# Patient Record
Sex: FEMALE | Race: WHITE | Employment: OTHER | URBAN - METROPOLITAN AREA
[De-identification: names, ages, dates, MRNs, and addresses within clinical notes are randomized per-mention and may not be internally consistent; named-entity substitution may affect disease eponyms.]

---

## 2017-02-01 ENCOUNTER — TRANSFERRED RECORDS (OUTPATIENT)
Dept: HEALTH INFORMATION MANAGEMENT | Facility: CLINIC | Age: 82
End: 2017-02-01

## 2017-02-06 ENCOUNTER — OFFICE VISIT (OUTPATIENT)
Dept: FAMILY MEDICINE | Facility: CLINIC | Age: 82
End: 2017-02-06
Payer: MEDICARE

## 2017-02-06 VITALS
TEMPERATURE: 97.8 F | WEIGHT: 167 LBS | OXYGEN SATURATION: 95 % | HEIGHT: 61 IN | DIASTOLIC BLOOD PRESSURE: 70 MMHG | BODY MASS INDEX: 31.53 KG/M2 | HEART RATE: 82 BPM | SYSTOLIC BLOOD PRESSURE: 180 MMHG

## 2017-02-06 DIAGNOSIS — I87.8 VENOUS STASIS OF LOWER EXTREMITY: ICD-10-CM

## 2017-02-06 DIAGNOSIS — J44.9 COPD, MODERATE (H): ICD-10-CM

## 2017-02-06 DIAGNOSIS — Z51.81 MEDICATION MONITORING ENCOUNTER: ICD-10-CM

## 2017-02-06 DIAGNOSIS — M15.0 PRIMARY OSTEOARTHRITIS INVOLVING MULTIPLE JOINTS: ICD-10-CM

## 2017-02-06 DIAGNOSIS — N18.9 ANEMIA IN CHRONIC RENAL DISEASE: ICD-10-CM

## 2017-02-06 DIAGNOSIS — D63.1 ANEMIA IN CHRONIC RENAL DISEASE: ICD-10-CM

## 2017-02-06 DIAGNOSIS — N18.4 CKD (CHRONIC KIDNEY DISEASE) STAGE 4, GFR 15-29 ML/MIN (H): Primary | ICD-10-CM

## 2017-02-06 DIAGNOSIS — G62.9 PERIPHERAL POLYNEUROPATHY: ICD-10-CM

## 2017-02-06 DIAGNOSIS — E78.5 HYPERLIPIDEMIA LDL GOAL <100: ICD-10-CM

## 2017-02-06 DIAGNOSIS — C34.12 MALIGNANT NEOPLASM OF UPPER LOBE OF LEFT LUNG (H): ICD-10-CM

## 2017-02-06 DIAGNOSIS — I10 HYPERTENSION GOAL BP (BLOOD PRESSURE) < 140/90: ICD-10-CM

## 2017-02-06 PROCEDURE — 99214 OFFICE O/P EST MOD 30 MIN: CPT | Performed by: FAMILY MEDICINE

## 2017-02-06 RX ORDER — FUROSEMIDE 20 MG
20 TABLET ORAL 2 TIMES DAILY
Qty: 30 TABLET | COMMUNITY
Start: 2017-02-06 | End: 2017-02-20

## 2017-02-06 RX ORDER — LOSARTAN POTASSIUM 50 MG/1
50 TABLET ORAL DAILY
Qty: 30 TABLET | Refills: 1 | Status: SHIPPED | OUTPATIENT
Start: 2017-02-06 | End: 2017-03-25

## 2017-02-06 RX ORDER — AMLODIPINE BESYLATE 5 MG/1
5 TABLET ORAL 2 TIMES DAILY
Qty: 90 TABLET | Refills: 3 | COMMUNITY
Start: 2017-02-06 | End: 2017-02-06

## 2017-02-06 RX ORDER — AMLODIPINE BESYLATE 5 MG/1
5 TABLET ORAL DAILY
Qty: 90 TABLET | Refills: 3 | Status: SHIPPED | OUTPATIENT
Start: 2017-02-06 | End: 2018-02-19

## 2017-02-06 NOTE — PATIENT INSTRUCTIONS
2/6/17    Amlodipine- Down to once a day 5 mg daily to reduce edema.  Losartan-Use as directed - 50 mg daily    Ibuprofen - 200 mg, 2 twice a day with food/fluids  Metoprolol - 50 mg twice daily.  Lasix - 20 mg twice daily  Aspirin 81 mg daily    [facility] ([facility] - [place]                        To reach your care team during and after hours:   [phone]  To reach our pharmacy:        [phone]    Clinic Hours                        Our clinic hours are:    Monday   7:30 am to 7:00 pm                  Tuesday through Friday 7:30 am to 5:00 pm                             Saturday   8:00 am to 12:00 pm      Sunday   Closed      Pharmacy Hours                        Our pharmacy hours are:    Monday   8:30 am to 7:00 pm       Tuesday to Friday  8:30 am to 6:00 pm                       Saturday    9:00 am to 1:00 pm              Sunday    Closed              There is also information available at our web site:  [email or web address]    If your provider ordered any lab tests and you do not receive the results within 10 business days, please call the clinic.    If you need a medication refill please contact your pharmacy.  Please allow 2-3 business days for your refill to be completed.    Our clinic offers telephone visits and e visits.  Please ask one of your team members to explain more.      Use [employer] (secure email communication and access to your chart) to send your primary care provider a message or make an appointment. Ask someone on your Team how to sign up for [employer].  Immunizations                      Immunization History   Administered Date(s) Administered     Influenza (High Dose) 3 valent vaccine 10/07/2010, 09/19/2012, 11/04/2013, 10/08/2014, 11/03/2015, 09/16/2016     Influenza (IIV3) 10/19/2004, 11/15/2005     Pneumococcal (PCV 13) 11/03/2015     Pneumococcal 23 valent 04/25/2006     TD (ADULT, 7+) 03/03/1998, 01/23/2006     TDAP (BOOSTRIX AGES 10-64) 08/27/2012     Zoster vaccine, live 10/24/2012         Health Maintenance                         Health Maintenance Due   Topic Date Due     URINE DRUG SCREEN Q1 YR  01/25/1950     Medicare Annual Wellness Visit  11/21/2015     Wellness Visit with your Primary Provider - yearly  11/21/2015     Osteoporosis Screening (Dexa) - every 3 years   01/11/2016     COPD ACTION PLAN Q1 YR  06/01/2016     Discuss Advance Directive Planning  07/15/2016

## 2017-02-06 NOTE — MR AVS SNAPSHOT
After Visit Summary   2/6/2017    Anat Correa    MRN: [id number]           Patient Information     Date Of Birth          1935        Visit Information        Provider Department      2/6/2017 3:40 PM Rashi Calle MD [facility]        Today's Diagnoses     Hypertension goal BP (blood pressure) < 140/90    -  1     CKD (chronic kidney disease) stage 4, GFR 15-29 ml/min (H)         Hyperlipidemia LDL goal <100         COPD, moderate         Malignant neoplasm of upper lobe of left lung (H)         Venous stasis of lower extremity         Peripheral polyneuropathy (H)         Anemia in chronic renal disease         Medication monitoring encounter           Care Instructions    2/6/17    Amlodipine- Down to once a day 5 mg daily to reduce edema.  Losartan-Use as directed - 50 mg daily    Ibuprofen - 200 mg, 2 twice a day with food/fluids  Metoprolol - 50 mg twice daily.  Lasix - 20 mg twice daily  Aspirin 81 mg daily    [facility] - [place]                        To reach your care team during and after hours:   [phone]  To reach our pharmacy:        [phone]    Clinic Hours                        Our clinic hours are:    Monday   7:30 am to 7:00 pm                  Tuesday through Friday 7:30 am to 5:00 pm                             Saturday   8:00 am to 12:00 pm      Sunday   Closed      Pharmacy Hours                        Our pharmacy hours are:    Monday   8:30 am to 7:00 pm       Tuesday to Friday  8:30 am to 6:00 pm                       Saturday    9:00 am to 1:00 pm              Sunday    Closed              There is also information available at our web site:  [email or web address]    If your provider ordered any lab tests and you do not receive the results within 10 business days, please call the clinic.    If you need a medication refill please contact your pharmacy.  Please allow 2-3 business days for your refill to be completed.    Our clinic offers  telephone visits and e visits.  Please ask one of your team members to explain more.      Use [employer] (secure email communication and access to your chart) to send your primary care provider a message or make an appointment. Ask someone on your Team how to sign up for [employer].  Immunizations                      Immunization History   Administered Date(s) Administered     Influenza (High Dose) 3 valent vaccine 10/07/2010, 09/19/2012, 11/04/2013, 10/08/2014, 11/03/2015, 09/16/2016     Influenza (IIV3) 10/19/2004, 11/15/2005     Pneumococcal (PCV 13) 11/03/2015     Pneumococcal 23 valent 04/25/2006     TD (ADULT, 7+) 03/03/1998, 01/23/2006     TDAP (BOOSTRIX AGES 10-64) 08/27/2012     Zoster vaccine, live 10/24/2012        Health Maintenance                         Health Maintenance Due   Topic Date Due     URINE DRUG SCREEN Q1 YR  01/25/1950     Medicare Annual Wellness Visit  11/21/2015     Wellness Visit with your Primary Provider - yearly  11/21/2015     Osteoporosis Screening (Dexa) - every 3 years   01/11/2016     COPD ACTION PLAN Q1 YR  06/01/2016     Discuss Advance Directive Planning  07/15/2016               Follow-ups after your visit        Who to contact     If you have questions or need follow up information about today's clinic visit or your schedule please contact [facility] directly at [phone].  Normal or non-critical lab and imaging results will be communicated to you by [employer], letter or phone within 4 business days after the clinic has received the results. If you do not hear from us within 7 days, please contact the clinic through [employer] or phone. If you have a critical or abnormal lab result, we will notify you by phone as soon as possible.  Submit refill requests through [employer] or call your pharmacy and they will forward the refill request to us. Please allow 3 business days for your refill to be completed.          Additional Information About Your Visit        [employer]  "Information     [employer] lets you send messages to your doctor, view your test results, renew your prescriptions, schedule appointments and more. To sign up, go to [email or web address] . Click on \"Log in\" on the left side of the screen, which will take you to the Welcome page. Then click on \"Sign up Now\" on the right side of the page.     You will be asked to enter the access code listed below, as well as some personal information. Please follow the directions to create your username and password.     Your access code is: [email or web address]  Expires: 2017  4:28 PM     Your access code will  in 90 days. If you need help or a new code, please call your [place] clinic or [phone].        Care EveryWhere ID     This is your Care EveryWhere ID. This could be used by other organizations to access your [place] medical records  [id number]        Your Vitals Were     Pulse Temperature Height BMI (Body Mass Index) Pulse Oximetry       82 97.8  F (36.6  C) (Oral) 5' 1\" (1.549 m) 31.57 kg/m2 95%        Blood Pressure from Last 3 Encounters:   17 180/70   16 158/72   11/10/16 152/60    Weight from Last 3 Encounters:   17 167 lb (75.751 kg)   16 162 lb (73.483 kg)   10/12/16 162 lb (73.483 kg)              Today, you had the following     No orders found for display         Today's Medication Changes          These changes are accurate as of: 17  4:28 PM.  If you have any questions, ask your nurse or doctor.               Start taking these medicines.        Dose/Directions    amLODIPine 5 MG tablet   Commonly known as:  NORVASC   Used for:  Hypertension goal BP (blood pressure) < 140/90, CKD (chronic kidney disease) stage 4, GFR 15-29 ml/min (H)   Started by:  Rashi Calle MD        Dose:  5 mg   Take 1 tablet (5 mg) by mouth daily   Quantity:  90 tablet   Refills:  3       losartan 50 MG tablet   Commonly known as:  COZAAR   Used for:  Hypertension goal BP (blood pressure) < 140/90, CKD " (chronic kidney disease) stage 4, GFR 15-29 ml/min (H)   Started by:  Rashi Calle MD        Dose:  50 mg   Take 1 tablet (50 mg) by mouth daily   Quantity:  30 tablet   Refills:  1         These medicines have changed or have updated prescriptions.        Dose/Directions    LASIX 20 MG tablet   This may have changed:  when to take this   Generic drug:  furosemide   Changed by:  Rashi Calle MD        Dose:  20 mg   Take 1 tablet (20 mg) by mouth 2 times daily   Quantity:  30 tablet   Refills:  0            Where to get your medicines      These medications were sent to [facility] [place] - [place], [place] - [address] [place] [address]  [address] [place] [id number]     Phone:  [phone]    - amLODIPine 5 MG tablet  - losartan 50 MG tablet             Primary Care Provider Office Phone # Fax #    Rashi Calle [phone]       [address] [address]        Thank you!     Thank you for choosing [facility]  for your care. Our goal is always to provide you with excellent care. Hearing back from our patients is one way we can continue to improve our services. Please take a few minutes to complete the written survey that you may receive in the mail after your visit with us. Thank you!             Your Updated Medication List - Protect others around you: Learn how to safely use, store and throw away your medicines at www.disposemymeds.org.          This list is accurate as of: 2/6/17  4:28 PM.  Always use your most recent med list.                   Brand Name Dispense Instructions for use    acetaminophen-codeine 300-30 MG per tablet    TYLENOL #3    90 tablet    Take 1-2 tablets by mouth every 6 hours as needed for moderate pain       albuterol 108 (90 BASE) MCG/ACT Inhaler    PROAIR HFA/PROVENTIL HFA/VENTOLIN HFA    3 Inhaler    Inhale 2 puffs into the lungs every 6 hours as needed for shortness of breath / dyspnea        amLODIPine 5 MG tablet    NORVASC    90 tablet    Take 1 tablet (5 mg) by mouth daily       calcium carbonate 500 MG chewable tablet    TUMS     Take 2 chew tab by mouth daily as needed for heartburn       IBUPROFEN PO      Take 400 mg by mouth every 6 hours as needed for moderate pain       ipratropium - albuterol 0.5 mg/2.5 mg/3 mL 0.5-2.5 (3) MG/3ML neb solution    DUONEB    90 vial    Take 1 vial (3 mLs) by nebulization every 6 hours as needed for shortness of breath / dyspnea or wheezing       LASIX 20 MG tablet   Generic drug:  furosemide     30 tablet    Take 1 tablet (20 mg) by mouth 2 times daily       losartan 50 MG tablet    COZAAR    30 tablet    Take 1 tablet (50 mg) by mouth daily       MAGIC MOUTHWASH (ENTER INGREDIENTS IN COMMENTS)     180 mL    Swish and spit 5-10 mLs in mouth every 6 hours as needed       METOPROLOL SUCCINATE ER PO      Take 100 mg by mouth daily (2 x 50mg = 100mg)       potassium chloride SA 10 MEQ CR tablet    K-DUR/KLOR-CON M    90 tablet    Take 1 tablet (10 mEq) by mouth daily

## 2017-02-06 NOTE — NURSING NOTE
"Chief Complaint   Patient presents with     Hypertension       Initial /78 mmHg  Pulse 82  Temp(Src) 97.8  F (36.6  C) (Oral)  Ht 5' 1\" (1.549 m)  Wt 167 lb (75.751 kg)  BMI 31.57 kg/m2  SpO2 95% Estimated body mass index is 31.57 kg/(m^2) as calculated from the following:    Height as of this encounter: 5' 1\" (1.549 m).    Weight as of this encounter: 167 lb (75.751 kg)..  BP completed using cuff size: james Rosario MA  "

## 2017-02-06 NOTE — PROGRESS NOTES
SUBJECTIVE:                                                    Anat Correa is a 82 year old female who presents to clinic today for the following health issues:    Anat presented to the clinic for bp issues.   Her left ring finger and right shoulder are causing her discomfort today.  Upon palpitation of some edema on her lower leg it was painful.       CKD/Hypertension Follow-up      Outpatient blood pressures are not being checked.    Low Salt Diet: no added salt    BP Readings from Last 3 Encounters:   02/06/17 180/70   12/07/16 158/72   11/10/16 152/60     Lipids    Recent Labs   Lab Test  03/09/16   0810  06/01/15   0818  09/25/12   0723   CHOL  211*  209*  156   HDL  54  55  49*   LDL  130*  128  83   TRIG  136  132  118   CHOLHDLRATIO   --   3.8  3.2     COPD stable    Lung cancer - stable followed by Dr Jay ERVIN - stable       Amount of exercise or physical activity:     Problems taking medications regularly: No    Medication side effects: With amlodipine some edema of the lower leg.      Diet: low salt        Problem list and histories reviewed & adjusted, as indicated.  Additional history: as documented.    Wt Readings from Last 4 Encounters:   02/06/17 167 lb (75.751 kg)   12/07/16 162 lb (73.483 kg)   10/12/16 162 lb (73.483 kg)   09/14/16 159 lb (72.122 kg)       Health Maintenance    Health Maintenance Due   Topic Date Due     URINE DRUG SCREEN Q1 YR  01/25/1950     MEDICARE ANNUAL WELLNESS VISIT  11/21/2015     WELLNESS VISIT Q1 YR (NO INBASKET)  11/21/2015     DEXA Q3 YR  01/11/2016     COPD ACTION PLAN Q1 YR  06/01/2016     ADVANCE DIRECTIVE PLANNING Q5 YRS (NO INBASKET)  07/15/2016       Current Problem List    Patient Active Problem List   Diagnosis     History of colonic polyps     Calculus of kidney     Lesion of plantar nerve     Osteoporosis     Primary iridocyclitis     Anemia     Hyperlipidemia LDL goal <100     OA (osteoarthritis)     Advanced directives,  counseling/discussion     Hypertension goal BP (blood pressure) < 140/90     COPD, moderate     Medication management     Vitamin D deficiency     CKD (chronic kidney disease) stage 4, GFR 15-29 ml/min (H)     Anemia in chronic renal disease     Secondary renal hyperparathyroidism (H)     Venous stasis of lower extremity     Peripheral neuropathy (H)     Hip pain, left     Chronic pain     Lung nodule     Nodule of left lung     Malignant neoplasm of upper lobe of left lung (H)       Past Medical History    Past Medical History   Diagnosis Date     Primary iridocyclitis 1998     iritis dr dominguez     Osteoporosis, unspecified 2/02     FOSAMAX  = upset stomach , pt declines further rx.      Lesion of plantar nerve 8/98     hay's neuroma dr connor     OA (osteoarthritis) 1998     lower ext worse katya knees     Calculus of kidney 1998     dr hope     Personal history of colonic polyps 7/98     dr araujo     Irritable bowel syndrome 7/03     Diverticulosis of colon (without mention of hemorrhage) 7/03     Internal hemorrhoids without mention of complication 7/03     Other ulcerative colitis 7/03     collangenous collitis- last colonoscopy 7/03      Hypertension goal BP (blood pressure) < 140/90 2005     COPD, moderate (H) 2004     moderate obstruction, with pulm htn - dr francisco did AAP     CKD (chronic kidney disease) stage 4, GFR 15-29 ml/min (H) 2008     dr mcdermott     Hemorrhage of gastrointestinal tract, unspecified 4/08     dr su     Anemia      Hyperlipidemia LDL goal <100 2006     Medication management      OA - 1 T#3 at HS with HX CKD     PONV (postoperative nausea and vomiting)      Venous stasis of lower extremity      Peripheral neuropathy (H)      early - burning at HS     Obesity (BMI 30.0-34.9)      Chronic pain      OA     Lung nodule 4/14, 3/16     Dr Thompson, 1.4 x 1.1 cm, lingula, PET neg 3/16     Malignant neoplasm of upper lobe of left lung (H) 7/16     Dr Thompson - x 2 nodules - moderately  differentiated adenocarcinoma (acinar predominant).  Final pathologic stage [id number], Final clinical stage IA, grade 2       Past Surgical History    Past Surgical History   Procedure Laterality Date     Hc hemorrhoidectomy,int/ext,simple       C appendectomy       C  delivery only  1965     , Low Cervical     Hc repair sliding inguinal hernia       mitra     Surgical history of -        mitra neck & back tumors     Hc colonoscopy thru stoma, diagnostic       IBS/diverticula/hemmorhoids dr araujo     Surgical history of -        neuroma excision - 2nd toe amputation     Surgical history of -   3/07     Rt IOL - dr jimenez     Surgical history of -        Lt IOL - dr jimenez     Hc esophagoscopy, diagnostic  , , 6/10     Gastric ulcer, healed, gastritis     Surgical history of -        Lt Knee replacement - dr gayle     Surgical history of -        Rt Knee replacement - dr gayle     Excise mass upper extremity  10/13/2011     Lt Forearm mass excision - dr ely     Excise mass upper extremity  2012     Lt Forearm mass excision - dr ely     Xr joint injection hip (hib)  2015     Rt - Dr Terry     Surgical history of -   9/15     Rt Second toe amputation - Dr White     Amputate toe(s) Right 2015     Procedure: AMPUTATE TOE(S);  Surgeon: Ashia White DPM, Pod;  Location: [facility] OR     Thoracoscopic wedge resection lung Left 2016     Procedure: THORACOSCOPIC WEDGE RESECTION LUNG;  Surgeon: Abdelrahman Thompson MD;  Location:  OR       Current Medications    Current Outpatient Prescriptions   Medication Sig Dispense Refill     furosemide (LASIX) 20 MG tablet Take 1 tablet (20 mg) by mouth 2 times daily 30 tablet      losartan (COZAAR) 50 MG tablet Take 1 tablet (50 mg) by mouth daily 30 tablet 1     amLODIPine (NORVASC) 5 MG tablet Take 1 tablet (5 mg) by mouth daily 90 tablet 3     acetaminophen-codeine (TYLENOL #3) 300-30 MG  per tablet Take 1-2 tablets by mouth every 6 hours as needed for moderate pain 90 tablet 0     MAGIC MOUTHWASH, ENTER INGREDIENTS IN COMMENTS, Swish and spit 5-10 mLs in mouth every 6 hours as needed 180 mL 1     albuterol (PROAIR HFA, PROVENTIL HFA, VENTOLIN HFA) 108 (90 BASE) MCG/ACT inhaler Inhale 2 puffs into the lungs every 6 hours as needed for shortness of breath / dyspnea 3 Inhaler 3     potassium chloride SA (K-DUR,KLOR-CON M) 10 MEQ tablet Take 1 tablet (10 mEq) by mouth daily 90 tablet 1     IBUPROFEN PO Take 400 mg by mouth every 6 hours as needed for moderate pain       calcium carbonate (TUMS) 500 MG chewable tablet Take 2 chew tab by mouth daily as needed for heartburn       METOPROLOL SUCCINATE ER PO Take 100 mg by mouth daily (2 x 50mg = 100mg)       ipratropium - albuterol 0.5 mg/2.5 mg/3 mL (DUONEB) 0.5-2.5 (3) MG/3ML nebulization Take 1 vial (3 mLs) by nebulization every 6 hours as needed for shortness of breath / dyspnea or wheezing 90 vial 3       Allergies    Allergies   Allergen Reactions     Prednisone      Yeast infection - swollen lips       Immunizations    Immunization History   Administered Date(s) Administered     Influenza (High Dose) 3 valent vaccine 10/07/2010, 09/19/2012, 11/04/2013, 10/08/2014, 11/03/2015, 09/16/2016     Influenza (IIV3) 10/19/2004, 11/15/2005     Pneumococcal (PCV 13) 11/03/2015     Pneumococcal 23 valent 04/25/2006     TD (ADULT, 7+) 03/03/1998, 01/23/2006     TDAP (BOOSTRIX AGES 10-64) 08/27/2012     Zoster vaccine, live 10/24/2012       Family History    Family History   Problem Relation Age of Onset     Cancer - colorectal Brother      Cancer - colorectal Brother      Breast Cancer Sister      CANCER Sister      lung     CANCER Sister      lung     CANCER Sister      lung     CEREBROVASCULAR DISEASE Mother      CEREBROVASCULAR DISEASE Father      Scleroderma Sister        Social History    Social History     Social History     Marital Status:       "Spouse Name: thanh     Number of Children: 1     Years of Education: 12     Occupational History     part-time [employer] section at [employer]       Social History Main Topics     Smoking status: Former Smoker -- 3.00 packs/day for 50 years     Types: Cigarettes     Quit date: 12/21/1993     Smokeless tobacco: Never Used      Comment: quit 1993     Alcohol Use: No     Drug Use: No      Comment: no herbal meds either      Sexual Activity: Not Currently      Comment:  for 24 years      Other Topics Concern     Caffeine Concern Yes     1 pot  qd - switched to decaff now     Special Diet Yes     Low salt     Exercise No     Seat Belt Yes     Self-Exams Yes     SBE encouraged motnhly      Parent/Sibling W/ Cabg, Mi Or Angioplasty Before 65f 55m? No     Social History Narrative    calcium - 3 large dairy servings/day - pt declines fosamax and other meds for her osteoporosis    flex sig/colonoscopy -last colonoscopy 7/03     sun precautions - discussed     mammogram - hasn't had one since 2001 at least - ordered     Td booster - 1/23/06    pneumovax -today 4/25/06    DEXA - pt declines repeat scan today 4/25/06 despite her osteoporosis     stool hemoccults - every year after age 40    ASA- easy bruising - can't take     mulvitamin - encouraged       All above reviewed and updated, all stable unless otherwise noted    Recent labs reviewed    ROS:  Constitutional, HEENT, cardiovascular, pulmonary, GI, , musculoskeletal, neuro, skin, endocrine and psych systems are negative, except as in HPI or otherwise noted       OBJECTIVE:                                                    /70 mmHg  Pulse 82  Temp(Src) 97.8  F (36.6  C) (Oral)  Ht 5' 1\" (1.549 m)  Wt 167 lb (75.751 kg)  BMI 31.57 kg/m2  SpO2 95%  Body mass index is 31.57 kg/(m^2).  GENERAL: healthy, alert and no distress, Overweight   EYES: Eyes grossly normal to inspection, extraocular movements - intact, and PERRL  HENT: ear canals- normal; TMs- " normal; Nose- normal; Mouth- no ulcers, no lesions  NECK: no tenderness, no adenopathy, no asymmetry, no masses, no stiffness; thyroid- normal to palpation  RESP: lungs clear to auscultation - no rales, no rhonchi, no wheezes  CV: regular rates and rhythm, normal S1 S2, no S3 or S4 and no murmur, no click or rub -  ABDOMEN: soft, no tenderness, no  hepatosplenomegaly, no masses, normal bowel sounds  MS: extremities- no gross deformities noted, some edema with pain on palpitation    SKIN: no suspicious lesions, no rashes  NEURO: strength and tone- normal, sensory exam- grossly normal, mentation- intact, speech- normal, reflexes- symmetric  BACK: no CVA tenderness, no paralumbar tenderness  PSYCH: Alert and oriented times 3; speech- coherent , normal rate and volume; able to articulate logical thoughts, able to abstract reason, no tangential thoughts, no hallucinations or delusions, affect- normal    DIAGNOSTICS/PROCEDURES:                                                      Results for orders placed or performed in visit on 12/07/16   Comprehensive metabolic panel   Result Value Ref Range    Sodium 133 133 - 144 mmol/L    Potassium 4.7 3.4 - 5.3 mmol/L    Chloride 99 94 - 109 mmol/L    Carbon Dioxide 25 20 - 32 mmol/L    Anion Gap 9 3 - 14 mmol/L    Glucose 83 70 - 99 mg/dL    Urea Nitrogen 28 7 - 30 mg/dL    Creatinine 1.35 (H) 0.52 - 1.04 mg/dL    GFR Estimate 38 (L) >60 mL/min/1.7m2    GFR Estimate If Black 46 (L) >60 mL/min/1.7m2    Calcium 9.7 8.5 - 10.1 mg/dL    Bilirubin Total 0.3 0.2 - 1.3 mg/dL    Albumin 3.8 3.4 - 5.0 g/dL    Protein Total 7.2 6.8 - 8.8 g/dL    Alkaline Phosphatase 75 40 - 150 U/L    ALT 20 0 - 50 U/L    AST 17 0 - 45 U/L   CBC with platelets   Result Value Ref Range    WBC 8.7 4.0 - 11.0 10e9/L    RBC Count 3.61 (L) 3.8 - 5.2 10e12/L    Hemoglobin 11.2 (L) 11.7 - 15.7 g/dL    Hematocrit 33.1 (L) 35.0 - 47.0 %    MCV 92 78 - 100 fl    MCH 31.0 26.5 - 33.0 pg    MCHC 33.8 31.5 - 36.5 g/dL     RDW 12.5 10.0 - 15.0 %    Platelet Count 313 150 - 450 10e9/L   Parathyroid Hormone Intact   Result Value Ref Range    Parathyroid Hormone Intact 12 12 - 72 pg/mL   Vitamin D Deficiency   Result Value Ref Range    Vitamin D Deficiency screening 31 20 - 75 ug/L        ASSESSMENT/PLAN:                                                        ICD-10-CM    1. CKD (chronic kidney disease) stage 4, GFR 15-29 ml/min (H) N18.4 losartan (COZAAR) 50 MG tablet     amLODIPine (NORVASC) 5 MG tablet   2. Hypertension goal BP (blood pressure) < 140/90 I10 losartan (COZAAR) 50 MG tablet     amLODIPine (NORVASC) 5 MG tablet     DISCONTINUED: amLODIPine (NORVASC) 5 MG tablet   3. Hyperlipidemia LDL goal <100 E78.5    4. COPD, moderate J44.9    5. Malignant neoplasm of upper lobe of left lung (H) C34.12    6. Venous stasis of lower extremity I87.8    7. Peripheral polyneuropathy (H) G62.9    8. Anemia in chronic renal disease N18.9     D63.1    9. Primary osteoarthritis involving multiple joints M15.0    10. Medication monitoring encounter Z51.81        Discussed treatment/modality options, including risk and benefits, she desires advised aspirin 81 mg po daily, heat, ice, further health care maintenance, medication change(s), new medication(s), OTC meds and observation. All diagnosis above reviewed and noted above, otherwise stable.  See [facility] orders for further details.  Follow up in 2 week(s) and as needed.    Changed amlodipine to once a day at 5 mg to reduce edema, prescribed losartan, and advised heat and ice for OA.  Continue current cares.    Health Maintenance Due   Topic Date Due     URINE DRUG SCREEN Q1 YR  01/25/1950     MEDICARE ANNUAL WELLNESS VISIT  11/21/2015     WELLNESS VISIT Q1 YR (NO INBASKET)  11/21/2015     DEXA Q3 YR  01/11/2016     COPD ACTION PLAN Q1 YR  06/01/2016     ADVANCE DIRECTIVE PLANNING Q5 YRS (NO INBASKET)  07/15/2016       See Patient Instructions  This document serves as a record of the  services and decisions personally performed and made by Rashi Calle MD FAAFP. It was created on their behalf by Dougie Vega, a trained medical scribe. The creation of this document is based the provider's statements to the medical scribe.  Dougie Vega February 6, 2017 3:53 PM             Rashi Calle MD FAAFP  [address]  [id number] [phone] Fax

## 2017-02-09 ENCOUNTER — TELEPHONE (OUTPATIENT)
Dept: FAMILY MEDICINE | Facility: CLINIC | Age: 82
End: 2017-02-09

## 2017-02-09 ENCOUNTER — ALLIED HEALTH/NURSE VISIT (OUTPATIENT)
Dept: FAMILY MEDICINE | Facility: CLINIC | Age: 82
End: 2017-02-09
Payer: MEDICARE

## 2017-02-09 VITALS — SYSTOLIC BLOOD PRESSURE: 160 MMHG | DIASTOLIC BLOOD PRESSURE: 68 MMHG

## 2017-02-09 DIAGNOSIS — I10 HYPERTENSION GOAL BP (BLOOD PRESSURE) < 140/90: Primary | ICD-10-CM

## 2017-02-09 PROCEDURE — 99207 ZZC NO CHARGE NURSE ONLY: CPT | Performed by: FAMILY MEDICINE

## 2017-02-09 NOTE — PROGRESS NOTES
Anat Correa is enrolled/participating in the retail pharmacy Blood Pressure Goals Achievement Program (BPGAP).  Anat Correa was evaluated at [facility] on February 9, 2017 at which time her blood pressure was:    BP Readings from Last 3 Encounters:   02/09/17 160/68   02/06/17 180/70   12/07/16 158/72     Reviewed lifestyle modifications for blood pressure control and reduction: including making healthy food choices, managing weight, getting regular exercise, smoking cessation, reducing alcohol consumption, monitoring blood pressure regularly.     Anat Correa is not experiencing symptoms.    Follow-Up: BP is not at goal of < 150/90 mmHg (patient 60+ years of age without diabetes), Recommended follow-up in 1 month at the pharmacy. Routing to PCP as an FYI.    Completed by: Thank you,  Alysa Hays [facility], Mgr [facility] [place] [phone]    Pt reports that she is stuck between amlodipine dosing of 2 daily and leg edema presumably from amlodipine.

## 2017-02-09 NOTE — TELEPHONE ENCOUNTER
Anat Correa is enrolled/participating in the retail pharmacy Blood Pressure Goals Achievement Program (BPGAP).  Anat Correa was evaluated at [facility] on February 9, 2017 at which time her blood pressure was:    BP Readings from Last 3 Encounters:   02/09/17 160/68   02/06/17 180/70   12/07/16 158/72     Reviewed lifestyle modifications for blood pressure control and reduction: including making healthy food choices, managing weight, getting regular exercise, smoking cessation, reducing alcohol consumption, monitoring blood pressure regularly.     Anat Correa is not experiencing symptoms.    Follow-Up: BP is not at goal of < 150/90 mmHg (patient 60+ years of age without diabetes), Recommended follow-up in 1 month at the pharmacy. Routing to PCP as an FYI.    Completed by: Thank you,  Alysa Hays [facility], Mgr [facility] [place] [phone]    Pt reports that she is stuck between amlodipine dosing of 2 daily and leg edema presumably from amlodipine.

## 2017-02-15 ENCOUNTER — TELEPHONE (OUTPATIENT)
Dept: FAMILY MEDICINE | Facility: CLINIC | Age: 82
End: 2017-02-15

## 2017-02-15 NOTE — TELEPHONE ENCOUNTER
Called pt     Joint Pain     Onset: ongoing    Description:   Location: right shoulder  Character: Sharp    Intensity: severe    Progression of Symptoms: worse    Accompanying Signs & Symptoms:  Other symptoms: radiation of pain to elbow and hand   History:   Previous similar pain: YES      Precipitating factors:   Trauma or overuse: YES    Alleviating factors:  Improved by: nothing       Therapies Tried and outcome: Pt advised we can order MRI and PT.  Pt has appt Monday and will wait until then.  Offered appt on Friday pt refused.    Cassandra Patel RN    [facility]

## 2017-02-20 ENCOUNTER — OFFICE VISIT (OUTPATIENT)
Dept: FAMILY MEDICINE | Facility: CLINIC | Age: 82
End: 2017-02-20
Payer: MEDICARE

## 2017-02-20 VITALS
BODY MASS INDEX: 31.34 KG/M2 | SYSTOLIC BLOOD PRESSURE: 146 MMHG | HEIGHT: 61 IN | OXYGEN SATURATION: 96 % | DIASTOLIC BLOOD PRESSURE: 60 MMHG | HEART RATE: 76 BPM | TEMPERATURE: 98.2 F | WEIGHT: 166 LBS

## 2017-02-20 DIAGNOSIS — E55.9 VITAMIN D DEFICIENCY: ICD-10-CM

## 2017-02-20 DIAGNOSIS — I10 HYPERTENSION GOAL BP (BLOOD PRESSURE) < 140/90: Primary | ICD-10-CM

## 2017-02-20 DIAGNOSIS — J44.9 COPD, MODERATE (H): ICD-10-CM

## 2017-02-20 DIAGNOSIS — I87.8 VENOUS STASIS OF LOWER EXTREMITY: ICD-10-CM

## 2017-02-20 DIAGNOSIS — Z51.81 MEDICATION MONITORING ENCOUNTER: ICD-10-CM

## 2017-02-20 DIAGNOSIS — G89.29 OTHER CHRONIC PAIN: ICD-10-CM

## 2017-02-20 DIAGNOSIS — M79.601 PAIN OF RIGHT UPPER EXTREMITY: ICD-10-CM

## 2017-02-20 DIAGNOSIS — G62.9 PERIPHERAL POLYNEUROPATHY: ICD-10-CM

## 2017-02-20 DIAGNOSIS — C34.12 MALIGNANT NEOPLASM OF UPPER LOBE OF LEFT LUNG (H): ICD-10-CM

## 2017-02-20 DIAGNOSIS — N18.4 CKD (CHRONIC KIDNEY DISEASE) STAGE 4, GFR 15-29 ML/MIN (H): ICD-10-CM

## 2017-02-20 DIAGNOSIS — M15.0 PRIMARY OSTEOARTHRITIS INVOLVING MULTIPLE JOINTS: ICD-10-CM

## 2017-02-20 PROCEDURE — 99214 OFFICE O/P EST MOD 30 MIN: CPT | Performed by: FAMILY MEDICINE

## 2017-02-20 RX ORDER — FUROSEMIDE 20 MG
TABLET ORAL
Qty: 270 TABLET | Refills: 3 | Status: SHIPPED | OUTPATIENT
Start: 2017-02-20 | End: 2017-03-02

## 2017-02-20 RX ORDER — POTASSIUM CHLORIDE 750 MG/1
20 TABLET, EXTENDED RELEASE ORAL DAILY
Qty: 180 TABLET | Refills: 3 | Status: SHIPPED | OUTPATIENT
Start: 2017-02-20 | End: 2017-06-02

## 2017-02-20 NOTE — PROGRESS NOTES
SUBJECTIVE:                                                    Anat Correa is a 82 year old female who presents to clinic today for the following health issues:      Patient has no complaints of issues related to hypertension.       Hypertension Follow-up - follows with Dr Gauthier - quite variable      Outpatient blood pressures are not being checked.    Low Salt Diet: no added salt    Currently on potassium, losartan, metoprolol, and lasix.        BP Readings from Last 3 Encounters:   02/20/17 146/60   02/09/17 160/68   02/06/17 180/70     Creatinine   Date Value Ref Range Status   12/07/2016 1.35 (H) 0.52 - 1.04 mg/dL Final     Recent Labs   Lab Test  03/09/16   0810  06/01/15   0818  09/25/12   0723   CHOL  211*  209*  156   HDL  54  55  49*   LDL  130*  128  83   TRIG  136  132  118   CHOLHDLRATIO   --   3.8  3.2     The patient has ongoing sharp pain in her right shoulder that is rated severe in intensity, lena so posterior in shoulder.   It radiates down to her elbow and hand, even makes fingernails hurt.   She has had a similar pain before, with no history of trauma and overuse.  She can't elevate arm above 30 degrees.   The patient reports lack of strength.   Denies redness, warmth, swelling.    Pt was advised we can order a MRI and PT, with phone call last week      Joint Pain - Rt upper extremity pain     Onset: ongoing    Description:   Location: right shoulder  Character: Sharp    Intensity: severe    Progression of Symptoms: worse    Accompanying Signs & Symptoms:  Other symptoms: radiation of pain to elbow and hand   History:   Previous similar pain: YES     Precipitating factors:   Trauma or overuse: YES    Alleviating factors:  Improved by: nothing     Therapies Tried and outcome: Pt advised we can order MRI and PT. Pt has appt Monday and will wait until then. Offered appt on Friday pt refused.     Cassandra Patel RN    Amount of exercise or physical activity: active at home - chores    Problems  taking medications regularly: No    Medication side effects: none    Diet: regular (no restrictions)      Problem list and histories reviewed & adjusted, as indicated.  Additional history: as documented    Wt Readings from Last 4 Encounters:   02/20/17 166 lb (75.3 kg)   02/06/17 167 lb (75.8 kg)   12/07/16 162 lb (73.5 kg)   10/12/16 162 lb (73.5 kg)       Health Maintenance    Health Maintenance Due   Topic Date Due     URINE DRUG SCREEN Q1 YR  01/25/1950     MEDICARE ANNUAL WELLNESS VISIT  11/21/2015     WELLNESS VISIT Q1 YR (NO INBASKET)  11/21/2015     DEXA Q3 YR  01/11/2016     COPD ACTION PLAN Q1 YR  06/01/2016     ADVANCE DIRECTIVE PLANNING Q5 YRS (NO INBASKET)  07/15/2016     LIPID MONITORING Q1 YEAR( NO INBASKET)  03/09/2017     MICROALBUMIN Q1 YEAR( NO INBASKET)  03/09/2017       Current Problem List    Patient Active Problem List   Diagnosis     History of colonic polyps     Calculus of kidney     Lesion of plantar nerve     Osteoporosis     Primary iridocyclitis     Anemia     Hyperlipidemia LDL goal <100     OA (osteoarthritis)     Advanced directives, counseling/discussion     Hypertension goal BP (blood pressure) < 140/90     COPD, moderate     Medication management     Vitamin D deficiency     CKD (chronic kidney disease) stage 4, GFR 15-29 ml/min (H)     Anemia in chronic renal disease     Secondary renal hyperparathyroidism (H)     Venous stasis of lower extremity     Peripheral neuropathy (H)     Hip pain, left     Chronic pain     Lung nodule     Nodule of left lung     Malignant neoplasm of upper lobe of left lung (H)       Past Medical History    Past Medical History   Diagnosis Date     Anemia      Calculus of kidney 1998     dr hope     Chronic pain      OA     CKD (chronic kidney disease) stage 4, GFR 15-29 ml/min (H) 2008     dr mcdermott     COPD, moderate (H) 2004     moderate obstruction, with pulm htn - dr francisco did AAP     Diverticulosis of colon (without mention of hemorrhage) 7/03      Hemorrhage of gastrointestinal tract, unspecified      dr su     Hyperlipidemia LDL goal <100      Hypertension goal BP (blood pressure) < 140/90      Internal hemorrhoids without mention of complication      Irritable bowel syndrome      Lesion of plantar nerve      hay's neuroma dr connor     Lung nodule , 3/16     Dr Thompson, 1.4 x 1.1 cm, lingula, PET neg 3/16     Malignant neoplasm of upper lobe of left lung (H)      Dr Thompson - x 2 nodules - moderately differentiated adenocarcinoma (acinar predominant).  Final pathologic stage [id number], Final clinical stage IA, grade 2     Medication management      OA - 1 T#3 at HS with HX CKD     OA (osteoarthritis)      lower ext worse katya knees     Obesity (BMI 30.0-34.9)      Osteoporosis, unspecified      FOSAMAX  = upset stomach , pt declines further rx.      Other ulcerative colitis      collangenous collitis- last colonoscopy       Peripheral neuropathy (H)      early - burning at HS     Personal history of colonic polyps      dr araujo     PONV (postoperative nausea and vomiting)      Primary iridocyclitis 1998     iritis dr dominguez     Venous stasis of lower extremity        Past Surgical History    Past Surgical History   Procedure Laterality Date     Hc hemorrhoidectomy,int/ext,simple       C appendectomy       C  delivery only  1965     , Low Cervical     Hc repair sliding inguinal hernia  1960     mitra     Surgical history of -        mitra neck & back tumors     Hc colonoscopy thru stoma, diagnostic       IBS/diverticula/hemmorhoids dr araujo     Surgical history of -        neuroma excision - 2nd toe amputation     Surgical history of -   3/07     Rt IOL - dr jimenez     Surgical history of -        Lt IOL - dr jimenez     Hc esophagoscopy, diagnostic  , , 6/10     Gastric ulcer, healed, gastritis     Surgical history of -        Lt Knee replacement  - dr gayle     Surgical history of -   9/09     Rt Knee replacement - dr gayle     Excise mass upper extremity  10/13/2011     Lt Forearm mass excision - dr ely     Excise mass upper extremity  12/31/2012     Lt Forearm mass excision - dr ely     Xr joint injection hip (hib)  2/2015     Rt - Dr Terry     Surgical history of -   9/15     Rt Second toe amputation - Dr White     Amputate toe(s) Right 9/17/2015     Procedure: AMPUTATE TOE(S);  Surgeon: Ashia White DPM, Pod;  Location: [facility] OR     Thoracoscopic wedge resection lung Left 7/12/2016     Procedure: THORACOSCOPIC WEDGE RESECTION LUNG;  Surgeon: Abdelrahman Thompson MD;  Location: [facility] OR       Current Medications    Current Outpatient Prescriptions   Medication Sig Dispense Refill     furosemide (LASIX) 20 MG tablet 40 mg in am and 20 mg at noon 270 tablet 3     potassium chloride SA (K-DUR/KLOR-CON M) 10 MEQ CR tablet Take 2 tablets (20 mEq) by mouth daily 180 tablet 3     losartan (COZAAR) 50 MG tablet Take 1 tablet (50 mg) by mouth daily 30 tablet 1     amLODIPine (NORVASC) 5 MG tablet Take 1 tablet (5 mg) by mouth daily 90 tablet 3     acetaminophen-codeine (TYLENOL #3) 300-30 MG per tablet Take 1-2 tablets by mouth every 6 hours as needed for moderate pain 90 tablet 0     MAGIC MOUTHWASH, ENTER INGREDIENTS IN COMMENTS, Swish and spit 5-10 mLs in mouth every 6 hours as needed 180 mL 1     albuterol (PROAIR HFA, PROVENTIL HFA, VENTOLIN HFA) 108 (90 BASE) MCG/ACT inhaler Inhale 2 puffs into the lungs every 6 hours as needed for shortness of breath / dyspnea 3 Inhaler 3     IBUPROFEN PO Take 400 mg by mouth every 6 hours as needed for moderate pain       calcium carbonate (TUMS) 500 MG chewable tablet Take 2 chew tab by mouth daily as needed for heartburn       METOPROLOL SUCCINATE ER PO Take 100 mg by mouth daily (2 x 50mg = 100mg)       ipratropium - albuterol 0.5 mg/2.5 mg/3 mL (DUONEB) 0.5-2.5 (3) MG/3ML nebulization Take 1 vial (3 mLs)  by nebulization every 6 hours as needed for shortness of breath / dyspnea or wheezing 90 vial 3     [DISCONTINUED] furosemide (LASIX) 20 MG tablet Take 1 tablet (20 mg) by mouth 2 times daily 30 tablet        Allergies    Allergies   Allergen Reactions     Prednisone      Yeast infection - swollen lips       Immunizations    Immunization History   Administered Date(s) Administered     Influenza (High Dose) 3 valent vaccine 10/07/2010, 09/19/2012, 11/04/2013, 10/08/2014, 11/03/2015, 09/16/2016     Influenza (IIV3) 10/19/2004, 11/15/2005     Pneumococcal (PCV 13) 11/03/2015     Pneumococcal 23 valent 04/25/2006     TD (ADULT, 7+) 03/03/1998, 01/23/2006     TDAP (BOOSTRIX AGES 10-64) 08/27/2012     Zoster vaccine, live 10/24/2012       Family History    Family History   Problem Relation Age of Onset     CEREBROVASCULAR DISEASE Mother      CEREBROVASCULAR DISEASE Father      Cancer - colorectal Brother      Cancer - colorectal Brother      Breast Cancer Sister      CANCER Sister      lung     CANCER Sister      lung     CANCER Sister      lung     Scleroderma Sister        Social History    Social History     Social History     Marital status:      Spouse name: thanh     Number of children: 1     Years of education: 12     Occupational History     part-time [employer] section at [employer]       Social History Main Topics     Smoking status: Former Smoker     Packs/day: 3.00     Years: 50.00     Types: Cigarettes     Quit date: 12/21/1993     Smokeless tobacco: Never Used      Comment: quit 1993     Alcohol use No     Drug use: No      Comment: no herbal meds either      Sexual activity: Not Currently      Comment:  for 24 years      Other Topics Concern     Caffeine Concern Yes     1 pot  qd - switched to decaff now     Special Diet Yes     Low salt     Exercise No     Seat Belt Yes     Self-Exams Yes     SBE encouraged motnhly      Parent/Sibling W/ Cabg, Mi Or Angioplasty Before 65f 55m? No     Social  "History Narrative    calcium - 3 large dairy servings/day - pt declines fosamax and other meds for her osteoporosis    flex sig/colonoscopy -last colonoscopy 7/03     sun precautions - discussed     mammogram - hasn't had one since 2001 at least - ordered     Td booster - 1/23/06    pneumovax -today 4/25/06    DEXA - pt declines repeat scan today 4/25/06 despite her osteoporosis     stool hemoccults - every year after age 40    ASA- easy bruising - can't take     mulvitamin - encouraged       All above reviewed and updated, all stable unless otherwise noted    Recent labs reviewed    ROS:  Constitutional, HEENT, cardiovascular, pulmonary, GI, , musculoskeletal, neuro, skin, endocrine and psych systems are negative, except as in HPI or otherwise noted       OBJECTIVE:                                                    /60  Pulse 76  Temp 98.2  F (36.8  C) (Oral)  Ht 5' 1\" (1.549 m)  Wt 166 lb (75.3 kg)  SpO2 96%  BMI 31.37 kg/m2  Body mass index is 31.37 kg/(m^2).  GENERAL: healthy, alert and no distress Overweight   EYES: Eyes grossly normal to inspection, extraocular movements - intact, and PERRL  HENT: ear canals- normal; TMs- normal; Nose- normal; Mouth- no ulcers, no lesions  NECK: no tenderness, no adenopathy, no asymmetry, no masses, no stiffness; thyroid- normal to palpation- Limited ROM     RESP: lungs clear to auscultation - no rales, no rhonchi, no wheezes- Crackles   CV: regular rates and rhythm, normal S1 S2, no S3 or S4 and no murmur, no click or rub -  ABDOMEN: soft, no tenderness, no  hepatosplenomegaly, no masses, normal bowel sounds  MS: extremities- no gross deformities noted, no edema - Pain with external rotation- Right arm Pain with abduction at approx 30 degrees and weak- Right arm - Neck ROM 50 % - limited rt shoulder rom - pain over rt anterior posterior shoulder to palpation  SKIN: no suspicious lesions, no rashes-   NEURO: strength and tone- normal, sensory exam- grossly normal, " mentation- intact, speech- normal, reflexes- symmetric  BACK: no CVA tenderness, no paralumbar tenderness  PSYCH: Alert and oriented times 3; speech- coherent , normal rate and volume; able to articulate logical thoughts, able to abstract reason, no tangential thoughts, no hallucinations or delusions, affect- normal    DIAGNOSTICS/PROCEDURES:                                                      Results for orders placed or performed in visit on 12/07/16   Comprehensive metabolic panel   Result Value Ref Range    Sodium 133 133 - 144 mmol/L    Potassium 4.7 3.4 - 5.3 mmol/L    Chloride 99 94 - 109 mmol/L    Carbon Dioxide 25 20 - 32 mmol/L    Anion Gap 9 3 - 14 mmol/L    Glucose 83 70 - 99 mg/dL    Urea Nitrogen 28 7 - 30 mg/dL    Creatinine 1.35 (H) 0.52 - 1.04 mg/dL    GFR Estimate 38 (L) >60 mL/min/1.7m2    GFR Estimate If Black 46 (L) >60 mL/min/1.7m2    Calcium 9.7 8.5 - 10.1 mg/dL    Bilirubin Total 0.3 0.2 - 1.3 mg/dL    Albumin 3.8 3.4 - 5.0 g/dL    Protein Total 7.2 6.8 - 8.8 g/dL    Alkaline Phosphatase 75 40 - 150 U/L    ALT 20 0 - 50 U/L    AST 17 0 - 45 U/L   CBC with platelets   Result Value Ref Range    WBC 8.7 4.0 - 11.0 10e9/L    RBC Count 3.61 (L) 3.8 - 5.2 10e12/L    Hemoglobin 11.2 (L) 11.7 - 15.7 g/dL    Hematocrit 33.1 (L) 35.0 - 47.0 %    MCV 92 78 - 100 fl    MCH 31.0 26.5 - 33.0 pg    MCHC 33.8 31.5 - 36.5 g/dL    RDW 12.5 10.0 - 15.0 %    Platelet Count 313 150 - 450 10e9/L   Parathyroid Hormone Intact   Result Value Ref Range    Parathyroid Hormone Intact 12 12 - 72 pg/mL   Vitamin D Deficiency   Result Value Ref Range    Vitamin D Deficiency screening 31 20 - 75 ug/L        ASSESSMENT/PLAN:                                                        ICD-10-CM    1. Hypertension goal BP (blood pressure) < 140/90 I10 furosemide (LASIX) 20 MG tablet     potassium chloride SA (K-DUR/KLOR-CON M) 10 MEQ CR tablet   2. CKD (chronic kidney disease) stage 4, GFR 15-29 ml/min (H) N18.4 furosemide (LASIX)  20 MG tablet   3. Pain of right upper extremity M79.601 PHYSICAL THERAPY REFERRAL   4. Primary osteoarthritis involving multiple joints M15.0    5. Malignant neoplasm of upper lobe of left lung (H) C34.12    6. COPD, moderate J44.9    7. Vitamin D deficiency E55.9    8. Venous stasis of lower extremity I87.8    9. Peripheral polyneuropathy (H) G62.9    10. Other chronic pain G89.29    11. Medication monitoring encounter Z51.81        Discussed treatment/modality options, including risk and benefits, she desires advised alcohol consumption 1oz per day or less, advised aspirin 81 mg po daily, advised 1 multivitamin per day, advised calcium 7611-7401 mg/d and Vitamin D 800-1200 IU/d, advised diet and exercise, advised opthalmologist every 1-2 years, further diagnostic(s), further health care maintenance, medication change(s), medication refill(s), heat, ice, and stretching, OTC meds and observation. All diagnosis above reviewed and noted above, otherwise stable.  See [facility] [place] orders for further details.  Follow up in 1 week(s) and as needed.    Advised tylenol/T#3 for rt shoulder related pain, advised to use limited NSAID's as directed, lasix increased to 2 in morning, potassium 2 in morning, PT- ordered, heat, ice and stretching, sx cares, BP follow-up, further HCM, consider MRI neck/shoulder, consider nephrology/cardiology consults.        Health Maintenance Due   Topic Date Due     URINE DRUG SCREEN Q1 YR  01/25/1950     MEDICARE ANNUAL WELLNESS VISIT  11/21/2015     WELLNESS VISIT Q1 YR (NO INBASKET)  11/21/2015     DEXA Q3 YR  01/11/2016     COPD ACTION PLAN Q1 YR  06/01/2016     ADVANCE DIRECTIVE PLANNING Q5 YRS (NO INBASKET)  07/15/2016     LIPID MONITORING Q1 YEAR( NO INBASKET)  03/09/2017     MICROALBUMIN Q1 YEAR( NO INBASKET)  03/09/2017       See Patient Instructions  This document serves as a record of the services and decisions personally performed and made by Rashi Calle MD [facility]. It was created on their  behalf by Dougie Vega, a trained medical scribe. The creation of this document is based the provider's statements to the medical scribe.  Dougie Vega February 20, 2017 7:52 AM             Rashi Calle MD [address]  [address] [phone] Fax

## 2017-02-20 NOTE — NURSING NOTE
"Chief Complaint   Patient presents with     Hypertension       Initial /68  Pulse 76  Temp 98.2  F (36.8  C) (Oral)  Ht 5' 1\" (1.549 m)  Wt 166 lb (75.3 kg)  SpO2 96%  BMI 31.37 kg/m2 Estimated body mass index is 31.37 kg/(m^2) as calculated from the following:    Height as of this encounter: 5' 1\" (1.549 m).    Weight as of this encounter: 166 lb (75.3 kg)..  BP completed using cuff size: regular  Sydney Rosaroi MA  "

## 2017-02-20 NOTE — PATIENT INSTRUCTIONS
2/20/17  Tylenol/Tylenol 3 for shoulder-  Use as directed  Ibuprofen- Use as directed  Lasix- Increased to 2 in morning and 1 at noon  Potassium- 2 in morning.      PT- Ordered  Heat, ice, and stretching   [facility]                        To reach your care team during and after hours:   [phone]  To reach our pharmacy:        [phone]    Clinic Hours                        Our clinic hours are:    Monday   7:30 am to 7:00 pm                  Tuesday through Friday 7:30 am to 5:00 pm                             Saturday   8:00 am to 12:00 pm      Sunday   Closed      Pharmacy Hours                        Our pharmacy hours are:    Monday   8:30 am to 7:00 pm       Tuesday to Friday  8:30 am to 6:00 pm                       Saturday    9:00 am to 1:00 pm              Sunday    Closed              There is also information available at our web site:  [email or web address]    If your provider ordered any lab tests and you do not receive the results within 10 business days, please call the clinic.    If you need a medication refill please contact your pharmacy.  Please allow 2-3 business days for your refill to be completed.    Our clinic offers telephone visits and e visits.  Please ask one of your team members to explain more.      Use [employer] (secure email communication and access to your chart) to send your primary care provider a message or make an appointment. Ask someone on your Team how to sign up for [employer].  Immunizations                      Immunization History   Administered Date(s) Administered     Influenza (High Dose) 3 valent vaccine 10/07/2010, 09/19/2012, 11/04/2013, 10/08/2014, 11/03/2015, 09/16/2016     Influenza (IIV3) 10/19/2004, 11/15/2005     Pneumococcal (PCV 13) 11/03/2015     Pneumococcal 23 valent 04/25/2006     TD (ADULT, 7+) 03/03/1998, 01/23/2006     TDAP (BOOSTRIX AGES 10-64) 08/27/2012     Zoster vaccine, live 10/24/2012        Health Maintenance                          Health Maintenance Due   Topic Date Due     URINE DRUG SCREEN Q1 YR  01/25/1950     Medicare Annual Wellness Visit  11/21/2015     Wellness Visit with your Primary Provider - yearly  11/21/2015     Osteoporosis Screening (Dexa) - every 3 years   01/11/2016     COPD ACTION PLAN Q1 YR  06/01/2016     Discuss Advance Directive Planning  07/15/2016     Cholesterol Lab - yearly  03/09/2017     Microalbumin Lab - yearly  03/09/2017                       Neck Strain             What is neck strain?   A strain is a tear of a muscle or tendon. Your neck is surrounded by small muscles that are close to the vertebrae, and larger muscles that make up the visible muscles of the neck.   How does it occur?   Neck strains most often happen when the head and neck are forcibly moved, such as in a whiplash injury or from contact in sports. Sometimes strains happen from an awkward position during sleep or poor posture while working at a computer.   What are the symptoms?   Symptoms include pain in your neck. When the neck muscles go into spasm you feel hard, tight muscles in your neck that are very tender to the touch. You have pain when you move your head to the side or when you try to move your head up or down. The spasming muscles can cause headaches.   The pain may start right after an injury or may take a few hours or days to develop. Other symptoms may include neck stiffness, dizziness, or unusual sensations, such as burning or a pins-and-needles feeling.   How is it diagnosed?   Your healthcare provider will examine your neck. You may have X-rays to make sure the vertebrae are not injured.   How is it treated?   Right after the injury, put an ice pack, gel pack, or package of frozen vegetables, wrapped in a cloth on the area every 3 to 4 hours, for up to 20 minutes at a time.   If you still have neck pain several days after the injury and after using ice, your healthcare provider may recommend using moist heat on your  neck. You can buy a moist-heat pad or make your own by soaking towels in hot water. Put moist heat on your neck for up to 20 minutes at a time every 3 or 4 hours until the pain goes away. You may find that it helps to alternate putting heat and ice on your neck.   Your healthcare provider may prescribe an anti-inflammatory medicine and a neck collar to support your neck and prevent further injury. Nonsteroidal anti-inflammatory medicines (NSAIDs) may cause stomach bleeding and other problems. These risks increase with age. Read the label and take as directed. Unless recommended by your healthcare provider, do not take for more than 10 days.   Follow your provider's instructions for doing exercises to help you recover.   How long will the effects last?   How long it takes to recover depends on your age, health, and if you have had a previous neck injury. Recovery time also depends on the severity of the injury. A mild injury may recover within a few weeks, whereas a severe injury may take 6 weeks or longer to recover. Ask your healthcare provider when you can return to your normal activities.   How can I prevent neck strain?   Neck strain is best prevented by having strong and supple neck muscles. If you have a job that requires you to be in one position all day (for example, work at a computer all day), it is very important to take breaks and stretch your neck muscles. Your provider will give you exercises to do while taking breaks from work.     Published by [employer].  This content is reviewed periodically and is subject to change as new health information becomes available. The information is intended to inform and educate and is not a replacement for medical evaluation, advice, diagnosis or treatment by a healthcare professional.   Written by Lucio Hastings MD, for [employer].   ? 2010 [employer] and/or its affiliates. All Rights Reserved.   Copyright   [employer] 2011  Adult Health  Advisor                    Neck Strain Rehabilitation Exercises                Do these exercises only if you do not have pain or numbness running down your arm or into your hand. The first 6 exercises are meant to help your neck remain flexible. Do not do any exercises that make your neck pain worse.   Active neck rotation: Sit in a chair, keeping your neck, shoulders, and trunk straight. First, turn your head slowly to the right. Move it gently to the point of pain. Move it back to the forward position. Relax. Then move it to the left. Repeat 10 times.   Active neck side bend: Sit in a chair, keeping your neck, shoulders, and trunk straight. Tilt your head so that your right ear moves toward your right shoulder. Move it to the point of pain. Then tilt your head so your left ear moves toward your left shoulder. Make sure you do not rotate your head while tilting or raise your shoulder toward your head. Repeat this exercise 10 times in each direction.   Neck flexion: Sit in a chair, keeping your neck, shoulders, and trunk straight. Bend your head forward, reaching your chin toward your chest. Hold for 5 seconds. Repeat 10 times.   Neck extension: Sit in a chair, keeping your neck, shoulders, and trunk straight. Bring your head back so that your chin is pointing toward the ceiling. Repeat 10 times.   Chin tuck: Place your fingertips on your chin and gently push your head straight back as if you are trying to make a double chin. Keep looking forward as your head moves back. Hold 5 seconds and repeat 5 times.   Scalene stretch: Sit or stand and clasp both hands behind your back. Lower your left shoulder and tilt your head toward the right until you feel a stretch. Hold this position for 15 to 30 seconds and then come back to the starting position. Then lower your right shoulder and tilt your head toward the left. Hold for 15 to 30 seconds. Repeat 3 times on each side.   Isometric neck flexion: Sit tall, eyes straight  ahead, and chin level. Place your palm against your forehead and gently push your forehead into your palm. Hold for 5 seconds and release. Do 3 sets of 5.   Isometric neck extension: Sit tall, eyes straight ahead, and chin level. Clasp your hands together and place them behind your head. Press the back of your head into your palms. Hold 5 seconds and release. Do 3 sets of 5.   Isometric neck side bend: Sit tall, eyes straight ahead, and chin level. Place the palm of your hand at the side of your temple and press your temple into the palm of your hand. Hold 5 seconds and release. Do 3 sets of 5 on each side.   Head lift: Neck curl: Lie on your back with your knees bent and your feet flat on the floor. Tuck your chin and lift your head toward your chest, keeping your shoulders on the floor. Hold for 5 seconds. Repeat 10 times.   Head lift: Neck side bend: Lie on your right side with your right arm lying straight out. Rest your head on your arm, then lift your head slowly toward your left shoulder. Hold for 5 seconds. Repeat 10 times. Switch to your left side and repeat the exercise, lifting your head toward your right shoulder.   Neck extension on hands and knees: Get on your hands and knees and look down at the floor. Keep your back straight and let your head slowly drop toward your chest. Then tuck your chin slightly and lift your head up until your neck is level with your back. Hold this position for 5 seconds. Repeat 10 times.   Scapular squeeze: While sitting or standing with your arms by your sides, squeeze your shoulder blades together and hold for 5 seconds. Do 3 sets of 10.   Published by [employer].  This content is reviewed periodically and is subject to change as new health information becomes available. The information is intended to inform and educate and is not a replacement for medical evaluation, advice, diagnosis or treatment by a healthcare professional.   Written by Geno Santana, MS, PT, and  Kriss Dietz, PT, [facility], [facility], for [facility].   ? 2010 [facility] and/or its affiliates. All Rights Reserved.   Copyright   [employer] 2011  Adult Health Advisor                                                        Rotator Cuff Injury   What is a rotator cuff injury?   A rotator cuff injury is a strain or tear in the group of tendons and muscles that hold your shoulder joint together and help move your shoulder.   How does it occur?   A rotator cuff injury may result from:     using your arm to break a fall     falling onto your arm     lifting a heavy object     use of your shoulder in sports with a repetitive overhead movement, such as swimming, baseball (mainly pitchers), football, and tennis, which gradually strains the tendon     manual labor such as painting, plastering, raking leaves, or housework   What are the symptoms?   The symptoms of a torn rotator cuff are:     arm and shoulder pain     shoulder weakness     shoulder tenderness     loss of shoulder movement, especially overhead   How is it diagnosed?   Your healthcare provider will examine you and check your shoulder for pain, tenderness, and loss of motion as you move your arm in all directions. Your provider will ask if your shoulder pain began suddenly or gradually. You may have an X-ray to make sure there are not any fractures or bone spurs.   Based on these results, you may have other tests or procedures right away or later, such as:     magnetic resonance imaging (MRI), which creates images of your shoulder and surrounding structures with sound waves     an arthrogram, which is an X-ray or MRI that is taken after a special dye has been injected into your shoulder joint to outline its soft structures     arthroscopy, a surgical procedure in which a small instrument is inserted into your shoulder joint so your provider can look directly at your rotator cuff   What is the treatment?   A tendon in your shoulder can be inflamed,  partially torn, or completely torn. What is done about it depends on how torn it is and how much it hurts.   If your tear is a minor one, it can be left to heal by itself if it does not interfere with your everyday activities. Your treatment plan should include:     proper sitting posture, in which your head and shoulders are balanced     rest for your shoulder, which means avoiding strenuous activity or any overhead motion that causes pain     ice packs at least once a day, and preferably 2 or 3 times a day     doing the exercises your healthcare provider gives you     anti-inflammatory drugs. Adults aged 65 years and older should not take non-steroidal anti-inflammatory medicine for more than 7 days without their healthcare provider's approval.     physical therapy to strengthen your shoulder as it heals   If you have a bad tear, you may need to have it repaired by arthroscopy. Arthroscopy can be used to perform surgery on a joint as well as to see inside the joint. The rough edges of a torn tendon can be trimmed and left to heal. Larger tears can be stitched back together. After surgery, your treatment plan will include physical therapy to strengthen your shoulder as it heals.   How long will the effects last?   Full recovery depends on what is torn and how it is treated.   When can I return to my normal activities?   Everyone recovers from an injury at a different rate. Return to your activities will be determined by how soon your shoulder recovers, not by how many days or weeks it has been since your injury has occurred. In general, the longer you have symptoms before you start treatment, the longer it will take to get better. The goal of rehabilitation is to return you to your normal activities as soon as is safely possible. If you return too soon you may worsen your injury.   You may safely return to your normal activities when:     Your injured shoulder has full range of motion without pain.     Your injured  shoulder has regained normal strength compared to the uninjured shoulder.   What can be done to help prevent this from recurring?   The best way to prevent a recurrence is to strengthen your shoulder muscles and keep them in peak condition with shoulder exercises.           Rotator Cuff Strain Rehabilitation Exercises                  You may do all of these exercises right away.   Isometric shoulder external rotation:  a doorway with your elbow bent 90 degrees and the back of the wrist on your injured side pressed against the door frame. Try to press your hand outward into the door frame. Hold for 5 seconds. Do 3 sets of 10.   Isometric shoulder internal rotation:  a doorway with your elbow bent 90 degrees and the front of the wrist on your injured side pressed against the door frame. Try to press your palm into the door frame. Hold for 5 seconds. Do 3 sets of 10.   Wand exercise: Flexion: Stand upright and hold a stick in both hands, palms down. Stretch your arms by lifting them over your head, keeping your arms straight. Hold for 5 seconds and return to the starting position. Repeat 10 times.   Wand exercise: Extension: Stand upright and hold a stick in both hands behind your back. Move the stick away from your back. Hold the end position for 5 seconds. Relax and return to the starting position. Repeat 10 times.   Wand exercise: External rotation: Lie on your back and hold a stick in both hands, palms up. Your upper arms should be resting on the floor with your elbows at your sides and bent 90 degrees. Use your uninjured arm to push your injured arm out away from your body. Keep the elbow of your injured arm at your side while it is being pushed. Hold the stretch for 5 seconds. Repeat 10 times.   Wand exercise: Shoulder abduction and adduction: Stand and hold a stick with both hands, palms facing away from your body. Rest the stick against the front of your thighs. Use your uninjured arm to push  your injured arm out to the side and up as high as possible. Keep your arms straight. Hold for 5 seconds. Repeat 10 times.   Resisted shoulder external rotation: Stand sideways next to a door with your injured arm farther from the door. Tie a knot in the end of the tubing and shut the knot in the door at waist level. Hold the other end of the tubing with the hand of your injured arm. Rest the hand of your injured arm across your stomach. Keeping your elbow in at your side, rotate your arm outward and away from your waist. Make sure you keep your elbow bent 90 degrees and your forearm parallel to the floor. Repeat 10 times. Build up to 3 sets of 10.   Resisted shoulder internal rotation: Stand sideways next to a door with your injured arm closest to the door. Tie a knot in the end of the tubing and shut the knot in the door at waist level. Hold the other end of the tubing with the hand of your injured arm. Bend the elbow of your injured arm 90 degrees. Keeping your elbow in at your side, rotate your forearm across your body and then back to the starting position. Make sure you keep your forearm parallel to the floor. Do 3 sets of 10.   Scaption: Stand with your arms at your sides and with your elbows straight. Slowly raise your arms to eye level. As you raise your arms, spread them apart so that they are only slightly in front of your body (at about a 30-degree angle to the front of your body). Point your thumbs toward the ceiling. Hold for 2 seconds and lower your arms slowly. Do 3 sets of 10. Progress to holding a soup can or light weight when you are doing the exercise and increase the weight as the exercise gets easier.   Side-lying external rotation: Lie on your uninjured side with your injured arm at your side and your elbow bent 90 degrees. Keeping your elbow against your side, raise your forearm toward the ceiling and hold for 2 seconds. Slowly lower your arm. Do 3 sets of 10. You can start doing this  "exercise holding a soup can or light weight and gradually increase the weight as long as there is no pain.   Horizontal abduction: Lie on your stomach on a table or the edge of a bed with the arm on your injured side hanging down over the edge. Raise your arm out to the side, with your thumb pointed toward the ceiling, until your arm is parallel to the floor. Hold for 2 seconds and then lower it slowly. Start this exercise with no weight. As you get stronger, add a light weight or hold a soup can. Do 3 sets of 10.   Push-up with a plus: Begin on the floor on your hands and knees. Keep your arms a shoulder width apart and lift your feet off the floor. Arch your back as high as possible and round your shoulders (this is the \"plus\" part or the exercise). Bend your elbows and lower your body to the floor. Return to the starting position and arch your back again. Do 3 sets of 10.   Published by [employer].  This content is reviewed periodically and is subject to change as new health information becomes available. The information is intended to inform and educate and is not a replacement for medical evaluation, advice, diagnosis or treatment by a healthcare professional.   Written by Geno Santana, MS, PT, and Kriss Dietz, PT, [facility], [facility], for [employer].   ? 2010 [employer] and/or its affiliates. All Rights Reserved.   Copyright   [employer] Reference Systems 2011  Adult Health Advisor                "

## 2017-02-20 NOTE — MR AVS SNAPSHOT
After Visit Summary   2/20/2017    Anat Correa    MRN: [id number]           Patient Information     Date Of Birth          1935        Visit Information        Provider Department      2/20/2017 7:40 AM Rashi Calle MD [facility]        Today's Diagnoses     Hypertension goal BP (blood pressure) < 140/90    -  1    CKD (chronic kidney disease) stage 4, GFR 15-29 ml/min (H)        Pain of right upper extremity        Primary osteoarthritis involving multiple joints        Malignant neoplasm of upper lobe of left lung (H)        COPD, moderate        Vitamin D deficiency        Venous stasis of lower extremity        Peripheral polyneuropathy (H)        Other chronic pain        Medication monitoring encounter          Care Instructions    2/20/17  Tylenol/Tylenol 3 for shoulder-  Use as directed  Ibuprofen- Use as directed  Lasix- Increased to 2 in morning and 1 at noon  Potassium- 2 in morning.      PT- Ordered  Heat, ice, and stretching   [facility]                        To reach your care team during and after hours:   [phone]  To reach our pharmacy:        [phone]    Clinic Hours                        Our clinic hours are:    Monday   7:30 am to 7:00 pm                  Tuesday through Friday 7:30 am to 5:00 pm                             Saturday   8:00 am to 12:00 pm      Sunday   Closed      Pharmacy Hours                        Our pharmacy hours are:    Monday   8:30 am to 7:00 pm       Tuesday to Friday  8:30 am to 6:00 pm                       Saturday    9:00 am to 1:00 pm              Sunday    Closed              There is also information available at our web site:  [email or web address]    If your provider ordered any lab tests and you do not receive the results within 10 business days, please call the clinic.    If you need a medication refill please contact your pharmacy.  Please allow 2-3 business days for your refill to be  completed.    Our clinic offers telephone visits and e visits.  Please ask one of your team members to explain more.      Use [employer] (secure email communication and access to your chart) to send your primary care provider a message or make an appointment. Ask someone on your Team how to sign up for [employer].  Immunizations                      Immunization History   Administered Date(s) Administered     Influenza (High Dose) 3 valent vaccine 10/07/2010, 09/19/2012, 11/04/2013, 10/08/2014, 11/03/2015, 09/16/2016     Influenza (IIV3) 10/19/2004, 11/15/2005     Pneumococcal (PCV 13) 11/03/2015     Pneumococcal 23 valent 04/25/2006     TD (ADULT, 7+) 03/03/1998, 01/23/2006     TDAP (BOOSTRIX AGES 10-64) 08/27/2012     Zoster vaccine, live 10/24/2012        Health Maintenance                         Health Maintenance Due   Topic Date Due     URINE DRUG SCREEN Q1 YR  01/25/1950     Medicare Annual Wellness Visit  11/21/2015     Wellness Visit with your Primary Provider - yearly  11/21/2015     Osteoporosis Screening (Dexa) - every 3 years   01/11/2016     COPD ACTION PLAN Q1 YR  06/01/2016     Discuss Advance Directive Planning  07/15/2016     Cholesterol Lab - yearly  03/09/2017     Microalbumin Lab - yearly  03/09/2017                       Neck Strain             What is neck strain?   A strain is a tear of a muscle or tendon. Your neck is surrounded by small muscles that are close to the vertebrae, and larger muscles that make up the visible muscles of the neck.   How does it occur?   Neck strains most often happen when the head and neck are forcibly moved, such as in a whiplash injury or from contact in sports. Sometimes strains happen from an awkward position during sleep or poor posture while working at a computer.   What are the symptoms?   Symptoms include pain in your neck. When the neck muscles go into spasm you feel hard, tight muscles in your neck that are very tender to the touch. You have pain when you  move your head to the side or when you try to move your head up or down. The spasming muscles can cause headaches.   The pain may start right after an injury or may take a few hours or days to develop. Other symptoms may include neck stiffness, dizziness, or unusual sensations, such as burning or a pins-and-needles feeling.   How is it diagnosed?   Your healthcare provider will examine your neck. You may have X-rays to make sure the vertebrae are not injured.   How is it treated?   Right after the injury, put an ice pack, gel pack, or package of frozen vegetables, wrapped in a cloth on the area every 3 to 4 hours, for up to 20 minutes at a time.   If you still have neck pain several days after the injury and after using ice, your healthcare provider may recommend using moist heat on your neck. You can buy a moist-heat pad or make your own by soaking towels in hot water. Put moist heat on your neck for up to 20 minutes at a time every 3 or 4 hours until the pain goes away. You may find that it helps to alternate putting heat and ice on your neck.   Your healthcare provider may prescribe an anti-inflammatory medicine and a neck collar to support your neck and prevent further injury. Nonsteroidal anti-inflammatory medicines (NSAIDs) may cause stomach bleeding and other problems. These risks increase with age. Read the label and take as directed. Unless recommended by your healthcare provider, do not take for more than 10 days.   Follow your provider's instructions for doing exercises to help you recover.   How long will the effects last?   How long it takes to recover depends on your age, health, and if you have had a previous neck injury. Recovery time also depends on the severity of the injury. A mild injury may recover within a few weeks, whereas a severe injury may take 6 weeks or longer to recover. Ask your healthcare provider when you can return to your normal activities.   How can I prevent neck strain?   Neck  strain is best prevented by having strong and supple neck muscles. If you have a job that requires you to be in one position all day (for example, work at a computer all day), it is very important to take breaks and stretch your neck muscles. Your provider will give you exercises to do while taking breaks from work.     Published by [employer].  This content is reviewed periodically and is subject to change as new health information becomes available. The information is intended to inform and educate and is not a replacement for medical evaluation, advice, diagnosis or treatment by a healthcare professional.   Written by Lucio Hastings MD, for [employer].   ? 2010 [facility] and/or its affiliates. All Rights Reserved.   Copyright   [employer] Systems 2011  Adult Health Advisor                    Neck Strain Rehabilitation Exercises                Do these exercises only if you do not have pain or numbness running down your arm or into your hand. The first 6 exercises are meant to help your neck remain flexible. Do not do any exercises that make your neck pain worse.   Active neck rotation: Sit in a chair, keeping your neck, shoulders, and trunk straight. First, turn your head slowly to the right. Move it gently to the point of pain. Move it back to the forward position. Relax. Then move it to the left. Repeat 10 times.   Active neck side bend: Sit in a chair, keeping your neck, shoulders, and trunk straight. Tilt your head so that your right ear moves toward your right shoulder. Move it to the point of pain. Then tilt your head so your left ear moves toward your left shoulder. Make sure you do not rotate your head while tilting or raise your shoulder toward your head. Repeat this exercise 10 times in each direction.   Neck flexion: Sit in a chair, keeping your neck, shoulders, and trunk straight. Bend your head forward, reaching your chin toward your chest. Hold for 5 seconds. Repeat 10 times.   Neck  extension: Sit in a chair, keeping your neck, shoulders, and trunk straight. Bring your head back so that your chin is pointing toward the ceiling. Repeat 10 times.   Chin tuck: Place your fingertips on your chin and gently push your head straight back as if you are trying to make a double chin. Keep looking forward as your head moves back. Hold 5 seconds and repeat 5 times.   Scalene stretch: Sit or stand and clasp both hands behind your back. Lower your left shoulder and tilt your head toward the right until you feel a stretch. Hold this position for 15 to 30 seconds and then come back to the starting position. Then lower your right shoulder and tilt your head toward the left. Hold for 15 to 30 seconds. Repeat 3 times on each side.   Isometric neck flexion: Sit tall, eyes straight ahead, and chin level. Place your palm against your forehead and gently push your forehead into your palm. Hold for 5 seconds and release. Do 3 sets of 5.   Isometric neck extension: Sit tall, eyes straight ahead, and chin level. Clasp your hands together and place them behind your head. Press the back of your head into your palms. Hold 5 seconds and release. Do 3 sets of 5.   Isometric neck side bend: Sit tall, eyes straight ahead, and chin level. Place the palm of your hand at the side of your temple and press your temple into the palm of your hand. Hold 5 seconds and release. Do 3 sets of 5 on each side.   Head lift: Neck curl: Lie on your back with your knees bent and your feet flat on the floor. Tuck your chin and lift your head toward your chest, keeping your shoulders on the floor. Hold for 5 seconds. Repeat 10 times.   Head lift: Neck side bend: Lie on your right side with your right arm lying straight out. Rest your head on your arm, then lift your head slowly toward your left shoulder. Hold for 5 seconds. Repeat 10 times. Switch to your left side and repeat the exercise, lifting your head toward your right shoulder.   Neck  extension on hands and knees: Get on your hands and knees and look down at the floor. Keep your back straight and let your head slowly drop toward your chest. Then tuck your chin slightly and lift your head up until your neck is level with your back. Hold this position for 5 seconds. Repeat 10 times.   Scapular squeeze: While sitting or standing with your arms by your sides, squeeze your shoulder blades together and hold for 5 seconds. Do 3 sets of 10.   Published by [employer].  This content is reviewed periodically and is subject to change as new health information becomes available. The information is intended to inform and educate and is not a replacement for medical evaluation, advice, diagnosis or treatment by a healthcare professional.   Written by Geno Santana MS, PT, and Kriss Dietz PT, [facility], [facility], for [employer].   ? 2010 [facility] and/or its affiliates. All Rights Reserved.   Copyright   Clinical Reference Systems 2011  Adult Health Advisor                                                        Rotator Cuff Injury   What is a rotator cuff injury?   A rotator cuff injury is a strain or tear in the group of tendons and muscles that hold your shoulder joint together and help move your shoulder.   How does it occur?   A rotator cuff injury may result from:     using your arm to break a fall     falling onto your arm     lifting a heavy object     use of your shoulder in sports with a repetitive overhead movement, such as swimming, baseball (mainly pitchers), football, and tennis, which gradually strains the tendon     manual labor such as painting, plastering, raking leaves, or housework   What are the symptoms?   The symptoms of a torn rotator cuff are:     arm and shoulder pain     shoulder weakness     shoulder tenderness     loss of shoulder movement, especially overhead   How is it diagnosed?   Your healthcare provider will examine you and check your shoulder for pain, tenderness, and loss of  motion as you move your arm in all directions. Your provider will ask if your shoulder pain began suddenly or gradually. You may have an X-ray to make sure there are not any fractures or bone spurs.   Based on these results, you may have other tests or procedures right away or later, such as:     magnetic resonance imaging (MRI), which creates images of your shoulder and surrounding structures with sound waves     an arthrogram, which is an X-ray or MRI that is taken after a special dye has been injected into your shoulder joint to outline its soft structures     arthroscopy, a surgical procedure in which a small instrument is inserted into your shoulder joint so your provider can look directly at your rotator cuff   What is the treatment?   A tendon in your shoulder can be inflamed, partially torn, or completely torn. What is done about it depends on how torn it is and how much it hurts.   If your tear is a minor one, it can be left to heal by itself if it does not interfere with your everyday activities. Your treatment plan should include:     proper sitting posture, in which your head and shoulders are balanced     rest for your shoulder, which means avoiding strenuous activity or any overhead motion that causes pain     ice packs at least once a day, and preferably 2 or 3 times a day     doing the exercises your healthcare provider gives you     anti-inflammatory drugs. Adults aged 65 years and older should not take non-steroidal anti-inflammatory medicine for more than 7 days without their healthcare provider's approval.     physical therapy to strengthen your shoulder as it heals   If you have a bad tear, you may need to have it repaired by arthroscopy. Arthroscopy can be used to perform surgery on a joint as well as to see inside the joint. The rough edges of a torn tendon can be trimmed and left to heal. Larger tears can be stitched back together. After surgery, your treatment plan will include physical  therapy to strengthen your shoulder as it heals.   How long will the effects last?   Full recovery depends on what is torn and how it is treated.   When can I return to my normal activities?   Everyone recovers from an injury at a different rate. Return to your activities will be determined by how soon your shoulder recovers, not by how many days or weeks it has been since your injury has occurred. In general, the longer you have symptoms before you start treatment, the longer it will take to get better. The goal of rehabilitation is to return you to your normal activities as soon as is safely possible. If you return too soon you may worsen your injury.   You may safely return to your normal activities when:     Your injured shoulder has full range of motion without pain.     Your injured shoulder has regained normal strength compared to the uninjured shoulder.   What can be done to help prevent this from recurring?   The best way to prevent a recurrence is to strengthen your shoulder muscles and keep them in peak condition with shoulder exercises.           Rotator Cuff Strain Rehabilitation Exercises                  You may do all of these exercises right away.   Isometric shoulder external rotation:  a doorway with your elbow bent 90 degrees and the back of the wrist on your injured side pressed against the door frame. Try to press your hand outward into the door frame. Hold for 5 seconds. Do 3 sets of 10.   Isometric shoulder internal rotation:  a doorway with your elbow bent 90 degrees and the front of the wrist on your injured side pressed against the door frame. Try to press your palm into the door frame. Hold for 5 seconds. Do 3 sets of 10.   Wand exercise: Flexion: Stand upright and hold a stick in both hands, palms down. Stretch your arms by lifting them over your head, keeping your arms straight. Hold for 5 seconds and return to the starting position. Repeat 10 times.   Wand exercise:  Extension: Stand upright and hold a stick in both hands behind your back. Move the stick away from your back. Hold the end position for 5 seconds. Relax and return to the starting position. Repeat 10 times.   Wand exercise: External rotation: Lie on your back and hold a stick in both hands, palms up. Your upper arms should be resting on the floor with your elbows at your sides and bent 90 degrees. Use your uninjured arm to push your injured arm out away from your body. Keep the elbow of your injured arm at your side while it is being pushed. Hold the stretch for 5 seconds. Repeat 10 times.   Wand exercise: Shoulder abduction and adduction: Stand and hold a stick with both hands, palms facing away from your body. Rest the stick against the front of your thighs. Use your uninjured arm to push your injured arm out to the side and up as high as possible. Keep your arms straight. Hold for 5 seconds. Repeat 10 times.   Resisted shoulder external rotation: Stand sideways next to a door with your injured arm farther from the door. Tie a knot in the end of the tubing and shut the knot in the door at waist level. Hold the other end of the tubing with the hand of your injured arm. Rest the hand of your injured arm across your stomach. Keeping your elbow in at your side, rotate your arm outward and away from your waist. Make sure you keep your elbow bent 90 degrees and your forearm parallel to the floor. Repeat 10 times. Build up to 3 sets of 10.   Resisted shoulder internal rotation: Stand sideways next to a door with your injured arm closest to the door. Tie a knot in the end of the tubing and shut the knot in the door at waist level. Hold the other end of the tubing with the hand of your injured arm. Bend the elbow of your injured arm 90 degrees. Keeping your elbow in at your side, rotate your forearm across your body and then back to the starting position. Make sure you keep your forearm parallel to the floor. Do 3 sets of  "10.   Scaption: Stand with your arms at your sides and with your elbows straight. Slowly raise your arms to eye level. As you raise your arms, spread them apart so that they are only slightly in front of your body (at about a 30-degree angle to the front of your body). Point your thumbs toward the ceiling. Hold for 2 seconds and lower your arms slowly. Do 3 sets of 10. Progress to holding a soup can or light weight when you are doing the exercise and increase the weight as the exercise gets easier.   Side-lying external rotation: Lie on your uninjured side with your injured arm at your side and your elbow bent 90 degrees. Keeping your elbow against your side, raise your forearm toward the ceiling and hold for 2 seconds. Slowly lower your arm. Do 3 sets of 10. You can start doing this exercise holding a soup can or light weight and gradually increase the weight as long as there is no pain.   Horizontal abduction: Lie on your stomach on a table or the edge of a bed with the arm on your injured side hanging down over the edge. Raise your arm out to the side, with your thumb pointed toward the ceiling, until your arm is parallel to the floor. Hold for 2 seconds and then lower it slowly. Start this exercise with no weight. As you get stronger, add a light weight or hold a soup can. Do 3 sets of 10.   Push-up with a plus: Begin on the floor on your hands and knees. Keep your arms a shoulder width apart and lift your feet off the floor. Arch your back as high as possible and round your shoulders (this is the \"plus\" part or the exercise). Bend your elbows and lower your body to the floor. Return to the starting position and arch your back again. Do 3 sets of 10.   Published by [employer].  This content is reviewed periodically and is subject to change as new health information becomes available. The information is intended to inform and educate and is not a replacement for medical evaluation, advice, diagnosis or treatment " by a healthcare professional.   Written by Geno Santana, MS, PT, and Kriss Dietz, PT, [facility], [facility], for [facility].   ? 2010 [facility] and/or its affiliates. All Rights Reserved.   Copyright   Clinical Reference Systems 2011  Adult Health Advisor                      Follow-ups after your visit        Additional Services     PHYSICAL THERAPY REFERRAL       *This therapy referral will be filtered to a centralized scheduling office at [facility] and the patient will receive a call to schedule an appointment at a [place] location most convenient for them. *     [facility] provides Physical Therapy evaluation and treatment and many specialty services across the [facility].  If requesting a specialty program, please choose from the list below.    If you have not heard from the scheduling office within 2 business days, please call [phone] for all locations, with the exception of [place], please call [phone].  Treatment: Evaluation & Treatment  Special Instructions/Modalities: none  Special Programs: right neck, shoulder, and arm pain    Please be aware that coverage of these services is subject to the terms and limitations of your health insurance plan.  Call member services at your health plan with any benefit or coverage questions.      **Note to Provider:  If you are referring outside of [place] for the therapy appointment, please list the name of the location in the  special instructions  above, print the referral and give to the patient to schedule the appointment.                  Who to contact     If you have questions or need follow up information about today's clinic visit or your schedule please contact [facility] directly at [phone].  Normal or non-critical lab and imaging results will be communicated to you by MyChart, letter or phone within 4 business days after the clinic has received the results. If you do not hear from us  "within 7 days, please contact the clinic through [employer] or phone. If you have a critical or abnormal lab result, we will notify you by phone as soon as possible.  Submit refill requests through [employer] or call your pharmacy and they will forward the refill request to us. Please allow 3 business days for your refill to be completed.          Additional Information About Your Visit        [employer] Information     [employer] lets you send messages to your doctor, view your test results, renew your prescriptions, schedule appointments and more. To sign up, go to [email or web address] . Click on \"Log in\" on the left side of the screen, which will take you to the Welcome page. Then click on \"Sign up Now\" on the right side of the page.     You will be asked to enter the access code listed below, as well as some personal information. Please follow the directions to create your username and password.     Your access code is: [email or web address]  Expires: 2017  4:28 PM     Your access code will  in 90 days. If you need help or a new code, please call your [place] clinic or [phone].        Care EveryWhere ID     This is your Care EveryWhere ID. This could be used by other organizations to access your [place] medical records  [id number]        Your Vitals Were     Pulse Temperature Height Pulse Oximetry BMI (Body Mass Index)       76 98.2  F (36.8  C) (Oral) 5' 1\" (1.549 m) 96% 31.37 kg/m2        Blood Pressure from Last 3 Encounters:   17 176/68   17 160/68   17 180/70    Weight from Last 3 Encounters:   17 166 lb (75.3 kg)   17 167 lb (75.8 kg)   16 162 lb (73.5 kg)              We Performed the Following     PHYSICAL THERAPY REFERRAL          Today's Medication Changes          These changes are accurate as of: 17  8:08 AM.  If you have any questions, ask your nurse or doctor.               These medicines have changed or have updated prescriptions.        Dose/Directions "    furosemide 20 MG tablet   Commonly known as:  LASIX   This may have changed:    - how much to take  - how to take this  - when to take this  - additional instructions   Used for:  Hypertension goal BP (blood pressure) < 140/90, CKD (chronic kidney disease) stage 4, GFR 15-29 ml/min (H)   Changed by:  Rashi Calle MD        40 mg in am and 20 mg at noon   Quantity:  270 tablet   Refills:  3       potassium chloride SA 10 MEQ CR tablet   Commonly known as:  K-DUR/KLOR-CON M   This may have changed:  how much to take   Used for:  Hypertension goal BP (blood pressure) < 140/90   Changed by:  Rashi Calle MD        Dose:  20 mEq   Take 2 tablets (20 mEq) by mouth daily   Quantity:  180 tablet   Refills:  3            Where to get your medicines      These medications were sent to [facility] [place] [address]     Phone:  [phone]     furosemide 20 MG tablet    potassium chloride SA 10 MEQ CR tablet                Primary Care Provider Office Phone # Fax #    Rashi Calle [phone]       [address] [address]        Thank you!     Thank you for choosing [facility]  for your care. Our goal is always to provide you with excellent care. Hearing back from our patients is one way we can continue to improve our services. Please take a few minutes to complete the written survey that you may receive in the mail after your visit with us. Thank you!             Your Updated Medication List - Protect others around you: Learn how to safely use, store and throw away your medicines at www.disposemymeds.org.          This list is accurate as of: 2/20/17  8:08 AM.  Always use your most recent med list.                   Brand Name Dispense Instructions for use    acetaminophen-codeine 300-30 MG per tablet    TYLENOL #3    90 tablet    Take 1-2 tablets by mouth  every 6 hours as needed for moderate pain       albuterol 108 (90 BASE) MCG/ACT Inhaler    PROAIR HFA/PROVENTIL HFA/VENTOLIN HFA    3 Inhaler    Inhale 2 puffs into the lungs every 6 hours as needed for shortness of breath / dyspnea       amLODIPine 5 MG tablet    NORVASC    90 tablet    Take 1 tablet (5 mg) by mouth daily       calcium carbonate 500 MG chewable tablet    TUMS     Take 2 chew tab by mouth daily as needed for heartburn       furosemide 20 MG tablet    LASIX    270 tablet    40 mg in am and 20 mg at noon       IBUPROFEN PO      Take 400 mg by mouth every 6 hours as needed for moderate pain       ipratropium - albuterol 0.5 mg/2.5 mg/3 mL 0.5-2.5 (3) MG/3ML neb solution    DUONEB    90 vial    Take 1 vial (3 mLs) by nebulization every 6 hours as needed for shortness of breath / dyspnea or wheezing       losartan 50 MG tablet    COZAAR    30 tablet    Take 1 tablet (50 mg) by mouth daily       MAGIC MOUTHWASH (ENTER INGREDIENTS IN COMMENTS)     180 mL    Swish and spit 5-10 mLs in mouth every 6 hours as needed       METOPROLOL SUCCINATE ER PO      Take 100 mg by mouth daily (2 x 50mg = 100mg)       potassium chloride SA 10 MEQ CR tablet    K-DUR/KLOR-CON M    180 tablet    Take 2 tablets (20 mEq) by mouth daily

## 2017-02-22 ENCOUNTER — THERAPY VISIT (OUTPATIENT)
Dept: PHYSICAL THERAPY | Facility: CLINIC | Age: 82
End: 2017-02-22
Payer: MEDICARE

## 2017-02-22 ENCOUNTER — ALLIED HEALTH/NURSE VISIT (OUTPATIENT)
Dept: NURSING | Facility: CLINIC | Age: 82
End: 2017-02-22
Payer: MEDICARE

## 2017-02-22 VITALS — DIASTOLIC BLOOD PRESSURE: 80 MMHG | OXYGEN SATURATION: 97 % | HEART RATE: 75 BPM | SYSTOLIC BLOOD PRESSURE: 190 MMHG

## 2017-02-22 DIAGNOSIS — I10 HTN (HYPERTENSION): Primary | ICD-10-CM

## 2017-02-22 DIAGNOSIS — M25.511 ACUTE PAIN OF RIGHT SHOULDER: Primary | ICD-10-CM

## 2017-02-22 PROCEDURE — 99207 ZZC NO CHARGE NURSE ONLY: CPT

## 2017-02-22 PROCEDURE — G8984 CARRY CURRENT STATUS: HCPCS | Mod: GP | Performed by: PHYSICAL THERAPIST

## 2017-02-22 PROCEDURE — 97161 PT EVAL LOW COMPLEX 20 MIN: CPT | Mod: GP | Performed by: PHYSICAL THERAPIST

## 2017-02-22 PROCEDURE — G8985 CARRY GOAL STATUS: HCPCS | Mod: GP | Performed by: PHYSICAL THERAPIST

## 2017-02-22 PROCEDURE — 97110 THERAPEUTIC EXERCISES: CPT | Mod: GP | Performed by: PHYSICAL THERAPIST

## 2017-02-22 NOTE — LETTER
DEPARTMENT OF HEALTH AND HUMAN SERVICES  CENTERS FOR MEDICARE & MEDICAID SERVICES    PLAN/UPDATED PLAN OF PROGRESS FOR OUTPATIENT REHABILITATION    PATIENTS NAME:  Anat Correa   : 1935  [facility]:    [id number]  [id number]  PROVIDER NAME: [facility]  MEDICAL RECORD NUMBER: [id number]   START OF CARE DATE:  SOC Date: 17   TYPE:  PT  PRIMARY/TREATMENT DIAGNOSIS: Acute pain of right shoulder  VISITS FROM START OF CARE:  Rxs Used: 1     Subjective:  Anat Correa is a 82 year old female with a right shoulder condition.  Occurance: Pt reports R shoulder pain and limited movement with R arm started about 1 month ago.    This is a new condition  2017  Patient reports pain:  Anterior and lateral.    Pain is described as aching and is intermittent and reported as 8/10.  Associated symptoms:  Loss of strength and loss of motion/stiffness. Pain is worse during the day.  Symptoms are exacerbated by using arm overhead, certain positions, using arm behind back, lying on extremity, using arm at shoulder level and lifting and relieved by ice.  Since onset symptoms are unchanged.        General health as reported by patient is good.  Pertinent medical history includes:  High blood pressure, osteoarthritis, kidney disease, cancer and overweight.  Medical allergies: no.  Other surgeries include:  Cancer surgery and orthopedic surgery (B TKA).  Medication history: see EPIC.  Current occupation is Retired  Barriers include:  Lives alone.  Red flags:  None as reported by the patient.      Objective:  Standing Alignment:    Cervical/Thoracic:  Thoracic kyphosis increased and forward head  Shoulder/UE:  Rounded shoulders  Gait:    Gait Type:  Antalgic   Weight Bearing Status:  WBAT   Assistive Devices:  None  Shoulder Evaluation:  ROM:  AROM:    Flexion:  Left:  130    Right:  85  Abduction:  Left: 125   Right:  100  External Rotation:  Left:  60    Right:   60  Extension/Internal Rotation:  Left:  T12    Right:  L2    Pain: pain with Flex and Abd  Strength:    Internal Rotation:  Left:5-/5     Pain:    Right: 4-/5     Pain:  External Rotation:   Left:5-/5     Pain:   Right:3/5      Pain:++    Special Tests:    Right shoulder positive for the following special tests:Rotator cuff tear     PATIENTS NAME:  Anat Correa   : 1935     Assessment/Plan:    Patient is a 82 year old female with right side shoulder complaints.    Patient has the following significant findings with corresponding treatment plan.                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    Comorbidity factors that impact the plan of care are:      Osteoarthritis and Weakness.     Medications impacting care: None.  2) Examination of Body Systems comprised of:   Body structures and functions that impact the plan of care:      Shoulder.   Activity limitations that impact the plan of care are:      Bathing, Cooking, Dressing and Lifting.  3) Clinical presentation characteristics are:   Stable/Uncomplicated.  4) Decision-Making    Low complexity using standardized patient assessment instrument and/or measureable assessment of functional outcome.  Cumulative Therapy Evaluation is: Low complexity.  Previous and current functional limitations:  (See Goal Flow Sheet for this information)    Short term and Long term goals: (See Goal Flow Sheet for this information)   Communication ability:  Patient appears to be able to clearly communicate and understand verbal and written communication and follow  "directions correctly.  Treatment Explanation - The following has been discussed with the patient:   RX ordered/plan of care  Anticipated outcomes  Possible risks and side effects  This patient would benefit from PT intervention to resume normal activities.   Rehab potential is good.    Frequency:  1 X week, once daily  Duration:  for 5 weeks  Discharge Plan:  Achieve all LTG.  Independent in home treatment program.  Reach maximal therapeutic benefit.            PATIENTS NAME:  Anat Correa   : 1935        Caregiver Signature/Credentials _____________________________ Date ________       Treating Provider: Marcell Otto PT     I have reviewed and certified the need for these services and plan of treatment while under my care.        PHYSICIAN'S SIGNATURE:   _________________________________________  Date___________   Rashi Calle    Certification period:  Beginning of Cert date period: 17 to  End of Cert period date: 17     Functional Level Progress Report: Please see attached \"Goal Flow sheet for Functional level.\"    ____X____ Continue Services or       ________ DC Services                Service dates: From  SOC Date: 17 date to present                         "

## 2017-02-22 NOTE — PROGRESS NOTES
Subjective:    Anat Correa is a 82 year old female with a right shoulder condition.  Occurance: Pt reports R shoulder pain and limited movement with R arm started about 1 month ago.    This is a new condition  Jan 2017  .    Patient reports pain:  Anterior and lateral.    Pain is described as aching and is intermittent and reported as 8/10.  Associated symptoms:  Loss of strength and loss of motion/stiffness. Pain is worse during the day.  Symptoms are exacerbated by using arm overhead, certain positions, using arm behind back, lying on extremity, using arm at shoulder level and lifting and relieved by ice.  Since onset symptoms are unchanged.        General health as reported by patient is good.  Pertinent medical history includes:  High blood pressure, osteoarthritis, kidney disease, cancer and overweight.  Medical allergies: no.  Other surgeries include:  Cancer surgery and orthopedic surgery (B TKA).  Medication history: see EPIC.  Current occupation is Retired  .        Barriers include:  Lives alone.    Red flags:  None as reported by the patient.                      Objective:    Standing Alignment:    Cervical/Thoracic:  Thoracic kyphosis increased and forward head  Shoulder/UE:  Rounded shoulders              Gait:    Gait Type:  Antalgic   Weight Bearing Status:  WBAT   Assistive Devices:  None                           Shoulder Evaluation:  ROM:  AROM:    Flexion:  Left:  130    Right:  85    Abduction:  Left: 125   Right:  100      External Rotation:  Left:  60    Right:  60            Extension/Internal Rotation:  Left:  T12    Right:  L2      Pain: pain with Flex and Abd    Strength:            Internal Rotation:  Left:5-/5     Pain:    Right: 4-/5     Pain:  External Rotation:   Left:5-/5     Pain:   Right:3/5      Pain:++              Special Tests:      Right shoulder positive for the following special tests:Rotator cuff tear                                       General      ROS    Assessment/Plan:      Patient is a 82 year old female with right side shoulder complaints.    Patient has the following significant findings with corresponding treatment plan.                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    Comorbidity factors that impact the plan of care are:      Osteoarthritis and Weakness.     Medications impacting care: None.  2) Examination of Body Systems comprised of:   Body structures and functions that impact the plan of care:      Shoulder.   Activity limitations that impact the plan of care are:      Bathing, Cooking, Dressing and Lifting.  3) Clinical presentation characteristics are:   Stable/Uncomplicated.  4) Decision-Making    Low complexity using standardized patient assessment instrument and/or measureable assessment of functional outcome.  Cumulative Therapy Evaluation is: Low complexity.    Previous and current functional limitations:  (See Goal Flow Sheet for this information)    Short term and Long term goals: (See Goal Flow Sheet for this information)     Communication ability:  Patient appears to be able to clearly communicate and understand verbal and written communication and follow directions correctly.  Treatment Explanation - The following has been discussed with the patient:   RX ordered/plan of care  Anticipated outcomes  Possible risks and side effects  This patient would benefit from PT intervention to resume normal activities.   Rehab potential is good.    Frequency:  1 X week, once daily  Duration:  for 5 weeks  Discharge Plan:  Achieve  all LTG.  Independent in home treatment program.  Reach maximal therapeutic benefit.    Please refer to the daily flowsheet for treatment today, total treatment time and time spent performing 1:1 timed codes.

## 2017-02-22 NOTE — NURSING NOTE
Anat M Annmariepaulette is being followed for Blood Pressure management.    NURSING ASSESSMENT/PLAN:  Blood pressure reading today is not at the provider specified goal of <140/90.    Current blood pressure medication(s):  Lasix, K+, Cozaar 50mg daily, Norvasc 5mg daily, Metoprolol 100mg daily.   1.  The patient will continue current medication regimen unchanged.     2.  We will not be checking a metabolic lab panel today.      3.  Follow up instructions include:     Next Provider visit: Follow up in 1 week.. Scheduled    SUBJECTIVE:                                                    The patient is taking medication as prescribed and is tolerating well.   Patient is not monitoring Blood Pressure at home.   Last 3 home readings   In addition notes:     Out of the following complicating factors: Cough, Headache, Lightheadedness, Shortness of breath, Fatigue, Nausea, Sexual Dysfunction, New onset of swelling or edema, Weakness and New onset of Chest Pain, the patient reports:  None    OBJECTIVE:                                                    Is pulse 55 or greater? - Yes  Pulse Readings from Last 1 Encounters:   02/20/17 76     Today's BP completed using cuff size: regular on left side  arm.  BP Readings from Last 3 Encounters:   02/20/17 146/60   02/09/17 160/68   02/06/17 180/70       Current Outpatient Prescriptions   Medication Sig Dispense Refill     furosemide (LASIX) 20 MG tablet 40 mg in am and 20 mg at noon 270 tablet 3     potassium chloride SA (K-DUR/KLOR-CON M) 10 MEQ CR tablet Take 2 tablets (20 mEq) by mouth daily 180 tablet 3     losartan (COZAAR) 50 MG tablet Take 1 tablet (50 mg) by mouth daily 30 tablet 1     amLODIPine (NORVASC) 5 MG tablet Take 1 tablet (5 mg) by mouth daily 90 tablet 3     acetaminophen-codeine (TYLENOL #3) 300-30 MG per tablet Take 1-2 tablets by mouth every 6 hours as needed for moderate pain 90 tablet 0     MAGIC MOUTHWASH, ENTER INGREDIENTS IN COMMENTS, Swish and spit 5-10 mLs  in mouth every 6 hours as needed 180 mL 1     albuterol (PROAIR HFA, PROVENTIL HFA, VENTOLIN HFA) 108 (90 BASE) MCG/ACT inhaler Inhale 2 puffs into the lungs every 6 hours as needed for shortness of breath / dyspnea 3 Inhaler 3     IBUPROFEN PO Take 400 mg by mouth every 6 hours as needed for moderate pain       calcium carbonate (TUMS) 500 MG chewable tablet Take 2 chew tab by mouth daily as needed for heartburn       METOPROLOL SUCCINATE ER PO Take 100 mg by mouth daily (2 x 50mg = 100mg)       ipratropium - albuterol 0.5 mg/2.5 mg/3 mL (DUONEB) 0.5-2.5 (3) MG/3ML nebulization Take 1 vial (3 mLs) by nebulization every 6 hours as needed for shortness of breath / dyspnea or wheezing 90 vial 3     Potassium   Date Value Ref Range Status   12/07/2016 4.7 3.4 - 5.3 mmol/L Final     Creatinine   Date Value Ref Range Status   12/07/2016 1.35 (H) 0.52 - 1.04 mg/dL Final     Urea Nitrogen   Date Value Ref Range Status   12/07/2016 28 7 - 30 mg/dL Final     GFR Estimate   Date Value Ref Range Status   12/07/2016 38 (L) >60 mL/min/1.7m2 Final     Comment:     Non  GFR Calc      A baseline potassium, creatinine, BUN, GFR has been done within past 12 months    Education:  general discussion/verbal explanation  Limit dietary sodium intake between 1500-2000mg every day  Regular aerobic exercise.  Discussed habit change regarding Continue medication and follow up  These interventions were used: Empathy/Understanding, Permission to raise concern or advise, Open ended questions and Roll with resistance  Education material provided and patient was given an opportunity to ask questions.    Patient verbalized understanding of this plan and is agreeable.    Professional Reference click here   Copy of current RN HTN MGMT order or refer to Other Orders:    Dosage adjustment made based on patient specific, physician directed, care plan.    Patient was here today for Physical Therapy and just wanted to check BP. Advised I  will route to Dr. Calle.  Macy Lema RN

## 2017-02-22 NOTE — MR AVS SNAPSHOT
After Visit Summary   2/22/2017    Anat Correa    MRN: [id number]           Patient Information     Date Of Birth          1935        Visit Information        Provider Department      2/22/2017 3:00 PM [facility]        Today's Diagnoses     HTN (hypertension)    -  1       Follow-ups after your visit        Follow-up notes from your care team     Return in about 1 week (around 3/1/2017), or has follow up appt with Dr. Calle.      Your next 10 appointments already scheduled     Feb 22, 2017  3:00 PM CST   Nurse Only with [facility]   [facility] ([facility])    [address] [address]   [phone]            Mar 01, 2017 10:10 AM CST   [facility] with Rylan Otto PT   [facility] ([facility])    [address] [address]   [phone]            Mar 02, 2017  9:00 AM CST   Office Visit with Rashi Calle MD   [facility]    [address] [place] [id number] [phone]           Bring a current list of meds and any records pertaining to this visit.  For Physicals, please bring immunization records and any forms needing to be filled out.  Please arrive 10 minutes early to complete paperwork.            Mar 08, 2017  8:50 AM CST   [facility] Sp[facility] with Rylan Otto PT   [facility] ([facility])    [address] [address] [phone]              Who to contact     If you have questions or need follow up information about today's clinic visit or your schedule please contact [facility] directly at [phone].  Normal or non-critical lab and imaging results will be communicated to you by MyChart, letter or phone within 4 business days after the clinic has received the results. If you do not hear from us within 7 days, please  "contact the clinic through [employer] or phone. If you have a critical or abnormal lab result, we will notify you by phone as soon as possible.  Submit refill requests through [employer] or call your pharmacy and they will forward the refill request to us. Please allow 3 business days for your refill to be completed.          Additional Information About Your Visit        [employer] Information     [employer] lets you send messages to your doctor, view your test results, renew your prescriptions, schedule appointments and more. To sign up, go to [email or web address] . Click on \"Log in\" on the left side of the screen, which will take you to the Welcome page. Then click on \"Sign up Now\" on the right side of the page.     You will be asked to enter the access code listed below, as well as some personal information. Please follow the directions to create your username and password.     Your access code is: [email or web address]  Expires: 2017  4:28 PM     Your access code will  in 90 days. If you need help or a new code, please call your [place] clinic or [phone].        Care EveryWhere ID     This is your Care EveryWhere ID. This could be used by other organizations to access your [facility] medical records  [id number]        Your Vitals Were     Pulse Pulse Oximetry                75 97%           Blood Pressure from Last 3 Encounters:   17 190/80   17 146/60   17 160/68    Weight from Last 3 Encounters:   17 166 lb (75.3 kg)   17 167 lb (75.8 kg)   16 162 lb (73.5 kg)              Today, you had the following     No orders found for display       Primary Care Provider Office Phone # Fax #    Rashi Calle [phone]       [address] [address]        Thank you!     Thank you for choosing [facility]  for your care. Our goal is always to provide you with excellent care. Hearing back from our patients is one way " we can continue to improve our services. Please take a few minutes to complete the written survey that you may receive in the mail after your visit with us. Thank you!             Your Updated Medication List - Protect others around you: Learn how to safely use, store and throw away your medicines at www.disposemymeds.org.          This list is accurate as of: 2/22/17  2:58 PM.  Always use your most recent med list.                   Brand Name Dispense Instructions for use    acetaminophen-codeine 300-30 MG per tablet    TYLENOL #3    90 tablet    Take 1-2 tablets by mouth every 6 hours as needed for moderate pain       albuterol 108 (90 BASE) MCG/ACT Inhaler    PROAIR HFA/PROVENTIL HFA/VENTOLIN HFA    3 Inhaler    Inhale 2 puffs into the lungs every 6 hours as needed for shortness of breath / dyspnea       amLODIPine 5 MG tablet    NORVASC    90 tablet    Take 1 tablet (5 mg) by mouth daily       calcium carbonate 500 MG chewable tablet    TUMS     Take 2 chew tab by mouth daily as needed for heartburn       furosemide 20 MG tablet    LASIX    270 tablet    40 mg in am and 20 mg at noon       IBUPROFEN PO      Take 400 mg by mouth every 6 hours as needed for moderate pain       ipratropium - albuterol 0.5 mg/2.5 mg/3 mL 0.5-2.5 (3) MG/3ML neb solution    DUONEB    90 vial    Take 1 vial (3 mLs) by nebulization every 6 hours as needed for shortness of breath / dyspnea or wheezing       losartan 50 MG tablet    COZAAR    30 tablet    Take 1 tablet (50 mg) by mouth daily       MAGIC MOUTHWASH (ENTER INGREDIENTS IN COMMENTS)     180 mL    Swish and spit 5-10 mLs in mouth every 6 hours as needed       METOPROLOL SUCCINATE ER PO      Take 100 mg by mouth daily (2 x 50mg = 100mg)       potassium chloride SA 10 MEQ CR tablet    K-DUR/KLOR-CON M    180 tablet    Take 2 tablets (20 mEq) by mouth daily

## 2017-02-23 ENCOUNTER — ALLIED HEALTH/NURSE VISIT (OUTPATIENT)
Dept: FAMILY MEDICINE | Facility: CLINIC | Age: 82
End: 2017-02-23
Payer: MEDICARE

## 2017-02-23 ENCOUNTER — TELEPHONE (OUTPATIENT)
Dept: FAMILY MEDICINE | Facility: CLINIC | Age: 82
End: 2017-02-23

## 2017-02-23 VITALS — SYSTOLIC BLOOD PRESSURE: 164 MMHG | DIASTOLIC BLOOD PRESSURE: 66 MMHG

## 2017-02-23 DIAGNOSIS — I10 HYPERTENSION GOAL BP (BLOOD PRESSURE) < 140/90: Primary | ICD-10-CM

## 2017-02-23 PROCEDURE — 99207 ZZC NO CHARGE NURSE ONLY: CPT | Performed by: FAMILY MEDICINE

## 2017-02-23 NOTE — MR AVS SNAPSHOT
After Visit Summary   2/23/2017    Anat Correa    MRN: [id number]           Patient Information     Date Of Birth          1935        Visit Information        Provider Department      2/23/2017 5:09 PM Rashi Calle MD [facility]        Today's Diagnoses     Hypertension goal BP (blood pressure) < 140/90    -  1       Follow-ups after your visit        Your next 10 appointments already scheduled     Mar 01, 2017 10:10 AM CST   RADHA Spine with Rylan Otto PT   [facility] ([facility])    [address] [id number] [phone]            Mar 02, 2017  9:00 AM CST   Office Visit with Rashi Calle MD   [facility] ([facility]    [address] [place] [id number] [phone]           Bring a current list of meds and any records pertaining to this visit.  For Physicals, please bring immunization records and any forms needing to be filled out.  Please arrive 10 minutes early to complete paperwork.            Mar 08, 2017  8:50 AM CST   RADHA Spine with Rylan Otto PT   [facility] ([facility])    [address] [id number] [phone]              Who to contact     If you have questions or need follow up information about today's clinic visit or your schedule please contact [facility] directly at [phone].  Normal or non-critical lab and imaging results will be communicated to you by MyChart, letter or phone within 4 business days after the clinic has received the results. If you do not hear from us within 7 days, please contact the clinic through MyChart or phone. If you have a critical or abnormal lab result, we will notify you by phone as soon as possible.  Submit refill requests through [employer] or call your pharmacy and they will forward the refill request to us. Please allow 3 business days for your  "refill to be completed.          Additional Information About Your Visit        MedminderharYohobuy Information     [email or web address] lets you send messages to your doctor, view your test results, renew your prescriptions, schedule appointments and more. To sign up, go to [email or web address] . Click on \"Log in\" on the left side of the screen, which will take you to the Welcome page. Then click on \"Sign up Now\" on the right side of the page.     You will be asked to enter the access code listed below, as well as some personal information. Please follow the directions to create your username and password.     Your access code is: [email or web address]  Expires: 2017  4:28 PM     Your access code will  in 90 days. If you need help or a new code, please call your [place] clinic or [phone].        Care EveryWhere ID     This is your Care EveryWhere ID. This could be used by other organizations to access your [place] medical records  [id number]         Blood Pressure from Last 3 Encounters:   17 164/66   17 190/80   17 146/60    Weight from Last 3 Encounters:   17 166 lb (75.3 kg)   17 167 lb (75.8 kg)   16 162 lb (73.5 kg)              Today, you had the following     No orders found for display       Primary Care Provider Office Phone # Fax #    Rashi Calle [phone]       [address] [address]        Thank you!     Thank you for choosing [facility]  for your care. Our goal is always to provide you with excellent care. Hearing back from our patients is one way we can continue to improve our services. Please take a few minutes to complete the written survey that you may receive in the mail after your visit with us. Thank you!             Your Updated Medication List - Protect others around you: Learn how to safely use, store and throw away your medicines at www.disposemymeds.org.          This list is accurate " as of: 2/23/17  5:12 PM.  Always use your most recent med list.                   Brand Name Dispense Instructions for use    acetaminophen-codeine 300-30 MG per tablet    TYLENOL #3    90 tablet    Take 1-2 tablets by mouth every 6 hours as needed for moderate pain       albuterol 108 (90 BASE) MCG/ACT Inhaler    PROAIR HFA/PROVENTIL HFA/VENTOLIN HFA    3 Inhaler    Inhale 2 puffs into the lungs every 6 hours as needed for shortness of breath / dyspnea       amLODIPine 5 MG tablet    NORVASC    90 tablet    Take 1 tablet (5 mg) by mouth daily       calcium carbonate 500 MG chewable tablet    TUMS     Take 2 chew tab by mouth daily as needed for heartburn       furosemide 20 MG tablet    LASIX    270 tablet    40 mg in am and 20 mg at noon       IBUPROFEN PO      Take 400 mg by mouth every 6 hours as needed for moderate pain       ipratropium - albuterol 0.5 mg/2.5 mg/3 mL 0.5-2.5 (3) MG/3ML neb solution    DUONEB    90 vial    Take 1 vial (3 mLs) by nebulization every 6 hours as needed for shortness of breath / dyspnea or wheezing       losartan 50 MG tablet    COZAAR    30 tablet    Take 1 tablet (50 mg) by mouth daily       MAGIC MOUTHWASH (ENTER INGREDIENTS IN COMMENTS)     180 mL    Swish and spit 5-10 mLs in mouth every 6 hours as needed       METOPROLOL SUCCINATE ER PO      Take 100 mg by mouth daily (2 x 50mg = 100mg)       potassium chloride SA 10 MEQ CR tablet    K-DUR/KLOR-CON M    180 tablet    Take 2 tablets (20 mEq) by mouth daily

## 2017-02-23 NOTE — TELEPHONE ENCOUNTER
Anat Correa is enrolled/participating in the retail pharmacy Blood Pressure Goals Achievement Program (BPGAP).  Anat Correa was evaluated at [facility] on February 23, 2017 at which time her blood pressure was:    BP Readings from Last 3 Encounters:   02/23/17 164/66   02/22/17 190/80   02/20/17 146/60     Reviewed lifestyle modifications for blood pressure control and reduction: including making healthy food choices, managing weight, getting regular exercise, smoking cessation, reducing alcohol consumption, monitoring blood pressure regularly.     Anat Correa is not experiencing symptoms.    Follow-Up: BP is not at goal of < 140/90mmHg (patient 18+ years of age with or without diabetes), Recommended follow-up with PCP.  Routing to PCP for further review.    Completed by: Thank you,  Alysa Hays RP, [facility] [place] [phone]  NOTE: Pt has appt on 3/2/17.  She will start doing the furosemide 2qam and 1 qnoon.  She has not been doing the noon dose.  She agreed to try it again and recheck bp early next week before appt.  Thank you,  Alysa Hays RP, [facility] [place] [phone]

## 2017-02-23 NOTE — PROGRESS NOTES
Anat Correa is enrolled/participating in the retail pharmacy Blood Pressure Goals Achievement Program (BPGAP).  Anat Correa was evaluated at [facility] on February 23, 2017 at which time her blood pressure was:    BP Readings from Last 3 Encounters:   02/23/17 164/66   02/22/17 190/80   02/20/17 146/60     Reviewed lifestyle modifications for blood pressure control and reduction: including making healthy food choices, managing weight, getting regular exercise, smoking cessation, reducing alcohol consumption, monitoring blood pressure regularly.     Anat Correa is not experiencing symptoms.    Follow-Up: BP is not at goal of < 140/90mmHg (patient 18+ years of age with or without diabetes), Recommended follow-up with PCP.  Routing to PCP for further review.    Completed by: Thank you,  Alysa Hays RP, [facility] [place] [phone]  NOTE: Pt has appt on 3/2/17.  She will start doing the furosemide 2qam and 1 qnoon.  She has not been doing the noon dose.  She agreed to try it again and recheck bp early next week before appt.  Thank you,  Alysa Hays RP, [facility] [place] [phone]

## 2017-02-28 ENCOUNTER — ALLIED HEALTH/NURSE VISIT (OUTPATIENT)
Dept: FAMILY MEDICINE | Facility: CLINIC | Age: 82
End: 2017-02-28
Payer: MEDICARE

## 2017-02-28 ENCOUNTER — TELEPHONE (OUTPATIENT)
Dept: FAMILY MEDICINE | Facility: CLINIC | Age: 82
End: 2017-02-28

## 2017-02-28 VITALS — DIASTOLIC BLOOD PRESSURE: 64 MMHG | SYSTOLIC BLOOD PRESSURE: 155 MMHG

## 2017-02-28 DIAGNOSIS — I10 HYPERTENSION GOAL BP (BLOOD PRESSURE) < 140/90: Primary | ICD-10-CM

## 2017-02-28 PROCEDURE — 99207 ZZC NO CHARGE NURSE ONLY: CPT | Performed by: FAMILY MEDICINE

## 2017-02-28 NOTE — NURSING NOTE
"Anat Correa is enrolled/participating in the retail pharmacy Blood Pressure Goals Achievement Program (BPGAP).  Anat Correa was evaluated at [facility] on February 28, 2017 at which time her blood pressure was:    BP Readings from Last 3 Encounters:   02/28/17 155/64   02/23/17 164/66   02/22/17 190/80     Reviewed lifestyle modifications for blood pressure control and reduction: including making healthy food choices, managing weight, getting regular exercise, smoking cessation, reducing alcohol consumption, monitoring blood pressure regularly.     Anat Correa is not experiencing symptoms.    Follow-Up: BP is not at goal of < 140/90mmHg (patient 18+ years of age with or without diabetes), Recommended follow-up with PCP.  Routing to PCP for further review.    Completed by: Thank you,  Alysa Hays [facility], [facility] [place] [phone]    NOTE:  Pt tried taking furosemide 2 qam and 1 q noon for a few days after the last bp check.  She reports that she had to go to the bathroom \"all the time\" so stopped.  She has appt with RIYA Calle 3/2/17.  Thank you,  Alysa Hays RP, [facility] [place] [phone]      "

## 2017-02-28 NOTE — MR AVS SNAPSHOT
After Visit Summary   2/28/2017    Anat Correa    MRN: [id number]           Patient Information     Date Of Birth          1935        Visit Information        Provider Department      2/28/2017 3:21 PM Rashi Calle MD [facility]        Today's Diagnoses     Hypertension goal BP (blood pressure) < 140/90    -  1       Follow-ups after your visit        Your next 10 appointments already scheduled     Mar 01, 2017 10:10 AM CST   RADHA Spine with Rylan Otto PT   [facility] ([facility])    [address] [address] [phone]            Mar 02, 2017  9:00 AM CST   Office Visit with Rashi Calle MD   [facility] ([facility])    [address] [place] [id number] [phone]           Bring a current list of meds and any records pertaining to this visit.  For Physicals, please bring immunization records and any forms needing to be filled out.  Please arrive 10 minutes early to complete paperwork.            Mar 08, 2017  8:50 AM CST   RADHA Spine with Rylan Otto PT   [facility] ([facility])    [address] [id number] [phone]              Who to contact     If you have questions or need follow up information about today's clinic visit or your schedule please contact [facility] directly at [phone].  Normal or non-critical lab and imaging results will be communicated to you by MyChart, letter or phone within 4 business days after the clinic has received the results. If you do not hear from us within 7 days, please contact the clinic through MyChart or phone. If you have a critical or abnormal lab result, we will notify you by phone as soon as possible.  Submit refill requests through [employer] or call your pharmacy and they will forward the refill request to us. Please allow 3 business days for your  "refill to be completed.          Additional Information About Your Visit        FacishareharFwd: Power Information     [employer] lets you send messages to your doctor, view your test results, renew your prescriptions, schedule appointments and more. To sign up, go to [email or web address] . Click on \"Log in\" on the left side of the screen, which will take you to the Welcome page. Then click on \"Sign up Now\" on the right side of the page.     You will be asked to enter the access code listed below, as well as some personal information. Please follow the directions to create your username and password.     Your access code is: [email or web address]  Expires: 2017  4:28 PM     Your access code will  in 90 days. If you need help or a new code, please call your [place] clinic or [phone].        Care EveryWhere ID     This is your Care EveryWhere ID. This could be used by other organizations to access your [place] medical records  [id number]         Blood Pressure from Last 3 Encounters:   17 155/64   17 164/66   17 190/80    Weight from Last 3 Encounters:   17 166 lb (75.3 kg)   17 167 lb (75.8 kg)   16 162 lb (73.5 kg)              Today, you had the following     No orders found for display       Primary Care Provider Office Phone # Fax #    Rashi Calle [phone]       [address] [address]        Thank you!     Thank you for choosing [facility]  for your care. Our goal is always to provide you with excellent care. Hearing back from our patients is one way we can continue to improve our services. Please take a few minutes to complete the written survey that you may receive in the mail after your visit with us. Thank you!             Your Updated Medication List - Protect others around you: Learn how to safely use, store and throw away your medicines at www.disposemymeds.org.          This list is accurate " as of: 2/28/17  3:30 PM.  Always use your most recent med list.                   Brand Name Dispense Instructions for use    acetaminophen-codeine 300-30 MG per tablet    TYLENOL #3    90 tablet    Take 1-2 tablets by mouth every 6 hours as needed for moderate pain       albuterol 108 (90 BASE) MCG/ACT Inhaler    PROAIR HFA/PROVENTIL HFA/VENTOLIN HFA    3 Inhaler    Inhale 2 puffs into the lungs every 6 hours as needed for shortness of breath / dyspnea       amLODIPine 5 MG tablet    NORVASC    90 tablet    Take 1 tablet (5 mg) by mouth daily       calcium carbonate 500 MG chewable tablet    TUMS     Take 2 chew tab by mouth daily as needed for heartburn       furosemide 20 MG tablet    LASIX    270 tablet    40 mg in am and 20 mg at noon       IBUPROFEN PO      Take 400 mg by mouth every 6 hours as needed for moderate pain       ipratropium - albuterol 0.5 mg/2.5 mg/3 mL 0.5-2.5 (3) MG/3ML neb solution    DUONEB    90 vial    Take 1 vial (3 mLs) by nebulization every 6 hours as needed for shortness of breath / dyspnea or wheezing       losartan 50 MG tablet    COZAAR    30 tablet    Take 1 tablet (50 mg) by mouth daily       MAGIC MOUTHWASH (ENTER INGREDIENTS IN COMMENTS)     180 mL    Swish and spit 5-10 mLs in mouth every 6 hours as needed       METOPROLOL SUCCINATE ER PO      Take 100 mg by mouth daily (2 x 50mg = 100mg)       potassium chloride SA 10 MEQ CR tablet    K-DUR/KLOR-CON M    180 tablet    Take 2 tablets (20 mEq) by mouth daily

## 2017-02-28 NOTE — TELEPHONE ENCOUNTER
Called pt reviewed results and recommendations.    Cassandra Patel RN    [facility]

## 2017-02-28 NOTE — TELEPHONE ENCOUNTER
"  BP Readings from Last 3 Encounters:   02/28/17 155/64   02/23/17 164/66   02/22/17 190/80     Reviewed lifestyle modifications for blood pressure control and reduction: including making healthy food choices, managing weight, getting regular exercise, smoking cessation, reducing alcohol consumption, monitoring blood pressure regularly.     Anat Correa is not experiencing symptoms.    Follow-Up: BP is not at goal of < 140/90mmHg (patient 18+ years of age with or without diabetes), Recommended follow-up with PCP.  Routing to PCP for further review.    Completed by: Thank you,  Alysa Hays [facility], [facility] [place] [phone]    NOTE:  Pt tried taking furosemide 2 qam and 1 q noon for a few days after the last bp check.  She reports that she had to go to the bathroom \"all the time\" so stopped.  She has appt with RIYA Calle 3/2/17.  Thank you,  Alysa Hays [facility], [facility] [place] [phone]        "

## 2017-03-01 ENCOUNTER — THERAPY VISIT (OUTPATIENT)
Dept: PHYSICAL THERAPY | Facility: CLINIC | Age: 82
End: 2017-03-01
Payer: MEDICARE

## 2017-03-01 DIAGNOSIS — M25.511 ACUTE PAIN OF RIGHT SHOULDER: ICD-10-CM

## 2017-03-01 PROCEDURE — 97110 THERAPEUTIC EXERCISES: CPT | Mod: GP | Performed by: PHYSICAL THERAPIST

## 2017-03-01 NOTE — PROGRESS NOTES
Subjective:    HPI                    Objective:    System    Physical Exam    General     ROS    Assessment/Plan:      SUBJECTIVE  Subjective changes as noted by pt:  Anat returns to PT for her 2nd PT visit with cont pain in her shoulder.  She has been doing her ROM HEP as recommended.    Changes in function:  Yes, improved AROM     Adverse reaction to treatment or activity:  None    OBJECTIVE  Changes in objective findings:  Yes, Anat now has full AAROM/PROM, AROM also improved to near WFL compared to other shoulder.  Pt now needs to work on light strengthening exercises.  Pt was started on supine strength HEP and will progress from there.  Pt was instructed the recovering is not quick and to anticipate 4-8 more weeks of working on light strength exercise to help with her shoulder function and pain.  With her injury and possible shoulder OA she might not get to be pain free.    ASSESSMENT  Anat continues to require intervention to meet STG and LTG's: PT  Patient is progressing as expected.  Response to therapy has shown an improvement in  ROM   Response to therapy has shown lack of progress in  pain level  Progress made towards STG/LTG?  Yes, improved ROM    PLAN  Current treatment program is being advanced to more complex exercises.    PTA/ATC plan:  N/A    Please refer to the daily flowsheet for treatment today, total treatment time and time spent performing 1:1 timed codes.

## 2017-03-01 NOTE — MR AVS SNAPSHOT
After Visit Summary   3/1/2017    Anat Correa    MRN: [id number]           Patient Information     Date Of Birth          1935        Visit Information        Provider Department      3/1/2017 10:10 AM Rylan Otto PT [facility]        Today's Diagnoses     Acute pain of right shoulder           Follow-ups after your visit        Your next 10 appointments already scheduled     Mar 02, 2017  9:00 AM CST   Office Visit with Rashi Calle MD   [facility] ([facility]    [address] [place] [id number] [phone]           Bring a current list of meds and any records pertaining to this visit.  For Physicals, please bring immunization records and any forms needing to be filled out.  Please arrive 10 minutes early to complete paperwork.            Mar 08, 2017  8:50 AM CST   RADHA Spine with Rylan Otto PT   [facility] ([facility]    [address] [address] [phone]              Who to contact     If you have questions or need follow up information about today's clinic visit or your schedule please contact [place] [facility] directly at [phone].  Normal or non-critical lab and imaging results will be communicated to you by [employer] Express[employer], letter or phone within 4 business days after the clinic has received the results. If you do not hear from us within 7 days, please contact the clinic through [employer] or phone. If you have a critical or abnormal lab result, we will notify you by phone as soon as possible.  Submit refill requests through [employer] or call your pharmacy and they will forward the refill request to us. Please allow 3 business days for your refill to be completed.          Additional Information About Your Visit        [employer] Chip ExpressharInnovational [employer] Information     [employer] lets you send messages to your doctor, view your test results, renew  "your prescriptions, schedule appointments and more. To sign up, go to [email or web address] . Click on \"Log in\" on the left side of the screen, which will take you to the Welcome page. Then click on \"Sign up Now\" on the right side of the page.     You will be asked to enter the access code listed below, as well as some personal information. Please follow the directions to create your username and password.     Your access code is: [email or web address]  Expires: 2017  4:28 PM     Your access code will  in 90 days. If you need help or a new code, please call your [place] clinic or [phone].        Care EveryWhere ID     This is your Care EveryWhere ID. This could be used by other organizations to access your [place] medical records  [id number]         Blood Pressure from Last 3 Encounters:   17 155/64   17 164/66   17 190/80    Weight from Last 3 Encounters:   17 75.3 kg (166 lb)   17 75.8 kg (167 lb)   16 73.5 kg (162 lb)              We Performed the Following     THERAPEUTIC EXERCISES        Primary Care Provider Office Phone # Fax #    Rashi Calle [phone]       [place] [id number]        Thank you!     Thank you for choosing [place] [facility] [place]  for your care. Our goal is always to provide you with excellent care. Hearing back from our patients is one way we can continue to improve our services. Please take a few minutes to complete the written survey that you may receive in the mail after your visit with us. Thank you!             Your Updated Medication List - Protect others around you: Learn how to safely use, store and throw away your medicines at www.disposemymeds.org.          This list is accurate as of: 3/1/17 11:19 AM.  Always use your most recent med list.                   Brand Name Dispense Instructions for use    acetaminophen-codeine 300-30 MG per tablet    TYLENOL " #3    90 tablet    Take 1-2 tablets by mouth every 6 hours as needed for moderate pain       albuterol 108 (90 BASE) MCG/ACT Inhaler    PROAIR HFA/PROVENTIL HFA/VENTOLIN HFA    3 Inhaler    Inhale 2 puffs into the lungs every 6 hours as needed for shortness of breath / dyspnea       amLODIPine 5 MG tablet    NORVASC    90 tablet    Take 1 tablet (5 mg) by mouth daily       calcium carbonate 500 MG chewable tablet    TUMS     Take 2 chew tab by mouth daily as needed for heartburn       furosemide 20 MG tablet    LASIX    270 tablet    40 mg in am and 20 mg at noon       IBUPROFEN PO      Take 400 mg by mouth every 6 hours as needed for moderate pain       ipratropium - albuterol 0.5 mg/2.5 mg/3 mL 0.5-2.5 (3) MG/3ML neb solution    DUONEB    90 vial    Take 1 vial (3 mLs) by nebulization every 6 hours as needed for shortness of breath / dyspnea or wheezing       losartan 50 MG tablet    COZAAR    30 tablet    Take 1 tablet (50 mg) by mouth daily       MAGIC MOUTHWASH (ENTER INGREDIENTS IN COMMENTS)     180 mL    Swish and spit 5-10 mLs in mouth every 6 hours as needed       METOPROLOL SUCCINATE ER PO      Take 100 mg by mouth daily (2 x 50mg = 100mg)       potassium chloride SA 10 MEQ CR tablet    K-DUR/KLOR-CON M    180 tablet    Take 2 tablets (20 mEq) by mouth daily

## 2017-03-02 ENCOUNTER — OFFICE VISIT (OUTPATIENT)
Dept: FAMILY MEDICINE | Facility: CLINIC | Age: 82
End: 2017-03-02
Payer: MEDICARE

## 2017-03-02 VITALS
OXYGEN SATURATION: 98 % | HEART RATE: 69 BPM | TEMPERATURE: 97.9 F | WEIGHT: 165 LBS | BODY MASS INDEX: 31.18 KG/M2 | SYSTOLIC BLOOD PRESSURE: 156 MMHG | DIASTOLIC BLOOD PRESSURE: 68 MMHG

## 2017-03-02 DIAGNOSIS — C34.12 MALIGNANT NEOPLASM OF UPPER LOBE OF LEFT LUNG (H): ICD-10-CM

## 2017-03-02 DIAGNOSIS — N18.4 CKD (CHRONIC KIDNEY DISEASE) STAGE 4, GFR 15-29 ML/MIN (H): Primary | ICD-10-CM

## 2017-03-02 DIAGNOSIS — I87.8 VENOUS STASIS OF LOWER EXTREMITY: ICD-10-CM

## 2017-03-02 DIAGNOSIS — M25.511 CHRONIC RIGHT SHOULDER PAIN: ICD-10-CM

## 2017-03-02 DIAGNOSIS — I10 HYPERTENSION GOAL BP (BLOOD PRESSURE) < 140/90: ICD-10-CM

## 2017-03-02 DIAGNOSIS — G89.29 CHRONIC RIGHT SHOULDER PAIN: ICD-10-CM

## 2017-03-02 DIAGNOSIS — G63 POLYNEUROPATHY ASSOCIATED WITH UNDERLYING DISEASE (H): ICD-10-CM

## 2017-03-02 DIAGNOSIS — J44.9 COPD, MODERATE (H): ICD-10-CM

## 2017-03-02 DIAGNOSIS — Z51.81 MEDICATION MONITORING ENCOUNTER: ICD-10-CM

## 2017-03-02 PROCEDURE — 99214 OFFICE O/P EST MOD 30 MIN: CPT | Performed by: FAMILY MEDICINE

## 2017-03-02 RX ORDER — FUROSEMIDE 20 MG
TABLET ORAL
Qty: 180 TABLET | Refills: 3 | Status: SHIPPED | OUTPATIENT
Start: 2017-03-02 | End: 2018-02-19

## 2017-03-02 NOTE — PROGRESS NOTES
SUBJECTIVE:                                                    Anat Correa is a 82 year old female who presents to clinic today for the following health issues:    Anat started PT for her right shoulder, they feel she has gained strength, however it is quite painful this morning.    May see LP if not better next week for right shoulder injection.    Hypertension/CKD Follow-up      Outpatient blood pressures are not being checked.    Low Salt Diet: low salt    Patient reports being in the bathroom all the time do to the lasix.    Switching to 40 mg of lasix in the morning and nothing in the afternoon.        Pt states she has really been trying to watch salt and is religous about taking meds    No significant edema    Lab Results   Component Value Date    CR 1.35 12/07/2016         BP Readings from Last 6 Encounters:   03/02/17 156/68   02/28/17 155/64   02/23/17 164/66   02/22/17 190/80   02/20/17 146/60   02/09/17 160/68       Wt Readings from Last 4 Encounters:   02/20/17 75.3 kg (166 lb)   02/06/17 75.8 kg (167 lb)   12/07/16 73.5 kg (162 lb)   10/12/16 73.5 kg (162 lb)     Lipids  Recent Labs   Lab Test  03/09/16   0810  06/01/15   0818  09/25/12   0723   CHOL  211*  209*  156   HDL  54  55  49*   LDL  130*  128  83   TRIG  136  132  118   CHOLHDLRATIO   --   3.8  3.2     Problem list and histories reviewed & adjusted, as indicated.  Additional history: as documented    Reviewed and updated as needed this visit by clinical staff  Tobacco  Allergies  Meds  Problems  Med Hx  Surg Hx  Fam Hx  Soc Hx        Reviewed and updated as needed this visit by Provider    Health Maintenance    Health Maintenance Due   Topic Date Due     URINE DRUG SCREEN Q1 YR  01/25/1950     MEDICARE ANNUAL WELLNESS VISIT  11/21/2015     WELLNESS VISIT Q1 YR (NO INBASKET)  11/21/2015     DEXA Q3 YR  01/11/2016     COPD ACTION PLAN Q1 YR  06/01/2016     ADVANCE DIRECTIVE PLANNING Q5 YRS (NO INBASKET)  07/15/2016     LIPID  MONITORING Q1 YEAR( NO INBASKET)  03/09/2017     MICROALBUMIN Q1 YEAR( NO INBASKET)  03/09/2017       Current Problem List    Patient Active Problem List   Diagnosis     History of colonic polyps     Calculus of kidney     Lesion of plantar nerve     Osteoporosis     Primary iridocyclitis     Anemia     Hyperlipidemia LDL goal <100     OA (osteoarthritis)     Advanced directives, counseling/discussion     Hypertension goal BP (blood pressure) < 140/90     COPD, moderate     Medication management     Vitamin D deficiency     CKD (chronic kidney disease) stage 4, GFR 15-29 ml/min (H)     Anemia in chronic renal disease     Secondary renal hyperparathyroidism (H)     Venous stasis of lower extremity     Peripheral neuropathy (H)     Chronic pain     Lung nodule     Nodule of left lung     Malignant neoplasm of upper lobe of left lung (H)     Acute pain of right shoulder       Past Medical History    Past Medical History   Diagnosis Date     Anemia      Calculus of kidney 1998     dr hope     Chronic pain      OA     CKD (chronic kidney disease) stage 4, GFR 15-29 ml/min (H) 2008     dr mcdermott     COPD, moderate (H) 2004     moderate obstruction, with pulm htn - dr francisco did AAP     Diverticulosis of colon (without mention of hemorrhage) 7/03     Hemorrhage of gastrointestinal tract, unspecified 4/08     dr su     Hyperlipidemia LDL goal <100 2006     Hypertension goal BP (blood pressure) < 140/90 2005     Internal hemorrhoids without mention of complication 7/03     Irritable bowel syndrome 7/03     Lesion of plantar nerve 8/98     hay's neuroma dr connor     Lung nodule 4/14, 3/16     Dr Thompson, 1.4 x 1.1 cm, lingula, PET neg 3/16     Malignant neoplasm of upper lobe of left lung (H) 7/16     Dr Thompson - x 2 nodules - moderately differentiated adenocarcinoma (acinar predominant).  Final pathologic stage [id number], Final clinical stage IA, grade 2     Medication management      OA - 1 T#3 at HS with HX CKD      OA (osteoarthritis)      lower ext worse katya knees     Obesity (BMI 30.0-34.9)      Osteoporosis, unspecified      FOSAMAX  = upset stomach , pt declines further rx.      Other ulcerative colitis      collangenous collitis- last colonoscopy       Peripheral neuropathy (H)      early - burning at HS     Personal history of colonic polyps      dr araujo     PONV (postoperative nausea and vomiting)      Primary iridocyclitis      iritis dr dominguez     Venous stasis of lower extremity        Past Surgical History    Past Surgical History   Procedure Laterality Date     Hc hemorrhoidectomy,int/ext,simple       C appendectomy       C  delivery only       , Low Cervical     Hc repair sliding inguinal hernia       mitra     Surgical history of -        mitra neck & back tumors     Hc colonoscopy thru stoma, diagnostic       IBS/diverticula/hemmorhoids dr araujo     Surgical history of -        neuroma excision - 2nd toe amputation     Surgical history of -   3/07     Rt IOL - dr jimenez     Surgical history of -        Lt IOL - dr jimenez     Hc esophagoscopy, diagnostic  , , 6/10     Gastric ulcer, healed, gastritis     Surgical history of -        Lt Knee replacement - dr gayle     Surgical history of -        Rt Knee replacement - dr gayle     Excise mass upper extremity  10/13/2011     Lt Forearm mass excision - dr ely     Excise mass upper extremity  2012     Lt Forearm mass excision - dr ely     Xr joint injection hip (hib)  2015     Rt - Dr Terry     Surgical history of -   9/15     Rt Second toe amputation - Dr White     Amputate toe(s) Right 2015     Procedure: AMPUTATE TOE(S);  Surgeon: Ashia White, DPM, Pod;  Location:  OR     Thoracoscopic wedge resection lung Left 2016     Procedure: THORACOSCOPIC WEDGE RESECTION LUNG;  Surgeon: Abdelrahman Thompson MD;  Location:  OR       Current  Medications    Current Outpatient Prescriptions   Medication Sig Dispense Refill     furosemide (LASIX) 20 MG tablet 40 mg in am 180 tablet 3     potassium chloride SA (K-DUR/KLOR-CON M) 10 MEQ CR tablet Take 2 tablets (20 mEq) by mouth daily 180 tablet 3     [DISCONTINUED] furosemide (LASIX) 20 MG tablet 40 mg in am and 20 mg at noon 270 tablet 3     losartan (COZAAR) 50 MG tablet Take 1 tablet (50 mg) by mouth daily 30 tablet 1     amLODIPine (NORVASC) 5 MG tablet Take 1 tablet (5 mg) by mouth daily 90 tablet 3     acetaminophen-codeine (TYLENOL #3) 300-30 MG per tablet Take 1-2 tablets by mouth every 6 hours as needed for moderate pain 90 tablet 0     MAGIC MOUTHWASH, ENTER INGREDIENTS IN COMMENTS, Swish and spit 5-10 mLs in mouth every 6 hours as needed 180 mL 1     albuterol (PROAIR HFA, PROVENTIL HFA, VENTOLIN HFA) 108 (90 BASE) MCG/ACT inhaler Inhale 2 puffs into the lungs every 6 hours as needed for shortness of breath / dyspnea 3 Inhaler 3     IBUPROFEN PO Take 400 mg by mouth every 6 hours as needed for moderate pain       calcium carbonate (TUMS) 500 MG chewable tablet Take 2 chew tab by mouth daily as needed for heartburn       METOPROLOL SUCCINATE ER PO Take 100 mg by mouth daily (2 x 50mg = 100mg)       ipratropium - albuterol 0.5 mg/2.5 mg/3 mL (DUONEB) 0.5-2.5 (3) MG/3ML nebulization Take 1 vial (3 mLs) by nebulization every 6 hours as needed for shortness of breath / dyspnea or wheezing 90 vial 3       Allergies    Allergies   Allergen Reactions     Prednisone      Yeast infection - swollen lips       Immunizations    Immunization History   Administered Date(s) Administered     Influenza (High Dose) 3 valent vaccine 10/07/2010, 09/19/2012, 11/04/2013, 10/08/2014, 11/03/2015, 09/16/2016     Influenza (IIV3) 10/19/2004, 11/15/2005     Pneumococcal (PCV 13) 11/03/2015     Pneumococcal 23 valent 04/25/2006     TD (ADULT, 7+) 03/03/1998, 01/23/2006     TDAP (BOOSTRIX AGES 10-64) 08/27/2012     Zoster  vaccine, live 10/24/2012       Family History    Family History   Problem Relation Age of Onset     CEREBROVASCULAR DISEASE Mother      CEREBROVASCULAR DISEASE Father      Cancer - colorectal Brother      Cancer - colorectal Brother      Breast Cancer Sister      CANCER Sister      lung     CANCER Sister      lung     CANCER Sister      lung     Scleroderma Sister        Social History    Social History     Social History     Marital status:      Spouse name: thanh     Number of children: Alex     Years of education: 12     Occupational History     part-time [employer] section at [employer]       Social History Main Topics     Smoking status: Former Smoker     Packs/day: 3.00     Years: 50.00     Types: Cigarettes     Quit date: 12/21/1993     Smokeless tobacco: Never Used      Comment: quit 1993     Alcohol use No     Drug use: No      Comment: no herbal meds either      Sexual activity: Not Currently      Comment:  for 24 years      Other Topics Concern     Caffeine Concern Yes     1 pot  qd - switched to decaff now     Special Diet Yes     Low salt     Exercise No     Seat Belt Yes     Self-Exams Yes     SBE encouraged motnhly      Parent/Sibling W/ Cabg, Mi Or Angioplasty Before 65f 55m? No     Social History Narrative    calcium - 3 large dairy servings/day - pt declines fosamax and other meds for her osteoporosis    flex sig/colonoscopy -last colonoscopy 7/03     sun precautions - discussed     mammogram - hasn't had one since 2001 at least - ordered     Td booster - 1/23/06    pneumovax -today 4/25/06    DEXA - pt declines repeat scan today 4/25/06 despite her osteoporosis     stool hemoccults - every year after age 40    ASA- easy bruising - can't take     mulvitamin - encouraged       All above reviewed and updated, all stable unless otherwise noted    Recent labs reviewed    ROS:  Constitutional, HEENT, cardiovascular, pulmonary, GI, , musculoskeletal, neuro, skin, endocrine and psych systems  are negative, except as in HPI or otherwise noted       OBJECTIVE:                                                    /68  Pulse 69  Temp 97.9  F (36.6  C) (Oral)  Wt 165 lb (74.8 kg)  SpO2 98%  BMI 31.18 kg/m2  Body mass index is 31.18 kg/(m^2).  GENERAL: healthy, alert and no distress Overweight  EYES: Eyes grossly normal to inspection, extraocular movements - intact, and PERRL  HENT: ear canals- normal; TMs- normal; Nose- normal; Mouth- no ulcers, no lesions  NECK: no tenderness, no adenopathy, no asymmetry, no masses, no stiffness; thyroid- normal to palpation  RESP: lungs clear to auscultation - no rales, no rhonchi, no wheezes  CV: regular rates and rhythm, normal S1 S2, no S3 or S4 and no murmur, no click or rub -  ABDOMEN: soft, no tenderness, no  hepatosplenomegaly, no masses, normal bowel sounds  MS: extremities- no gross deformities noted, no edema, slight tenderness to the right leg  SKIN: no suspicious lesions, no rashes  NEURO: strength and tone- normal, sensory exam- grossly normal, mentation- intact, speech- normal, reflexes- symmetric  BACK: no CVA tenderness, no paralumbar tenderness  PSYCH: Alert and oriented times 3; speech- coherent , normal rate and volume; able to articulate logical thoughts, able to abstract reason, no tangential thoughts, no hallucinations or delusions, affect- normal    DIAGNOSTICS/PROCEDURES:                                                      Results for orders placed or performed in visit on 12/07/16   Comprehensive metabolic panel   Result Value Ref Range    Sodium 133 133 - 144 mmol/L    Potassium 4.7 3.4 - 5.3 mmol/L    Chloride 99 94 - 109 mmol/L    Carbon Dioxide 25 20 - 32 mmol/L    Anion Gap 9 3 - 14 mmol/L    Glucose 83 70 - 99 mg/dL    Urea Nitrogen 28 7 - 30 mg/dL    Creatinine 1.35 (H) 0.52 - 1.04 mg/dL    GFR Estimate 38 (L) >60 mL/min/1.7m2    GFR Estimate If Black 46 (L) >60 mL/min/1.7m2    Calcium 9.7 8.5 - 10.1 mg/dL    Bilirubin Total 0.3 0.2 -  1.3 mg/dL    Albumin 3.8 3.4 - 5.0 g/dL    Protein Total 7.2 6.8 - 8.8 g/dL    Alkaline Phosphatase 75 40 - 150 U/L    ALT 20 0 - 50 U/L    AST 17 0 - 45 U/L   CBC with platelets   Result Value Ref Range    WBC 8.7 4.0 - 11.0 10e9/L    RBC Count 3.61 (L) 3.8 - 5.2 10e12/L    Hemoglobin 11.2 (L) 11.7 - 15.7 g/dL    Hematocrit 33.1 (L) 35.0 - 47.0 %    MCV 92 78 - 100 fl    MCH 31.0 26.5 - 33.0 pg    MCHC 33.8 31.5 - 36.5 g/dL    RDW 12.5 10.0 - 15.0 %    Platelet Count 313 150 - 450 10e9/L   Parathyroid Hormone Intact   Result Value Ref Range    Parathyroid Hormone Intact 12 12 - 72 pg/mL   Vitamin D Deficiency   Result Value Ref Range    Vitamin D Deficiency screening 31 20 - 75 ug/L        ASSESSMENT/PLAN:                                                        ICD-10-CM    1. CKD (chronic kidney disease) stage 4, GFR 15-29 ml/min (H) N18.4 furosemide (LASIX) 20 MG tablet   2. Hypertension goal BP (blood pressure) < 140/90 I10 furosemide (LASIX) 20 MG tablet   3. COPD, moderate J44.9    4. Chronic right shoulder pain M25.511     G89.29    5. Malignant neoplasm of upper lobe of left lung (H) C34.12    6. Polyneuropathy associated with underlying disease (H) G63    7. Venous stasis of lower extremity I87.8    8. Medication monitoring encounter Z51.81        Discussed treatment/modality options, including risk and benefits, she desires advised alcohol consumption 1oz per day or less, advised aspirin 81 mg po daily, advised 1 multivitamin per day, advised calcium 3475-3666 mg/d and Vitamin D 800-1200 IU/d, advised dentist every 6 months, advised diet, exercise, and weight loss, advised opthalmologist every 1-2 years, further health care maintenance, medication change(s) and observation. All diagnosis above reviewed and noted above, otherwise stable.  See [facility] orders for further details.  Follow up in 2 month(s) and as needed.    Planning on switching to taking 40 mg of lasix in the morning and nothing in the  afternoon, planning to see Dinorahtracy Terrazas for shoulder injection if not better by next week, continue PT, hold on labs.    Health Maintenance Due   Topic Date Due     URINE DRUG SCREEN Q1 YR  01/25/1950     MEDICARE ANNUAL WELLNESS VISIT  11/21/2015     WELLNESS VISIT Q1 YR (NO INBASKET)  11/21/2015     DEXA Q3 YR  01/11/2016     COPD ACTION PLAN Q1 YR  06/01/2016     ADVANCE DIRECTIVE PLANNING Q5 YRS (NO INBASKET)  07/15/2016     LIPID MONITORING Q1 YEAR( NO INBASKET)  03/09/2017     MICROALBUMIN Q1 YEAR( NO INBASKET)  03/09/2017       See Patient Instructions  This document serves as a record of the services and decisions personally performed and made by Rashi Calle MD [facility]. It was created on their behalf by Dougie Vega, a trained medical scribe. The creation of this document is based the provider's statements to the medical scribe.  Dougie Vega March 2, 2017 8:55 AM             Rashi Calle MD [address]  [phone] Fax

## 2017-03-02 NOTE — MR AVS SNAPSHOT
After Visit Summary   3/2/2017    Anat Correa    MRN: [id number]           Patient Information     Date Of Birth          1935        Visit Information        Provider Department      3/2/2017 9:00 AM Rashi Calle MD [facility]        Today's Diagnoses     CKD (chronic kidney disease) stage 4, GFR 15-29 ml/min (H)    -  1    Hypertension goal BP (blood pressure) < 140/90        COPD, moderate        Chronic right shoulder pain        Malignant neoplasm of upper lobe of left lung (H)        Polyneuropathy associated with underlying disease (H)        Venous stasis of lower extremity        Medication monitoring encounter          Care Instructions    3/2/17    Lasix 40 mg every morning    Dinorah ANTONIO who can do shoulder injections    [facility]                        To reach your care team during and after hours:   [phone]  To reach our pharmacy:        [phone]    Clinic Hours                        Our clinic hours are:    Monday   7:30 am to 7:00 pm                  Tuesday through Friday 7:30 am to 5:00 pm                             Saturday   8:00 am to 12:00 pm      Sunday   Closed      Pharmacy Hours                        Our pharmacy hours are:    Monday   8:30 am to 7:00 pm       Tuesday to Friday  8:30 am to 6:00 pm                       Saturday    9:00 am to 1:00 pm              Sunday    Closed              There is also information available at our web site:  [email or web address]    If your provider ordered any lab tests and you do not receive the results within 10 business days, please call the clinic.    If you need a medication refill please contact your pharmacy.  Please allow 2-3 business days for your refill to be completed.    Our clinic offers telephone visits and e visits.  Please ask one of your team members to explain more.      Use [email or web address] (secure email communication and access to your chart) to send your primary  care provider a message or make an appointment. Ask someone on your Team how to sign up for [employer].  Immunizations                      Immunization History   Administered Date(s) Administered     Influenza (High Dose) 3 valent vaccine 10/07/2010, 09/19/2012, 11/04/2013, 10/08/2014, 11/03/2015, 09/16/2016     Influenza (IIV3) 10/19/2004, 11/15/2005     Pneumococcal (PCV 13) 11/03/2015     Pneumococcal 23 valent 04/25/2006     TD (ADULT, 7+) 03/03/1998, 01/23/2006     TDAP (BOOSTRIX AGES 10-64) 08/27/2012     Zoster vaccine, live 10/24/2012        Health Maintenance                         Health Maintenance Due   Topic Date Due     URINE DRUG SCREEN Q1 YR  01/25/1950     Medicare Annual Wellness Visit  11/21/2015     Wellness Visit with your Primary Provider - yearly  11/21/2015     Osteoporosis Screening (Dexa) - every 3 years   01/11/2016     COPD ACTION PLAN Q1 YR  06/01/2016     Discuss Advance Directive Planning  07/15/2016     Cholesterol Lab - yearly  03/09/2017     Microalbumin Lab - yearly  03/09/2017             Follow-ups after your visit        Your next 10 appointments already scheduled     Mar 08, 2017  8:50 AM CST   [facility] with Rylan Otto PT   [facility] [place] ([facility])    [address]  [place] [address] [phone]              Who to contact     If you have questions or need follow up information about today's clinic visit or your schedule please contact [facility] directly at [phone].  Normal or non-critical lab and imaging results will be communicated to you by MyChart, letter or phone within 4 business days after the clinic has received the results. If you do not hear from us within 7 days, please contact the clinic through MyChart or phone. If you have a critical or abnormal lab result, we will notify you by phone as soon as possible.  Submit refill requests through [employer] or call your pharmacy and they will forward the  "refill request to us. Please allow 3 business days for your refill to be completed.          Additional Information About Your Visit        [employer] Information     [employer] lets you send messages to your doctor, view your test results, renew your prescriptions, schedule appointments and more. To sign up, go to [email or web address] . Click on \"Log in\" on the left side of the screen, which will take you to the Welcome page. Then click on \"Sign up Now\" on the right side of the page.     You will be asked to enter the access code listed below, as well as some personal information. Please follow the directions to create your username and password.     Your access code is: [email or web address]  Expires: 2017  4:28 PM     Your access code will  in 90 days. If you need help or a new code, please call your [place] clinic or [phone].        Care EveryWhere ID     This is your Care EveryWhere ID. This could be used by other organizations to access your [place] medical records  [id number]        Your Vitals Were     Pulse Temperature Pulse Oximetry BMI (Body Mass Index)          69 97.9  F (36.6  C) (Oral) 98% 31.18 kg/m2         Blood Pressure from Last 3 Encounters:   17 156/68   17 155/64   17 164/66    Weight from Last 3 Encounters:   17 165 lb (74.8 kg)   17 166 lb (75.3 kg)   17 167 lb (75.8 kg)              Today, you had the following     No orders found for display         Today's Medication Changes          These changes are accurate as of: 3/2/17  9:19 AM.  If you have any questions, ask your nurse or doctor.               These medicines have changed or have updated prescriptions.        Dose/Directions    furosemide 20 MG tablet   Commonly known as:  LASIX   This may have changed:  additional instructions   Used for:  Hypertension goal BP (blood pressure) < 140/90, CKD (chronic kidney disease) stage 4, GFR 15-29 ml/min (H)   Changed by:  Rashi Calle MD        40 mg " in am   Quantity:  180 tablet   Refills:  3            Where to get your medicines      These medications were sent to [facility] - [place], [place] - [address]  [address] [address]     Phone:  [phone]     furosemide 20 MG tablet                Primary Care Provider Office Phone # Fax #    Rashi Calle [phone]       [facility] [address] [place] [id number]        Thank you!     Thank you for choosing [facility]  for your care. Our goal is always to provide you with excellent care. Hearing back from our patients is one way we can continue to improve our services. Please take a few minutes to complete the written survey that you may receive in the mail after your visit with us. Thank you!             Your Updated Medication List - Protect others around you: Learn how to safely use, store and throw away your medicines at www.disposemymeds.org.          This list is accurate as of: 3/2/17  9:19 AM.  Always use your most recent med list.                   Brand Name Dispense Instructions for use    acetaminophen-codeine 300-30 MG per tablet    TYLENOL #3    90 tablet    Take 1-2 tablets by mouth every 6 hours as needed for moderate pain       albuterol 108 (90 BASE) MCG/ACT Inhaler    PROAIR HFA/PROVENTIL HFA/VENTOLIN HFA    3 Inhaler    Inhale 2 puffs into the lungs every 6 hours as needed for shortness of breath / dyspnea       amLODIPine 5 MG tablet    NORVASC    90 tablet    Take 1 tablet (5 mg) by mouth daily       calcium carbonate 500 MG chewable tablet    TUMS     Take 2 chew tab by mouth daily as needed for heartburn       furosemide 20 MG tablet    LASIX    180 tablet    40 mg in am       IBUPROFEN PO      Take 400 mg by mouth every 6 hours as needed for moderate pain       ipratropium - albuterol 0.5 mg/2.5 mg/3 mL 0.5-2.5 (3) MG/3ML neb solution    DUONEB    90 vial    Take 1 vial  (3 mLs) by nebulization every 6 hours as needed for shortness of breath / dyspnea or wheezing       losartan 50 MG tablet    COZAAR    30 tablet    Take 1 tablet (50 mg) by mouth daily       MAGIC MOUTHWASH (ENTER INGREDIENTS IN COMMENTS)     180 mL    Swish and spit 5-10 mLs in mouth every 6 hours as needed       METOPROLOL SUCCINATE ER PO      Take 100 mg by mouth daily (2 x 50mg = 100mg)       potassium chloride SA 10 MEQ CR tablet    K-DUR/KLOR-CON M    180 tablet    Take 2 tablets (20 mEq) by mouth daily

## 2017-03-02 NOTE — PATIENT INSTRUCTIONS
3/2/17    Lasix 40 mg every morning    Dinorah ANTONIO who can do shoulder injections    [facility]                        To reach your care team during and after hours:   [phone]  To reach our pharmacy:        [phone]    Clinic Hours                        Our clinic hours are:    Monday   7:30 am to 7:00 pm                  Tuesday through Friday 7:30 am to 5:00 pm                             Saturday   8:00 am to 12:00 pm      Sunday   Closed      Pharmacy Hours                        Our pharmacy hours are:    Monday   8:30 am to 7:00 pm       Tuesday to Friday  8:30 am to 6:00 pm                       Saturday    9:00 am to 1:00 pm              Sunday    Closed              There is also information available at our web site:  [email or web address]    If your provider ordered any lab tests and you do not receive the results within 10 business days, please call the clinic.    If you need a medication refill please contact your pharmacy.  Please allow 2-3 business days for your refill to be completed.    Our clinic offers telephone visits and e visits.  Please ask one of your team members to explain more.      Use [employer] (secure email communication and access to your chart) to send your primary care provider a message or make an appointment. Ask someone on your Team how to sign up for [employer].  Immunizations                      Immunization History   Administered Date(s) Administered     Influenza (High Dose) 3 valent vaccine 10/07/2010, 09/19/2012, 11/04/2013, 10/08/2014, 11/03/2015, 09/16/2016     Influenza (IIV3) 10/19/2004, 11/15/2005     Pneumococcal (PCV 13) 11/03/2015     Pneumococcal 23 valent 04/25/2006     TD (ADULT, 7+) 03/03/1998, 01/23/2006     TDAP (BOOSTRIX AGES 10-64) 08/27/2012     Zoster vaccine, live 10/24/2012        Health Maintenance                         Health Maintenance Due   Topic Date Due     URINE DRUG SCREEN Q1 YR  01/25/1950     Medicare Annual  Wellness Visit  11/21/2015     Wellness Visit with your Primary Provider - yearly  11/21/2015     Osteoporosis Screening (Dexa) - every 3 years   01/11/2016     COPD ACTION PLAN Q1 YR  06/01/2016     Discuss Advance Directive Planning  07/15/2016     Cholesterol Lab - yearly  03/09/2017     Microalbumin Lab - yearly  03/09/2017

## 2017-03-02 NOTE — NURSING NOTE
"Chief Complaint   Patient presents with     RECHECK       Initial /70  Pulse 69  Temp 97.9  F (36.6  C) (Oral)  Wt 165 lb (74.8 kg)  SpO2 98%  BMI 31.18 kg/m2 Estimated body mass index is 31.18 kg/(m^2) as calculated from the following:    Height as of 2/20/17: 5' 1\" (1.549 m).    Weight as of this encounter: 165 lb (74.8 kg).  Medication Reconciliation: complete  "

## 2017-03-04 ENCOUNTER — TELEPHONE (OUTPATIENT)
Dept: FAMILY MEDICINE | Facility: CLINIC | Age: 82
End: 2017-03-04

## 2017-03-08 ENCOUNTER — THERAPY VISIT (OUTPATIENT)
Dept: PHYSICAL THERAPY | Facility: CLINIC | Age: 82
End: 2017-03-08
Payer: MEDICARE

## 2017-03-08 DIAGNOSIS — M25.511 ACUTE PAIN OF RIGHT SHOULDER: ICD-10-CM

## 2017-03-08 PROCEDURE — G8985 CARRY GOAL STATUS: HCPCS | Mod: GP | Performed by: PHYSICAL THERAPIST

## 2017-03-08 PROCEDURE — G8984 CARRY CURRENT STATUS: HCPCS | Mod: GP | Performed by: PHYSICAL THERAPIST

## 2017-03-08 PROCEDURE — 97110 THERAPEUTIC EXERCISES: CPT | Mod: GP | Performed by: PHYSICAL THERAPIST

## 2017-03-08 NOTE — MR AVS SNAPSHOT
"              After Visit Summary   3/8/2017    Anat Correa    MRN: [id number]           Patient Information     Date Of Birth          1935        Visit Information        Provider Department      3/8/2017 8:50 AM Rylan Otto PT [place] [facility]        Today's Diagnoses     Acute pain of right shoulder           Follow-ups after your visit        Who to contact     If you have questions or need follow up information about today's clinic visit or your schedule please contact [facility] directly at [phone].  Normal or non-critical lab and imaging results will be communicated to you by [employer], letter or phone within 4 business days after the clinic has received the results. If you do not hear from us within 7 days, please contact the clinic through [employer] or phone. If you have a critical or abnormal lab result, we will notify you by phone as soon as possible.  Submit refill requests through [employer] or call your pharmacy and they will forward the refill request to us. Please allow 3 business days for your refill to be completed.          Additional Information About Your Visit        MyChart Information     [employer] lets you send messages to your doctor, view your test results, renew your prescriptions, schedule appointments and more. To sign up, go to [email or web address] . Click on \"Log in\" on the left side of the screen, which will take you to the Welcome page. Then click on \"Sign up Now\" on the right side of the page.     You will be asked to enter the access code listed below, as well as some personal information. Please follow the directions to create your username and password.     Your access code is: [email or web address]  Expires: 2017  4:28 PM     Your access code will  in 90 days. If you need help or a new code, please call your [facility] clinic or [phone].        Care EveryWhere ID     This is your Care EveryWhere ID. This " could be used by other organizations to access your [facility] medical records  [id number]         Blood Pressure from Last 3 Encounters:   03/02/17 156/68   02/28/17 155/64   02/23/17 164/66    Weight from Last 3 Encounters:   03/02/17 74.8 kg (165 lb)   02/20/17 75.3 kg (166 lb)   02/06/17 75.8 kg (167 lb)              We Performed the Following     [facility] PROGRESS NOTES REPORT     THERAPEUTIC EXERCISES        Primary Care Provider Office Phone # Fax #    Rashi Calle [phone]       [facility] [address] [place] [phone]        Thank you!     Thank you for choosing [facility]  for your care. Our goal is always to provide you with excellent care. Hearing back from our patients is one way we can continue to improve our services. Please take a few minutes to complete the written survey that you may receive in the mail after your visit with us. Thank you!             Your Updated Medication List - Protect others around you: Learn how to safely use, store and throw away your medicines at www.disposemymeds.org.          This list is accurate as of: 3/8/17  9:20 AM.  Always use your most recent med list.                   Brand Name Dispense Instructions for use    acetaminophen-codeine 300-30 MG per tablet    TYLENOL #3    90 tablet    Take 1-2 tablets by mouth every 6 hours as needed for moderate pain       albuterol 108 (90 BASE) MCG/ACT Inhaler    PROAIR HFA/PROVENTIL HFA/VENTOLIN HFA    3 Inhaler    Inhale 2 puffs into the lungs every 6 hours as needed for shortness of breath / dyspnea       amLODIPine 5 MG tablet    NORVASC    90 tablet    Take 1 tablet (5 mg) by mouth daily       calcium carbonate 500 MG chewable tablet    TUMS     Take 2 chew tab by mouth daily as needed for heartburn       furosemide 20 MG tablet    LASIX    180 tablet    40 mg in am       IBUPROFEN PO      Take 400 mg by mouth every 6 hours as needed for moderate pain        ipratropium - albuterol 0.5 mg/2.5 mg/3 mL 0.5-2.5 (3) MG/3ML neb solution    DUONEB    90 vial    Take 1 vial (3 mLs) by nebulization every 6 hours as needed for shortness of breath / dyspnea or wheezing       losartan 50 MG tablet    COZAAR    30 tablet    Take 1 tablet (50 mg) by mouth daily       MAGIC MOUTHWASH (ENTER INGREDIENTS IN COMMENTS)     180 mL    Swish and spit 5-10 mLs in mouth every 6 hours as needed       METOPROLOL SUCCINATE ER PO      Take 100 mg by mouth daily (2 x 50mg = 100mg)       potassium chloride SA 10 MEQ CR tablet    K-DUR/KLOR-CON M    180 tablet    Take 2 tablets (20 mEq) by mouth daily

## 2017-03-08 NOTE — PROGRESS NOTES
Subjective:    HPI                    Objective:    System    Physical Exam    General     ROS    Assessment/Plan:      PROGRESS  REPORT    Progress reporting period is from initial to 3/8/17.       SUBJECTIVE  Subjective changes noted by patient:  Anat returns to PT with some improvement from her first visit, pt feels pain in her shoulder with new supine AROM exercises so pt decided to stop them at home, pt has been mostly doing AAROM with cane.  Pt saw her MD last week with possible injection for shoulder to help with pain.    Current pain level is 6/10  .     Previous pain level was  8/10  .   Changes in function:  Yes (See Goal flowsheet attached for changes in current functional level)  Adverse reaction to treatment or activity: None    OBJECTIVE  Changes noted in objective findings:  Yes, AROM WFL (ROM is normal compared to other side).  MMT ER 3+/5, IR 4/5 (trial of progressing strength not tolerated).  Pt now has improved functional AROM.    ASSESSMENT/PLAN  Updated problem list and treatment plan: Diagnosis 1:  R shoulder pain, RCT  Pain -  hot/cold therapy, self management, education and home program  Decreased strength - therapeutic exercise and therapeutic activities  Inflammation - cold therapy  Decreased function - therapeutic activities  Impaired posture - neuro re-education  STG/LTGs have been met or progress has been made towards goals:  Yes (See Goal flow sheet completed today.)  Assessment of Progress: The patient's condition is improving.  Self Management Plans:  Patient has been instructed in a home treatment program.  Patient  has been instructed in self management of symptoms.  I have re-evaluated this patient and find that the nature, scope, duration and intensity of the therapy is appropriate for the medical condition of the patient.  Anat continues to require the following intervention to meet STG and LTG's:  PT    Recommendations:  Anat will cont with AAROM 1x/day, use her arm  more normally during the day.  Discussed need for injection at this time pt has improved AROM, slight reduction of pain so recommend waiting on doing this shoulder injection in risk of making her RC tear worse and lose her functional AROM.  Pt will call MD if she would like to proceed with injection.    Please refer to the daily flowsheet for treatment today, total treatment time and time spent performing 1:1 timed codes.

## 2017-03-11 DIAGNOSIS — I10 ESSENTIAL HYPERTENSION WITH GOAL BLOOD PRESSURE LESS THAN 140/90: ICD-10-CM

## 2017-03-13 RX ORDER — POTASSIUM CHLORIDE 750 MG/1
TABLET, EXTENDED RELEASE ORAL
Qty: 90 TABLET | Refills: 1 | OUTPATIENT
Start: 2017-03-13

## 2017-03-13 NOTE — TELEPHONE ENCOUNTER
POTASSIUM CHLORIDE VISHAL ER 10 TBCR      Last Written Prescription Date: 02/20/2017  Last Fill Quantity: 180, # refills: 3  Last Office Visit with G, P or [facility] prescribing provider: 03/02/2017       Potassium   Date Value Ref Range Status   12/07/2016 4.7 3.4 - 5.3 mmol/L Final     Creatinine   Date Value Ref Range Status   12/07/2016 1.35 (H) 0.52 - 1.04 mg/dL Final     BP Readings from Last 3 Encounters:   03/02/17 156/68   02/28/17 155/64   02/23/17 164/66

## 2017-03-13 NOTE — TELEPHONE ENCOUNTER
Duplicate. Sent to pharmacy 2/17 -Mary Lou Baron, Certified Pharmacy Technician, [facility], [facility], Ph. [phone]

## 2017-03-16 ENCOUNTER — RADIANT APPOINTMENT (OUTPATIENT)
Dept: GENERAL RADIOLOGY | Facility: CLINIC | Age: 82
End: 2017-03-16
Attending: PHYSICIAN ASSISTANT
Payer: MEDICARE

## 2017-03-16 ENCOUNTER — OFFICE VISIT (OUTPATIENT)
Dept: FAMILY MEDICINE | Facility: CLINIC | Age: 82
End: 2017-03-16
Payer: MEDICARE

## 2017-03-16 VITALS
WEIGHT: 165 LBS | OXYGEN SATURATION: 97 % | DIASTOLIC BLOOD PRESSURE: 70 MMHG | SYSTOLIC BLOOD PRESSURE: 136 MMHG | TEMPERATURE: 97.2 F | HEART RATE: 75 BPM | BODY MASS INDEX: 31.15 KG/M2 | HEIGHT: 61 IN

## 2017-03-16 DIAGNOSIS — M25.551 HIP PAIN, RIGHT: ICD-10-CM

## 2017-03-16 DIAGNOSIS — M25.511 ACUTE PAIN OF RIGHT SHOULDER: ICD-10-CM

## 2017-03-16 DIAGNOSIS — M70.61 GREATER TROCHANTERIC BURSITIS OF RIGHT HIP: Primary | ICD-10-CM

## 2017-03-16 DIAGNOSIS — M16.11 PRIMARY OSTEOARTHRITIS OF RIGHT HIP: ICD-10-CM

## 2017-03-16 DIAGNOSIS — M75.41 IMPINGEMENT SYNDROME OF RIGHT SHOULDER: ICD-10-CM

## 2017-03-16 PROCEDURE — 20605 DRAIN/INJ JOINT/BURSA W/O US: CPT | Mod: RT | Performed by: PHYSICIAN ASSISTANT

## 2017-03-16 PROCEDURE — 73030 X-RAY EXAM OF SHOULDER: CPT | Mod: RT

## 2017-03-16 PROCEDURE — 73502 X-RAY EXAM HIP UNI 2-3 VIEWS: CPT

## 2017-03-16 PROCEDURE — 20610 DRAIN/INJ JOINT/BURSA W/O US: CPT | Mod: RT | Performed by: PHYSICIAN ASSISTANT

## 2017-03-16 RX ORDER — TRIAMCINOLONE ACETONIDE 40 MG/ML
80 INJECTION, SUSPENSION INTRA-ARTICULAR; INTRAMUSCULAR ONCE
Qty: 2 ML | Refills: 0 | OUTPATIENT
Start: 2017-03-16 | End: 2017-03-16

## 2017-03-16 NOTE — NURSING NOTE
"Chief Complaint   Patient presents with     Shoulder Pain     right shoulder pain ~2-3 months and right hip pain ongoing. Has been going to PT - not helpful        Initial /70  Pulse 75  Temp 97.2  F (36.2  C) (Tympanic)  Ht 5' 1\" (1.549 m)  Wt 165 lb (74.8 kg)  SpO2 97%  BMI 31.18 kg/m2 Estimated body mass index is 31.18 kg/(m^2) as calculated from the following:    Height as of this encounter: 5' 1\" (1.549 m).    Weight as of this encounter: 165 lb (74.8 kg).  Medication Reconciliation: complete   Sara Lopez CMA      "

## 2017-03-16 NOTE — MR AVS SNAPSHOT
After Visit Summary   3/16/2017    Anat Correa    MRN: [id number]           Patient Information     Date Of Birth          1935        Visit Information        Provider Department      3/16/2017 1:40 PM Dinorah Terrazas PA-C [facility] [place]        Today's Diagnoses     Greater trochanteric bursitis of right hip    -  1    Hip pain, right        Acute pain of right shoulder        Primary osteoarthritis of right hip        Impingement syndrome of right shoulder          Care Instructions                   Trochanteric Bursitis           What is trochanteric bursitis?   Trochanteric bursitis is irritation or inflammation of the trochanteric bursa. A bursa is a fluid-filled sac that acts as a cushion between tendons, bones, and skin. The trochanteric bursa is located on the upper, outer area of the thigh. There is a bump on the outer side of the upper part of the thigh bone (femur) called the greater trochanter. The trochanteric bursa is located over the greater trochanter.   How does it occur?   The trochanteric bursa may be inflamed by a group of muscles or tendons rubbing over the bursa and causing friction against the thigh bone. This injury can occur with running, walking, or bicycling, especially when the bicycle seat is too high.   What are the symptoms?   You have pain on the upper outer area of your thigh or in your hip. The pain is worse when you walk, bicycle, or go up or down stairs. You have pain when you move your thigh bone and feel tenderness in the area over the greater trochanter.   How is it diagnosed?   Your healthcare provider will ask about your symptoms and examine your hip and thigh.   How is it treated?   To treat this condition:   Put an ice pack, gel pack, or package of frozen vegetables, wrapped in a cloth on the area every 3 to 4 hours, for up to 20 minutes at a time.   Take an anti-inflammatory medicine such as ibuprofen, or other medicine as  directed by your provider. Nonsteroidal anti-inflammatory medicines (NSAIDs) may cause stomach bleeding and other problems. These risks increase with age. Read the label and take as directed. Unless recommended by your healthcare provider, do not take for more than 10 days.   Your provider may give you an injection of a corticosteroid medicine.   While you are recovering from your injury you will need to change your sport or activity to one that does not make your condition worse. For example, you may need to swim instead of running or bicycling. If you are bicycling, you may need to lower your bicycle seat.   How long do the effects last?   The length of recovery depends on many factors such as your age, health, and if you have had a previous injury. Recovery time also depends on the severity of the injury. A bursa that is only mildly inflamed and has just started to hurt may improve within a few weeks. A bursa that is significantly inflamed and has been painful for a long time may take up to a few months to improve. You need to stop doing the activities that cause pain until your bursa has healed. If you continue doing activities that cause pain, your symptoms will return and it will take longer to recover.   When can I return to my normal activities?   Everyone recovers from an injury at a different rate. Return to your activities depends on how soon your leg recovers, not by how many days or weeks it has been since your injury has occurred. In general, the longer you have symptoms before you start treatment, the longer it will take to get better. The goal of rehabilitation is to return to your normal activities as soon as is safely possible. If you return too soon you may worsen your injury.   You may safely return to your normal activities when, starting from the top of the list and progressing to the end, each of the following is true:   You have full range of motion in the injured leg compared to the  uninjured leg.   You have full strength of the injured leg compared to the uninjured leg.   You can walk straight ahead without pain or limping.   How can I prevent trochanteric bursitis?   Trochanteric bursitis is best prevented by warming up properly and stretching the muscles on the outer side of your upper thigh.     Published by [employer].  This content is reviewed periodically and is subject to change as new health information becomes available. The information is intended to inform and educate and is not a replacement for medical evaluation, advice, diagnosis or treatment by a healthcare professional.   Written by Lucio Hastings MD, for [employer].   ? 2010 [facility] and/or its affiliates. All Rights Reserved.   Copyright   Clinical Reference Systems 2011         Trochanteric Bursitis Rehabilitation Exercises   You can begin stretching the muscles that run along the outside of your hip using the first two exercise. You can do the strengthening exercises when the sharp pain lessens.   Stretching exercises     Piriformis stretch: Lying on your back with both knees bent, rest the ankle of your injured leg over the knee of your uninjured leg. Grasp the thigh of your uninjured leg and pull that knee toward your chest. You will feel a stretch along the buttocks and possibly along the outside of your hip on the injured side. Hold this for 15 to 30 seconds. Repeat 3 times.     Iliotibial band stretch (standing): Cross your uninjured leg in front of your injured leg and bend down and touch your toes. You can move your hands across the floor toward the uninjured side and you will feel more stretch on the outside of your thigh on the injured side. Hold this position for 15 to 30 seconds. Return to the starting position. Repeat 3 times.   Strengthening exercises     Straight leg raise: Lie on your back with your legs straight out in front of you. Tighten up the top of your thigh muscle on the injured leg and lift  that leg about 8 inches off the floor, keeping the thigh muscle tight throughout. Slowly lower your leg back down to the floor. Do 3 sets of 10.     Wall squat with a ball: Stand with your back, shoulders, and head against a wall and look straight ahead. Keep your shoulders relaxed and your feet 1 foot away from the wall and a shoulder's width apart. Place a rolled up pillow or a soccer-sized ball between your thighs. Keeping your head against the wall, slowly squat while squeezing the pillow or ball at the same time. Squat down until you are almost in a sitting position. Your thighs will not yet be parallel to the floor. Hold this position for 10 seconds and then slowly slide back up the wall. Make sure you keep squeezing the pillow or ball throughout this exercise. Repeat 10 times. Build up to 3 sets of 10.     Prone hip extension: Lie on your stomach with your legs straight out behind you. Tighten up your buttocks muscles and lift one leg off the floor about 8 inches. Keep your knee straight. Hold for 5 seconds. Then lower your leg and relax. Do 3 sets of 10.   Written by Geno Santana M.S., P.T., for [employer].   Published by [employer].   This content is reviewed periodically and is subject to change as new health information becomes available. The information is intended to inform and educate and is not a replacement for medical evaluation, advice, diagnosis or treatment by a healthcare professional.   Sports Medicine Advisor 2003.1 Index  Sports Medicine Advisor 2003.1 Credits   Copyright   2003 [employer]. All rights reserved.   Page footer image                      Follow-ups after your visit        Who to contact     If you have questions or need follow up information about today's clinic visit or your schedule please contact [facility] directly at [phone].  Normal or non-critical lab and imaging results will be communicated to  "you by MyChart, letter or phone within 4 business days after the clinic has received the results. If you do not hear from us within 7 days, please contact the clinic through [employer] or phone. If you have a critical or abnormal lab result, we will notify you by phone as soon as possible.  Submit refill requests through [employer] or call your pharmacy and they will forward the refill request to us. Please allow 3 business days for your refill to be completed.          Additional Information About Your Visit        [employer] Information     [employer] lets you send messages to your doctor, view your test results, renew your prescriptions, schedule appointments and more. To sign up, go to [email or web address] . Click on \"Log in\" on the left side of the screen, which will take you to the Welcome page. Then click on \"Sign up Now\" on the right side of the page.     You will be asked to enter the access code listed below, as well as some personal information. Please follow the directions to create your username and password.     Your access code is: [email or web address]  Expires: 2017  5:28 PM     Your access code will  in 90 days. If you need help or a new code, please call your [facility] clinic or [phone].        Care EveryWhere ID     This is your Care EveryWhere ID. This could be used by other organizations to access your [facility] medical records  [id number]        Your Vitals Were     Pulse Temperature Height Pulse Oximetry BMI (Body Mass Index)       75 97.2  F (36.2  C) (Tympanic) 5' 1\" (1.549 m) 97% 31.18 kg/m2        Blood Pressure from Last 3 Encounters:   17 136/70   17 156/68   17 155/64    Weight from Last 3 Encounters:   17 165 lb (74.8 kg)   17 165 lb (74.8 kg)   17 166 lb (75.3 kg)              We Performed the Following     Kenalog  80 MG         []     Kenalog 20 MG  []     Large Joint/Bursa injection and/or drainage (Shoulder, Knee)     Medium Joint/Bursa " injection and/or drainage (Elbow, TM, Wrist, Ankle)          Today's Medication Changes          These changes are accurate as of: 3/16/17  2:16 PM.  If you have any questions, ask your nurse or doctor.               Start taking these medicines.        Dose/Directions    * triamcinolone acetonide 40 MG/ML injection   Commonly known as:  KENALOG   Used for:  Impingement syndrome of right shoulder   Started by:  Dinorah Terrazas PA-C        Dose:  80 mg   2 mLs (80 mg) by INTRA-ARTICULAR route once for 1 dose   Quantity:  2 mL   Refills:  0       * triamcinolone acetonide 10 MG/ML injection   Commonly known as:  KENALOG   Used for:  Greater trochanteric bursitis of right hip   Started by:  Dinorah Terrazas PA-C        Dose:  20 mg   2 mLs (20 mg) by INTRA-ARTICULAR route once for 1 dose   Quantity:  2 mL   Refills:  0       * Notice:  This list has 2 medication(s) that are the same as other medications prescribed for you. Read the directions carefully, and ask your doctor or other care provider to review them with you.         Where to get your medicines      Some of these will need a paper prescription and others can be bought over the counter.  Ask your nurse if you have questions.     You don't need a prescription for these medications     triamcinolone acetonide 10 MG/ML injection    triamcinolone acetonide 40 MG/ML injection                Primary Care Provider Office Phone # Fax #    Rashi Calle [phone]       [facility] [address] [place] [id number]        Thank you!     Thank you for choosing [facility]  for your care. Our goal is always to provide you with excellent care. Hearing back from our patients is one way we can continue to improve our services. Please take a few minutes to complete the written survey that you may receive in the mail after your visit with us. Thank you!             Your Updated Medication List - Protect others  around you: Learn how to safely use, store and throw away your medicines at www.disposemymeds.org.          This list is accurate as of: 3/16/17  2:16 PM.  Always use your most recent med list.                   Brand Name Dispense Instructions for use    acetaminophen-codeine 300-30 MG per tablet    TYLENOL #3    90 tablet    Take 1-2 tablets by mouth every 6 hours as needed for moderate pain       albuterol 108 (90 BASE) MCG/ACT Inhaler    PROAIR HFA/PROVENTIL HFA/VENTOLIN HFA    3 Inhaler    Inhale 2 puffs into the lungs every 6 hours as needed for shortness of breath / dyspnea       amLODIPine 5 MG tablet    NORVASC    90 tablet    Take 1 tablet (5 mg) by mouth daily       calcium carbonate 500 MG chewable tablet    TUMS     Take 2 chew tab by mouth daily as needed for heartburn       furosemide 20 MG tablet    LASIX    180 tablet    40 mg in am       IBUPROFEN PO      Take 400 mg by mouth every 6 hours as needed for moderate pain       ipratropium - albuterol 0.5 mg/2.5 mg/3 mL 0.5-2.5 (3) MG/3ML neb solution    DUONEB    90 vial    Take 1 vial (3 mLs) by nebulization every 6 hours as needed for shortness of breath / dyspnea or wheezing       losartan 50 MG tablet    COZAAR    30 tablet    Take 1 tablet (50 mg) by mouth daily       MAGIC MOUTHWASH (ENTER INGREDIENTS IN COMMENTS)     180 mL    Swish and spit 5-10 mLs in mouth every 6 hours as needed       METOPROLOL SUCCINATE ER PO      Take 100 mg by mouth daily (2 x 50mg = 100mg)       potassium chloride SA 10 MEQ CR tablet    K-DUR/KLOR-CON M    180 tablet    Take 2 tablets (20 mEq) by mouth daily       * triamcinolone acetonide 40 MG/ML injection    KENALOG    2 mL    2 mLs (80 mg) by INTRA-ARTICULAR route once for 1 dose       * triamcinolone acetonide 10 MG/ML injection    KENALOG    2 mL    2 mLs (20 mg) by INTRA-ARTICULAR route once for 1 dose       * Notice:  This list has 2 medication(s) that are the same as other medications prescribed for you. Read  the directions carefully, and ask your doctor or other care provider to review them with you.

## 2017-03-16 NOTE — PATIENT INSTRUCTIONS
Trochanteric Bursitis           What is trochanteric bursitis?   Trochanteric bursitis is irritation or inflammation of the trochanteric bursa. A bursa is a fluid-filled sac that acts as a cushion between tendons, bones, and skin. The trochanteric bursa is located on the upper, outer area of the thigh. There is a bump on the outer side of the upper part of the thigh bone (femur) called the greater trochanter. The trochanteric bursa is located over the greater trochanter.   How does it occur?   The trochanteric bursa may be inflamed by a group of muscles or tendons rubbing over the bursa and causing friction against the thigh bone. This injury can occur with running, walking, or bicycling, especially when the bicycle seat is too high.   What are the symptoms?   You have pain on the upper outer area of your thigh or in your hip. The pain is worse when you walk, bicycle, or go up or down stairs. You have pain when you move your thigh bone and feel tenderness in the area over the greater trochanter.   How is it diagnosed?   Your healthcare provider will ask about your symptoms and examine your hip and thigh.   How is it treated?   To treat this condition:   Put an ice pack, gel pack, or package of frozen vegetables, wrapped in a cloth on the area every 3 to 4 hours, for up to 20 minutes at a time.   Take an anti-inflammatory medicine such as ibuprofen, or other medicine as directed by your provider. Nonsteroidal anti-inflammatory medicines (NSAIDs) may cause stomach bleeding and other problems. These risks increase with age. Read the label and take as directed. Unless recommended by your healthcare provider, do not take for more than 10 days.   Your provider may give you an injection of a corticosteroid medicine.   While you are recovering from your injury you will need to change your sport or activity to one that does not make your condition worse. For example, you may need to swim instead of running or  bicycling. If you are bicycling, you may need to lower your bicycle seat.   How long do the effects last?   The length of recovery depends on many factors such as your age, health, and if you have had a previous injury. Recovery time also depends on the severity of the injury. A bursa that is only mildly inflamed and has just started to hurt may improve within a few weeks. A bursa that is significantly inflamed and has been painful for a long time may take up to a few months to improve. You need to stop doing the activities that cause pain until your bursa has healed. If you continue doing activities that cause pain, your symptoms will return and it will take longer to recover.   When can I return to my normal activities?   Everyone recovers from an injury at a different rate. Return to your activities depends on how soon your leg recovers, not by how many days or weeks it has been since your injury has occurred. In general, the longer you have symptoms before you start treatment, the longer it will take to get better. The goal of rehabilitation is to return to your normal activities as soon as is safely possible. If you return too soon you may worsen your injury.   You may safely return to your normal activities when, starting from the top of the list and progressing to the end, each of the following is true:   You have full range of motion in the injured leg compared to the uninjured leg.   You have full strength of the injured leg compared to the uninjured leg.   You can walk straight ahead without pain or limping.   How can I prevent trochanteric bursitis?   Trochanteric bursitis is best prevented by warming up properly and stretching the muscles on the outer side of your upper thigh.     Published by [employer].  This content is reviewed periodically and is subject to change as new health information becomes available. The information is intended to inform and educate and is not a replacement for medical  evaluation, advice, diagnosis or treatment by a healthcare professional.   Written by Lucio Hastings MD, for [employer].   ? 2010 [facility] and/or its affiliates. All Rights Reserved.   Copyright   Clinical Reference Systems 2011         Trochanteric Bursitis Rehabilitation Exercises   You can begin stretching the muscles that run along the outside of your hip using the first two exercise. You can do the strengthening exercises when the sharp pain lessens.   Stretching exercises     Piriformis stretch: Lying on your back with both knees bent, rest the ankle of your injured leg over the knee of your uninjured leg. Grasp the thigh of your uninjured leg and pull that knee toward your chest. You will feel a stretch along the buttocks and possibly along the outside of your hip on the injured side. Hold this for 15 to 30 seconds. Repeat 3 times.     Iliotibial band stretch (standing): Cross your uninjured leg in front of your injured leg and bend down and touch your toes. You can move your hands across the floor toward the uninjured side and you will feel more stretch on the outside of your thigh on the injured side. Hold this position for 15 to 30 seconds. Return to the starting position. Repeat 3 times.   Strengthening exercises     Straight leg raise: Lie on your back with your legs straight out in front of you. Tighten up the top of your thigh muscle on the injured leg and lift that leg about 8 inches off the floor, keeping the thigh muscle tight throughout. Slowly lower your leg back down to the floor. Do 3 sets of 10.     Wall squat with a ball: Stand with your back, shoulders, and head against a wall and look straight ahead. Keep your shoulders relaxed and your feet 1 foot away from the wall and a shoulder's width apart. Place a rolled up pillow or a soccer-sized ball between your thighs. Keeping your head against the wall, slowly squat while squeezing the pillow or ball at the same time. Squat down until you  are almost in a sitting position. Your thighs will not yet be parallel to the floor. Hold this position for 10 seconds and then slowly slide back up the wall. Make sure you keep squeezing the pillow or ball throughout this exercise. Repeat 10 times. Build up to 3 sets of 10.     Prone hip extension: Lie on your stomach with your legs straight out behind you. Tighten up your buttocks muscles and lift one leg off the floor about 8 inches. Keep your knee straight. Hold for 5 seconds. Then lower your leg and relax. Do 3 sets of 10.   Written by Geno Santana M.S., P.T., for [employer].   Published by [employer].   This content is reviewed periodically and is subject to change as new health information becomes available. The information is intended to inform and educate and is not a replacement for medical evaluation, advice, diagnosis or treatment by a healthcare professional.   Sports Medicine Advisor 2003.1 Index  Sports Medicine Advisor 2003.1 Credits   Copyright   2003 [employer]. All rights reserved.   Page footer image

## 2017-03-25 DIAGNOSIS — I10 HYPERTENSION GOAL BP (BLOOD PRESSURE) < 140/90: ICD-10-CM

## 2017-03-25 DIAGNOSIS — N18.4 CKD (CHRONIC KIDNEY DISEASE) STAGE 4, GFR 15-29 ML/MIN (H): ICD-10-CM

## 2017-03-25 RX ORDER — LOSARTAN POTASSIUM 50 MG/1
50 TABLET ORAL DAILY
Qty: 90 TABLET | Refills: 3 | Status: SHIPPED | OUTPATIENT
Start: 2017-03-25 | End: 2017-10-18

## 2017-03-25 NOTE — TELEPHONE ENCOUNTER
Last filled 3/4/17 for #30 for 30 day supply.    Patient would like a 90 day supply.    Thanks!  Ben Fierro, Certified Pharmacy Technician  [facility]  [phone]

## 2017-03-26 NOTE — TELEPHONE ENCOUNTER
rx done, recent visit.    Lab Results   Component Value Date    CR 1.35 12/07/2016     BP Readings from Last 3 Encounters:   03/16/17 136/70   03/02/17 156/68   02/28/17 155/64

## 2017-03-27 DIAGNOSIS — M15.0 PRIMARY OSTEOARTHRITIS INVOLVING MULTIPLE JOINTS: ICD-10-CM

## 2017-03-27 NOTE — TELEPHONE ENCOUNTER
acetaminophen-codeine (TYLENOL #3) 300-30 MG per tablet      Last Written Prescription Date:  12-  Last Fill Quantity: 90,   # refills: 0  Last Office Visit with [facility], [facility] or [facility] prescribing provider: 03-  Future Office visit:       Routing refill request to provider for review/approval because:  acetaminophen-codeine (TYLENOL #3) 300-30 MG per tablet

## 2017-04-10 ENCOUNTER — ALLIED HEALTH/NURSE VISIT (OUTPATIENT)
Dept: FAMILY MEDICINE | Facility: CLINIC | Age: 82
End: 2017-04-10
Payer: MEDICARE

## 2017-04-10 VITALS — SYSTOLIC BLOOD PRESSURE: 158 MMHG | DIASTOLIC BLOOD PRESSURE: 66 MMHG

## 2017-04-10 DIAGNOSIS — I10 HYPERTENSION GOAL BP (BLOOD PRESSURE) < 140/90: Primary | ICD-10-CM

## 2017-04-10 PROCEDURE — 99207 ZZC NO CHARGE NURSE ONLY: CPT | Performed by: FAMILY MEDICINE

## 2017-04-10 NOTE — PROGRESS NOTES
Anat Correa is enrolled/participating in the retail pharmacy Blood Pressure Goals Achievement Program (BPGAP).  Anat Correa was evaluated at [facility] on April 10, 2017 at which time her blood pressure was:    BP Readings from Last 3 Encounters:   04/10/17 158/66   03/16/17 136/70   03/02/17 156/68     Reviewed lifestyle modifications for blood pressure control and reduction: including making healthy food choices, managing weight, getting regular exercise, smoking cessation, reducing alcohol consumption, monitoring blood pressure regularly.     Anat Correa is not experiencing symptoms.    Follow-Up: BP is not at goal of < 140/90 mmHg, note CKD, Recommended follow-up with PCP.  Routing to PCP for further review.    Completed by: Ally Lyon, Angel  [facility] Pharmacist

## 2017-04-10 NOTE — MR AVS SNAPSHOT
"              After Visit Summary   4/10/2017    Anat Correa    MRN: [id number]           Patient Information     Date Of Birth          1935        Visit Information        Provider Department      4/10/2017 3:34 PM Rashi Calle MD [facility]        Today's Diagnoses     Hypertension goal BP (blood pressure) < 140/90    -  1       Follow-ups after your visit        Who to contact     If you have questions or need follow up information about today's clinic visit or your schedule please contact [facility] directly at [phone].  Normal or non-critical lab and imaging results will be communicated to you by HunterOnhart, letter or phone within 4 business days after the clinic has received the results. If you do not hear from us within 7 days, please contact the clinic through HunterOnhart or phone. If you have a critical or abnormal lab result, we will notify you by phone as soon as possible.  Submit refill requests through Affibody or call your pharmacy and they will forward the refill request to us. Please allow 3 business days for your refill to be completed.          Additional Information About Your Visit        MyChart Information     Affibody lets you send messages to your doctor, view your test results, renew your prescriptions, schedule appointments and more. To sign up, go to [email or web address] . Click on \"Log in\" on the left side of the screen, which will take you to the Welcome page. Then click on \"Sign up Now\" on the right side of the page.     You will be asked to enter the access code listed below, as well as some personal information. Please follow the directions to create your username and password.     Your access code is: [email or web address]  Expires: 2017  5:28 PM     Your access code will  in 90 days. If you need help or a new code, please call your [facility] or [phone].        Care EveryWhere ID     This is your Care EveryWhere ID. This could " be used by other organizations to access your [place] medical records  [id number]         Blood Pressure from Last 3 Encounters:   04/10/17 158/66   03/16/17 136/70   03/02/17 156/68    Weight from Last 3 Encounters:   03/16/17 165 lb (74.8 kg)   03/02/17 165 lb (74.8 kg)   02/20/17 166 lb (75.3 kg)              Today, you had the following     No orders found for display       Primary Care Provider Office Phone # Fax #    Rashi Calle [phone]       [facility] [address] [address]        Thank you!     Thank you for choosing [facility]  for your care. Our goal is always to provide you with excellent care. Hearing back from our patients is one way we can continue to improve our services. Please take a few minutes to complete the written survey that you may receive in the mail after your visit with us. Thank you!             Your Updated Medication List - Protect others around you: Learn how to safely use, store and throw away your medicines at www.disposemymeds.org.          This list is accurate as of: 4/10/17 11:59 PM.  Always use your most recent med list.                   Brand Name Dispense Instructions for use    acetaminophen-codeine 300-30 MG per tablet    TYLENOL #3    90 tablet    Take 1-2 tablets by mouth every 6 hours as needed for moderate pain       albuterol 108 (90 BASE) MCG/ACT Inhaler    PROAIR HFA/PROVENTIL HFA/VENTOLIN HFA    3 Inhaler    Inhale 2 puffs into the lungs every 6 hours as needed for shortness of breath / dyspnea       amLODIPine 5 MG tablet    NORVASC    90 tablet    Take 1 tablet (5 mg) by mouth daily       calcium carbonate 500 MG chewable tablet    TUMS     Take 2 chew tab by mouth daily as needed for heartburn       furosemide 20 MG tablet    LASIX    180 tablet    40 mg in am       IBUPROFEN PO      Take 400 mg by mouth every 6 hours as needed for moderate pain       ipratropium - albuterol 0.5 mg/2.5  mg/3 mL 0.5-2.5 (3) MG/3ML neb solution    DUONEB    90 vial    Take 1 vial (3 mLs) by nebulization every 6 hours as needed for shortness of breath / dyspnea or wheezing       losartan 50 MG tablet    COZAAR    90 tablet    Take 1 tablet (50 mg) by mouth daily       MAGIC MOUTHWASH (ENTER INGREDIENTS IN COMMENTS)     180 mL    Swish and spit 5-10 mLs in mouth every 6 hours as needed       METOPROLOL SUCCINATE ER PO      Take 100 mg by mouth daily (2 x 50mg = 100mg)       potassium chloride SA 10 MEQ CR tablet    K-DUR/KLOR-CON M    180 tablet    Take 2 tablets (20 mEq) by mouth daily

## 2017-05-26 ENCOUNTER — ALLIED HEALTH/NURSE VISIT (OUTPATIENT)
Dept: FAMILY MEDICINE | Facility: CLINIC | Age: 82
End: 2017-05-26
Payer: MEDICARE

## 2017-05-26 DIAGNOSIS — I10 HYPERTENSION GOAL BP (BLOOD PRESSURE) < 140/90: Primary | ICD-10-CM

## 2017-05-26 DIAGNOSIS — J44.9 COPD, MODERATE (H): ICD-10-CM

## 2017-05-26 PROCEDURE — 99207 ZZC NO CHARGE NURSE ONLY: CPT | Performed by: FAMILY MEDICINE

## 2017-05-26 RX ORDER — IPRATROPIUM BROMIDE AND ALBUTEROL SULFATE 2.5; .5 MG/3ML; MG/3ML
SOLUTION RESPIRATORY (INHALATION)
Qty: 270 ML | Refills: 3 | Status: SHIPPED | OUTPATIENT
Start: 2017-05-26 | End: 2018-02-19

## 2017-05-26 NOTE — TELEPHONE ENCOUNTER
ipratropium - albuterol 0.5 mg/2.5 mg/3 mL (DUONEB) 0.5-2.5 (3) MG/3ML nebulization       Last Written Prescription Date: 3/7/2016  Last Fill Quantity: 90 vial, # refills: 3    Last Office Visit with G, P or [facility] prescribing provider:  3/02/2017  Future Office Visit:       Date of Last Asthma Action Plan Letter:   There are no preventive care reminders to display for this patient.   Asthma Control Test:   ACT Total Scores 7/30/2012   ACT TOTAL SCORE 8   ASTHMA ER VISITS 0 = None   ASTHMA HOSPITALIZATIONS 0 = None       Date of Last Spirometry Test: 10/24/2014  No results found for this or any previous visit.

## 2017-05-26 NOTE — NURSING NOTE
Anat Correa is enrolled/participating in the retail pharmacy Blood Pressure Goals Achievement Program (BPGAP).  Anat Correa was evaluated at [facility] on May 26, 2017 at which time her blood pressure was:    BP Readings from Last 3 Encounters:   04/10/17 158/66   03/16/17 136/70   03/02/17 156/68     Reviewed lifestyle modifications for blood pressure control and reduction: including making healthy food choices, managing weight, getting regular exercise, smoking cessation, reducing alcohol consumption, monitoring blood pressure regularly.     Anat Correa is not experiencing symptoms.    Follow-Up: BP is at goal of < 140/90mmHg (patient 18+ years of age with or without diabetes).  Recommended follow-up at pharmacy in 6 months.     Recommendation to Provider:  Bp is near normal for her.    Completed by: Thank you,  Alysa Hays RPh,  [facility] [phone]

## 2017-05-26 NOTE — MR AVS SNAPSHOT
"              After Visit Summary   2017    Anat Correa    MRN: [id number]           Patient Information     Date Of Birth          1935        Visit Information        Provider Department      2017 5:59 PM Rashi Calle MD [facility]        Today's Diagnoses     Hypertension goal BP (blood pressure) < 140/90    -  1       Follow-ups after your visit        Who to contact     If you have questions or need follow up information about today's clinic visit or your schedule please contact [facility] directly at [phone].  Normal or non-critical lab and imaging results will be communicated to you by ROVOPhart, letter or phone within 4 business days after the clinic has received the results. If you do not hear from us within 7 days, please contact the clinic through PlayWitht or phone. If you have a critical or abnormal lab result, we will notify you by phone as soon as possible.  Submit refill requests through [employer] Communications or call your pharmacy and they will forward the refill request to us. Please allow 3 business days for your refill to be completed.          Additional Information About Your Visit        MyChart Information     [employer] lets you send messages to your doctor, view your test results, renew your prescriptions, schedule appointments and more. To sign up, go to [email or web address] . Click on \"Log in\" on the left side of the screen, which will take you to the Welcome page. Then click on \"Sign up Now\" on the right side of the page.     You will be asked to enter the access code listed below, as well as some personal information. Please follow the directions to create your username and password.     Your access code is: [email or web address]  Expires: 2017  6:01 PM     Your access code will  in 90 days. If you need help or a new code, please call your [facility] or [phone].        Care EveryWhere ID     This is your Care EveryWhere ID. This " could be used by other organizations to access your [place] medical records  [id number]         Blood Pressure from Last 3 Encounters:   04/10/17 158/66   03/16/17 136/70   03/02/17 156/68    Weight from Last 3 Encounters:   03/16/17 165 lb (74.8 kg)   03/02/17 165 lb (74.8 kg)   02/20/17 166 lb (75.3 kg)              Today, you had the following     No orders found for display         Today's Medication Changes          These changes are accurate as of: 5/26/17  6:01 PM.  If you have any questions, ask your nurse or doctor.               These medicines have changed or have updated prescriptions.        Dose/Directions    ipratropium - albuterol 0.5 mg/2.5 mg/3 mL 0.5-2.5 (3) MG/3ML neb solution   Commonly known as:  DUONEB   This may have changed:  See the new instructions.   Used for:  COPD, moderate (H)   Changed by:  Rashi Calle MD        NEBULIZE CONTENTS OF ONE VIAL EVERY 6 HOURS AS NEEDED FOR SHORTNESS OF BREATH/ DYSPNEA OR WHEEZING   Quantity:  270 mL   Refills:  3            Where to get your medicines      These medications were sent to [facility] [place] [facility] [place] [address]     Phone:  [phone]     ipratropium - albuterol 0.5 mg/2.5 mg/3 mL 0.5-2.5 (3) MG/3ML neb solution                Primary Care Provider Office Phone # Fax #    Rashi Calle [phone]       [address] [address]        Thank you!     Thank you for choosing [facility]  for your care. Our goal is always to provide you with excellent care. Hearing back from our patients is one way we can continue to improve our services. Please take a few minutes to complete the written survey that you may receive in the mail after your visit with us. Thank you!             Your Updated Medication List - Protect others around you: Learn how to safely use, store and throw away your  medicines at www.disposemymeds.org.          This list is accurate as of: 5/26/17  6:01 PM.  Always use your most recent med list.                   Brand Name Dispense Instructions for use    acetaminophen-codeine 300-30 MG per tablet    TYLENOL #3    90 tablet    Take 1-2 tablets by mouth every 6 hours as needed for moderate pain       albuterol 108 (90 BASE) MCG/ACT Inhaler    PROAIR HFA/PROVENTIL HFA/VENTOLIN HFA    3 Inhaler    Inhale 2 puffs into the lungs every 6 hours as needed for shortness of breath / dyspnea       amLODIPine 5 MG tablet    NORVASC    90 tablet    Take 1 tablet (5 mg) by mouth daily       calcium carbonate 500 MG chewable tablet    TUMS     Take 2 chew tab by mouth daily as needed for heartburn       furosemide 20 MG tablet    LASIX    180 tablet    40 mg in am       IBUPROFEN PO      Take 400 mg by mouth every 6 hours as needed for moderate pain       ipratropium - albuterol 0.5 mg/2.5 mg/3 mL 0.5-2.5 (3) MG/3ML neb solution    DUONEB    270 mL    NEBULIZE CONTENTS OF ONE VIAL EVERY 6 HOURS AS NEEDED FOR SHORTNESS OF BREATH/ DYSPNEA OR WHEEZING       losartan 50 MG tablet    COZAAR    90 tablet    Take 1 tablet (50 mg) by mouth daily       MAGIC MOUTHWASH (ENTER INGREDIENTS IN COMMENTS)     180 mL    Swish and spit 5-10 mLs in mouth every 6 hours as needed       METOPROLOL SUCCINATE ER PO      Take 100 mg by mouth daily (2 x 50mg = 100mg)       potassium chloride SA 10 MEQ CR tablet    K-DUR/KLOR-CON M    180 tablet    Take 2 tablets (20 mEq) by mouth daily

## 2017-05-27 ENCOUNTER — OFFICE VISIT (OUTPATIENT)
Dept: FAMILY MEDICINE | Facility: CLINIC | Age: 82
End: 2017-05-27
Payer: MEDICARE

## 2017-05-27 VITALS
DIASTOLIC BLOOD PRESSURE: 78 MMHG | HEART RATE: 73 BPM | SYSTOLIC BLOOD PRESSURE: 118 MMHG | OXYGEN SATURATION: 97 % | HEIGHT: 61 IN | TEMPERATURE: 98.2 F

## 2017-05-27 DIAGNOSIS — S81.811A SKIN TEAR OF RIGHT LOWER LEG WITHOUT COMPLICATION, INITIAL ENCOUNTER: Primary | ICD-10-CM

## 2017-05-27 DIAGNOSIS — N25.81 SECONDARY RENAL HYPERPARATHYROIDISM (H): ICD-10-CM

## 2017-05-27 DIAGNOSIS — Z89.429 S/P AMPUTATION OF LESSER TOE, UNSPECIFIED LATERALITY (H): ICD-10-CM

## 2017-05-27 DIAGNOSIS — G63 POLYNEUROPATHY ASSOCIATED WITH UNDERLYING DISEASE (H): ICD-10-CM

## 2017-05-27 DIAGNOSIS — I87.8 VENOUS STASIS: ICD-10-CM

## 2017-05-27 PROCEDURE — 99214 OFFICE O/P EST MOD 30 MIN: CPT | Performed by: PHYSICIAN ASSISTANT

## 2017-05-27 NOTE — MR AVS SNAPSHOT
After Visit Summary   5/27/2017    Anat Correa    MRN: [id number]           Patient Information     Date Of Birth          1935        Visit Information        Provider Department      5/27/2017 11:00 AM Dinorah Terrazas PA-C [facility]        Today's Diagnoses     Skin tear of right lower leg without complication, initial encounter    -  1    Venous stasis          Care Instructions      Skin Tear (Skin Avulsion)  A skin avulsion is a tearing of the top layer of skin. This commonly happens after a fall or other injury. It also tends to be more common in older people, or those taking blood thinners or steroids for long periods of time.  Home care  The following guidelines will help you care for your wound at home:    Keep the wound clean and dry for the first 48 hours after the stitches have been placed.    If there is a dressing or bandage, change it when it gets wet or dirty. Otherwise, leave it on for the first 24 hours, then change it once a day or as often as the doctor says.    If stitches or staples were used, check the wound every day.    After taking off the dressing, wash the area gently with soap and water. Clean as close to the stitches as you can. Avoid washing or rubbing the stitches directly.    After 3 days you can keep the bandages off the wound, unless told otherwise. Allow the wound to be open to the air.    Keep a thin layer of antibiotic ointment on the cut. This will keep the wound clean, make it easier to remove the stitches, and reduce scarring.    If your wound is oozing, you can put a nonstick dressing over it. Then, reapply the bandage or dressing as you were told.    You can shower as usual after the first 24 hours, but don't soak the area in water (no baths or swimming) until the stitches or staples are taken out.    If surgical tape was used, keep the area clean and dry. If it becomes wet, blot it dry with a towel.    If skin glue was used,  don't put any creams, lotions, or antibiotic ointments on it. These can dissolve the glue. Usually the glue will flake off in about 5 to 10 days by itself. Try to resist picking it off before that so the wound doesn't open up. When it gets wet, dry it gently.  Here is some information about medications:    You may use acetaminophen or ibuprofen to control pain, unless another pain medicine was given. If you have chronic liver or kidney disease or ever had a stomach ulcer or GI bleeding, talk with your doctor before using these medicines.    If you were given antibiotics, take them until they are all used up. It is important to finish the antibiotics even if the wound looks better. This will ensure that the infection has cleared.  Follow-up care  Follow up with your doctor, clinic, or this facility as you were advised, and:    Watch for any signs of infection, such as increasing redness, swelling, or pus coming out. If this happens, don't wait for your scheduled visit. Instead, see a doctor sooner.    Stitches or staples are usually taken out within 5 to 14 days. This varies depending on what part of your body they are on, and the type of wound. The doctor will tell you how long stitches should be left in.     If surgical tape was used, it is usually left on for 7 to 10 days. You can remove surgical tape after that unless you were told otherwise. If you try to remove it, and it is too hard, soaking can help. If the edges of the cut pull apart, stop removing the tape and follow up with your doctor    As mentioned above, skin glue will flake off by itself in 5 to 10 days, so you don't need to pull it off.  Note: If any X-rays were done, a radiologist will review them. You will be notified of any changes that may affect your care.  When to seek medical care  Get prompt medical attention if any of the following occur:    Increasing pain in the wound    Redness, swelling, or pus coming from the wound    Fever of 100.4 F  "(38 C) or higher, or as directed by your health care provider    Sutures or staples come apart or fall out before your next appointment    Surgical tape closures fall off within 7 days, or the wound edges re-open    Bleeding not controlled by direct pressure    [id number] [employer]. [address] [place] [address]. All rights reserved. This information is not intended as a substitute for professional medical care. Always follow your healthcare professional's instructions.                Follow-ups after your visit        Additional Services     VASCULAR MEDICINE REFERRAL ([facility])       PAD without symptoms - order lipid panel and \"US AVERY Doppler no exercise\" (RRR3124)  PAD with claudication - order \"US AVERY Doppler with exercise\" ([id number]), \"US aorta/ivc/iliac duplex complete\" ([id number]) and \"US lower extremity arterial duplex bilateral\" ([id number])  PAD with critical limb ischemia - resting AVERY and toe-brachial index (refer to vascular medicine for triage of testing)  Varicose veins/edema - \"US lower extremity venous duplex bilateral\" ([id number])  [Number to call to schedule: [phone]. Vascular medicine providers are: Nora Dawson Godishala]                  Who to contact     If you have questions or need follow up information about today's clinic visit or your schedule please contact [facility] directly at [phone].  Normal or non-critical lab and imaging results will be communicated to you by MyChart, letter or phone within 4 business days after the clinic has received the results. If you do not hear from us within 7 days, please contact the clinic through MyChart or phone. If you have a critical or abnormal lab result, we will notify you by phone as soon as possible.  Submit refill requests through ModeWalk or call your pharmacy and they will forward the refill request to us. Please allow 3 business days for your refill to be completed.          Additional Information " "About Your Visit        MyChart Information     Soundstache lets you send messages to your doctor, view your test results, renew your prescriptions, schedule appointments and more. To sign up, go to [email or web address] . Click on \"Log in\" on the left side of the screen, which will take you to the Welcome page. Then click on \"Sign up Now\" on the right side of the page.     You will be asked to enter the access code listed below, as well as some personal information. Please follow the directions to create your username and password.     Your access code is: [email or web address]  Expires: 2017  6:01 PM     Your access code will  in 90 days. If you need help or a new code, please call your [place] clinic or [phone].        Care EveryWhere ID     This is your Care EveryWhere ID. This could be used by other organizations to access your [place] medical records  [id number]        Your Vitals Were     Pulse Temperature Height Pulse Oximetry Breastfeeding?       73 98.2  F (36.8  C) (Oral) 5' 1\" (1.549 m) 97% No        Blood Pressure from Last 3 Encounters:   04/10/17 158/66   17 136/70   17 156/68    Weight from Last 3 Encounters:   17 165 lb (74.8 kg)   17 165 lb (74.8 kg)   17 166 lb (75.3 kg)              We Performed the Following     VASCULAR MEDICINE REFERRAL ([facility])        Primary Care Provider Office Phone # Fax #    Rashi Calle [phone]       [facility] [address] [address]        Thank you!     Thank you for choosing [facility]  for your care. Our goal is always to provide you with excellent care. Hearing back from our patients is one way we can continue to improve our services. Please take a few minutes to complete the written survey that you may receive in the mail after your visit with us. Thank you!             Your Updated Medication List - Protect others around you: Learn how to safely use, store " and throw away your medicines at www.disposemymeds.org.          This list is accurate as of: 5/27/17 11:26 AM.  Always use your most recent med list.                   Brand Name Dispense Instructions for use    acetaminophen-codeine 300-30 MG per tablet    TYLENOL #3    90 tablet    Take 1-2 tablets by mouth every 6 hours as needed for moderate pain       albuterol 108 (90 BASE) MCG/ACT Inhaler    PROAIR HFA/PROVENTIL HFA/VENTOLIN HFA    3 Inhaler    Inhale 2 puffs into the lungs every 6 hours as needed for shortness of breath / dyspnea       amLODIPine 5 MG tablet    NORVASC    90 tablet    Take 1 tablet (5 mg) by mouth daily       calcium carbonate 500 MG chewable tablet    TUMS     Take 2 chew tab by mouth daily as needed for heartburn       furosemide 20 MG tablet    LASIX    180 tablet    40 mg in am       IBUPROFEN PO      Take 400 mg by mouth every 6 hours as needed for moderate pain       ipratropium - albuterol 0.5 mg/2.5 mg/3 mL 0.5-2.5 (3) MG/3ML neb solution    DUONEB    270 mL    NEBULIZE CONTENTS OF ONE VIAL EVERY 6 HOURS AS NEEDED FOR SHORTNESS OF BREATH/ DYSPNEA OR WHEEZING       losartan 50 MG tablet    COZAAR    90 tablet    Take 1 tablet (50 mg) by mouth daily       MAGIC MOUTHWASH (ENTER INGREDIENTS IN COMMENTS)     180 mL    Swish and spit 5-10 mLs in mouth every 6 hours as needed       METOPROLOL SUCCINATE ER PO      Take 100 mg by mouth daily (2 x 50mg = 100mg)       potassium chloride SA 10 MEQ CR tablet    K-DUR/KLOR-CON M    180 tablet    Take 2 tablets (20 mEq) by mouth daily

## 2017-05-27 NOTE — NURSING NOTE
"Chief Complaint   Patient presents with     Abrasion     scrape on her right leg -        Initial Pulse 73  Temp 98.2  F (36.8  C) (Oral)  Ht 5' 1\" (1.549 m)  SpO2 97%  Breastfeeding? No Estimated body mass index is 31.18 kg/(m^2) as calculated from the following:    Height as of 3/16/17: 5' 1\" (1.549 m).    Weight as of 3/16/17: 165 lb (74.8 kg).  Medication Reconciliation: complete    "

## 2017-05-27 NOTE — PROGRESS NOTES
SUBJECTIVE:                                                    Anat Correa is a 82 year old female who presents to clinic today for the following health issues:    Leg Abrasion  Anat presents to clinic today with chief complaint of right calf abrasion. Injury occurred this morning when patient scraped her leg as she was getting into car. Describes large skin tear with significant bleeding. Has tried dressing but with bleeding, bruising, and pain she thought it should be looked at.   Of mention, patient would like her BP rechecked. States that she noticed BP was low upon arrival to clinic. This is of some concern for patient as she states that she normally runs higher that 118.     BP Readings from Last 6 Encounters:   05/27/17 118/78   04/10/17 158/66   03/16/17 136/70   03/02/17 156/68   02/28/17 155/64   02/23/17 164/66       Problem list and histories reviewed & adjusted, as indicated.  Additional history: as documented    Patient Active Problem List   Diagnosis     History of colonic polyps     Calculus of kidney     Lesion of plantar nerve     Osteoporosis     Primary iridocyclitis     Anemia     Hyperlipidemia LDL goal <100     OA (osteoarthritis)     Advanced directives, counseling/discussion     Hypertension goal BP (blood pressure) < 140/90     COPD, moderate     Medication management     Vitamin D deficiency     CKD (chronic kidney disease) stage 4, GFR 15-29 ml/min (H)     Anemia in chronic renal disease     Secondary renal hyperparathyroidism (H)     Venous stasis of lower extremity     Peripheral neuropathy (H)     Chronic pain     Lung nodule     Nodule of left lung     Malignant neoplasm of upper lobe of left lung (H)     Acute pain of right shoulder     S/P amputation of lesser toe, unspecified laterality (H)     Past Surgical History:   Procedure Laterality Date     AMPUTATE TOE(S) Right 9/17/2015    Procedure: AMPUTATE TOE(S);  Surgeon: Ashia White DPM, Pod;  Location:  OR       APPENDECTOMY  1980     C  DELIVERY ONLY  1965    , Low Cervical     EXCISE MASS UPPER EXTREMITY  10/13/2011    Lt Forearm mass excision - dr ely     EXCISE MASS UPPER EXTREMITY  2012    Lt Forearm mass excision - dr ely     HC COLONOSCOPY THRU STOMA, DIAGNOSTIC      IBS/diverticula/hemmorhoids dr araujo     HC ESOPHAGOSCOPY, DIAGNOSTIC  , , 6/10    Gastric ulcer, healed, gastritis     HC HEMORRHOIDECTOMY,INT/EXT,SIMPLE       HC REPAIR SLIDING INGUINAL HERNIA      mitra     SURGICAL HISTORY OF -       [facility] neck & back tumors     SURGICAL HISTORY OF -       neuroma excision - 2nd toe amputation     SURGICAL HISTORY OF -   3/07    Rt IOL - dr jimenez     SURGICAL HISTORY OF -       Lt IOL - dr jimenez     SURGICAL HISTORY OF -       Lt Knee replacement - dr gayle     SURGICAL HISTORY OF -       Rt Knee replacement - dr gayle     SURGICAL HISTORY OF -   9/15    Rt Second toe amputation - Dr White     THORACOSCOPIC WEDGE RESECTION LUNG Left 2016    Procedure: THORACOSCOPIC WEDGE RESECTION LUNG;  Surgeon: Abdelrahman Thompson MD;  Location: [facility] OR     XR JOINT INJECTION HIP (HIB)  2015    Rt - Dr Terry       Social History   Substance Use Topics     Smoking status: Former Smoker     Packs/day: 3.00     Years: 50.00     Types: Cigarettes     Quit date: 1993     Smokeless tobacco: Never Used      Comment: quit      Alcohol use No     Family History   Problem Relation Age of Onset     CEREBROVASCULAR DISEASE Mother      CEREBROVASCULAR DISEASE Father      Cancer - colorectal Brother      Cancer - colorectal Brother      Breast Cancer Sister      CANCER Sister      lung     CANCER Sister      lung     CANCER Sister      lung     Scleroderma Sister          Current Outpatient Prescriptions   Medication Sig Dispense Refill     ipratropium - albuterol 0.5 mg/2.5 mg/3 mL (DUONEB) 0.5-2.5 (3) MG/3ML neb solution NEBULIZE CONTENTS OF  ONE VIAL EVERY 6 HOURS AS NEEDED FOR SHORTNESS OF BREATH/ DYSPNEA OR WHEEZING 270 mL 3     acetaminophen-codeine (TYLENOL #3) 300-30 MG per tablet Take 1-2 tablets by mouth every 6 hours as needed for moderate pain 90 tablet 0     losartan (COZAAR) 50 MG tablet Take 1 tablet (50 mg) by mouth daily 90 tablet 3     furosemide (LASIX) 20 MG tablet 40 mg in am 180 tablet 3     potassium chloride SA (K-DUR/KLOR-CON M) 10 MEQ CR tablet Take 2 tablets (20 mEq) by mouth daily 180 tablet 3     amLODIPine (NORVASC) 5 MG tablet Take 1 tablet (5 mg) by mouth daily 90 tablet 3     MAGIC MOUTHWASH, ENTER INGREDIENTS IN COMMENTS, Swish and spit 5-10 mLs in mouth every 6 hours as needed 180 mL 1     albuterol (PROAIR HFA, PROVENTIL HFA, VENTOLIN HFA) 108 (90 BASE) MCG/ACT inhaler Inhale 2 puffs into the lungs every 6 hours as needed for shortness of breath / dyspnea 3 Inhaler 3     IBUPROFEN PO Take 400 mg by mouth every 6 hours as needed for moderate pain       calcium carbonate (TUMS) 500 MG chewable tablet Take 2 chew tab by mouth daily as needed for heartburn       METOPROLOL SUCCINATE ER PO Take 100 mg by mouth daily (2 x 50mg = 100mg)       Allergies   Allergen Reactions     Prednisone      Yeast infection - swollen lips       Reviewed and updated as needed this visit by clinical staff  Tobacco  Allergies  Meds  Problems       Reviewed and updated as needed this visit by Provider  Allergies  Meds  Problems         ROS:  Constitutional, HEENT, cardiovascular, pulmonary, GI, , musculoskeletal, neuro, skin, endocrine and psych systems are negative, except as otherwise noted.    This document serves as a record of the services and decisions personally performed and made by Dinorah Terrazas PA-C. It was created on her behalf by Abigail Berger, a trained medical scribe. The creation of this document is based the provider's statements to the medical scribe.  Abigail Berger, May 27, 2017 11:19 AM    OBJECTIVE:                        "                             /78 (BP Location: Left arm)  Pulse 73  Temp 98.2  F (36.8  C) (Oral)  Ht 5' 1\" (1.549 m)  SpO2 97%  Breastfeeding? No  There is no height or weight on file to calculate BMI.     GENERAL: healthy, alert and no distress  RESP: lungs clear to auscultation - no rales, rhonchi or wheezes  CV: regular rate and rhythm, normal S1 S2, no S3 or S4, no murmur, click or rub, no peripheral edema and peripheral pulses strong  SKIN: full thickness disruption of epidurmis on right anterior shin with fat exposure measuring 4 cm in length and 3 cm in width at widest point, small area of active bleeding at inferior aspect of skin tear, venous stasis of skin changes on bilateral lower extremities with nonpitting (semi-boggy) swelling of left lower extremity - tenderness to deep palpation of this area - no palpable abnormality (pt reports this is a chronic area of pain/swelling)  NEURO: Normal strength and tone, mentation intact and speech normal  PSYCH: mentation appears normal, affect normal/bright    Recheck BP in clinic during visit today:     Diagnostic Test Results:  none      ASSESSMENT/PLAN:                                                    Anat was seen today for abrasion.    Diagnoses and all orders for this visit:    Skin tear of right lower leg without complication, initial encounter, Venous stasis   Dressed wound in clinic. Sent patient home with wound dressings to change BID. Advised patient to elevate legs at home. Referred patient to vascular medicine for further evaluation of venostasis changes. If wound in not healing, worsens, or becomes uncomfortable RTC next week.     Greater than 25 minutes were spent with the patient. The majority of this time was coordinating care and counseling regarding the above diagnoses.    The information in this document, created by the medical scribe for me, accurately reflects the services I personally performed and the decisions made by me. I " have reviewed and approved this document for accuracy prior to leaving the patient care area .  Dinorah Terrazas PA-C May 27, 2017 11:19 AM    Dinorah Terrazas PA-C  [facility] [place]

## 2017-05-27 NOTE — Clinical Note
Anat is following up Friday if needed - if improving I told her she didn't have to come in.  Perhaps you want to have triage call Thursday to see how she is doing?  Thanks!  Dinorah

## 2017-05-27 NOTE — PATIENT INSTRUCTIONS
Skin Tear (Skin Avulsion)  A skin avulsion is a tearing of the top layer of skin. This commonly happens after a fall or other injury. It also tends to be more common in older people, or those taking blood thinners or steroids for long periods of time.  Home care  The following guidelines will help you care for your wound at home:    Keep the wound clean and dry for the first 48 hours after the stitches have been placed.    If there is a dressing or bandage, change it when it gets wet or dirty. Otherwise, leave it on for the first 24 hours, then change it once a day or as often as the doctor says.    If stitches or staples were used, check the wound every day.    After taking off the dressing, wash the area gently with soap and water. Clean as close to the stitches as you can. Avoid washing or rubbing the stitches directly.    After 3 days you can keep the bandages off the wound, unless told otherwise. Allow the wound to be open to the air.    Keep a thin layer of antibiotic ointment on the cut. This will keep the wound clean, make it easier to remove the stitches, and reduce scarring.    If your wound is oozing, you can put a nonstick dressing over it. Then, reapply the bandage or dressing as you were told.    You can shower as usual after the first 24 hours, but don't soak the area in water (no baths or swimming) until the stitches or staples are taken out.    If surgical tape was used, keep the area clean and dry. If it becomes wet, blot it dry with a towel.    If skin glue was used, don't put any creams, lotions, or antibiotic ointments on it. These can dissolve the glue. Usually the glue will flake off in about 5 to 10 days by itself. Try to resist picking it off before that so the wound doesn't open up. When it gets wet, dry it gently.  Here is some information about medications:    You may use acetaminophen or ibuprofen to control pain, unless another pain medicine was given. If you have chronic liver or  kidney disease or ever had a stomach ulcer or GI bleeding, talk with your doctor before using these medicines.    If you were given antibiotics, take them until they are all used up. It is important to finish the antibiotics even if the wound looks better. This will ensure that the infection has cleared.  Follow-up care  Follow up with your doctor, clinic, or this facility as you were advised, and:    Watch for any signs of infection, such as increasing redness, swelling, or pus coming out. If this happens, don't wait for your scheduled visit. Instead, see a doctor sooner.    Stitches or staples are usually taken out within 5 to 14 days. This varies depending on what part of your body they are on, and the type of wound. The doctor will tell you how long stitches should be left in.     If surgical tape was used, it is usually left on for 7 to 10 days. You can remove surgical tape after that unless you were told otherwise. If you try to remove it, and it is too hard, soaking can help. If the edges of the cut pull apart, stop removing the tape and follow up with your doctor    As mentioned above, skin glue will flake off by itself in 5 to 10 days, so you don't need to pull it off.  Note: If any X-rays were done, a radiologist will review them. You will be notified of any changes that may affect your care.  When to seek medical care  Get prompt medical attention if any of the following occur:    Increasing pain in the wound    Redness, swelling, or pus coming from the wound    Fever of 100.4 F (38 C) or higher, or as directed by your health care provider    Sutures or staples come apart or fall out before your next appointment    Surgical tape closures fall off within 7 days, or the wound edges re-open    Bleeding not controlled by direct pressure    6725-3790 [employer]. [address] [place] [address], [place] [address]. All rights reserved. This information is not intended as a substitute for professional  medical care. Always follow your healthcare professional's instructions.

## 2017-06-02 ENCOUNTER — TELEPHONE (OUTPATIENT)
Dept: FAMILY MEDICINE | Facility: CLINIC | Age: 82
End: 2017-06-02

## 2017-06-02 ENCOUNTER — OFFICE VISIT (OUTPATIENT)
Dept: FAMILY MEDICINE | Facility: CLINIC | Age: 82
End: 2017-06-02
Payer: MEDICARE

## 2017-06-02 VITALS
DIASTOLIC BLOOD PRESSURE: 74 MMHG | BODY MASS INDEX: 31.34 KG/M2 | SYSTOLIC BLOOD PRESSURE: 176 MMHG | HEART RATE: 78 BPM | TEMPERATURE: 98.5 F | OXYGEN SATURATION: 92 % | HEIGHT: 61 IN | WEIGHT: 166 LBS

## 2017-06-02 DIAGNOSIS — I10 HYPERTENSION GOAL BP (BLOOD PRESSURE) < 140/90: ICD-10-CM

## 2017-06-02 DIAGNOSIS — Z51.81 MEDICATION MONITORING ENCOUNTER: ICD-10-CM

## 2017-06-02 DIAGNOSIS — G63 POLYNEUROPATHY ASSOCIATED WITH UNDERLYING DISEASE (H): ICD-10-CM

## 2017-06-02 DIAGNOSIS — N18.4 CKD (CHRONIC KIDNEY DISEASE) STAGE 4, GFR 15-29 ML/MIN (H): ICD-10-CM

## 2017-06-02 DIAGNOSIS — I10 HYPERTENSION GOAL BP (BLOOD PRESSURE) < 140/90: Primary | ICD-10-CM

## 2017-06-02 DIAGNOSIS — I87.8 VENOUS STASIS OF LOWER EXTREMITY: ICD-10-CM

## 2017-06-02 DIAGNOSIS — J44.9 COPD, MODERATE (H): ICD-10-CM

## 2017-06-02 DIAGNOSIS — H35.61 RETINAL HEMORRHAGE OF RIGHT EYE: ICD-10-CM

## 2017-06-02 DIAGNOSIS — E78.5 HYPERLIPIDEMIA LDL GOAL <100: ICD-10-CM

## 2017-06-02 DIAGNOSIS — S81.811D SKIN TEAR OF RIGHT LOWER LEG WITHOUT COMPLICATION, SUBSEQUENT ENCOUNTER: ICD-10-CM

## 2017-06-02 DIAGNOSIS — R55 SYNCOPE, UNSPECIFIED SYNCOPE TYPE: ICD-10-CM

## 2017-06-02 DIAGNOSIS — M15.0 PRIMARY OSTEOARTHRITIS INVOLVING MULTIPLE JOINTS: ICD-10-CM

## 2017-06-02 DIAGNOSIS — G89.29 OTHER CHRONIC PAIN: ICD-10-CM

## 2017-06-02 DIAGNOSIS — D63.1 ANEMIA IN CHRONIC RENAL DISEASE: ICD-10-CM

## 2017-06-02 DIAGNOSIS — C34.12 MALIGNANT NEOPLASM OF UPPER LOBE OF LEFT LUNG (H): ICD-10-CM

## 2017-06-02 DIAGNOSIS — R53.83 OTHER FATIGUE: ICD-10-CM

## 2017-06-02 DIAGNOSIS — N18.9 ANEMIA IN CHRONIC RENAL DISEASE: ICD-10-CM

## 2017-06-02 LAB
ERYTHROCYTE [DISTWIDTH] IN BLOOD BY AUTOMATED COUNT: 12.8 % (ref 10–15)
HCT VFR BLD AUTO: 31.2 % (ref 35–47)
HGB BLD-MCNC: 10.5 G/DL (ref 11.7–15.7)
MCH RBC QN AUTO: 31.1 PG (ref 26.5–33)
MCHC RBC AUTO-ENTMCNC: 33.7 G/DL (ref 31.5–36.5)
MCV RBC AUTO: 92 FL (ref 78–100)
PLATELET # BLD AUTO: 349 10E9/L (ref 150–450)
RBC # BLD AUTO: 3.38 10E12/L (ref 3.8–5.2)
WBC # BLD AUTO: 10.6 10E9/L (ref 4–11)

## 2017-06-02 PROCEDURE — 99214 OFFICE O/P EST MOD 30 MIN: CPT | Performed by: FAMILY MEDICINE

## 2017-06-02 PROCEDURE — 84443 ASSAY THYROID STIM HORMONE: CPT | Performed by: FAMILY MEDICINE

## 2017-06-02 PROCEDURE — 36415 COLL VENOUS BLD VENIPUNCTURE: CPT | Performed by: FAMILY MEDICINE

## 2017-06-02 PROCEDURE — 85027 COMPLETE CBC AUTOMATED: CPT | Performed by: FAMILY MEDICINE

## 2017-06-02 PROCEDURE — 80053 COMPREHEN METABOLIC PANEL: CPT | Performed by: FAMILY MEDICINE

## 2017-06-02 RX ORDER — METOPROLOL SUCCINATE 50 MG/1
50 TABLET, EXTENDED RELEASE ORAL DAILY
Qty: 180 TABLET | Refills: 3 | Status: SHIPPED | OUTPATIENT
Start: 2017-06-02 | End: 2017-06-02

## 2017-06-02 RX ORDER — METOPROLOL SUCCINATE 50 MG/1
50 TABLET, EXTENDED RELEASE ORAL
Qty: 180 TABLET | Refills: 3 | Status: SHIPPED | OUTPATIENT
Start: 2017-06-02 | End: 2018-02-19
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
## 2017-06-02 NOTE — PATIENT INSTRUCTIONS
[facility]                        To reach your care team during and after hours:   [phone]  To reach our pharmacy:        [phone]    Clinic Hours                        Our clinic hours are:    Monday   7:30 am to 7:00 pm                  Tuesday through Friday 7:30 am to 5:00 pm                             Saturday   8:00 am to 12:00 pm      Sunday   Closed      Pharmacy Hours                        Our pharmacy hours are:    Monday   8:30 am to 7:00 pm       Tuesday to Friday  8:30 am to 6:00 pm                       Saturday    9:00 am to 1:00 pm              Sunday    Closed              There is also information available at our web site:  [email or web address]    If your provider ordered any lab tests and you do not receive the results within 10 business days, please call the clinic.    If you need a medication refill please contact your pharmacy.  Please allow 2-3 business days for your refill to be completed.    Our clinic offers telephone visits and e visits.  Please ask one of your team members to explain more.      Use [employer] (secure email communication and access to your chart) to send your primary care provider a message or make an appointment. Ask someone on your Team how to sign up for [employer].  Immunizations                      Immunization History   Administered Date(s) Administered     Influenza (High Dose) 3 valent vaccine 10/07/2010, 09/19/2012, 11/04/2013, 10/08/2014, 11/03/2015, 09/16/2016     Influenza (IIV3) 10/19/2004, 11/15/2005     Pneumococcal (PCV 13) 11/03/2015     Pneumococcal 23 valent 04/25/2006     TD (ADULT, 7+) 03/03/1998, 01/23/2006     TDAP Vaccine (Boostrix) 08/27/2012     Zoster vaccine, live 10/24/2012        Health Maintenance                         Health Maintenance Due   Topic Date Due     URINE DRUG SCREEN Q1 YR  01/25/1950     Medicare Annual Wellness Visit  11/21/2015     Wellness Visit with your Primary Provider - yearly  11/21/2015      Osteoporosis Screening (Dexa) - every 3 years   01/11/2016     COPD ACTION PLAN Q1 YR  06/01/2016     Discuss Advance Directive Planning  07/15/2016     Cholesterol Lab - yearly  03/09/2017     Microalbumin Lab - yearly  03/09/2017     FALL RISK ASSESSMENT  05/20/2017

## 2017-06-02 NOTE — TELEPHONE ENCOUNTER
Huddled with TS,MD - metoprolol needs to BID     Reordered     Ariadna Starks RN, BSN  [facility]

## 2017-06-02 NOTE — NURSING NOTE
"Chief Complaint   Patient presents with     WOUND CARE       Initial /72  Pulse 78  Temp 98.5  F (36.9  C) (Oral)  Ht 5' 1\" (1.549 m)  Wt 166 lb (75.3 kg)  SpO2 92%  BMI 31.37 kg/m2 Estimated body mass index is 31.37 kg/(m^2) as calculated from the following:    Height as of this encounter: 5' 1\" (1.549 m).    Weight as of this encounter: 166 lb (75.3 kg)..  BP completed using cuff size: james Rosario MA  "

## 2017-06-02 NOTE — MR AVS SNAPSHOT
After Visit Summary   6/2/2017    Anat Correa    MRN: [id number]           Patient Information     Date Of Birth          1935        Visit Information        Provider Department      6/2/2017 2:20 PM Rashi Calle MD [place] [facility] [place]        Today's Diagnoses     Hypertension goal BP (blood pressure) < 140/90    -  1    Retinal hemorrhage of right eye        Syncope, unspecified syncope type        Other fatigue        Skin tear of right lower leg without complication, subsequent encounter        Malignant neoplasm of upper lobe of left lung (H)        COPD, moderate        CKD (chronic kidney disease) stage 4, GFR 15-29 ml/min (H)        Polyneuropathy associated with underlying disease (H)        Venous stasis of lower extremity        Anemia in chronic renal disease        Hyperlipidemia LDL goal <100        Primary osteoarthritis involving multiple joints        Other chronic pain        Medication monitoring encounter          Care Instructions        [facility] [place]                        To reach your care team during and after hours:   [phone]  To reach our pharmacy:        [phone]    Clinic Hours                        Our clinic hours are:    Monday   7:30 am to 7:00 pm                  Tuesday through Friday 7:30 am to 5:00 pm                             Saturday   8:00 am to 12:00 pm      Sunday   Closed      Pharmacy Hours                        Our pharmacy hours are:    Monday   8:30 am to 7:00 pm       Tuesday to Friday  8:30 am to 6:00 pm                       Saturday    9:00 am to 1:00 pm              Sunday    Closed              There is also information available at our web site:  [email or web address]    If your provider ordered any lab tests and you do not receive the results within 10 business days, please call the clinic.    If you need a medication refill please contact your pharmacy.  Please allow 2-3 business days for your refill to  be completed.    Our clinic offers telephone visits and e visits.  Please ask one of your team members to explain more.      Use Usersnaphart (secure email communication and access to your chart) to send your primary care provider a message or make an appointment. Ask someone on your Team how to sign up for Users[employer].  Immunizations                      Immunization History   Administered Date(s) Administered     Influenza (High Dose) 3 valent vaccine 10/07/2010, 09/19/2012, 11/04/2013, 10/08/2014, 11/03/2015, 09/16/2016     Influenza (IIV3) 10/19/2004, 11/15/2005     Pneumococcal (PCV 13) 11/03/2015     Pneumococcal 23 valent 04/25/2006     TD (ADULT, 7+) 03/03/1998, 01/23/2006     TDAP Vaccine (Boostrix) 08/27/2012     Zoster vaccine, live 10/24/2012        Health Maintenance                         Health Maintenance Due   Topic Date Due     URINE DRUG SCREEN Q1 YR  01/25/1950     Medicare Annual Wellness Visit  11/21/2015     Wellness Visit with your Primary Provider - yearly  11/21/2015     Osteoporosis Screening (Dexa) - every 3 years   01/11/2016     COPD ACTION PLAN Q1 YR  06/01/2016     Discuss Advance Directive Planning  07/15/2016     Cholesterol Lab - yearly  03/09/2017     Microalbumin Lab - yearly  03/09/2017     FALL RISK ASSESSMENT  05/20/2017               Follow-ups after your visit        Future tests that were ordered for you today     Open Future Orders        Priority Expected Expires Ordered    MR Brain w/o & w Contrast Routine  6/2/2018 6/2/2017    MRA Head w/o Contrast Angiogram Routine  6/2/2018 6/2/2017            Who to contact     If you have questions or need follow up information about today's clinic visit or your schedule please contact [facility] directly at [phone].  Normal or non-critical lab and imaging results will be communicated to you by MyChart, letter or phone within 4 business days after the clinic has received the results. If you do not hear from us within 7  "days, please contact the clinic through [employer] or phone. If you have a critical or abnormal lab result, we will notify you by phone as soon as possible.  Submit refill requests through [employer] or call your pharmacy and they will forward the refill request to us. Please allow 3 business days for your refill to be completed.          Additional Information About Your Visit        AnaBios Information     AnaBios lets you send messages to your doctor, view your test results, renew your prescriptions, schedule appointments and more. To sign up, go to [email or web address] . Click on \"Log in\" on the left side of the screen, which will take you to the Welcome page. Then click on \"Sign up Now\" on the right side of the page.     You will be asked to enter the access code listed below, as well as some personal information. Please follow the directions to create your username and password.     Your access code is: [email or web address]  Expires: 2017  6:01 PM     Your access code will  in 90 days. If you need help or a new code, please call your [place] clinic or [phone].        Care EveryWhere ID     This is your Care EveryWhere ID. This could be used by other organizations to access your [place] medical records  [id number]        Your Vitals Were     Pulse Temperature Height Pulse Oximetry BMI (Body Mass Index)       78 98.5  F (36.9  C) (Oral) 5' 1\" (1.549 m) 92% 31.37 kg/m2        Blood Pressure from Last 3 Encounters:   17 176/74   17 118/78   04/10/17 158/66    Weight from Last 3 Encounters:   17 166 lb (75.3 kg)   17 165 lb (74.8 kg)   17 165 lb (74.8 kg)              We Performed the Following     CBC with platelets     Comprehensive metabolic panel     TSH with free T4 reflex          Today's Medication Changes          These changes are accurate as of: 17  3:26 PM.  If you have any questions, ask your nurse or doctor.               These medicines have changed or have " updated prescriptions.        Dose/Directions    metoprolol 50 MG 24 hr tablet   Commonly known as:  TOPROL-XL   This may have changed:    - medication strength  - how much to take   Used for:  Hypertension goal BP (blood pressure) < 140/90   Changed by:  Rashi Calle MD        Dose:  50 mg   Take 1 tablet (50 mg) by mouth daily (2 x 50mg = 100mg)   Quantity:  180 tablet   Refills:  3            Where to get your medicines      These medications were sent to [facility] - [address] [address]     Phone:  [phone]     metoprolol 50 MG 24 hr tablet                Primary Care Provider Office Phone # Fax #    Rashi Calle [phone]       [address] [address]        Thank you!     Thank you for choosing [facility]  for your care. Our goal is always to provide you with excellent care. Hearing back from our patients is one way we can continue to improve our services. Please take a few minutes to complete the written survey that you may receive in the mail after your visit with us. Thank you!             Your Updated Medication List - Protect others around you: Learn how to safely use, store and throw away your medicines at www.disposemymeds.org.          This list is accurate as of: 6/2/17  3:26 PM.  Always use your most recent med list.                   Brand Name Dispense Instructions for use    acetaminophen-codeine 300-30 MG per tablet    TYLENOL #3    90 tablet    Take 1-2 tablets by mouth every 6 hours as needed for moderate pain       albuterol 108 (90 BASE) MCG/ACT Inhaler    PROAIR HFA/PROVENTIL HFA/VENTOLIN HFA    3 Inhaler    Inhale 2 puffs into the lungs every 6 hours as needed for shortness of breath / dyspnea       amLODIPine 5 MG tablet    NORVASC    90 tablet    Take 1 tablet (5 mg) by mouth daily       calcium carbonate 500 MG  chewable tablet    TUMS     Take 2 chew tab by mouth daily as needed for heartburn       furosemide 20 MG tablet    LASIX    180 tablet    40 mg in am       IBUPROFEN PO      Take 400 mg by mouth every 6 hours as needed for moderate pain       ipratropium - albuterol 0.5 mg/2.5 mg/3 mL 0.5-2.5 (3) MG/3ML neb solution    DUONEB    270 mL    NEBULIZE CONTENTS OF ONE VIAL EVERY 6 HOURS AS NEEDED FOR SHORTNESS OF BREATH/ DYSPNEA OR WHEEZING       losartan 50 MG tablet    COZAAR    90 tablet    Take 1 tablet (50 mg) by mouth daily       MAGIC MOUTHWASH (ENTER INGREDIENTS IN COMMENTS)     180 mL    Swish and spit 5-10 mLs in mouth every 6 hours as needed       metoprolol 50 MG 24 hr tablet    TOPROL-XL    180 tablet    Take 1 tablet (50 mg) by mouth daily (2 x 50mg = 100mg)

## 2017-06-03 ENCOUNTER — ALLIED HEALTH/NURSE VISIT (OUTPATIENT)
Dept: FAMILY MEDICINE | Facility: CLINIC | Age: 82
End: 2017-06-03
Payer: MEDICARE

## 2017-06-03 VITALS — SYSTOLIC BLOOD PRESSURE: 144 MMHG | DIASTOLIC BLOOD PRESSURE: 64 MMHG

## 2017-06-03 DIAGNOSIS — I10 HYPERTENSION GOAL BP (BLOOD PRESSURE) < 140/90: Primary | ICD-10-CM

## 2017-06-03 LAB
ALBUMIN SERPL-MCNC: 3.7 G/DL (ref 3.4–5)
ALP SERPL-CCNC: 73 U/L (ref 40–150)
ALT SERPL W P-5'-P-CCNC: 22 U/L (ref 0–50)
ANION GAP SERPL CALCULATED.3IONS-SCNC: 10 MMOL/L (ref 3–14)
AST SERPL W P-5'-P-CCNC: 20 U/L (ref 0–45)
BILIRUB SERPL-MCNC: 0.2 MG/DL (ref 0.2–1.3)
BUN SERPL-MCNC: 43 MG/DL (ref 7–30)
CALCIUM SERPL-MCNC: 9.7 MG/DL (ref 8.5–10.1)
CHLORIDE SERPL-SCNC: 97 MMOL/L (ref 94–109)
CO2 SERPL-SCNC: 24 MMOL/L (ref 20–32)
CREAT SERPL-MCNC: 2.15 MG/DL (ref 0.52–1.04)
GFR SERPL CREATININE-BSD FRML MDRD: 22 ML/MIN/1.7M2
GLUCOSE SERPL-MCNC: 84 MG/DL (ref 70–99)
POTASSIUM SERPL-SCNC: 5.2 MMOL/L (ref 3.4–5.3)
PROT SERPL-MCNC: 7.3 G/DL (ref 6.8–8.8)
SODIUM SERPL-SCNC: 131 MMOL/L (ref 133–144)
TSH SERPL DL<=0.005 MIU/L-ACNC: 2.99 MU/L (ref 0.4–4)

## 2017-06-03 PROCEDURE — 99207 ZZC NO CHARGE NURSE ONLY: CPT | Performed by: FAMILY MEDICINE

## 2017-06-03 NOTE — MR AVS SNAPSHOT
"              After Visit Summary   6/3/2017    Anat Correa    MRN: [id number]           Patient Information     Date Of Birth          1935        Visit Information        Provider Department      6/3/2017 10:01 AM Rashi Calle MD [facility]        Today's Diagnoses     Hypertension goal BP (blood pressure) < 140/90    -  1       Follow-ups after your visit        Your next 10 appointments already scheduled     Jun 30, 2017  2:40 PM CDT   Office Visit with Rashi Calle MD   [facility]    [address] [place] [id number]   [phone]           Bring a current list of meds and any records pertaining to this visit.  For Physicals, please bring immunization records and any forms needing to be filled out.  Please arrive 10 minutes early to complete paperwork.              Who to contact     If you have questions or need follow up information about today's clinic visit or your schedule please contact [facility] directly at [phone].  Normal or non-critical lab and imaging results will be communicated to you by Flavourshart, letter or phone within 4 business days after the clinic has received the results. If you do not hear from us within 7 days, please contact the clinic through VSHOREt or phone. If you have a critical or abnormal lab result, we will notify you by phone as soon as possible.  Submit refill requests through Bunkspeed or call your pharmacy and they will forward the refill request to us. Please allow 3 business days for your refill to be completed.          Additional Information About Your Visit        FlavoursharHazelcast Information     Bunkspeed lets you send messages to your doctor, view your test results, renew your prescriptions, schedule appointments and more. To sign up, go to [email or web address] . Click on \"Log in\" on the left side of the screen, which will take you to the Welcome page. Then click " "on \"Sign up Now\" on the right side of the page.     You will be asked to enter the access code listed below, as well as some personal information. Please follow the directions to create your username and password.     Your access code is: [email or web address]  Expires: 2017  6:01 PM     Your access code will  in 90 days. If you need help or a new code, please call your [place] clinic or [phone].        Care EveryWhere ID     This is your Care EveryWhere ID. This could be used by other organizations to access your [place] medical records  [id number]         Blood Pressure from Last 3 Encounters:   17 144/64   17 176/74   17 118/78    Weight from Last 3 Encounters:   17 166 lb (75.3 kg)   17 165 lb (74.8 kg)   17 165 lb (74.8 kg)              Today, you had the following     No orders found for display       Primary Care Provider Office Phone # Fax #    Rashi Calle [phone]       [facility] [address] [place] [address]        Thank you!     Thank you for choosing [facility]  for your care. Our goal is always to provide you with excellent care. Hearing back from our patients is one way we can continue to improve our services. Please take a few minutes to complete the written survey that you may receive in the mail after your visit with us. Thank you!             Your Updated Medication List - Protect others around you: Learn how to safely use, store and throw away your medicines at www.disposemymeds.org.          This list is accurate as of: 6/3/17 11:59 PM.  Always use your most recent med list.                   Brand Name Dispense Instructions for use    acetaminophen-codeine 300-30 MG per tablet    TYLENOL #3    90 tablet    Take 1-2 tablets by mouth every 6 hours as needed for moderate pain       albuterol 108 (90 BASE) MCG/ACT Inhaler    PROAIR HFA/PROVENTIL HFA/VENTOLIN HFA    3 Inhaler    Inhale 2 " puffs into the lungs every 6 hours as needed for shortness of breath / dyspnea       amLODIPine 5 MG tablet    NORVASC    90 tablet    Take 1 tablet (5 mg) by mouth daily       calcium carbonate 500 MG chewable tablet    TUMS     Take 2 chew tab by mouth daily as needed for heartburn       furosemide 20 MG tablet    LASIX    180 tablet    40 mg in am       IBUPROFEN PO      Take 400 mg by mouth every 6 hours as needed for moderate pain       ipratropium - albuterol 0.5 mg/2.5 mg/3 mL 0.5-2.5 (3) MG/3ML neb solution    DUONEB    270 mL    NEBULIZE CONTENTS OF ONE VIAL EVERY 6 HOURS AS NEEDED FOR SHORTNESS OF BREATH/ DYSPNEA OR WHEEZING       losartan 50 MG tablet    COZAAR    90 tablet    Take 1 tablet (50 mg) by mouth daily       MAGIC MOUTHWASH (ENTER INGREDIENTS IN COMMENTS)     180 mL    Swish and spit 5-10 mLs in mouth every 6 hours as needed       metoprolol 50 MG 24 hr tablet    TOPROL-XL    180 tablet    Take 1 tablet (50 mg) by mouth 2 times daily (2 x 50mg = 100mg)

## 2017-06-03 NOTE — Clinical Note
Dr. Calle wanted her to follow up in the pharmacy today (saturday) due to high reading Friday (systolic katya). She was changed to bid vs qd dosing of her Toprol XL on Friday. Anat was thrilled by the readings today & feeling very well

## 2017-06-12 ENCOUNTER — OFFICE VISIT (OUTPATIENT)
Dept: FAMILY MEDICINE | Facility: CLINIC | Age: 82
End: 2017-06-12
Payer: MEDICARE

## 2017-06-12 VITALS
WEIGHT: 165 LBS | TEMPERATURE: 98.1 F | SYSTOLIC BLOOD PRESSURE: 160 MMHG | HEIGHT: 61 IN | DIASTOLIC BLOOD PRESSURE: 64 MMHG | OXYGEN SATURATION: 90 % | BODY MASS INDEX: 31.15 KG/M2 | HEART RATE: 77 BPM

## 2017-06-12 DIAGNOSIS — N25.81 SECONDARY RENAL HYPERPARATHYROIDISM (H): ICD-10-CM

## 2017-06-12 DIAGNOSIS — Z51.81 MEDICATION MONITORING ENCOUNTER: ICD-10-CM

## 2017-06-12 DIAGNOSIS — N18.4 CKD (CHRONIC KIDNEY DISEASE) STAGE 4, GFR 15-29 ML/MIN (H): Primary | ICD-10-CM

## 2017-06-12 DIAGNOSIS — I10 HYPERTENSION GOAL BP (BLOOD PRESSURE) < 140/90: ICD-10-CM

## 2017-06-12 DIAGNOSIS — N18.9 ANEMIA IN CHRONIC RENAL DISEASE: ICD-10-CM

## 2017-06-12 DIAGNOSIS — R30.0 DYSURIA: ICD-10-CM

## 2017-06-12 DIAGNOSIS — D63.1 ANEMIA IN CHRONIC RENAL DISEASE: ICD-10-CM

## 2017-06-12 DIAGNOSIS — I87.8 VENOUS STASIS OF LOWER EXTREMITY: ICD-10-CM

## 2017-06-12 DIAGNOSIS — D64.9 ANEMIA, UNSPECIFIED TYPE: ICD-10-CM

## 2017-06-12 DIAGNOSIS — J44.9 COPD, MODERATE (H): ICD-10-CM

## 2017-06-12 DIAGNOSIS — S81.811A SKIN TEAR OF RIGHT LOWER LEG WITHOUT COMPLICATION, INITIAL ENCOUNTER: ICD-10-CM

## 2017-06-12 LAB
ALBUMIN UR-MCNC: NEGATIVE MG/DL
APPEARANCE UR: CLEAR
BACTERIA #/AREA URNS HPF: ABNORMAL /HPF
BASOPHILS # BLD AUTO: 0 10E9/L (ref 0–0.2)
BASOPHILS NFR BLD AUTO: 0.4 %
BILIRUB UR QL STRIP: NEGATIVE
COLOR UR AUTO: YELLOW
COPATH REPORT: NORMAL
DIFFERENTIAL METHOD BLD: ABNORMAL
EOSINOPHIL # BLD AUTO: 0.4 10E9/L (ref 0–0.7)
EOSINOPHIL NFR BLD AUTO: 3.6 %
ERYTHROCYTE [DISTWIDTH] IN BLOOD BY AUTOMATED COUNT: 12.5 % (ref 10–15)
FOLATE SERPL-MCNC: 55.3 NG/ML
GLUCOSE UR STRIP-MCNC: NEGATIVE MG/DL
HCT VFR BLD AUTO: 31.3 % (ref 35–47)
HGB BLD-MCNC: 10.7 G/DL (ref 11.7–15.7)
HGB UR QL STRIP: NEGATIVE
KETONES UR STRIP-MCNC: NEGATIVE MG/DL
LEUKOCYTE ESTERASE UR QL STRIP: ABNORMAL
LYMPHOCYTES # BLD AUTO: 1.7 10E9/L (ref 0.8–5.3)
LYMPHOCYTES NFR BLD AUTO: 17.1 %
MCH RBC QN AUTO: 31.5 PG (ref 26.5–33)
MCHC RBC AUTO-ENTMCNC: 34.2 G/DL (ref 31.5–36.5)
MCV RBC AUTO: 92 FL (ref 78–100)
MONOCYTES # BLD AUTO: 1.1 10E9/L (ref 0–1.3)
MONOCYTES NFR BLD AUTO: 11 %
NEUTROPHILS # BLD AUTO: 6.6 10E9/L (ref 1.6–8.3)
NEUTROPHILS NFR BLD AUTO: 67.9 %
NITRATE UR QL: NEGATIVE
NON-SQ EPI CELLS #/AREA URNS LPF: ABNORMAL /LPF
PH UR STRIP: 7 PH (ref 5–7)
PLATELET # BLD AUTO: 332 10E9/L (ref 150–450)
RBC # BLD AUTO: 3.4 10E12/L (ref 3.8–5.2)
RBC #/AREA URNS AUTO: ABNORMAL /HPF (ref 0–2)
SP GR UR STRIP: 1.01 (ref 1–1.03)
URN SPEC COLLECT METH UR: ABNORMAL
UROBILINOGEN UR STRIP-ACNC: 0.2 EU/DL (ref 0.2–1)
VIT B12 SERPL-MCNC: 834 PG/ML (ref 193–986)
WBC # BLD AUTO: 9.7 10E9/L (ref 4–11)
WBC #/AREA URNS AUTO: ABNORMAL /HPF (ref 0–2)

## 2017-06-12 PROCEDURE — 82728 ASSAY OF FERRITIN: CPT | Performed by: FAMILY MEDICINE

## 2017-06-12 PROCEDURE — 83550 IRON BINDING TEST: CPT | Performed by: FAMILY MEDICINE

## 2017-06-12 PROCEDURE — 85025 COMPLETE CBC W/AUTO DIFF WBC: CPT | Performed by: FAMILY MEDICINE

## 2017-06-12 PROCEDURE — 82746 ASSAY OF FOLIC ACID SERUM: CPT | Performed by: FAMILY MEDICINE

## 2017-06-12 PROCEDURE — 99214 OFFICE O/P EST MOD 30 MIN: CPT | Performed by: FAMILY MEDICINE

## 2017-06-12 PROCEDURE — 83540 ASSAY OF IRON: CPT | Performed by: FAMILY MEDICINE

## 2017-06-12 PROCEDURE — 85060 BLOOD SMEAR INTERPRETATION: CPT | Performed by: FAMILY MEDICINE

## 2017-06-12 PROCEDURE — 80048 BASIC METABOLIC PNL TOTAL CA: CPT | Performed by: FAMILY MEDICINE

## 2017-06-12 PROCEDURE — 81001 URINALYSIS AUTO W/SCOPE: CPT | Performed by: FAMILY MEDICINE

## 2017-06-12 PROCEDURE — 36415 COLL VENOUS BLD VENIPUNCTURE: CPT | Performed by: FAMILY MEDICINE

## 2017-06-12 PROCEDURE — 82607 VITAMIN B-12: CPT | Performed by: FAMILY MEDICINE

## 2017-06-12 PROCEDURE — 40000847 ZZHCL STATISTIC MORPHOLOGY W/INTERP HISTOLOGY TC 85060: Performed by: FAMILY MEDICINE

## 2017-06-12 RX ORDER — BUDESONIDE AND FORMOTEROL FUMARATE DIHYDRATE 160; 4.5 UG/1; UG/1
2 AEROSOL RESPIRATORY (INHALATION) 2 TIMES DAILY
Qty: 10.2 G | Refills: 11 | Status: SHIPPED | OUTPATIENT
Start: 2017-06-12 | End: 2017-12-05

## 2017-06-12 NOTE — PATIENT INSTRUCTIONS
Use Duoneb, albuterol inhaler, and Symbicort as directed for breathing issues - you can use Symbicort twice daily, if needed    Wound care discussed, follow up if right leg wound does not improve or worsens    Follow up regarding labs drawn today    Continue to check blood pressure    Follow up for Brain/Head MRI/MRA, as directed    [facility] [place]                        To reach your care team during and after hours:   [phone]  To reach our pharmacy:        [phone]    Clinic Hours                        Our clinic hours are:    Monday   7:30 am to 7:00 pm                  Tuesday through Friday 7:30 am to 5:00 pm                             Saturday   8:00 am to 12:00 pm      Sunday   Closed      Pharmacy Hours                        Our pharmacy hours are:    Monday   8:30 am to 7:00 pm       Tuesday to Friday  8:30 am to 6:00 pm                       Saturday    9:00 am to 1:00 pm              Sunday    Closed              There is also information available at our web site:  [email or web address]    If your provider ordered any lab tests and you do not receive the results within 10 business days, please call the clinic.    If you need a medication refill please contact your pharmacy.  Please allow 2-3 business days for your refill to be completed.    Our clinic offers telephone visits and e visits.  Please ask one of your team members to explain more.      Use [employer] (secure email communication and access to your chart) to send your primary care provider a message or make an appointment. Ask someone on your Team how to sign up for [employer].  Immunizations                      Immunization History   Administered Date(s) Administered     Influenza (High Dose) 3 valent vaccine 10/07/2010, 09/19/2012, 11/04/2013, 10/08/2014, 11/03/2015, 09/16/2016     Influenza (IIV3) 10/19/2004, 11/15/2005     Pneumococcal (PCV 13) 11/03/2015     Pneumococcal 23 valent 04/25/2006     TD (ADULT, 7+) 03/03/1998,  01/23/2006     TDAP Vaccine (Boostrix) 08/27/2012     Zoster vaccine, live 10/24/2012        Health Maintenance                         Health Maintenance Due   Topic Date Due     URINE DRUG SCREEN Q1 YR  01/25/1950     Medicare Annual Wellness Visit  11/21/2015     Wellness Visit with your Primary Provider - yearly  11/21/2015     Osteoporosis Screening (Dexa) - every 3 years   01/11/2016     COPD ACTION PLAN Q1 YR  06/01/2016     Discuss Advance Directive Planning  07/15/2016     Cholesterol Lab - yearly  03/09/2017     Microalbumin Lab - yearly  03/09/2017     FALL RISK ASSESSMENT  05/20/2017
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
## 2017-06-12 NOTE — NURSING NOTE
"Chief Complaint   Patient presents with     RECHECK     Hypertension       Initial /64 (BP Location: Right arm, Patient Position: Chair, Cuff Size: Adult Large)  Pulse 77  Temp 98.1  F (36.7  C) (Oral)  Ht 5' 1\" (1.549 m)  Wt 165 lb (74.8 kg)  SpO2 90%  BMI 31.18 kg/m2 Estimated body mass index is 31.18 kg/(m^2) as calculated from the following:    Height as of this encounter: 5' 1\" (1.549 m).    Weight as of this encounter: 165 lb (74.8 kg)..  BP completed using cuff size: james Rosario MA  "

## 2017-06-12 NOTE — MR AVS SNAPSHOT
After Visit Summary   6/12/2017    Anat Correa    MRN: [id number]           Patient Information     Date Of Birth          1935        Visit Information        Provider Department      6/12/2017 9:00 AM Rashi Calle MD [facility]        Today's Diagnoses     CKD (chronic kidney disease) stage 4, GFR 15-29 ml/min (H)    -  1    Hypertension goal BP (blood pressure) < 140/90        Secondary renal hyperparathyroidism (H)        Anemia, unspecified type        Anemia in chronic renal disease        COPD, moderate        Skin tear of right lower leg without complication, initial encounter        Venous stasis of lower extremity        Dysuria        Medication monitoring encounter          Care Instructions    Use Duoneb, albuterol inhaler, and Symbicort as directed for breathing issues - you can use Symbicort twice daily, if needed    Wound care discussed, follow up if right leg wound does not improve or worsens    Follow up regarding labs drawn today    Continue to check blood pressure    Follow up for Brain/Head MRI/MRA, as directed    [facility]                        To reach your care team during and after hours:   [phone]  To reach our pharmacy:        [phone]    Clinic Hours                        Our clinic hours are:    Monday   7:30 am to 7:00 pm                  Tuesday through Friday 7:30 am to 5:00 pm                             Saturday   8:00 am to 12:00 pm      Sunday   Closed      Pharmacy Hours                        Our pharmacy hours are:    Monday   8:30 am to 7:00 pm       Tuesday to Friday  8:30 am to 6:00 pm                       Saturday    9:00 am to 1:00 pm              Sunday    Closed              There is also information available at our web site:  [email or web address]    If your provider ordered any lab tests and you do not receive the results within 10 business days, please call the clinic.    If you need a medication  refill please contact your pharmacy.  Please allow 2-3 business days for your refill to be completed.    Our clinic offers telephone visits and e visits.  Please ask one of your team members to explain more.      Use [employer] (secure email communication and access to your chart) to send your primary care provider a message or make an appointment. Ask someone on your Team how to sign up for [employer].  Immunizations                      Immunization History   Administered Date(s) Administered     Influenza (High Dose) 3 valent vaccine 10/07/2010, 09/19/2012, 11/04/2013, 10/08/2014, 11/03/2015, 09/16/2016     Influenza (IIV3) 10/19/2004, 11/15/2005     Pneumococcal (PCV 13) 11/03/2015     Pneumococcal 23 valent 04/25/2006     TD (ADULT, 7+) 03/03/1998, 01/23/2006     TDAP Vaccine (Boostrix) 08/27/2012     Zoster vaccine, live 10/24/2012        Health Maintenance                         Health Maintenance Due   Topic Date Due     URINE DRUG SCREEN Q1 YR  01/25/1950     Medicare Annual Wellness Visit  11/21/2015     Wellness Visit with your Primary Provider - yearly  11/21/2015     Osteoporosis Screening (Dexa) - every 3 years   01/11/2016     COPD ACTION PLAN Q1 YR  06/01/2016     Discuss Advance Directive Planning  07/15/2016     Cholesterol Lab - yearly  03/09/2017     Microalbumin Lab - yearly  03/09/2017     FALL RISK ASSESSMENT  05/20/2017               Follow-ups after your visit        Your next 10 appointments already scheduled     Jun 30, 2017  2:40 PM CDT   Office Visit with Rashi Calle MD   [facility]    [address] [place] [id number] [phone]           Bring a current list of meds and any records pertaining to this visit.  For Physicals, please bring immunization records and any forms needing to be filled out.  Please arrive 10 minutes early to complete paperwork.              Who to contact     If you have questions or need follow up  "information about today's clinic visit or your schedule please contact [facility] directly at [phone].  Normal or non-critical lab and imaging results will be communicated to you by MyChart, letter or phone within 4 business days after the clinic has received the results. If you do not hear from us within 7 days, please contact the clinic through [employer] or phone. If you have a critical or abnormal lab result, we will notify you by phone as soon as possible.  Submit refill requests through [employer] or call your pharmacy and they will forward the refill request to us. Please allow 3 business days for your refill to be completed.          Additional Information About Your Visit        MyChart Information     [employer] lets you send messages to your doctor, view your test results, renew your prescriptions, schedule appointments and more. To sign up, go to [email or web address] . Click on \"Log in\" on the left side of the screen, which will take you to the Welcome page. Then click on \"Sign up Now\" on the right side of the page.     You will be asked to enter the access code listed below, as well as some personal information. Please follow the directions to create your username and password.     Your access code is: [email or web address]  Expires: 2017  6:01 PM     Your access code will  in 90 days. If you need help or a new code, please call your [facility] clinic or [phone].        Care EveryWhere ID     This is your Care EveryWhere ID. This could be used by other organizations to access your [facility] medical records  [id number]        Your Vitals Were     Pulse Temperature Height Pulse Oximetry BMI (Body Mass Index)       77 98.1  F (36.7  C) (Oral) 5' 1\" (1.549 m) 90% 31.18 kg/m2        Blood Pressure from Last 3 Encounters:   17 160/64   17 144/64   17 176/74    Weight from Last 3 Encounters:   17 165 lb (74.8 kg)   17 166 lb (75.3 kg)   17 165 lb (74.8 " kg)              We Performed the Following     *UA reflex to Microscopic and Culture ([place] [facility] (except [place] and [place])     Basic metabolic panel  (Ca, Cl, CO2, Creat, Gluc, K, Na, BUN)     Bld morphology pathology review     CBC with platelets     Ferritin     Folate     Iron and iron binding capacity     Vitamin B12          Today's Medication Changes          These changes are accurate as of: 6/12/17  9:51 AM.  If you have any questions, ask your nurse or doctor.               Start taking these medicines.        Dose/Directions    budesonide-formoterol 160-4.5 MCG/ACT Inhaler   Commonly known as:  SYMBICORT   Used for:  COPD, moderate (H)   Started by:  Rashi Calle MD        Dose:  2 puff   Inhale 2 puffs into the lungs 2 times daily   Quantity:  10.2 g   Refills:  11            Where to get your medicines      These medications were sent to [facility] [place] [id number]     Phone:  [phone]     budesonide-formoterol 160-4.5 MCG/ACT Inhaler                Primary Care Provider Office Phone # Fax #    Rashi Calle [phone]       [address] [address]        Thank you!     Thank you for choosing [facility]  for your care. Our goal is always to provide you with excellent care. Hearing back from our patients is one way we can continue to improve our services. Please take a few minutes to complete the written survey that you may receive in the mail after your visit with us. Thank you!             Your Updated Medication List - Protect others around you: Learn how to safely use, store and throw away your medicines at www.disposemymeds.org.          This list is accurate as of: 6/12/17  9:51 AM.  Always use your most recent med list.                   Brand Name Dispense Instructions for use    acetaminophen-codeine  300-30 MG per tablet    TYLENOL #3    90 tablet    Take 1-2 tablets by mouth every 6 hours as needed for moderate pain       albuterol 108 (90 BASE) MCG/ACT Inhaler    PROAIR HFA/PROVENTIL HFA/VENTOLIN HFA    3 Inhaler    Inhale 2 puffs into the lungs every 6 hours as needed for shortness of breath / dyspnea       amLODIPine 5 MG tablet    NORVASC    90 tablet    Take 1 tablet (5 mg) by mouth daily       budesonide-formoterol 160-4.5 MCG/ACT Inhaler    SYMBICORT    10.2 g    Inhale 2 puffs into the lungs 2 times daily       calcium carbonate 500 MG chewable tablet    TUMS     Take 2 chew tab by mouth daily as needed for heartburn       furosemide 20 MG tablet    LASIX    180 tablet    40 mg in am       IBUPROFEN PO      Take 400 mg by mouth every 6 hours as needed for moderate pain       ipratropium - albuterol 0.5 mg/2.5 mg/3 mL 0.5-2.5 (3) MG/3ML neb solution    DUONEB    270 mL    NEBULIZE CONTENTS OF ONE VIAL EVERY 6 HOURS AS NEEDED FOR SHORTNESS OF BREATH/ DYSPNEA OR WHEEZING       losartan 50 MG tablet    COZAAR    90 tablet    Take 1 tablet (50 mg) by mouth daily       MAGIC MOUTHWASH (ENTER INGREDIENTS IN COMMENTS)     180 mL    Swish and spit 5-10 mLs in mouth every 6 hours as needed       metoprolol 50 MG 24 hr tablet    TOPROL-XL    180 tablet    Take 1 tablet (50 mg) by mouth 2 times daily (2 x 50mg = 100mg)

## 2017-06-12 NOTE — LETTER
[facility]  [address] [place] [address]                  [phone]   June 14, 2017    Anat Correa  [address] [facility] [address]      Dear Anat,    Here is a summary of your recent test results:    Sodium is a little low.  Kidney function has improved.   Mildly low hemoglobin.  Few white cells in your urine.    We advise:    Continue current cares with close follow up.  Multivitamin with iron 1 daily.  Recheck sodium in the next 3-5 days.  Recheck urine in any signs of infection (UA/UC).    Your test results are enclosed.      Please contact me if you have any questions.    In addition, here is a list of due or overdue Health Maintenance reminders.    Health Maintenance Due   Topic Date Due     URINE DRUG SCREEN Q1 YR  01/25/1950     Medicare Annual Wellness Visit  11/21/2015     Wellness Visit with your Primary Provider - yearly  11/21/2015     Osteoporosis Screening (Dexa) - every 3 years   01/11/2016     COPD ACTION PLAN Q1 YR  06/01/2016     Discuss Advance Directive Planning  07/15/2016     Cholesterol Lab - yearly  03/09/2017     Microalbumin Lab - yearly  03/09/2017     FALL RISK ASSESSMENT  05/20/2017       Please call us at [phone] (or use [employer]) to address the above recommendations.            Thank you very much for trusting [facility]..     Healthy regards,        Rashi Calle M.D.        Results for orders placed or performed in visit on 06/12/17   Basic metabolic panel  (Ca, Cl, CO2, Creat, Gluc, K, Na, BUN)   Result Value Ref Range    Sodium 128 (L) 133 - 144 mmol/L    Potassium 5.1 3.4 - 5.3 mmol/L    Chloride 96 94 - 109 mmol/L    Carbon Dioxide 24 20 - 32 mmol/L    Anion Gap 8 3 - 14 mmol/L    Glucose 93 70 - 99 mg/dL    Urea Nitrogen 28 7 - 30 mg/dL    Creatinine 1.44 (H) 0.52 - 1.04 mg/dL    GFR Estimate 35 (L) >60 mL/min/1.7m2    GFR Estimate If Black 42 (L) >60 mL/min/1.7m2     Calcium 9.7 8.5 - 10.1 mg/dL   Bld morphology pathology review   Result Value Ref Range    Copath Report       Patient Name: AMITA CRESPO  MR#: [id number]  Specimen #: [id number]  Collected: 6/12/2017  Received: 6/12/2017  Reported: 6/12/2017 15:42  Ordering Phy(s): ELICIA HOSKINS    For improved result formatting, select 'View Enhanced Report Format'  under Linked Documents section.    TEST(S):  Peripheral Smear Morphology    FINAL DIAGNOSIS:  Peripheral blood morphology:  - Normocytic normochromic anemia, mild.    COMMENT:  The differential diagnosis of normocytic normochromic anemia includes,  alone or in combination, blood loss, low grade hemolysis, anemia of  chronic disease, anemia of renal failure, endocrinopathies and plasma  cell dyscrasia.  Correlation with clinical findings and further  evaluation, if clinically indicated, is recommended.    Electronically signed out by:    Ronen Sebastian M.D.    CLINICAL HISTORY:  82 years old female.  Indication for peripheral blood morphology:  Anemia.    PERIPHERAL BLOOD DATA:    Patient Value (Reference Range >18 year old female)  9.7       WBC    (4.0-11.0 x 10*9/L)  3.40     RBC    (3.8-5.2 x 10*12/L)  10.7     HGB    (11.7-15.7 g/dL)  31.3     HCT    (35.0-47.0 %)  92       MCV    (78-100fL)  31.5     MCH    (26.5-33.0 pg)  34.2     MCHC    (31.5-36.5 g/dL)  12.5     RDW    (10.0-15.0 %)  332      PLT    (150-450 x 10*9/L)    PERIPHERAL BLOOD DIFFERENTIAL  (Reference ranges >18 year old)    Percent  67.9      Neutrophils, segmented and bands  17.1      Lymphocytes  11.0      Monocytes  3.6       Eosinophils  0.4       Basophils    Absolute  6.6      Neutrophils, segmented and bands    (1.6 - 8.3 x 10*9/L)  1.7      Lymphocytes    (0.8 - 5.3 x 10*9/L)  1.1      Monocytes    (0 -1.3 x 10*9/L)  0.4      Eosinophils    (0 - 0.7 x 10*9/L)  0.0      Basophils    (0 - 0.2 x 10*9/L)    PERIPHERAL MORPHOLOGY:    ERYTHROCYTES: The hemoglobin is recorded as 10.7 g/dL.   The erythrocytes  are normocytic normochromic.  There is no morphologic evidence of  hemolysis.    LEUKOCYTES: The white blood cell count is recorded as 9700.  T he  differential counts are within normal limits.  The neutrophils,  eosinophils, basophils, monocytes and lymphocytes show mature  morphology.  There is no evidence of dysplastic changes or blasts.    PLATELETS: The platelet count is recorded as 332,000.  The platelets  show normal size and granulation.  (Dictated by Ronen Sebastian MD 6- @ 3:42 PM).    CPT Codes:  A: 53977-EFLU    TESTING LAB LOCATION:  [facility] [address] [place]  [id number] [phone]    COLLECTION SITE:  Client:  [facility]  Location:  [facility] (R)     Iron and iron binding capacity   Result Value Ref Range    Iron 76 35 - 180 ug/dL    Iron Binding Cap 348 240 - 430 ug/dL    Iron Saturation Index 22 15 - 46 %   Ferritin   Result Value Ref Range    Ferritin 44 8 - 252 ng/mL   Vitamin B12   Result Value Ref Range    Vitamin B12 834 193 - 986 pg/mL   Folate   Result Value Ref Range    Folate 55.3 >5.4 ng/mL   *UA reflex to Microscopic and Culture ([place] and [facility] (except [place] and [place])   Result Value Ref Range    Color Urine Yellow     Appearance Urine Clear     Glucose Urine Negative NEG mg/dL    Bilirubin Urine Negative NEG    Ketones Urine Negative NEG mg/dL    Specific Gravity Urine 1.010 1.003 - 1.035    Blood Urine Negative NEG    pH Urine 7.0 5.0 - 7.0 pH    Protein Albumin Urine Negative NEG mg/dL    Urobilinogen Urine 0.2 0.2 - 1.0 EU/dL    Nitrite Urine Negative NEG    Leukocyte Esterase Urine Trace (A) NEG    Source Midstream Urine    CBC with platelets differential   Result Value Ref Range    WBC 9.7 4.0 - 11.0 10e9/L    RBC Count 3.40 (L) 3.8 - 5.2 10e12/L    Hemoglobin 10.7 (L) 11.7 - 15.7 g/dL    Hematocrit 31.3 (L) 35.0 - 47.0 %    MCV 92 78 - 100 fl    MCH 31.5 26.5 - 33.0 pg    MCHC 34.2  31.5 - 36.5 g/dL    RDW 12.5 10.0 - 15.0 %    Platelet Count 332 150 - 450 10e9/L    Diff Method Automated Method     % Neutrophils 67.9 %    % Lymphocytes 17.1 %    % Monocytes 11.0 %    % Eosinophils 3.6 %    % Basophils 0.4 %    Absolute Neutrophil 6.6 1.6 - 8.3 10e9/L    Absolute Lymphocytes 1.7 0.8 - 5.3 10e9/L    Absolute Monocytes 1.1 0.0 - 1.3 10e9/L    Absolute Eosinophils 0.4 0.0 - 0.7 10e9/L    Absolute Basophils 0.0 0.0 - 0.2 10e9/L   Urine Microscopic   Result Value Ref Range    WBC Urine 2-5 (A) 0 - 2 /HPF    RBC Urine O - 2 0 - 2 /HPF    Squamous Epithelial /LPF Urine Few FEW /LPF    Bacteria Urine Few (A) NEG /HPF

## 2017-06-13 LAB
ANION GAP SERPL CALCULATED.3IONS-SCNC: 8 MMOL/L (ref 3–14)
BUN SERPL-MCNC: 28 MG/DL (ref 7–30)
CALCIUM SERPL-MCNC: 9.7 MG/DL (ref 8.5–10.1)
CHLORIDE SERPL-SCNC: 96 MMOL/L (ref 94–109)
CO2 SERPL-SCNC: 24 MMOL/L (ref 20–32)
CREAT SERPL-MCNC: 1.44 MG/DL (ref 0.52–1.04)
FERRITIN SERPL-MCNC: 44 NG/ML (ref 8–252)
GFR SERPL CREATININE-BSD FRML MDRD: 35 ML/MIN/1.7M2
GLUCOSE SERPL-MCNC: 93 MG/DL (ref 70–99)
IRON SATN MFR SERPL: 22 % (ref 15–46)
IRON SERPL-MCNC: 76 UG/DL (ref 35–180)
POTASSIUM SERPL-SCNC: 5.1 MMOL/L (ref 3.4–5.3)
SODIUM SERPL-SCNC: 128 MMOL/L (ref 133–144)
TIBC SERPL-MCNC: 348 UG/DL (ref 240–430)

## 2017-06-14 DIAGNOSIS — E87.1 LOW SODIUM LEVELS: Primary | ICD-10-CM

## 2017-06-26 DIAGNOSIS — M15.0 PRIMARY OSTEOARTHRITIS INVOLVING MULTIPLE JOINTS: ICD-10-CM

## 2017-06-26 DIAGNOSIS — G89.29 OTHER CHRONIC PAIN: ICD-10-CM

## 2017-06-26 NOTE — TELEPHONE ENCOUNTER
Controlled Substance Refill Request for Acetaminophen-Codeine   Problem List Complete:  Yes    Last Written Prescription Date:  03/27/2017  Last Fill Quantity: 90,   # refills: 0    Last Office Visit with [facility] primary care provider: 06/12/2017    Clinic visit frequency required: none noted     Future Office visit:   Next 5 appointments (look out 90 days)     Jun 30, 2017  2:40 PM CDT   [facility] Visit with Rashi [facility]le MD   [facility]    [address] [place] [id number] [phone]                  Controlled substance agreement on file: No.     Processing:  Staff will hand deliver Rx to on-site pharmacy   checked in past 6 months?  No, route to RN

## 2017-06-26 NOTE — TELEPHONE ENCOUNTER
Controlled Substance Refill Request for Acetaminophen-Codeine 300-30  Problem List Complete:  No     PROVIDER TO CONSIDER COMPLETION OF PROBLEM LIST AND OVERVIEW/CONTROLLED SUBSTANCE AGREEMENT    Last Written Prescription Date:  3-27-17  Last Fill Quantity: 90,   # refills: 0    Last Office Visit with [facility] primary care provider: 6-12-17    Future Office visit:   Next 5 appointments (look out 90 days)     Jun 30, 2017  2:40 PM CDT   [facility] Visit with [facility] MD   [facility]    [address] [place] [id number] [phone]                  Controlled substance agreement on file: No.     Processing:  Staff will hand deliver Rx to on-site pharmacy   checked in past 6 months?  No, route to RN     Thank you,  Katie Beverly CPhT  [facility]

## 2017-07-22 ENCOUNTER — ALLIED HEALTH/NURSE VISIT (OUTPATIENT)
Dept: FAMILY MEDICINE | Facility: CLINIC | Age: 82
End: 2017-07-22
Payer: MEDICARE

## 2017-07-22 VITALS — DIASTOLIC BLOOD PRESSURE: 66 MMHG | SYSTOLIC BLOOD PRESSURE: 158 MMHG

## 2017-07-22 DIAGNOSIS — I10 HYPERTENSION GOAL BP (BLOOD PRESSURE) < 140/90: Primary | ICD-10-CM

## 2017-07-22 PROCEDURE — 99207 ZZC NO CHARGE NURSE ONLY: CPT | Performed by: FAMILY MEDICINE

## 2017-07-22 NOTE — PROGRESS NOTES
"Anat Correa is enrolled/participating in the retail pharmacy Blood Pressure Goals Achievement Program (BPGAP).  Anat Correa was evaluated at [facility] on July 22, 2017 at which time her blood pressure was:    BP Readings from Last 3 Encounters:   07/22/17 158/66   06/12/17 160/64   06/03/17 144/64     Reviewed lifestyle modifications for blood pressure control and reduction: including making healthy food choices, managing weight, getting regular exercise, smoking cessation, reducing alcohol consumption, monitoring blood pressure regularly.     Anat Correa is not experiencing symptoms.    Follow-Up: BP is not at goal of < 140/90 mmHg (patient 60+ years of age without diabetes), Recommended follow-up with PCP.  Routing to PCP for further review.    Recommendation to Provider: Pt reports that she is using metoprolol bid and furosemide two daily \"unless I play cards\" (due to urination).  Pt has been chronically over goal.     Anat Correa was evaluated for enrollment into the PGEN study today.    Patient eligible for enrollment:  No- on 3 classes for htn  Patient interested in enrollment:  No    Completed by: Thank you,  Alysa Hays [facility], Mgr [facility] [phone]      "

## 2017-07-22 NOTE — MR AVS SNAPSHOT
"              After Visit Summary   2017    Anat Correa    MRN: [id number]           Patient Information     Date Of Birth          1935        Visit Information        Provider Department      2017 10:17 AM Rashi Calle MD [facility]        Today's Diagnoses     Hypertension goal BP (blood pressure) < 140/90    -  1       Follow-ups after your visit        Who to contact     If you have questions or need follow up information about today's clinic visit or your schedule please contact [facility] directly at [phone].  Normal or non-critical lab and imaging results will be communicated to you by [employer], letter or phone within 4 business days after the clinic has received the results. If you do not hear from us within 7 days, please contact the clinic through [employer] or phone. If you have a critical or abnormal lab result, we will notify you by phone as soon as possible.  Submit refill requests through [email or web address] or call your pharmacy and they will forward the refill request to us. Please allow 3 business days for your refill to be completed.          Additional Information About Your Visit        MyChart Information     [email or web address] lets you send messages to your doctor, view your test results, renew your prescriptions, schedule appointments and more. To sign up, go to [email or web address] . Click on \"Log in\" on the left side of the screen, which will take you to the Welcome page. Then click on \"Sign up Now\" on the right side of the page.     You will be asked to enter the access code listed below, as well as some personal information. Please follow the directions to create your username and password.     Your access code is: [email or web address]  Expires: 2017  6:01 PM     Your access code will  in 90 days. If you need help or a new code, please call your [facility] or [phone].        Care EveryWhere ID     This is your Care EveryWhere ID. This " could be used by other organizations to access your [place] medical records  [id number]         Blood Pressure from Last 3 Encounters:   07/22/17 158/66   06/12/17 160/64   06/03/17 144/64    Weight from Last 3 Encounters:   06/12/17 165 lb (74.8 kg)   06/02/17 166 lb (75.3 kg)   03/16/17 165 lb (74.8 kg)              Today, you had the following     No orders found for display       Primary Care Provider Office Phone # Fax #    Rashi Calle [phone]       [facility] [address] [facility] [address]        Equal Access to Services     DIANELYS LOW : Hadii bob velazquezo Soruy, waaxda luqadaha, qaybta kaalmada adeegyada, adriana calderon. So [facility] [phone].    ATENCIÓN: Si habla español, tiene a winchester disposición servicios gratuitos de asistencia lingüística. Llame al [phone].    We comply with applicable federal civil rights laws and [place] laws. We do not discriminate on the basis of race, color, national origin, age, disability sex, sexual orientation or gender identity.            Thank you!     Thank you for choosing [facility]  for your care. Our goal is always to provide you with excellent care. Hearing back from our patients is one way we can continue to improve our services. Please take a few minutes to complete the written survey that you may receive in the mail after your visit with us. Thank you!             Your Updated Medication List - Protect others around you: Learn how to safely use, store and throw away your medicines at www.disposemymeds.org.          This list is accurate as of: 7/22/17 11:59 PM.  Always use your most recent med list.                   Brand Name Dispense Instructions for use Diagnosis    acetaminophen-codeine 300-30 MG per tablet    TYLENOL #3    90 tablet    Take 1-2 tablets by mouth every 6 hours as needed for moderate pain    Primary osteoarthritis involving multiple joints        albuterol 108 (90 BASE) MCG/ACT Inhaler    PROAIR HFA/PROVENTIL HFA/VENTOLIN HFA    3 Inhaler    Inhale 2 puffs into the lungs every 6 hours as needed for shortness of breath / dyspnea    COPD, moderate (H)       amLODIPine 5 MG tablet    NORVASC    90 tablet    Take 1 tablet (5 mg) by mouth daily    Hypertension goal BP (blood pressure) < 140/90, CKD (chronic kidney disease) stage 4, GFR 15-29 ml/min (H)       budesonide-formoterol 160-4.5 MCG/ACT Inhaler    SYMBICORT    10.2 g    Inhale 2 puffs into the lungs 2 times daily    COPD, moderate (H)       calcium carbonate 500 MG chewable tablet    TUMS     Take 2 chew tab by mouth daily as needed for heartburn        fluticasone-vilanterol 200-25 MCG/INH oral inhaler    BREO ELLIPTA    1 Inhaler    Inhale 1 puff into the lungs daily    COPD, moderate (H)       furosemide 20 MG tablet    LASIX    180 tablet    40 mg in am    Hypertension goal BP (blood pressure) < 140/90, CKD (chronic kidney disease) stage 4, GFR 15-29 ml/min (H)       IBUPROFEN PO      Take 400 mg by mouth every 6 hours as needed for moderate pain        ipratropium - albuterol 0.5 mg/2.5 mg/3 mL 0.5-2.5 (3) MG/3ML neb solution    DUONEB    270 mL    NEBULIZE CONTENTS OF ONE VIAL EVERY 6 HOURS AS NEEDED FOR SHORTNESS OF BREATH/ DYSPNEA OR WHEEZING    COPD, moderate (H)       losartan 50 MG tablet    COZAAR    90 tablet    Take 1 tablet (50 mg) by mouth daily    Hypertension goal BP (blood pressure) < 140/90, CKD (chronic kidney disease) stage 4, GFR 15-29 ml/min (H)       MAGIC MOUTHWASH (ENTER INGREDIENTS IN COMMENTS)     180 mL    Swish and spit 5-10 mLs in mouth every 6 hours as needed    Pain, dental       metoprolol 50 MG 24 hr tablet    TOPROL-XL    180 tablet    Take 1 tablet (50 mg) by mouth 2 times daily (2 x 50mg = 100mg)    Hypertension goal BP (blood pressure) < 140/90

## 2017-08-29 ENCOUNTER — ALLIED HEALTH/NURSE VISIT (OUTPATIENT)
Dept: FAMILY MEDICINE | Facility: CLINIC | Age: 82
End: 2017-08-29
Payer: MEDICARE

## 2017-08-29 VITALS — DIASTOLIC BLOOD PRESSURE: 68 MMHG | SYSTOLIC BLOOD PRESSURE: 160 MMHG

## 2017-08-29 DIAGNOSIS — Z01.30 BP CHECK: Primary | ICD-10-CM

## 2017-08-29 PROCEDURE — 99207 ZZC NO CHARGE NURSE ONLY: CPT | Performed by: FAMILY MEDICINE

## 2017-08-29 NOTE — PROGRESS NOTES
Anat Correa is enrolled/participating in the retail pharmacy Blood Pressure Goals Achievement Program (BPGAP).  Anat Correa was evaluated at [facility] on August 29, 2017 at which time her blood pressure was:    BP Readings from Last 3 Encounters:   08/29/17 160/68   07/22/17 158/66   06/12/17 160/64     Reviewed lifestyle modifications for blood pressure control and reduction: including making healthy food choices, managing weight, getting regular exercise, smoking cessation, reducing alcohol consumption, monitoring blood pressure regularly.     Anat Correa is not experiencing symptoms.    Follow-Up: BP is not at goal of < 150/90 mmHg (patient 60+ years of age without diabetes), Recommended follow-up in 1 month at the pharmacy. Routing to PCP as an FYI.      Completed by: Tegan Busby [facility]   [phone]

## 2017-08-29 NOTE — MR AVS SNAPSHOT
"              After Visit Summary   2017    Anat Correa    MRN: [id number]           Patient Information     Date Of Birth          1935        Visit Information        Provider Department      2017 1:34 PM Rashi Calle MD [facility]        Today's Diagnoses     BP check    -  1       Follow-ups after your visit        Who to contact     If you have questions or need follow up information about today's clinic visit or your schedule please contact [facility] directly at [phone].  Normal or non-critical lab and imaging results will be communicated to you by MyChart, letter or phone within 4 business days after the clinic has received the results. If you do not hear from us within 7 days, please contact the clinic through [employer] or phone. If you have a critical or abnormal lab result, we will notify you by phone as soon as possible.  Submit refill requests through [employer] or call your pharmacy and they will forward the refill request to us. Please allow 3 business days for your refill to be completed.          Additional Information About Your Visit        MyChart Information     [employer] lets you send messages to your doctor, view your test results, renew your prescriptions, schedule appointments and more. To sign up, go to [email or web address] . Click on \"Log in\" on the left side of the screen, which will take you to the Welcome page. Then click on \"Sign up Now\" on the right side of the page.     You will be asked to enter the access code listed below, as well as some personal information. Please follow the directions to create your username and password.     Your access code is: [email or web address]  Expires: 2017  1:37 PM     Your access code will  in 90 days. If you need help or a new code, please call your [facility] or [phone].        Care EveryWhere ID     This is your Care EveryWhere ID. This could be used by other organizations to " access your [place] medical records  [id number]         Blood Pressure from Last 3 Encounters:   08/29/17 160/68   07/22/17 158/66   06/12/17 160/64    Weight from Last 3 Encounters:   06/12/17 165 lb (74.8 kg)   06/02/17 166 lb (75.3 kg)   03/16/17 165 lb (74.8 kg)              Today, you had the following     No orders found for display       Primary Care Provider Office Phone # Fax #    Rashi Calle [phone]       [address] [place] [address]        Equal Access to Services     [facility]: Hadii aad ku hadasho Soomaali, waaxda luqadaha, qaybta kaalmada adeegyada, adriana farias . So [facility] [phone].    ATENCIÓN: Si habla español, tiene a winchester disposición servicios gratuitos de asistencia lingüística. [facility] [phone].    We comply with applicable federal civil rights laws and [place] laws. We do not discriminate on the basis of race, color, national origin, age, disability sex, sexual orientation or gender identity.            Thank you!     Thank you for choosing [facility]  for your care. Our goal is always to provide you with excellent care. Hearing back from our patients is one way we can continue to improve our services. Please take a few minutes to complete the written survey that you may receive in the mail after your visit with us. Thank you!             Your Updated Medication List - Protect others around you: Learn how to safely use, store and throw away your medicines at www.disposemymeds.org.          This list is accurate as of: 8/29/17  1:37 PM.  Always use your most recent med list.                   Brand Name Dispense Instructions for use Diagnosis    acetaminophen-codeine 300-30 MG per tablet    TYLENOL #3    90 tablet    Take 1-2 tablets by mouth every 6 hours as needed for moderate pain    Primary osteoarthritis involving multiple joints       albuterol 108 (90 BASE) MCG/ACT Inhaler    PROAIR HFA/PROVENTIL  HFA/VENTOLIN HFA    3 Inhaler    Inhale 2 puffs into the lungs every 6 hours as needed for shortness of breath / dyspnea    COPD, moderate (H)       amLODIPine 5 MG tablet    NORVASC    90 tablet    Take 1 tablet (5 mg) by mouth daily    Hypertension goal BP (blood pressure) < 140/90, CKD (chronic kidney disease) stage 4, GFR 15-29 ml/min (H)       budesonide-formoterol 160-4.5 MCG/ACT Inhaler    SYMBICORT    10.2 g    Inhale 2 puffs into the lungs 2 times daily    COPD, moderate (H)       calcium carbonate 500 MG chewable tablet    TUMS     Take 2 chew tab by mouth daily as needed for heartburn        fluticasone-vilanterol 200-25 MCG/INH oral inhaler    BREO ELLIPTA    1 Inhaler    Inhale 1 puff into the lungs daily    COPD, moderate (H)       furosemide 20 MG tablet    LASIX    180 tablet    40 mg in am    Hypertension goal BP (blood pressure) < 140/90, CKD (chronic kidney disease) stage 4, GFR 15-29 ml/min (H)       IBUPROFEN PO      Take 400 mg by mouth every 6 hours as needed for moderate pain        ipratropium - albuterol 0.5 mg/2.5 mg/3 mL 0.5-2.5 (3) MG/3ML neb solution    DUONEB    270 mL    NEBULIZE CONTENTS OF ONE VIAL EVERY 6 HOURS AS NEEDED FOR SHORTNESS OF BREATH/ DYSPNEA OR WHEEZING    COPD, moderate (H)       losartan 50 MG tablet    COZAAR    90 tablet    Take 1 tablet (50 mg) by mouth daily    Hypertension goal BP (blood pressure) < 140/90, CKD (chronic kidney disease) stage 4, GFR 15-29 ml/min (H)       MAGIC MOUTHWASH (ENTER INGREDIENTS IN COMMENTS)     180 mL    Swish and spit 5-10 mLs in mouth every 6 hours as needed    Pain, dental       metoprolol 50 MG 24 hr tablet    TOPROL-XL    180 tablet    Take 1 tablet (50 mg) by mouth 2 times daily (2 x 50mg = 100mg)    Hypertension goal BP (blood pressure) < 140/90

## 2017-09-11 ENCOUNTER — OFFICE VISIT (OUTPATIENT)
Dept: PODIATRY | Facility: CLINIC | Age: 82
End: 2017-09-11
Payer: MEDICARE

## 2017-09-11 VITALS
DIASTOLIC BLOOD PRESSURE: 80 MMHG | BODY MASS INDEX: 31.15 KG/M2 | SYSTOLIC BLOOD PRESSURE: 170 MMHG | WEIGHT: 165 LBS | HEIGHT: 61 IN

## 2017-09-11 DIAGNOSIS — M19.071 PRIMARY LOCALIZED OSTEOARTHROSIS, ANKLE AND FOOT, RIGHT: ICD-10-CM

## 2017-09-11 DIAGNOSIS — M19.072 PRIMARY LOCALIZED OSTEOARTHROSIS, ANKLE AND FOOT, LEFT: ICD-10-CM

## 2017-09-11 DIAGNOSIS — M79.672 BILATERAL FOOT PAIN: Primary | ICD-10-CM

## 2017-09-11 DIAGNOSIS — M79.671 BILATERAL FOOT PAIN: Primary | ICD-10-CM

## 2017-09-11 DIAGNOSIS — I87.2 VENOUS INSUFFICIENCY OF BOTH LOWER EXTREMITIES: ICD-10-CM

## 2017-09-11 DIAGNOSIS — L60.0 INGROWN NAIL OF SECOND TOE OF RIGHT FOOT: ICD-10-CM

## 2017-09-11 DIAGNOSIS — M20.41 HAMMER TOE OF RIGHT FOOT: ICD-10-CM

## 2017-09-11 PROCEDURE — 99212 OFFICE O/P EST SF 10 MIN: CPT | Mod: 25 | Performed by: PODIATRIST

## 2017-09-11 PROCEDURE — 20600 DRAIN/INJ JOINT/BURSA W/O US: CPT | Mod: T2 | Performed by: PODIATRIST

## 2017-09-11 NOTE — LETTER
"    9/11/2017         RE: Anat Correa  [id number] [address] [facility] [address]        Dear Colleague,    Thank you for referring your patient, Anat Correa, to the [facility]. Please see a copy of my visit note below.    Podiatry / Foot and Ankle Surgery Progress Note    September 11, 2017    Subject: Patient was seen for follow up on bilateral foot pain and ingrown nails and hammertoe. She would like injections today to both feet. Is also wondering about ingrown nail procedures and doing a tenotomy on her right 2nd toe like her left 2nd toe. Pain today is 8/10.     Objective:  Vitals: /80  Ht 1.549 m (5' 1\")  Wt 74.8 kg (165 lb)  BMI 31.18 kg/m2  BMI= Body mass index is 31.18 kg/(m^2).    General:  Patient is alert and orientated.  NAD  Vascular: DP & PT pulses are faintly palpable bilaterally. Varicosities noted diffusely over feet. Edema Noted to both feet and legs, more prominent on left lateral ankle. CFT and skin temperature is normal to both lower extremities.   Neurologic: Lower extremity sensation is intact to light touch. No evidence of weakness or contracture in the lower extremities. No evidence of neuropathy. Patient did have pain with lateral compression of both feet and a nitish's click was noted at both feet.   Musculoskeletal: Patient is ambulatory without assistive device or brace. She has pain with compression of the 3rd metatarsal phalangeal joints bilateral.  Previously amputated 2nd toes bilateral.  Decrease arch height bilaterally.      Assessment:     Bilateral foot pain  Primary localized osteoarthrosis, ankle and foot, right  Primary localized osteoarthrosis, ankle and foot, left  Ingrown nail of second toe of right foot  Hammer toe of right foot  Toe amputation status, left (H)  Toe amputation status, right (H)  Venous insufficiency of both lower extremities      Plan:  Talked about arthritis and treatments including orthotics, injections, " icing, NSAIDS, bracing, physical therapy, compounding pain cream, or surgical intervention.    Reviewed and discussed causes of hammertoes with patient.  Explained that this can be caused by an overpowering of muscles or by the way we walk.  Discussed conservative treatments such as orthotics, pads, shoe gear.  Explained that sometimes the flexor tendons can be cut to try and straighten the toe and reduce rubbing. This is normally done in office and patient is weight bearing in postop she for 1-2 weeks.  We also discussed surgical intervention to remove the joint and possibly fuse the toe.  Normally patient has a pin sticking out of the toe for about 6 weeks and can not get the foot wet. Patient would have to be minimal weight bearing in cam boot.      The potential causes and nature of an ingrown toenail were discussed with the patient.  We reviewed the natural history/prognosis of the condition and potential risks if no treatment is provided.      Treatment options discussed included conservative management (oral antibiotics, soaking of foot, adequate width shoes)  as well as surgical management (partial or total nail removal).  The pros and cons of both forms of treatment were reviewed.      At this time, we can do the injections. She will make a 30 minute office procedure for the ingrown nails and tenotomy.     Procedure:  After verbal consent, the left 3rd toe joint was marked out.  The foot was prepped and draped using sterile technique.  A mixture of 1cc of 2% lidocaine plain and 1 1/2 cc of kenalog -40 was injected into the medial aspect of left 2nd interphalangeal joint.  Patient tolerated procedure and anesthesia well.    Procedure 2: same procedure done to the right foot.      Procedure:   After verbal consent, the left sinus tarsi.  The foot was prepped and draped using sterile technique.  A mixture of 1cc of 2% lidocaine plain and 1 1/2 cc of kenalog -40 was injected into the left sinus tarsi.  Patient  tolerated procedure and anesthesia well.    Ashia White DPM, Podiatry/Foot and Ankle Surgery    Weight management plan: Patient was referred to their PCP to discuss a diet and exercise plan.        Again, thank you for allowing me to participate in the care of your patient.        Sincerely,        Ashia White DPM, Podiatry/Foot and Ankle Surgery

## 2017-09-11 NOTE — MR AVS SNAPSHOT
After Visit Summary   9/11/2017    Anat Correa    MRN: [id number]           Patient Information     Date Of Birth          1935        Visit Information        Provider Department      9/11/2017 8:30 AM Ashia White DPM, Podiatry/Foot and Ankle Surgery [facility]        Care Instructions      DR. WHITE'S CLINIC LOCATIONS:   MONDAY AM - [place] TUESDAY - [place]   [address] [address] [address]    [facility] [place] [address] [place] [address]   [phone] / [phone] / [phone]       WEDNESDAY - [place] FRIDAY AM - [facility]   [address]   [place] [address] [place] [address]   [phone] / [phone]       FRIDAY PM - [place] SCHEDULE SURGERY: [phone]   [address] BILLING QUESTIONS: [phone]   [place] [address] AFTER HOURS: [phone] / [phone] APPOINTMENTS: [phone]       Make next appointment for 30 mins please       INGROWN TOENAILS  When a toenail is ingrown, it is curved and grows into the skin, usually at the nail borders (the sides of the nail). This  digging in  of the nail irritates the skin, often creating pain, redness, swelling, and warmth in the toe.  If an ingrown nail causes a break in the skin, bacteria may enter and cause an infection in the area, which is often marked by drainage and a foul odor. However, even if the toe isn t painful, red, swollen, or warm, a nail that curves downward into the skin can progress to an infection.  CAUSES:  Heredity: In many people, the tendency for ingrown toenails is inherited.   Trauma: Sometimes an ingrown toenail is the result of trauma, such as stubbing your toe, having an object fall on your toe, or engaging in activities that involve repeated pressure on the toes, such as kicking or running.   Improper Trimming:  The most common cause of ingrown toenails is cutting your nails  too short. This encourages the skin next to the nail to fold over the nail.   Improperly Sized Footwear: Ingrown toenails can result from wearing socks and shoes that are tight or short.   Nail Conditions: Ingrown toenails can be caused by nail problems, such as fungal infections or losing a nail due to trauma.   TREATMENT: Sometimes initial treatment for ingrown toenails can be safely performed at home. However, home treatment is strongly discouraged if an infection is suspected, or for those who have medical conditions that put feet at high risk, such as diabetes, nerve damage in the foot, or poor circulation.  Home care: If you don t have an infection or any of the above medical conditions, you can soak your foot in room-temperature water (adding Epsom s salt may be recommended by your doctor), and gently massage the side of the nail fold to help reduce the inflammation.  Avoid attempting  bathroom surgery.  Repeated cutting of the nail can cause the condition to worsen over time. If your symptoms fail to improve, it s time to see a foot and ankle surgeon.  Physician care: After examining the toe, the foot and ankle surgeon will select the treatment best suited for you. If an infection is present, an oral antibiotic may be prescribed.  Sometimes a minor surgical procedure, often performed in the office, will ease the pain and remove the offending nail. After applying a local anesthetic, the doctor removes part of the nail s side border. Some nails may become ingrown again, requiring removal of the nail root.  Following the nail procedure, a light bandage will be applied. Most people experience very little pain after surgery and may resume normal activity the next day. If your surgeon has prescribed an oral antibiotic, be sure to take all the medication, even if your symptoms have improved.  PREVENTION:  Proper Trimming: Cut toenails in a fairly straight line, and don t cut them too short. You should be able to  get your fingernail under the sides and end of the nail.   Well-fitting Footwear: Don t wear shoes that are short or tight in the toe area. Avoid shoes that are loose, because they too cause pressure on the toes, especially when running or walking briskly.     INGROWN TOENAIL REMOVAL AFTERCARE     Go directly home and elevate the affected foot on one or two pillows for the remainder of the day/evening if possible. Your toe may stay numb anywhere from 2-8 hours.     Take Tylenol, ibuprofen or another anti-inflammatory as needed for pain.     Take antibiotic if that has been prescribed. Finish the entire prescribed antibiotic even if your symptoms have improved.     The evening of the procedure, soak/wash the affected area in warm water (you may add Epsom salt) for 5 to 10 minutes. Do this twice a day for 2-4 weeks (6-8 weeks if you had phenol) (you may count showering/bathing as one soak).  After soaks, pat the area dry and then allow to airdry for a few minutes. Apply antibiotic ointment to the area and cover with 2 X 2 gauze and paper tape or band-aid.    You may pursue everyday activities as tolerated with either an open toe shoe or cut-out shoe as needed or you may wear regular shoes if no pain is noted.    Watch for any signs and symptoms of infection such as: redness, red streaks going up the foot/leg, swelling, pus or foul odor. Those that have had the phenol procedure, the toe will drain longer and will look like it is infected because it is a chemical burn.     Please call with questions.  HAMMERTOES     Hammertoe is a contracture (bending) of one or both joints of the second, third, fourth, or fifth (little) toes. This abnormal bending can put pressure on the toe when wearing shoes, causing problems to develop.  Hammertoes usually start out as mild deformities and get progressively worse over time. In the earlier stages, hammertoes are flexible and the symptoms can often be managed with noninvasive  measures. But if left untreated, hammertoes can become more rigid and will not respond to non-surgical treatment.  Because of the progressive nature of hammertoes, they should receive early attention. Hammertoes never get better without some kind of intervention.  Causes  The most common cause of hammertoe is a muscle/tendon imbalance. This imbalance, which leads to a bending of the toe, results from mechanical (structural) changes in the foot that occur over time in some people.  Hammertoes may be aggravated by shoes that don t fit properly. A hammertoe may result if a toe is too long and is forced into a cramped position when a tight shoe is worn.  Occasionally, hammertoe is the result of an earlier trauma to the toe. In some people, hammertoes are inherited.  Symptoms  Pain or irritation of the affected toe when wearing shoes.   Corns and calluses (a buildup of skin) on the toe, between two toes, or on the ball of the foot. Corns are caused by constant friction against the shoe. They may be soft or hard, depending upon their location.   Inflammation, redness, or a burning sensation   Contracture of the toe   In more severe cases of hammertoe, open sores may form.   Diagnosis  Although hammertoes are readily apparent, to arrive at a diagnosis the foot and ankle surgeon will obtain a thorough history of your symptoms and examine your foot. During the physical examination, the doctor may attempt to reproduce your symptoms by manipulating your foot and will study the contractures of the toes. In addition, the foot and ankle surgeon may take x-rays to determine the degree of the deformities and assess any changes that may have occurred.   Hammertoes are progressive - they don t go away by themselves and usually they will get worse over time. However, not all cases are alike - some hammertoes progress more rapidly than others. Once your foot and ankle surgeon has evaluated your hammertoes, a treatment plan can be  developed that is suited to your needs.  Non-surgical Treatment  There is a variety of treatment options for hammertoe. The treatment your foot and ankle surgeon selects will depend upon the severity of your hammertoe and other factors.  A number of non-surgical measures can be undertaken:  Padding corns and calluses. Your foot and ankle surgeon can provide or prescribe pads designed to shield corns from irritation. If you want to try over-the-counter pads, avoid the medicated types. Medicated pads are generally not recommended because they may contain a small amount of acid that can be harmful. Consult your surgeon about this option.   Changes in shoewear. Avoid shoes with pointed toes, shoes that are too short, or shoes with high heels - conditions that can force your toe against the front of the shoe. Instead, choose comfortable shoes with a deep, roomy toe box and heels no higher than two inches.   Orthotic devices. A custom orthotic device placed in your shoe may help control the muscle/tendon imbalance.   Injection therapy. Corticosteroid injections are sometimes used to ease pain and inflammation caused by hammertoe.   Medications. Oral nonsteroidal anti-inflammatory drugs (NSAIDs), such as ibuprofen, may be recommended to reduce pain and inflammation.   Splinting/strapping. Splints or small straps may be applied by the surgeon to realign the bent toe.   Exercises:   1. The Toe Tap  Stand flat on the ground in your bare feet. Raise all of your toes up off the ground as high as you can. Then starting with the little toes, slowly press them down to the ground. After the big toes are on the ground, start over by raising all of them up off the ground again. This motion is similar to tapping your fingers on a desk. Repeat this ten times.     2. Interlocking your Fingers and Toes  Cross your right foot over your knee and place the fingers of your left hand between your toes. Squeeze your toes together, pinching  your fingers between them. Spread the toes apart and squeeze them together again. Repeat this ten times then do the other foot. Like most exercises, this will get easier the more you do it. If you are having a lot of difficulty with this exercise, start with just your index finger between your first and second toe, then later add your middle finger between your second and third toes, and so on until you can fit all your fingers between your toes. Do this ten times on each foot. Eventually you will be able to spread your toes apart without using your fingers.    3. Gripping the Floor   the floor by pressing the pads of your toes (not the tips) into the floor without curling your toes. Relax and repeat this ten times. If your shoes have the proper amount of depth, you should be able to do this with shoes on.      HAMMERTOE TOE SURGERY   Hammertoe surgery is complex. The surgical procedure is an attempt to help the toe lay in a better position. Nearly every structure in the toe will be cut including the tendons, ligaments, skin and bone. Hammertoes are a complex deformity and final toe position is difficult to predict. The only sure way to position a toe is to fuse all three digital joints. That will not happen as some degree of toe motion is needed for walking. The toe may not be completely reduced as the surrounding skin and other structures may not allow the toe to return to a normal position. The tendons on adjacent toes may need to be cut at the time of the original or subsequent surgeries, as interconnections exist between the toes. The toe may drift after surgery. Stiffness may develop leading to new areas of pressure.   Future shoe choices will be critical in allowing the surgery to provide comfort. The toes will still hurt if shoes rub. The original pain may also persist as other foot problems may be contributing to the current pain. The toe may or may not be pinned in place. External pins would require  complete avoidance of water on the foot for six weeks. The pin would be removed about six weeks after the surgery. Strict attention to protection is critical. The pin could get bumped or loosen resulting in early removal. Removal might be necessary before the bone heals which would negatively affect the final surgical outcome and toe allignment.         Body Mass Index (BMI)  Many things can cause foot and ankle problems. Foot structure, activity level, foot mechanics and injuries are common causes of pain.  One very important issue that often goes unmentioned, is body weight. Extra weight can cause increased stress on muscles, ligaments, bones and tendons.  Sometimes just a few extra pounds is all it takes to put one over her/his threshold. Without reducing that stress, it can be difficult to alleviate pain. Some people are uncomfortable addressing this issue, but we feel it is important for you to think about it. As Foot &  Ankle specialists, our job is addressing the lower extremity problem and possible causes. Regarding extra body weight, we encourage patients to discuss diet and weight management plans with their primary care doctors. It is this team approach that gives you the best opportunity for pain relief and getting you back on your feet.                  Follow-ups after your visit        Who to contact     If you have questions or need follow up information about today's clinic visit or your schedule please contact [facility] directly at [phone].  Normal or non-critical lab and imaging results will be communicated to you by MyChart, letter or phone within 4 business days after the clinic has received the results. If you do not hear from us within 7 days, please contact the clinic through MyChart or phone. If you have a critical or abnormal lab result, we will notify you by phone as soon as possible.  Submit refill requests through [employer] or call your pharmacy and they will forward the  "refill request to us. Please allow 3 business days for your refill to be completed.          Additional Information About Your Visit        [employer] Information     [employer] lets you send messages to your doctor, view your test results, renew your prescriptions, schedule appointments and more. To sign up, go to [email or web address] . Click on \"Log in\" on the left side of the screen, which will take you to the Welcome page. Then click on \"Sign up Now\" on the right side of the page.     You will be asked to enter the access code listed below, as well as some personal information. Please follow the directions to create your username and password.     Your access code is: [email or web address]  Expires: 2017  1:37 PM     Your access code will  in 90 days. If you need help or a new code, please call your [facility] clinic or [phone].        Care EveryWhere ID     This is your Care EveryWhere ID. This could be used by other organizations to access your [facility] medical records  [id number]        Your Vitals Were     Height BMI (Body Mass Index)                5' 1\" (1.549 m) 31.18 kg/m2           Blood Pressure from Last 3 Encounters:   17 170/80   17 160/68   17 158/66    Weight from Last 3 Encounters:   17 165 lb (74.8 kg)   17 165 lb (74.8 kg)   17 166 lb (75.3 kg)              Today, you had the following     No orders found for display       Primary Care Provider Office Phone # Fax #    Rashi Calle [phone] [address] [facility] [address]        Equal Access to Services     [facility] : Hadii bob Murphy, adriana joe, chelo simmonsaladriana agustin. So [facility] [phone].    ATENCIÓN: Si habla español, tiene a w[place]ster disposición servicios gratuitos de asistencia lingüística. Llame al [phone].    We comply with applicable federal civil rights laws and [place] laws. We do not " discriminate on the basis of race, color, national origin, age, disability sex, sexual orientation or gender identity.            Thank you!     [facility]  [facility]. Our goal is always to provide you with excellent care. Hearing back from our patients is one way we can continue to improve our services. Please take a few minutes to complete the written survey that you may receive in the mail after your visit with us. Thank you!             Your Updated Medication List - Protect others around you: Learn how to safely use, store and throw away your medicines at www.disposemymeds.org.          This list is accurate as of: 9/11/17  8:52 AM.  Always use your most recent med list.                   Brand Name Dispense Instructions for use Diagnosis    acetaminophen-codeine 300-30 MG per tablet    TYLENOL #3    90 tablet    Take 1-2 tablets by mouth every 6 hours as needed for moderate pain    Primary osteoarthritis involving multiple joints       albuterol 108 (90 BASE) MCG/ACT Inhaler    PROAIR HFA/PROVENTIL HFA/VENTOLIN HFA    3 Inhaler    Inhale 2 puffs into the lungs every 6 hours as needed for shortness of breath / dyspnea    COPD, moderate (H)       amLODIPine 5 MG tablet    NORVASC    90 tablet    Take 1 tablet (5 mg) by mouth daily    Hypertension goal BP (blood pressure) < 140/90, CKD (chronic kidney disease) stage 4, GFR 15-29 ml/min (H)       budesonide-formoterol 160-4.5 MCG/ACT Inhaler    SYMBICORT    10.2 g    Inhale 2 puffs into the lungs 2 times daily    COPD, moderate (H)       calcium carbonate 500 MG chewable tablet    TUMS     Take 2 chew tab by mouth daily as needed for heartburn        fluticasone-vilanterol 200-25 MCG/INH oral inhaler    BREO ELLIPTA    1 Inhaler    Inhale 1 puff into the lungs daily    COPD, moderate (H)       furosemide 20 MG tablet    LASIX    180 tablet    40 mg in am    Hypertension goal BP (blood pressure) < 140/90, CKD (chronic kidney  disease) stage 4, GFR 15-29 ml/min (H)       IBUPROFEN PO      Take 400 mg by mouth every 6 hours as needed for moderate pain        ipratropium - albuterol 0.5 mg/2.5 mg/3 mL 0.5-2.5 (3) MG/3ML neb solution    DUONEB    270 mL    NEBULIZE CONTENTS OF ONE VIAL EVERY 6 HOURS AS NEEDED FOR SHORTNESS OF BREATH/ DYSPNEA OR WHEEZING    COPD, moderate (H)       losartan 50 MG tablet    COZAAR    90 tablet    Take 1 tablet (50 mg) by mouth daily    Hypertension goal BP (blood pressure) < 140/90, CKD (chronic kidney disease) stage 4, GFR 15-29 ml/min (H)       MAGIC MOUTHWASH (ENTER INGREDIENTS IN COMMENTS)     180 mL    Swish and spit 5-10 mLs in mouth every 6 hours as needed    Pain, dental       metoprolol 50 MG 24 hr tablet    TOPROL-XL    180 tablet    Take 1 tablet (50 mg) by mouth 2 times daily (2 x 50mg = 100mg)    Hypertension goal BP (blood pressure) < 140/90

## 2017-09-11 NOTE — PROGRESS NOTES
"Podiatry / Foot and Ankle Surgery Progress Note    September 11, 2017    Subject: Patient was seen for follow up on bilateral foot pain and ingrown nails and hammertoe. She would like injections today to both feet. Is also wondering about ingrown nail procedures and doing a tenotomy on her right 2nd toe like her left 2nd toe. Pain today is 8/10.     Objective:  Vitals: /80  Ht 1.549 m (5' 1\")  Wt 74.8 kg (165 lb)  BMI 31.18 kg/m2  BMI= Body mass index is 31.18 kg/(m^2).    General:  Patient is alert and orientated.  NAD  Vascular: DP & PT pulses are faintly palpable bilaterally. Varicosities noted diffusely over feet. Edema Noted to both feet and legs, more prominent on left lateral ankle. CFT and skin temperature is normal to both lower extremities.   Neurologic: Lower extremity sensation is intact to light touch. No evidence of weakness or contracture in the lower extremities. No evidence of neuropathy. Patient did have pain with lateral compression of both feet and a nitish's click was noted at both feet.   Musculoskeletal: Patient is ambulatory without assistive device or brace. She has pain with compression of the 3rd metatarsal phalangeal joints bilateral.  Previously amputated 2nd toes bilateral.  Decrease arch height bilaterally.      Assessment:     Bilateral foot pain  Primary localized osteoarthrosis, ankle and foot, right  Primary localized osteoarthrosis, ankle and foot, left  Ingrown nail of second toe of right foot  Hammer toe of right foot  Toe amputation status, left (H)  Toe amputation status, right (H)  Venous insufficiency of both lower extremities      Plan:  Talked about arthritis and treatments including orthotics, injections, icing, NSAIDS, bracing, physical therapy, compounding pain cream, or surgical intervention.    Reviewed and discussed causes of hammertoes with patient.  Explained that this can be caused by an overpowering of muscles or by the way we walk.  Discussed " conservative treatments such as orthotics, pads, shoe gear.  Explained that sometimes the flexor tendons can be cut to try and straighten the toe and reduce rubbing. This is normally done in office and patient is weight bearing in postop she for 1-2 weeks.  We also discussed surgical intervention to remove the joint and possibly fuse the toe.  Normally patient has a pin sticking out of the toe for about 6 weeks and can not get the foot wet. Patient would have to be minimal weight bearing in cam boot.      The potential causes and nature of an ingrown toenail were discussed with the patient.  We reviewed the natural history/prognosis of the condition and potential risks if no treatment is provided.      Treatment options discussed included conservative management (oral antibiotics, soaking of foot, adequate width shoes)  as well as surgical management (partial or total nail removal).  The pros and cons of both forms of treatment were reviewed.      At this time, we can do the injections. She will make a 30 minute office procedure for the ingrown nails and tenotomy.     Procedure:  After verbal consent, the left 3rd toe joint was marked out.  The foot was prepped and draped using sterile technique.  A mixture of 1cc of 2% lidocaine plain and 1 1/2 cc of kenalog -40 was injected into the medial aspect of [id number] interphalangeal joint.  Patient tolerated procedure and anesthesia well.    Procedure 2: same procedure done to the right foot.      Ashia White DPM, Podiatry/Foot and Ankle Surgery    Weight management plan: Patient was referred to their PCP to discuss a diet and exercise plan.

## 2017-09-11 NOTE — NURSING NOTE
"Chief Complaint   Patient presents with     Foot Problems     pain in the great toe and the 3rd toes on both feet, was hoping for a cortison injection, on left foot feels like the pain goes into the ankle, painful 3rd toes feels like it may be ingrown        Initial /80  Ht 5' 1\" (1.549 m)  Wt 165 lb (74.8 kg)  BMI 31.18 kg/m2 Estimated body mass index is 31.18 kg/(m^2) as calculated from the following:    Height as of this encounter: 5' 1\" (1.549 m).    Weight as of this encounter: 165 lb (74.8 kg).  Medication Reconciliation: complete   Renzo Rivera MA      "

## 2017-09-11 NOTE — PATIENT INSTRUCTIONS
DR. MCFARLAND'S CLINIC LOCATIONS:   MONDAY AM - [place] TUESDAY - [place]   [address] [address] [place] [address] [address] [place] [address]   [phone] / [phone] / [phone]       WEDNESDAY - [place] FRIDAY AM - [facility]   [address] [address]   [place] [address] [place] [address]   [phone] / [phone]       FRIDAY PM - [place] SCHEDULE SURGERY: [phone]   [address] BILLING QUESTIONS: [phone]   [place] [address] AFTER HOURS: [phone] APPOINTMENTS: [phone]       Make next appointment for 30 mins please       INGROWN TOENAILS  When a toenail is ingrown, it is curved and grows into the skin, usually at the nail borders (the sides of the nail). This  digging in  of the nail irritates the skin, often creating pain, redness, swelling, and warmth in the toe.  If an ingrown nail causes a break in the skin, bacteria may enter and cause an infection in the area, which is often marked by drainage and a foul odor. However, even if the toe isn t painful, red, swollen, or warm, a nail that curves downward into the skin can progress to an infection.  CAUSES:  Heredity: In many people, the tendency for ingrown toenails is inherited.   Trauma: Sometimes an ingrown toenail is the result of trauma, such as stubbing your toe, having an object fall on your toe, or engaging in activities that involve repeated pressure on the toes, such as kicking or running.   Improper Trimming:  The most common cause of ingrown toenails is cutting your nails too short. This encourages the skin next to the nail to fold over the nail.   Improperly Sized Footwear: Ingrown toenails can result from wearing socks and shoes that are tight or short.   Nail Conditions: Ingrown toenails can be caused by nail problems, such as fungal infections or losing a nail due to trauma.   TREATMENT: Sometimes  initial treatment for ingrown toenails can be safely performed at home. However, home treatment is strongly discouraged if an infection is suspected, or for those who have medical conditions that put feet at high risk, such as diabetes, nerve damage in the foot, or poor circulation.  Home care: If you don t have an infection or any of the above medical conditions, you can soak your foot in room-temperature water (adding Epsom s salt may be recommended by your doctor), and gently massage the side of the nail fold to help reduce the inflammation.  Avoid attempting  bathroom surgery.  Repeated cutting of the nail can cause the condition to worsen over time. If your symptoms fail to improve, it s time to see a foot and ankle surgeon.  Physician care: After examining the toe, the foot and ankle surgeon will select the treatment best suited for you. If an infection is present, an oral antibiotic may be prescribed.  Sometimes a minor surgical procedure, often performed in the office, will ease the pain and remove the offending nail. After applying a local anesthetic, the doctor removes part of the nail s side border. Some nails may become ingrown again, requiring removal of the nail root.  Following the nail procedure, a light bandage will be applied. Most people experience very little pain after surgery and may resume normal activity the next day. If your surgeon has prescribed an oral antibiotic, be sure to take all the medication, even if your symptoms have improved.  PREVENTION:  Proper Trimming: Cut toenails in a fairly straight line, and don t cut them too short. You should be able to get your fingernail under the sides and end of the nail.   Well-fitting Footwear: Don t wear shoes that are short or tight in the toe area. Avoid shoes that are loose, because they too cause pressure on the toes, especially when running or walking briskly.     INGROWN TOENAIL REMOVAL AFTERCARE     Go directly home and elevate the  affected foot on one or two pillows for the remainder of the day/evening if possible. Your toe may stay numb anywhere from 2-8 hours.     Take Tylenol, ibuprofen or another anti-inflammatory as needed for pain.     Take antibiotic if that has been prescribed. Finish the entire prescribed antibiotic even if your symptoms have improved.     The evening of the procedure, soak/wash the affected area in warm water (you may add Epsom salt) for 5 to 10 minutes. Do this twice a day for 2-4 weeks (6-8 weeks if you had phenol) (you may count showering/bathing as one soak).  After soaks, pat the area dry and then allow to airdry for a few minutes. Apply antibiotic ointment to the area and cover with 2 X 2 gauze and paper tape or band-aid.    You may pursue everyday activities as tolerated with either an open toe shoe or cut-out shoe as needed or you may wear regular shoes if no pain is noted.    Watch for any signs and symptoms of infection such as: redness, red streaks going up the foot/leg, swelling, pus or foul odor. Those that have had the phenol procedure, the toe will drain longer and will look like it is infected because it is a chemical burn.     Please call with questions.  HAMMERTOES     Hammertoe is a contracture (bending) of one or both joints of the second, third, fourth, or fifth (little) toes. This abnormal bending can put pressure on the toe when wearing shoes, causing problems to develop.  Hammertoes usually start out as mild deformities and get progressively worse over time. In the earlier stages, hammertoes are flexible and the symptoms can often be managed with noninvasive measures. But if left untreated, hammertoes can become more rigid and will not respond to non-surgical treatment.  Because of the progressive nature of hammertoes, they should receive early attention. Hammertoes never get better without some kind of intervention.  Causes  The most common cause of hammertoe is a muscle/tendon imbalance.  This imbalance, which leads to a bending of the toe, results from mechanical (structural) changes in the foot that occur over time in some people.  Hammertoes may be aggravated by shoes that don t fit properly. A hammertoe may result if a toe is too long and is forced into a cramped position when a tight shoe is worn.  Occasionally, hammertoe is the result of an earlier trauma to the toe. In some people, hammertoes are inherited.  Symptoms  Pain or irritation of the affected toe when wearing shoes.   Corns and calluses (a buildup of skin) on the toe, between two toes, or on the ball of the foot. Corns are caused by constant friction against the shoe. They may be soft or hard, depending upon their location.   Inflammation, redness, or a burning sensation   Contracture of the toe   In more severe cases of hammertoe, open sores may form.   Diagnosis  Although hammertoes are readily apparent, to arrive at a diagnosis the foot and ankle surgeon will obtain a thorough history of your symptoms and examine your foot. During the physical examination, the doctor may attempt to reproduce your symptoms by manipulating your foot and will study the contractures of the toes. In addition, the foot and ankle surgeon may take x-rays to determine the degree of the deformities and assess any changes that may have occurred.   Hammertoes are progressive   they don t go away by themselves and usually they will get worse over time. However, not all cases are alike   some hammertoes progress more rapidly than others. Once your foot and ankle surgeon has evaluated your hammertoes, a treatment plan can be developed that is suited to your needs.  Non-surgical Treatment  There is a variety of treatment options for hammertoe. The treatment your foot and ankle surgeon selects will depend upon the severity of your hammertoe and other factors.  A number of non-surgical measures can be undertaken:  Padding corns and calluses. Your foot and ankle  surgeon can provide or prescribe pads designed to shield corns from irritation. If you want to try over-the-counter pads, avoid the medicated types. Medicated pads are generally not recommended because they may contain a small amount of acid that can be harmful. Consult your surgeon about this option.   Changes in shoewear. Avoid shoes with pointed toes, shoes that are too short, or shoes with high heels   conditions that can force your toe against the front of the shoe. Instead, choose comfortable shoes with a deep, roomy toe box and heels no higher than two inches.   Orthotic devices. A custom orthotic device placed in your shoe may help control the muscle/tendon imbalance.   Injection therapy. Corticosteroid injections are sometimes used to ease pain and inflammation caused by hammertoe.   Medications. Oral nonsteroidal anti-inflammatory drugs (NSAIDs), such as ibuprofen, may be recommended to reduce pain and inflammation.   Splinting/strapping. Splints or small straps may be applied by the surgeon to realign the bent toe.   Exercises:   1. The Toe Tap  Stand flat on the ground in your bare feet. Raise all of your toes up off the ground as high as you can. Then starting with the little toes, slowly press them down to the ground. After the big toes are on the ground, start over by raising all of them up off the ground again. This motion is similar to tapping your fingers on a desk. Repeat this ten times.     2. Interlocking your Fingers and Toes  Cross your right foot over your knee and place the fingers of your left hand between your toes. Squeeze your toes together, pinching your fingers between them. Spread the toes apart and squeeze them together again. Repeat this ten times then do the other foot. Like most exercises, this will get easier the more you do it. If you are having a lot of difficulty with this exercise, start with just your index finger between your first and second toe, then later add your middle  finger between your second and third toes, and so on until you can fit all your fingers between your toes. Do this ten times on each foot. Eventually you will be able to spread your toes apart without using your fingers.    3. Gripping the Floor   the floor by pressing the pads of your toes (not the tips) into the floor without curling your toes. Relax and repeat this ten times. If your shoes have the proper amount of depth, you should be able to do this with shoes on.      HAMMERTOE TOE SURGERY   Hammertoe surgery is complex. The surgical procedure is an attempt to help the toe lay in a better position. Nearly every structure in the toe will be cut including the tendons, ligaments, skin and bone. Hammertoes are a complex deformity and final toe position is difficult to predict. The only sure way to position a toe is to fuse all three digital joints. That will not happen as some degree of toe motion is needed for walking. The toe may not be completely reduced as the surrounding skin and other structures may not allow the toe to return to a normal position. The tendons on adjacent toes may need to be cut at the time of the original or subsequent surgeries, as interconnections exist between the toes. The toe may drift after surgery. Stiffness may develop leading to new areas of pressure.   Future shoe choices will be critical in allowing the surgery to provide comfort. The toes will still hurt if shoes rub. The original pain may also persist as other foot problems may be contributing to the current pain. The toe may or may not be pinned in place. External pins would require complete avoidance of water on the foot for six weeks. The pin would be removed about six weeks after the surgery. Strict attention to protection is critical. The pin could get bumped or loosen resulting in early removal. Removal might be necessary before the bone heals which would negatively affect the final surgical outcome and toe  allignment.         Body Mass Index (BMI)  Many things can cause foot and ankle problems. Foot structure, activity level, foot mechanics and injuries are common causes of pain.  One very important issue that often goes unmentioned, is body weight. Extra weight can cause increased stress on muscles, ligaments, bones and tendons.  Sometimes just a few extra pounds is all it takes to put one over her/his threshold. Without reducing that stress, it can be difficult to alleviate pain. Some people are uncomfortable addressing this issue, but we feel it is important for you to think about it. As Foot &  Ankle specialists, our job is addressing the lower extremity problem and possible causes. Regarding extra body weight, we encourage patients to discuss diet and weight management plans with their primary care doctors. It is this team approach that gives you the best opportunity for pain relief and getting you back on your feet.

## 2017-09-19 ENCOUNTER — TELEPHONE (OUTPATIENT)
Dept: FAMILY MEDICINE | Facility: CLINIC | Age: 82
End: 2017-09-19

## 2017-09-19 DIAGNOSIS — Z12.31 ENCOUNTER FOR SCREENING MAMMOGRAM FOR BREAST CANCER: Primary | ICD-10-CM

## 2017-09-19 NOTE — TELEPHONE ENCOUNTER
Reason for Call: Request for an order or referral:    Order or referral being requested: Mammo orders      Date needed: as soon as possible    Has the patient been seen by the PCP for this problem? YES    Additional comments: Please put in orders for a mammogram    Phone number Patient can be reached at:  Home number on file [phone] (home)    Best Time:  any    Can we leave a detailed message on this number?  YES    Call taken on 9/19/2017 at 10:02 AM by Deann Delong

## 2017-09-19 NOTE — TELEPHONE ENCOUNTER
Orders placed     Called # below     Advised on the information above     Patient stated an understanding and agreed with plan.    Ariadna Starks RN, BSN  [facility]

## 2017-09-25 ENCOUNTER — TELEPHONE (OUTPATIENT)
Dept: LAB | Facility: CLINIC | Age: 82
End: 2017-09-25

## 2017-09-25 DIAGNOSIS — M15.0 PRIMARY OSTEOARTHRITIS INVOLVING MULTIPLE JOINTS: ICD-10-CM

## 2017-09-25 NOTE — TELEPHONE ENCOUNTER
Controlled Substance Refill Request for acetaminophen-codeine (TYLENOL #3) 300-30 MG per tablet  Problem List Complete:  Yes    Last Written Prescription Date:  6.26.17  Last Fill Quantity: 90,   # refills: 0    Last Office Visit with [facility] primary care provider: 6.12.17    Clinic visit frequency required:      Future Office visit:   Next 5 appointments (look out 90 days)     Oct 18, 2017 10:40 AM CDT   [facility]    [address] [place] [id number] [phone]                  Controlled substance agreement on file: No.     Processing:  Fax Rx to listed pharmacy     checked in past 6 months?  Yes 5.27.17

## 2017-09-25 NOTE — TELEPHONE ENCOUNTER
Patient was supposed to have a repeat CBC done after 06/12/2017 labs noted low sodium levels.     Called [phone] (home)   Advised of lab - lab only appointment scheduled for 09/28/2017.   Orders extended.     Annika Novak RN  [facility]

## 2017-09-25 NOTE — TELEPHONE ENCOUNTER
Labs on 2017 have  and a letter was sent on 2017. Patient has not followed up. Routing to team to address.    [facility]

## 2017-09-28 ENCOUNTER — TELEPHONE (OUTPATIENT)
Dept: FAMILY MEDICINE | Facility: CLINIC | Age: 82
End: 2017-09-28

## 2017-09-28 DIAGNOSIS — E87.1 LOW SODIUM LEVELS: ICD-10-CM

## 2017-09-28 DIAGNOSIS — N64.4 BREAST PAIN, LEFT: ICD-10-CM

## 2017-09-28 DIAGNOSIS — C34.12 MALIGNANT NEOPLASM OF UPPER LOBE OF LEFT LUNG (H): Primary | ICD-10-CM

## 2017-09-28 LAB
ALBUMIN SERPL-MCNC: 4 G/DL (ref 3.4–5)
ALP SERPL-CCNC: 74 U/L (ref 40–150)
ALT SERPL W P-5'-P-CCNC: 24 U/L (ref 0–50)
ANION GAP SERPL CALCULATED.3IONS-SCNC: 9 MMOL/L (ref 3–14)
AST SERPL W P-5'-P-CCNC: 17 U/L (ref 0–45)
BILIRUB SERPL-MCNC: 0.4 MG/DL (ref 0.2–1.3)
BUN SERPL-MCNC: 33 MG/DL (ref 7–30)
CALCIUM SERPL-MCNC: 9.6 MG/DL (ref 8.5–10.1)
CHLORIDE SERPL-SCNC: 91 MMOL/L (ref 94–109)
CO2 SERPL-SCNC: 24 MMOL/L (ref 20–32)
CREAT SERPL-MCNC: 1.53 MG/DL (ref 0.52–1.04)
ERYTHROCYTE [DISTWIDTH] IN BLOOD BY AUTOMATED COUNT: 12.2 % (ref 10–15)
GFR SERPL CREATININE-BSD FRML MDRD: 33 ML/MIN/1.7M2
GLUCOSE SERPL-MCNC: 107 MG/DL (ref 70–99)
HCT VFR BLD AUTO: 32.3 % (ref 35–47)
HGB BLD-MCNC: 11 G/DL (ref 11.7–15.7)
MCH RBC QN AUTO: 30.8 PG (ref 26.5–33)
MCHC RBC AUTO-ENTMCNC: 34.1 G/DL (ref 31.5–36.5)
MCV RBC AUTO: 91 FL (ref 78–100)
PLATELET # BLD AUTO: 368 10E9/L (ref 150–450)
POTASSIUM SERPL-SCNC: 4.8 MMOL/L (ref 3.4–5.3)
PROT SERPL-MCNC: 7.4 G/DL (ref 6.8–8.8)
RBC # BLD AUTO: 3.57 10E12/L (ref 3.8–5.2)
SODIUM SERPL-SCNC: 124 MMOL/L (ref 133–144)
WBC # BLD AUTO: 11.5 10E9/L (ref 4–11)

## 2017-09-28 PROCEDURE — 36415 COLL VENOUS BLD VENIPUNCTURE: CPT | Performed by: FAMILY MEDICINE

## 2017-09-28 PROCEDURE — 80053 COMPREHEN METABOLIC PANEL: CPT | Performed by: FAMILY MEDICINE

## 2017-09-28 PROCEDURE — 85027 COMPLETE CBC AUTOMATED: CPT | Performed by: FAMILY MEDICINE

## 2017-09-28 NOTE — TELEPHONE ENCOUNTER
Diagnostic mammogram pending.  Will route to TS/pool for review and to sign.    MACIE Sullivan, RN, PHN  [facility]) [phone]

## 2017-09-28 NOTE — TELEPHONE ENCOUNTER
Reason for Call: Request for an order or referral:    Order or referral being requested: Diagnostic Mammogram    Date needed: as soon as possible    Has the patient been seen by the PCP for this problem? Not Applicable    Additional comments: Patient was scheduled on 9/28/17 for a mammo with the mobile unit here in [place]. Mai from the mobile mammo states that this patient [facility] due to her stating she has left breast pain and has had previous lung cancer.  Please let patient know when order is done and she will schedule appointment.    Phone number Patient can be reached at:  Home number on file [phone] (home)    Best Time:  any    Can we leave a detailed message on this number?  YES    Call taken on 9/28/2017 at 10:21 AM by Macy Valle

## 2017-10-02 ENCOUNTER — OFFICE VISIT (OUTPATIENT)
Dept: FAMILY MEDICINE | Facility: CLINIC | Age: 82
End: 2017-10-02
Payer: MEDICARE

## 2017-10-02 VITALS
HEIGHT: 61 IN | WEIGHT: 161.4 LBS | TEMPERATURE: 97.6 F | HEART RATE: 70 BPM | OXYGEN SATURATION: 96 % | SYSTOLIC BLOOD PRESSURE: 163 MMHG | BODY MASS INDEX: 30.47 KG/M2 | DIASTOLIC BLOOD PRESSURE: 71 MMHG

## 2017-10-02 DIAGNOSIS — C34.12 MALIGNANT NEOPLASM OF UPPER LOBE OF LEFT LUNG (H): ICD-10-CM

## 2017-10-02 DIAGNOSIS — D63.1 ANEMIA IN STAGE 4 CHRONIC KIDNEY DISEASE (H): ICD-10-CM

## 2017-10-02 DIAGNOSIS — I10 HYPERTENSION GOAL BP (BLOOD PRESSURE) < 140/90: ICD-10-CM

## 2017-10-02 DIAGNOSIS — Z23 NEED FOR PROPHYLACTIC VACCINATION AND INOCULATION AGAINST INFLUENZA: ICD-10-CM

## 2017-10-02 DIAGNOSIS — G63 POLYNEUROPATHY ASSOCIATED WITH UNDERLYING DISEASE (H): ICD-10-CM

## 2017-10-02 DIAGNOSIS — N18.4 ANEMIA IN STAGE 4 CHRONIC KIDNEY DISEASE (H): ICD-10-CM

## 2017-10-02 DIAGNOSIS — N18.4 CKD (CHRONIC KIDNEY DISEASE) STAGE 4, GFR 15-29 ML/MIN (H): ICD-10-CM

## 2017-10-02 DIAGNOSIS — Z51.81 MEDICATION MONITORING ENCOUNTER: ICD-10-CM

## 2017-10-02 DIAGNOSIS — J44.9 COPD, MODERATE (H): ICD-10-CM

## 2017-10-02 DIAGNOSIS — E87.1 HYPONATREMIA: Primary | ICD-10-CM

## 2017-10-02 DIAGNOSIS — R73.09 ABNORMAL GLUCOSE: ICD-10-CM

## 2017-10-02 DIAGNOSIS — I87.8 VENOUS STASIS OF LOWER EXTREMITY: ICD-10-CM

## 2017-10-02 PROBLEM — Z71.89 ADVANCED DIRECTIVES, COUNSELING/DISCUSSION: Status: ACTIVE | Noted: 2017-10-02

## 2017-10-02 LAB
ANION GAP SERPL CALCULATED.3IONS-SCNC: 9 MMOL/L (ref 3–14)
BUN SERPL-MCNC: 40 MG/DL (ref 7–30)
CALCIUM SERPL-MCNC: 9.9 MG/DL (ref 8.5–10.1)
CHLORIDE SERPL-SCNC: 94 MMOL/L (ref 94–109)
CO2 SERPL-SCNC: 25 MMOL/L (ref 20–32)
CREAT SERPL-MCNC: 1.56 MG/DL (ref 0.52–1.04)
ERYTHROCYTE [DISTWIDTH] IN BLOOD BY AUTOMATED COUNT: 12.5 % (ref 10–15)
GFR SERPL CREATININE-BSD FRML MDRD: 32 ML/MIN/1.7M2
GLUCOSE SERPL-MCNC: 94 MG/DL (ref 70–99)
HBA1C MFR BLD: 5.6 % (ref 4.3–6)
HCT VFR BLD AUTO: 33.1 % (ref 35–47)
HGB BLD-MCNC: 11.3 G/DL (ref 11.7–15.7)
MCH RBC QN AUTO: 31 PG (ref 26.5–33)
MCHC RBC AUTO-ENTMCNC: 34.1 G/DL (ref 31.5–36.5)
MCV RBC AUTO: 91 FL (ref 78–100)
PLATELET # BLD AUTO: 345 10E9/L (ref 150–450)
POTASSIUM SERPL-SCNC: 5.2 MMOL/L (ref 3.4–5.3)
RBC # BLD AUTO: 3.65 10E12/L (ref 3.8–5.2)
SODIUM SERPL-SCNC: 128 MMOL/L (ref 133–144)
WBC # BLD AUTO: 10.8 10E9/L (ref 4–11)

## 2017-10-02 PROCEDURE — 85027 COMPLETE CBC AUTOMATED: CPT | Performed by: FAMILY MEDICINE

## 2017-10-02 PROCEDURE — G0008 ADMIN INFLUENZA VIRUS VAC: HCPCS | Performed by: FAMILY MEDICINE

## 2017-10-02 PROCEDURE — 36415 COLL VENOUS BLD VENIPUNCTURE: CPT | Performed by: FAMILY MEDICINE

## 2017-10-02 PROCEDURE — 80048 BASIC METABOLIC PNL TOTAL CA: CPT | Performed by: FAMILY MEDICINE

## 2017-10-02 PROCEDURE — 99214 OFFICE O/P EST MOD 30 MIN: CPT | Mod: 25 | Performed by: FAMILY MEDICINE

## 2017-10-02 PROCEDURE — 83036 HEMOGLOBIN GLYCOSYLATED A1C: CPT | Performed by: FAMILY MEDICINE

## 2017-10-02 PROCEDURE — 90662 IIV NO PRSV INCREASED AG IM: CPT | Performed by: FAMILY MEDICINE

## 2017-10-02 ASSESSMENT — ANXIETY QUESTIONNAIRES
7. FEELING AFRAID AS IF SOMETHING AWFUL MIGHT HAPPEN: NOT AT ALL
5. BEING SO RESTLESS THAT IT IS HARD TO SIT STILL: NOT AT ALL
2. NOT BEING ABLE TO STOP OR CONTROL WORRYING: NOT AT ALL
3. WORRYING TOO MUCH ABOUT DIFFERENT THINGS: NOT AT ALL
IF YOU CHECKED OFF ANY PROBLEMS ON THIS QUESTIONNAIRE, HOW DIFFICULT HAVE THESE PROBLEMS MADE IT FOR YOU TO DO YOUR WORK, TAKE CARE OF THINGS AT HOME, OR GET ALONG WITH OTHER PEOPLE: NOT DIFFICULT AT ALL
GAD7 TOTAL SCORE: 3
6. BECOMING EASILY ANNOYED OR IRRITABLE: NEARLY EVERY DAY
1. FEELING NERVOUS, ANXIOUS, OR ON EDGE: NOT AT ALL

## 2017-10-02 ASSESSMENT — PATIENT HEALTH QUESTIONNAIRE - PHQ9
5. POOR APPETITE OR OVEREATING: NOT AT ALL
SUM OF ALL RESPONSES TO PHQ QUESTIONS 1-9: 4

## 2017-10-02 NOTE — MR AVS SNAPSHOT
After Visit Summary   10/2/2017    Anat Correa    MRN: [id number]           Patient Information     Date Of Birth          1935        Visit Information        Provider Department      10/2/2017 9:20 AM Rashi Calle MD [facility]  [place]        Today's Diagnoses     Hyponatremia    -  1    CKD (chronic kidney disease) stage 4, GFR 15-29 ml/min (H)        Hypertension goal BP (blood pressure) < 140/90        Malignant neoplasm of upper lobe of left lung (H)        Polyneuropathy associated with underlying disease (H)        Venous stasis of lower extremity        Anemia in stage 4 chronic kidney disease (H)        COPD, moderate        Abnormal glucose        Medication monitoring encounter        Need for prophylactic vaccination and inoculation against influenza          Care Instructions    Zyrtec 10 mg for nose dripping    Follow up for blood pressure checks with pharmacy      [facility] [place]                        To reach your care team during and after hours:   [phone]  To reach our pharmacy:        [phone]    Clinic Hours                        Our clinic hours are:    Monday   7:30 am to 7:00 pm                  Tuesday through Friday 7:30 am to 5:00 pm                             Saturday   8:00 am to 12:00 pm      Sunday   Closed      Pharmacy Hours                        Our pharmacy hours are:    Monday   8:30 am to 7:00 pm       Tuesday to Friday  8:30 am to 6:00 pm                       Saturday    9:00 am to 1:00 pm              Sunday    Closed              There is also information available at our web site:  [email or web address]    If your provider ordered any lab tests and you do not receive the results within 10 business days, please call the clinic.    If you need a medication refill please contact your pharmacy.  Please allow 2-3 business days for your refill to be completed.    Our clinic offers telephone visits and e visits.  Please ask  one of your team members to explain more.      Use [employer] (secure email communication and access to your chart) to send your primary care provider a message or make an appointment. Ask someone on your Team how to sign up for [email or web address].  Immunizations                      Immunization History   Administered Date(s) Administered     Influenza (High Dose) 3 valent vaccine 10/07/2010, 09/19/2012, 11/04/2013, 10/08/2014, 11/03/2015, 09/16/2016     Influenza (IIV3) 10/19/2004, 11/15/2005     Pneumococcal (PCV 13) 11/03/2015     Pneumococcal 23 valent 04/25/2006     TD (ADULT, 7+) 03/03/1998, 01/23/2006     TDAP Vaccine (Boostrix) 08/27/2012     Zoster vaccine, live 10/24/2012        Health Maintenance                         Health Maintenance Due   Topic Date Due     URINE DRUG SCREEN Q1 YR  01/25/1950     Medicare Annual Wellness Visit  11/21/2015     Wellness Visit with your Primary Provider - yearly  11/21/2015     Osteoporosis Screening (Dexa) - every 3 years   01/11/2016     COPD ACTION PLAN Q1 YR  06/01/2016     Discuss Advance Directive Planning  07/15/2016     Cholesterol Lab - yearly  03/09/2017     Microalbumin Lab - yearly  03/09/2017     FALL RISK ASSESSMENT  05/20/2017     WILLIAM QUESTIONNAIRE 1 YEAR  07/16/2017     Depression Assessment - yearly  07/16/2017     Flu Vaccine - yearly  09/01/2017     Mammogram - yearly  09/12/2017               Follow-ups after your visit        Your next 10 appointments already scheduled     Oct 04, 2017 10:30 AM CDT   MA DIAGNOSTIC DIGITAL BILATERAL with [id number]   [facility] ([facility])    [address] [facility], [address]  [facility] [id number] [phone]           Do not use any powder, lotion or deodorant under your arms or on your breast. If you do, we will ask you to remove it before your exam.  Wear comfortable, two-piece clothing.  If you have any allergies, tell your care team.  Bring any previous mammograms from other facilities or have  "them mailed to the breast center.  Three-dimensional (3D) mammograms are available at [facility] locations in [place], [place], [place], [place], [facility], [place], [place], and [place]. -Health locations include [place] and [facility] [place] [facility] in [place]. Benefits of 3D mammograms include: - Improved rate of cancer detection - Decreases your chance of having to go back for more tests, which means fewer: - \"False-positive\" results (This means that there is an abnormal area but it isn't cancer.) - Invasive testing procedures, such as a biopsy or surgery - Can provide clearer images of the breast if you have dense breast tissue. 3D mammography is an optional exam that anyone can have with a 2D mammogram. It doesn't replace or take the place of a 2D mammogram. 2D mammograms remain an effective screening test for all women.  Not all insurance companies cover the cost of a 3D mammogram. Check with your insurance.            Oct 04, 2017 11:00 AM CDT   US BREAST LEFT LIMITED 1-3 QUAD with [email or web address]   [facility] ([facility] [facility])    [address] [facility], [address]  [facility] [address] [phone]           Please bring a list of your medicines (including vitamins, minerals and over-the-counter drugs). Also, tell your doctor about any allergies you may have. Wear comfortable clothes and leave your valuables at home.  You do not need to do anything special to prepare for your exam.  Please call the Imaging Department at your exam site with any questions.            Oct 18, 2017 10:40 AM CDT   Office Visit with Rashi Calle MD   [facility] ([facility]    [address] [place] [address] [phone]           Bring a current list of meds and any records pertaining to this visit. For Physicals, please bring immunization records and any forms needing to be filled out. Please arrive 10 minutes early to complete " "paperwork.              Who to contact     If you have questions or need follow up information about today's clinic visit or your schedule please contact [facility] directly at [phone].  Normal or non-critical lab and imaging results will be communicated to you by MyChart, letter or phone within 4 business days after the clinic has received the results. If you do not hear from us within 7 days, please contact the clinic through MyChart or phone. If you have a critical or abnormal lab result, we will notify you by phone as soon as possible.  Submit refill requests through [employer] or call your pharmacy and they will forward the refill request to us. Please allow 3 business days for your refill to be completed.          Additional Information About Your Visit        [facility] Information     [employer] lets you send messages to your doctor, view your test results, renew your prescriptions, schedule appointments and more. To sign up, go to [email or web address] . Click on \"Log in\" on the left side of the screen, which will take you to the Welcome page. Then click on \"Sign up Now\" on the right side of the page.     You will be asked to enter the access code listed below, as well as some personal information. Please follow the directions to create your username and password.     Your access code is: [email or web address]  Expires: 2017  1:37 PM     Your access code will  in 90 days. If you need help or a new code, please call your [place] clinic or [phone].        Care EveryWhere ID     This is your Care EveryWhere ID. This could be used by other organizations to access your [place] medical records  [id number]        Your Vitals Were     Pulse Temperature Height Pulse Oximetry Breastfeeding? BMI (Body Mass Index)    70 97.6  F (36.4  C) (Oral) 5' 1\" (1.549 m) 96% No 30.5 kg/m2       Blood Pressure from Last 3 Encounters:   10/02/17 163/71   17 170/80   17 160/68    Weight from " Last 3 Encounters:   10/02/17 161 lb 6.4 oz (73.2 kg)   09/11/17 165 lb (74.8 kg)   06/12/17 165 lb (74.8 kg)              We Performed the Following     Basic metabolic panel  (Ca, Cl, CO2, Creat, Gluc, K, Na, BUN)     CBC with platelets     FLU VACCINE, INCREASED ANTIGEN, PRESV FREE     Hemoglobin A1c          Today's Medication Changes          These changes are accurate as of: 10/2/17  9:49 AM.  If you have any questions, ask your nurse or doctor.               Stop taking these medicines if you haven't already. Please contact your care team if you have questions.     fluticasone-vilanterol 200-25 MCG/INH oral inhaler   Commonly known as:  BREO ELLIPTA   Stopped by:  Rashi Calle MD                    Primary Care Provider Office Phone # Fax #    Rashi Calle [phone] [address] [facility] [address]        Equal Access to Services     [facility] AH: Hadii aad ku hadasho Soomaali, waaxda luqadaha, qaybta kaalmada adeegyada, waxay idiin hayaan christiana khararajendra farias . So [facility] [phone].    ATENCIÓN: Si jorden espdevonte, tiene a [place] disposición servicios gratuitos de asistencia lingüística. Llame al [phone].    We comply with applicable federal civil rights laws and [place] laws. We do not discriminate on the basis of race, color, national origin, age, disability, sex, sexual orientation, or gender identity.            Thank you!     Thank you for choosing [facility]  for your care. Our goal is always to provide you with excellent care. Hearing back from our patients is one way we can continue to improve our services. Please take a few minutes to complete the written survey that you may receive in the mail after your visit with us. Thank you!             Your Updated Medication List - Protect others around you: Learn how to safely use, store and throw away your medicines at www.disposemymeds.org.          This list is accurate as of: 10/2/17  9:49 AM.   Always use your most recent med list.                   Brand Name Dispense Instructions for use Diagnosis    acetaminophen-codeine 300-30 MG per tablet    TYLENOL #3    90 tablet    Take 1-2 tablets by mouth every 6 hours as needed for moderate pain    Primary osteoarthritis involving multiple joints       albuterol 108 (90 BASE) MCG/ACT Inhaler    PROAIR HFA/PROVENTIL HFA/VENTOLIN HFA    3 Inhaler    Inhale 2 puffs into the lungs every 6 hours as needed for shortness of breath / dyspnea    COPD, moderate (H)       amLODIPine 5 MG tablet    NORVASC    90 tablet    Take 1 tablet (5 mg) by mouth daily    Hypertension goal BP (blood pressure) < 140/90, CKD (chronic kidney disease) stage 4, GFR 15-29 ml/min (H)       budesonide-formoterol 160-4.5 MCG/ACT Inhaler    SYMBICORT    10.2 g    Inhale 2 puffs into the lungs 2 times daily    COPD, moderate (H)       calcium carbonate 500 MG chewable tablet    TUMS     Take 2 chew tab by mouth daily as needed for heartburn        furosemide 20 MG tablet    LASIX    180 tablet    40 mg in am    Hypertension goal BP (blood pressure) < 140/90, CKD (chronic kidney disease) stage 4, GFR 15-29 ml/min (H)       IBUPROFEN PO      Take 400 mg by mouth every 6 hours as needed for moderate pain        ipratropium - albuterol 0.5 mg/2.5 mg/3 mL 0.5-2.5 (3) MG/3ML neb solution    DUONEB    270 mL    NEBULIZE CONTENTS OF ONE VIAL EVERY 6 HOURS AS NEEDED FOR SHORTNESS OF BREATH/ DYSPNEA OR WHEEZING    COPD, moderate (H)       losartan 50 MG tablet    COZAAR    90 tablet    Take 1 tablet (50 mg) by mouth daily    Hypertension goal BP (blood pressure) < 140/90, CKD (chronic kidney disease) stage 4, GFR 15-29 ml/min (H)       metoprolol 50 MG 24 hr tablet    TOPROL-XL    180 tablet    Take 1 tablet (50 mg) by mouth 2 times daily (2 x 50mg = 100mg)    Hypertension goal BP (blood pressure) < 140/90

## 2017-10-02 NOTE — PATIENT INSTRUCTIONS
Zyrtec 10 mg for nose dripping    Follow up for blood pressure checks with pharmacy      [facility]                        To reach your care team during and after hours:   [phone]  To reach our pharmacy:        [phone]    Clinic Hours                        Our clinic hours are:    Monday   7:30 am to 7:00 pm                  Tuesday through Friday 7:30 am to 5:00 pm                             Saturday   8:00 am to 12:00 pm      Sunday   Closed      Pharmacy Hours                        Our pharmacy hours are:    Monday   8:30 am to 7:00 pm       Tuesday to Friday  8:30 am to 6:00 pm                       Saturday    9:00 am to 1:00 pm              Sunday    Closed              There is also information available at our web site:  [email or web address]    If your provider ordered any lab tests and you do not receive the results within 10 business days, please call the clinic.    If you need a medication refill please contact your pharmacy.  Please allow 2-3 business days for your refill to be completed.    Our clinic offers telephone visits and e visits.  Please ask one of your team members to explain more.      Use "[employer] (secure email communication and access to your chart) to send your primary care provider a message or make an appointment. Ask someone on your Team how to sign up for "[employer]  Immunizations                      Immunization History   Administered Date(s) Administered     Influenza (High Dose) 3 valent vaccine 10/07/2010, 09/19/2012, 11/04/2013, 10/08/2014, 11/03/2015, 09/16/2016     Influenza (IIV3) 10/19/2004, 11/15/2005     Pneumococcal (PCV 13) 11/03/2015     Pneumococcal 23 valent 04/25/2006     TD (ADULT, 7+) 03/03/1998, 01/23/2006     TDAP Vaccine (Boostrix) 08/27/2012     Zoster vaccine, live 10/24/2012        Health Maintenance                         Health Maintenance Due   Topic Date Due     URINE DRUG SCREEN Q1 YR  01/25/1950     Medicare Annual Wellness  Visit  11/21/2015     Wellness Visit with your Primary Provider - yearly  11/21/2015     Osteoporosis Screening (Dexa) - every 3 years   01/11/2016     COPD ACTION PLAN Q1 YR  06/01/2016     Discuss Advance Directive Planning  07/15/2016     Cholesterol Lab - yearly  03/09/2017     Microalbumin Lab - yearly  03/09/2017     FALL RISK ASSESSMENT  05/20/2017     WILLIAM QUESTIONNAIRE 1 YEAR  07/16/2017     Depression Assessment - yearly  07/16/2017     Flu Vaccine - yearly  09/01/2017     Mammogram - yearly  09/12/2017

## 2017-10-02 NOTE — PROGRESS NOTES
SUBJECTIVE:   Anat Correa is a 82 year old female who presents to clinic today for the following health issues:    Recheck -- labs on 09/28/2017, coming in today to check on low sodium at 124 - see below.     Anxiety/Depression -- Well controlled.     PHQ-9: 4  WILLIAM-7: 3    HTN/CKD stage 4 -- Metoprolol 50 mg twice daily, Losartan 50 mg daily, Lasix 40 mg daily, Amlodipine 5 mg daily.     BP Readings from Last 3 Encounters:   10/02/17 163/71   09/11/17 170/80   08/29/17 160/68     Creatinine   Date Value Ref Range Status   09/28/2017 1.53 (H) 0.52 - 1.04 mg/dL Final     GFR Estimate   Date Value Ref Range Status   09/28/2017 33 (L) >60 mL/min/1.7m2 Final     Comment:     Non  GFR Calc   06/12/2017 35 (L) >60 mL/min/1.7m2 Final     Comment:     Non  GFR Calc   06/02/2017 22 (L) >60 mL/min/1.7m2 Final     Comment:     Non  GFR Calc     Lung Cancer -- Followed by Oncology (Jay) - she has scans scheduled.    Breast Pain -- Anat stated she needs to follow up for Mammogram due to left breast pain, was not able to be seen by mobile mammogram screening.      Ankle Pain -- Bilateral pain, right is much more significant that left. She complained of venous stasis changes to skin as well.     COPD -- Anat reported worse sx with hot/humid weather - for which she uses Symbicort.     OA -- Anat uses ibuprofen as directed by outside provider.     Eyes/Rhinitis -- Anat reported that she receives injections, which cause her nose to run.       Problem list and histories reviewed & adjusted, as indicated.  Additional history: as documented    Reviewed and updated as needed this visit by clinical staffTobacco  Allergies  Meds  Med Hx  Surg Hx  Fam Hx  Soc Hx      Reviewed and updated as needed this visit by Provider         BP Readings from Last 3 Encounters:   10/02/17 163/71   09/11/17 170/80   08/29/17 160/68     Wt Readings from Last 4 Encounters:    10/02/17 161 lb 6.4 oz (73.2 kg)   09/11/17 165 lb (74.8 kg)   06/12/17 165 lb (74.8 kg)   06/02/17 166 lb (75.3 kg)       Health Maintenance    Health Maintenance Due   Topic Date Due     URINE DRUG SCREEN Q1 YR  01/25/1950     MEDICARE ANNUAL WELLNESS VISIT  11/21/2015     WELLNESS VISIT Q1 YR  11/21/2015     DEXA Q3 YR  01/11/2016     COPD ACTION PLAN Q1 YR  06/01/2016     ADVANCE DIRECTIVE PLANNING Q5 YRS  07/15/2016     LIPID MONITORING Q1 YEAR  03/09/2017     MICROALBUMIN Q1 YEAR  03/09/2017     FALL RISK ASSESSMENT  05/20/2017     WILLIAM QUESTIONNAIRE 1 YEAR  07/16/2017     PHQ-9 Q1YR  07/16/2017     INFLUENZA VACCINE (SYSTEM ASSIGNED)  09/01/2017     MAMMO Q1 YR  09/12/2017       Current Problem List    Patient Active Problem List   Diagnosis     History of colonic polyps     Calculus of kidney     Lesion of plantar nerve     Osteoporosis     Primary iridocyclitis     Anemia     Hyperlipidemia LDL goal <100     OA (osteoarthritis)     Advanced directives, counseling/discussion     Hypertension goal BP (blood pressure) < 140/90     COPD, moderate     Medication management     Vitamin D deficiency     CKD (chronic kidney disease) stage 4, GFR 15-29 ml/min (H)     Anemia in chronic renal disease     Secondary renal hyperparathyroidism (H)     Venous stasis of lower extremity     Peripheral neuropathy (H)     Chronic pain     Lung nodule     Nodule of left lung     Malignant neoplasm of upper lobe of left lung (H)     Acute pain of right shoulder     S/P amputation of lesser toe, unspecified laterality (H)     Retinal hemorrhage of right eye       Past Medical History    Past Medical History:   Diagnosis Date     Anemia      Calculus of kidney 1998    dr hope     Chronic pain     OA     Chronic pain      CKD (chronic kidney disease) stage 4, GFR 15-29 ml/min (H) 2008    dr mcdermott     COPD, moderate (H) 2004    moderate obstruction, with pulm htn - dr francisco did AAP     Diverticulosis of colon (without mention of  hemorrhage)      Hemorrhage of gastrointestinal tract, unspecified     dr su     Hyperlipidemia LDL goal <100      Hypertension goal BP (blood pressure) < 140/90      Internal hemorrhoids without mention of complication      Irritable bowel syndrome      Lesion of plantar nerve     hay's neuroma dr connor     Lung nodule , 3/16    Dr Thompson, 1.4 x 1.1 cm, lingula, PET neg 3/16     Malignant neoplasm of upper lobe of left lung (H)     Dr Thompson - x 2 nodules - moderately differentiated adenocarcinoma (acinar predominant).  Final pathologic stage [id number], Final clinical stage IA, grade 2     Medication management     OA - 1 T#3 at HS with HX CKD     OA (osteoarthritis)     lower ext worse katya knees     Obesity (BMI 30.0-34.9)      Osteoporosis, unspecified     FOSAMAX  = upset stomach , pt declines further rx.      Other ulcerative colitis     collangenous collitis- last colonoscopy       Peripheral neuropathy (H)     early - burning at HS     Personal history of colonic polyps     dr araujo     PONV (postoperative nausea and vomiting)      Primary iridocyclitis     iritis dr dominguez     Venous stasis of lower extremity        Past Surgical History    Past Surgical History:   Procedure Laterality Date     AMPUTATE TOE(S) Right 2015    Procedure: AMPUTATE TOE(S);  Surgeon: Ashia White, DPM, Pod;  Location: [facility] OR     C APPENDECTOMY       C  DELIVERY ONLY  1965    , Low Cervical     EXCISE MASS UPPER EXTREMITY  10/13/2011    Lt Forearm mass excision - dr ely     EXCISE MASS UPPER EXTREMITY  2012    Lt Forearm mass excision - dr ely     HC COLONOSCOPY THRU STOMA, DIAGNOSTIC      IBS/diverticula/hemmorhoids dr araujo     HC ESOPHAGOSCOPY, DIAGNOSTIC  , , 6/10    Gastric ulcer, healed, gastritis     HC HEMORRHOIDECTOMY,INT/EXT,SIMPLE       HC REPAIR SLIDING INGUINAL HERNIA      [facility]     SURGICAL  HISTORY OF -   1970    [facility] neck & back tumors     SURGICAL HISTORY OF -   9/06    neuroma excision - 2nd toe amputation     SURGICAL HISTORY OF -   3/07    Rt IOL - dr jimenez     SURGICAL HISTORY OF -   4/07    Lt IOL - dr jimenez     SURGICAL HISTORY OF -   9/04    Lt Knee replacement - dr gayle     SURGICAL HISTORY OF -   9/09    Rt Knee replacement - dr gayle     SURGICAL HISTORY OF -   9/15    Rt Second toe amputation - Dr White     THORACOSCOPIC WEDGE RESECTION LUNG Left 7/12/2016    Procedure: THORACOSCOPIC WEDGE RESECTION LUNG;  Surgeon: Abdelrahman Thompson MD;  Location: [facility] OR     XR JOINT INJECTION HIP (HIB)  2/2015    Rt - Dr Terry       Current Medications    Current Outpatient Prescriptions   Medication Sig Dispense Refill     acetaminophen-codeine (TYLENOL #3) 300-30 MG per tablet Take 1-2 tablets by mouth every 6 hours as needed for moderate pain 90 tablet 0     budesonide-formoterol (SYMBICORT) 160-4.5 MCG/ACT Inhaler Inhale 2 puffs into the lungs 2 times daily 10.2 g 11     metoprolol (TOPROL-XL) 50 MG 24 hr tablet Take 1 tablet (50 mg) by mouth 2 times daily (2 x 50mg = 100mg) 180 tablet 3     ipratropium - albuterol 0.5 mg/2.5 mg/3 mL (DUONEB) 0.5-2.5 (3) MG/3ML neb solution NEBULIZE CONTENTS OF ONE VIAL EVERY 6 HOURS AS NEEDED FOR SHORTNESS OF BREATH/ DYSPNEA OR WHEEZING 270 mL 3     losartan (COZAAR) 50 MG tablet Take 1 tablet (50 mg) by mouth daily 90 tablet 3     furosemide (LASIX) 20 MG tablet 40 mg in am 180 tablet 3     amLODIPine (NORVASC) 5 MG tablet Take 1 tablet (5 mg) by mouth daily 90 tablet 3     albuterol (PROAIR HFA, PROVENTIL HFA, VENTOLIN HFA) 108 (90 BASE) MCG/ACT inhaler Inhale 2 puffs into the lungs every 6 hours as needed for shortness of breath / dyspnea 3 Inhaler 3     IBUPROFEN PO Take 400 mg by mouth every 6 hours as needed for moderate pain       calcium carbonate (TUMS) 500 MG chewable tablet Take 2 chew tab by mouth daily as needed for heartburn          Allergies    Allergies   Allergen Reactions     Prednisone      Yeast infection - swollen lips       Immunizations    Immunization History   Administered Date(s) Administered     Influenza (High Dose) 3 valent vaccine 10/07/2010, 09/19/2012, 11/04/2013, 10/08/2014, 11/03/2015, 09/16/2016, 10/02/2017     Influenza (IIV3) 10/19/2004, 11/15/2005     Pneumococcal (PCV 13) 11/03/2015     Pneumococcal 23 valent 04/25/2006     TD (ADULT, 7+) 03/03/1998, 01/23/2006     TDAP Vaccine (Boostrix) 08/27/2012     Zoster vaccine, live 10/24/2012       Family History    Family History   Problem Relation Age of Onset     CEREBROVASCULAR DISEASE Mother      CEREBROVASCULAR DISEASE Father      Cancer - colorectal Brother      Cancer - colorectal Brother      Breast Cancer Sister      CANCER Sister      lung     CANCER Sister      lung     CANCER Sister      lung     Scleroderma Sister        Social History    Social History     Social History     Marital status:      Spouse name: thanh     Number of children: 1     Years of education: 12     Occupational History     part-time [employer] at [employer] Main Topics     Smoking status: Former Smoker     Packs/day: 3.00     Years: 50.00     Types: Cigarettes     Quit date: 12/21/1993     Smokeless tobacco: Never Used      Comment: quit 1993     Alcohol use No     Drug use: No      Comment: no herbal meds either      Sexual activity: Not Currently      Comment:  for 24 years      Other Topics Concern     Caffeine Concern Yes     1 pot  qd - switched to decaff now     Special Diet Yes     Low salt     Exercise No     Seat Belt Yes     Self-Exams Yes     SBE encouraged motnhly      Parent/Sibling W/ Cabg, Mi Or Angioplasty Before 65f 55m? No     Social History Narrative    calcium - 3 large dairy servings/day - pt declines fosamax and other meds for her osteoporosis    flex sig/colonoscopy -last colonoscopy 7/03     sun precautions - discussed   "   mammogram - hasn't had one since 2001 at least - ordered     Td booster - 1/23/06    pneumovax -today 4/25/06    DEXA - pt declines repeat scan today 4/25/06 despite her osteoporosis     stool hemoccults - every year after age 40    ASA- easy bruising - can't take     mulvitamin - encouraged       All above reviewed and updated, all stable unless otherwise noted    Recent labs reviewed    ROS:  Constitutional, HEENT, cardiovascular, pulmonary, GI, , musculoskeletal, neuro, skin, endocrine and psych systems are negative, except as in HPI or otherwise noted     This document serves as a record of the services and decisions personally performed and made by Rashi Calle MD [facility]. It was created on their behalf by Dougie Osborne, a trained medical scribe. The creation of this document is based the provider's statements to the medical scribe.  Dougie Osborne October 2, 2017 9:33 AM    OBJECTIVE:                                                    /71 (BP Location: Left arm, Patient Position: Chair, Cuff Size: Adult Regular)  Pulse 70  Temp 97.6  F (36.4  C) (Oral)  Ht 5' 1\" (1.549 m)  Wt 161 lb 6.4 oz (73.2 kg)  SpO2 96%  Breastfeeding? No  BMI 30.5 kg/m2  Body mass index is 30.5 kg/(m^2).  GENERAL: healthy, alert and no distress, obese  HENT: ear canals and TM's normal upon viewing with otoscope, nose and mouth without ulcers or lesions upon viewing with otoscope  RESP: lungs clear to auscultation - no rales, no rhonchi, no wheezes  CV: regular rates and rhythm, normal S1 S2, no S3 or S4 and no murmur, no click or rub -  ABDOMEN: soft, no tenderness, no  hepatosplenomegaly, no masses, normal bowel sounds  MS: bilateral LE's tender to palpation, trace edema noted  SKIN: venous stasis changes noted to bilateral LE's, otherwise no suspicious lesions, no rashes to visible skin  NEURO: mentation intact and speech normal  BACK: no CVA tenderness, no paralumbar tenderness  PSYCH: Alert and oriented times 3; speech- " coherent , normal rate and volume; able to articulate logical thoughts, able to abstract reason, no tangential thoughts, no hallucinations or delusions, affect- normal    DIAGNOSTICS/PROCEDURES:                                                      Results for orders placed or performed in visit on 10/02/17   CBC with platelets   Result Value Ref Range    WBC 10.8 4.0 - 11.0 10e9/L    RBC Count 3.65 (L) 3.8 - 5.2 10e12/L    Hemoglobin 11.3 (L) 11.7 - 15.7 g/dL    Hematocrit 33.1 (L) 35.0 - 47.0 %    MCV 91 78 - 100 fl    MCH 31.0 26.5 - 33.0 pg    MCHC 34.1 31.5 - 36.5 g/dL    RDW 12.5 10.0 - 15.0 %    Platelet Count 345 150 - 450 10e9/L   Hemoglobin A1c   Result Value Ref Range    Hemoglobin A1C 5.6 4.3 - 6.0 %        ASSESSMENT/PLAN:                                                        ICD-10-CM    1. Hyponatremia E87.1 Basic metabolic panel  (Ca, Cl, CO2, Creat, Gluc, K, Na, BUN)   2. CKD (chronic kidney disease) stage 4, GFR 15-29 ml/min (H) N18.4 Basic metabolic panel  (Ca, Cl, CO2, Creat, Gluc, K, Na, BUN)     CBC with platelets   3. Hypertension goal BP (blood pressure) < 140/90 I10 Basic metabolic panel  (Ca, Cl, CO2, Creat, Gluc, K, Na, BUN)   4. Malignant neoplasm of upper lobe of left lung (H) C34.12    5. Polyneuropathy associated with underlying disease (H) G63    6. Venous stasis of lower extremity I87.8    7. Anemia in stage 4 chronic kidney disease (H) N18.4 CBC with platelets    D63.1    8. COPD, moderate J44.9    9. Abnormal glucose R73.09 Hemoglobin A1c   10. Medication monitoring encounter Z51.81 Basic metabolic panel  (Ca, Cl, CO2, Creat, Gluc, K, Na, BUN)     CBC with platelets   11. Need for prophylactic vaccination and inoculation against influenza Z23 FLU VACCINE, INCREASED ANTIGEN, PRESV FREE, AGE 65+ [30528]     Vaccine Administration, Initial [[id number]]     CANCELED: FLU VACCINE, INCREASED ANTIGEN, PRESV FREE     Discussed treatment/modality options, including risk and benefits, she desires  further health care maintenance, further lab(s), immunizations (influenza), OTC meds (Zyrtec), and observation. All diagnosis above reviewed and noted above, otherwise stable.  See [facility] orders for further details.  Follow up in 4-6 month(s) and as needed.    Recheck labs, flu vac, meds reviewed, close follow up, watch BP.    Health Maintenance Due   Topic Date Due     URINE DRUG SCREEN Q1 YR  01/25/1950     MEDICARE ANNUAL WELLNESS VISIT  11/21/2015     WELLNESS VISIT Q1 YR  11/21/2015     DEXA Q3 YR  01/11/2016     COPD ACTION PLAN Q1 YR  06/01/2016     ADVANCE DIRECTIVE PLANNING Q5 YRS  07/15/2016     LIPID MONITORING Q1 YEAR  03/09/2017     MICROALBUMIN Q1 YEAR  03/09/2017     FALL RISK ASSESSMENT  05/20/2017     WILLIAM QUESTIONNAIRE 1 YEAR  07/16/2017     PHQ-9 Q1YR  07/16/2017     INFLUENZA VACCINE (SYSTEM ASSIGNED)  09/01/2017     MAMMO Q1 YR  09/12/2017       Patient Instructions     Zyrtec 10 mg for nose dripping    Follow up for blood pressure checks with pharmacy      The information in this document, created by the medical scribe for me, accurately reflects the services I personally performed and the decisions made by me. I have reviewed and approved this document for accuracy.   Rashi Calle MD FAAFP            Rashi aClle MD [address]  [address] [phone] Fax    Injectable Influenza Immunization Documentation    1.  Is the person to be vaccinated sick today?   No    2. Does the person to be vaccinated have an allergy to a component   of the vaccine?   No    3. Has the person to be vaccinated ever had a serious reaction   to influenza vaccine in the past?   No    4. Has the person to be vaccinated ever had Guillain-Barré syndrome?   No    Form completed by Kena Lomas MA

## 2017-10-02 NOTE — NURSING NOTE
"Chief Complaint   Patient presents with     RECHECK     Labs - 09/28/2017       Initial /71 (BP Location: Left arm, Patient Position: Chair, Cuff Size: Adult Regular)  Pulse 70  Temp 97.6  F (36.4  C) (Oral)  Ht 5' 1\" (1.549 m)  Wt 161 lb 6.4 oz (73.2 kg)  SpO2 96%  Breastfeeding? No  BMI 30.5 kg/m2 Estimated body mass index is 30.5 kg/(m^2) as calculated from the following:    Height as of this encounter: 5' 1\" (1.549 m).    Weight as of this encounter: 161 lb 6.4 oz (73.2 kg).  Medication Reconciliation: complete     Kena Lomas MA       "

## 2017-10-03 ASSESSMENT — ANXIETY QUESTIONNAIRES: GAD7 TOTAL SCORE: 3

## 2017-10-04 ENCOUNTER — HOSPITAL ENCOUNTER (OUTPATIENT)
Dept: MAMMOGRAPHY | Facility: CLINIC | Age: 82
Discharge: HOME | End: 2017-10-04
Attending: PHYSICIAN ASSISTANT | Admitting: PHYSICIAN ASSISTANT
Payer: MEDICARE

## 2017-10-04 ENCOUNTER — HOSPITAL ENCOUNTER (OUTPATIENT)
Dept: ULTRASOUND IMAGING | Facility: CLINIC | Age: 82
End: 2017-10-04
Attending: PHYSICIAN ASSISTANT
Payer: MEDICARE

## 2017-10-04 ENCOUNTER — TELEPHONE (OUTPATIENT)
Dept: FAMILY MEDICINE | Facility: CLINIC | Age: 82
End: 2017-10-04

## 2017-10-04 DIAGNOSIS — C34.12 MALIGNANT NEOPLASM OF UPPER LOBE OF LEFT LUNG (H): ICD-10-CM

## 2017-10-04 DIAGNOSIS — N64.4 BREAST PAIN, LEFT: ICD-10-CM

## 2017-10-04 PROCEDURE — G0204 DX MAMMO INCL CAD BI: HCPCS

## 2017-10-04 PROCEDURE — 76642 ULTRASOUND BREAST LIMITED: CPT | Mod: LT

## 2017-10-04 NOTE — LETTER
[facility] Breast Ultrasound  [address]  [facility] [address]                                                                                                              Anat Correa  [address] [place] [facility] [address]    October 4, 2017  Date of Exam:     Dear Anat:    Thank you for your recent visit.  Breast Imaging Result: We are pleased to inform you that the results of your recent breast imaging show no evidence of malignancy (cancer).    Breast Density: Your mammogram shows that you have dense breast tissue. This means you have a slightly higher risk of getting breast cancer. It also means your mammograms will be harder to read but it doesn't mean that mammograms aren t useful. In fact, yearly mammograms are even more important for women at higher risk.    If you are experiencing any breast problems such as a lump or localized pain we request that you discuss this with your health care provider if you haven t already done so, as additional testing may be necessary.    As you know, early detection of cancer is very important. Although mammography is the most accurate method for early detection, not all cancers are found through mammography. A thorough examination includes a combination of mammography, physical examination and breast self-examination. Currently the American College of Radiology and the Society of Breast Imaging recommend an annual mammogram for all women beginning at the age of 40.    A report of your breast imaging results was sent to: [facility]    Your breast imaging will become part of your medical file here at [place] for at least 10 years. You are responsible for informing any new health care provider or breast imaging facility of the date and location of this examination.    We appreciate the opportunity to participate in your health care.    Sincerely,    Marcelo Masterson MD  Interpreting Radiologist  [facility]  Breast Ultrasound

## 2017-10-04 NOTE — TELEPHONE ENCOUNTER
Noted LP reviewed normal mammo results, pt wanted TS aware of these too.    Nathalie Joseph RN  [facility]

## 2017-10-04 NOTE — TELEPHONE ENCOUNTER
Patient notified by phone.  She wants to keep her 10/18/2017 appt with TS just in case she needs to follow up on tests she is having checked next week.    MACIE Sullivan, RN, N  [facility]) [phone]

## 2017-10-06 ENCOUNTER — TELEPHONE (OUTPATIENT)
Dept: FAMILY MEDICINE | Facility: CLINIC | Age: 82
End: 2017-10-06

## 2017-10-06 DIAGNOSIS — N18.4 CKD (CHRONIC KIDNEY DISEASE) STAGE 4, GFR 15-29 ML/MIN (H): ICD-10-CM

## 2017-10-06 DIAGNOSIS — D64.9 ANEMIA, UNSPECIFIED TYPE: Primary | ICD-10-CM

## 2017-10-06 DIAGNOSIS — D63.1 ANEMIA IN STAGE 4 CHRONIC KIDNEY DISEASE (H): ICD-10-CM

## 2017-10-06 DIAGNOSIS — I10 HYPERTENSION GOAL BP (BLOOD PRESSURE) < 140/90: ICD-10-CM

## 2017-10-06 DIAGNOSIS — N18.4 ANEMIA IN STAGE 4 CHRONIC KIDNEY DISEASE (H): ICD-10-CM

## 2017-10-10 ENCOUNTER — ALLIED HEALTH/NURSE VISIT (OUTPATIENT)
Dept: NURSING | Facility: CLINIC | Age: 82
End: 2017-10-10
Payer: MEDICARE

## 2017-10-10 VITALS — DIASTOLIC BLOOD PRESSURE: 74 MMHG | SYSTOLIC BLOOD PRESSURE: 156 MMHG | HEART RATE: 62 BPM

## 2017-10-10 DIAGNOSIS — N18.4 CKD (CHRONIC KIDNEY DISEASE) STAGE 4, GFR 15-29 ML/MIN (H): ICD-10-CM

## 2017-10-10 DIAGNOSIS — N18.4 ANEMIA IN STAGE 4 CHRONIC KIDNEY DISEASE (H): ICD-10-CM

## 2017-10-10 DIAGNOSIS — D63.1 ANEMIA IN STAGE 4 CHRONIC KIDNEY DISEASE (H): ICD-10-CM

## 2017-10-10 DIAGNOSIS — I10 HYPERTENSION GOAL BP (BLOOD PRESSURE) < 140/90: Primary | ICD-10-CM

## 2017-10-10 DIAGNOSIS — D64.9 ANEMIA, UNSPECIFIED TYPE: ICD-10-CM

## 2017-10-10 DIAGNOSIS — I10 HYPERTENSION GOAL BP (BLOOD PRESSURE) < 140/90: ICD-10-CM

## 2017-10-10 LAB
ANION GAP SERPL CALCULATED.3IONS-SCNC: 11 MMOL/L (ref 3–14)
BUN SERPL-MCNC: 40 MG/DL (ref 7–30)
CALCIUM SERPL-MCNC: 9.5 MG/DL (ref 8.5–10.1)
CHLORIDE SERPL-SCNC: 96 MMOL/L (ref 94–109)
CO2 SERPL-SCNC: 22 MMOL/L (ref 20–32)
CREAT SERPL-MCNC: 1.72 MG/DL (ref 0.52–1.04)
ERYTHROCYTE [DISTWIDTH] IN BLOOD BY AUTOMATED COUNT: 12.6 % (ref 10–15)
GFR SERPL CREATININE-BSD FRML MDRD: 28 ML/MIN/1.7M2
GLUCOSE SERPL-MCNC: 77 MG/DL (ref 70–99)
HCT VFR BLD AUTO: 33 % (ref 35–47)
HGB BLD-MCNC: 11.3 G/DL (ref 11.7–15.7)
MCH RBC QN AUTO: 31.1 PG (ref 26.5–33)
MCHC RBC AUTO-ENTMCNC: 34.2 G/DL (ref 31.5–36.5)
MCV RBC AUTO: 91 FL (ref 78–100)
PLATELET # BLD AUTO: 308 10E9/L (ref 150–450)
POTASSIUM SERPL-SCNC: 4.9 MMOL/L (ref 3.4–5.3)
RBC # BLD AUTO: 3.63 10E12/L (ref 3.8–5.2)
SODIUM SERPL-SCNC: 129 MMOL/L (ref 133–144)
WBC # BLD AUTO: 10.2 10E9/L (ref 4–11)

## 2017-10-10 PROCEDURE — 36415 COLL VENOUS BLD VENIPUNCTURE: CPT | Performed by: FAMILY MEDICINE

## 2017-10-10 PROCEDURE — 99207 ZZC NO CHARGE NURSE ONLY: CPT

## 2017-10-10 PROCEDURE — 85027 COMPLETE CBC AUTOMATED: CPT | Performed by: FAMILY MEDICINE

## 2017-10-10 PROCEDURE — 80048 BASIC METABOLIC PNL TOTAL CA: CPT | Performed by: FAMILY MEDICINE

## 2017-10-10 NOTE — MR AVS SNAPSHOT
"              After Visit Summary   10/10/2017    Anat Correa    MRN: [id number]           Patient Information     Date Of Birth          1935        Visit Information        Provider Department      10/10/2017 9:15 AM [facility]TICO[facility]ULATION CLINIC [facility]        Today's Diagnoses     Hypertension goal BP (blood pressure) < 140/90    -  1       Follow-ups after your visit        Your next 10 appointments already scheduled     Oct 18, 2017 10:40 AM CDT   Office Visit with Rashi Calle MD   [facility] ([facility])    [address] [place] [id number] [phone]           Bring a current list of meds and any records pertaining to this visit. For Physicals, please bring immunization records and any forms needing to be filled out. Please arrive 10 minutes early to complete paperwork.              Who to contact     If you have questions or need follow up information about today's clinic visit or your schedule please contact [facility] directly at [phone].  Normal or non-critical lab and imaging results will be communicated to you by Vadiohart, letter or phone within 4 business days after the clinic has received the results. If you do not hear from us within 7 days, please contact the clinic through [address] [employer] or phone. If you have a critical or abnormal lab result, we will notify you by phone as soon as possible.  Submit refill requests through Linkpass or call your pharmacy and they will forward the refill request to us. Please allow 3 business days for your refill to be completed.          Additional Information About Your Visit        VadioharNordic Consumer Portals Information     Linkpass lets you send messages to your doctor, view your test results, renew your prescriptions, schedule appointments and more. To sign up, go to [email or web address] . Click on \"Log in\" on the left side of the screen, which will take you to the Welcome page. " "Then click on \"Sign up Now\" on the right side of the page.     You will be asked to enter the access code listed below, as well as some personal information. Please follow the directions to create your username and password.     Your access code is: [email or web address]  Expires: 2017  1:37 PM     Your access code will  in 90 days. If you need help or a new code, please call your [facility] clinic or [phone].        Care EveryWhere ID     This is your Care EveryWhere ID. This could be used by other organizations to access your [facility] medical records  [id number]        Your Vitals Were     Pulse                   62            Blood Pressure from Last 3 Encounters:   10/10/17 156/74   10/02/17 163/71   17 170/80    Weight from Last 3 Encounters:   10/02/17 161 lb 6.4 oz (73.2 kg)   17 165 lb (74.8 kg)   17 165 lb (74.8 kg)              Today, you had the following     No orders found for display       Primary Care Provider Office Phone # Fax #    Rashi Calle [phone]       [address] [place] [id number]        Equal Access to Services     DIANELYS LOW AH: Hadii bob ku hadasho Soomaali, waaxda luqadaha, qaybta kaalmada adeegyada, adriana luin christiana calderon. So [facility] [phone].    ATENCIÓN: Si habla español, tiene a [place] disposición servicios gratuitos de asistencia lingüística. Llame al [phone].    We comply with applicable federal civil rights laws and [place] laws. We do not discriminate on the basis of race, color, national origin, age, disability, sex, sexual orientation, or gender identity.            Thank you!     Thank you for choosing [facility]  for your care. Our goal is always to provide you with excellent care. Hearing back from our patients is one way we can continue to improve our services. Please take a few minutes to complete the written survey that you may receive in the mail after your visit with us. " Thank you!             Your Updated Medication List - Protect others around you: Learn how to safely use, store and throw away your medicines at www.disposemymeds.org.          This list is accurate as of: 10/10/17  9:32 AM.  Always use your most recent med list.                   Brand Name Dispense Instructions for use Diagnosis    acetaminophen-codeine 300-30 MG per tablet    TYLENOL #3    90 tablet    Take 1-2 tablets by mouth every 6 hours as needed for moderate pain    Primary osteoarthritis involving multiple joints       albuterol 108 (90 BASE) MCG/ACT Inhaler    PROAIR HFA/PROVENTIL HFA/VENTOLIN HFA    3 Inhaler    Inhale 2 puffs into the lungs every 6 hours as needed for shortness of breath / dyspnea    COPD, moderate (H)       amLODIPine 5 MG tablet    NORVASC    90 tablet    Take 1 tablet (5 mg) by mouth daily    Hypertension goal BP (blood pressure) < 140/90, CKD (chronic kidney disease) stage 4, GFR 15-29 ml/min (H)       budesonide-formoterol 160-4.5 MCG/ACT Inhaler    SYMBICORT    10.2 g    Inhale 2 puffs into the lungs 2 times daily    COPD, moderate (H)       calcium carbonate 500 MG chewable tablet    TUMS     Take 2 chew tab by mouth daily as needed for heartburn        furosemide 20 MG tablet    LASIX    180 tablet    40 mg in am    Hypertension goal BP (blood pressure) < 140/90, CKD (chronic kidney disease) stage 4, GFR 15-29 ml/min (H)       IBUPROFEN PO      Take 400 mg by mouth every 6 hours as needed for moderate pain        ipratropium - albuterol 0.5 mg/2.5 mg/3 mL 0.5-2.5 (3) MG/3ML neb solution    DUONEB    270 mL    NEBULIZE CONTENTS OF ONE VIAL EVERY 6 HOURS AS NEEDED FOR SHORTNESS OF BREATH/ DYSPNEA OR WHEEZING    COPD, moderate (H)       losartan 50 MG tablet    COZAAR    90 tablet    Take 1 tablet (50 mg) by mouth daily    Hypertension goal BP (blood pressure) < 140/90, CKD (chronic kidney disease) stage 4, GFR 15-29 ml/min (H)       metoprolol 50 MG 24 hr tablet    TOPROL-XL     180 tablet    Take 1 tablet (50 mg) by mouth 2 times daily (2 x 50mg = 100mg)    Hypertension goal BP (blood pressure) < 140/90

## 2017-10-18 ENCOUNTER — OFFICE VISIT (OUTPATIENT)
Dept: FAMILY MEDICINE | Facility: CLINIC | Age: 82
End: 2017-10-18
Payer: MEDICARE

## 2017-10-18 VITALS
HEIGHT: 61 IN | BODY MASS INDEX: 30.58 KG/M2 | TEMPERATURE: 98.1 F | WEIGHT: 162 LBS | OXYGEN SATURATION: 93 % | HEART RATE: 74 BPM | SYSTOLIC BLOOD PRESSURE: 188 MMHG | DIASTOLIC BLOOD PRESSURE: 74 MMHG

## 2017-10-18 DIAGNOSIS — M81.0 OSTEOPOROSIS WITHOUT CURRENT PATHOLOGICAL FRACTURE, UNSPECIFIED OSTEOPOROSIS TYPE: ICD-10-CM

## 2017-10-18 DIAGNOSIS — M70.62 GREATER TROCHANTERIC BURSITIS OF LEFT HIP: ICD-10-CM

## 2017-10-18 DIAGNOSIS — M15.0 PRIMARY OSTEOARTHRITIS INVOLVING MULTIPLE JOINTS: ICD-10-CM

## 2017-10-18 DIAGNOSIS — G63 POLYNEUROPATHY ASSOCIATED WITH UNDERLYING DISEASE (H): ICD-10-CM

## 2017-10-18 DIAGNOSIS — N18.4 CKD (CHRONIC KIDNEY DISEASE) STAGE 4, GFR 15-29 ML/MIN (H): ICD-10-CM

## 2017-10-18 DIAGNOSIS — J44.9 COPD, MODERATE (H): ICD-10-CM

## 2017-10-18 DIAGNOSIS — Z79.899 CONTROLLED SUBSTANCE AGREEMENT SIGNED: ICD-10-CM

## 2017-10-18 DIAGNOSIS — N25.81 SECONDARY RENAL HYPERPARATHYROIDISM (H): ICD-10-CM

## 2017-10-18 DIAGNOSIS — I10 HYPERTENSION GOAL BP (BLOOD PRESSURE) < 140/90: Primary | ICD-10-CM

## 2017-10-18 DIAGNOSIS — N18.4 ANEMIA IN STAGE 4 CHRONIC KIDNEY DISEASE (H): ICD-10-CM

## 2017-10-18 DIAGNOSIS — C34.12 MALIGNANT NEOPLASM OF UPPER LOBE OF LEFT LUNG (H): ICD-10-CM

## 2017-10-18 DIAGNOSIS — D63.1 ANEMIA IN STAGE 4 CHRONIC KIDNEY DISEASE (H): ICD-10-CM

## 2017-10-18 DIAGNOSIS — I87.8 VENOUS STASIS OF LOWER EXTREMITY: ICD-10-CM

## 2017-10-18 DIAGNOSIS — Z51.81 MEDICATION MONITORING ENCOUNTER: ICD-10-CM

## 2017-10-18 PROCEDURE — 20610 DRAIN/INJ JOINT/BURSA W/O US: CPT | Mod: LT | Performed by: FAMILY MEDICINE

## 2017-10-18 PROCEDURE — 99214 OFFICE O/P EST MOD 30 MIN: CPT | Mod: 25 | Performed by: FAMILY MEDICINE

## 2017-10-18 RX ORDER — LOSARTAN POTASSIUM 50 MG/1
100 TABLET ORAL DAILY
Qty: 180 TABLET | Refills: 3 | Status: SHIPPED | OUTPATIENT
Start: 2017-10-18 | End: 2018-02-19

## 2017-10-18 NOTE — PATIENT INSTRUCTIONS
Increase losartan dose to 2 pills a day.     Follow up in 2-3 weeks for BP recheck and lab work.     [facility]                        To reach your care team during and after hours:   [phone]  To reach our pharmacy:        [phone]    Clinic Hours                        Our clinic hours are:    Monday   7:30 am to 7:00 pm                  Tuesday through Friday 7:30 am to 5:00 pm                             Saturday   8:00 am to 12:00 pm      Sunday   Closed      Pharmacy Hours                        Our pharmacy hours are:    Monday   8:30 am to 7:00 pm       Tuesday to Friday  8:30 am to 6:00 pm                       Saturday    9:00 am to 1:00 pm              Sunday    Closed              There is also information available at our web site:  [email or web address]    If your provider ordered any lab tests and you do not receive the results within 10 business days, please call the clinic.    If you need a medication refill please contact your pharmacy.  Please allow 2-3 business days for your refill to be completed.    Our clinic offers telephone visits and e visits.  Please ask one of your team members to explain more.      Use CrowdTwist (secure email communication and access to your chart) to send your primary care provider a message or make an appointment. Ask someone on your Team how to sign up for CrowdTwist.  Immunizations                      Immunization History   Administered Date(s) Administered     Influenza (High Dose) 3 valent vaccine 10/07/2010, 09/19/2012, 11/04/2013, 10/08/2014, 11/03/2015, 09/16/2016, 10/02/2017     Influenza (IIV3) 10/19/2004, 11/15/2005     Pneumococcal (PCV 13) 11/03/2015     Pneumococcal 23 valent 04/25/2006     TD (ADULT, 7+) 03/03/1998, 01/23/2006     TDAP Vaccine (Boostrix) 08/27/2012     Zoster vaccine, live 10/24/2012        Health Maintenance                         Health Maintenance Due   Topic Date Due     URINE DRUG SCREEN Q1 YR  01/25/1950      Medicare Annual Wellness Visit  11/21/2015     Wellness Visit with your Primary Provider - yearly  11/21/2015     Osteoporosis Screening (Dexa) - every 3 years   01/11/2016     COPD ACTION PLAN Q1 YR  06/01/2016     Cholesterol Lab - yearly  03/09/2017     Microalbumin Lab - yearly  03/09/2017

## 2017-10-18 NOTE — PROGRESS NOTES
SUBJECTIVE:   Anat Correa is a 82 year old female who presents to clinic today for the following health issues:    Review lab results    MS: Wants injection in left hip area - last injection was 3/16/17 by Dinorah Terrazas - left greater trochantitis. She states that she is having trouble sleeping because of the pain, when laying on it.     Arthritis: the pt says her legs and feet are hurting with her osteoarthritis. She takes 4 ibuprofen a day, but is limited due to her CKD. She gets some relief but not complete. She explains that some days are worse than others. Recent bilateral foot cortisone injections by Dr White.    Neuropathy: pt notes she still has some trouble with pedal neuropathy. She sees a podiatrist for her foot issues and will follow up as needed.     Hypertension: Pt inquires about lisinopril rather than losartan.     BP Readings from Last 3 Encounters:   10/18/17 188/74   10/10/17 156/74   10/02/17 163/71       Problem list and histories reviewed & adjusted, as indicated.  Additional history: as documented    Reviewed and updated as needed this visit by clinical staff    Reviewed and updated as needed this visit by Provider    Wt Readings from Last 4 Encounters:   10/18/17 162 lb (73.5 kg)   10/02/17 161 lb 6.4 oz (73.2 kg)   09/11/17 165 lb (74.8 kg)   06/12/17 165 lb (74.8 kg)       Health Maintenance    Health Maintenance Due   Topic Date Due     URINE DRUG SCREEN Q1 YR  01/25/1950     MEDICARE ANNUAL WELLNESS VISIT  11/21/2015     WELLNESS VISIT Q1 YR  11/21/2015     DEXA Q3 YR  01/11/2016     COPD ACTION PLAN Q1 YR  06/01/2016     LIPID MONITORING Q1 YEAR  03/09/2017     MICROALBUMIN Q1 YEAR  03/09/2017       Current Problem List    Patient Active Problem List   Diagnosis     History of colonic polyps     Calculus of kidney     Lesion of plantar nerve     Osteoporosis     Primary iridocyclitis     Anemia     Hyperlipidemia LDL goal <100     OA (osteoarthritis)     Advanced directives,  counseling/discussion     Hypertension goal BP (blood pressure) < 140/90     COPD, moderate     Controlled substance agreement signed     Vitamin D deficiency     CKD (chronic kidney disease) stage 4, GFR 15-29 ml/min (H)     Anemia in chronic renal disease     Secondary renal hyperparathyroidism (H)     Venous stasis of lower extremity     Peripheral neuropathy     Chronic pain     Lung nodule     Nodule of left lung     Malignant neoplasm of upper lobe of left lung (H)     Acute pain of right shoulder     S/P amputation of lesser toe, unspecified laterality (H)     Retinal hemorrhage of right eye       Past Medical History    Past Medical History:   Diagnosis Date     Anemia      Calculus of kidney 1998    dr hope     Chronic pain     OA     Chronic pain      CKD (chronic kidney disease) stage 4, GFR 15-29 ml/min (H) 2008    dr mcdermott     COPD, moderate (H) 2004    moderate obstruction, with pulm htn - dr francisco did AAP     Diverticulosis of colon (without mention of hemorrhage) 7/03     Hemorrhage of gastrointestinal tract, unspecified 4/08    dr us     Hyperlipidemia LDL goal <100 2006     Hypertension goal BP (blood pressure) < 140/90 2005     Internal hemorrhoids without mention of complication 7/03     Irritable bowel syndrome 7/03     Lesion of plantar nerve 8/98    hay's neuroma dr connor     Lung nodule 4/14, 3/16    Dr Thompson, 1.4 x 1.1 cm, lingula, PET neg 3/16     Malignant neoplasm of upper lobe of left lung (H) 7/16    Dr Thompson - x 2 nodules - moderately differentiated adenocarcinoma (acinar predominant).  Final pathologic stage [id number], Final clinical stage IA, grade 2     Medication management     OA - 1 T#3 at HS with HX CKD     OA (osteoarthritis) 1998    lower ext worse katya knees     Obesity (BMI 30.0-34.9)      Osteoporosis, unspecified 2/02    FOSAMAX  = upset stomach , pt declines further rx.      Other ulcerative colitis 7/03    collangenous collitis- last colonoscopy 7/03       Peripheral neuropathy     early - burning at HS     Personal history of colonic polyps     dr araujo     PONV (postoperative nausea and vomiting)      Primary iridocyclitis 1998    iritis dr dominguez     Venous stasis of lower extremity        Past Surgical History    Past Surgical History:   Procedure Laterality Date     AMPUTATE TOE(S) Right 2015    Procedure: AMPUTATE TOE(S);  Surgeon: Ashia White, DPM, Pod;  Location: [facility] OR     C APPENDECTOMY       C  DELIVERY ONLY  1965    , Low Cervical     EXCISE MASS UPPER EXTREMITY  10/13/2011    Lt Forearm mass excision - dr ely     EXCISE MASS UPPER EXTREMITY  2012    Lt Forearm mass excision - dr ely     HC COLONOSCOPY THRU STOMA, DIAGNOSTIC      IBS/diverticula/hemmorhoids dr araujo     HC ESOPHAGOSCOPY, DIAGNOSTIC  , , 6/10    Gastric ulcer, healed, gastritis     HC HEMORRHOIDECTOMY,INT/EXT,SIMPLE       HC REPAIR SLIDING INGUINAL HERNIA      mitra     SURGICAL HISTORY OF -       mitra neck & back tumors     SURGICAL HISTORY OF -       neuroma excision - 2nd toe amputation     SURGICAL HISTORY OF -   3/07    Rt IOL - dr jimenez     SURGICAL HISTORY OF -       Lt IOL - dr jimenez     SURGICAL HISTORY OF -       Lt Knee replacement - dr gayle     SURGICAL HISTORY OF -       Rt Knee replacement - dr gayle     SURGICAL HISTORY OF -   9/15    Rt Second toe amputation - Dr White     THORACOSCOPIC WEDGE RESECTION LUNG Left 2016    Procedure: THORACOSCOPIC WEDGE RESECTION LUNG;  Surgeon: Abdelrahman Thompson MD;  Location: [facility] OR     XR JOINT INJECTION HIP (HIB)  2015    Rt - Dr Terry       Current Medications    Current Outpatient Prescriptions   Medication Sig Dispense Refill     losartan (COZAAR) 50 MG tablet Take 2 tablets (100 mg) by mouth daily 180 tablet 3     acetaminophen-codeine (TYLENOL #3) 300-30 MG per tablet Take 1-2 tablets by mouth every 6 hours as needed for  moderate pain 90 tablet 0     budesonide-formoterol (SYMBICORT) 160-4.5 MCG/ACT Inhaler Inhale 2 puffs into the lungs 2 times daily 10.2 g 11     metoprolol (TOPROL-XL) 50 MG 24 hr tablet Take 1 tablet (50 mg) by mouth 2 times daily (2 x 50mg = 100mg) 180 tablet 3     ipratropium - albuterol 0.5 mg/2.5 mg/3 mL (DUONEB) 0.5-2.5 (3) MG/3ML neb solution NEBULIZE CONTENTS OF ONE VIAL EVERY 6 HOURS AS NEEDED FOR SHORTNESS OF BREATH/ DYSPNEA OR WHEEZING 270 mL 3     furosemide (LASIX) 20 MG tablet 40 mg in am 180 tablet 3     amLODIPine (NORVASC) 5 MG tablet Take 1 tablet (5 mg) by mouth daily 90 tablet 3     albuterol (PROAIR HFA, PROVENTIL HFA, VENTOLIN HFA) 108 (90 BASE) MCG/ACT inhaler Inhale 2 puffs into the lungs every 6 hours as needed for shortness of breath / dyspnea 3 Inhaler 3     IBUPROFEN PO Take 400 mg by mouth every 6 hours as needed for moderate pain       calcium carbonate (TUMS) 500 MG chewable tablet Take 2 chew tab by mouth daily as needed for heartburn       [DISCONTINUED] losartan (COZAAR) 50 MG tablet Take 1 tablet (50 mg) by mouth daily 90 tablet 3       Allergies    Allergies   Allergen Reactions     Prednisone      Yeast infection - swollen lips       Immunizations    Immunization History   Administered Date(s) Administered     Influenza (High Dose) 3 valent vaccine 10/07/2010, 09/19/2012, 11/04/2013, 10/08/2014, 11/03/2015, 09/16/2016, 10/02/2017     Influenza (IIV3) 10/19/2004, 11/15/2005     Pneumococcal (PCV 13) 11/03/2015     Pneumococcal 23 valent 04/25/2006     TD (ADULT, 7+) 03/03/1998, 01/23/2006     TDAP Vaccine (Boostrix) 08/27/2012     Zoster vaccine, live 10/24/2012       Family History    Family History   Problem Relation Age of Onset     CEREBROVASCULAR DISEASE Mother      CEREBROVASCULAR DISEASE Father      Cancer - colorectal Brother      Cancer - colorectal Brother      Breast Cancer Sister      CANCER Sister      lung     CANCER Sister      lung     CANCER Sister      lung      Scleroderma Sister        Social History    Social History     Social History     Marital status:      Spouse name: thanh     Number of children: Alex     Years of education: 12     Occupational History     part-time [employer] section at [employer]       Social History Main Topics     Smoking status: Former Smoker     Packs/day: 3.00     Years: 50.00     Types: Cigarettes     Quit date: 12/21/1993     Smokeless tobacco: Never Used      Comment: quit 1993     Alcohol use No     Drug use: No      Comment: no herbal meds either      Sexual activity: Not Currently      Comment:  for 24 years      Other Topics Concern     Caffeine Concern Yes     1 pot  qd - switched to decaff now     Special Diet Yes     Low salt     Exercise No     Seat Belt Yes     Self-Exams Yes     SBE encouraged motnhly      Parent/Sibling W/ Cabg, Mi Or Angioplasty Before 65f 55m? No     Social History Narrative    calcium - 3 large dairy servings/day - pt declines fosamax and other meds for her osteoporosis    flex sig/colonoscopy -last colonoscopy 7/03     sun precautions - discussed     mammogram - hasn't had one since 2001 at least - ordered     Td booster - 1/23/06    pneumovax -today 4/25/06    DEXA - pt declines repeat scan today 4/25/06 despite her osteoporosis     stool hemoccults - every year after age 40    ASA- easy bruising - can't take     mulvitamin - encouraged     All above reviewed and updated, all stable unless otherwise noted    Recent labs reviewed    ROS:  C: NEGATIVE for fever, chills, change in weight  I: NEGATIVE for worrisome rashes, moles or lesions  E: NEGATIVE for vision changes or irritation  E/M: NEGATIVE for ear, mouth and throat problems  R: NEGATIVE for significant cough or SOB  B: NEGATIVE for masses, tenderness or discharge  CV: NEGATIVE for chest pain, palpitations or peripheral edema  GI: NEGATIVE for nausea, abdominal pain, heartburn, or change in bowel habits  : NEGATIVE for frequency, dysuria,  "or hematuria  M: NEGATIVE for significant myalgia (See HPI above)  N: NEGATIVE for weakness, dizziness or paresthesias  E: NEGATIVE for temperature intolerance, skin/hair changes  H: NEGATIVE for bleeding problems  P: NEGATIVE for changes in mood or affect    OBJECTIVE:                                                    /74  Pulse 74  Temp 98.1  F (36.7  C) (Oral)  Ht 5' 1\" (1.549 m)  Wt 162 lb (73.5 kg)  SpO2 93%  BMI 30.61 kg/m2  Body mass index is 30.61 kg/(m^2).     GENERAL: healthy, alert and no distress  EYES: Eyes grossly normal to inspection, extraocular movements - intact, and PERRL  HENT: ear canals- normal; TMs- normal; Nose- normal; Mouth- no ulcers, no lesions  NECK: no tenderness, no adenopathy, no asymmetry, no masses, no stiffness; thyroid- normal to palpation  RESP: lungs clear to auscultation - no rales, no rhonchi, no wheezes  CV: regular rates and rhythm, normal S1 S2, no S3 or S4 and no murmur, no click or rub -  ABDOMEN: soft, no tenderness, no  hepatosplenomegaly, no masses, normal bowel sounds  MS: extremities- no gross deformities noted, no edema  SKIN: no suspicious lesions, no rashes  NEURO: strength and tone- normal, sensory exam- grossly normal, mentation- intact, speech- normal, reflexes- symmetric  BACK: no CVA tenderness, no paralumbar tenderness  PSYCH: Alert and oriented times 3; speech- coherent , normal rate and volume; able to articulate logical thoughts, able to abstract reason, no tangential thoughts, no hallucinations or delusions, affect- normal    DIAGNOSTICS/PROCEDURES:                                                      Pain over the left greater trochanteris - no redness - no warmth - no swelling - risks/benefits reviewed - Kenalog 40 mg/ml, 0.5 ml in 1.5 ml lidocaine with out epi - hibiclens prep - site identified - injection completed with immediate improvement.    Results for orders placed or performed in visit on 10/10/17   Basic metabolic panel  (Ca, Cl, " CO2, Creat, Gluc, K, Na, BUN)   Result Value Ref Range    Sodium 129 (L) 133 - 144 mmol/L    Potassium 4.9 3.4 - 5.3 mmol/L    Chloride 96 94 - 109 mmol/L    Carbon Dioxide 22 20 - 32 mmol/L    Anion Gap 11 3 - 14 mmol/L    Glucose 77 70 - 99 mg/dL    Urea Nitrogen 40 (H) 7 - 30 mg/dL    Creatinine 1.72 (H) 0.52 - 1.04 mg/dL    GFR Estimate 28 (L) >60 mL/min/1.7m2    GFR Estimate If Black 34 (L) >60 mL/min/1.7m2    Calcium 9.5 8.5 - 10.1 mg/dL   CBC with platelets   Result Value Ref Range    WBC 10.2 4.0 - 11.0 10e9/L    RBC Count 3.63 (L) 3.8 - 5.2 10e12/L    Hemoglobin 11.3 (L) 11.7 - 15.7 g/dL    Hematocrit 33.0 (L) 35.0 - 47.0 %    MCV 91 78 - 100 fl    MCH 31.1 26.5 - 33.0 pg    MCHC 34.2 31.5 - 36.5 g/dL    RDW 12.6 10.0 - 15.0 %    Platelet Count 308 150 - 450 10e9/L        ASSESSMENT/PLAN:                                                        ICD-10-CM    1. Hypertension goal BP (blood pressure) < 140/90 I10 Comprehensive metabolic panel     CBC with platelets     losartan (COZAAR) 50 MG tablet   2. CKD (chronic kidney disease) stage 4, GFR 15-29 ml/min (H) N18.4 Comprehensive metabolic panel     CBC with platelets     losartan (COZAAR) 50 MG tablet   3. Anemia in stage 4 chronic kidney disease (H) N18.4 Comprehensive metabolic panel    D63.1 CBC with platelets   4. Secondary renal hyperparathyroidism (H) N25.81    5. Greater trochanteric bursitis of left hip M70.62 DRAIN/INJECT LARGE JOINT/BURSA   6. Malignant neoplasm of upper lobe of left lung (H) C34.12    7. Primary osteoarthritis involving multiple joints M15.0    8. COPD, moderate J44.9    9. Polyneuropathy associated with underlying disease (H) G63    10. Venous stasis of lower extremity I87.8    11. Osteoporosis without current pathological fracture, unspecified osteoporosis type M81.0    12. Controlled substance agreement signed Z79.899    13. Medication monitoring encounter Z51.81 Comprehensive metabolic panel     CBC with platelets     Labs:  Discussed and reviewed recent lab results. Pt is offered repeat labs but defers at this time. She would rather do these in a couple weeks. Order placed for labs that the patient will complete in a couple of weeks.    CKD: Discussed the balance of sodium levels and kidney function with fluid intake.     Polyneuropathy: pt will follow up with podiatry for this issue.     Hypertension: advised increasing losartan to 2 pills per day.     MS (Left Hip): 1/2 mL of Kenalog 40 mg and 1.5 cc's lidocaine into left trochanteric region for cortisone injection administered with patient's verbal consent.     Discussed treatment/modality options, including risk and benefits, she is advised diet and exercise, advised blood pressure checks and future labs, and diet discussed. All diagnosis above reviewed and noted above, otherwise stable.  See Disruptor Beam orders for further details.  Follow up in 2-3 week(s) and as needed.    Health Maintenance Due   Topic Date Due     URINE DRUG SCREEN Q1 YR  01/25/1950     MEDICARE ANNUAL WELLNESS VISIT  11/21/2015     WELLNESS VISIT Q1 YR  11/21/2015     DEXA Q3 YR  01/11/2016     COPD ACTION PLAN Q1 YR  06/01/2016     LIPID MONITORING Q1 YEAR  03/09/2017     MICROALBUMIN Q1 YEAR  03/09/2017            Rashi Calle MD [facility]  [address]  [id number] [phone] Fax

## 2017-10-18 NOTE — MR AVS SNAPSHOT
After Visit Summary   10/18/2017    Anat Correa    MRN: [id number]           Patient Information     Date Of Birth          1935        Visit Information        Provider Department      10/18/2017 10:40 AM Rashi Calle MD [facility]        Today's Diagnoses     Hypertension goal BP (blood pressure) < 140/90    -  1    CKD (chronic kidney disease) stage 4, GFR 15-29 ml/min (H)        Anemia in stage 4 chronic kidney disease (H)        Secondary renal hyperparathyroidism (H)        Greater trochanteric bursitis of left hip        Malignant neoplasm of upper lobe of left lung (H)        Primary osteoarthritis involving multiple joints        COPD, moderate        Polyneuropathy associated with underlying disease (H)        Venous stasis of lower extremity        Osteoporosis without current pathological fracture, unspecified osteoporosis type        Controlled substance agreement signed        Medication monitoring encounter          Care Instructions    Increase losartan dose to 2 pills a day.     Follow up in 2-3 weeks for BP recheck and lab work.     [facility]                        To reach your care team during and after hours:   [phone]  To reach our pharmacy:        [phone]    Clinic Hours                        Our clinic hours are:    Monday   7:30 am to 7:00 pm                  Tuesday through Friday 7:30 am to 5:00 pm                             Saturday   8:00 am to 12:00 pm      Sunday   Closed      Pharmacy Hours                        Our pharmacy hours are:    Monday   8:30 am to 7:00 pm       Tuesday to Friday  8:30 am to 6:00 pm                       Saturday    9:00 am to 1:00 pm              Sunday    Closed              There is also information available at our web site:  [email or web address]    If your provider ordered any lab tests and you do not receive the results within 10 business days, please call the clinic.    If you  need a medication refill please contact your pharmacy.  Please allow 2-3 business days for your refill to be completed.    Our clinic offers telephone visits and e visits.  Please ask one of your team members to explain more.      Use Cramstert (secure email communication and access to your chart) to send your primary care provider a message or make an appointment. Ask someone on your Team how to sign up for Cramstert.  Immunizations                      Immunization History   Administered Date(s) Administered     Influenza (High Dose) 3 valent vaccine 10/07/2010, 09/19/2012, 11/04/2013, 10/08/2014, 11/03/2015, 09/16/2016, 10/02/2017     Influenza (IIV3) 10/19/2004, 11/15/2005     Pneumococcal (PCV 13) 11/03/2015     Pneumococcal 23 valent 04/25/2006     TD (ADULT, 7+) 03/03/1998, 01/23/2006     TDAP Vaccine (Boostrix) 08/27/2012     Zoster vaccine, live 10/24/2012        Health Maintenance                         Health Maintenance Due   Topic Date Due     URINE DRUG SCREEN Q1 YR  01/25/1950     Medicare Annual Wellness Visit  11/21/2015     Wellness Visit with your Primary Provider - yearly  11/21/2015     Osteoporosis Screening (Dexa) - every 3 years   01/11/2016     COPD ACTION PLAN Q1 YR  06/01/2016     Cholesterol Lab - yearly  03/09/2017     Microalbumin Lab - yearly  03/09/2017               Follow-ups after your visit        Follow-up notes from your care team     Return in about 3 weeks (around 11/8/2017), or if symptoms worsen or fail to improve, for Lab Work, BP Recheck.      Future tests that were ordered for you today     Open Future Orders        Priority Expected Expires Ordered    Comprehensive metabolic panel Routine  10/18/2018 10/18/2017    CBC with platelets Routine  10/18/2018 10/18/2017            Who to contact     If you have questions or need follow up information about today's clinic visit or your schedule please contact [facility] directly at [phone].  Normal or  "non-critical lab and imaging results will be communicated to you by MyChart, letter or phone within 4 business days after the clinic has received the results. If you do not hear from us within 7 days, please contact the clinic through HackerOnet or phone. If you have a critical or abnormal lab result, we will notify you by phone as soon as possible.  Submit refill requests through Tinybop or call your pharmacy and they will forward the refill request to us. Please allow 3 business days for your refill to be completed.          Additional Information About Your Visit        Tinybop Information     Tinybop lets you send messages to your doctor, view your test results, renew your prescriptions, schedule appointments and more. To sign up, go to [email or web address] . Click on \"Log in\" on the left side of the screen, which will take you to the Welcome page. Then click on \"Sign up Now\" on the right side of the page.     You will be asked to enter the access code listed below, as well as some personal information. Please follow the directions to create your username and password.     Your access code is: [email or web address]  Expires: 2017  1:37 PM     Your access code will  in 90 days. If you need help or a new code, please call your [facility] clinic or [phone].        Care EveryWhere ID     This is your Care EveryWhere ID. This could be used by other organizations to access your [facility] medical records  [id number]        Your Vitals Were     Pulse Temperature Height Pulse Oximetry BMI (Body Mass Index)       74 98.1  F (36.7  C) (Oral) 5' 1\" (1.549 m) 93% 30.61 kg/m2        Blood Pressure from Last 3 Encounters:   10/18/17 188/74   10/10/17 156/74   10/02/17 163/71    Weight from Last 3 Encounters:   10/18/17 162 lb (73.5 kg)   10/02/17 161 lb 6.4 oz (73.2 kg)   17 165 lb (74.8 kg)              We Performed the Following     DRAIN/INJECT LARGE JOINT/BURSA          Today's Medication Changes        "   These changes are accurate as of: 10/18/17 11:54 AM.  If you have any questions, ask your nurse or doctor.               These medicines have changed or have updated prescriptions.        Dose/Directions    losartan 50 MG tablet   Commonly known as:  COZAAR   This may have changed:  how much to take   Used for:  Hypertension goal BP (blood pressure) < 140/90, CKD (chronic kidney disease) stage 4, GFR 15-29 ml/min (H)   Changed by:  Rashi Calle MD        Dose:  100 mg   Take 2 tablets (100 mg) by mouth daily   Quantity:  180 tablet   Refills:  3            Where to get your medicines      These medications were sent to [facility] - [place], [place] - [address]  [address] [place] [address], [facility] [address]     Phone:  [phone]     losartan 50 MG tablet                Primary Care Provider Office Phone # Fax #    Rashi Calle [phone]       [address] [place] [address]        Equal Access to Services     [facility] AH: Hadii bob ku hadasho Soomaali, waaxda luqadaha, qaybta kaalmada adeegyada, waxay idiin hayaan christiana farias . So [facility] [phone].    ATENCIÓN: Si habla español, tiene a [place] disposición servicios gratuitos de asistencia lingüística. [facility] [phone].    We comply with applicable federal civil rights laws and [place] laws. We do not discriminate on the basis of race, color, national origin, age, disability, sex, sexual orientation, or gender identity.            Thank you!     Thank you for choosing [facility]  for your care. Our goal is always to provide you with excellent care. Hearing back from our patients is one way we can continue to improve our services. Please take a few minutes to complete the written survey that you may receive in the mail after your visit with us. Thank you!             Your Updated Medication List - Protect others around you: Learn how to safely use, store and throw away  your medicines at www.disposemymeds.org.          This list is accurate as of: 10/18/17 11:54 AM.  Always use your most recent med list.                   Brand Name Dispense Instructions for use Diagnosis    acetaminophen-codeine 300-30 MG per tablet    TYLENOL #3    90 tablet    Take 1-2 tablets by mouth every 6 hours as needed for moderate pain    Primary osteoarthritis involving multiple joints       albuterol 108 (90 BASE) MCG/ACT Inhaler    PROAIR HFA/PROVENTIL HFA/VENTOLIN HFA    3 Inhaler    Inhale 2 puffs into the lungs every 6 hours as needed for shortness of breath / dyspnea    COPD, moderate (H)       amLODIPine 5 MG tablet    NORVASC    90 tablet    Take 1 tablet (5 mg) by mouth daily    Hypertension goal BP (blood pressure) < 140/90, CKD (chronic kidney disease) stage 4, GFR 15-29 ml/min (H)       budesonide-formoterol 160-4.5 MCG/ACT Inhaler    SYMBICORT    10.2 g    Inhale 2 puffs into the lungs 2 times daily    COPD, moderate (H)       calcium carbonate 500 MG chewable tablet    TUMS     Take 2 chew tab by mouth daily as needed for heartburn        furosemide 20 MG tablet    LASIX    180 tablet    40 mg in am    Hypertension goal BP (blood pressure) < 140/90, CKD (chronic kidney disease) stage 4, GFR 15-29 ml/min (H)       IBUPROFEN PO      Take 400 mg by mouth every 6 hours as needed for moderate pain        ipratropium - albuterol 0.5 mg/2.5 mg/3 mL 0.5-2.5 (3) MG/3ML neb solution    DUONEB    270 mL    NEBULIZE CONTENTS OF ONE VIAL EVERY 6 HOURS AS NEEDED FOR SHORTNESS OF BREATH/ DYSPNEA OR WHEEZING    COPD, moderate (H)       losartan 50 MG tablet    COZAAR    180 tablet    Take 2 tablets (100 mg) by mouth daily    Hypertension goal BP (blood pressure) < 140/90, CKD (chronic kidney disease) stage 4, GFR 15-29 ml/min (H)       metoprolol 50 MG 24 hr tablet    TOPROL-XL    180 tablet    Take 1 tablet (50 mg) by mouth 2 times daily (2 x 50mg = 100mg)    Hypertension goal BP (blood pressure) <  140/90

## 2017-10-18 NOTE — NURSING NOTE
"Chief Complaint   Patient presents with     RECHECK       Initial /74  Pulse 74  Temp 98.1  F (36.7  C) (Oral)  Ht 5' 1\" (1.549 m)  Wt 162 lb (73.5 kg)  SpO2 93%  BMI 30.61 kg/m2 Estimated body mass index is 30.61 kg/(m^2) as calculated from the following:    Height as of this encounter: 5' 1\" (1.549 m).    Weight as of this encounter: 162 lb (73.5 kg)..  BP completed using cuff size: james Rosario MA  "

## 2017-11-08 ENCOUNTER — OFFICE VISIT (OUTPATIENT)
Dept: FAMILY MEDICINE | Facility: CLINIC | Age: 82
End: 2017-11-08
Payer: MEDICARE

## 2017-11-08 VITALS
HEART RATE: 77 BPM | SYSTOLIC BLOOD PRESSURE: 154 MMHG | TEMPERATURE: 98 F | WEIGHT: 160 LBS | DIASTOLIC BLOOD PRESSURE: 68 MMHG | OXYGEN SATURATION: 96 % | BODY MASS INDEX: 30.21 KG/M2 | HEIGHT: 61 IN

## 2017-11-08 DIAGNOSIS — Z79.899 CONTROLLED SUBSTANCE AGREEMENT SIGNED: ICD-10-CM

## 2017-11-08 DIAGNOSIS — G89.29 OTHER CHRONIC PAIN: ICD-10-CM

## 2017-11-08 DIAGNOSIS — M15.0 PRIMARY OSTEOARTHRITIS INVOLVING MULTIPLE JOINTS: ICD-10-CM

## 2017-11-08 DIAGNOSIS — H35.61 RETINAL HEMORRHAGE OF RIGHT EYE: ICD-10-CM

## 2017-11-08 DIAGNOSIS — N18.4 CKD (CHRONIC KIDNEY DISEASE) STAGE 4, GFR 15-29 ML/MIN (H): ICD-10-CM

## 2017-11-08 DIAGNOSIS — D63.1 ANEMIA IN STAGE 4 CHRONIC KIDNEY DISEASE (H): ICD-10-CM

## 2017-11-08 DIAGNOSIS — Z51.81 MEDICATION MONITORING ENCOUNTER: ICD-10-CM

## 2017-11-08 DIAGNOSIS — I10 HYPERTENSION GOAL BP (BLOOD PRESSURE) < 140/90: ICD-10-CM

## 2017-11-08 DIAGNOSIS — N25.81 SECONDARY RENAL HYPERPARATHYROIDISM (H): ICD-10-CM

## 2017-11-08 DIAGNOSIS — E78.5 HYPERLIPIDEMIA LDL GOAL <100: ICD-10-CM

## 2017-11-08 DIAGNOSIS — N18.4 ANEMIA IN STAGE 4 CHRONIC KIDNEY DISEASE (H): ICD-10-CM

## 2017-11-08 DIAGNOSIS — C34.12 MALIGNANT NEOPLASM OF UPPER LOBE OF LEFT LUNG (H): ICD-10-CM

## 2017-11-08 DIAGNOSIS — G63 POLYNEUROPATHY ASSOCIATED WITH UNDERLYING DISEASE (H): ICD-10-CM

## 2017-11-08 DIAGNOSIS — J44.9 COPD, MODERATE (H): ICD-10-CM

## 2017-11-08 DIAGNOSIS — I87.8 VENOUS STASIS OF LOWER EXTREMITY: ICD-10-CM

## 2017-11-08 LAB
ALBUMIN SERPL-MCNC: 3.6 G/DL (ref 3.4–5)
ALP SERPL-CCNC: 71 U/L (ref 40–150)
ALT SERPL W P-5'-P-CCNC: 26 U/L (ref 0–50)
ANION GAP SERPL CALCULATED.3IONS-SCNC: 10 MMOL/L (ref 3–14)
AST SERPL W P-5'-P-CCNC: 20 U/L (ref 0–45)
BILIRUB SERPL-MCNC: 0.4 MG/DL (ref 0.2–1.3)
BUN SERPL-MCNC: 32 MG/DL (ref 7–30)
CALCIUM SERPL-MCNC: 9.7 MG/DL (ref 8.5–10.1)
CHLORIDE SERPL-SCNC: 94 MMOL/L (ref 94–109)
CO2 SERPL-SCNC: 24 MMOL/L (ref 20–32)
CREAT SERPL-MCNC: 1.43 MG/DL (ref 0.52–1.04)
ERYTHROCYTE [DISTWIDTH] IN BLOOD BY AUTOMATED COUNT: 12.8 % (ref 10–15)
GFR SERPL CREATININE-BSD FRML MDRD: 35 ML/MIN/1.7M2
GLUCOSE SERPL-MCNC: 99 MG/DL (ref 70–99)
HCT VFR BLD AUTO: 32.7 % (ref 35–47)
HGB BLD-MCNC: 11.2 G/DL (ref 11.7–15.7)
MCH RBC QN AUTO: 31 PG (ref 26.5–33)
MCHC RBC AUTO-ENTMCNC: 34.3 G/DL (ref 31.5–36.5)
MCV RBC AUTO: 91 FL (ref 78–100)
PLATELET # BLD AUTO: 340 10E9/L (ref 150–450)
POTASSIUM SERPL-SCNC: 4.9 MMOL/L (ref 3.4–5.3)
PROT SERPL-MCNC: 7.2 G/DL (ref 6.8–8.8)
RBC # BLD AUTO: 3.61 10E12/L (ref 3.8–5.2)
SODIUM SERPL-SCNC: 128 MMOL/L (ref 133–144)
WBC # BLD AUTO: 11.2 10E9/L (ref 4–11)

## 2017-11-08 PROCEDURE — 80053 COMPREHEN METABOLIC PANEL: CPT | Performed by: FAMILY MEDICINE

## 2017-11-08 PROCEDURE — 85027 COMPLETE CBC AUTOMATED: CPT | Performed by: FAMILY MEDICINE

## 2017-11-08 PROCEDURE — 36415 COLL VENOUS BLD VENIPUNCTURE: CPT | Performed by: FAMILY MEDICINE

## 2017-11-08 PROCEDURE — 99214 OFFICE O/P EST MOD 30 MIN: CPT | Performed by: FAMILY MEDICINE

## 2017-11-08 NOTE — PROGRESS NOTES
SUBJECTIVE:   Anat Correa is a 82 year old female who presents to clinic today for the following health issues:    Skin -- Sore spot on bilateral legs - spots started as black spots and now are brown. She reported that she takes Tylenol #3 in the evening and wakes up with pain, curious if she should take two tablets instead.     Eyes -- Avastin injections have worked well, but she has experienced rhinitis as a result.     Chronic Pain -- Anat complained of cramps at night. She has tried tonic water, which helps some. She reported that a recent corticosteroid injection to her hip helped pain. Uses T#3 1-2 per night, mainly 1    COPD -- Stable, unchanged. She uses a nebulizer in the morning, which helps. She feels that her portable inhalers don't help too much, but she isn't concerned.     HTN -- Losartan 100 mg daily, Metoprolol 50 mg twice daily, Lasix 40 mg daily, Amlodipine 5 mg daily.     BP Readings from Last 3 Encounters:   11/08/17 154/68   10/18/17 188/74   10/10/17 156/74       Problem list and histories reviewed & adjusted, as indicated.  Additional history: as documented    Reviewed and updated as needed this visit by clinical staff  Tobacco  Allergies  Meds  Med Hx  Surg Hx  Fam Hx  Soc Hx      Reviewed and updated as needed this visit by Provider       Wt Readings from Last 4 Encounters:   11/08/17 160 lb (72.6 kg)   10/18/17 162 lb (73.5 kg)   10/02/17 161 lb 6.4 oz (73.2 kg)   09/11/17 165 lb (74.8 kg)       Health Maintenance    Health Maintenance Due   Topic Date Due     URINE DRUG SCREEN Q1 YR  01/25/1950     MEDICARE ANNUAL WELLNESS VISIT  11/21/2015     WELLNESS VISIT Q1 YR  11/21/2015     DEXA Q3 YR  01/11/2016     COPD ACTION PLAN Q1 YR  06/01/2016     LIPID MONITORING Q1 YEAR  03/09/2017     MICROALBUMIN Q1 YEAR  03/09/2017       Current Problem List    Patient Active Problem List   Diagnosis     History of colonic polyps     Calculus of kidney     Lesion of plantar nerve      Osteoporosis     Primary iridocyclitis     Anemia     Hyperlipidemia LDL goal <100     OA (osteoarthritis)     Advanced directives, counseling/discussion     Hypertension goal BP (blood pressure) < 140/90     COPD, moderate     Controlled substance agreement signed     Vitamin D deficiency     CKD (chronic kidney disease) stage 4, GFR 15-29 ml/min (H)     Anemia in chronic renal disease     Secondary renal hyperparathyroidism (H)     Venous stasis of lower extremity     Peripheral neuropathy     Chronic pain     Lung nodule     Nodule of left lung     Malignant neoplasm of upper lobe of left lung (H)     Acute pain of right shoulder     S/P amputation of lesser toe, unspecified laterality (H)     Retinal hemorrhage of right eye       Past Medical History    Past Medical History:   Diagnosis Date     Anemia      Calculus of kidney 1998    dr hope     Chronic pain     OA     Chronic pain      CKD (chronic kidney disease) stage 4, GFR 15-29 ml/min (H) 2008    dr mcdermott     COPD, moderate (H) 2004    moderate obstruction, with pulm htn - dr francisco did AAP     Diverticulosis of colon (without mention of hemorrhage) 7/03     Hemorrhage of gastrointestinal tract, unspecified 4/08    dr su     Hyperlipidemia LDL goal <100 2006     Hypertension goal BP (blood pressure) < 140/90 2005     Internal hemorrhoids without mention of complication 7/03     Irritable bowel syndrome 7/03     Lesion of plantar nerve 8/98    hay's neuroma dr connor     Lung nodule 4/14, 3/16    Dr Thompson, 1.4 x 1.1 cm, lingula, PET neg 3/16     Malignant neoplasm of upper lobe of left lung (H) 7/16    Dr Thompson - x 2 nodules - moderately differentiated adenocarcinoma (acinar predominant).  Final pathologic stage [id number], Final clinical stage IA, grade 2     Medication management     OA - 1 T#3 at HS with HX CKD     OA (osteoarthritis) 1998    lower ext worse katya knees     Obesity (BMI 30.0-34.9)      Osteoporosis, unspecified 2/02    FOSAMAX  =  upset stomach , pt declines further rx.      Other ulcerative colitis     collangenous collitis- last colonoscopy       Peripheral neuropathy     early - burning at HS     Personal history of colonic polyps     dr van SANTOS (postoperative nausea and vomiting)      Primary iridocyclitis     iritis dr dominguez     Venous stasis of lower extremity        Past Surgical History    Past Surgical History:   Procedure Laterality Date     AMPUTATE TOE(S) Right 2015    Procedure: AMPUTATE TOE(S);  Surgeon: Ashia White, DPM, Pod;  Location: [facility] OR     C APPENDECTOMY       C  DELIVERY ONLY      , Low Cervical     EXCISE MASS UPPER EXTREMITY  10/13/2011    Lt Forearm mass excision - dr ely     EXCISE MASS UPPER EXTREMITY  2012    Lt Forearm mass excision - dr ely     HC COLONOSCOPY THRU STOMA, DIAGNOSTIC      IBS/diverticula/hemmorhoids dr araujo     HC ESOPHAGOSCOPY, DIAGNOSTIC  , , 6/10    Gastric ulcer, healed, gastritis     HC HEMORRHOIDECTOMY,INT/EXT,SIMPLE       HC REPAIR SLIDING INGUINAL HERNIA      mitra     SURGICAL HISTORY OF -       mitra neck & back tumors     SURGICAL HISTORY OF -       neuroma excision - 2nd toe amputation     SURGICAL HISTORY OF -   3/07    Rt IOL - dr jimenez     SURGICAL HISTORY OF -       Lt IOL - dr jimenez     SURGICAL HISTORY OF -       Lt Knee replacement - dr gayle     SURGICAL HISTORY OF -       Rt Knee replacement - dr gayle     SURGICAL HISTORY OF -   9/15    Rt Second toe amputation - Dr White     THORACOSCOPIC WEDGE RESECTION LUNG Left 2016    Procedure: THORACOSCOPIC WEDGE RESECTION LUNG;  Surgeon: Abdelrahman Thompson MD;  Location: [facility] OR     XR JOINT INJECTION HIP (HIB)  2015    Rt - Dr Terry       Current Medications    Current Outpatient Prescriptions   Medication Sig Dispense Refill     ASPIRIN NOT PRESCRIBED (INTENTIONAL) Please choose reason not prescribed,  below 0 each 0     losartan (COZAAR) 50 MG tablet Take 2 tablets (100 mg) by mouth daily 180 tablet 3     acetaminophen-codeine (TYLENOL #3) 300-30 MG per tablet Take 1-2 tablets by mouth every 6 hours as needed for moderate pain 90 tablet 0     budesonide-formoterol (SYMBICORT) 160-4.5 MCG/ACT Inhaler Inhale 2 puffs into the lungs 2 times daily 10.2 g 11     metoprolol (TOPROL-XL) 50 MG 24 hr tablet Take 1 tablet (50 mg) by mouth 2 times daily (2 x 50mg = 100mg) 180 tablet 3     ipratropium - albuterol 0.5 mg/2.5 mg/3 mL (DUONEB) 0.5-2.5 (3) MG/3ML neb solution NEBULIZE CONTENTS OF ONE VIAL EVERY 6 HOURS AS NEEDED FOR SHORTNESS OF BREATH/ DYSPNEA OR WHEEZING 270 mL 3     furosemide (LASIX) 20 MG tablet 40 mg in am 180 tablet 3     amLODIPine (NORVASC) 5 MG tablet Take 1 tablet (5 mg) by mouth daily 90 tablet 3     albuterol (PROAIR HFA, PROVENTIL HFA, VENTOLIN HFA) 108 (90 BASE) MCG/ACT inhaler Inhale 2 puffs into the lungs every 6 hours as needed for shortness of breath / dyspnea 3 Inhaler 3     IBUPROFEN PO Take 400 mg by mouth every 6 hours as needed for moderate pain       calcium carbonate (TUMS) 500 MG chewable tablet Take 2 chew tab by mouth daily as needed for heartburn         Allergies    Allergies   Allergen Reactions     Prednisone      Yeast infection - swollen lips       Immunizations    Immunization History   Administered Date(s) Administered     Influenza (High Dose) 3 valent vaccine 10/07/2010, 09/19/2012, 11/04/2013, 10/08/2014, 11/03/2015, 09/16/2016, 10/02/2017     Influenza (IIV3) 10/19/2004, 11/15/2005     Pneumococcal (PCV 13) 11/03/2015     Pneumococcal 23 valent 04/25/2006     TD (ADULT, 7+) 03/03/1998, 01/23/2006     TDAP Vaccine (Boostrix) 08/27/2012     Zoster vaccine, live 10/24/2012       Family History    Family History   Problem Relation Age of Onset     CEREBROVASCULAR DISEASE Mother      CEREBROVASCULAR DISEASE Father      Cancer - colorectal Brother      Cancer - colorectal  Brother      Breast Cancer Sister      CANCER Sister      lung     CANCER Sister      lung     CANCER Sister      lung     Scleroderma Sister        Social History    Social History     Social History     Marital status:      Spouse name: thanh     Number of children: 1     Years of education: 12     Occupational History     part-time [employer] section at [employer]       Social History Main Topics     Smoking status: Former Smoker     Packs/day: 3.00     Years: 50.00     Types: Cigarettes     Quit date: 12/21/1993     Smokeless tobacco: Never Used      Comment: quit 1993     Alcohol use No     Drug use: No      Comment: no herbal meds either      Sexual activity: Not Currently      Comment:  for 24 years      Other Topics Concern     Caffeine Concern Yes     1 pot  qd - switched to decaff now     Special Diet Yes     Low salt     Exercise No     Seat Belt Yes     Self-Exams Yes     SBE encouraged motnhly      Parent/Sibling W/ Cabg, Mi Or Angioplasty Before 65f 55m? No     Social History Narrative    calcium - 3 large dairy servings/day - pt declines fosamax and other meds for her osteoporosis    flex sig/colonoscopy -last colonoscopy 7/03     sun precautions - discussed     mammogram - hasn't had one since 2001 at least - ordered     Td booster - 1/23/06    pneumovax -today 4/25/06    DEXA - pt declines repeat scan today 4/25/06 despite her osteoporosis     stool hemoccults - every year after age 40    ASA- easy bruising - can't take     mulvitamin - encouraged     All above reviewed and updated, all stable unless otherwise noted    Recent labs reviewed    ROS:  Constitutional, HEENT, cardiovascular, pulmonary, GI, , musculoskeletal, neuro, skin, endocrine and psych systems are negative, except as in HPI or otherwise noted     This document serves as a record of the services and decisions personally performed and made by Rashi Calle MD FAAFP. It was created on their behalf by Dougie Osborne, a trained  "medical scribe. The creation of this document is based the provider's statements to the medical scribe.  Dougie Osborne November 8, 2017 9:41 AM      OBJECTIVE:                                                    /68  Pulse 77  Temp 98  F (36.7  C) (Oral)  Ht 5' 1\" (1.549 m)  Wt 160 lb (72.6 kg)  SpO2 96%  BMI 30.23 kg/m2  Body mass index is 30.23 kg/(m^2).  GENERAL: healthy, alert and no distress, obese   RESP: lungs clear to auscultation - no rales, no rhonchi, no wheezes  CV: regular rates and rhythm, normal S1 S2, no S3 or S4 and no murmur, no click or rub -  MS: extremities- no gross deformities noted, no edema  SKIN: chronic venous stasis changes to bilateral LE's, otherwise no suspicious lesions, no rashes to visible skin  NEURO: mentation intact and speech normal  PSYCH: affect normal/bright    DIAGNOSTICS/PROCEDURES:                                                      Results for orders placed or performed in visit on 10/10/17   Basic metabolic panel  (Ca, Cl, CO2, Creat, Gluc, K, Na, BUN)   Result Value Ref Range    Sodium 129 (L) 133 - 144 mmol/L    Potassium 4.9 3.4 - 5.3 mmol/L    Chloride 96 94 - 109 mmol/L    Carbon Dioxide 22 20 - 32 mmol/L    Anion Gap 11 3 - 14 mmol/L    Glucose 77 70 - 99 mg/dL    Urea Nitrogen 40 (H) 7 - 30 mg/dL    Creatinine 1.72 (H) 0.52 - 1.04 mg/dL    GFR Estimate 28 (L) >60 mL/min/1.7m2    GFR Estimate If Black 34 (L) >60 mL/min/1.7m2    Calcium 9.5 8.5 - 10.1 mg/dL   CBC with platelets   Result Value Ref Range    WBC 10.2 4.0 - 11.0 10e9/L    RBC Count 3.63 (L) 3.8 - 5.2 10e12/L    Hemoglobin 11.3 (L) 11.7 - 15.7 g/dL    Hematocrit 33.0 (L) 35.0 - 47.0 %    MCV 91 78 - 100 fl    MCH 31.1 26.5 - 33.0 pg    MCHC 34.2 31.5 - 36.5 g/dL    RDW 12.6 10.0 - 15.0 %    Platelet Count 308 150 - 450 10e9/L        ASSESSMENT/PLAN:                                                        ICD-10-CM    1. Hypertension goal BP (blood pressure) < 140/90 I10 ASPIRIN NOT PRESCRIBED " (INTENTIONAL)     Comprehensive metabolic panel     CBC with platelets   2. CKD (chronic kidney disease) stage 4, GFR 15-29 ml/min (H) N18.4 Comprehensive metabolic panel     CBC with platelets   3. Retinal hemorrhage of right eye H35.61    4. COPD, moderate J44.9    5. Primary osteoarthritis involving multiple joints M15.0    6. Other chronic pain G89.29    7. Anemia in stage 4 chronic kidney disease (H) N18.4 Comprehensive metabolic panel    D63.1 CBC with platelets   8. Secondary renal hyperparathyroidism (H) N25.81    9. Venous stasis of lower extremity I87.8    10. Polyneuropathy associated with underlying disease (H) G63    11. Malignant neoplasm of upper lobe of left lung (H) C34.12    12. Hyperlipidemia LDL goal <100 E78.5    13. Controlled substance agreement signed Z79.899    14. Medication monitoring encounter Z51.81 Comprehensive metabolic panel     CBC with platelets     Discussed treatment/modality options, including risk and benefits, she desires further health care maintenance, further lab(s), heat/ice/stretching & exercise, medication refill(s), OTC meds (Gatorade), and observation. All diagnosis above reviewed and noted above, otherwise stable.  See [facility] orders for further details.  Follow up as needed.    - advised pt to increase Tylenol #3 to 1-2 tablets, if needed for nighttime pains.     Health Maintenance Due   Topic Date Due     URINE DRUG SCREEN Q1 YR  01/25/1950     MEDICARE ANNUAL WELLNESS VISIT  11/21/2015     WELLNESS VISIT Q1 YR  11/21/2015     DEXA Q3 YR  01/11/2016     COPD ACTION PLAN Q1 YR  06/01/2016     LIPID MONITORING Q1 YEAR  03/09/2017     MICROALBUMIN Q1 YEAR  03/09/2017     Patient Instructions     Heat recommended for leg tenderness -- okay to try taking 2 tablets of Tylenol #3 at night to treat this    For cramping, try drinking one glass of Gatorade daily/Tonic water    The information in this document, created by the medical scribe for me, accurately reflects the  services I personally performed and the decisions made by me. I have reviewed and approved this document for accuracy.   Rashi Calle MD FAAFP            Rashi Calle MD [address]  [address] [phone] Fax

## 2017-11-08 NOTE — PATIENT INSTRUCTIONS
Heat recommended for leg tenderness -- okay to try taking 2 tablets of Tylenol #3 at night to treat this    For cramping, try drinking one glass of Gatorade daily      [facility]                        To reach your care team during and after hours:   [phone]  To reach our pharmacy:        [phone]    Clinic Hours                        Our clinic hours are:    Monday   7:30 am to 7:00 pm                  Tuesday through Friday 7:30 am to 5:00 pm                             Saturday   8:00 am to 12:00 pm      Sunday   Closed      Pharmacy Hours                        Our pharmacy hours are:    Monday   8:30 am to 7:00 pm       Tuesday to Friday  8:30 am to 6:00 pm                       Saturday    9:00 am to 1:00 pm              Sunday    Closed              There is also information available at our web site:  [email or web address]    If your provider ordered any lab tests and you do not receive the results within 10 business days, please call the clinic.    If you need a medication refill please contact your pharmacy.  Please allow 2-3 business days for your refill to be completed.    Our clinic offers telephone visits and e visits.  Please ask one of your team members to explain more.      Use SecureLinkt (secure email communication and access to your chart) to send your primary care provider a message or make an appointment. Ask someone on your Team how to sign up for pyco.  Immunizations                      Immunization History   Administered Date(s) Administered     Influenza (High Dose) 3 valent vaccine 10/07/2010, 09/19/2012, 11/04/2013, 10/08/2014, 11/03/2015, 09/16/2016, 10/02/2017     Influenza (IIV3) 10/19/2004, 11/15/2005     Pneumococcal (PCV 13) 11/03/2015     Pneumococcal 23 valent 04/25/2006     TD (ADULT, 7+) 03/03/1998, 01/23/2006     TDAP Vaccine (Boostrix) 08/27/2012     Zoster vaccine, live 10/24/2012        Health Maintenance                         Health Maintenance  Due   Topic Date Due     URINE DRUG SCREEN Q1 YR  01/25/1950     Medicare Annual Wellness Visit  11/21/2015     Wellness Visit with your Primary Provider - yearly  11/21/2015     Osteoporosis Screening (Dexa) - every 3 years   01/11/2016     COPD ACTION PLAN Q1 YR  06/01/2016     Cholesterol Lab - yearly  03/09/2017     Microalbumin Lab - yearly  03/09/2017

## 2017-11-08 NOTE — NURSING NOTE
"Chief Complaint   Patient presents with     RECHECK       Initial /64  Pulse 77  Temp 98  F (36.7  C) (Oral)  Ht 5' 1\" (1.549 m)  Wt 160 lb (72.6 kg)  SpO2 96%  BMI 30.23 kg/m2 Estimated body mass index is 30.23 kg/(m^2) as calculated from the following:    Height as of this encounter: 5' 1\" (1.549 m).    Weight as of this encounter: 160 lb (72.6 kg)..  BP completed using cuff size: james Rosario MA  "

## 2017-11-08 NOTE — MR AVS SNAPSHOT
After Visit Summary   11/8/2017    Anat Correa    MRN: [id number]           Patient Information     Date Of Birth          1935        Visit Information        Provider Department      11/8/2017 9:00 AM Rashi Calle MD [facility]  [place]        Today's Diagnoses     Hypertension goal BP (blood pressure) < 140/90        CKD (chronic kidney disease) stage 4, GFR 15-29 ml/min (H)        Retinal hemorrhage of right eye        COPD, moderate        Primary osteoarthritis involving multiple joints        Other chronic pain        Anemia in stage 4 chronic kidney disease (H)        Secondary renal hyperparathyroidism (H)        Venous stasis of lower extremity        Polyneuropathy associated with underlying disease (H)        Malignant neoplasm of upper lobe of left lung (H)        Hyperlipidemia LDL goal <100        Controlled substance agreement signed        Medication monitoring encounter          Care Instructions    Heat recommended for leg tenderness -- okay to try taking 2 tablets of Tylenol #3 at night to treat this    For cramping, try drinking one glass of Gatorade daily      [facility]                        To reach your care team during and after hours:   [phone]  To reach our pharmacy:        [phone]    Clinic Hours                        Our clinic hours are:    Monday   7:30 am to 7:00 pm                  Tuesday through Friday 7:30 am to 5:00 pm                             Saturday   8:00 am to 12:00 pm      Sunday   Closed      Pharmacy Hours                        Our pharmacy hours are:    Monday   8:30 am to 7:00 pm       Tuesday to Friday  8:30 am to 6:00 pm                       Saturday    9:00 am to 1:00 pm              Sunday    Closed              There is also information available at our web site:  [email or web address]    If your provider ordered any lab tests and you do not receive the results within 10 business days, please call  the clinic.    If you need a medication refill please contact your pharmacy.  Please allow 2-3 business days for your refill to be completed.    Our clinic offers telephone visits and e visits.  Please ask one of your team members to explain more.      Use Fashion[employer]tt (secure email communication and access to your chart) to send your primary care provider a message or make an appointment. Ask someone on your Team how to sign up for Fashion[employer]tt.  Immunizations                      Immunization History   Administered Date(s) Administered     Influenza (High Dose) 3 valent vaccine 10/07/2010, 09/19/2012, 11/04/2013, 10/08/2014, 11/03/2015, 09/16/2016, 10/02/2017     Influenza (IIV3) 10/19/2004, 11/15/2005     Pneumococcal (PCV 13) 11/03/2015     Pneumococcal 23 valent 04/25/2006     TD (ADULT, 7+) 03/03/1998, 01/23/2006     TDAP Vaccine (Boostrix) 08/27/2012     Zoster vaccine, live 10/24/2012        Health Maintenance                         Health Maintenance Due   Topic Date Due     URINE DRUG SCREEN Q1 YR  01/25/1950     Medicare Annual Wellness Visit  11/21/2015     Wellness Visit with your Primary Provider - yearly  11/21/2015     Osteoporosis Screening (Dexa) - every 3 years   01/11/2016     COPD ACTION PLAN Q1 YR  06/01/2016     Cholesterol Lab - yearly  03/09/2017     Microalbumin Lab - yearly  03/09/2017               Follow-ups after your visit        Who to contact     If you have questions or need follow up information about today's clinic visit or your schedule please contact [facility] directly at [phone].  Normal or non-critical lab and imaging results will be communicated to you by MyChart, letter or phone within 4 business days after the clinic has received the results. If you do not hear from us within 7 days, please contact the clinic through MyChart or phone. If you have a critical or abnormal lab result, we will notify you by phone as soon as possible.  Submit refill requests through  "[employer]ot or call your pharmacy and they will forward the refill request to us. Please allow 3 business days for your refill to be completed.          Additional Information About Your Visit        MyChart Information     [employer] lets you send messages to your doctor, view your test results, renew your prescriptions, schedule appointments and more. To sign up, go to [email or web address] . Click on \"Log in\" on the left side of the screen, which will take you to the Welcome page. Then click on \"Sign up Now\" on the right side of the page.     You will be asked to enter the access code listed below, as well as some personal information. Please follow the directions to create your username and password.     Your access code is: [email or web address]  Expires: 2017 12:37 PM     Your access code will  in 90 days. If you need help or a new code, please call your [facility] clinic or [phone].        Care EveryWhere ID     This is your Care EveryWhere ID. This could be used by other organizations to access your [facility] medical records  [id number]        Your Vitals Were     Pulse Temperature Height Pulse Oximetry BMI (Body Mass Index)       77 98  F (36.7  C) (Oral) 5' 1\" (1.549 m) 96% 30.23 kg/m2        Blood Pressure from Last 3 Encounters:   17 154/68   10/18/17 188/74   10/10/17 156/74    Weight from Last 3 Encounters:   17 160 lb (72.6 kg)   10/18/17 162 lb (73.5 kg)   10/02/17 161 lb 6.4 oz (73.2 kg)              We Performed the Following     CBC with platelets     Comprehensive metabolic panel          Today's Medication Changes          These changes are accurate as of: 17  9:57 AM.  If you have any questions, ask your nurse or doctor.               Start taking these medicines.        Dose/Directions    ASPIRIN NOT PRESCRIBED   Commonly known as:  INTENTIONAL   Used for:  Hypertension goal BP (blood pressure) < 140/90   Started by:  Rashi Calle MD        Please choose reason not " prescribed, below   Quantity:  0 each   Refills:  0            Where to get your medicines      Some of these will need a paper prescription and others can be bought over the counter.  Ask your nurse if you have questions.     You don't need a prescription for these medications     ASPIRIN NOT PRESCRIBED                Primary Care Provider Office Phone # Fax #    Rashi Calle [phone]       [address] [place] [address]        Equal Access to Services     DIANELYS LOW : Hadii aad ku hadasho Soomaali, waaxda luqadaha, qaybta kaalmada adeegyada, waxay idiin hayaan adeeg meghanshenarajendra ladimas . So [facility] [phone].    ATENCIÓN: Si habla espdevonte, tiene a winchester disposición servicios gratuitos de asistencia lingüística. Kourtneyame al [phone].    We comply with applicable federal civil rights laws and [place] laws. We do not discriminate on the basis of race, color, national origin, age, disability, sex, sexual orientation, or gender identity.            Thank you!     Thank you for choosing [facility]  for your care. Our goal is always to provide you with excellent care. Hearing back from our patients is one way we can continue to improve our services. Please take a few minutes to complete the written survey that you may receive in the mail after your visit with us. Thank you!             Your Updated Medication List - Protect others around you: Learn how to safely use, store and throw away your medicines at www.disposemymeds.org.          This list is accurate as of: 11/8/17  9:57 AM.  Always use your most recent med list.                   Brand Name Dispense Instructions for use Diagnosis    acetaminophen-codeine 300-30 MG per tablet    TYLENOL #3    90 tablet    Take 1-2 tablets by mouth every 6 hours as needed for moderate pain    Primary osteoarthritis involving multiple joints       albuterol 108 (90 BASE) MCG/ACT Inhaler    PROAIR HFA/PROVENTIL HFA/VENTOLIN HFA    3  Inhaler    Inhale 2 puffs into the lungs every 6 hours as needed for shortness of breath / dyspnea    COPD, moderate (H)       amLODIPine 5 MG tablet    NORVASC    90 tablet    Take 1 tablet (5 mg) by mouth daily    Hypertension goal BP (blood pressure) < 140/90, CKD (chronic kidney disease) stage 4, GFR 15-29 ml/min (H)       ASPIRIN NOT PRESCRIBED    INTENTIONAL    0 each    Please choose reason not prescribed, below    Hypertension goal BP (blood pressure) < 140/90       budesonide-formoterol 160-4.5 MCG/ACT Inhaler    SYMBICORT    10.2 g    Inhale 2 puffs into the lungs 2 times daily    COPD, moderate (H)       calcium carbonate 500 MG chewable tablet    TUMS     Take 2 chew tab by mouth daily as needed for heartburn        furosemide 20 MG tablet    LASIX    180 tablet    40 mg in am    Hypertension goal BP (blood pressure) < 140/90, CKD (chronic kidney disease) stage 4, GFR 15-29 ml/min (H)       IBUPROFEN PO      Take 400 mg by mouth every 6 hours as needed for moderate pain        ipratropium - albuterol 0.5 mg/2.5 mg/3 mL 0.5-2.5 (3) MG/3ML neb solution    DUONEB    270 mL    NEBULIZE CONTENTS OF ONE VIAL EVERY 6 HOURS AS NEEDED FOR SHORTNESS OF BREATH/ DYSPNEA OR WHEEZING    COPD, moderate (H)       losartan 50 MG tablet    COZAAR    180 tablet    Take 2 tablets (100 mg) by mouth daily    Hypertension goal BP (blood pressure) < 140/90, CKD (chronic kidney disease) stage 4, GFR 15-29 ml/min (H)       metoprolol 50 MG 24 hr tablet    TOPROL-XL    180 tablet    Take 1 tablet (50 mg) by mouth 2 times daily (2 x 50mg = 100mg)    Hypertension goal BP (blood pressure) < 140/90

## 2017-11-10 DIAGNOSIS — E87.1 SODIUM BLOOD DECREASED: Primary | ICD-10-CM

## 2017-11-20 ENCOUNTER — ALLIED HEALTH/NURSE VISIT (OUTPATIENT)
Dept: NURSING | Facility: CLINIC | Age: 82
End: 2017-11-20
Payer: MEDICARE

## 2017-11-20 VITALS — HEART RATE: 72 BPM | DIASTOLIC BLOOD PRESSURE: 70 MMHG | OXYGEN SATURATION: 98 % | SYSTOLIC BLOOD PRESSURE: 140 MMHG

## 2017-11-20 DIAGNOSIS — E87.1 SODIUM BLOOD DECREASED: ICD-10-CM

## 2017-11-20 DIAGNOSIS — I10 HYPERTENSION GOAL BP (BLOOD PRESSURE) < 140/90: Primary | ICD-10-CM

## 2017-11-20 LAB — SODIUM SERPL-SCNC: 128 MMOL/L (ref 133–144)

## 2017-11-20 PROCEDURE — 36415 COLL VENOUS BLD VENIPUNCTURE: CPT | Performed by: FAMILY MEDICINE

## 2017-11-20 PROCEDURE — 99207 ZZC NO CHARGE NURSE ONLY: CPT

## 2017-11-20 PROCEDURE — 84295 ASSAY OF SERUM SODIUM: CPT | Performed by: FAMILY MEDICINE

## 2017-11-20 NOTE — MR AVS SNAPSHOT
"              After Visit Summary   2017    Anat Correa    MRN: [id number]           Patient Information     Date Of Birth          1935        Visit Information        Provider Department      2017 8:00 AM [facility]        Today's Diagnoses     Hypertension goal BP (blood pressure) < 140/90    -  1       Follow-ups after your visit        Who to contact     If you have questions or need follow up information about today's clinic visit or your schedule please contact [facility] directly at [phone].  Normal or non-critical lab and imaging results will be communicated to you by [employer]hart, letter or phone within 4 business days after the clinic has received the results. If you do not hear from us within 7 days, please contact the clinic through [employer] or phone. If you have a critical or abnormal lab result, we will notify you by phone as soon as possible.  Submit refill requests through [email or web address] or call your pharmacy and they will forward the refill request to us. Please allow 3 business days for your refill to be completed.          Additional Information About Your Visit        MyChart Information     [email or web address] lets you send messages to your doctor, view your test results, renew your prescriptions, schedule appointments and more. To sign up, go to [email or web address] . Click on \"Log in\" on the left side of the screen, which will take you to the Welcome page. Then click on \"Sign up Now\" on the right side of the page.     You will be asked to enter the access code listed below, as well as some personal information. Please follow the directions to create your username and password.     Your access code is: [email or web address]  Expires: 2017 12:37 PM     Your access code will  in 90 days. If you need help or a new code, please call your [facility] or [phone].        Care EveryWhere ID     This is your Care EveryWhere " ID. This could be used by other organizations to access your [facility] medical records  [id number]        Your Vitals Were     Pulse Pulse Oximetry                72 98%           Blood Pressure from Last 3 Encounters:   11/20/17 140/70   11/08/17 154/68   10/18/17 188/74    Weight from Last 3 Encounters:   11/08/17 160 lb (72.6 kg)   10/18/17 162 lb (73.5 kg)   10/02/17 161 lb 6.4 oz (73.2 kg)              Today, you had the following     No orders found for display       Primary Care Provider Office Phone # Fax #    Rashi Calle [phone]       [address] [facility] [address]        Equal Access to Services     DIANELYS LOW : Hadii bob barrett hadasho Sorubaali, waaxda luqadaha, qaybta kaalmada adeegyada, adriana farias . So [facility] [phone].    ATENCIÓN: Si habla español, tiene a winchester disposición servicios gratuitos de asistencia lingüística. [facility] [phone].    We comply with applicable federal civil rights laws and [place] laws. We do not discriminate on the basis of race, color, national origin, age, disability, sex, sexual orientation, or gender identity.            Thank you!     Thank you for choosing [facility]  for your care. Our goal is always to provide you with excellent care. Hearing back from our patients is one way we can continue to improve our services. Please take a few minutes to complete the written survey that you may receive in the mail after your visit with us. Thank you!             Your Updated Medication List - Protect others around you: Learn how to safely use, store and throw away your medicines at www.disposemymeds.org.          This list is accurate as of: 11/20/17  8:43 AM.  Always use your most recent med list.                   Brand Name Dispense Instructions for use Diagnosis    acetaminophen-codeine 300-30 MG per tablet    TYLENOL #3    90 tablet    Take 1-2 tablets by mouth every 6 hours as needed for  moderate pain    Primary osteoarthritis involving multiple joints       albuterol 108 (90 BASE) MCG/ACT Inhaler    PROAIR HFA/PROVENTIL HFA/VENTOLIN HFA    3 Inhaler    Inhale 2 puffs into the lungs every 6 hours as needed for shortness of breath / dyspnea    COPD, moderate (H)       amLODIPine 5 MG tablet    NORVASC    90 tablet    Take 1 tablet (5 mg) by mouth daily    Hypertension goal BP (blood pressure) < 140/90, CKD (chronic kidney disease) stage 4, GFR 15-29 ml/min (H)       ASPIRIN NOT PRESCRIBED    INTENTIONAL    0 each    Please choose reason not prescribed, below    Hypertension goal BP (blood pressure) < 140/90       budesonide-formoterol 160-4.5 MCG/ACT Inhaler    SYMBICORT    10.2 g    Inhale 2 puffs into the lungs 2 times daily    COPD, moderate (H)       calcium carbonate 500 MG chewable tablet    TUMS     Take 2 chew tab by mouth daily as needed for heartburn        furosemide 20 MG tablet    LASIX    180 tablet    40 mg in am    Hypertension goal BP (blood pressure) < 140/90, CKD (chronic kidney disease) stage 4, GFR 15-29 ml/min (H)       IBUPROFEN PO      Take 400 mg by mouth every 6 hours as needed for moderate pain        ipratropium - albuterol 0.5 mg/2.5 mg/3 mL 0.5-2.5 (3) MG/3ML neb solution    DUONEB    270 mL    NEBULIZE CONTENTS OF ONE VIAL EVERY 6 HOURS AS NEEDED FOR SHORTNESS OF BREATH/ DYSPNEA OR WHEEZING    COPD, moderate (H)       losartan 50 MG tablet    COZAAR    180 tablet    Take 2 tablets (100 mg) by mouth daily    Hypertension goal BP (blood pressure) < 140/90, CKD (chronic kidney disease) stage 4, GFR 15-29 ml/min (H)       metoprolol 50 MG 24 hr tablet    TOPROL-XL    180 tablet    Take 1 tablet (50 mg) by mouth 2 times daily (2 x 50mg = 100mg)    Hypertension goal BP (blood pressure) < 140/90

## 2017-11-20 NOTE — PROGRESS NOTES
Hypertension Follow-up      Outpatient blood pressures are not being checked.    Low Salt Diet: no added salt        Amount of exercise or physical activity: None    Problems taking medications regularly: No    Medication side effects: none    Diet: regular (no restrictions) and low salt    Denies: CP, SOB , headaches, blurred vision, nausea, vomiting     Huddled with MD GENESIS - BP is good no changes     Ariadna Starks RN, BSN  [facility]

## 2017-11-21 ENCOUNTER — TELEPHONE (OUTPATIENT)
Dept: FAMILY MEDICINE | Facility: CLINIC | Age: 82
End: 2017-11-21

## 2017-11-21 NOTE — TELEPHONE ENCOUNTER
Reason for Call:  Same Day Appointment, Requested Provider:  Rashi Calle MD    PCP: Rashi Calle    Reason for visit: The patient has an appointment with Dr. Calle on 12/05/2017 at 9:20 a.m. She is wondering if Dr. Calle can work her in sooner than that. She wants an early morning appointment. It's for a low sodium followup. She also mentions that she has one kidney so is wondering about urinary issues as well.    Duration of symptoms: Unsure    Have you been treated for this in the past? Yes    Can we leave a detailed message on this number? YES    Phone number patient can be reached at: Home number on file [phone] (home)    Best Time: Anytime    Call taken on 11/21/2017 at 2:26 PM by Leslie Garduno

## 2017-11-28 DIAGNOSIS — J44.9 COPD, MODERATE (H): ICD-10-CM

## 2017-11-29 NOTE — TELEPHONE ENCOUNTER
Requested Prescriptions   Pending Prescriptions Disp Refills     VENTOLIN  (90 BASE) MCG/ACT Inhaler [Pharmacy Med Name: VENTOLIN HFA 108MCG/ACT AERS]  Last Written Prescription Date:  10/12/2016  Last Fill Quantity: 3 Inhaler,  # refills: 3   Last Office Visit with G, P or [facility] prescribing provider:  11/8/2017   Future Office Visit:    Next 5 appointments (look out 90 days)     Dec 05, 2017  9:20 AM CST   [facility]le [facility]    [address] [place] [id number]   [phone]                  54 g 3     Sig: INHALE TWO PUFFS INTO THE LUNGS EVERY 6 HOURS AS NEEDED FOR SHORTNESS OF BREATH/DYSPNEA    Asthma Maintenance Inhalers - Anticholinergics Passed    11/28/2017  2:22 PM       Passed - Patient is age 12 years or older       Passed - Recent or future visit with authorizing provider's specialty    Patient had office visit in the last year or has a visit in the next 30 days with authorizing provider.  See chart review.

## 2017-11-30 ENCOUNTER — ALLIED HEALTH/NURSE VISIT (OUTPATIENT)
Dept: FAMILY MEDICINE | Facility: CLINIC | Age: 82
End: 2017-11-30
Payer: MEDICARE

## 2017-11-30 ENCOUNTER — TELEPHONE (OUTPATIENT)
Dept: FAMILY MEDICINE | Facility: CLINIC | Age: 82
End: 2017-11-30

## 2017-11-30 VITALS — SYSTOLIC BLOOD PRESSURE: 148 MMHG | DIASTOLIC BLOOD PRESSURE: 62 MMHG

## 2017-11-30 DIAGNOSIS — Z01.30 BP CHECK: Primary | ICD-10-CM

## 2017-11-30 DIAGNOSIS — J44.9 COPD, MODERATE (H): ICD-10-CM

## 2017-11-30 PROCEDURE — 99207 ZZC NO CHARGE NURSE ONLY: CPT | Performed by: FAMILY MEDICINE

## 2017-11-30 RX ORDER — ALBUTEROL SULFATE 90 UG/1
AEROSOL, METERED RESPIRATORY (INHALATION)
Qty: 54 G | Refills: 0 | Status: SHIPPED | OUTPATIENT
Start: 2017-11-30 | End: 2017-11-30

## 2017-11-30 RX ORDER — ALBUTEROL SULFATE 90 UG/1
AEROSOL, METERED RESPIRATORY (INHALATION)
Qty: 54 G | Refills: 0 | Status: SHIPPED | OUTPATIENT
Start: 2017-11-30 | End: 2018-02-19

## 2017-11-30 RX ORDER — ALBUTEROL SULFATE 90 UG/1
2 AEROSOL, METERED RESPIRATORY (INHALATION) EVERY 6 HOURS PRN
Qty: 3 INHALER | Refills: 3 | Status: CANCELLED | OUTPATIENT
Start: 2017-11-30

## 2017-11-30 NOTE — MR AVS SNAPSHOT
"              After Visit Summary   11/30/2017    Anat Correa    MRN: [id number]           Patient Information     Date Of Birth          1935        Visit Information        Provider Department      11/30/2017 1:47 PM Rashi Calle MD [facility]        Today's Diagnoses     BP check    -  1       Follow-ups after your visit        Your next 10 appointments already scheduled     Dec 05, 2017  9:20 AM CST   SHORT with Rashi aClle MD   [facility] ([facility])    [address] [place] [address] [phone] [phone]              Who to contact     If you have questions or need follow up information about today's clinic visit or your schedule please contact [facility] directly at [phone].  Normal or non-critical lab and imaging results will be communicated to you by MyChart, letter or phone within 4 business days after the clinic has received the results. If you do not hear from us within 7 days, please contact the clinic through MyChart or phone. If you have a critical or abnormal lab result, we will notify you by phone as soon as possible.  Submit refill requests through [employer] or call your pharmacy and they will forward the refill request to us. Please allow 3 business days for your refill to be completed.          Additional Information About Your Visit        MyChart Information     Page[employer]zer lets you send messages to your doctor, view your test results, renew your prescriptions, schedule appointments and more. To sign up, go to [email or web address] . Click on \"Log in\" on the left side of the screen, which will take you to the Welcome page. Then click on \"Sign up Now\" on the right side of the page.     You will be asked to enter the access code listed below, as well as some personal information. Please follow the directions to create your username and password.     Your access code is: [email or web address]  Expires: " 2018  1:50 PM     Your access code will  in 90 days. If you need help or a new code, please call your [facility] clinic or [phone].        Care EveryWhere ID     This is your Care EveryWhere ID. This could be used by other organizations to access your [facility] medical records  [id number]         Blood Pressure from Last 3 Encounters:   17 148/62   17 140/70   17 154/68    Weight from Last 3 Encounters:   17 160 lb (72.6 kg)   10/18/17 162 lb (73.5 kg)   10/02/17 161 lb 6.4 oz (73.2 kg)              Today, you had the following     No orders found for display         Today's Medication Changes          These changes are accurate as of: 17  1:50 PM.  If you have any questions, ask your nurse or doctor.               Start taking these medicines.        Dose/Directions    albuterol 108 (90 BASE) MCG/ACT Inhaler   Commonly known as:  VENTOLIN HFA   Used for:  COPD, moderate (H)   Started by:  Rashi Calle MD        INHALE TWO PUFFS INTO THE LUNGS EVERY 6 HOURS AS NEEDED FOR SHORTNESS OF BREATH/DYSPNEA   Quantity:  54 g   Refills:  0            Where to get your medicines      These medications were sent to [facility] [place] [address]     Phone:  [phone]     albuterol 108 (90 BASE) MCG/ACT Inhaler                Primary Care Provider Office Phone # Fax #    Rashi Calle [phone]       [address] [place] [address]        Equal Access to Services     [facility] : Hadii bob Murphy, waaxda luqadaha, qaybta kaaladriana agustin. So [facility] [phone].    ATENCIÓN: Si habla español, tiene a winchester disposición servicios gratuitos de asistencia lingüística. Llame al [phone].    We comply with applicable federal civil rights laws and [place] laws. We do not discriminate on the basis of race, color,  national origin, age, disability, sex, sexual orientation, or gender identity.            Thank you!     Thank you for choosing [facility]  for your care. Our goal is always to provide you with excellent care. Hearing back from our patients is one way we can continue to improve our services. Please take a few minutes to complete the written survey that you may receive in the mail after your visit with us. Thank you!             Your Updated Medication List - Protect others around you: Learn how to safely use, store and throw away your medicines at www.disposemymeds.org.          This list is accurate as of: 11/30/17  1:50 PM.  Always use your most recent med list.                   Brand Name Dispense Instructions for use Diagnosis    acetaminophen-codeine 300-30 MG per tablet    TYLENOL #3    90 tablet    Take 1-2 tablets by mouth every 6 hours as needed for moderate pain    Primary osteoarthritis involving multiple joints       albuterol 108 (90 BASE) MCG/ACT Inhaler    VENTOLIN HFA    54 g    INHALE TWO PUFFS INTO THE LUNGS EVERY 6 HOURS AS NEEDED FOR SHORTNESS OF BREATH/DYSPNEA    COPD, moderate (H)       amLODIPine 5 MG tablet    NORVASC    90 tablet    Take 1 tablet (5 mg) by mouth daily    Hypertension goal BP (blood pressure) < 140/90, CKD (chronic kidney disease) stage 4, GFR 15-29 ml/min (H)       ASPIRIN NOT PRESCRIBED    INTENTIONAL    0 each    Please choose reason not prescribed, below    Hypertension goal BP (blood pressure) < 140/90       budesonide-formoterol 160-4.5 MCG/ACT Inhaler    SYMBICORT    10.2 g    Inhale 2 puffs into the lungs 2 times daily    COPD, moderate (H)       calcium carbonate 500 MG chewable tablet    TUMS     Take 2 chew tab by mouth daily as needed for heartburn        furosemide 20 MG tablet    LASIX    180 tablet    40 mg in am    Hypertension goal BP (blood pressure) < 140/90, CKD (chronic kidney disease) stage 4, GFR 15-29 ml/min (H)       IBUPROFEN PO       Take 400 mg by mouth every 6 hours as needed for moderate pain        ipratropium - albuterol 0.5 mg/2.5 mg/3 mL 0.5-2.5 (3) MG/3ML neb solution    DUONEB    270 mL    NEBULIZE CONTENTS OF ONE VIAL EVERY 6 HOURS AS NEEDED FOR SHORTNESS OF BREATH/ DYSPNEA OR WHEEZING    COPD, moderate (H)       losartan 50 MG tablet    COZAAR    180 tablet    Take 2 tablets (100 mg) by mouth daily    Hypertension goal BP (blood pressure) < 140/90, CKD (chronic kidney disease) stage 4, GFR 15-29 ml/min (H)       metoprolol 50 MG 24 hr tablet    TOPROL-XL    180 tablet    Take 1 tablet (50 mg) by mouth 2 times daily (2 x 50mg = 100mg)    Hypertension goal BP (blood pressure) < 140/90

## 2017-11-30 NOTE — PROGRESS NOTES
Anat Correa is enrolled/participating in the retail pharmacy Blood Pressure Goals Achievement Program (BPGAP).  Anat Correa was evaluated at [facility] on November 30, 2017 at which time her blood pressure was:    BP Readings from Last 3 Encounters:   11/30/17 148/62   11/20/17 140/70   11/08/17 154/68     Reviewed lifestyle modifications for blood pressure control and reduction: including making healthy food choices, managing weight, getting regular exercise, smoking cessation, reducing alcohol consumption, monitoring blood pressure regularly.     Anat Correa is not experiencing symptoms.    Follow-Up: BP is not at goal of < 140/90mmHg (patient 18+ years of age with or without diabetes), Recommended follow-up in 1 month at the pharmacy. Routing to PCP as an FYI. Patient has an appointment next week with MD.          Completed by: Tegan Busby [facility]   [phone]    Reviewed, agree.

## 2017-11-30 NOTE — TELEPHONE ENCOUNTER
Prescription approved per [facility] Refill Protocol.  Nathalie Joseph RN  [facility]

## 2017-12-05 ENCOUNTER — OFFICE VISIT (OUTPATIENT)
Dept: FAMILY MEDICINE | Facility: CLINIC | Age: 82
End: 2017-12-05
Payer: MEDICARE

## 2017-12-05 VITALS
WEIGHT: 160 LBS | DIASTOLIC BLOOD PRESSURE: 66 MMHG | HEART RATE: 76 BPM | BODY MASS INDEX: 30.21 KG/M2 | SYSTOLIC BLOOD PRESSURE: 144 MMHG | HEIGHT: 61 IN | OXYGEN SATURATION: 94 % | TEMPERATURE: 98 F

## 2017-12-05 DIAGNOSIS — M15.0 PRIMARY OSTEOARTHRITIS INVOLVING MULTIPLE JOINTS: ICD-10-CM

## 2017-12-05 DIAGNOSIS — I10 HYPERTENSION GOAL BP (BLOOD PRESSURE) < 140/90: ICD-10-CM

## 2017-12-05 DIAGNOSIS — Z51.81 MEDICATION MONITORING ENCOUNTER: ICD-10-CM

## 2017-12-05 DIAGNOSIS — D64.9 ANEMIA, UNSPECIFIED TYPE: ICD-10-CM

## 2017-12-05 DIAGNOSIS — N18.4 CKD (CHRONIC KIDNEY DISEASE) STAGE 4, GFR 15-29 ML/MIN (H): ICD-10-CM

## 2017-12-05 DIAGNOSIS — C34.12 MALIGNANT NEOPLASM OF UPPER LOBE OF LEFT LUNG (H): ICD-10-CM

## 2017-12-05 DIAGNOSIS — N25.81 SECONDARY RENAL HYPERPARATHYROIDISM (H): ICD-10-CM

## 2017-12-05 DIAGNOSIS — E78.5 HYPERLIPIDEMIA LDL GOAL <100: ICD-10-CM

## 2017-12-05 DIAGNOSIS — G63 POLYNEUROPATHY ASSOCIATED WITH UNDERLYING DISEASE (H): ICD-10-CM

## 2017-12-05 DIAGNOSIS — J44.9 COPD, MODERATE (H): ICD-10-CM

## 2017-12-05 DIAGNOSIS — I87.8 VENOUS STASIS OF LOWER EXTREMITY: ICD-10-CM

## 2017-12-05 DIAGNOSIS — N18.4 ANEMIA IN STAGE 4 CHRONIC KIDNEY DISEASE (H): ICD-10-CM

## 2017-12-05 DIAGNOSIS — M81.0 OSTEOPOROSIS WITHOUT CURRENT PATHOLOGICAL FRACTURE, UNSPECIFIED OSTEOPOROSIS TYPE: ICD-10-CM

## 2017-12-05 DIAGNOSIS — D63.1 ANEMIA IN STAGE 4 CHRONIC KIDNEY DISEASE (H): ICD-10-CM

## 2017-12-05 DIAGNOSIS — E87.1 HYPONATREMIA: Primary | ICD-10-CM

## 2017-12-05 DIAGNOSIS — E55.9 VITAMIN D DEFICIENCY: ICD-10-CM

## 2017-12-05 LAB
ALBUMIN UR-MCNC: NEGATIVE MG/DL
ANION GAP SERPL CALCULATED.3IONS-SCNC: 11 MMOL/L (ref 3–14)
APPEARANCE UR: CLEAR
BACTERIA #/AREA URNS HPF: ABNORMAL /HPF
BILIRUB UR QL STRIP: NEGATIVE
BUN SERPL-MCNC: 34 MG/DL (ref 7–30)
CALCIUM SERPL-MCNC: 9.9 MG/DL (ref 8.5–10.1)
CHLORIDE SERPL-SCNC: 97 MMOL/L (ref 94–109)
CO2 SERPL-SCNC: 22 MMOL/L (ref 20–32)
COLOR UR AUTO: YELLOW
CREAT SERPL-MCNC: 1.64 MG/DL (ref 0.52–1.04)
ERYTHROCYTE [DISTWIDTH] IN BLOOD BY AUTOMATED COUNT: 12.9 % (ref 10–15)
GFR SERPL CREATININE-BSD FRML MDRD: 30 ML/MIN/1.7M2
GLUCOSE SERPL-MCNC: 95 MG/DL (ref 70–99)
GLUCOSE UR STRIP-MCNC: NEGATIVE MG/DL
HCT VFR BLD AUTO: 33 % (ref 35–47)
HGB BLD-MCNC: 11.2 G/DL (ref 11.7–15.7)
HGB UR QL STRIP: NEGATIVE
KETONES UR STRIP-MCNC: NEGATIVE MG/DL
LEUKOCYTE ESTERASE UR QL STRIP: ABNORMAL
MCH RBC QN AUTO: 31.3 PG (ref 26.5–33)
MCHC RBC AUTO-ENTMCNC: 33.9 G/DL (ref 31.5–36.5)
MCV RBC AUTO: 92 FL (ref 78–100)
NITRATE UR QL: NEGATIVE
OSMOLALITY SERPL: 275 MMOL/KG (ref 280–301)
OSMOLALITY UR: 344 MMOL/KG (ref 100–1200)
PH UR STRIP: 7 PH (ref 5–7)
PLATELET # BLD AUTO: 336 10E9/L (ref 150–450)
POTASSIUM SERPL-SCNC: 4.9 MMOL/L (ref 3.4–5.3)
RBC # BLD AUTO: 3.58 10E12/L (ref 3.8–5.2)
RBC #/AREA URNS AUTO: ABNORMAL /HPF
SODIUM SERPL-SCNC: 130 MMOL/L (ref 133–144)
SODIUM UR-SCNC: 53 MMOL/L
SOURCE: ABNORMAL
SP GR UR STRIP: 1.01 (ref 1–1.03)
UROBILINOGEN UR STRIP-ACNC: 0.2 EU/DL (ref 0.2–1)
WBC # BLD AUTO: 9.9 10E9/L (ref 4–11)
WBC #/AREA URNS AUTO: ABNORMAL /HPF

## 2017-12-05 PROCEDURE — 99214 OFFICE O/P EST MOD 30 MIN: CPT | Performed by: FAMILY MEDICINE

## 2017-12-05 PROCEDURE — 85027 COMPLETE CBC AUTOMATED: CPT | Performed by: FAMILY MEDICINE

## 2017-12-05 PROCEDURE — 83935 ASSAY OF URINE OSMOLALITY: CPT | Performed by: FAMILY MEDICINE

## 2017-12-05 PROCEDURE — 84300 ASSAY OF URINE SODIUM: CPT | Performed by: FAMILY MEDICINE

## 2017-12-05 PROCEDURE — 81001 URINALYSIS AUTO W/SCOPE: CPT | Performed by: FAMILY MEDICINE

## 2017-12-05 PROCEDURE — 36415 COLL VENOUS BLD VENIPUNCTURE: CPT | Performed by: FAMILY MEDICINE

## 2017-12-05 PROCEDURE — 83930 ASSAY OF BLOOD OSMOLALITY: CPT | Performed by: FAMILY MEDICINE

## 2017-12-05 PROCEDURE — 80048 BASIC METABOLIC PNL TOTAL CA: CPT | Performed by: FAMILY MEDICINE

## 2017-12-05 NOTE — PROGRESS NOTES
SUBJECTIVE:   Anat Correa is a 82 year old female who presents to clinic today for the following health issues:    Recheck on sodium - labs pending.     Hyperlipidemia -- monitored.     Recent Labs   Lab Test  03/09/16   0810  06/01/15   0818  09/25/12   0723   CHOL  211*  209*  156   HDL  54  55  49*   LDL  130*  128  83   TRIG  136  132  118   CHOLHDLRATIO   --   3.8  3.2     Hyperparathyroidism -- monitored.     Lab Results   Component Value Date    TSH 2.99 06/02/2017     Hypertension -- losartan 2 x 50 mg daily, metoprolol 50 mg twice daily, and amlodipine 5 mg daily. Anat believes elevated bp is due to walking/driving in the snow. Anat expressed interest in starting lisinopril again.     BP Readings from Last 3 Encounters:   12/05/17 144/66   11/30/17 148/62   11/20/17 140/70     CKD -- stage 4.    Creatinine   Date Value Ref Range Status   11/08/2017 1.43 (H) 0.52 - 1.04 mg/dL Final     COPD -- stopped taking symbicort due to price. Improved with duobed twice daily and ventolin inhaler as needed.     Problem list and histories reviewed & adjusted, as indicated.  Additional history: as documented    Reviewed and updated as needed this visit by clinical staff  Tobacco  Allergies  Meds  Med Hx  Surg Hx  Fam Hx  Soc Hx      Reviewed and updated as needed this visit by Provider         Wt Readings from Last 4 Encounters:   12/05/17 160 lb (72.6 kg)   11/08/17 160 lb (72.6 kg)   10/18/17 162 lb (73.5 kg)   10/02/17 161 lb 6.4 oz (73.2 kg)       Health Maintenance    Health Maintenance Due   Topic Date Due     URINE DRUG SCREEN Q1 YR  01/25/1950     MEDICARE ANNUAL WELLNESS VISIT  11/21/2015     WELLNESS VISIT Q1 YR  11/21/2015     DEXA Q3 YR  01/11/2016     COPD ACTION PLAN Q1 YR  06/01/2016     LIPID MONITORING Q1 YEAR  03/09/2017     MICROALBUMIN Q1 YEAR  03/09/2017       Current Problem List    Patient Active Problem List   Diagnosis     History of colonic polyps     Calculus of kidney      Lesion of plantar nerve     Osteoporosis     Primary iridocyclitis     Anemia     Hyperlipidemia LDL goal <100     OA (osteoarthritis)     Advanced directives, counseling/discussion     Hypertension goal BP (blood pressure) < 140/90     COPD, moderate     Controlled substance agreement signed     Vitamin D deficiency     CKD (chronic kidney disease) stage 4, GFR 15-29 ml/min (H)     Anemia in chronic renal disease     Secondary renal hyperparathyroidism (H)     Venous stasis of lower extremity     Peripheral neuropathy     Chronic pain     Lung nodule     Nodule of left lung     Malignant neoplasm of upper lobe of left lung (H)     Acute pain of right shoulder     S/P amputation of lesser toe, unspecified laterality (H)     Retinal hemorrhage of right eye       Past Medical History    Past Medical History:   Diagnosis Date     Anemia      Calculus of kidney 1998    dr hope     Chronic pain     OA     Chronic pain      CKD (chronic kidney disease) stage 4, GFR 15-29 ml/min (H) 2008    dr mcdermott     COPD, moderate (H) 2004    moderate obstruction, with pulm htn - dr francisco did AAP     Diverticulosis of colon (without mention of hemorrhage) 7/03     Hemorrhage of gastrointestinal tract, unspecified 4/08    dr su     Hyperlipidemia LDL goal <100 2006     Hypertension goal BP (blood pressure) < 140/90 2005     Internal hemorrhoids without mention of complication 7/03     Irritable bowel syndrome 7/03     Lesion of plantar nerve 8/98    hay's neuroma dr connor     Lung nodule 4/14, 3/16    Dr Thompson, 1.4 x 1.1 cm, lingula, PET neg 3/16     Malignant neoplasm of upper lobe of left lung (H) 7/16    Dr Thompson - x 2 nodules - moderately differentiated adenocarcinoma (acinar predominant).  Final pathologic stage [id number], Final clinical stage IA, grade 2     Medication management     OA - 1 T#3 at HS with HX CKD     OA (osteoarthritis) 1998    lower ext worse katya knees     Obesity (BMI 30.0-34.9)      Osteoporosis,  unspecified     FOSAMAX  = upset stomach , pt declines further rx.      Other ulcerative colitis     collangenous collitis- last colonoscopy       Peripheral neuropathy     early - burning at HS     Personal history of colonic polyps     dr araujo     PONV (postoperative nausea and vomiting)      Primary iridocyclitis     iritis dr dominguez     Venous stasis of lower extremity        Past Surgical History    Past Surgical History:   Procedure Laterality Date     AMPUTATE TOE(S) Right 2015    Procedure: AMPUTATE TOE(S);  Surgeon: Ashia White, DPM, Pod;  Location: [facility] OR     C APPENDECTOMY       C  DELIVERY ONLY  1965    , Low Cervical     EXCISE MASS UPPER EXTREMITY  10/13/2011    Lt Forearm mass excision - dr ely     EXCISE MASS UPPER EXTREMITY  2012    Lt Forearm mass excision - dr eyl     HC COLONOSCOPY THRU STOMA, DIAGNOSTIC      IBS/diverticula/hemmorhoids dr araujo     HC ESOPHAGOSCOPY, DIAGNOSTIC  , , 6/10    Gastric ulcer, healed, gastritis     HC HEMORRHOIDECTOMY,INT/EXT,SIMPLE       HC REPAIR SLIDING INGUINAL HERNIA      mitra     SURGICAL HISTORY OF -       mitra neck & back tumors     SURGICAL HISTORY OF -       neuroma excision - 2nd toe amputation     SURGICAL HISTORY OF -   3/07    Rt IOL - dr jimenez     SURGICAL HISTORY OF -       Lt IOL - dr jimenez     SURGICAL HISTORY OF -       Lt Knee replacement - dr gayle     SURGICAL HISTORY OF -       Rt Knee replacement - dr gayle     SURGICAL HISTORY OF -   9/15    Rt Second toe amputation - Dr White     THORACOSCOPIC WEDGE RESECTION LUNG Left 2016    Procedure: THORACOSCOPIC WEDGE RESECTION LUNG;  Surgeon: Abdelrahman Thompson MD;  Location: [facility] OR     XR JOINT INJECTION HIP (HIB)  2015    Rt - Dr Terry       Current Medications    Current Outpatient Prescriptions   Medication Sig Dispense Refill     albuterol (VENTOLIN HFA) 108 (90 BASE)  MCG/ACT Inhaler INHALE TWO PUFFS INTO THE LUNGS EVERY 6 HOURS AS NEEDED FOR SHORTNESS OF BREATH/DYSPNEA 54 g 0     ASPIRIN NOT PRESCRIBED (INTENTIONAL) Please choose reason not prescribed, below 0 each 0     losartan (COZAAR) 50 MG tablet Take 2 tablets (100 mg) by mouth daily 180 tablet 3     acetaminophen-codeine (TYLENOL #3) 300-30 MG per tablet Take 1-2 tablets by mouth every 6 hours as needed for moderate pain 90 tablet 0     metoprolol (TOPROL-XL) 50 MG 24 hr tablet Take 1 tablet (50 mg) by mouth 2 times daily (2 x 50mg = 100mg) 180 tablet 3     ipratropium - albuterol 0.5 mg/2.5 mg/3 mL (DUONEB) 0.5-2.5 (3) MG/3ML neb solution NEBULIZE CONTENTS OF ONE VIAL EVERY 6 HOURS AS NEEDED FOR SHORTNESS OF BREATH/ DYSPNEA OR WHEEZING 270 mL 3     furosemide (LASIX) 20 MG tablet 40 mg in am 180 tablet 3     amLODIPine (NORVASC) 5 MG tablet Take 1 tablet (5 mg) by mouth daily 90 tablet 3     IBUPROFEN PO Take 400 mg by mouth every 6 hours as needed for moderate pain       calcium carbonate (TUMS) 500 MG chewable tablet Take 2 chew tab by mouth daily as needed for heartburn         Allergies    Allergies   Allergen Reactions     Prednisone      Yeast infection - swollen lips       Immunizations    Immunization History   Administered Date(s) Administered     Influenza (High Dose) 3 valent vaccine 10/07/2010, 09/19/2012, 11/04/2013, 10/08/2014, 11/03/2015, 09/16/2016, 10/02/2017     Influenza (IIV3) PF 10/19/2004, 11/15/2005     Pneumococcal (PCV 13) 11/03/2015     Pneumococcal 23 valent 04/25/2006     TD (ADULT, 7+) 03/03/1998, 01/23/2006     TDAP Vaccine (Boostrix) 08/27/2012     Zoster vaccine, live 10/24/2012       Family History    Family History   Problem Relation Age of Onset     CEREBROVASCULAR DISEASE Mother      CEREBROVASCULAR DISEASE Father      Cancer - colorectal Brother      Cancer - colorectal Brother      Breast Cancer Sister      CANCER Sister      lung     CANCER Sister      lung     CANCER Sister       "lung     Scleroderma Sister        Social History    Social History     Social History     Marital status:      Spouse name: thanh     Number of children: Alex     Years of education: 12     Occupational History     part-time [employer] section at [employer]       Social History Main Topics     Smoking status: Former Smoker     Packs/day: 3.00     Years: 50.00     Types: Cigarettes     Quit date: 12/21/1993     Smokeless tobacco: Never Used      Comment: quit 1993     Alcohol use No     Drug use: No      Comment: no herbal meds either      Sexual activity: Not Currently      Comment:  for 24 years      Other Topics Concern     Caffeine Concern Yes     1 pot  qd - switched to decaff now     Special Diet Yes     Low salt     Exercise No     Seat Belt Yes     Self-Exams Yes     SBE encouraged motnhly      Parent/Sibling W/ Cabg, Mi Or Angioplasty Before 65f 55m? No     Social History Narrative    calcium - 3 large dairy servings/day - pt declines fosamax and other meds for her osteoporosis    flex sig/colonoscopy -last colonoscopy 7/03     sun precautions - discussed     mammogram - hasn't had one since 2001 at least - ordered     Td booster - 1/23/06    pneumovax -today 4/25/06    DEXA - pt declines repeat scan today 4/25/06 despite her osteoporosis     stool hemoccults - every year after age 40    ASA- easy bruising - can't take     mulvitamin - encouraged       All above reviewed and updated, all stable unless otherwise noted    Recent labs reviewed    ROS:  10 point ROS of systems including Constitutional, Eyes, Respiratory, Cardiovascular, Gastroenterology, Genitourinary, Integumentary, Muscularskeletal, Psychiatric were all negative except for pertinent positives noted in my HPI.    OBJECTIVE:                                                    /66  Pulse 76  Temp 98  F (36.7  C) (Oral)  Ht 5' 1\" (1.549 m)  Wt 160 lb (72.6 kg)  SpO2 94%  BMI 30.23 kg/m2  Body mass index is 30.23 " kg/(m^2).  GENERAL: healthy, alert and no distress  EYES: Eyes grossly normal to inspection, extraocular movements - intact, and PERRL  HENT: ear canals and TM's normal upon viewing with otoscope, nose and mouth without ulcers or lesions upon viewing with otoscope  RESP: lungs clear to auscultation - no rales, no rhonchi, no wheezes  CV: regular rates and rhythm, normal S1 S2, no S3 or S4 and no murmur, no click or rub -  ABDOMEN: soft, no tenderness, no  hepatosplenomegaly, no masses, normal bowel sounds  MS: extremities- no gross deformities noted, no edema, chronic venous stasis bilateral lower extremities    SKIN: no suspicious lesions, no rashes  NEURO: mentation intact and speech normal  BACK: no CVA tenderness, no paralumbar tenderness  PSYCH: Alert and oriented times 3; speech- coherent , normal rate and volume; able to articulate logical thoughts, able to abstract reason, no tangential thoughts, no hallucinations or delusions, affect- normal    DIAGNOSTICS/PROCEDURES:                                                      No results found for this or any previous visit (from the past 24 hour(s)).     Results for orders placed or performed in visit on 11/20/17   Sodium   Result Value Ref Range    Sodium 128 (L) 133 - 144 mmol/L        ASSESSMENT/PLAN:                                                        ICD-10-CM    1. Hyponatremia E87.1 Osmolality     Basic metabolic panel  (Ca, Cl, CO2, Creat, Gluc, K, Na, BUN)   2. CKD (chronic kidney disease) stage 4, GFR 15-29 ml/min (H) N18.4 Basic metabolic panel  (Ca, Cl, CO2, Creat, Gluc, K, Na, BUN)   3. COPD, moderate J44.9    4. Hypertension goal BP (blood pressure) < 140/90 I10 Basic metabolic panel  (Ca, Cl, CO2, Creat, Gluc, K, Na, BUN)   5. Hyperlipidemia LDL goal <100 E78.5 Basic metabolic panel  (Ca, Cl, CO2, Creat, Gluc, K, Na, BUN)   6. Primary osteoarthritis involving multiple joints M15.0    7. Anemia, unspecified type D64.9 CBC with platelets   8.  Anemia in stage 4 chronic kidney disease (H) N18.4 CBC with platelets    D63.1    9. Secondary renal hyperparathyroidism (H) N25.81    10. Venous stasis of lower extremity I87.8    11. Polyneuropathy associated with underlying disease (H) G63    12. Malignant neoplasm of upper lobe of left lung (H) C34.12 Basic metabolic panel  (Ca, Cl, CO2, Creat, Gluc, K, Na, BUN)   13. Vitamin D deficiency E55.9    14. Osteoporosis without current pathological fracture, unspecified osteoporosis type M81.0    15. Medication monitoring encounter Z51.81 *UA reflex to Microscopic and Culture ([place] and [facility] (except [place] and [place])     Basic metabolic panel  (Ca, Cl, CO2, Creat, Gluc, K, Na, BUN)     CBC with platelets     Discussed treatment/modality options, including risk and benefits, she desires further health care maintenance, further lab(s) and observation. All diagnosis above reviewed and noted above, otherwise stable.  See [employer] orders for further details.  Follow up in 4-6 month(s) and as needed.    Urine culture pending per Pt request.     [employer] for coupons.     Advised medication therapy management - check with Alysa    Labs pending.     Health Maintenance Due   Topic Date Due     URINE DRUG SCREEN Q1 YR  01/25/1950     MEDICARE ANNUAL WELLNESS VISIT  11/21/2015     WELLNESS VISIT Q1 YR  11/21/2015     DEXA Q3 YR  01/11/2016     COPD ACTION PLAN Q1 YR  06/01/2016     LIPID MONITORING Q1 YEAR  03/09/2017     MICROALBUMIN Q1 YEAR  03/09/2017       See Patient Instructions    This document serves as a record of the services and decisions personally performed and made by [facility]. It was created on their behalf by Erika Renteria, a trained medical scribe. The creation of this document is based the provider's statements to the medical scribe. Erika Renteria December 5, 2017 9:19 AM              Rashi Calle MD [address]   [address]  [phone] Fax

## 2017-12-05 NOTE — PATIENT INSTRUCTIONS
[employer] for prescription coupons.    Advised medication therapy management -- check with Alysa.    Labs and urine culture pending - will follow up with results.       [facility]                        To reach your care team during and after hours:   [phone]  To reach our pharmacy:        [phone]    Clinic Hours                        Our clinic hours are:    Monday   7:30 am to 7:00 pm                  Tuesday through Friday 7:30 am to 5:00 pm                             Saturday   8:00 am to 12:00 pm      Sunday   Closed      Pharmacy Hours                        Our pharmacy hours are:    Monday   8:30 am to 7:00 pm       Tuesday to Friday  8:30 am to 6:00 pm                       Saturday    9:00 am to 1:00 pm              Sunday    Closed              There is also information available at our web site:  [email or web address]    If your provider ordered any lab tests and you do not receive the results within 10 business days, please call the clinic.    If you need a medication refill please contact your pharmacy.  Please allow 2-3 business days for your refill to be completed.    Our clinic offers telephone visits and e visits.  Please ask one of your team members to explain more.      Use [employer] (secure email communication and access to your chart) to send your primary care provider a message or make an appointment. Ask someone on your Team how to sign up for [employer].  Immunizations                      Immunization History   Administered Date(s) Administered     Influenza (High Dose) 3 valent vaccine 10/07/2010, 09/19/2012, 11/04/2013, 10/08/2014, 11/03/2015, 09/16/2016, 10/02/2017     Influenza (IIV3) PF 10/19/2004, 11/15/2005     Pneumococcal (PCV 13) 11/03/2015     Pneumococcal 23 valent 04/25/2006     TD (ADULT, 7+) 03/03/1998, 01/23/2006     TDAP Vaccine (Boostrix) 08/27/2012     Zoster vaccine, live 10/24/2012        Health Maintenance                         Health Maintenance  Due   Topic Date Due     URINE DRUG SCREEN Q1 YR  01/25/1950     Medicare Annual Wellness Visit  11/21/2015     Wellness Visit with your Primary Provider - yearly  11/21/2015     Osteoporosis Screening (Dexa) - every 3 years   01/11/2016     COPD ACTION PLAN Q1 YR  06/01/2016     Cholesterol Lab - yearly  03/09/2017     Microalbumin Lab - yearly  03/09/2017

## 2017-12-05 NOTE — NURSING NOTE
"Chief Complaint   Patient presents with     RECHECK       Initial /76  Pulse 76  Temp 98  F (36.7  C) (Oral)  Ht 5' 1\" (1.549 m)  Wt 160 lb (72.6 kg)  SpO2 94%  BMI 30.23 kg/m2 Estimated body mass index is 30.23 kg/(m^2) as calculated from the following:    Height as of this encounter: 5' 1\" (1.549 m).    Weight as of this encounter: 160 lb (72.6 kg)..  BP completed using cuff size: james Rosario MA  "

## 2017-12-05 NOTE — MR AVS SNAPSHOT
After Visit Summary   12/5/2017    Anat Correa    MRN: [id number]           Patient Information     Date Of Birth          1935        Visit Information        Provider Department      12/5/2017 9:20 AM Rashi Calle MD [facility]  [place]        Today's Diagnoses     Hyponatremia    -  1    CKD (chronic kidney disease) stage 4, GFR 15-29 ml/min (H)        COPD, moderate        Hypertension goal BP (blood pressure) < 140/90        Hyperlipidemia LDL goal <100        Primary osteoarthritis involving multiple joints        Anemia, unspecified type        Anemia in stage 4 chronic kidney disease (H)        Secondary renal hyperparathyroidism (H)        Venous stasis of lower extremity        Polyneuropathy associated with underlying disease (H)        Malignant neoplasm of upper lobe of left lung (H)        Vitamin D deficiency        Osteoporosis without current pathological fracture, unspecified osteoporosis type        Medication monitoring encounter          Care Instructions    [employer] for prescription coupons.    Advised medication therapy management -- check with Alysa.    Labs and urine culture pending - will follow up with results.       [facility] [place]                        To reach your care team during and after hours:   [phone]  To reach our pharmacy:        [phone]    Clinic Hours                        Our clinic hours are:    Monday   7:30 am to 7:00 pm                  Tuesday through Friday 7:30 am to 5:00 pm                             Saturday   8:00 am to 12:00 pm      Sunday   Closed      Pharmacy Hours                        Our pharmacy hours are:    Monday   8:30 am to 7:00 pm       Tuesday to Friday  8:30 am to 6:00 pm                       Saturday    9:00 am to 1:00 pm              Sunday    Closed              There is also information available at our web site:  [email or web address]    If your provider ordered any lab tests and you do  not receive the results within 10 business days, please call the clinic.    If you need a medication refill please contact your pharmacy.  Please allow 2-3 business days for your refill to be completed.    Our clinic offers telephone visits and e visits.  Please ask one of your team members to explain more.      Use [employer] (secure email communication and access to your chart) to send your primary care provider a message or make an appointment. Ask someone on your Team how to sign up for [employer].  Immunizations                      Immunization History   Administered Date(s) Administered     Influenza (High Dose) 3 valent vaccine 10/07/2010, 09/19/2012, 11/04/2013, 10/08/2014, 11/03/2015, 09/16/2016, 10/02/2017     Influenza (IIV3) PF 10/19/2004, 11/15/2005     Pneumococcal (PCV 13) 11/03/2015     Pneumococcal 23 valent 04/25/2006     TD (ADULT, 7+) 03/03/1998, 01/23/2006     TDAP Vaccine (Boostrix) 08/27/2012     Zoster vaccine, live 10/24/2012        Health Maintenance                         Health Maintenance Due   Topic Date Due     URINE DRUG SCREEN Q1 YR  01/25/1950     Medicare Annual Wellness Visit  11/21/2015     Wellness Visit with your Primary Provider - yearly  11/21/2015     Osteoporosis Screening (Dexa) - every 3 years   01/11/2016     COPD ACTION PLAN Q1 YR  06/01/2016     Cholesterol Lab - yearly  03/09/2017     Microalbumin Lab - yearly  03/09/2017                 Follow-ups after your visit        Who to contact     If you have questions or need follow up information about today's clinic visit or your schedule please contact [facility] directly at [phone].  Normal or non-critical lab and imaging results will be communicated to you by MyChart, letter or phone within 4 business days after the clinic has received the results. If you do not hear from us within 7 days, please contact the clinic through MyChart or phone. If you have a critical or abnormal lab result, we will notify  "you by phone as soon as possible.  Submit refill requests through [employer] or call your pharmacy and they will forward the refill request to us. Please allow 3 business days for your refill to be completed.          Additional Information About Your Visit        [email or web address] Information     [employer] lets you send messages to your doctor, view your test results, renew your prescriptions, schedule appointments and more. To sign up, go to [email or web address] . Click on \"Log in\" on the left side of the screen, which will take you to the Welcome page. Then click on \"Sign up Now\" on the right side of the page.     You will be asked to enter the access code listed below, as well as some personal information. Please follow the directions to create your username and password.     Your access code is: [email or web address]  Expires: 2018  1:50 PM     Your access code will  in 90 days. If you need help or a new code, please call your [place] clinic or [phone].        Care EveryWhere ID     This is your Care EveryWhere ID. This could be used by other organizations to access your [place] medical records  [id number]        Your Vitals Were     Pulse Temperature Height Pulse Oximetry BMI (Body Mass Index)       76 98  F (36.7  C) (Oral) 5' 1\" (1.549 m) 94% 30.23 kg/m2        Blood Pressure from Last 3 Encounters:   17 144/66   17 148/62   17 140/70    Weight from Last 3 Encounters:   17 160 lb (72.6 kg)   17 160 lb (72.6 kg)   10/18/17 162 lb (73.5 kg)              We Performed the Following     *UA reflex to Microscopic and Culture ([place] and [facility] (except [place] and [place])     Basic metabolic panel  (Ca, Cl, CO2, Creat, Gluc, K, Na, BUN)     CBC with platelets     Osmolality urine     Osmolality     Sodium random urine          Today's Medication Changes          These changes are accurate as of: 17  9:42 AM.  If you have any questions, ask your nurse or doctor.       "         Stop taking these medicines if you haven't already. Please contact your care team if you have questions.     budesonide-formoterol 160-4.5 MCG/ACT Inhaler   Commonly known as:  SYMBICORT   Stopped by:  Rashi Calle MD                    Primary Care Provider Office Phone # Fax #    Rashi Calle [phone]       [address] [place] [address]        Equal Access to Services     [facility]: Hadii aad ku hadasho Soomaali, waaxda luqadaha, qaybta kaalmada adeegyada, waxay idiin hayaan adeeg khshenash la'delln . So [facility] [phone].    ATENCIÓN: Si habla español, tiene a winchester disposición servicios gratuitos de asistencia lingüística. [facility] [phone].    We comply with applicable federal civil rights laws and [place] laws. We do not discriminate on the basis of race, color, national origin, age, disability, sex, sexual orientation, or gender identity.            Thank you!     Thank you for choosing [facility]  for your care. Our goal is always to provide you with excellent care. Hearing back from our patients is one way we can continue to improve our services. Please take a few minutes to complete the written survey that you may receive in the mail after your visit with us. Thank you!             Your Updated Medication List - Protect others around you: Learn how to safely use, store and throw away your medicines at www.disposemymeds.org.          This list is accurate as of: 12/5/17  9:42 AM.  Always use your most recent med list.                   Brand Name Dispense Instructions for use Diagnosis    acetaminophen-codeine 300-30 MG per tablet    TYLENOL #3    90 tablet    Take 1-2 tablets by mouth every 6 hours as needed for moderate pain    Primary osteoarthritis involving multiple joints       albuterol 108 (90 BASE) MCG/ACT Inhaler    VENTOLIN HFA    54 g    INHALE TWO PUFFS INTO THE LUNGS EVERY 6 HOURS AS NEEDED FOR SHORTNESS OF BREATH/DYSPNEA    COPD,  moderate (H)       amLODIPine 5 MG tablet    NORVASC    90 tablet    Take 1 tablet (5 mg) by mouth daily    Hypertension goal BP (blood pressure) < 140/90, CKD (chronic kidney disease) stage 4, GFR 15-29 ml/min (H)       ASPIRIN NOT PRESCRIBED    INTENTIONAL    0 each    Please choose reason not prescribed, below    Hypertension goal BP (blood pressure) < 140/90       calcium carbonate 500 MG chewable tablet    TUMS     Take 2 chew tab by mouth daily as needed for heartburn        furosemide 20 MG tablet    LASIX    180 tablet    40 mg in am    Hypertension goal BP (blood pressure) < 140/90, CKD (chronic kidney disease) stage 4, GFR 15-29 ml/min (H)       IBUPROFEN PO      Take 400 mg by mouth every 6 hours as needed for moderate pain        ipratropium - albuterol 0.5 mg/2.5 mg/3 mL 0.5-2.5 (3) MG/3ML neb solution    DUONEB    270 mL    NEBULIZE CONTENTS OF ONE VIAL EVERY 6 HOURS AS NEEDED FOR SHORTNESS OF BREATH/ DYSPNEA OR WHEEZING    COPD, moderate (H)       losartan 50 MG tablet    COZAAR    180 tablet    Take 2 tablets (100 mg) by mouth daily    Hypertension goal BP (blood pressure) < 140/90, CKD (chronic kidney disease) stage 4, GFR 15-29 ml/min (H)       metoprolol 50 MG 24 hr tablet    TOPROL-XL    180 tablet    Take 1 tablet (50 mg) by mouth 2 times daily (2 x 50mg = 100mg)    Hypertension goal BP (blood pressure) < 140/90

## 2017-12-11 ENCOUNTER — TELEPHONE (OUTPATIENT)
Dept: FAMILY MEDICINE | Facility: CLINIC | Age: 82
End: 2017-12-11

## 2017-12-11 NOTE — TELEPHONE ENCOUNTER
See lab result note.  I already spoke with her.    Cassandra Kim, BS, RN, N  [facility]) [phone]

## 2017-12-11 NOTE — TELEPHONE ENCOUNTER
Patient requesting lab results. Would like to speak to the nurse and a copy mailed to her home. Please advise.  [phone] (home) none (work)  Thank you  Juliana Stevenson

## 2017-12-19 DIAGNOSIS — M15.0 PRIMARY OSTEOARTHRITIS INVOLVING MULTIPLE JOINTS: ICD-10-CM

## 2017-12-19 NOTE — TELEPHONE ENCOUNTER
Requested Prescriptions   Pending Prescriptions Disp Refills     acetaminophen-codeine (TYLENOL #3) 300-30 MG per tablet 90 tablet 0     Sig: Take 1-2 tablets by mouth every 6 hours as needed for moderate pain    There is no refill protocol information for this order        Controlled Substance Refill Request for   acetaminophen-codeine (TYLENOL #3) 300-30 MG per tablet 90 tablet 0 9/25/2017       Problem List Complete:  No     PROVIDER TO CONSIDER COMPLETION OF PROBLEM LIST AND OVERVIEW/CONTROLLED SUBSTANCE AGREEMENT        Last Office Visit with [facility] primary care provider: 12/5/2017    Future Office visit:     Controlled substance agreement on file: No.     Processing:  Staff will hand deliver Rx to on-site pharmacy     checked in past 6 months?  No, route to RN     Ariadna Starks RN, BSN  [facility]

## 2018-02-17 DIAGNOSIS — N18.4 CKD (CHRONIC KIDNEY DISEASE) STAGE 4, GFR 15-29 ML/MIN (H): ICD-10-CM

## 2018-02-17 DIAGNOSIS — I10 HYPERTENSION GOAL BP (BLOOD PRESSURE) < 140/90: ICD-10-CM

## 2018-02-17 RX ORDER — AMLODIPINE BESYLATE 5 MG/1
TABLET ORAL
Qty: 90 TABLET | Refills: 3 | Status: CANCELLED | OUTPATIENT
Start: 2018-02-17

## 2018-02-19 ENCOUNTER — OFFICE VISIT (OUTPATIENT)
Dept: FAMILY MEDICINE | Facility: CLINIC | Age: 83
End: 2018-02-19
Payer: MEDICARE

## 2018-02-19 VITALS
WEIGHT: 164 LBS | HEIGHT: 61 IN | BODY MASS INDEX: 30.96 KG/M2 | HEART RATE: 73 BPM | SYSTOLIC BLOOD PRESSURE: 156 MMHG | DIASTOLIC BLOOD PRESSURE: 76 MMHG | OXYGEN SATURATION: 96 % | TEMPERATURE: 97.8 F

## 2018-02-19 DIAGNOSIS — N20.0 CALCULUS OF KIDNEY: ICD-10-CM

## 2018-02-19 DIAGNOSIS — R35.1 NOCTURIA: ICD-10-CM

## 2018-02-19 DIAGNOSIS — N18.4 CKD (CHRONIC KIDNEY DISEASE) STAGE 4, GFR 15-29 ML/MIN (H): Primary | ICD-10-CM

## 2018-02-19 DIAGNOSIS — C34.12 MALIGNANT NEOPLASM OF UPPER LOBE OF LEFT LUNG (H): ICD-10-CM

## 2018-02-19 DIAGNOSIS — J44.9 COPD, MODERATE (H): ICD-10-CM

## 2018-02-19 DIAGNOSIS — L98.9 SKIN LESION: ICD-10-CM

## 2018-02-19 DIAGNOSIS — N18.4 ANEMIA IN STAGE 4 CHRONIC KIDNEY DISEASE (H): ICD-10-CM

## 2018-02-19 DIAGNOSIS — M81.0 OSTEOPOROSIS WITHOUT CURRENT PATHOLOGICAL FRACTURE, UNSPECIFIED OSTEOPOROSIS TYPE: ICD-10-CM

## 2018-02-19 DIAGNOSIS — M21.612 BUNION, LEFT: ICD-10-CM

## 2018-02-19 DIAGNOSIS — I10 HYPERTENSION GOAL BP (BLOOD PRESSURE) < 140/90: ICD-10-CM

## 2018-02-19 DIAGNOSIS — G63 POLYNEUROPATHY ASSOCIATED WITH UNDERLYING DISEASE (H): ICD-10-CM

## 2018-02-19 DIAGNOSIS — Z51.81 MEDICATION MONITORING ENCOUNTER: ICD-10-CM

## 2018-02-19 DIAGNOSIS — N25.81 SECONDARY RENAL HYPERPARATHYROIDISM (H): ICD-10-CM

## 2018-02-19 DIAGNOSIS — I87.8 VENOUS STASIS OF LOWER EXTREMITY: ICD-10-CM

## 2018-02-19 DIAGNOSIS — R68.89 COLD INTOLERANCE: ICD-10-CM

## 2018-02-19 DIAGNOSIS — L71.9 ROSACEA: ICD-10-CM

## 2018-02-19 DIAGNOSIS — E55.9 VITAMIN D DEFICIENCY: ICD-10-CM

## 2018-02-19 DIAGNOSIS — D63.1 ANEMIA IN STAGE 4 CHRONIC KIDNEY DISEASE (H): ICD-10-CM

## 2018-02-19 LAB
ALBUMIN SERPL-MCNC: 3.9 G/DL (ref 3.4–5)
ALBUMIN UR-MCNC: NEGATIVE MG/DL
ALP SERPL-CCNC: 73 U/L (ref 40–150)
ALT SERPL W P-5'-P-CCNC: 23 U/L (ref 0–50)
ANION GAP SERPL CALCULATED.3IONS-SCNC: 8 MMOL/L (ref 3–14)
APPEARANCE UR: CLEAR
AST SERPL W P-5'-P-CCNC: 21 U/L (ref 0–45)
BACTERIA #/AREA URNS HPF: ABNORMAL /HPF
BASOPHILS # BLD AUTO: 0.1 10E9/L (ref 0–0.2)
BASOPHILS NFR BLD AUTO: 0.6 %
BILIRUB SERPL-MCNC: 0.4 MG/DL (ref 0.2–1.3)
BILIRUB UR QL STRIP: NEGATIVE
BUN SERPL-MCNC: 38 MG/DL (ref 7–30)
CALCIUM SERPL-MCNC: 9.6 MG/DL (ref 8.5–10.1)
CHLORIDE SERPL-SCNC: 99 MMOL/L (ref 94–109)
CO2 SERPL-SCNC: 24 MMOL/L (ref 20–32)
COLOR UR AUTO: YELLOW
CREAT SERPL-MCNC: 1.68 MG/DL (ref 0.52–1.04)
CREAT UR-MCNC: 38 MG/DL
DEPRECATED CALCIDIOL+CALCIFEROL SERPL-MC: 22 UG/L (ref 20–75)
DIFFERENTIAL METHOD BLD: ABNORMAL
EOSINOPHIL # BLD AUTO: 0.4 10E9/L (ref 0–0.7)
EOSINOPHIL NFR BLD AUTO: 4.4 %
ERYTHROCYTE [DISTWIDTH] IN BLOOD BY AUTOMATED COUNT: 12.1 % (ref 10–15)
FERRITIN SERPL-MCNC: 30 NG/ML (ref 8–252)
FOLATE SERPL-MCNC: 28 NG/ML
GFR SERPL CREATININE-BSD FRML MDRD: 29 ML/MIN/1.7M2
GLUCOSE SERPL-MCNC: 86 MG/DL (ref 70–99)
GLUCOSE UR STRIP-MCNC: NEGATIVE MG/DL
HCT VFR BLD AUTO: 32.8 % (ref 35–47)
HGB BLD-MCNC: 10.9 G/DL (ref 11.7–15.7)
HGB UR QL STRIP: NEGATIVE
IRON SATN MFR SERPL: 30 % (ref 15–46)
IRON SERPL-MCNC: 115 UG/DL (ref 35–180)
KETONES UR STRIP-MCNC: NEGATIVE MG/DL
LEUKOCYTE ESTERASE UR QL STRIP: ABNORMAL
LYMPHOCYTES # BLD AUTO: 1.9 10E9/L (ref 0.8–5.3)
LYMPHOCYTES NFR BLD AUTO: 20 %
MCH RBC QN AUTO: 30.9 PG (ref 26.5–33)
MCHC RBC AUTO-ENTMCNC: 33.2 G/DL (ref 31.5–36.5)
MCV RBC AUTO: 93 FL (ref 78–100)
MICROALBUMIN UR-MCNC: 11 MG/L
MICROALBUMIN/CREAT UR: 28.57 MG/G CR (ref 0–25)
MONOCYTES # BLD AUTO: 1 10E9/L (ref 0–1.3)
MONOCYTES NFR BLD AUTO: 10.4 %
NEUTROPHILS # BLD AUTO: 6.2 10E9/L (ref 1.6–8.3)
NEUTROPHILS NFR BLD AUTO: 64.6 %
NITRATE UR QL: NEGATIVE
PH UR STRIP: 6.5 PH (ref 5–7)
PHOSPHATE SERPL-MCNC: 3.3 MG/DL (ref 2.5–4.5)
PLATELET # BLD AUTO: 357 10E9/L (ref 150–450)
POTASSIUM SERPL-SCNC: 5.1 MMOL/L (ref 3.4–5.3)
PROT SERPL-MCNC: 7.4 G/DL (ref 6.8–8.8)
PTH-INTACT SERPL-MCNC: 15 PG/ML (ref 12–72)
RBC # BLD AUTO: 3.53 10E12/L (ref 3.8–5.2)
RBC #/AREA URNS AUTO: ABNORMAL /HPF
SODIUM SERPL-SCNC: 131 MMOL/L (ref 133–144)
SOURCE: ABNORMAL
SP GR UR STRIP: 1.01 (ref 1–1.03)
T4 FREE SERPL-MCNC: 1.1 NG/DL (ref 0.76–1.46)
TIBC SERPL-MCNC: 386 UG/DL (ref 240–430)
TSH SERPL DL<=0.005 MIU/L-ACNC: 2.7 MU/L (ref 0.4–4)
UROBILINOGEN UR STRIP-ACNC: 0.2 EU/DL (ref 0.2–1)
VIT B12 SERPL-MCNC: 732 PG/ML (ref 193–986)
WBC # BLD AUTO: 9.5 10E9/L (ref 4–11)
WBC #/AREA URNS AUTO: ABNORMAL /HPF

## 2018-02-19 PROCEDURE — 83970 ASSAY OF PARATHORMONE: CPT | Performed by: FAMILY MEDICINE

## 2018-02-19 PROCEDURE — 84439 ASSAY OF FREE THYROXINE: CPT | Performed by: FAMILY MEDICINE

## 2018-02-19 PROCEDURE — 83540 ASSAY OF IRON: CPT | Performed by: FAMILY MEDICINE

## 2018-02-19 PROCEDURE — 87186 SC STD MICRODIL/AGAR DIL: CPT | Performed by: FAMILY MEDICINE

## 2018-02-19 PROCEDURE — 81001 URINALYSIS AUTO W/SCOPE: CPT | Performed by: FAMILY MEDICINE

## 2018-02-19 PROCEDURE — 82746 ASSAY OF FOLIC ACID SERUM: CPT | Performed by: FAMILY MEDICINE

## 2018-02-19 PROCEDURE — 87088 URINE BACTERIA CULTURE: CPT | Performed by: FAMILY MEDICINE

## 2018-02-19 PROCEDURE — 99214 OFFICE O/P EST MOD 30 MIN: CPT | Performed by: FAMILY MEDICINE

## 2018-02-19 PROCEDURE — 84100 ASSAY OF PHOSPHORUS: CPT | Performed by: FAMILY MEDICINE

## 2018-02-19 PROCEDURE — 87086 URINE CULTURE/COLONY COUNT: CPT | Performed by: FAMILY MEDICINE

## 2018-02-19 PROCEDURE — 82043 UR ALBUMIN QUANTITATIVE: CPT | Performed by: FAMILY MEDICINE

## 2018-02-19 PROCEDURE — 80050 GENERAL HEALTH PANEL: CPT | Performed by: FAMILY MEDICINE

## 2018-02-19 PROCEDURE — 82728 ASSAY OF FERRITIN: CPT | Performed by: FAMILY MEDICINE

## 2018-02-19 PROCEDURE — 82306 VITAMIN D 25 HYDROXY: CPT | Performed by: FAMILY MEDICINE

## 2018-02-19 PROCEDURE — 36415 COLL VENOUS BLD VENIPUNCTURE: CPT | Performed by: FAMILY MEDICINE

## 2018-02-19 PROCEDURE — 82607 VITAMIN B-12: CPT | Performed by: FAMILY MEDICINE

## 2018-02-19 PROCEDURE — 83550 IRON BINDING TEST: CPT | Performed by: FAMILY MEDICINE

## 2018-02-19 RX ORDER — IPRATROPIUM BROMIDE AND ALBUTEROL SULFATE 2.5; .5 MG/3ML; MG/3ML
1 SOLUTION RESPIRATORY (INHALATION) EVERY 6 HOURS PRN
Qty: 270 ML | Refills: 3 | Status: SHIPPED | OUTPATIENT
Start: 2018-02-19 | End: 2019-03-04

## 2018-02-19 RX ORDER — FUROSEMIDE 20 MG
TABLET ORAL
Qty: 180 TABLET | Refills: 3 | Status: SHIPPED | OUTPATIENT
Start: 2018-02-19 | End: 2019-03-30

## 2018-02-19 RX ORDER — LOSARTAN POTASSIUM 50 MG/1
100 TABLET ORAL DAILY
Qty: 180 TABLET | Refills: 3 | Status: SHIPPED | OUTPATIENT
Start: 2018-02-19 | End: 2019-05-04

## 2018-02-19 RX ORDER — METRONIDAZOLE 10 MG/G
GEL TOPICAL DAILY
Qty: 60 G | Refills: 3 | Status: SHIPPED | OUTPATIENT
Start: 2018-02-19 | End: 2018-02-20 | Stop reason: ALTCHOICE

## 2018-02-19 RX ORDER — ALBUTEROL SULFATE 90 UG/1
AEROSOL, METERED RESPIRATORY (INHALATION)
Qty: 54 G | Refills: 3 | Status: SHIPPED | OUTPATIENT
Start: 2018-02-19 | End: 2019-10-29

## 2018-02-19 RX ORDER — METOPROLOL SUCCINATE 50 MG/1
50 TABLET, EXTENDED RELEASE ORAL
Qty: 180 TABLET | Refills: 3 | Status: SHIPPED | OUTPATIENT
Start: 2018-02-19 | End: 2019-02-23

## 2018-02-19 RX ORDER — AMLODIPINE BESYLATE 5 MG/1
5 TABLET ORAL DAILY
Qty: 90 TABLET | Refills: 3 | Status: SHIPPED | OUTPATIENT
Start: 2018-02-19 | End: 2018-04-24

## 2018-02-19 NOTE — MR AVS SNAPSHOT
After Visit Summary   2/19/2018    Anat Correa    MRN: [id number]           Patient Information     Date Of Birth          1935        Visit Information        Provider Department      2/19/2018 8:00 AM Rashi Calle MD [facility]  [place]        Today's Diagnoses     CKD (chronic kidney disease) stage 4, GFR 15-29 ml/min (H)    -  1    Hypertension goal BP (blood pressure) < 140/90        Venous stasis of lower extremity        Anemia in stage 4 chronic kidney disease (H)        Osteoporosis without current pathological fracture, unspecified osteoporosis type        Cold intolerance        Secondary renal hyperparathyroidism (H)        Vitamin D deficiency        COPD, moderate        Malignant neoplasm of upper lobe of left lung (H)        Polyneuropathy associated with underlying disease (H)        Calculus of kidney        Nocturia        Bunion, left        Rosacea        Skin lesion        Medication monitoring encounter          Care Instructions    Follow up for fasting labs when able -- call ahead to make an appointment     Follow up with Podiatry (Cindy), when able - to discuss left toe issues    Try moisturizing cream for left temple skin patch -- follow up for removal or treatment if this doesn't help    Metrogel topical, as directed for rosacea symptoms      [facility] [place]                        To reach your care team during and after hours:   [phone]  To reach our pharmacy:        [phone]    Clinic Hours                        Our clinic hours are:    Monday   7:30 am to 7:00 pm                  Tuesday through Friday 7:30 am to 5:00 pm                             Saturday   8:00 am to 12:00 pm      Sunday   Closed      Pharmacy Hours                        Our pharmacy hours are:    Monday   8:30 am to 7:00 pm       Tuesday to Friday  8:30 am to 6:00 pm                       Saturday    9:00 am to 1:00 pm              Sunday    Closed               There is also information available at our web site:  [email or web address]    If your provider ordered any lab tests and you do not receive the results within 10 business days, please call the clinic.    If you need a medication refill please contact your pharmacy.  Please allow 2-3 business days for your refill to be completed.    Our clinic offers telephone visits and e visits.  Please ask one of your team members to explain more.      Use [employer] (secure email communication and access to your chart) to send your primary care provider a message or make an appointment. Ask someone on your Team how to sign up for [employer].  Immunizations                      Immunization History   Administered Date(s) Administered     Influenza (High Dose) 3 valent vaccine 10/07/2010, 09/19/2012, 11/04/2013, 10/08/2014, 11/03/2015, 09/16/2016, 10/02/2017     Influenza (IIV3) PF 10/19/2004, 11/15/2005     Pneumo Conj 13-V (2010&after) 11/03/2015     Pneumococcal 23 valent 04/25/2006     TD (ADULT, 7+) 03/03/1998, 01/23/2006     TDAP Vaccine (Boostrix) 08/27/2012     Zoster vaccine, live 10/24/2012        Health Maintenance                         Health Maintenance Due   Topic Date Due     URINE DRUG SCREEN Q1 YR  01/25/1950     Medicare Annual Wellness Visit  11/21/2015     Wellness Visit with your Primary Provider - yearly  11/21/2015     Osteoporosis Screening (Dexa) - every 3 years   01/11/2016     COPD ACTION PLAN Q1 YR  06/01/2016     Cholesterol Lab - yearly  03/09/2017     Microalbumin Lab - yearly  03/09/2017               Follow-ups after your visit        Follow-up notes from your care team     Return if symptoms worsen or fail to improve.      Who to contact     If you have questions or need follow up information about today's clinic visit or your schedule please contact [facility] directly at [phone].  Normal or non-critical lab and imaging results will be communicated to you by MyChart, letter or  "phone within 4 business days after the clinic has received the results. If you do not hear from us within 7 days, please contact the clinic through [employer] or phone. If you have a critical or abnormal lab result, we will notify you by phone as soon as possible.  Submit refill requests through [employer] or call your pharmacy and they will forward the refill request to us. Please allow 3 business days for your refill to be completed.          Additional Information About Your Visit        [employer] Information     [employer] lets you send messages to your doctor, view your test results, renew your prescriptions, schedule appointments and more. To sign up, go to [email or web address] . Click on \"Log in\" on the left side of the screen, which will take you to the Welcome page. Then click on \"Sign up Now\" on the right side of the page.     You will be asked to enter the access code listed below, as well as some personal information. Please follow the directions to create your username and password.     Your access code is: [email or web address]  Expires: 2018  1:50 PM     Your access code will  in 90 days. If you need help or a new code, please call your [place] clinic or [phone].        Care EveryWhere ID     This is your Care EveryWhere ID. This could be used by other organizations to access your [place] medical records  [id number]        Your Vitals Were     Pulse Temperature Height Pulse Oximetry BMI (Body Mass Index)       73 97.8  F (36.6  C) (Oral) 5' 1\" (1.549 m) 96% 30.99 kg/m2        Blood Pressure from Last 3 Encounters:   18 158/88   17 144/66   17 148/62    Weight from Last 3 Encounters:   18 164 lb (74.4 kg)   17 160 lb (72.6 kg)   17 160 lb (72.6 kg)              We Performed the Following     *UA reflex to Microscopic and Culture ([place] and [place] [facility] (except [place] and [place])     Albumin Random Urine Quantitative with Creat Ratio     CBC with " platelets and differential     Comprehensive metabolic panel     Ferritin     Folate     Iron and iron binding capacity     Parathyroid Hormone Intact     Phosphorus     T4, free     TSH     Urine Culture Aerobic Bacterial     Vitamin B12     Vitamin D Deficiency          Today's Medication Changes          These changes are accurate as of 2/19/18  8:23 AM.  If you have any questions, ask your nurse or doctor.               Start taking these medicines.        Dose/Directions    metroNIDAZOLE 1 % gel   Commonly known as:  METROGEL   Used for:  Rosacea   Started by:  Rashi Calle MD        Apply topically daily   Quantity:  60 g   Refills:  3         These medicines have changed or have updated prescriptions.        Dose/Directions    ipratropium - albuterol 0.5 mg/2.5 mg/3 mL 0.5-2.5 (3) MG/3ML neb solution   Commonly known as:  DUONEB   This may have changed:  See the new instructions.   Used for:  COPD, moderate (H)   Changed by:  Rashi Calle MD        Dose:  1 vial   Take 1 vial (3 mLs) by nebulization every 6 hours as needed for shortness of breath / dyspnea or wheezing   Quantity:  270 mL   Refills:  3            Where to get your medicines      These medications were sent to [facility] [place] [id number]     Phone:  [phone]     albuterol 108 (90 BASE) MCG/ACT Inhaler    amLODIPine 5 MG tablet    furosemide 20 MG tablet    ipratropium - albuterol 0.5 mg/2.5 mg/3 mL 0.5-2.5 (3) MG/3ML neb solution    losartan 50 MG tablet    metoprolol succinate 50 MG 24 hr tablet    metroNIDAZOLE 1 % gel                Primary Care Provider Office Phone # Fax #    Rashi Calle [phone]       [address] [place] [address]        Equal Access to Services     DIANELYS LOW AH: Hanh Murphy, adriana joe, adriana melton. So [facility]  [phone].    ATENCIÓN: Si jorden bush, tiene a winchester disposición servicios gratuitos de asistencia lingüística. Mehran cardenas [phone].    We comply with applicable federal civil rights laws and [place] laws. We do not discriminate on the basis of race, color, national origin, age, disability, sex, sexual orientation, or gender identity.            Thank you!     Thank you for choosing [facility]  for your care. Our goal is always to provide you with excellent care. Hearing back from our patients is one way we can continue to improve our services. Please take a few minutes to complete the written survey that you may receive in the mail after your visit with us. Thank you!             Your Updated Medication List - Protect others around you: Learn how to safely use, store and throw away your medicines at www.disposemymeds.org.          This list is accurate as of 2/19/18  8:23 AM.  Always use your most recent med list.                   Brand Name Dispense Instructions for use Diagnosis    acetaminophen-codeine 300-30 MG per tablet    TYLENOL #3    90 tablet    Take 1-2 tablets by mouth every 6 hours as needed for moderate pain    Primary osteoarthritis involving multiple joints       albuterol 108 (90 BASE) MCG/ACT Inhaler    VENTOLIN HFA    54 g    INHALE TWO PUFFS INTO THE LUNGS EVERY 6 HOURS AS NEEDED FOR SHORTNESS OF BREATH/DYSPNEA    COPD, moderate (H)       amLODIPine 5 MG tablet    NORVASC    90 tablet    Take 1 tablet (5 mg) by mouth daily    Hypertension goal BP (blood pressure) < 140/90, CKD (chronic kidney disease) stage 4, GFR 15-29 ml/min (H)       ASPIRIN NOT PRESCRIBED    INTENTIONAL    0 each    Please choose reason not prescribed, below    Hypertension goal BP (blood pressure) < 140/90       calcium carbonate 500 MG chewable tablet    TUMS     Take 2 chew tab by mouth daily as needed for heartburn        furosemide 20 MG tablet    LASIX    180 tablet    40 mg in am     Hypertension goal BP (blood pressure) < 140/90, CKD (chronic kidney disease) stage 4, GFR 15-29 ml/min (H)       IBUPROFEN PO      Take 400 mg by mouth every 6 hours as needed for moderate pain        ipratropium - albuterol 0.5 mg/2.5 mg/3 mL 0.5-2.5 (3) MG/3ML neb solution    DUONEB    270 mL    Take 1 vial (3 mLs) by nebulization every 6 hours as needed for shortness of breath / dyspnea or wheezing    COPD, moderate (H)       losartan 50 MG tablet    COZAAR    180 tablet    Take 2 tablets (100 mg) by mouth daily    Hypertension goal BP (blood pressure) < 140/90, CKD (chronic kidney disease) stage 4, GFR 15-29 ml/min (H)       metoprolol succinate 50 MG 24 hr tablet    TOPROL-XL    180 tablet    Take 1 tablet (50 mg) by mouth 2 times daily (2 x 50mg = 100mg)    Hypertension goal BP (blood pressure) < 140/90, CKD (chronic kidney disease) stage 4, GFR 15-29 ml/min (H)       metroNIDAZOLE 1 % gel    METROGEL    60 g    Apply topically daily    Rosacea

## 2018-02-19 NOTE — NURSING NOTE
"Chief Complaint   Patient presents with     RECHECK       Initial /88  Pulse 73  Temp 97.8  F (36.6  C) (Oral)  Ht 5' 1\" (1.549 m)  Wt 164 lb (74.4 kg)  SpO2 96%  BMI 30.99 kg/m2 Estimated body mass index is 30.99 kg/(m^2) as calculated from the following:    Height as of this encounter: 5' 1\" (1.549 m).    Weight as of this encounter: 164 lb (74.4 kg)..  BP completed using cuff size: james Rosario MA  "

## 2018-02-19 NOTE — PROGRESS NOTES
"  SUBJECTIVE:   Anat Correa is a 83 year old female who presents to clinic today for the following health issues:    Toe Pain/Peripheral Neuropathy -- Left big toe painful X 1 month.    Skin -- Look at spot by left eye.    Lung Cancer -- Stable, no concerns. Followed by Oncology (Jay), with regular CT screenings at those visits - next is in the spring, per pt.     Anemia in chronic renal disease -- She is concerned that this could be causing her to feel cold \"all the time\".     Hemoglobin   Date Value Ref Range Status   02/19/2018 10.9 (L) 11.7 - 15.7 g/dL Final   12/05/2017 11.2 (L) 11.7 - 15.7 g/dL Final     HTN/CKD stage 4 -- Followed by Nephrology (Abrahan), next visit within one week from today.     BP Readings from Last 3 Encounters:   02/19/18 156/76   12/05/17 144/66   11/30/17 148/62     GFR Estimate   Date Value Ref Range Status   12/05/2017 30 (L) >60 mL/min/1.7m2 Final     Comment:     Non  GFR Calc   11/08/2017 35 (L) >60 mL/min/1.7m2 Final     Comment:     Non  GFR Calc   10/10/2017 28 (L) >60 mL/min/1.7m2 Final     Comment:     Non  GFR Calc     Problem list and histories reviewed & adjusted, as indicated.  Additional history: as documented    Reviewed and updated as needed this visit by clinical staff  Tobacco  Allergies  Meds  Med Hx  Surg Hx  Fam Hx  Soc Hx      Reviewed and updated as needed this visit by Provider       BP Readings from Last 3 Encounters:   02/19/18 156/76   12/05/17 144/66   11/30/17 148/62     Wt Readings from Last 4 Encounters:   02/19/18 164 lb (74.4 kg)   12/05/17 160 lb (72.6 kg)   11/08/17 160 lb (72.6 kg)   10/18/17 162 lb (73.5 kg)       Health Maintenance    Health Maintenance Due   Topic Date Due     URINE DRUG SCREEN Q1 YR  01/25/1950     MEDICARE ANNUAL WELLNESS VISIT  11/21/2015     WELLNESS VISIT Q1 YR  11/21/2015     DEXA Q3 YR  01/11/2016     COPD ACTION PLAN Q1 YR  06/01/2016     LIPID MONITORING Q1 " YEAR  03/09/2017     MICROALBUMIN Q1 YEAR  03/09/2017       Current Problem List    Patient Active Problem List   Diagnosis     History of colonic polyps     Calculus of kidney     Lesion of plantar nerve     Osteoporosis     Primary iridocyclitis     Anemia     Hyperlipidemia LDL goal <100     OA (osteoarthritis)     Advanced directives, counseling/discussion     Hypertension goal BP (blood pressure) < 140/90     COPD, moderate     Controlled substance agreement signed     Vitamin D deficiency     CKD (chronic kidney disease) stage 4, GFR 15-29 ml/min (H)     Anemia in chronic renal disease     Secondary renal hyperparathyroidism (H)     Venous stasis of lower extremity     Peripheral neuropathy     Chronic pain     Lung nodule     Nodule of left lung     Malignant neoplasm of upper lobe of left lung (H)     Acute pain of right shoulder     S/P amputation of lesser toe, unspecified laterality (H)     Retinal hemorrhage of right eye       Past Medical History    Past Medical History:   Diagnosis Date     Anemia      Calculus of kidney 1998    dr hope     Chronic pain     OA     Chronic pain      CKD (chronic kidney disease) stage 4, GFR 15-29 ml/min (H) 2008    dr mcdermott     COPD, moderate (H) 2004    moderate obstruction, with pulm htn - dr francisco did AAP     Diverticulosis of colon (without mention of hemorrhage) 7/03     Hemorrhage of gastrointestinal tract, unspecified 4/08    dr su     Hyperlipidemia LDL goal <100 2006     Hypertension goal BP (blood pressure) < 140/90 2005     Internal hemorrhoids without mention of complication 7/03     Irritable bowel syndrome 7/03     Lesion of plantar nerve 8/98    hay's neuroma dr connor     Lung nodule 4/14, 3/16    Dr Thompson, 1.4 x 1.1 cm, lingula, PET neg 3/16     Malignant neoplasm of upper lobe of left lung (H) 7/16    Dr Thompson - x 2 nodules - moderately differentiated adenocarcinoma (acinar predominant).  Final pathologic stage [id number], Final clinical  stage IA, grade 2     Medication management     OA - 1 T#3 at HS with HX CKD     OA (osteoarthritis)     lower ext worse katya knees     Obesity (BMI 30.0-34.9)      Osteoporosis, unspecified     FOSAMAX  = upset stomach , pt declines further rx.      Other ulcerative colitis     collangenous collitis- last colonoscopy       Peripheral neuropathy     early - burning at HS     Personal history of colonic polyps     dr araujo     PONV (postoperative nausea and vomiting)      Primary iridocyclitis     iritis dr dominguez     Venous stasis of lower extremity        Past Surgical History    Past Surgical History:   Procedure Laterality Date     AMPUTATE TOE(S) Right 2015    Procedure: AMPUTATE TOE(S);  Surgeon: Ashia White, DPM, Pod;  Location: [facility] OR     C APPENDECTOMY       C  DELIVERY ONLY  1965    , Low Cervical     EXCISE MASS UPPER EXTREMITY  10/13/2011    Lt Forearm mass excision - dr ely     EXCISE MASS UPPER EXTREMITY  2012    Lt Forearm mass excision - dr ely     HC COLONOSCOPY THRU STOMA, DIAGNOSTIC      IBS/diverticula/hemmorhoids dr araujo     HC ESOPHAGOSCOPY, DIAGNOSTIC  , , 6/10    Gastric ulcer, healed, gastritis     HC HEMORRHOIDECTOMY,INT/EXT,SIMPLE       HC REPAIR SLIDING INGUINAL HERNIA      mitra     SURGICAL HISTORY OF -       [facility]ra neck & back tumors     SURGICAL HISTORY OF -       neuroma excision - 2nd toe amputation     SURGICAL HISTORY OF -   3/07    Rt IOL - dr jimenez     SURGICAL HISTORY OF -       Lt IOL - dr jimenez     SURGICAL HISTORY OF -       Lt Knee replacement - dr gayle     SURGICAL HISTORY OF -       Rt Knee replacement - dr gayle     SURGICAL HISTORY OF -   9/15    Rt Second toe amputation - Dr White     THORACOSCOPIC WEDGE RESECTION LUNG Left 2016    Procedure: THORACOSCOPIC WEDGE RESECTION LUNG;  Surgeon: Abdelrahman Thompson MD;  Location: [facility] OR     XR JOINT  INJECTION HIP (HIB)  2/2015    Rt - Dr Terry       Current Medications    Current Outpatient Prescriptions   Medication Sig Dispense Refill     amLODIPine (NORVASC) 5 MG tablet Take 1 tablet (5 mg) by mouth daily 90 tablet 3     furosemide (LASIX) 20 MG tablet 40 mg in am 180 tablet 3     losartan (COZAAR) 50 MG tablet Take 2 tablets (100 mg) by mouth daily 180 tablet 3     metoprolol succinate (TOPROL-XL) 50 MG 24 hr tablet Take 1 tablet (50 mg) by mouth 2 times daily (2 x 50mg = 100mg) 180 tablet 3     metroNIDAZOLE (METROGEL) 1 % gel Apply topically daily 60 g 3     albuterol (VENTOLIN HFA) 108 (90 BASE) MCG/ACT Inhaler INHALE TWO PUFFS INTO THE LUNGS EVERY 6 HOURS AS NEEDED FOR SHORTNESS OF BREATH/DYSPNEA 54 g 3     ipratropium - albuterol 0.5 mg/2.5 mg/3 mL (DUONEB) 0.5-2.5 (3) MG/3ML neb solution Take 1 vial (3 mLs) by nebulization every 6 hours as needed for shortness of breath / dyspnea or wheezing 270 mL 3     acetaminophen-codeine (TYLENOL #3) 300-30 MG per tablet Take 1-2 tablets by mouth every 6 hours as needed for moderate pain 90 tablet 0     ASPIRIN NOT PRESCRIBED (INTENTIONAL) Please choose reason not prescribed, below 0 each 0     IBUPROFEN PO Take 400 mg by mouth every 6 hours as needed for moderate pain       calcium carbonate (TUMS) 500 MG chewable tablet Take 2 chew tab by mouth daily as needed for heartburn       [DISCONTINUED] albuterol (VENTOLIN HFA) 108 (90 BASE) MCG/ACT Inhaler INHALE TWO PUFFS INTO THE LUNGS EVERY 6 HOURS AS NEEDED FOR SHORTNESS OF BREATH/DYSPNEA 54 g 0     [DISCONTINUED] losartan (COZAAR) 50 MG tablet Take 2 tablets (100 mg) by mouth daily 180 tablet 3     [DISCONTINUED] metoprolol (TOPROL-XL) 50 MG 24 hr tablet Take 1 tablet (50 mg) by mouth 2 times daily (2 x 50mg = 100mg) 180 tablet 3     [DISCONTINUED] ipratropium - albuterol 0.5 mg/2.5 mg/3 mL (DUONEB) 0.5-2.5 (3) MG/3ML neb solution NEBULIZE CONTENTS OF ONE VIAL EVERY 6 HOURS AS NEEDED FOR SHORTNESS OF BREATH/  DYSPNEA OR WHEEZING 270 mL 3     [DISCONTINUED] furosemide (LASIX) 20 MG tablet 40 mg in am 180 tablet 3     [DISCONTINUED] amLODIPine (NORVASC) 5 MG tablet Take 1 tablet (5 mg) by mouth daily 90 tablet 3       Allergies    Allergies   Allergen Reactions     Prednisone      Yeast infection - swollen lips       Immunizations    Immunization History   Administered Date(s) Administered     Influenza (High Dose) 3 valent vaccine 10/07/2010, 09/19/2012, 11/04/2013, 10/08/2014, 11/03/2015, 09/16/2016, 10/02/2017     Influenza (IIV3) PF 10/19/2004, 11/15/2005     Pneumo Conj 13-V (2010&after) 11/03/2015     Pneumococcal 23 valent 04/25/2006     TD (ADULT, 7+) 03/03/1998, 01/23/2006     TDAP Vaccine (Boostrix) 08/27/2012     Zoster vaccine, live 10/24/2012       Family History    Family History   Problem Relation Age of Onset     CEREBROVASCULAR DISEASE Mother      CEREBROVASCULAR DISEASE Father      Cancer - colorectal Brother      Cancer - colorectal Brother      Breast Cancer Sister      CANCER Sister      lung     CANCER Sister      lung     CANCER Sister      lung     Scleroderma Sister        Social History    Social History     Social History     Marital status:      Spouse name: thanh     Number of children: 1     Years of education: 12     Occupational History     part-time [employer] section at [email or web address]       Social History Main Topics     Smoking status: Former Smoker     Packs/day: 3.00     Years: 50.00     Types: Cigarettes     Quit date: 12/21/1993     Smokeless tobacco: Never Used      Comment: quit 1993     Alcohol use No     Drug use: No      Comment: no herbal meds either      Sexual activity: Not Currently      Comment:  for 24 years      Other Topics Concern     Caffeine Concern Yes     1 pot  qd - switched to decaff now     Special Diet Yes     Low salt     Exercise No     Seat Belt Yes     Self-Exams Yes     SBE encouraged motnhly      Parent/Sibling W/ Cabg, Mi Or Angioplasty Before  "65f 55m? No     Social History Narrative    calcium - 3 large dairy servings/day - pt declines fosamax and other meds for her osteoporosis    flex sig/colonoscopy -last colonoscopy 7/03     sun precautions - discussed     mammogram - hasn't had one since 2001 at least - ordered     Td booster - 1/23/06    pneumovax -today 4/25/06    DEXA - pt declines repeat scan today 4/25/06 despite her osteoporosis     stool hemoccults - every year after age 41    ASA- easy bruising - can't take     mulvitamin - encouraged       All above reviewed and updated, all stable unless otherwise noted    Recent labs reviewed    ROS:  Constitutional, HEENT, cardiovascular, pulmonary, GI, , musculoskeletal, neuro, skin, endocrine and psych systems are negative, except as in HPI or otherwise noted     This document serves as a record of the services and decisions personally performed and made by Rashi Calle MD FAAFP. It was created on their behalf by Dougie Osborne, a trained medical scribe. The creation of this document is based the provider's statements to the medical scribe.  Dougie Osborne February 19, 2018 8:03 AM      OBJECTIVE:                                                    /76  Pulse 73  Temp 97.8  F (36.6  C) (Oral)  Ht 5' 1\" (1.549 m)  Wt 164 lb (74.4 kg)  SpO2 96%  BMI 30.99 kg/m2  Body mass index is 30.99 kg/(m^2).  GENERAL: healthy, alert and no distress, obese   HENT: ear canals and TM's normal upon viewing with otoscope, nose and mouth without ulcers or lesions upon viewing with otoscope  RESP: lungs clear to auscultation - no rales, no rhonchi, no wheezes  CV: regular rates and rhythm, normal S1 S2, no S3 or S4 and no murmur, no click or rub -  ABDOMEN: soft, no tenderness, no  hepatosplenomegaly, no masses, normal bowel sounds  MS: left great toe bunion noted, no signs of infection, minimal  SKIN: 1 cm irregular red/scaly lesion noted to left temple, otherwise no suspicious lesions, no rashes to visible " skin  NEURO: mentation intact and speech normal  BACK: no CVA tenderness, no paralumbar tenderness  PSYCH: affect normal/bright    DIAGNOSTICS/PROCEDURES:                                                      Results for orders placed or performed in visit on 02/19/18   CBC with platelets and differential   Result Value Ref Range    WBC 9.5 4.0 - 11.0 10e9/L    RBC Count 3.53 (L) 3.8 - 5.2 10e12/L    Hemoglobin 10.9 (L) 11.7 - 15.7 g/dL    Hematocrit 32.8 (L) 35.0 - 47.0 %    MCV 93 78 - 100 fl    MCH 30.9 26.5 - 33.0 pg    MCHC 33.2 31.5 - 36.5 g/dL    RDW 12.1 10.0 - 15.0 %    Platelet Count 357 150 - 450 10e9/L    Diff Method Automated Method     % Neutrophils 64.6 %    % Lymphocytes 20.0 %    % Monocytes 10.4 %    % Eosinophils 4.4 %    % Basophils 0.6 %    Absolute Neutrophil 6.2 1.6 - 8.3 10e9/L    Absolute Lymphocytes 1.9 0.8 - 5.3 10e9/L    Absolute Monocytes 1.0 0.0 - 1.3 10e9/L    Absolute Eosinophils 0.4 0.0 - 0.7 10e9/L    Absolute Basophils 0.1 0.0 - 0.2 10e9/L        ASSESSMENT/PLAN:                                                        ICD-10-CM    1. CKD (chronic kidney disease) stage 4, GFR 15-29 ml/min (H) N18.4 Comprehensive metabolic panel     CBC with platelets and differential     *UA reflex to Microscopic and Culture ([facility] (except [place] and [place])     Albumin Random Urine Quantitative with Creat Ratio     amLODIPine (NORVASC) 5 MG tablet     furosemide (LASIX) 20 MG tablet     losartan (COZAAR) 50 MG tablet     metoprolol succinate (TOPROL-XL) 50 MG 24 hr tablet   2. Hypertension goal BP (blood pressure) < 140/90 I10 Comprehensive metabolic panel     *UA reflex to Microscopic and Culture ([facility] (except [place] and [place])     Albumin Random Urine Quantitative with Creat Ratio     amLODIPine (NORVASC) 5 MG tablet     furosemide (LASIX) 20 MG tablet     losartan (COZAAR) 50 MG tablet     metoprolol succinate (TOPROL-XL) 50 MG 24 hr tablet    3. Venous stasis of lower extremity I87.8 Comprehensive metabolic panel   4. Anemia in stage 4 chronic kidney disease (H) N18.4 CBC with platelets and differential    D63.1 Iron and iron binding capacity     Ferritin     Vitamin B12     Folate     TSH     T4, free     Parathyroid Hormone Intact   5. Osteoporosis without current pathological fracture, unspecified osteoporosis type M81.0 Comprehensive metabolic panel     Vitamin D Deficiency   6. Cold intolerance R68.89 TSH     T4, free   7. Secondary renal hyperparathyroidism (H) N25.81 Comprehensive metabolic panel     CBC with platelets and differential     TSH     T4, free     Parathyroid Hormone Intact     Phosphorus   8. Vitamin D deficiency E55.9 Vitamin D Deficiency   9. COPD, moderate J44.9 albuterol (VENTOLIN HFA) 108 (90 BASE) MCG/ACT Inhaler     ipratropium - albuterol 0.5 mg/2.5 mg/3 mL (DUONEB) 0.5-2.5 (3) MG/3ML neb solution   10. Malignant neoplasm of upper lobe of left lung (H) C34.12 Comprehensive metabolic panel     CBC with platelets and differential   11. Polyneuropathy associated with underlying disease (H) G63 Comprehensive metabolic panel   12. Calculus of kidney N20.0 *UA reflex to Microscopic and Culture ([facility] (except [place] and [place])   13. Nocturia R35.1 *UA reflex to Microscopic and Culture ([facility] (except [place] and [place])     Urine Culture Aerobic Bacterial   14. Bunion, left M21.612    15. Rosacea L71.9 metroNIDAZOLE (METROGEL) 1 % gel   16. Skin lesion L98.9    17. Medication monitoring encounter Z51.81 Comprehensive metabolic panel     CBC with platelets and differential     Iron and iron binding capacity     Ferritin     Vitamin B12     Folate     TSH     T4, free     Vitamin D Deficiency     *UA reflex to Microscopic and Culture ([facility] (except [place] and [place])     Urine Culture Aerobic Bacterial     Parathyroid Hormone Intact     Phosphorus      Albumin Random Urine Quantitative with Creat Ratio     Discussed treatment/modality options, including risk and benefits, she desires further health care maintenance, further lab(s), medication refill(s), new medications (metrogel), OTC meds, referrals (podiatry), and observation. All diagnosis above reviewed and noted above, otherwise stable.  See [facility] orders for further details.  Follow up as needed.    Health Maintenance Due   Topic Date Due     URINE DRUG SCREEN Q1 YR  01/25/1950     MEDICARE ANNUAL WELLNESS VISIT  11/21/2015     WELLNESS VISIT Q1 YR  11/21/2015     DEXA Q3 YR  01/11/2016     COPD ACTION PLAN Q1 YR  06/01/2016     LIPID MONITORING Q1 YEAR  03/09/2017     MICROALBUMIN Q1 YEAR  03/09/2017     Patient Instructions     Follow up for fasting labs when able -- call ahead to make an appointment     Follow up with Podiatry (Cindy), when able - to discuss left toe issues    Try moisturizing cream for left temple skin patch -- follow up for removal or treatment if this doesn't help    Metrogel topical, as directed for rosacea symptoms    The information in this document, created by the medical scribe for me, accurately reflects the services I personally performed and the decisions made by me. I have reviewed and approved this document for accuracy.   Rashi Calle MD FAAFP            Rashi Calle MD [address]  [address] [phone] Fax

## 2018-02-19 NOTE — PATIENT INSTRUCTIONS
Follow up for fasting labs when able -- call ahead to make an appointment     Follow up with Podiatry (Cindy), when able - to discuss left toe issues    Try moisturizing cream for left temple skin patch -- follow up for removal or treatment if this doesn't help    Metrogel topical, as directed for rosacea symptoms      [facility]                        To reach your care team during and after hours:   [phone]  To reach our pharmacy:        [phone]    Clinic Hours                        Our clinic hours are:    Monday   7:30 am to 7:00 pm                  Tuesday through Friday 7:30 am to 5:00 pm                             Saturday   8:00 am to 12:00 pm      Sunday   Closed      Pharmacy Hours                        Our pharmacy hours are:    Monday   8:30 am to 7:00 pm       Tuesday to Friday  8:30 am to 6:00 pm                       Saturday    9:00 am to 1:00 pm              Sunday    Closed              There is also information available at our web site:  [email or web address]    If your provider ordered any lab tests and you do not receive the results within 10 business days, please call the clinic.    If you need a medication refill please contact your pharmacy.  Please allow 2-3 business days for your refill to be completed.    Our clinic offers telephone visits and e visits.  Please ask one of your team members to explain more.      Use [employer] (secure email communication and access to your chart) to send your primary care provider a message or make an appointment. Ask someone on your Team how to sign up for [employer].  Immunizations                      Immunization History   Administered Date(s) Administered     Influenza (High Dose) 3 valent vaccine 10/07/2010, 09/19/2012, 11/04/2013, 10/08/2014, 11/03/2015, 09/16/2016, 10/02/2017     Influenza (IIV3) PF 10/19/2004, 11/15/2005     Pneumo Conj 13-V (2010&after) 11/03/2015     Pneumococcal 23 valent 04/25/2006     TD (ADULT, 7+)  03/03/1998, 01/23/2006     TDAP Vaccine (Boostrix) 08/27/2012     Zoster vaccine, live 10/24/2012        Health Maintenance                         Health Maintenance Due   Topic Date Due     URINE DRUG SCREEN Q1 YR  01/25/1950     Medicare Annual Wellness Visit  11/21/2015     Wellness Visit with your Primary Provider - yearly  11/21/2015     Osteoporosis Screening (Dexa) - every 3 years   01/11/2016     COPD ACTION PLAN Q1 YR  06/01/2016     Cholesterol Lab - yearly  03/09/2017     Microalbumin Lab - yearly  03/09/2017

## 2018-02-20 RX ORDER — METRONIDAZOLE 7.5 MG/G
GEL TOPICAL DAILY
Qty: 45 G | Refills: 3 | Status: SHIPPED | OUTPATIENT
Start: 2018-02-20 | End: 2019-10-29

## 2018-02-20 NOTE — PROGRESS NOTES
Pharmacy got a verbal order to change metrogel 1 % to metrogel 0.75% asking for a new order in epic     Order placed     Ariadna Starks RN, BSN  [facility]

## 2018-02-21 ENCOUNTER — TELEPHONE (OUTPATIENT)
Dept: FAMILY MEDICINE | Facility: CLINIC | Age: 83
End: 2018-02-21

## 2018-02-21 LAB
BACTERIA SPEC CULT: ABNORMAL
SPECIMEN SOURCE: ABNORMAL

## 2018-02-21 NOTE — TELEPHONE ENCOUNTER
Patient calling re: lab result updates from 2/19/18. Please advise.  [phone] (home)   Thank you  Juliana holloway

## 2018-02-22 DIAGNOSIS — N39.0 URINARY TRACT INFECTION: Primary | ICD-10-CM

## 2018-02-22 RX ORDER — AMOXICILLIN 875 MG
875 TABLET ORAL 2 TIMES DAILY
Qty: 20 TABLET | Refills: 0 | Status: SHIPPED | OUTPATIENT
Start: 2018-02-22 | End: 2018-04-24

## 2018-02-22 NOTE — TELEPHONE ENCOUNTER
Patient calling again.  Would like to have these results for her appointment with kidney specialist on Monday.  Please review and advise.    Vero Parker

## 2018-02-27 ENCOUNTER — TRANSFERRED RECORDS (OUTPATIENT)
Dept: HEALTH INFORMATION MANAGEMENT | Facility: CLINIC | Age: 83
End: 2018-02-27

## 2018-02-28 ENCOUNTER — ALLIED HEALTH/NURSE VISIT (OUTPATIENT)
Dept: FAMILY MEDICINE | Facility: CLINIC | Age: 83
End: 2018-02-28
Payer: MEDICARE

## 2018-02-28 VITALS — SYSTOLIC BLOOD PRESSURE: 144 MMHG | DIASTOLIC BLOOD PRESSURE: 54 MMHG

## 2018-02-28 DIAGNOSIS — I10 HYPERTENSION GOAL BP (BLOOD PRESSURE) < 140/90: Primary | ICD-10-CM

## 2018-02-28 PROCEDURE — 99207 ZZC NO CHARGE NURSE ONLY: CPT | Performed by: FAMILY MEDICINE

## 2018-02-28 NOTE — MR AVS SNAPSHOT
"              After Visit Summary   2018    Anat Correa    MRN: [id number]           Patient Information     Date Of Birth          1935        Visit Information        Provider Department      2018 10:06 AM Rashi Calle MD [facility]        Today's Diagnoses     Hypertension goal BP (blood pressure) < 140/90    -  1       Follow-ups after your visit        Who to contact     If you have questions or need follow up information about today's clinic visit or your schedule please contact [facility] directly at [phone].  Normal or non-critical lab and imaging results will be communicated to you by [email or web address], letter or phone within 4 business days after the clinic has received the results. If you do not hear from us within 7 days, please contact the clinic through [email or web address] or phone. If you have a critical or abnormal lab result, we will notify you by phone as soon as possible.  Submit refill requests through Njuice or call your pharmacy and they will forward the refill request to us. Please allow 3 business days for your refill to be completed.          Additional Information About Your Visit        MyChart Information     Njuice lets you send messages to your doctor, view your test results, renew your prescriptions, schedule appointments and more. To sign up, go to [email or web address] . Click on \"Log in\" on the left side of the screen, which will take you to the Welcome page. Then click on \"Sign up Now\" on the right side of the page.     You will be asked to enter the access code listed below, as well as some personal information. Please follow the directions to create your username and password.     Your access code is: [email or web address]  Expires: 2018  1:50 PM     Your access code will  in 90 days. If you need help or a new code, please call your [place] clinic or [phone].        Care EveryWhere ID     This is your Care EveryWhere ID. This " could be used by other organizations to access your [place] medical records  [id number]         Blood Pressure from Last 3 Encounters:   02/28/18 144/54   02/19/18 156/76   12/05/17 144/66    Weight from Last 3 Encounters:   02/19/18 164 lb (74.4 kg)   12/05/17 160 lb (72.6 kg)   11/08/17 160 lb (72.6 kg)              Today, you had the following     No orders found for display       Primary Care Provider Office Phone # Fax #    Rashi Calle [phone] [facility] [address]        Equal Access to Services     [facility] DEVANTE : Hadii bob barrett hadasho Soruy, waaxda luqadaha, qaybta kaalmada adehectoryada, adriana calderon. [facility] [phone].    ATENCIÓN: Si habla español, tiene a [place] disposición servicios gratuitos de asistencia lingüística. [facility] [phone].    We comply with applicable federal civil rights laws and [place] laws. We do not discriminate on the basis of race, color, national origin, age, disability, sex, sexual orientation, or gender identity.            Thank you!     Thank you for choosing [facility]  for your care. Our goal is always to provide you with excellent care. Hearing back from our patients is one way we can continue to improve our services. Please take a few minutes to complete the written survey that you may receive in the mail after your visit with us. Thank you!             Your Updated Medication List - Protect others around you: Learn how to safely use, store and throw away your medicines at www.disposemymeds.org.          This list is accurate as of 2/28/18 10:12 AM.  Always use your most recent med list.                   Brand Name Dispense Instructions for use Diagnosis    acetaminophen-codeine 300-30 MG per tablet    TYLENOL #3    90 tablet    Take 1-2 tablets by mouth every 6 hours as needed for moderate pain    Primary osteoarthritis involving multiple joints       albuterol 108 (90  BASE) MCG/ACT Inhaler    VENTOLIN HFA    54 g    INHALE TWO PUFFS INTO THE LUNGS EVERY 6 HOURS AS NEEDED FOR SHORTNESS OF BREATH/DYSPNEA    COPD, moderate (H)       amLODIPine 5 MG tablet    NORVASC    90 tablet    Take 1 tablet (5 mg) by mouth daily    Hypertension goal BP (blood pressure) < 140/90, CKD (chronic kidney disease) stage 4, GFR 15-29 ml/min (H)       amoxicillin 875 MG tablet    AMOXIL    20 tablet    Take 1 tablet (875 mg) by mouth 2 times daily    Urinary tract infection       ASPIRIN NOT PRESCRIBED    INTENTIONAL    0 each    Please choose reason not prescribed, below    Hypertension goal BP (blood pressure) < 140/90       calcium carbonate 500 MG chewable tablet    TUMS     Take 2 chew tab by mouth daily as needed for heartburn        furosemide 20 MG tablet    LASIX    180 tablet    40 mg in am    Hypertension goal BP (blood pressure) < 140/90, CKD (chronic kidney disease) stage 4, GFR 15-29 ml/min (H)       IBUPROFEN PO      Take 400 mg by mouth every 6 hours as needed for moderate pain        ipratropium - albuterol 0.5 mg/2.5 mg/3 mL 0.5-2.5 (3) MG/3ML neb solution    DUONEB    270 mL    Take 1 vial (3 mLs) by nebulization every 6 hours as needed for shortness of breath / dyspnea or wheezing    COPD, moderate (H)       losartan 50 MG tablet    COZAAR    180 tablet    Take 2 tablets (100 mg) by mouth daily    Hypertension goal BP (blood pressure) < 140/90, CKD (chronic kidney disease) stage 4, GFR 15-29 ml/min (H)       metoprolol succinate 50 MG 24 hr tablet    TOPROL-XL    180 tablet    Take 1 tablet (50 mg) by mouth 2 times daily (2 x 50mg = 100mg)    Hypertension goal BP (blood pressure) < 140/90, CKD (chronic kidney disease) stage 4, GFR 15-29 ml/min (H)       metroNIDAZOLE 0.75 % topical gel    METROGEL    45 g    Apply topically daily    Rosacea

## 2018-02-28 NOTE — PROGRESS NOTES
Anat Correa is enrolled/participating in the retail pharmacy Blood Pressure Goals Achievement Program (BPGAP).   Anat Correa was evaluated at [facility] on February 28, 2018 at which time her blood pressure was:    BP Readings from Last 3 Encounters:   02/28/18 144/54   02/19/18 156/76   12/05/17 144/66     Reviewed lifestyle modifications for blood pressure control and reduction: including making healthy food choices, managing weight, getting regular exercise, smoking cessation, reducing alcohol consumption, monitoring blood pressure regularly.     Anat Correa is not experiencing symptoms.    Follow-Up: is at goal of 150/90    Recommendation to Provider:       Anat Correa was evaluated for enrollment into the PGEN study today.    Patient eligible for enrollment:  No  Patient interested in enrollment:  No    Completed by:   Wayne Ott [facility]  [phone]

## 2018-03-13 DIAGNOSIS — N39.0 URINARY TRACT INFECTION: ICD-10-CM

## 2018-03-13 LAB
ALBUMIN UR-MCNC: NEGATIVE MG/DL
APPEARANCE UR: CLEAR
BILIRUB UR QL STRIP: NEGATIVE
COLOR UR AUTO: YELLOW
GLUCOSE UR STRIP-MCNC: NEGATIVE MG/DL
HGB UR QL STRIP: NEGATIVE
KETONES UR STRIP-MCNC: NEGATIVE MG/DL
LEUKOCYTE ESTERASE UR QL STRIP: NEGATIVE
NITRATE UR QL: NEGATIVE
PH UR STRIP: 7 PH (ref 5–7)
SOURCE: NORMAL
SP GR UR STRIP: 1.01 (ref 1–1.03)
UROBILINOGEN UR STRIP-ACNC: 0.2 EU/DL (ref 0.2–1)

## 2018-03-13 PROCEDURE — 81003 URINALYSIS AUTO W/O SCOPE: CPT | Performed by: FAMILY MEDICINE

## 2018-03-14 ENCOUNTER — TELEPHONE (OUTPATIENT)
Dept: FAMILY MEDICINE | Facility: CLINIC | Age: 83
End: 2018-03-14

## 2018-03-14 NOTE — TELEPHONE ENCOUNTER
The patient indicates understanding of these issues and agrees with the plan.  Nathalie Joseph RN  [facility]

## 2018-03-14 NOTE — TELEPHONE ENCOUNTER
Patient requesting UA results. Please advise.  [phone] (home) none (work)  Ok to leave detailed msg: Yes  Thank you  Juliana Stevenson

## 2018-03-19 ENCOUNTER — OFFICE VISIT (OUTPATIENT)
Dept: PODIATRY | Facility: CLINIC | Age: 83
End: 2018-03-19
Payer: MEDICARE

## 2018-03-19 VITALS
WEIGHT: 165 LBS | HEIGHT: 61 IN | DIASTOLIC BLOOD PRESSURE: 80 MMHG | BODY MASS INDEX: 31.15 KG/M2 | SYSTOLIC BLOOD PRESSURE: 168 MMHG

## 2018-03-19 DIAGNOSIS — I87.2 VENOUS INSUFFICIENCY OF BOTH LOWER EXTREMITIES: ICD-10-CM

## 2018-03-19 DIAGNOSIS — Z89.422 HISTORY OF AMPUTATION OF LESSER TOE, LEFT (H): ICD-10-CM

## 2018-03-19 DIAGNOSIS — M21.42 PES PLANUS OF BOTH FEET: ICD-10-CM

## 2018-03-19 DIAGNOSIS — L60.0 INGROWN NAIL OF SECOND TOE OF RIGHT FOOT: ICD-10-CM

## 2018-03-19 DIAGNOSIS — Z89.421 HISTORY OF AMPUTATION OF LESSER TOE, RIGHT (H): ICD-10-CM

## 2018-03-19 DIAGNOSIS — L60.0 INGROWN NAIL OF SECOND TOE OF LEFT FOOT: ICD-10-CM

## 2018-03-19 DIAGNOSIS — M25.572 SINUS TARSI SYNDROME OF LEFT ANKLE: ICD-10-CM

## 2018-03-19 DIAGNOSIS — M19.072 PRIMARY LOCALIZED OSTEOARTHROSIS OF LEFT ANKLE AND FOOT: ICD-10-CM

## 2018-03-19 DIAGNOSIS — M21.41 PES PLANUS OF BOTH FEET: ICD-10-CM

## 2018-03-19 DIAGNOSIS — M19.071 PRIMARY LOCALIZED OSTEOARTHROSIS OF RIGHT ANKLE AND FOOT: ICD-10-CM

## 2018-03-19 DIAGNOSIS — M79.671 BILATERAL FOOT PAIN: Primary | ICD-10-CM

## 2018-03-19 DIAGNOSIS — M25.571 SINUS TARSI SYNDROME OF RIGHT ANKLE: ICD-10-CM

## 2018-03-19 DIAGNOSIS — M79.672 BILATERAL FOOT PAIN: Primary | ICD-10-CM

## 2018-03-19 PROCEDURE — 11732 AVLSN NAIL PLATE SIMPLE EACH: CPT | Mod: T6 | Performed by: PODIATRIST

## 2018-03-19 PROCEDURE — 99213 OFFICE O/P EST LOW 20 MIN: CPT | Mod: 25 | Performed by: PODIATRIST

## 2018-03-19 PROCEDURE — 11730 AVULSION NAIL PLATE SIMPLE 1: CPT | Mod: T1 | Performed by: PODIATRIST

## 2018-03-19 PROCEDURE — 20605 DRAIN/INJ JOINT/BURSA W/O US: CPT | Mod: RT | Performed by: PODIATRIST

## 2018-03-19 NOTE — PROGRESS NOTES
"Podiatry / Foot and Ankle Surgery Progress Note    March 19, 2018    Subject: Patient was seen for pain to both feet and ingrown nails to both 2nd toes. She would like injections today and to have the ingrown nails removed. Pain is 8/10. Denies injury.     Objective:  Vitals: /80  Ht 5' 1\" (1.549 m)  Wt 165 lb (74.8 kg)  BMI 31.18 kg/m2  BMI= Body mass index is 31.18 kg/(m^2).    General:  Patient is alert and orientated.  NAD  Vascular: DP & PT pulses are faintly palpable bilaterally. Varicosities noted diffusely over feet. Edema Noted to both feet and legs, more prominent on left lateral ankle. CFT and skin temperature is normal to both lower extremities.   Neurologic: Lower extremity sensation is intact to light touch. No evidence of weakness or contracture in the lower extremities. No evidence of neuropathy. Patient did have pain with lateral compression of both feet and a nitish's click was noted at both feet.   Musculoskeletal: Patient is ambulatory without assistive device or brace. She has pain with compression of the right sinus tarsi and left sinus tarsi.  Previously amputated 2nd toes bilateral.  Decrease arch height bilaterally.      Assessment:      Bilateral foot pain  Primary localized osteoarthrosis of right ankle and foot  Primary localized osteoarthrosis of left ankle and foot  Ingrown nail of second toe of right foot  Ingrown nail of second toe of left foot  Pes planus of both feet  History of amputation of lesser toe, right (H)  History of amputation of lesser toe, left (H)  Venous insufficiency of both lower extremities  Sinus tarsi syndrome of right ankle  Sinus tarsi syndrome of left ankle     Plan:  Talked about arthritis and treatments including orthotics, injections, icing, NSAIDS, bracing, physical therapy, compounding pain cream, or surgical intervention. Talked about sinus tarsitis which is early arthritis to the sinus tarsi joint. Talked about treatments including orthotics, " icing, compression, NSAIDs, injection, physical therapy with iontophoresis, immobilization, compounding pain cream, permanent ankle bracing, MRI to assess for need for fusion.    She would like injections today to both feet.      The potential causes and nature of an ingrown toenail were discussed with the patient.  We reviewed the natural history/prognosis of the condition and potential risks if no treatment is provided.      Treatment options discussed included conservative management (oral antibiotics, soaking of foot, adequate width shoes)  as well as surgical management (partial or total nail removal).  The pros and cons of both forms of treatment were reviewed.      She would like part of the nails removed today. Will soak feet 2x a day for 2 weeks and apply anti biotic ointment and bandage.      Procedure:  After verbal consent, the left sinus tarsi joint was marked out.  The foot was prepped and draped using sterile technique.  A mixture of 1cc of 2% lidocaine plain and 1 1/2 cc of kenalog -40 was injected into the left sinus tarsi joint.  Patient tolerated procedure and anesthesia well.     Procedure 2: same procedure done to the right foot.     Procedure 3: After verbal consent, the right 2nd toe was anesthetized with 5cc's of 1% lidocaine plain. A tourniquet was applied to the toe. The medial and lateral borders were then raised from the nail bed and then cut the length of the nail.  The offending nail borders were then removed.  Bacitracin was applied to the nail bed.  The tourniquet was removed.  Bandage was applied to the toe.  The patient tolerated the procedure and anesthesia well.    Procedure 4: same procedure to the left 2nd toenail.         Ashia White DPM, Podiatry/Foot and Ankle Surgery    Weight management plan: Patient was referred to their PCP to discuss a diet and exercise plan.

## 2018-03-19 NOTE — LETTER
"    3/19/2018         RE: Anat Correa  [id number] [place] [address]        Dear Colleague,    Thank you for referring your patient, Anat Correa, to the [facility]. Please see a copy of my visit note below.    Podiatry / Foot and Ankle Surgery Progress Note    March 19, 2018    Subject: Patient was seen for pain to both feet and ingrown nails to both 2nd toes. She would like injections today and to have the ingrown nails removed. Pain is 8/10. Denies injury.     Objective:  Vitals: /80  Ht 5' 1\" (1.549 m)  Wt 165 lb (74.8 kg)  BMI 31.18 kg/m2  BMI= Body mass index is 31.18 kg/(m^2).    General:  Patient is alert and orientated.  NAD  Vascular: DP & PT pulses are faintly palpable bilaterally. Varicosities noted diffusely over feet. Edema Noted to both feet and legs, more prominent on left lateral ankle. CFT and skin temperature is normal to both lower extremities.   Neurologic: Lower extremity sensation is intact to light touch. No evidence of weakness or contracture in the lower extremities. No evidence of neuropathy. Patient did have pain with lateral compression of both feet and a nitish's click was noted at both feet.   Musculoskeletal: Patient is ambulatory without assistive device or brace. She has pain with compression of the right sinus tarsi and left sinus tarsi.  Previously amputated 2nd toes bilateral.  Decrease arch height bilaterally.      Assessment:      Bilateral foot pain  Primary localized osteoarthrosis of right ankle and foot  Primary localized osteoarthrosis of left ankle and foot  Ingrown nail of second toe of right foot  Ingrown nail of second toe of left foot  Pes planus of both feet  History of amputation of lesser toe, right (H)  History of amputation of lesser toe, left (H)  Venous insufficiency of both lower extremities  Sinus tarsi syndrome of right ankle  Sinus tarsi syndrome of left ankle     Plan:  Talked about arthritis and " treatments including orthotics, injections, icing, NSAIDS, bracing, physical therapy, compounding pain cream, or surgical intervention. Talked about sinus tarsitis which is early arthritis to the sinus tarsi joint. Talked about treatments including orthotics, icing, compression, NSAIDs, injection, physical therapy with iontophoresis, immobilization, compounding pain cream, permanent ankle bracing, MRI to assess for need for fusion.    She would like injections today to both feet.      The potential causes and nature of an ingrown toenail were discussed with the patient.  We reviewed the natural history/prognosis of the condition and potential risks if no treatment is provided.      Treatment options discussed included conservative management (oral antibiotics, soaking of foot, adequate width shoes)  as well as surgical management (partial or total nail removal).  The pros and cons of both forms of treatment were reviewed.      She would like part of the nails removed today. Will soak feet 2x a day for 2 weeks and apply anti biotic ointment and bandage.      Procedure:  After verbal consent, the left sinus tarsi joint was marked out.  The foot was prepped and draped using sterile technique.  A mixture of 1cc of 2% lidocaine plain and 1 1/2 cc of kenalog -40 was injected into the left sinus tarsi joint.  Patient tolerated procedure and anesthesia well.     Procedure 2: same procedure done to the right foot.     Procedure 3: After verbal consent, the right 2nd toe was anesthetized with 5cc's of 1% lidocaine plain. A tourniquet was applied to the toe. The medial and lateral borders were then raised from the nail bed and then cut the length of the nail.  The offending nail borders were then removed.  Bacitracin was applied to the nail bed.  The tourniquet was removed.  Bandage was applied to the toe.  The patient tolerated the procedure and anesthesia well.    Procedure 4: same procedure to the left 2nd toenail.          Ashia White DPM, Podiatry/Foot and Ankle Surgery    Weight management plan: Patient was referred to their PCP to discuss a diet and exercise plan.        Again, thank you for allowing me to participate in the care of your patient.        Sincerely,        Ashia White DPM, Podiatry/Foot and Ankle Surgery

## 2018-03-19 NOTE — MR AVS SNAPSHOT
After Visit Summary   3/19/2018    Anat Correa    MRN: [id number]           Patient Information     Date Of Birth          1935        Visit Information        Provider Department      3/19/2018 8:15 AM Ashia Mcfarland DPM, Podiatry/Foot and Ankle Surgery [facility] [place]        Care Instructions    Thank you for choosing [facility]!    DR. MCFARLAND'S CLINIC LOCATIONS:   MONDAY AM - [place] TUESDAY - [place]   [address] [facility] [address] [place] [address] [place] [address]   [phone] / [phone] / [phone]       WEDNESDAY - [place] FRIDAY PM - [place]   [address]   [place] [address] [place] [address]   [phone] / [phone] / [phone]       SCHEDULE SURGERY: [phone]    APPOINTMENTS: [phone]    BILLING QUESTIONS: [phone]      INGROWN TOENAILS  When a toenail is ingrown, it is curved and grows into the skin, usually at the nail borders (the sides of the nail). This  digging in  of the nail irritates the skin, often creating pain, redness, swelling, and warmth in the toe.  If an ingrown nail causes a break in the skin, bacteria may enter and cause an infection in the area, which is often marked by drainage and a foul odor. However, even if the toe isn t painful, red, swollen, or warm, a nail that curves downward into the skin can progress to an infection.  CAUSES:  Heredity: In many people, the tendency for ingrown toenails is inherited.   Trauma: Sometimes an ingrown toenail is the result of trauma, such as stubbing your toe, having an object fall on your toe, or engaging in activities that involve repeated pressure on the toes, such as kicking or running.   Improper Trimming:  The most common cause of ingrown toenails is cutting your nails too short. This encourages the skin next to the nail to fold over the nail.   Improperly  Sized Footwear: Ingrown toenails can result from wearing socks and shoes that are tight or short.   Nail Conditions: Ingrown toenails can be caused by nail problems, such as fungal infections or losing a nail due to trauma.   TREATMENT: Sometimes initial treatment for ingrown toenails can be safely performed at home. However, home treatment is strongly discouraged if an infection is suspected, or for those who have medical conditions that put feet at high risk, such as diabetes, nerve damage in the foot, or poor circulation.  Home care: If you don t have an infection or any of the above medical conditions, you can soak your foot in room-temperature water (adding Epsom s salt may be recommended by your doctor), and gently massage the side of the nail fold to help reduce the inflammation.  Avoid attempting  bathroom surgery.  Repeated cutting of the nail can cause the condition to worsen over time. If your symptoms fail to improve, it s time to see a foot and ankle surgeon.  Physician care: After examining the toe, the foot and ankle surgeon will select the treatment best suited for you. If an infection is present, an oral antibiotic may be prescribed.  Sometimes a minor surgical procedure, often performed in the office, will ease the pain and remove the offending nail. After applying a local anesthetic, the doctor removes part of the nail s side border. Some nails may become ingrown again, requiring removal of the nail root.  Following the nail procedure, a light bandage will be applied. Most people experience very little pain after surgery and may resume normal activity the next day. If your surgeon has prescribed an oral antibiotic, be sure to take all the medication, even if your symptoms have improved.  PREVENTION:  Proper Trimming: Cut toenails in a fairly straight line, and don t cut them too short. You should be able to get your fingernail under the sides and end of the nail.   Well-fitting Footwear: Don t  wear shoes that are short or tight in the toe area. Avoid shoes that are loose, because they too cause pressure on the toes, especially when running or walking briskly.     INGROWN TOENAIL REMOVAL AFTERCARE     Go directly home and elevate the affected foot on one or two pillows for the remainder of the day/evening if possible. Your toe may stay numb anywhere from 2-8 hours.     Take Tylenol, ibuprofen or another anti-inflammatory as needed for pain.     Take antibiotic if that has been prescribed. Finish the entire prescribed antibiotic even if your symptoms have improved.     The evening of the procedure, soak/wash the affected area in warm water (you may add Epsom salt) for 5 to 10 minutes. Do this twice a day for 2-4 weeks (6-8 weeks if you had phenol) (you may count showering/bathing as one soak).  After soaks, pat the area dry and then allow to airdry for a few minutes. Apply antibiotic ointment to the area and cover with 2 X 2 gauze and paper tape or band-aid.    You may pursue everyday activities as tolerated with either an open toe shoe or cut-out shoe as needed or you may wear regular shoes if no pain is noted.    Watch for any signs and symptoms of infection such as: redness, red streaks going up the foot/leg, swelling, pus or foul odor. Those that have had the phenol procedure, the toe will drain longer and will look like it is infected because it is a chemical burn.     Please call with questions.  OSTEOARTHRITIS OF THE FOOT & ANKLE  Osteoarthritis is a condition characterized by the breakdown and eventual loss of cartilage in one or more joints. Cartilage (the connective tissue found at the end of the bones in the joints) protects and cushions the bones during movement. When cartilage deteriorates or is lost, symptoms develop that can restrict one s ability to easily perform daily activities.  Osteoarthritis is also known as degenerative arthritis, reflecting its nature to develop as part of the aging  process. As the most common form of arthritis, osteoarthritis affects millions of Americans. Some people refer to osteoarthritis simply as arthritis, even though there are many different types of arthritis.  Osteoarthritis appears at various joints throughout the body, including the hands, feet, spine, hips, and knees. In the foot, the disease most frequently occurs in the big toe, although it is also often found in the midfoot and ankle.  CAUSES  Osteoarthritis is considered a  wear and tear  disease because the cartilage in the joint wears down with repeated stress and use over time. As the cartilage deteriorates and gets thinner, the bones lose their protective covering and eventually may rub together, causing pain and inflammation of the joint.  An injury may also lead to osteoarthritis, although it may take months or years after the injury for the condition to develop. For example, osteoarthritis in the big toe is often caused by kicking or jamming the toe, or by dropping something on the toe. Osteoarthritis in the midfoot is often caused by dropping something on it, or by a sprain or fracture. In the ankle, osteoarthritis is usually caused by a fracture and occasionally by a severe sprain.  Sometimes osteoarthritis develops as a result of abnormal foot mechanics such as flat feet or high arches. A flat foot causes less stability in the ligaments (bands of tissue that connect bones), resulting in excessive strain on the joints, which can cause arthritis. A high arch is rigid and lacks mobility, causing a jamming of joints that creates an increased risk of arthritis.  SYMPTOMS  People with osteoarthritis in the foot or ankle experience, in varying degrees, one or more of the following: Pain and stiffness in the joint, swelling in or near the joint, or difficulty walking or bending the joint.   Some patients with osteoarthritis also develop a bone spur (a bony protrusion) at the affected joint. Shoe pressure may  cause pain at the site of a bone spur, and in some cases blisters or calluses may form over its surface. Bone spurs can also limit the movement of the joint.    DIAGNOSIS  In diagnosing osteoarthritis, the foot and ankle surgeon will examine the foot thoroughly, looking for swelling in the joint, limited mobility, and pain with movement. In some cases, deformity and/or enlargement (spur) of the joint may be noted. X-rays may be ordered to evaluate the extent of the disease.  NON-SURGICAL TREATMENT  To help relieve symptoms, the surgeon may begin treating osteoarthritis with one or more of the following non-surgical approaches:  Oral medications. Nonsteroidal anti-inflammatory drugs (NSAIDs), such as ibuprofen, are often helpful in reducing the inflammation and pain. Occasionally a prescription for a steroid medication is needed to adequately reduce symptoms.   Orthotic devices. Custom orthotic devices (shoe inserts) are often prescribed to provide support to improve the foot s mechanics or cushioning to help minimize pain.   Bracing. Bracing, which restricts motion and supports the joint, can reduce pain during walking and help prevent further deformity.   Immobilization. Protecting the foot from movement by wearing a cast or removable cast-boot may be necessary to allow the inflammation to resolve.   Steroid injections. In some cases, steroid injections are applied to the affected joint to deliver anti-inflammatory medication.   Physical therapy. Exercises to strengthen the muscles, especially when the osteoarthritis occurs in the ankle, may give the patient greater stability and help avoid injury that might worsen the condition.   DO I NEED SURGERY?  When osteoarthritis has progressed substantially or failed to improve with non-surgical treatment, surgery may be recommended. In advanced cases, surgery may be the only option. The goal of surgery is to decrease pain and improve function. The foot and ankle surgeon  will consider a number of factors when selecting the procedure best suited to the patient s condition and lifestyle.      Body Mass Index (BMI)  Many things can cause foot and ankle problems. Foot structure, activity level, foot mechanics and injuries are common causes of pain.  One very important issue that often goes unmentioned, is body weight. Extra weight can cause increased stress on muscles, ligaments, bones and tendons.  Sometimes just a few extra pounds is all it takes to put one over her/his threshold. Without reducing that stress, it can be difficult to alleviate pain. Some people are uncomfortable addressing this issue, but we feel it is important for you to think about it. As Foot &  Ankle specialists, our job is addressing the lower extremity problem and possible causes. Regarding extra body weight, we encourage patients to discuss diet and weight management plans with their primary care doctors. It is this team approach that gives you the best opportunity for pain relief and getting you back on your feet.                  Follow-ups after your visit        Your next 10 appointments already scheduled     Apr 24, 2018  7:40 AM CDT   [facility] Visit [facility]   [facility]    [address] [place] [address] [phone]           Bring a current list of meds and any records pertaining to this visit. For Physicals, please bring immunization records and any forms needing to be filled out. Please arrive 10 minutes early to complete paperwork.              Who to contact     If you have questions or need follow up information about today's clinic visit or your schedule please contact [facility] [place] directly at [phone].  Normal or non-critical lab and imaging results will be communicated to you by MyChart, letter or phone within 4 business days after the clinic has received the results. If you do not hear from us  "within 7 days, please contact the clinic through [employer] or phone. If you have a critical or abnormal lab result, we will notify you by phone as soon as possible.  Submit refill requests through [employer] or call your pharmacy and they will forward the refill request to us. Please allow 3 business days for your refill to be completed.          Additional Information About Your Visit        [employer] Information     [employer] lets you send messages to your doctor, view your test results, renew your prescriptions, schedule appointments and more. To sign up, go to [email or web address] . Click on \"Log in\" on the left side of the screen, which will take you to the Welcome page. Then click on \"Sign up Now\" on the right side of the page.     You will be asked to enter the access code listed below, as well as some personal information. Please follow the directions to create your username and password.     Your access code is: [email or web address]  Expires: 2018  8:46 AM     Your access code will  in 90 days. If you need help or a new code, please call your [facility] clinic or [phone].        Care EveryWhere ID     This is your Care EveryWhere ID. This could be used by other organizations to access your [place] medical records  [id number]        Your Vitals Were     Height BMI (Body Mass Index)                5' 1\" (1.549 m) 31.18 kg/m2           Blood Pressure from Last 3 Encounters:   18 168/80   18 144/54   18 156/76    Weight from Last 3 Encounters:   18 165 lb (74.8 kg)   18 164 lb (74.4 kg)   17 160 lb (72.6 kg)              Today, you had the following     No orders found for display       Primary Care Provider Office Phone # Fax #    Rashi Calle [phone]       [address] [place] [id number]        Equal Access to Services     DIANELYS LOW AH: Hanh Murphy, adriana joe, qaybadriana trevizo " meghanshenarajendra ruizDorisaaavila ah. So [facility] [phone].    ATENCIÓN: Si jorden bush, tiene a [place] disposición servicios gratuitos de asistencia lingüística. Mehran cardenas [phone].    We comply with applicable federal civil rights laws and [place] laws. We do not discriminate on the basis of race, color, national origin, age, disability, sex, sexual orientation, or gender identity.            Thank you!     [facility] you [facility]  [facility] your [facility]. Our goal is always to provide you with excellent care. Hearing back from our patients is one way we can continue to improve our services. Please take a few minutes to complete the written survey that you may receive in the mail after your visit with us. Thank you!             Your Updated Medication List - Protect others around you: Learn how to safely use, store and throw away your medicines at www.disposemymeds.org.          This list is accurate as of 3/19/18  8:46 AM.  Always use your most recent med list.                   Brand Name Dispense Instructions for use Diagnosis    acetaminophen-codeine 300-30 MG per tablet    TYLENOL #3    90 tablet    Take 1-2 tablets by mouth every 6 hours as needed for moderate pain    Primary osteoarthritis involving multiple joints       albuterol 108 (90 BASE) MCG/ACT Inhaler    VENTOLIN HFA    54 g    INHALE TWO PUFFS INTO THE LUNGS EVERY 6 HOURS AS NEEDED FOR SHORTNESS OF BREATH/DYSPNEA    COPD, moderate (H)       amLODIPine 5 MG tablet    NORVASC    90 tablet    Take 1 tablet (5 mg) by mouth daily    Hypertension goal BP (blood pressure) < 140/90, CKD (chronic kidney disease) stage 4, GFR 15-29 ml/min (H)       amoxicillin 875 MG tablet    AMOXIL    20 tablet    Take 1 tablet (875 mg) by mouth 2 times daily    Urinary tract infection       ASPIRIN NOT PRESCRIBED    INTENTIONAL    0 each    Please choose reason not prescribed, below    Hypertension goal BP (blood pressure) < 140/90       calcium carbonate 500 MG chewable  tablet    TUMS     Take 2 chew tab by mouth daily as needed for heartburn        furosemide 20 MG tablet    LASIX    180 tablet    40 mg in am    Hypertension goal BP (blood pressure) < 140/90, CKD (chronic kidney disease) stage 4, GFR 15-29 ml/min (H)       IBUPROFEN PO      Take 400 mg by mouth every 6 hours as needed for moderate pain        ipratropium - albuterol 0.5 mg/2.5 mg/3 mL 0.5-2.5 (3) MG/3ML neb solution    DUONEB    270 mL    Take 1 vial (3 mLs) by nebulization every 6 hours as needed for shortness of breath / dyspnea or wheezing    COPD, moderate (H)       losartan 50 MG tablet    COZAAR    180 tablet    Take 2 tablets (100 mg) by mouth daily    Hypertension goal BP (blood pressure) < 140/90, CKD (chronic kidney disease) stage 4, GFR 15-29 ml/min (H)       metoprolol succinate 50 MG 24 hr tablet    TOPROL-XL    180 tablet    Take 1 tablet (50 mg) by mouth 2 times daily (2 x 50mg = 100mg)    Hypertension goal BP (blood pressure) < 140/90, CKD (chronic kidney disease) stage 4, GFR 15-29 ml/min (H)       metroNIDAZOLE 0.75 % topical gel    METROGEL    45 g    Apply topically daily    Rosacea

## 2018-03-19 NOTE — NURSING NOTE
"Chief Complaint   Patient presents with     Foot Problems     pt would like a injection on her feet last onws were done 9/11/18     Toenail     bilateral 3rd toes are bothering her would procedure done        Initial /80  Ht 5' 1\" (1.549 m)  Wt 165 lb (74.8 kg)  BMI 31.18 kg/m2 Estimated body mass index is 31.18 kg/(m^2) as calculated from the following:    Height as of this encounter: 5' 1\" (1.549 m).    Weight as of this encounter: 165 lb (74.8 kg).  Medication Reconciliation: complete   Renzo Rivera MA      "

## 2018-03-19 NOTE — PATIENT INSTRUCTIONS
Thank you for choosing [facility]!    DR. MCFARLAND'S CLINIC LOCATIONS:   MONDAY AM - [place] TUESDAY - [place]   [address] [address] [address] [address] [place] [id number] [phone] / [phone] / [phone]       WEDNESDAY - [place] FRIDAY PM - [place]   [address]   [place] [address] [place] [address] [phone] / [phone] / [phone]       SCHEDULE SURGERY: [phone]    APPOINTMENTS: [phone]    BILLING QUESTIONS: [phone]      INGROWN TOENAILS  When a toenail is ingrown, it is curved and grows into the skin, usually at the nail borders (the sides of the nail). This  digging in  of the nail irritates the skin, often creating pain, redness, swelling, and warmth in the toe.  If an ingrown nail causes a break in the skin, bacteria may enter and cause an infection in the area, which is often marked by drainage and a foul odor. However, even if the toe isn t painful, red, swollen, or warm, a nail that curves downward into the skin can progress to an infection.  CAUSES:  Heredity: In many people, the tendency for ingrown toenails is inherited.   Trauma: Sometimes an ingrown toenail is the result of trauma, such as stubbing your toe, having an object fall on your toe, or engaging in activities that involve repeated pressure on the toes, such as kicking or running.   Improper Trimming:  The most common cause of ingrown toenails is cutting your nails too short. This encourages the skin next to the nail to fold over the nail.   Improperly Sized Footwear: Ingrown toenails can result from wearing socks and shoes that are tight or short.   Nail Conditions: Ingrown toenails can be caused by nail problems, such as fungal infections or losing a nail due to trauma.   TREATMENT: Sometimes initial treatment for ingrown toenails can be safely performed at home. However, home  treatment is strongly discouraged if an infection is suspected, or for those who have medical conditions that put feet at high risk, such as diabetes, nerve damage in the foot, or poor circulation.  Home care: If you don t have an infection or any of the above medical conditions, you can soak your foot in room-temperature water (adding Epsom s salt may be recommended by your doctor), and gently massage the side of the nail fold to help reduce the inflammation.  Avoid attempting  bathroom surgery.  Repeated cutting of the nail can cause the condition to worsen over time. If your symptoms fail to improve, it s time to see a foot and ankle surgeon.  Physician care: After examining the toe, the foot and ankle surgeon will select the treatment best suited for you. If an infection is present, an oral antibiotic may be prescribed.  Sometimes a minor surgical procedure, often performed in the office, will ease the pain and remove the offending nail. After applying a local anesthetic, the doctor removes part of the nail s side border. Some nails may become ingrown again, requiring removal of the nail root.  Following the nail procedure, a light bandage will be applied. Most people experience very little pain after surgery and may resume normal activity the next day. If your surgeon has prescribed an oral antibiotic, be sure to take all the medication, even if your symptoms have improved.  PREVENTION:  Proper Trimming: Cut toenails in a fairly straight line, and don t cut them too short. You should be able to get your fingernail under the sides and end of the nail.   Well-fitting Footwear: Don t wear shoes that are short or tight in the toe area. Avoid shoes that are loose, because they too cause pressure on the toes, especially when running or walking briskly.     INGROWN TOENAIL REMOVAL AFTERCARE     Go directly home and elevate the affected foot on one or two pillows for the remainder of the day/evening if possible. Your  toe may stay numb anywhere from 2-8 hours.     Take Tylenol, ibuprofen or another anti-inflammatory as needed for pain.     Take antibiotic if that has been prescribed. Finish the entire prescribed antibiotic even if your symptoms have improved.     The evening of the procedure, soak/wash the affected area in warm water (you may add Epsom salt) for 5 to 10 minutes. Do this twice a day for 2-4 weeks (6-8 weeks if you had phenol) (you may count showering/bathing as one soak).  After soaks, pat the area dry and then allow to airdry for a few minutes. Apply antibiotic ointment to the area and cover with 2 X 2 gauze and paper tape or band-aid.    You may pursue everyday activities as tolerated with either an open toe shoe or cut-out shoe as needed or you may wear regular shoes if no pain is noted.    Watch for any signs and symptoms of infection such as: redness, red streaks going up the foot/leg, swelling, pus or foul odor. Those that have had the phenol procedure, the toe will drain longer and will look like it is infected because it is a chemical burn.     Please call with questions.  OSTEOARTHRITIS OF THE FOOT & ANKLE  Osteoarthritis is a condition characterized by the breakdown and eventual loss of cartilage in one or more joints. Cartilage (the connective tissue found at the end of the bones in the joints) protects and cushions the bones during movement. When cartilage deteriorates or is lost, symptoms develop that can restrict one s ability to easily perform daily activities.  Osteoarthritis is also known as degenerative arthritis, reflecting its nature to develop as part of the aging process. As the most common form of arthritis, osteoarthritis affects millions of Americans. Some people refer to osteoarthritis simply as arthritis, even though there are many different types of arthritis.  Osteoarthritis appears at various joints throughout the body, including the hands, feet, spine, hips, and knees. In the foot,  the disease most frequently occurs in the big toe, although it is also often found in the midfoot and ankle.  CAUSES  Osteoarthritis is considered a  wear and tear  disease because the cartilage in the joint wears down with repeated stress and use over time. As the cartilage deteriorates and gets thinner, the bones lose their protective covering and eventually may rub together, causing pain and inflammation of the joint.  An injury may also lead to osteoarthritis, although it may take months or years after the injury for the condition to develop. For example, osteoarthritis in the big toe is often caused by kicking or jamming the toe, or by dropping something on the toe. Osteoarthritis in the midfoot is often caused by dropping something on it, or by a sprain or fracture. In the ankle, osteoarthritis is usually caused by a fracture and occasionally by a severe sprain.  Sometimes osteoarthritis develops as a result of abnormal foot mechanics such as flat feet or high arches. A flat foot causes less stability in the ligaments (bands of tissue that connect bones), resulting in excessive strain on the joints, which can cause arthritis. A high arch is rigid and lacks mobility, causing a jamming of joints that creates an increased risk of arthritis.  SYMPTOMS  People with osteoarthritis in the foot or ankle experience, in varying degrees, one or more of the following: Pain and stiffness in the joint, swelling in or near the joint, or difficulty walking or bending the joint.   Some patients with osteoarthritis also develop a bone spur (a bony protrusion) at the affected joint. Shoe pressure may cause pain at the site of a bone spur, and in some cases blisters or calluses may form over its surface. Bone spurs can also limit the movement of the joint.    DIAGNOSIS  In diagnosing osteoarthritis, the foot and ankle surgeon will examine the foot thoroughly, looking for swelling in the joint, limited mobility, and pain with  movement. In some cases, deformity and/or enlargement (spur) of the joint may be noted. X-rays may be ordered to evaluate the extent of the disease.  NON-SURGICAL TREATMENT  To help relieve symptoms, the surgeon may begin treating osteoarthritis with one or more of the following non-surgical approaches:  Oral medications. Nonsteroidal anti-inflammatory drugs (NSAIDs), such as ibuprofen, are often helpful in reducing the inflammation and pain. Occasionally a prescription for a steroid medication is needed to adequately reduce symptoms.   Orthotic devices. Custom orthotic devices (shoe inserts) are often prescribed to provide support to improve the foot s mechanics or cushioning to help minimize pain.   Bracing. Bracing, which restricts motion and supports the joint, can reduce pain during walking and help prevent further deformity.   Immobilization. Protecting the foot from movement by wearing a cast or removable cast-boot may be necessary to allow the inflammation to resolve.   Steroid injections. In some cases, steroid injections are applied to the affected joint to deliver anti-inflammatory medication.   Physical therapy. Exercises to strengthen the muscles, especially when the osteoarthritis occurs in the ankle, may give the patient greater stability and help avoid injury that might worsen the condition.   DO I NEED SURGERY?  When osteoarthritis has progressed substantially or failed to improve with non-surgical treatment, surgery may be recommended. In advanced cases, surgery may be the only option. The goal of surgery is to decrease pain and improve function. The foot and ankle surgeon will consider a number of factors when selecting the procedure best suited to the patient s condition and lifestyle.      Body Mass Index (BMI)  Many things can cause foot and ankle problems. Foot structure, activity level, foot mechanics and injuries are common causes of pain.  One very important issue that often goes  unmentioned, is body weight. Extra weight can cause increased stress on muscles, ligaments, bones and tendons.  Sometimes just a few extra pounds is all it takes to put one over her/his threshold. Without reducing that stress, it can be difficult to alleviate pain. Some people are uncomfortable addressing this issue, but we feel it is important for you to think about it. As Foot &  Ankle specialists, our job is addressing the lower extremity problem and possible causes. Regarding extra body weight, we encourage patients to discuss diet and weight management plans with their primary care doctors. It is this team approach that gives you the best opportunity for pain relief and getting you back on your feet.

## 2018-03-20 DIAGNOSIS — M15.0 PRIMARY OSTEOARTHRITIS INVOLVING MULTIPLE JOINTS: ICD-10-CM

## 2018-03-20 NOTE — TELEPHONE ENCOUNTER
acetaminophen-codeine (TYLENOL #3) 300-30 MG per tablet        Last Written Prescription Date:  12.19.17  Last Fill Quantity: 90,   # refills: 0  Last Office Visit: 2.19.18  Future Office visit:    Next 5 appointments (look out 90 days)     Apr 24, 2018  7:40 AM CDT   [facility] with [facility]ashi [facility]le [facility]   [facility]    [address] [place] [id number] [phone]                   Routing refill request to provider for review/approval because:  Drug not on the [facility], [facility] or [facility] refill protocol or controlled substance        Controlled substance agreement signed

## 2018-03-20 NOTE — TELEPHONE ENCOUNTER
csa not on file     Routing refill request to provider for review/approval because:  Drug not on the [facility] refill protocol     Ariadna Starks RN, BSN  [facility]

## 2018-03-30 ENCOUNTER — TELEPHONE (OUTPATIENT)
Dept: FAMILY MEDICINE | Facility: CLINIC | Age: 83
End: 2018-03-30

## 2018-03-30 NOTE — TELEPHONE ENCOUNTER
Prior Authorization Retail Medication Request    Medication/Dose: Ipratropium/Sol Albuterol  ICD code (if different than what is on RX):  J44.9  Previously Tried and Failed:    Rationale:  Pt has bad COPD    Insurance Name:  Form says Juan [employer] Prescription benefit facilitator   phone# [phone]  Fax [phone]  Insurance ID:  [id number]  Client ID [id number]      Pharmacy Information (if different than what is on RX)  Name:  No pharmacy listed on form but it looks like she uses [facility] in [place]  Phone:

## 2018-04-02 ENCOUNTER — ALLIED HEALTH/NURSE VISIT (OUTPATIENT)
Dept: FAMILY MEDICINE | Facility: CLINIC | Age: 83
End: 2018-04-02
Payer: MEDICARE

## 2018-04-02 VITALS — DIASTOLIC BLOOD PRESSURE: 64 MMHG | SYSTOLIC BLOOD PRESSURE: 146 MMHG

## 2018-04-02 DIAGNOSIS — I10 HYPERTENSION GOAL BP (BLOOD PRESSURE) < 140/90: Primary | ICD-10-CM

## 2018-04-02 PROCEDURE — 99207 ZZC NO CHARGE NURSE ONLY: CPT | Performed by: FAMILY MEDICINE

## 2018-04-02 NOTE — MR AVS SNAPSHOT
"              After Visit Summary   4/2/2018    Anat Correa    MRN: [id number]           Patient Information     Date Of Birth          1935        Visit Information        Provider Department      4/2/2018 11:40 AM Rashi Calle MD [facility]        Today's Diagnoses     Hypertension goal BP (blood pressure) < 140/90    -  1       Follow-ups after your visit        Your next 10 appointments already scheduled     Apr 24, 2018  7:40 AM CDT   Office Visit with Rashi Calle MD   [facility]    [address] [place] [id number]   [phone]           Bring a current list of meds and any records pertaining to this visit. For Physicals, please bring immunization records and any forms needing to be filled out. Please arrive 10 minutes early to complete paperwork.              Who to contact     If you have questions or need follow up information about today's clinic visit or your schedule please contact [facility] directly at [phone].  Normal or non-critical lab and imaging results will be communicated to you by [employer], letter or phone within 4 business days after the clinic has received the results. If you do not hear from us within 7 days, please contact the clinic through Atrecat or phone. If you have a critical or abnormal lab result, we will notify you by phone as soon as possible.  Submit refill requests through Toonimo or call your pharmacy and they will forward the refill request to us. Please allow 3 business days for your refill to be completed.          Additional Information About Your Visit        [employer] Information     Toonimo lets you send messages to your doctor, view your test results, renew your prescriptions, schedule appointments and more. To sign up, go to [email or web address] . Click on \"Log in\" on the left side of the screen, which will take you to the Welcome page. Then click " "on \"Sign up Now\" on the right side of the page.     You will be asked to enter the access code listed below, as well as some personal information. Please follow the directions to create your username and password.     Your access code is: [email or web address]  Expires: 2018  8:46 AM     Your access code will  in 90 days. If you need help or a new code, please call your [place] clinic or [phone].        Care EveryWhere ID     This is your Care EveryWhere ID. This could be used by other organizations to access your [place] medical records  [id number]         Blood Pressure from Last 3 Encounters:   18 146/64   18 168/80   18 144/54    Weight from Last 3 Encounters:   18 165 lb (74.8 kg)   18 164 lb (74.4 kg)   17 160 lb (72.6 kg)              Today, you had the following     No orders found for display       Primary Care Provider Office Phone # Fax #    Rashi Calle [phone] [address] [facility] [address]        Equal Access to Services     JUNE [facility] Hospital[facility]ANIA AH: Hadii aad ku hadkylieo Sorubaali, waaxda luqadaha, qaybta kaalmada adeegyada, adriana calderon. So [facility] [phone].    ATENCIÓN: Si habla español, tiene a winchester disposición servicios gratuitos de asistencia lingüística. Llame al [phone].    We comply with applicable federal civil rights laws and [place] laws. We do not discriminate on the basis of race, color, national origin, age, disability, sex, sexual orientation, or gender identity.            Thank you!     Thank you for choosing [facility]  for your care. Our goal is always to provide you with excellent care. Hearing back from our patients is one way we can continue to improve our services. Please take a few minutes to complete the written survey that you may receive in the mail after your visit with us. Thank you!             Your Updated Medication List - Protect others around you: " Learn how to safely use, store and throw away your medicines at www.disposemymeds.org.          This list is accurate as of 4/2/18 11:59 PM.  Always use your most recent med list.                   Brand Name Dispense Instructions for use Diagnosis    acetaminophen-codeine 300-30 MG per tablet    TYLENOL #3    90 tablet    Take 1-2 tablets by mouth every 6 hours as needed for moderate pain    Primary osteoarthritis involving multiple joints       albuterol 108 (90 BASE) MCG/ACT Inhaler    VENTOLIN HFA    54 g    INHALE TWO PUFFS INTO THE LUNGS EVERY 6 HOURS AS NEEDED FOR SHORTNESS OF BREATH/DYSPNEA    COPD, moderate (H)       amLODIPine 5 MG tablet    NORVASC    90 tablet    Take 1 tablet (5 mg) by mouth daily    Hypertension goal BP (blood pressure) < 140/90, CKD (chronic kidney disease) stage 4, GFR 15-29 ml/min (H)       amoxicillin 875 MG tablet    AMOXIL    20 tablet    Take 1 tablet (875 mg) by mouth 2 times daily    Urinary tract infection       ASPIRIN NOT PRESCRIBED    INTENTIONAL    0 each    Please choose reason not prescribed, below    Hypertension goal BP (blood pressure) < 140/90       calcium carbonate 500 MG chewable tablet    TUMS     Take 2 chew tab by mouth daily as needed for heartburn        furosemide 20 MG tablet    LASIX    180 tablet    40 mg in am    Hypertension goal BP (blood pressure) < 140/90, CKD (chronic kidney disease) stage 4, GFR 15-29 ml/min (H)       IBUPROFEN PO      Take 400 mg by mouth every 6 hours as needed for moderate pain        ipratropium - albuterol 0.5 mg/2.5 mg/3 mL 0.5-2.5 (3) MG/3ML neb solution    DUONEB    270 mL    Take 1 vial (3 mLs) by nebulization every 6 hours as needed for shortness of breath / dyspnea or wheezing    COPD, moderate (H)       losartan 50 MG tablet    COZAAR    180 tablet    Take 2 tablets (100 mg) by mouth daily    Hypertension goal BP (blood pressure) < 140/90, CKD (chronic kidney disease) stage 4, GFR 15-29 ml/min (H)       metoprolol  succinate 50 MG 24 hr tablet    TOPROL-XL    180 tablet    Take 1 tablet (50 mg) by mouth 2 times daily (2 x 50mg = 100mg)    Hypertension goal BP (blood pressure) < 140/90, CKD (chronic kidney disease) stage 4, GFR 15-29 ml/min (H)       metroNIDAZOLE 0.75 % topical gel    METROGEL    45 g    Apply topically daily    Rosacea

## 2018-04-02 NOTE — TELEPHONE ENCOUNTER
Prior Authorization Approval    Authorization Effective Date: 4/2/2018  Authorization Expiration Date: 4/2/2019  Medication: Ipratropium/Sol Albuterol  Approved Dose/Quantity:    Reference #:     Insurance Company: Other (see comments)Comment:  [employer]  Expected CoPay:       CoPay Card Available:      Foundation Assistance Needed:    Which Pharmacy is filling the prescription (Not needed for infusion/clinic administered): [facility] - [place], [place] - [address] [place] [address]  Pharmacy Notified: Yes  Patient Notified: Yes

## 2018-04-02 NOTE — TELEPHONE ENCOUNTER
Central Prior Authorization Team   Phone: [phone]    PA Initiation    Medication: Ipratropium/Sol Albuterol  Insurance Company: Other (see comments)Comment:  [facility]  Pharmacy Filling the Rx: [facility] - [place], [place] - [address] [place] [address]  Filling Pharmacy Phone: [phone]  Filling Pharmacy Fax:    Start Date: 4/2/2018    Manually faxed request.

## 2018-04-02 NOTE — TELEPHONE ENCOUNTER
Central Prior Authorization Team   Phone: [phone]    Insurance is requiring a quantity limit override.  They will be faxing forms to the PA dept to fill out.

## 2018-04-02 NOTE — PROGRESS NOTES
"Anat Correa is enrolled/participating in the retail pharmacy Blood Pressure Goals Achievement Program (BPGAP).  Anat Correa was evaluated at [facility] on April 2, 2018 at which time her blood pressure was:    BP Readings from Last 3 Encounters:   04/02/18 146/64   03/19/18 168/80   02/28/18 144/54     Reviewed lifestyle modifications for blood pressure control and reduction: including making healthy food choices, managing weight, getting regular exercise, smoking cessation, reducing alcohol consumption, monitoring blood pressure regularly.     Anat Correa is not experiencing symptoms.    Follow-Up: BP is not at goal of < 140/90mmHg (patient 18+ years of age with or without diabetes), Recommended follow-up with PCP.  Routing to PCP for further review.    Recommendation to Provider: Still not at goal.  Pt reports that 'This is my best one yet!\".  Not willing to stay for re-check.  Is aware to watch sodium intake- discussed this again today.    Anat Correa was evaluated for enrollment into the PGEN study today.    Patient eligible for enrollment:  Yes  Patient interested in enrollment:  No    Completed by: Thank you,  Alysa Hays [facility], Mgr [facility] [place] [phone]      "

## 2018-04-24 ENCOUNTER — OFFICE VISIT (OUTPATIENT)
Dept: FAMILY MEDICINE | Facility: CLINIC | Age: 83
End: 2018-04-24
Payer: MEDICARE

## 2018-04-24 VITALS
SYSTOLIC BLOOD PRESSURE: 142 MMHG | OXYGEN SATURATION: 97 % | DIASTOLIC BLOOD PRESSURE: 56 MMHG | BODY MASS INDEX: 30.96 KG/M2 | HEIGHT: 61 IN | WEIGHT: 164 LBS | HEART RATE: 74 BPM | TEMPERATURE: 97.5 F

## 2018-04-24 DIAGNOSIS — I10 HYPERTENSION GOAL BP (BLOOD PRESSURE) < 140/90: ICD-10-CM

## 2018-04-24 DIAGNOSIS — G63 POLYNEUROPATHY ASSOCIATED WITH UNDERLYING DISEASE (H): ICD-10-CM

## 2018-04-24 DIAGNOSIS — E55.9 VITAMIN D DEFICIENCY: ICD-10-CM

## 2018-04-24 DIAGNOSIS — N18.4 ANEMIA IN STAGE 4 CHRONIC KIDNEY DISEASE (H): ICD-10-CM

## 2018-04-24 DIAGNOSIS — N25.81 SECONDARY RENAL HYPERPARATHYROIDISM (H): ICD-10-CM

## 2018-04-24 DIAGNOSIS — D63.1 ANEMIA IN STAGE 4 CHRONIC KIDNEY DISEASE (H): ICD-10-CM

## 2018-04-24 DIAGNOSIS — Z51.81 MEDICATION MONITORING ENCOUNTER: ICD-10-CM

## 2018-04-24 DIAGNOSIS — E78.5 HYPERLIPIDEMIA LDL GOAL <100: ICD-10-CM

## 2018-04-24 DIAGNOSIS — M15.0 PRIMARY OSTEOARTHRITIS INVOLVING MULTIPLE JOINTS: ICD-10-CM

## 2018-04-24 DIAGNOSIS — N18.4 CKD (CHRONIC KIDNEY DISEASE) STAGE 4, GFR 15-29 ML/MIN (H): Primary | ICD-10-CM

## 2018-04-24 DIAGNOSIS — J44.9 COPD, MODERATE (H): ICD-10-CM

## 2018-04-24 DIAGNOSIS — C34.12 MALIGNANT NEOPLASM OF UPPER LOBE OF LEFT LUNG (H): ICD-10-CM

## 2018-04-24 DIAGNOSIS — I87.8 VENOUS STASIS OF LOWER EXTREMITY: ICD-10-CM

## 2018-04-24 DIAGNOSIS — M81.0 OSTEOPOROSIS WITHOUT CURRENT PATHOLOGICAL FRACTURE, UNSPECIFIED OSTEOPOROSIS TYPE: ICD-10-CM

## 2018-04-24 LAB
ANION GAP SERPL CALCULATED.3IONS-SCNC: 7 MMOL/L (ref 3–14)
BUN SERPL-MCNC: 36 MG/DL (ref 7–30)
CALCIUM SERPL-MCNC: 9.5 MG/DL (ref 8.5–10.1)
CHLORIDE SERPL-SCNC: 101 MMOL/L (ref 94–109)
CO2 SERPL-SCNC: 23 MMOL/L (ref 20–32)
CREAT SERPL-MCNC: 1.41 MG/DL (ref 0.52–1.04)
DEPRECATED CALCIDIOL+CALCIFEROL SERPL-MC: 22 UG/L (ref 20–75)
ERYTHROCYTE [DISTWIDTH] IN BLOOD BY AUTOMATED COUNT: 12.9 % (ref 10–15)
GFR SERPL CREATININE-BSD FRML MDRD: 36 ML/MIN/1.7M2
GLUCOSE SERPL-MCNC: 74 MG/DL (ref 70–99)
HCT VFR BLD AUTO: 31.7 % (ref 35–47)
HGB BLD-MCNC: 10.4 G/DL (ref 11.7–15.7)
MCH RBC QN AUTO: 30.3 PG (ref 26.5–33)
MCHC RBC AUTO-ENTMCNC: 32.8 G/DL (ref 31.5–36.5)
MCV RBC AUTO: 92 FL (ref 78–100)
PLATELET # BLD AUTO: 299 10E9/L (ref 150–450)
POTASSIUM SERPL-SCNC: 5.2 MMOL/L (ref 3.4–5.3)
RBC # BLD AUTO: 3.43 10E12/L (ref 3.8–5.2)
SODIUM SERPL-SCNC: 131 MMOL/L (ref 133–144)
WBC # BLD AUTO: 9.4 10E9/L (ref 4–11)

## 2018-04-24 PROCEDURE — 99214 OFFICE O/P EST MOD 30 MIN: CPT | Performed by: FAMILY MEDICINE

## 2018-04-24 PROCEDURE — 85027 COMPLETE CBC AUTOMATED: CPT | Performed by: FAMILY MEDICINE

## 2018-04-24 PROCEDURE — 80048 BASIC METABOLIC PNL TOTAL CA: CPT | Performed by: FAMILY MEDICINE

## 2018-04-24 PROCEDURE — 82306 VITAMIN D 25 HYDROXY: CPT | Performed by: FAMILY MEDICINE

## 2018-04-24 PROCEDURE — 36415 COLL VENOUS BLD VENIPUNCTURE: CPT | Performed by: FAMILY MEDICINE

## 2018-04-24 RX ORDER — AMLODIPINE BESYLATE 5 MG/1
5 TABLET ORAL 2 TIMES DAILY
Qty: 180 TABLET | Refills: 3 | Status: SHIPPED | OUTPATIENT
Start: 2018-04-24 | End: 2019-06-29

## 2018-04-24 RX ORDER — AMLODIPINE BESYLATE 5 MG/1
10 TABLET ORAL DAILY
Qty: 90 TABLET | Refills: 3 | COMMUNITY
Start: 2018-04-24 | End: 2018-04-24

## 2018-04-24 NOTE — MR AVS SNAPSHOT
After Visit Summary   4/24/2018    Anat Correa    MRN: [id number]           Patient Information     Date Of Birth          1935        Visit Information        Provider Department      4/24/2018 7:40 AM Rashi Calle MD [facility]        Today's Diagnoses     CKD (chronic kidney disease) stage 4, GFR 15-29 ml/min (H)    -  1    Anemia in stage 4 chronic kidney disease (H)        Hypertension goal BP (blood pressure) < 140/90        Hyperlipidemia LDL goal <100        Malignant neoplasm of upper lobe of left lung (H)        COPD, moderate        Primary osteoarthritis involving multiple joints        Osteoporosis without current pathological fracture, unspecified osteoporosis type        Vitamin D deficiency        Secondary renal hyperparathyroidism (H)        Venous stasis of lower extremity        Polyneuropathy associated with underlying disease (H)        Medication monitoring encounter          Care Instructions        [facility] [place]                        To reach your care team during and after hours:   [phone]  To reach our pharmacy:        [phone]    Clinic Hours                        Our clinic hours are:    Monday   7:30 am to 7:00 pm                  Tuesday through Friday 7:30 am to 5:00 pm                             Saturday   8:00 am to 12:00 pm      Sunday   Closed      Pharmacy Hours                        Our pharmacy hours are:    Monday   8:30 am to 7:00 pm       Tuesday to Friday  8:30 am to 6:00 pm                       Saturday    9:00 am to 1:00 pm              Sunday    Closed              There is also information available at our web site:  [email or web address]    If your provider ordered any lab tests and you do not receive the results within 10 business days, please call the clinic.    If you need a medication refill please contact your pharmacy.  Please allow 2-3 business days for your refill to be completed.    Our clinic  offers telephone visits and e visits.  Please ask one of your team members to explain more.      Use [employer] (secure email communication and access to your chart) to send your primary care provider a message or make an appointment. Ask someone on your Team how to sign up for [employer].  Immunizations                      Immunization History   Administered Date(s) Administered     Influenza (High Dose) 3 valent vaccine 10/07/2010, 09/19/2012, 11/04/2013, 10/08/2014, 11/03/2015, 09/16/2016, 10/02/2017     Influenza (IIV3) PF 10/19/2004, 11/15/2005     Pneumo Conj 13-V (2010&after) 11/03/2015     Pneumococcal 23 valent 04/25/2006     TD (ADULT, 7+) 03/03/1998, 01/23/2006     TDAP Vaccine (Boostrix) 08/27/2012     Zoster vaccine, live 10/24/2012        Health Maintenance                         Health Maintenance Due   Topic Date Due     URINE DRUG SCREEN Q1 YR  01/25/1950     Medicare Annual Wellness Visit  11/21/2015     Wellness Visit with your Primary Provider - yearly  11/21/2015     Osteoporosis Screening (Dexa) - every 3 years   01/11/2016     COPD ACTION PLAN Q1 YR  06/01/2016     Cholesterol Lab - yearly  03/09/2017     Colonoscopy - ever 10 years  04/22/2018               Follow-ups after your visit        Follow-up notes from your care team     Return in about 4 months (around 8/24/2018), or if symptoms worsen or fail to improve.      Future tests that were ordered for you today     Open Future Orders        Priority Expected Expires Ordered    Lipid panel reflex to direct LDL Fasting Routine  4/24/2019 4/24/2018            Who to contact     If you have questions or need follow up information about today's clinic visit or your schedule please contact [facility] directly at [phone].  Normal or non-critical lab and imaging results will be communicated to you by MyChart, letter or phone within 4 business days after the clinic has received the results. If you do not hear from us within 7  "days, please contact the clinic through [employer] or phone. If you have a critical or abnormal lab result, we will notify you by phone as soon as possible.  Submit refill requests through [employer] or call your pharmacy and they will forward the refill request to us. Please allow 3 business days for your refill to be completed.          Additional Information About Your Visit        [employer] Information     [employer] lets you send messages to your doctor, view your test results, renew your prescriptions, schedule appointments and more. To sign up, go to [email or web address] . Click on \"Log in\" on the left side of the screen, which will take you to the Welcome page. Then click on \"Sign up Now\" on the right side of the page.     You will be asked to enter the access code listed below, as well as some personal information. Please follow the directions to create your username and password.     Your access code is: [email or web address]  Expires: 2018  8:46 AM     Your access code will  in 90 days. If you need help or a new code, please call your [facility] clinic or [phone].        Care EveryWhere ID     This is your Care EveryWhere ID. This could be used by other organizations to access your [facility] medical records  [id number]        Your Vitals Were     Pulse Temperature Height Pulse Oximetry BMI (Body Mass Index)       74 97.5  F (36.4  C) (Oral) 5' 1\" (1.549 m) 97% 30.99 kg/m2        Blood Pressure from Last 3 Encounters:   18 142/56   18 146/64   18 168/80    Weight from Last 3 Encounters:   18 164 lb (74.4 kg)   18 165 lb (74.8 kg)   18 164 lb (74.4 kg)              We Performed the Following     Basic metabolic panel  (Ca, Cl, CO2, Creat, Gluc, K, Na, BUN)     CBC with platelets     Vitamin D Deficiency          Today's Medication Changes          These changes are accurate as of 18  8:10 AM.  If you have any questions, ask your nurse or doctor.               Start " taking these medicines.        Dose/Directions    amLODIPine 5 MG tablet   Commonly known as:  NORVASC   Used for:  Hypertension goal BP (blood pressure) < 140/90, CKD (chronic kidney disease) stage 4, GFR 15-29 ml/min (H)   Started by:  Rashi Calle MD        Dose:  5 mg   Take 1 tablet (5 mg) by mouth 2 times daily   Quantity:  180 tablet   Refills:  3            Where to get your medicines      These medications were sent to [facility] [place] - [address], [facility] [address]     Phone:  [phone]     amLODIPine 5 MG tablet                Primary Care Provider Office Phone # Fax #    Rashi Calle [phone]       [address] [place] [id number]        Equal Access to Services     [facility] DEVANTE : Hadii bob barrett hadasho Soomaali, waaxda luqadaha, qaybta kaalmada adeegyada, adriana farias . So [facility] [phone].    ATENCIÓN: Si habla español, tiene a [place] disposición servicios gratuitos de asistencia lingüística. [facility] [phone].    We comply with applicable federal civil rights laws and [place] laws. We do not discriminate on the basis of race, color, national origin, age, disability, sex, sexual orientation, or gender identity.            Thank you!     Thank you for choosing [facility]  for your care. Our goal is always to provide you with excellent care. Hearing back from our patients is one way we can continue to improve our services. Please take a few minutes to complete the written survey that you may receive in the mail after your visit with us. Thank you!             Your Updated Medication List - Protect others around you: Learn how to safely use, store and throw away your medicines at www.disposemymeds.org.          This list is accurate as of 4/24/18  8:10 AM.  Always use your most recent med list.                   Brand Name Dispense Instructions for use  Diagnosis    acetaminophen-codeine 300-30 MG per tablet    TYLENOL #3    90 tablet    Take 1-2 tablets by mouth every 6 hours as needed for moderate pain    Primary osteoarthritis involving multiple joints       albuterol 108 (90 Base) MCG/ACT Inhaler    VENTOLIN HFA    54 g    INHALE TWO PUFFS INTO THE LUNGS EVERY 6 HOURS AS NEEDED FOR SHORTNESS OF BREATH/DYSPNEA    COPD, moderate (H)       amLODIPine 5 MG tablet    NORVASC    180 tablet    Take 1 tablet (5 mg) by mouth 2 times daily    Hypertension goal BP (blood pressure) < 140/90, CKD (chronic kidney disease) stage 4, GFR 15-29 ml/min (H)       ASPIRIN NOT PRESCRIBED    INTENTIONAL    0 each    Please choose reason not prescribed, below    Hypertension goal BP (blood pressure) < 140/90       calcium carbonate 500 MG chewable tablet    TUMS     Take 2 chew tab by mouth daily as needed for heartburn        furosemide 20 MG tablet    LASIX    180 tablet    40 mg in am    Hypertension goal BP (blood pressure) < 140/90, CKD (chronic kidney disease) stage 4, GFR 15-29 ml/min (H)       IBUPROFEN PO      Take 400 mg by mouth every 6 hours as needed for moderate pain        ipratropium - albuterol 0.5 mg/2.5 mg/3 mL 0.5-2.5 (3) MG/3ML neb solution    DUONEB    270 mL    Take 1 vial (3 mLs) by nebulization every 6 hours as needed for shortness of breath / dyspnea or wheezing    COPD, moderate (H)       losartan 50 MG tablet    COZAAR    180 tablet    Take 2 tablets (100 mg) by mouth daily    Hypertension goal BP (blood pressure) < 140/90, CKD (chronic kidney disease) stage 4, GFR 15-29 ml/min (H)       metoprolol succinate 50 MG 24 hr tablet    TOPROL-XL    180 tablet    Take 1 tablet (50 mg) by mouth 2 times daily (2 x 50mg = 100mg)    Hypertension goal BP (blood pressure) < 140/90, CKD (chronic kidney disease) stage 4, GFR 15-29 ml/min (H)       metroNIDAZOLE 0.75 % topical gel    METROGEL    45 g    Apply topically daily    Rosacea

## 2018-04-24 NOTE — PATIENT INSTRUCTIONS
[facility] [place]                        To reach your care team during and after hours:   [phone]  To reach our pharmacy:        [phone]    Clinic Hours                        Our clinic hours are:    Monday   7:30 am to 7:00 pm                  Tuesday through Friday 7:30 am to 5:00 pm                             Saturday   8:00 am to 12:00 pm      Sunday   Closed      Pharmacy Hours                        Our pharmacy hours are:    Monday   8:30 am to 7:00 pm       Tuesday to Friday  8:30 am to 6:00 pm                       Saturday    9:00 am to 1:00 pm              Sunday    Closed              There is also information available at our web site:  [email or web address]    If your provider ordered any lab tests and you do not receive the results within 10 business days, please call the clinic.    If you need a medication refill please contact your pharmacy.  Please allow 2-3 business days for your refill to be completed.    Our clinic offers telephone visits and e visits.  Please ask one of your team members to explain more.      Use [employer] (secure email communication and access to your chart) to send your primary care provider a message or make an appointment. Ask someone on your Team how to sign up for [employer].  Immunizations                      Immunization History   Administered Date(s) Administered     Influenza (High Dose) 3 valent vaccine 10/07/2010, 09/19/2012, 11/04/2013, 10/08/2014, 11/03/2015, 09/16/2016, 10/02/2017     Influenza (IIV3) PF 10/19/2004, 11/15/2005     Pneumo Conj 13-V (2010&after) 11/03/2015     Pneumococcal 23 valent 04/25/2006     TD (ADULT, 7+) 03/03/1998, 01/23/2006     TDAP Vaccine (Boostrix) 08/27/2012     Zoster vaccine, live 10/24/2012        Health Maintenance                         Health Maintenance Due   Topic Date Due     URINE DRUG SCREEN Q1 YR  01/25/1950     Medicare Annual Wellness Visit  11/21/2015     Wellness Visit with your Primary Provider -  yearly  11/21/2015     Osteoporosis Screening (Dexa) - every 3 years   01/11/2016     COPD ACTION PLAN Q1 YR  06/01/2016     Cholesterol Lab - yearly  03/09/2017     Colonoscopy - ever 10 years  04/22/2018

## 2018-04-24 NOTE — NURSING NOTE
"Chief Complaint   Patient presents with     RECHECK       Initial /60  Pulse 74  Temp 97.5  F (36.4  C) (Oral)  Ht 5' 1\" (1.549 m)  Wt 164 lb (74.4 kg)  SpO2 97%  BMI 30.99 kg/m2 Estimated body mass index is 30.99 kg/(m^2) as calculated from the following:    Height as of this encounter: 5' 1\" (1.549 m).    Weight as of this encounter: 164 lb (74.4 kg)..  BP completed using cuff size: james Rosario MA  "

## 2018-04-24 NOTE — PROGRESS NOTES
SUBJECTIVE:   Anat Correa is a 83 year old female who presents to clinic today for the following health issues:    CKD 4 - Hypertension Follow-up - following with Dr Gauthier - see recent notes, increased amlodi[pine to 5 mg twice daily    Creatinine   Date Value Ref Range Status   02/19/2018 1.68 (H) 0.52 - 1.04 mg/dL Final     GFR Estimate   Date Value Ref Range Status   02/19/2018 29 (L) >60 mL/min/1.7m2 Final     Comment:     Non  GFR Calc   12/05/2017 30 (L) >60 mL/min/1.7m2 Final     Comment:     Non  GFR Calc   11/08/2017 35 (L) >60 mL/min/1.7m2 Final     Comment:     Non  GFR Calc     Outpatient blood pressures are being checked at pharmacy.  Results are high    BP Readings from Last 3 Encounters:   04/24/18 142/56   04/02/18 146/64   03/19/18 168/80     ACD    Hemoglobin   Date Value Ref Range Status   02/19/2018 10.9 (L) 11.7 - 15.7 g/dL Final     Lipids    Recent Labs   Lab Test  03/09/16   0810  06/01/15   0818  09/25/12   0723   CHOL  211*  209*  156   HDL  54  55  49*   LDL  130*  128  83   TRIG  136  132  118   CHOLHDLRATIO   --   3.8  3.2     Lung Cancer - follow up with Dr Thompson in May    COPD -  Stable    Osteoporosis/low vitamin D/Hyperparathyrois -  Stable    Chronic venous stasis - stable - minimal edema, moderately tender to touch    Low Salt Diet: no added salt    Problem list and histories reviewed & adjusted, as indicated.  Additional history: as documented    Reviewed and updated as needed this visit by clinical staff  Tobacco  Allergies  Meds  Med Hx  Surg Hx  Fam Hx  Soc Hx      Reviewed and updated as needed this visit by Provider       BP Readings from Last 3 Encounters:   04/24/18 142/56   04/02/18 146/64   03/19/18 168/80       Wt Readings from Last 4 Encounters:   04/24/18 164 lb (74.4 kg)   03/19/18 165 lb (74.8 kg)   02/19/18 164 lb (74.4 kg)   12/05/17 160 lb (72.6 kg)       Health Maintenance    Health Maintenance Due    Topic Date Due     URINE DRUG SCREEN Q1 YR  01/25/1950     COPD ACTION PLAN Q1 YR  06/01/2016     LIPID MONITORING Q1 YEAR  03/09/2017       Current Problem List    Patient Active Problem List   Diagnosis     History of colonic polyps     Calculus of kidney     Lesion of plantar nerve     Osteoporosis     Primary iridocyclitis     Anemia     Hyperlipidemia LDL goal <100     OA (osteoarthritis)     Advanced directives, counseling/discussion     Hypertension goal BP (blood pressure) < 140/90     COPD, moderate     Controlled substance agreement signed     Vitamin D deficiency     CKD (chronic kidney disease) stage 4, GFR 15-29 ml/min (H)     Anemia in chronic renal disease     Secondary renal hyperparathyroidism (H)     Venous stasis of lower extremity     Peripheral neuropathy     Chronic pain     Lung nodule     Nodule of left lung     Malignant neoplasm of upper lobe of left lung (H)     Acute pain of right shoulder     S/P amputation of lesser toe, unspecified laterality (H)     Retinal hemorrhage of right eye       Past Medical History    Past Medical History:   Diagnosis Date     Anemia      Calculus of kidney 1998    dr hope     Chronic pain     OA     Chronic pain      CKD (chronic kidney disease) stage 4, GFR 15-29 ml/min (H) 2008    dr mcdermott     COPD, moderate (H) 2004    moderate obstruction, with pulm htn - dr francisco did AAP     Diverticulosis of colon (without mention of hemorrhage) 7/03     Hemorrhage of gastrointestinal tract, unspecified 4/08    dr su     Hyperlipidemia LDL goal <100 2006     Hypertension goal BP (blood pressure) < 140/90 2005     Internal hemorrhoids without mention of complication 7/03     Irritable bowel syndrome 7/03     Lesion of plantar nerve 8/98    hay's neuroma dr connor     Lung nodule 4/14, 3/16    Dr Thompson, 1.4 x 1.1 cm, lingula, PET neg 3/16     Malignant neoplasm of upper lobe of left lung (H) 7/16    Dr Thompson - x 2 nodules - moderately differentiated  adenocarcinoma (acinar predominant).  Final pathologic stage [id number], Final clinical stage IA, grade 2     Medication management     OA - 1 T#3 at HS with HX CKD     OA (osteoarthritis)     lower ext worse katya knees     Obesity (BMI 30.0-34.9)      Osteoporosis, unspecified     FOSAMAX  = upset stomach , pt declines further rx.      Other ulcerative colitis     collangenous collitis- last colonoscopy       Peripheral neuropathy     early - burning at HS     Personal history of colonic polyps     dr araujo     PONV (postoperative nausea and vomiting)      Primary iridocyclitis     iritis dr dominguez     Venous stasis of lower extremity        Past Surgical History    Past Surgical History:   Procedure Laterality Date     AMPUTATE TOE(S) Right 2015    Procedure: AMPUTATE TOE(S);  Surgeon: Ashia White, DPM, Pod;  Location: [facility] OR     C APPENDECTOMY       C  DELIVERY ONLY      , Low Cervical     EXCISE MASS UPPER EXTREMITY  10/13/2011    Lt Forearm mass excision - dr ely     EXCISE MASS UPPER EXTREMITY  2012    Lt Forearm mass excision - dr ely     HC COLONOSCOPY THRU STOMA, DIAGNOSTIC      IBS/diverticula/hemmorhoids dr araujo     HC ESOPHAGOSCOPY, DIAGNOSTIC  , , 6/10    Gastric ulcer, healed, gastritis     HC HEMORRHOIDECTOMY,INT/EXT,SIMPLE       HC REPAIR SLIDING INGUINAL HERNIA      mitra     SURGICAL HISTORY OF -       mitra neck & back tumors     SURGICAL HISTORY OF -       neuroma excision - 2nd toe amputation     SURGICAL HISTORY OF -   3/07    Rt IOL - dr jimenez     SURGICAL HISTORY OF -       Lt IOL - dr jimenez     SURGICAL HISTORY OF -       Lt Knee replacement - dr gayle     SURGICAL HISTORY OF -       Rt Knee replacement - dr gayle     SURGICAL HISTORY OF -   9/15    Rt Second toe amputation - Dr White     THORACOSCOPIC WEDGE RESECTION LUNG Left 2016    Procedure: THORACOSCOPIC WEDGE  RESECTION LUNG;  Surgeon: Abdelrahman Thompson MD;  Location: [facility] OR     XR JOINT INJECTION HIP (HIB)  2/2015    Rt - Dr Terry       Current Medications    Current Outpatient Prescriptions   Medication Sig Dispense Refill     acetaminophen-codeine (TYLENOL #3) 300-30 MG per tablet Take 1-2 tablets by mouth every 6 hours as needed for moderate pain 90 tablet 0     albuterol (VENTOLIN HFA) 108 (90 BASE) MCG/ACT Inhaler INHALE TWO PUFFS INTO THE LUNGS EVERY 6 HOURS AS NEEDED FOR SHORTNESS OF BREATH/DYSPNEA 54 g 3     amLODIPine (NORVASC) 5 MG tablet Take 1 tablet (5 mg) by mouth 2 times daily 180 tablet 3     ASPIRIN NOT PRESCRIBED (INTENTIONAL) Please choose reason not prescribed, below 0 each 0     calcium carbonate (TUMS) 500 MG chewable tablet Take 2 chew tab by mouth daily as needed for heartburn       furosemide (LASIX) 20 MG tablet 40 mg in am 180 tablet 3     IBUPROFEN PO Take 400 mg by mouth every 6 hours as needed for moderate pain       ipratropium - albuterol 0.5 mg/2.5 mg/3 mL (DUONEB) 0.5-2.5 (3) MG/3ML neb solution Take 1 vial (3 mLs) by nebulization every 6 hours as needed for shortness of breath / dyspnea or wheezing 270 mL 3     losartan (COZAAR) 50 MG tablet Take 2 tablets (100 mg) by mouth daily 180 tablet 3     metoprolol succinate (TOPROL-XL) 50 MG 24 hr tablet Take 1 tablet (50 mg) by mouth 2 times daily (2 x 50mg = 100mg) 180 tablet 3     metroNIDAZOLE (METROGEL) 0.75 % topical gel Apply topically daily 45 g 3     [DISCONTINUED] amLODIPine (NORVASC) 5 MG tablet Take 1 tablet (5 mg) by mouth daily 90 tablet 3     [DISCONTINUED] amLODIPine (NORVASC) 5 MG tablet Take 2 tablets (10 mg) by mouth daily 90 tablet 3       Allergies    Allergies   Allergen Reactions     Prednisone      Yeast infection - swollen lips       Immunizations    Immunization History   Administered Date(s) Administered     Influenza (High Dose) 3 valent vaccine 10/07/2010, 09/19/2012, 11/04/2013, 10/08/2014, 11/03/2015,  09/16/2016, 10/02/2017     Influenza (IIV3) PF 10/19/2004, 11/15/2005     Pneumo Conj 13-V (2010&after) 11/03/2015     Pneumococcal 23 valent 04/25/2006     TD (ADULT, 7+) 03/03/1998, 01/23/2006     TDAP Vaccine (Boostrix) 08/27/2012     Zoster vaccine, live 10/24/2012       Family History    Family History   Problem Relation Age of Onset     CEREBROVASCULAR DISEASE Mother      CEREBROVASCULAR DISEASE Father      Cancer - colorectal Brother      Cancer - colorectal Brother      Breast Cancer Sister      CANCER Sister      lung     CANCER Sister      lung     CANCER Sister      lung     Scleroderma Sister        Social History    Social History     Social History     Marital status:      Spouse name: thanh     Number of children: Alex     Years of education: 12     Occupational History     part-time [employer] section at [employer]       Social History Main Topics     Smoking status: Former Smoker     Packs/day: 3.00     Years: 50.00     Types: Cigarettes     Quit date: 12/21/1993     Smokeless tobacco: Never Used      Comment: quit 1993     Alcohol use No     Drug use: No      Comment: no herbal meds either      Sexual activity: Not Currently      Comment:  for 24 years      Other Topics Concern     Caffeine Concern Yes     1 pot  qd - switched to decaff now     Special Diet Yes     Low salt     Exercise No     Seat Belt Yes     Self-Exams Yes     SBE encouraged motnhly      Parent/Sibling W/ Cabg, Mi Or Angioplasty Before 65f 55m? No     Social History Narrative    calcium - 3 large dairy servings/day - pt declines fosamax and other meds for her osteoporosis    flex sig/colonoscopy -last colonoscopy 7/03     sun precautions - discussed     mammogram - hasn't had one since 2001 at least - ordered     Td booster - 1/23/06    pneumovax -today 4/25/06    DEXA - pt declines repeat scan today 4/25/06 despite her osteoporosis     stool hemoccults - every year after age 40    ASA- easy bruising - can't take      "mulvitamin - encouraged       All above reviewed and updated, all stable unless otherwise noted    Recent labs reviewed    ROS:  CONSTITUTIONAL: NEGATIVE for fever, chills, change in weight  INTEGUMENTARY/SKIN: NEGATIVE for worrisome rashes, moles or lesions  EYES: NEGATIVE for vision changes or irritation  ENT/MOUTH: NEGATIVE for ear, mouth and throat problems  RESP: NEGATIVE for significant cough or SOB  CV: NEGATIVE for chest pain, palpitations or peripheral edema  GI: NEGATIVE for nausea, abdominal pain, heartburn, or change in bowel habits  : NEGATIVE for frequency, dysuria, or hematuria  MUSCULOSKELETAL: NEGATIVE for significant arthralgias or myalgia  NEURO: NEGATIVE for weakness, dizziness or paresthesias  ENDOCRINE: NEGATIVE for temperature intolerance, skin/hair changes  HEME: NEGATIVE for bleeding problems  PSYCHIATRIC: NEGATIVE for changes in mood or affect    OBJECTIVE:                                                    /56  Pulse 74  Temp 97.5  F (36.4  C) (Oral)  Ht 5' 1\" (1.549 m)  Wt 164 lb (74.4 kg)  SpO2 97%  BMI 30.99 kg/m2  Body mass index is 30.99 kg/(m^2).  GENERAL: healthy, alert and no distress  EYES: Eyes grossly normal to inspection, extraocular movements - intact, and PERRL  HENT: ear canals- normal; TMs- normal; Nose- normal; Mouth- no ulcers, no lesions  NECK: no tenderness, no adenopathy, no asymmetry, no masses, no stiffness; thyroid- normal to palpation  RESP: lungs clear to auscultation - no rales, no rhonchi, no wheezes  CV: regular rates and rhythm, normal S1 S2, no S3 or S4 and no murmur, no click or rub -  ABDOMEN: soft, no tenderness, no  hepatosplenomegaly, no masses, normal bowel sounds  MS: extremities- no gross deformities noted, no edema  SKIN: no suspicious lesions, no rashes  NEURO: strength and tone- normal, sensory exam- grossly normal, mentation- intact, speech- normal, reflexes- symmetric  BACK: no CVA tenderness, no paralumbar tenderness  PSYCH: Alert " and oriented times 3; speech- coherent , normal rate and volume; able to articulate logical thoughts, able to abstract reason, no tangential thoughts, no hallucinations or delusions, affect- normal  Stable chronic venous stasis changes bilaterally with minimal edema    DIAGNOSTICS/PROCEDURES:                                                      Labs pending     ASSESSMENT/PLAN:                                                        ICD-10-CM    1. CKD (chronic kidney disease) stage 4, GFR 15-29 ml/min (H) N18.4 amLODIPine (NORVASC) 5 MG tablet     Basic metabolic panel  (Ca, Cl, CO2, Creat, Gluc, K, Na, BUN)     DISCONTINUED: amLODIPine (NORVASC) 5 MG tablet   2. Anemia in stage 4 chronic kidney disease (H) N18.4 CBC with platelets    D63.1    3. Hypertension goal BP (blood pressure) < 140/90 I10 amLODIPine (NORVASC) 5 MG tablet     Basic metabolic panel  (Ca, Cl, CO2, Creat, Gluc, K, Na, BUN)     DISCONTINUED: amLODIPine (NORVASC) 5 MG tablet   4. Hyperlipidemia LDL goal <100 E78.5 Lipid panel reflex to direct LDL Fasting   5. Malignant neoplasm of upper lobe of left lung (H) C34.12 Basic metabolic panel  (Ca, Cl, CO2, Creat, Gluc, K, Na, BUN)     CBC with platelets   6. COPD, moderate J44.9    7. Primary osteoarthritis involving multiple joints M15.0    8. Osteoporosis without current pathological fracture, unspecified osteoporosis type M81.0    9. Vitamin D deficiency E55.9 Vitamin D Deficiency   10. Secondary renal hyperparathyroidism (H) N25.81    11. Venous stasis of lower extremity I87.8    12. Polyneuropathy associated with underlying disease (H) G63    13. Medication monitoring encounter Z51.81 Basic metabolic panel  (Ca, Cl, CO2, Creat, Gluc, K, Na, BUN)     CBC with platelets     Vitamin D Deficiency       Discussed treatment/modality options, including risk and benefits, she desires continue current cares, labs, monthly BP checks. All diagnosis above reviewed and noted above, otherwise stable.  See  [facility] orders for further details.  Follow up in 4 month(s) and as needed.    Health Maintenance Due   Topic Date Due     URINE DRUG SCREEN Q1 YR  01/25/1950     COPD ACTION PLAN Q1 YR  06/01/2016     LIPID MONITORING Q1 YEAR  03/09/2017       See Patient Instructions           Rashi Calle MD [address]  [address] [phone] Fax

## 2018-05-22 ENCOUNTER — ALLIED HEALTH/NURSE VISIT (OUTPATIENT)
Dept: FAMILY MEDICINE | Facility: CLINIC | Age: 83
End: 2018-05-22
Payer: MEDICARE

## 2018-05-22 VITALS — SYSTOLIC BLOOD PRESSURE: 150 MMHG | DIASTOLIC BLOOD PRESSURE: 56 MMHG

## 2018-05-22 DIAGNOSIS — I10 HYPERTENSION GOAL BP (BLOOD PRESSURE) < 140/90: Primary | ICD-10-CM

## 2018-05-22 PROCEDURE — 99207 ZZC NO CHARGE NURSE ONLY: CPT | Performed by: FAMILY MEDICINE

## 2018-05-22 NOTE — MR AVS SNAPSHOT
"              After Visit Summary   2018    Anat Correa    MRN: [id number]           Patient Information     Date Of Birth          1935        Visit Information        Provider Department      2018 3:42 PM Rashi Calle MD [facility]        Today's Diagnoses     Hypertension goal BP (blood pressure) < 140/90    -  1       Follow-ups after your visit        Who to contact     If you have questions or need follow up information about today's clinic visit or your schedule please contact [facility] directly at [phone].  Normal or non-critical lab and imaging results will be communicated to you by CodeSealer[employer], letter or phone within 4 business days after the clinic has received the results. If you do not hear from us within 7 days, please contact the clinic through [employer] or phone. If you have a critical or abnormal lab result, we will notify you by phone as soon as possible.  Submit refill requests through [employer] or call your pharmacy and they will forward the refill request to us. Please allow 3 business days for your refill to be completed.          Additional Information About Your Visit        MyChart Information     [employer] lets you send messages to your doctor, view your test results, renew your prescriptions, schedule appointments and more. To sign up, go to [email or web address] . Click on \"Log in\" on the left side of the screen, which will take you to the Welcome page. Then click on \"Sign up Now\" on the right side of the page.     You will be asked to enter the access code listed below, as well as some personal information. Please follow the directions to create your username and password.     Your access code is: [email or web address]  Expires: 2018  8:46 AM     Your access code will  in 90 days. If you need help or a new code, please call your [facility] or [phone].        Care EveryWhere ID     This is your Care EveryWhere ID. This " could be used by other organizations to access your [place] medical records  [id number]         Blood Pressure from Last 3 Encounters:   05/22/18 150/56   04/24/18 142/56   04/02/18 146/64    Weight from Last 3 Encounters:   04/24/18 164 lb (74.4 kg)   03/19/18 165 lb (74.8 kg)   02/19/18 164 lb (74.4 kg)              Today, you had the following     No orders found for display       Primary Care Provider Office Phone # Fax #    Rashi Calle [phone] [address] [facility] [address]        Equal Access to Services     [facility] DEVANTE : Hadii bob barrett hadasho Soruy, waaxda luqadaha, qaybta kaalmada adehectoryada, adriana calderon. [facility] [phone].    ATENCIÓN: Si habla español, tiene a [place] disposición servicios gratuitos de asistencia lingüística. [facility] [phone].    We comply with applicable federal civil rights laws and [place] laws. We do not discriminate on the basis of race, color, national origin, age, disability, sex, sexual orientation, or gender identity.            Thank you!     Thank you for choosing [facility]  for your care. Our goal is always to provide you with excellent care. Hearing back from our patients is one way we can continue to improve our services. Please take a few minutes to complete the written survey that you may receive in the mail after your visit with us. Thank you!             Your Updated Medication List - Protect others around you: Learn how to safely use, store and throw away your medicines at www.disposemymeds.org.          This list is accurate as of 5/22/18 11:59 PM.  Always use your most recent med list.                   Brand Name Dispense Instructions for use Diagnosis    acetaminophen-codeine 300-30 MG per tablet    TYLENOL #3    90 tablet    Take 1-2 tablets by mouth every 6 hours as needed for moderate pain    Primary osteoarthritis involving multiple joints       albuterol 108 (90  Base) MCG/ACT Inhaler    VENTOLIN HFA    54 g    INHALE TWO PUFFS INTO THE LUNGS EVERY 6 HOURS AS NEEDED FOR SHORTNESS OF BREATH/DYSPNEA    COPD, moderate (H)       amLODIPine 5 MG tablet    NORVASC    180 tablet    Take 1 tablet (5 mg) by mouth 2 times daily    Hypertension goal BP (blood pressure) < 140/90, CKD (chronic kidney disease) stage 4, GFR 15-29 ml/min (H)       ASPIRIN NOT PRESCRIBED    INTENTIONAL    0 each    Please choose reason not prescribed, below    Hypertension goal BP (blood pressure) < 140/90       calcium carbonate 500 MG chewable tablet    TUMS     Take 2 chew tab by mouth daily as needed for heartburn        furosemide 20 MG tablet    LASIX    180 tablet    40 mg in am    Hypertension goal BP (blood pressure) < 140/90, CKD (chronic kidney disease) stage 4, GFR 15-29 ml/min (H)       IBUPROFEN PO      Take 400 mg by mouth every 6 hours as needed for moderate pain        ipratropium - albuterol 0.5 mg/2.5 mg/3 mL 0.5-2.5 (3) MG/3ML neb solution    DUONEB    270 mL    Take 1 vial (3 mLs) by nebulization every 6 hours as needed for shortness of breath / dyspnea or wheezing    COPD, moderate (H)       losartan 50 MG tablet    COZAAR    180 tablet    Take 2 tablets (100 mg) by mouth daily    Hypertension goal BP (blood pressure) < 140/90, CKD (chronic kidney disease) stage 4, GFR 15-29 ml/min (H)       metoprolol succinate 50 MG 24 hr tablet    TOPROL-XL    180 tablet    Take 1 tablet (50 mg) by mouth 2 times daily (2 x 50mg = 100mg)    Hypertension goal BP (blood pressure) < 140/90, CKD (chronic kidney disease) stage 4, GFR 15-29 ml/min (H)       metroNIDAZOLE 0.75 % topical gel    METROGEL    45 g    Apply topically daily    Rosacea

## 2018-05-22 NOTE — PROGRESS NOTES
Anat Correa is enrolled/participating in the retail pharmacy Blood Pressure Goals Achievement Program (BPGAP).  Anat Correa was evaluated at [facility] on May 22, 2018 at which time her blood pressure was:    BP Readings from Last 3 Encounters:   05/22/18 150/56   04/24/18 142/56   04/02/18 146/64     Reviewed lifestyle modifications for blood pressure control and reduction: including making healthy food choices, managing weight, getting regular exercise, smoking cessation, reducing alcohol consumption, monitoring blood pressure regularly.     Anat Correa is not experiencing symptoms.    Follow-Up: BP is not at goal of < 140/90mmHg (patient 18+ years of age with or without diabetes), Recommended follow-up in 1 month at the pharmacy. Routing to PCP as an FYI.    Recommendation to Provider: Pt has been stable at this bp for quite some time.  Not interested in changing meds. Leaving for primary to decide.    Anat Correa was evaluated for enrollment into the PGEN study today.    Patient eligible for enrollment:  No  Patient interested in enrollment:  No    Completed by: Thank you,  Alysa Hays [facility], Mgr [facility] [place] [phone]

## 2018-06-19 DIAGNOSIS — M15.0 PRIMARY OSTEOARTHRITIS INVOLVING MULTIPLE JOINTS: ICD-10-CM

## 2018-06-19 NOTE — TELEPHONE ENCOUNTER
rx done, on time, recent visit    BP Readings from Last 3 Encounters:   05/22/18 150/56   04/24/18 142/56   04/02/18 146/64     Creatinine   Date Value Ref Range Status   04/24/2018 1.41 (H) 0.52 - 1.04 mg/dL Final

## 2018-06-19 NOTE — TELEPHONE ENCOUNTER
Routing refill request to provider for review/approval because:  Drug not on the [facility] refill protocol     Ariadna Starks RN, BSN  [facility]

## 2018-06-19 NOTE — TELEPHONE ENCOUNTER
Controlled Substance Refill Request for acetaminophen-codeine (TYLENOL #3) 300-30 MG per tablet  Problem List Complete:  Yes    Last Written Prescription Date:  3.20.18  Last Fill Quantity: 90,   # refills: 0    Last Office Visit with [facility] primary care provider: 4.24.18    Clinic visit frequency required:      Future Office visit:     Controlled substance agreement on file: Yes:  Date 1.18.13.     Processing:  Fax Rx to listed pharmacy   checked in past 6 months?  Yes 5.27.17

## 2018-06-21 ENCOUNTER — OFFICE VISIT (OUTPATIENT)
Dept: FAMILY MEDICINE | Facility: CLINIC | Age: 83
End: 2018-06-21
Payer: MEDICARE

## 2018-06-21 ENCOUNTER — ALLIED HEALTH/NURSE VISIT (OUTPATIENT)
Dept: NURSING | Facility: CLINIC | Age: 83
End: 2018-06-21
Payer: MEDICARE

## 2018-06-21 VITALS
SYSTOLIC BLOOD PRESSURE: 190 MMHG | RESPIRATION RATE: 12 BRPM | HEART RATE: 72 BPM | DIASTOLIC BLOOD PRESSURE: 80 MMHG | OXYGEN SATURATION: 96 % | TEMPERATURE: 97.5 F

## 2018-06-21 VITALS
DIASTOLIC BLOOD PRESSURE: 80 MMHG | OXYGEN SATURATION: 96 % | SYSTOLIC BLOOD PRESSURE: 190 MMHG | RESPIRATION RATE: 12 BRPM | TEMPERATURE: 97.5 F | HEART RATE: 72 BPM

## 2018-06-21 DIAGNOSIS — I10 HYPERTENSION GOAL BP (BLOOD PRESSURE) < 140/90: ICD-10-CM

## 2018-06-21 DIAGNOSIS — M15.0 PRIMARY OSTEOARTHRITIS INVOLVING MULTIPLE JOINTS: ICD-10-CM

## 2018-06-21 DIAGNOSIS — J44.9 COPD, MODERATE (H): ICD-10-CM

## 2018-06-21 DIAGNOSIS — S81.811A LACERATION OF LOWER LEG, RIGHT, INITIAL ENCOUNTER: Primary | ICD-10-CM

## 2018-06-21 DIAGNOSIS — I87.2 CHRONIC VENOUS STASIS DERMATITIS OF BOTH LOWER EXTREMITIES: ICD-10-CM

## 2018-06-21 DIAGNOSIS — N18.4 CKD (CHRONIC KIDNEY DISEASE) STAGE 4, GFR 15-29 ML/MIN (H): ICD-10-CM

## 2018-06-21 DIAGNOSIS — S81.811A LACERATION OF RIGHT LOWER EXTREMITY, INITIAL ENCOUNTER: Primary | ICD-10-CM

## 2018-06-21 DIAGNOSIS — G89.29 OTHER CHRONIC PAIN: ICD-10-CM

## 2018-06-21 DIAGNOSIS — Z51.81 MEDICATION MONITORING ENCOUNTER: ICD-10-CM

## 2018-06-21 DIAGNOSIS — E66.811 CLASS 1 OBESITY WITH SERIOUS COMORBIDITY AND BODY MASS INDEX (BMI) OF 30.0 TO 30.9 IN ADULT, UNSPECIFIED OBESITY TYPE: ICD-10-CM

## 2018-06-21 DIAGNOSIS — I87.8 VENOUS STASIS OF LOWER EXTREMITY: ICD-10-CM

## 2018-06-21 DIAGNOSIS — L03.115 CELLULITIS OF RIGHT LOWER EXTREMITY: ICD-10-CM

## 2018-06-21 PROCEDURE — 90471 IMMUNIZATION ADMIN: CPT | Performed by: FAMILY MEDICINE

## 2018-06-21 PROCEDURE — 90714 TD VACC NO PRESV 7 YRS+ IM: CPT | Performed by: FAMILY MEDICINE

## 2018-06-21 PROCEDURE — 99214 OFFICE O/P EST MOD 30 MIN: CPT | Mod: 25 | Performed by: FAMILY MEDICINE

## 2018-06-21 PROCEDURE — 99207 ZZC NO CHARGE NURSE ONLY: CPT

## 2018-06-21 RX ORDER — CEPHALEXIN 500 MG/1
500 CAPSULE ORAL 3 TIMES DAILY
Qty: 30 CAPSULE | Refills: 0 | Status: SHIPPED | OUTPATIENT
Start: 2018-06-21 | End: 2018-07-01

## 2018-06-21 NOTE — MR AVS SNAPSHOT
After Visit Summary   6/21/2018    Anat Correa    MRN: [id number]           Patient Information     Date Of Birth          1935        Visit Information        Provider Department      6/21/2018 1:00 PM Rashi Calle MD [facility]        Today's Diagnoses     Laceration of right lower extremity, initial encounter    -  1    Venous stasis of lower extremity        CKD (chronic kidney disease) stage 4, GFR 15-29 ml/min (H)        Hypertension goal BP (blood pressure) < 140/90        COPD, moderate        Primary osteoarthritis involving multiple joints        Other chronic pain        Class 1 obesity with serious comorbidity and body mass index (BMI) of 30.0 to 30.9 in adult, unspecified obesity type        Medication monitoring encounter          Care Instructions        [facility]                        To reach your care team during and after hours:   [phone]  To reach our pharmacy:        [phone]    Clinic Hours                        Our clinic hours are:    Monday   7:30 am to 7:00 pm                  Tuesday through Friday 7:30 am to 5:00 pm                             Saturday   8:00 am to 12:00 pm      Sunday   Closed      Pharmacy Hours                        Our pharmacy hours are:    Monday   8:30 am to 7:00 pm       Tuesday to Friday  8:30 am to 6:00 pm                       Saturday    9:00 am to 1:00 pm              Sunday    Closed              There is also information available at our web site:  [email or web address]    If your provider ordered any lab tests and you do not receive the results within 10 business days, please call the clinic.    If you need a medication refill please contact your pharmacy.  Please allow 2-3 business days for your refill to be completed.    Our clinic offers telephone visits and e visits.  Please ask one of your team members to explain more.      Use [employer] (secure email communication and access to your  chart) to send your primary care provider a message or make an appointment. Ask someone on your Team how to sign up for [employer].  Immunizations                      Immunization History   Administered Date(s) Administered     Influenza (High Dose) 3 valent vaccine 10/07/2010, 09/19/2012, 11/04/2013, 10/08/2014, 11/03/2015, 09/16/2016, 10/02/2017     Influenza (IIV3) PF 10/19/2004, 11/15/2005     Pneumo Conj 13-V (2010&after) 11/03/2015     Pneumococcal 23 valent 04/25/2006     TD (ADULT, 7+) 03/03/1998, 01/23/2006, 06/21/2018     TDAP Vaccine (Boostrix) 08/27/2012     Zoster vaccine, live 10/24/2012        Health Maintenance                         Health Maintenance Due   Topic Date Due     URINE DRUG SCREEN Q1 YR  01/25/1950     COPD ACTION PLAN Q1 YR  06/01/2016     Cholesterol Lab - yearly  03/09/2017               Follow-ups after your visit        Follow-up notes from your care team     Return in about 2 weeks (around 7/5/2018), or if symptoms worsen or fail to improve, for Routine Visit.      Your next 10 appointments already scheduled     Jun 27, 2018 11:00 AM CDT   Office Visit with Rashi Calle MD   [facility]    [address] [place] [address] [phone]           Bring a current list of meds and any records pertaining to this visit. For Physicals, please bring immunization records and any forms needing to be filled out. Please arrive 10 minutes early to complete paperwork.              Who to contact     If you have questions or need follow up information about today's clinic visit or your schedule please contact [facility] directly at [phone].  Normal or non-critical lab and imaging results will be communicated to you by MyChart, letter or phone within 4 business days after the clinic has received the results. If you do not hear from us within 7 days, please contact the clinic through MyChart or phone. If you  "have a critical or abnormal lab result, we will notify you by phone as soon as possible.  Submit refill requests through [employer] or call your pharmacy and they will forward the refill request to us. Please allow 3 business days for your refill to be completed.          Additional Information About Your Visit        Edictivehart Information     [employer] lets you send messages to your doctor, view your test results, renew your prescriptions, schedule appointments and more. To sign up, go to [email or web address] . Click on \"Log in\" on the left side of the screen, which will take you to the Welcome page. Then click on \"Sign up Now\" on the right side of the page.     You will be asked to enter the access code listed below, as well as some personal information. Please follow the directions to create your username and password.     Your access code is: [email or web address]  Expires: 2018  3:15 PM     Your access code will  in 90 days. If you need help or a new code, please call your [facility] clinic or [phone].        Care EveryWhere ID     This is your Care EveryWhere ID. This could be used by other organizations to access your [facility] medical records  [id number]        Your Vitals Were     Pulse Temperature Respirations Pulse Oximetry          72 97.5  F (36.4  C) (Oral) 12 96%         Blood Pressure from Last 3 Encounters:   18 130/70   18 190/80   18 190/80    Weight from Last 3 Encounters:   18 164 lb (74.4 kg)   18 165 lb (74.8 kg)   18 164 lb (74.4 kg)              We Performed the Following     ADMIN 1st VACCINE     TD PRESERV FREE, IM (7+ YRS)          Today's Medication Changes          These changes are accurate as of 18 11:59 PM.  If you have any questions, ask your nurse or doctor.               Start taking these medicines.        Dose/Directions    cephALEXin 500 MG capsule   Commonly known as:  KEFLEX   Used for:  Laceration of lower leg, right, initial " encounter, Cellulitis of right lower extremity, Chronic venous stasis dermatitis of both lower extremities        Dose:  500 mg   Take 1 capsule (500 mg) by mouth 3 times daily for 10 days   Quantity:  30 capsule   Refills:  0            Where to get your medicines      These medications were sent to [facility] - [place], [place] - [address] [facility]  [address] [address], [facility] [address]     Phone:  [phone]     cephALEXin 500 MG capsule                Primary Care Provider Office Phone # Fax #    Rashi Calle [phone]       [address] [place] [address]        Equal Access to Services     [facility] CenterTANIA : Hadii bob barrett hadasho Soomaali, waaxda luqadaha, qaybta kaalmada adeegyada, adriana farias . So [facility] [phone].    ATENCIÓN: Si habla español, tiene a winchester disposición servicios gratuitos de asistencia lingüística. Ll[facility] [phone].    We comply with applicable federal civil rights laws and [place] laws. We do not discriminate on the basis of race, color, national origin, age, disability, sex, sexual orientation, or gender identity.            Thank you!     Thank you for choosing [facility]  for your care. Our goal is always to provide you with excellent care. Hearing back from our patients is one way we can continue to improve our services. Please take a few minutes to complete the written survey that you may receive in the mail after your visit with us. Thank you!             Your Updated Medication List - Protect others around you: Learn how to safely use, store and throw away your medicines at www.disposemymeds.org.          This list is accurate as of 6/21/18 11:59 PM.  Always use your most recent med list.                   Brand Name Dispense Instructions for use Diagnosis    acetaminophen-codeine 300-30 MG per tablet    TYLENOL #3    90 tablet    Take 1-2 tablets by mouth every 6 hours as  needed for moderate pain    Primary osteoarthritis involving multiple joints       albuterol 108 (90 Base) MCG/ACT Inhaler    VENTOLIN HFA    54 g    INHALE TWO PUFFS INTO THE LUNGS EVERY 6 HOURS AS NEEDED FOR SHORTNESS OF BREATH/DYSPNEA    COPD, moderate (H)       amLODIPine 5 MG tablet    NORVASC    180 tablet    Take 1 tablet (5 mg) by mouth 2 times daily    Hypertension goal BP (blood pressure) < 140/90, CKD (chronic kidney disease) stage 4, GFR 15-29 ml/min (H)       ASPIRIN NOT PRESCRIBED    INTENTIONAL    0 each    Please choose reason not prescribed, below    Hypertension goal BP (blood pressure) < 140/90       calcium carbonate 500 MG chewable tablet    TUMS     Take 2 chew tab by mouth daily as needed for heartburn        cephALEXin 500 MG capsule    KEFLEX    30 capsule    Take 1 capsule (500 mg) by mouth 3 times daily for 10 days    Laceration of lower leg, right, initial encounter, Cellulitis of right lower extremity, Chronic venous stasis dermatitis of both lower extremities       furosemide 20 MG tablet    LASIX    180 tablet    40 mg in am    Hypertension goal BP (blood pressure) < 140/90, CKD (chronic kidney disease) stage 4, GFR 15-29 ml/min (H)       IBUPROFEN PO      Take 400 mg by mouth every 6 hours as needed for moderate pain        ipratropium - albuterol 0.5 mg/2.5 mg/3 mL 0.5-2.5 (3) MG/3ML neb solution    DUONEB    270 mL    Take 1 vial (3 mLs) by nebulization every 6 hours as needed for shortness of breath / dyspnea or wheezing    COPD, moderate (H)       losartan 50 MG tablet    COZAAR    180 tablet    Take 2 tablets (100 mg) by mouth daily    Hypertension goal BP (blood pressure) < 140/90, CKD (chronic kidney disease) stage 4, GFR 15-29 ml/min (H)       metoprolol succinate 50 MG 24 hr tablet    TOPROL-XL    180 tablet    Take 1 tablet (50 mg) by mouth 2 times daily (2 x 50mg = 100mg)    Hypertension goal BP (blood pressure) < 140/90, CKD (chronic kidney disease) stage 4, GFR 15-29  ml/min (H)       metroNIDAZOLE 0.75 % topical gel    METROGEL    45 g    Apply topically daily    Rosacea

## 2018-06-21 NOTE — PROGRESS NOTES
Patient was at the pharmacy and stated that she cut her leg about a week ago and it was not getting better.   Pharmacy sent patient to clinic to assess.     Concern - Laceration to R Lower Leg  Onset: 3 Weeks    Description:   Patient states she was opening her car door and the edge caught onto her leg and ripped some skin off.     Intensity: moderate    Progression of Symptoms:  worsening and constant    Accompanying Signs & Symptoms:  Redness  Warmth  Swelling  Pain  Tender  Small amount of drainage from wound    Previous history of similar problem:   No    Precipitating factors:   Worsened by: None    Alleviating factors:  Improved by: None    Therapies Tried and outcome: None    DENIES: Fevers, chills/sweats      Advised OV with PCP - moved to [facility] schedule.     Annika Novak RN  [place] [facility]

## 2018-06-21 NOTE — MR AVS SNAPSHOT
After Visit Summary   6/21/2018    Anat Correa    MRN: [id number]           Patient Information     Date Of Birth          1935        Visit Information        Provider Department      6/21/2018 2:15 PM [facility]        Today's Diagnoses     Laceration of lower leg, right, initial encounter    -  1    Cellulitis of right lower extremity        Chronic venous stasis dermatitis of both lower extremities        Hypertension goal BP (blood pressure) < 140/90           Follow-ups after your visit        Your next 10 appointments already scheduled     Jun 27, 2018 11:00 AM CDT   Office Visit with Rashi Calle MD   [facility]    [address] [place] [address] [phone]           Bring a current list of meds and any records pertaining to this visit. For Physicals, please bring immunization records and any forms needing to be filled out. Please arrive 10 minutes early to complete paperwork.              Who to contact     If you have questions or need follow up information about today's clinic visit or your schedule please contact [facility] directly at [phone].  Normal or non-critical lab and imaging results will be communicated to you by exozethart, letter or phone within 4 business days after the clinic has received the results. If you do not hear from us within 7 days, please contact the clinic through exozethart or phone. If you have a critical or abnormal lab result, we will notify you by phone as soon as possible.  Submit refill requests through [employer] or call your pharmacy and they will forward the refill request to us. Please allow 3 business days for your refill to be completed.          Additional Information About Your Visit        [employer] Information     [employer] lets you send messages to your doctor, view your test results, renew your prescriptions,  "schedule appointments and more. To sign up, go to [email or web address] . Click on \"Log in\" on the left side of the screen, which will take you to the Welcome page. Then click on \"Sign up Now\" on the right side of the page.     You will be asked to enter the access code listed below, as well as some personal information. Please follow the directions to create your username and password.     Your access code is: [email or web address]  Expires: 2018  3:15 PM     Your access code will  in 90 days. If you need help or a new code, please call your [facility] clinic or [phone].        Care EveryWhere ID     This is your Care EveryWhere ID. This could be used by other organizations to access your [facility] medical records  [id number]        Your Vitals Were     Pulse Temperature Respirations Pulse Oximetry          72 97.5  F (36.4  C) (Oral) 12 96%         Blood Pressure from Last 3 Encounters:   18 190/80   18 150/56   18 142/56    Weight from Last 3 Encounters:   18 164 lb (74.4 kg)   18 165 lb (74.8 kg)   18 164 lb (74.4 kg)              Today, you had the following     No orders found for display         Today's Medication Changes          These changes are accurate as of 18  3:15 PM.  If you have any questions, ask your nurse or doctor.               Start taking these medicines.        Dose/Directions    cephALEXin 500 MG capsule   Commonly known as:  KEFLEX   Used for:  Laceration of lower leg, right, initial encounter, Cellulitis of right lower extremity, Chronic venous stasis dermatitis of both lower extremities        Dose:  500 mg   Take 1 capsule (500 mg) by mouth 3 times daily for 10 days   Quantity:  30 capsule   Refills:  0            Where to get your medicines      These medications were sent to [facility] [address] [place] [address]     Phone:  [phone]     cephALEXin " 500 MG capsule                Primary Care Provider Office Phone # Fax #    Rashi Calle [phone] [address] [facility] [address]        Equal Access to Services     DIANELYS LOW : Hadduran bob barrett marcuso Jeffrey, waaxda luqadaha, qaybta kaalmada aurora, adriana beauchamp laDorisdimitrios calderon. So [facility] [phone].    ATENCIÓN: Si habla español, tiene a winchester disposición servicios gratuitos de asistencia lingüística. Llame al [phone].    We comply with applicable federal civil rights laws and [place] laws. We do not discriminate on the basis of race, color, national origin, age, disability, sex, sexual orientation, or gender identity.            Thank you!     Thank you for choosing [facility]  for your care. Our goal is always to provide you with excellent care. Hearing back from our patients is one way we can continue to improve our services. Please take a few minutes to complete the written survey that you may receive in the mail after your visit with us. Thank you!             Your Updated Medication List - Protect others around you: Learn how to safely use, store and throw away your medicines at www.disposemymeds.org.          This list is accurate as of 6/21/18  3:15 PM.  Always use your most recent med list.                   Brand Name Dispense Instructions for use Diagnosis    acetaminophen-codeine 300-30 MG per tablet    TYLENOL #3    90 tablet    Take 1-2 tablets by mouth every 6 hours as needed for moderate pain    Primary osteoarthritis involving multiple joints       albuterol 108 (90 Base) MCG/ACT Inhaler    VENTOLIN HFA    54 g    INHALE TWO PUFFS INTO THE LUNGS EVERY 6 HOURS AS NEEDED FOR SHORTNESS OF BREATH/DYSPNEA    COPD, moderate (H)       amLODIPine 5 MG tablet    NORVASC    180 tablet    Take 1 tablet (5 mg) by mouth 2 times daily    Hypertension goal BP (blood pressure) < 140/90, CKD (chronic kidney disease) stage 4, GFR 15-29  ml/min (H)       ASPIRIN NOT PRESCRIBED    INTENTIONAL    0 each    Please choose reason not prescribed, below    Hypertension goal BP (blood pressure) < 140/90       calcium carbonate 500 MG chewable tablet    TUMS     Take 2 chew tab by mouth daily as needed for heartburn        cephALEXin 500 MG capsule    KEFLEX    30 capsule    Take 1 capsule (500 mg) by mouth 3 times daily for 10 days    Laceration of lower leg, right, initial encounter, Cellulitis of right lower extremity, Chronic venous stasis dermatitis of both lower extremities       furosemide 20 MG tablet    LASIX    180 tablet    40 mg in am    Hypertension goal BP (blood pressure) < 140/90, CKD (chronic kidney disease) stage 4, GFR 15-29 ml/min (H)       IBUPROFEN PO      Take 400 mg by mouth every 6 hours as needed for moderate pain        ipratropium - albuterol 0.5 mg/2.5 mg/3 mL 0.5-2.5 (3) MG/3ML neb solution    DUONEB    270 mL    Take 1 vial (3 mLs) by nebulization every 6 hours as needed for shortness of breath / dyspnea or wheezing    COPD, moderate (H)       losartan 50 MG tablet    COZAAR    180 tablet    Take 2 tablets (100 mg) by mouth daily    Hypertension goal BP (blood pressure) < 140/90, CKD (chronic kidney disease) stage 4, GFR 15-29 ml/min (H)       metoprolol succinate 50 MG 24 hr tablet    TOPROL-XL    180 tablet    Take 1 tablet (50 mg) by mouth 2 times daily (2 x 50mg = 100mg)    Hypertension goal BP (blood pressure) < 140/90, CKD (chronic kidney disease) stage 4, GFR 15-29 ml/min (H)       metroNIDAZOLE 0.75 % topical gel    METROGEL    45 g    Apply topically daily    Rosacea

## 2018-06-21 NOTE — PROGRESS NOTES
SUBJECTIVE:   Anat Correa is a 83 year old female who presents to clinic today for the following health issues:    Concern - Laceration to R Lower Leg  Onset: 3 Weeks    Description:   Patient states she was opening her car door and the edge of the door caught her leg and ripped some skin off.     Intensity: moderate    Progression of Symptoms:  worsening and constant    Accompanying Signs & Symptoms:    Redness    Warmth    Swelling    Pain    Tender    Small amount of drainage from wound    Previous history of similar problem:     No    Precipitating factors:     Worsened by: None    Alleviating factors:    Improved by: None     Therapies Tried and outcome: None     DENIES: Fevers, chills/sweats    CKD/Htn/COPD/OA - all stable    BP Readings from Last 3 Encounters:   06/22/18 130/70   06/21/18 190/80   06/21/18 190/80     Lab Results   Component Value Date    CR 1.41 04/24/2018     Problem list and histories reviewed & adjusted, as indicated.  Additional history: as documented      Reviewed and updated as needed this visit by clinical staff       Reviewed and updated as needed this visit by Provider       Wt Readings from Last 4 Encounters:   04/24/18 164 lb (74.4 kg)   03/19/18 165 lb (74.8 kg)   02/19/18 164 lb (74.4 kg)   12/05/17 160 lb (72.6 kg)       Health Maintenance    Health Maintenance Due   Topic Date Due     URINE DRUG SCREEN Q1 YR  01/25/1950     COPD ACTION PLAN Q1 YR  06/01/2016     LIPID MONITORING Q1 YEAR  03/09/2017       Current Problem List    Patient Active Problem List   Diagnosis     History of colonic polyps     Calculus of kidney     Lesion of plantar nerve     Osteoporosis     Primary iridocyclitis     Anemia     Hyperlipidemia LDL goal <100     OA (osteoarthritis)     Advanced directives, counseling/discussion     Hypertension goal BP (blood pressure) < 140/90     COPD, moderate     Controlled substance agreement signed     Vitamin D deficiency     CKD (chronic kidney disease)  stage 4, GFR 15-29 ml/min (H)     Anemia in chronic renal disease     Secondary renal hyperparathyroidism (H)     Venous stasis of lower extremity     Peripheral neuropathy     Chronic pain     Lung nodule     Nodule of left lung     Malignant neoplasm of upper lobe of left lung (H)     Acute pain of right shoulder     S/P amputation of lesser toe, unspecified laterality (H)     Retinal hemorrhage of right eye     Class 1 obesity with serious comorbidity and body mass index (BMI) of 30.0 to 30.9 in adult, unspecified obesity type       Past Medical History    Past Medical History:   Diagnosis Date     Anemia      Calculus of kidney 1998    dr hope     Chronic pain     OA     Chronic pain      CKD (chronic kidney disease) stage 4, GFR 15-29 ml/min (H) 2008    dr mcdermott     COPD, moderate (H) 2004    moderate obstruction, with pulm htn - dr francisco did AAP     Diverticulosis of colon (without mention of hemorrhage) 7/03     Hemorrhage of gastrointestinal tract, unspecified 4/08    dr su     Hyperlipidemia LDL goal <100 2006     Hypertension goal BP (blood pressure) < 140/90 2005     Internal hemorrhoids without mention of complication 7/03     Irritable bowel syndrome 7/03     Lesion of plantar nerve 8/98    hay's neuroma dr connor     Lung nodule 4/14, 3/16    Dr Thompson, 1.4 x 1.1 cm, lingula, PET neg 3/16     Malignant neoplasm of upper lobe of left lung (H) 7/16    Dr Thompson - x 2 nodules - moderately differentiated adenocarcinoma (acinar predominant).  Final pathologic stage [id number], Final clinical stage IA, grade 2     Medication management     OA - 1 T#3 at HS with HX CKD     OA (osteoarthritis) 1998    lower ext worse katya knees     Obesity (BMI 30.0-34.9)      Osteoporosis, unspecified 2/02    FOSAMAX  = upset stomach , pt declines further rx.      Other ulcerative colitis 7/03    collangenous collitis- last colonoscopy 7/03      Peripheral neuropathy     early - burning at HS     Personal history of  colonic polyps     dr araujo     PONV (postoperative nausea and vomiting)      Primary iridocyclitis 1998    iritis dr dominguez     Venous stasis of lower extremity        Past Surgical History    Past Surgical History:   Procedure Laterality Date     AMPUTATE TOE(S) Right 2015    Procedure: AMPUTATE TOE(S);  Surgeon: Ashia White, DPM, Pod;  Location: [facility] OR     C APPENDECTOMY       C  DELIVERY ONLY  1965    , Low Cervical     EXCISE MASS UPPER EXTREMITY  10/13/2011    Lt Forearm mass excision - dr ely     EXCISE MASS UPPER EXTREMITY  2012    Lt Forearm mass excision - dr ely     HC COLONOSCOPY THRU STOMA, DIAGNOSTIC      IBS/diverticula/hemmorhoids dr araujo     HC ESOPHAGOSCOPY, DIAGNOSTIC  , , 6/10    Gastric ulcer, healed, gastritis     HC HEMORRHOIDECTOMY,INT/EXT,SIMPLE       HC REPAIR SLIDING INGUINAL HERNIA      mitra     SURGICAL HISTORY OF -       mitra neck & back tumors     SURGICAL HISTORY OF -       neuroma excision - 2nd toe amputation     SURGICAL HISTORY OF -   3/07    Rt IOL - dr jimenez     SURGICAL HISTORY OF -       Lt IOL - dr jimenez     SURGICAL HISTORY OF -       Lt Knee replacement - dr gayle     SURGICAL HISTORY OF -       Rt Knee replacement - dr gayle     SURGICAL HISTORY OF -   9/15    Rt Second toe amputation - Dr White     THORACOSCOPIC WEDGE RESECTION LUNG Left 2016    Procedure: THORACOSCOPIC WEDGE RESECTION LUNG;  Surgeon: Abdelrahman Thompson MD;  Location: [facility] OR     XR JOINT INJECTION HIP (HIB)  2015    Rt - Dr Terry       Current Medications    Current Outpatient Prescriptions   Medication Sig Dispense Refill     acetaminophen-codeine (TYLENOL #3) 300-30 MG per tablet Take 1-2 tablets by mouth every 6 hours as needed for moderate pain 90 tablet 0     albuterol (VENTOLIN HFA) 108 (90 BASE) MCG/ACT Inhaler INHALE TWO PUFFS INTO THE LUNGS EVERY 6 HOURS AS NEEDED FOR SHORTNESS OF  BREATH/DYSPNEA 54 g 3     amLODIPine (NORVASC) 5 MG tablet Take 1 tablet (5 mg) by mouth 2 times daily 180 tablet 3     ASPIRIN NOT PRESCRIBED (INTENTIONAL) Please choose reason not prescribed, below 0 each 0     calcium carbonate (TUMS) 500 MG chewable tablet Take 2 chew tab by mouth daily as needed for heartburn       cephALEXin (KEFLEX) 500 MG capsule Take 1 capsule (500 mg) by mouth 3 times daily for 10 days 30 capsule 0     furosemide (LASIX) 20 MG tablet 40 mg in am 180 tablet 3     IBUPROFEN PO Take 400 mg by mouth every 6 hours as needed for moderate pain       ipratropium - albuterol 0.5 mg/2.5 mg/3 mL (DUONEB) 0.5-2.5 (3) MG/3ML neb solution Take 1 vial (3 mLs) by nebulization every 6 hours as needed for shortness of breath / dyspnea or wheezing 270 mL 3     losartan (COZAAR) 50 MG tablet Take 2 tablets (100 mg) by mouth daily 180 tablet 3     metoprolol succinate (TOPROL-XL) 50 MG 24 hr tablet Take 1 tablet (50 mg) by mouth 2 times daily (2 x 50mg = 100mg) 180 tablet 3     metroNIDAZOLE (METROGEL) 0.75 % topical gel Apply topically daily 45 g 3       Allergies    Allergies   Allergen Reactions     Prednisone      Yeast infection - swollen lips       Immunizations    Immunization History   Administered Date(s) Administered     Influenza (High Dose) 3 valent vaccine 10/07/2010, 09/19/2012, 11/04/2013, 10/08/2014, 11/03/2015, 09/16/2016, 10/02/2017     Influenza (IIV3) PF 10/19/2004, 11/15/2005     Pneumo Conj 13-V (2010&after) 11/03/2015     Pneumococcal 23 valent 04/25/2006     TD (ADULT, 7+) 03/03/1998, 01/23/2006, 06/21/2018     TDAP Vaccine (Boostrix) 08/27/2012     Zoster vaccine, live 10/24/2012       Family History    Family History   Problem Relation Age of Onset     Cerebrovascular Disease Mother      Cerebrovascular Disease Father      Cancer - colorectal Brother      Cancer - colorectal Brother      Breast Cancer Sister      Cancer Sister      lung     Cancer Sister      lung     Cancer Sister       lung     Scleroderma Sister        Social History    Social History     Social History     Marital status:      Spouse name: thanh     Number of children: Alex     Years of education: 12     Occupational History     part-time [employer] section at [employer]       Social History Main Topics     Smoking status: Former Smoker     Packs/day: 3.00     Years: 50.00     Types: Cigarettes     Quit date: 12/21/1993     Smokeless tobacco: Never Used      Comment: quit 1993     Alcohol use No     Drug use: No      Comment: no herbal meds either      Sexual activity: Not Currently      Comment:  for 24 years      Other Topics Concern     Caffeine Concern Yes     1 pot  qd - switched to decaff now     Special Diet Yes     Low salt     Exercise No     Seat Belt Yes     Self-Exams Yes     SBE encouraged motnhly      Parent/Sibling W/ Cabg, Mi Or Angioplasty Before 65f 55m? No     Social History Narrative    calcium - 3 large dairy servings/day - pt declines fosamax and other meds for her osteoporosis    flex sig/colonoscopy -last colonoscopy 7/03     sun precautions - discussed     mammogram - hasn't had one since 2001 at least - ordered     Td booster - 1/23/06    pneumovax -today 4/25/06    DEXA - pt declines repeat scan today 4/25/06 despite her osteoporosis     stool hemoccults - every year after age 40    ASA- easy bruising - can't take     mulvitamin - encouraged       All above reviewed and updated, all stable unless otherwise noted    Recent labs reviewed    ROS:  Constitutional, HEENT, cardiovascular, pulmonary, GI, , musculoskeletal, neuro, skin, endocrine and psych systems are negative, except as in HPI or otherwise noted       OBJECTIVE:                                                    /80 (BP Location: Left arm, Patient Position: Sitting, Cuff Size: Adult Regular)  Pulse 72  Temp 97.5  F (36.4  C) (Oral)  Resp 12  SpO2 96%  There is no height or weight on file to calculate BMI.  GENERAL:  healthy, alert and no distress  HENT: ear canals and TM's normal upon viewing with otoscope, nose and mouth without ulcers or lesions upon viewing with otoscope  RESP: lungs clear to auscultation - no rales, no rhonchi, no wheezes  CV: regular rates and rhythm, normal S1 S2, no S3 or S4 and no murmur, no click or rub -  ABDOMEN: soft, no tenderness, no  hepatosplenomegaly, no masses, normal bowel sounds  MS: extremities- no gross deformities noted, no edema  SKIN: R Lower Inner Leg - 1.5cm x 2cm skin tear down to Subcutaneous layer; R Lower Outer Leg - 3.5cm x 2cm skin tear, Edematous   NEURO: mentation intact and speech normal  BACK: no CVA tenderness, no paralumbar tenderness  PSYCH: affect normal    DIAGNOSTICS/PROCEDURES:                                                      Results for orders placed or performed in visit on 04/24/18   Basic metabolic panel  (Ca, Cl, CO2, Creat, Gluc, K, Na, BUN)   Result Value Ref Range    Sodium 131 (L) 133 - 144 mmol/L    Potassium 5.2 3.4 - 5.3 mmol/L    Chloride 101 94 - 109 mmol/L    Carbon Dioxide 23 20 - 32 mmol/L    Anion Gap 7 3 - 14 mmol/L    Glucose 74 70 - 99 mg/dL    Urea Nitrogen 36 (H) 7 - 30 mg/dL    Creatinine 1.41 (H) 0.52 - 1.04 mg/dL    GFR Estimate 36 (L) >60 mL/min/1.7m2    GFR Estimate If Black 43 (L) >60 mL/min/1.7m2    Calcium 9.5 8.5 - 10.1 mg/dL   CBC with platelets   Result Value Ref Range    WBC 9.4 4.0 - 11.0 10e9/L    RBC Count 3.43 (L) 3.8 - 5.2 10e12/L    Hemoglobin 10.4 (L) 11.7 - 15.7 g/dL    Hematocrit 31.7 (L) 35.0 - 47.0 %    MCV 92 78 - 100 fl    MCH 30.3 26.5 - 33.0 pg    MCHC 32.8 31.5 - 36.5 g/dL    RDW 12.9 10.0 - 15.0 %    Platelet Count 299 150 - 450 10e9/L   Vitamin D Deficiency   Result Value Ref Range    Vitamin D Deficiency screening 22 20 - 75 ug/L        ASSESSMENT/PLAN:                                                        ICD-10-CM    1. Laceration of right lower extremity, initial encounter S81.811A TD PRESERV FREE, IM (7+  YRS)     ADMIN 1st VACCINE   2. Venous stasis of lower extremity I87.8    3. CKD (chronic kidney disease) stage 4, GFR 15-29 ml/min (H) N18.4    4. Hypertension goal BP (blood pressure) < 140/90 I10    5. COPD, moderate J44.9    6. Primary osteoarthritis involving multiple joints M15.0    7. Other chronic pain G89.29    8. Class 1 obesity with serious comorbidity and body mass index (BMI) of 30.0 to 30.9 in adult, unspecified obesity type E66.9     Z68.30    9. Medication monitoring encounter Z51.81      Discussed treatment/modality options, including risk and benefits, she desires follow up with another visit and immunization(s). All diagnosis above reviewed and noted above, otherwise stable.  See [employer] orders for further details.      Wound cares reviewed, Td updated, Keflex, T#3 refill, limited use, 1 per night secondary to OA/sleep    Return in about 2 weeks (around 7/5/2018), or if symptoms worsen or fail to improve, for Routine Visit.    Health Maintenance Due   Topic Date Due     URINE DRUG SCREEN Q1 YR  01/25/1950     COPD ACTION PLAN Q1 YR  06/01/2016     LIPID MONITORING Q1 YEAR  03/09/2017       Patient Instructions         [facility]                        To reach your care team during and after hours:   [phone]  To reach our pharmacy:        [phone]    Clinic Hours                        Our clinic hours are:    Monday   7:30 am to 7:00 pm                  Tuesday through Friday 7:30 am to 5:00 pm                             Saturday   8:00 am to 12:00 pm      Sunday   Closed      Pharmacy Hours                        Our pharmacy hours are:    Monday   8:30 am to 7:00 pm       Tuesday to Friday  8:30 am to 6:00 pm                       Saturday    9:00 am to 1:00 pm              Sunday    Closed              There is also information available at our web site:  [email or web address]    If your provider ordered any lab tests and you do not receive the results within 10  business days, please call the clinic.    If you need a medication refill please contact your pharmacy.  Please allow 2-3 business days for your refill to be completed.    Our clinic offers telephone visits and e visits.  Please ask one of your team members to explain more.      Use Spotstert (secure email communication and access to your chart) to send your primary care provider a message or make an appointment. Ask someone on your Team how to sign up for Spotstert.  Immunizations                      Immunization History   Administered Date(s) Administered     Influenza (High Dose) 3 valent vaccine 10/07/2010, 09/19/2012, 11/04/2013, 10/08/2014, 11/03/2015, 09/16/2016, 10/02/2017     Influenza (IIV3) PF 10/19/2004, 11/15/2005     Pneumo Conj 13-V (2010&after) 11/03/2015     Pneumococcal 23 valent 04/25/2006     TD (ADULT, 7+) 03/03/1998, 01/23/2006, 06/21/2018     TDAP Vaccine (Boostrix) 08/27/2012     Zoster vaccine, live 10/24/2012        Health Maintenance                         Health Maintenance Due   Topic Date Due     URINE DRUG SCREEN Q1 YR  01/25/1950     COPD ACTION PLAN Q1 YR  06/01/2016     Cholesterol Lab - yearly  03/09/2017                    Rashi Calle MD [address]  [address] [phone] Fax

## 2018-06-22 ENCOUNTER — ALLIED HEALTH/NURSE VISIT (OUTPATIENT)
Dept: FAMILY MEDICINE | Facility: CLINIC | Age: 83
End: 2018-06-22
Payer: MEDICARE

## 2018-06-22 VITALS — SYSTOLIC BLOOD PRESSURE: 130 MMHG | DIASTOLIC BLOOD PRESSURE: 70 MMHG

## 2018-06-22 DIAGNOSIS — Z01.30 BP CHECK: Primary | ICD-10-CM

## 2018-06-22 PROCEDURE — 99207 ZZC NO CHARGE NURSE ONLY: CPT | Performed by: FAMILY MEDICINE

## 2018-06-22 NOTE — PROGRESS NOTES
Anat Correa is enrolled/participating in the retail pharmacy Blood Pressure Goals Achievement Program (BPGAP).  Anat Correa was evaluated at [facility] on June 22, 2018 at which time her blood pressure was:    BP Readings from Last 3 Encounters:   06/22/18 130/70   06/21/18 190/80   06/21/18 190/80     Reviewed lifestyle modifications for blood pressure control and reduction: including making healthy food choices, managing weight, getting regular exercise, smoking cessation, reducing alcohol consumption, monitoring blood pressure regularly.     Anat Correa is not experiencing symptoms.    Follow-Up: BP is at goal of < 150/90 mmHg (patient 60+ years of age without diabetes).  Recommended follow-up at pharmacy in 6 months.     Recommendation to Provider: no change patient at goal.      Completed by: Alysa Fernandez, PharmD  [facility]  PH: [phone]

## 2018-06-22 NOTE — MR AVS SNAPSHOT
"              After Visit Summary   6/22/2018    Anat Correa    MRN: [id number]           Patient Information     Date Of Birth          1935        Visit Information        Provider Department      6/22/2018 11:22 AM Rahsi Calle MD [facility]        Today's Diagnoses     BP check    -  1       Follow-ups after your visit        Your next 10 appointments already scheduled     Jun 27, 2018 11:00 AM CDT   Office Visit with Rashi Calle MD   [facility] ([facility]    [address] [place] [id number] [phone]           Bring a current list of meds and any records pertaining to this visit. For Physicals, please bring immunization records and any forms needing to be filled out. Please arrive 10 minutes early to complete paperwork.              Who to contact     If you have questions or need follow up information about today's clinic visit or your schedule please contact [facility] directly at [phone].  Normal or non-critical lab and imaging results will be communicated to you by [employer], letter or phone within 4 business days after the clinic has received the results. If you do not hear from us within 7 days, please contact the clinic through [employer] or phone. If you have a critical or abnormal lab result, we will notify you by phone as soon as possible.  Submit refill requests through "[employer]" or call your pharmacy and they will forward the refill request to us. Please allow 3 business days for your refill to be completed.          Additional Information About Your Visit        [employer] Information     "[employer] lets you send messages to your doctor, view your test results, renew your prescriptions, schedule appointments and more. To sign up, go to [email or web address]" . Click on \"Log in\" on the left side of the screen, which will take you to the Welcome page. Then click on \"Sign up Now\" on the right side of " the page.     You will be asked to enter the access code listed below, as well as some personal information. Please follow the directions to create your username and password.     Your access code is: [email or web address]  Expires: 2018  3:15 PM     Your access code will  in 90 days. If you need help or a new code, please call your [facility] clinic or [phone].        Care EveryWhere ID     This is your Care EveryWhere ID. This could be used by other organizations to access your [facility] medical records  [id number]         Blood Pressure from Last 3 Encounters:   18 130/70   18 190/80   18 190/80    Weight from Last 3 Encounters:   18 164 lb (74.4 kg)   18 165 lb (74.8 kg)   18 164 lb (74.4 kg)              Today, you had the following     No orders found for display       Primary Care Provider Office Phone # Fax #    Rashi Calle [phone]       [address] [place] [id number]        Equal Access to Services     [facility] CenterTANIA AH: Hadii aad ku hadasho Soomaali, waaxda luqadaha, qaybta kaalmada adeegyada, adriana farias . So [facility] [phone].    ATENCIÓN: Si habla español, tiene a w[place]ster disposición servicios gratuitos de asistencia lingüística. LlThe [facility] [phone].    We comply with applicable federal civil rights laws and [place] laws. We do not discriminate on the basis of race, color, national origin, age, disability, sex, sexual orientation, or gender identity.            Thank you!     Thank you for choosing [facility]  for your care. Our goal is always to provide you with excellent care. Hearing back from our patients is one way we can continue to improve our services. Please take a few minutes to complete the written survey that you may receive in the mail after your visit with us. Thank you!             Your Updated Medication List - Protect others around you: Learn how to safely use, store and  throw away your medicines at www.disposemymeds.org.          This list is accurate as of 6/22/18 11:25 AM.  Always use your most recent med list.                   Brand Name Dispense Instructions for use Diagnosis    acetaminophen-codeine 300-30 MG per tablet    TYLENOL #3    90 tablet    Take 1-2 tablets by mouth every 6 hours as needed for moderate pain    Primary osteoarthritis involving multiple joints       albuterol 108 (90 Base) MCG/ACT Inhaler    VENTOLIN HFA    54 g    INHALE TWO PUFFS INTO THE LUNGS EVERY 6 HOURS AS NEEDED FOR SHORTNESS OF BREATH/DYSPNEA    COPD, moderate (H)       amLODIPine 5 MG tablet    NORVASC    180 tablet    Take 1 tablet (5 mg) by mouth 2 times daily    Hypertension goal BP (blood pressure) < 140/90, CKD (chronic kidney disease) stage 4, GFR 15-29 ml/min (H)       ASPIRIN NOT PRESCRIBED    INTENTIONAL    0 each    Please choose reason not prescribed, below    Hypertension goal BP (blood pressure) < 140/90       calcium carbonate 500 MG chewable tablet    TUMS     Take 2 chew tab by mouth daily as needed for heartburn        cephALEXin 500 MG capsule    KEFLEX    30 capsule    Take 1 capsule (500 mg) by mouth 3 times daily for 10 days    Laceration of lower leg, right, initial encounter, Cellulitis of right lower extremity, Chronic venous stasis dermatitis of both lower extremities       furosemide 20 MG tablet    LASIX    180 tablet    40 mg in am    Hypertension goal BP (blood pressure) < 140/90, CKD (chronic kidney disease) stage 4, GFR 15-29 ml/min (H)       IBUPROFEN PO      Take 400 mg by mouth every 6 hours as needed for moderate pain        ipratropium - albuterol 0.5 mg/2.5 mg/3 mL 0.5-2.5 (3) MG/3ML neb solution    DUONEB    270 mL    Take 1 vial (3 mLs) by nebulization every 6 hours as needed for shortness of breath / dyspnea or wheezing    COPD, moderate (H)       losartan 50 MG tablet    COZAAR    180 tablet    Take 2 tablets (100 mg) by mouth daily    Hypertension  goal BP (blood pressure) < 140/90, CKD (chronic kidney disease) stage 4, GFR 15-29 ml/min (H)       metoprolol succinate 50 MG 24 hr tablet    TOPROL-XL    180 tablet    Take 1 tablet (50 mg) by mouth 2 times daily (2 x 50mg = 100mg)    Hypertension goal BP (blood pressure) < 140/90, CKD (chronic kidney disease) stage 4, GFR 15-29 ml/min (H)       metroNIDAZOLE 0.75 % topical gel    METROGEL    45 g    Apply topically daily    Rosacea

## 2018-06-24 PROBLEM — E66.811 CLASS 1 OBESITY WITH SERIOUS COMORBIDITY AND BODY MASS INDEX (BMI) OF 30.0 TO 30.9 IN ADULT, UNSPECIFIED OBESITY TYPE: Status: ACTIVE | Noted: 2018-06-24

## 2018-06-24 NOTE — PATIENT INSTRUCTIONS
[facility]                        To reach your care team during and after hours:   [phone]  To reach our pharmacy:        [phone]    Clinic Hours                        Our clinic hours are:    Monday   7:30 am to 7:00 pm                  Tuesday through Friday 7:30 am to 5:00 pm                             Saturday   8:00 am to 12:00 pm      Sunday   Closed      Pharmacy Hours                        Our pharmacy hours are:    Monday   8:30 am to 7:00 pm       Tuesday to Friday  8:30 am to 6:00 pm                       Saturday    9:00 am to 1:00 pm              Sunday    Closed              There is also information available at our web site:  [email or web address]    If your provider ordered any lab tests and you do not receive the results within 10 business days, please call the clinic.    If you need a medication refill please contact your pharmacy.  Please allow 2-3 business days for your refill to be completed.    Our clinic offers telephone visits and e visits.  Please ask one of your team members to explain more.      Use lifecaket (secure email communication and access to your chart) to send your primary care provider a message or make an appointment. Ask someone on your Team how to sign up for [employer].  Immunizations                      Immunization History   Administered Date(s) Administered     Influenza (High Dose) 3 valent vaccine 10/07/2010, 09/19/2012, 11/04/2013, 10/08/2014, 11/03/2015, 09/16/2016, 10/02/2017     Influenza (IIV3) PF 10/19/2004, 11/15/2005     Pneumo Conj 13-V (2010&after) 11/03/2015     Pneumococcal 23 valent 04/25/2006     TD (ADULT, 7+) 03/03/1998, 01/23/2006, 06/21/2018     TDAP Vaccine (Boostrix) 08/27/2012     Zoster vaccine, live 10/24/2012        Health Maintenance                         Health Maintenance Due   Topic Date Due     URINE DRUG SCREEN Q1 YR  01/25/1950     COPD ACTION PLAN Q1 YR  06/01/2016     Cholesterol Lab - yearly  03/09/2017

## 2018-06-27 ENCOUNTER — OFFICE VISIT (OUTPATIENT)
Dept: FAMILY MEDICINE | Facility: CLINIC | Age: 83
End: 2018-06-27
Payer: MEDICARE

## 2018-06-27 VITALS
BODY MASS INDEX: 30.4 KG/M2 | TEMPERATURE: 98 F | HEIGHT: 61 IN | OXYGEN SATURATION: 91 % | DIASTOLIC BLOOD PRESSURE: 60 MMHG | HEART RATE: 75 BPM | WEIGHT: 161 LBS | SYSTOLIC BLOOD PRESSURE: 164 MMHG

## 2018-06-27 DIAGNOSIS — N18.4 CKD (CHRONIC KIDNEY DISEASE) STAGE 4, GFR 15-29 ML/MIN (H): ICD-10-CM

## 2018-06-27 DIAGNOSIS — N18.4 ANEMIA IN STAGE 4 CHRONIC KIDNEY DISEASE (H): ICD-10-CM

## 2018-06-27 DIAGNOSIS — I87.8 VENOUS STASIS OF LOWER EXTREMITY: ICD-10-CM

## 2018-06-27 DIAGNOSIS — I10 HYPERTENSION GOAL BP (BLOOD PRESSURE) < 140/90: ICD-10-CM

## 2018-06-27 DIAGNOSIS — D63.1 ANEMIA IN STAGE 4 CHRONIC KIDNEY DISEASE (H): ICD-10-CM

## 2018-06-27 DIAGNOSIS — S81.811D LACERATION OF RIGHT LOWER EXTREMITY, SUBSEQUENT ENCOUNTER: Primary | ICD-10-CM

## 2018-06-27 DIAGNOSIS — Z51.81 MEDICATION MONITORING ENCOUNTER: ICD-10-CM

## 2018-06-27 DIAGNOSIS — M15.0 PRIMARY OSTEOARTHRITIS INVOLVING MULTIPLE JOINTS: ICD-10-CM

## 2018-06-27 DIAGNOSIS — E66.811 CLASS 1 OBESITY WITH SERIOUS COMORBIDITY AND BODY MASS INDEX (BMI) OF 30.0 TO 30.9 IN ADULT, UNSPECIFIED OBESITY TYPE: ICD-10-CM

## 2018-06-27 PROCEDURE — 99214 OFFICE O/P EST MOD 30 MIN: CPT | Performed by: FAMILY MEDICINE

## 2018-06-27 NOTE — PROGRESS NOTES
SUBJECTIVE:   Anat Correa is a 83 year old female who presents to clinic today for the following health issues:    Cellulitis/Chronic venous staisis Follow Up  Anat was seen in clinic on 06/21/18 due to a laceration of her left lower leg after the leg was caught in a car door which tore some skin. The laceration occurred 3 weeks prior to her visit but was worsening in severity and had accompanying erythema and warmth. She was treated with Keflex 500 mg X10 days and her Td vaccine was updated. Upon her visit today, she reports that the lacerations have been improving and are no longer warm or erythematous.    CKD - 4/Hypertension/ACD (Dr Gauthier)- Prescribed amlodipine 5 mg BID, ferosemide 40 mg, metoprolol succinate 50 mg BID, and losartan 100 mg. She denies any edema or loose stools on the medications.    Hemoglobin   Date Value Ref Range Status   04/24/2018 10.4 (L) 11.7 - 15.7 g/dL Final     GFR Estimate   Date Value Ref Range Status   04/24/2018 36 (L) >60 mL/min/1.7m2 Final     Comment:     Non  GFR Calc   02/19/2018 29 (L) >60 mL/min/1.7m2 Final     Comment:     Non  GFR Calc   12/05/2017 30 (L) >60 mL/min/1.7m2 Final     Comment:     Non  GFR Calc     Lab Results   Component Value Date    CR 1.41 04/24/2018     BP Readings from Last 6 Encounters:   06/27/18 164/60   06/22/18 130/70   06/21/18 190/80   06/21/18 190/80   05/22/18 150/56   04/24/18 142/56     OA - stabel    Problem list and histories reviewed & adjusted, as indicated.  Additional history: as documented    body mass index is 30.42 kg/(m^2).    Wt Readings from Last 4 Encounters:   06/27/18 161 lb (73 kg)   04/24/18 164 lb (74.4 kg)   03/19/18 165 lb (74.8 kg)   02/19/18 164 lb (74.4 kg)       Health Maintenance    Health Maintenance Due   Topic Date Due     URINE DRUG SCREEN Q1 YR  01/25/1950     COPD ACTION PLAN Q1 YR  06/01/2016     LIPID MONITORING Q1 YEAR  03/09/2017       Current  Problem List    Patient Active Problem List   Diagnosis     History of colonic polyps     Calculus of kidney     Lesion of plantar nerve     Osteoporosis     Primary iridocyclitis     Anemia     Hyperlipidemia LDL goal <100     OA (osteoarthritis)     Advanced directives, counseling/discussion     Hypertension goal BP (blood pressure) < 140/90     COPD, moderate     Controlled substance agreement signed     Vitamin D deficiency     CKD (chronic kidney disease) stage 4, GFR 15-29 ml/min (H)     Anemia in chronic renal disease     Secondary renal hyperparathyroidism (H)     Venous stasis of lower extremity     Peripheral neuropathy     Chronic pain     Lung nodule     Nodule of left lung     Malignant neoplasm of upper lobe of left lung (H)     Acute pain of right shoulder     S/P amputation of lesser toe, unspecified laterality (H)     Retinal hemorrhage of right eye     Class 1 obesity with serious comorbidity and body mass index (BMI) of 30.0 to 30.9 in adult, unspecified obesity type       Past Medical History    Past Medical History:   Diagnosis Date     Anemia      Calculus of kidney 1998    dr hope     Chronic pain     OA     Chronic pain      CKD (chronic kidney disease) stage 4, GFR 15-29 ml/min (H) 2008    dr mcdermott     COPD, moderate (H) 2004    moderate obstruction, with pulm htn - dr francisco did AAP     Diverticulosis of colon (without mention of hemorrhage) 7/03     Hemorrhage of gastrointestinal tract, unspecified 4/08    dr su     Hyperlipidemia LDL goal <100 2006     Hypertension goal BP (blood pressure) < 140/90 2005     Internal hemorrhoids without mention of complication 7/03     Irritable bowel syndrome 7/03     Lesion of plantar nerve 8/98    hay's neuroma dr connor     Lung nodule 4/14, 3/16    Dr Thompson, 1.4 x 1.1 cm, lingula, PET neg 3/16     Malignant neoplasm of upper lobe of left lung (H) 7/16    Dr Thompson - x 2 nodules - moderately differentiated adenocarcinoma (acinar  predominant).  Final pathologic stage [id number], Final clinical stage IA, grade 2     Medication management     OA - 1 T#3 at HS with HX CKD     OA (osteoarthritis)     lower ext worse katya knees     Obesity (BMI 30.0-34.9)      Osteoporosis, unspecified     FOSAMAX  = upset stomach , pt declines further rx.      Other ulcerative colitis     collangenous collitis- last colonoscopy       Peripheral neuropathy     early - burning at HS     Personal history of colonic polyps     dr araujo     PONV (postoperative nausea and vomiting)      Primary iridocyclitis     iritis dr dominguez     Venous stasis of lower extremity        Past Surgical History    Past Surgical History:   Procedure Laterality Date     AMPUTATE TOE(S) Right 2015    Procedure: AMPUTATE TOE(S);  Surgeon: Ashia White, DPM, Pod;  Location: [facility] OR     C APPENDECTOMY       C  DELIVERY ONLY      , Low Cervical     EXCISE MASS UPPER EXTREMITY  10/13/2011    Lt Forearm mass excision - dr ely     EXCISE MASS UPPER EXTREMITY  2012    Lt Forearm mass excision - dr ely     HC COLONOSCOPY THRU STOMA, DIAGNOSTIC      IBS/diverticula/hemmorhoids dr araujo     HC ESOPHAGOSCOPY, DIAGNOSTIC  , , 6/10    Gastric ulcer, healed, gastritis     HC HEMORRHOIDECTOMY,INT/EXT,SIMPLE       HC REPAIR SLIDING INGUINAL HERNIA      mitra     SURGICAL HISTORY OF -       mitra neck & back tumors     SURGICAL HISTORY OF -       neuroma excision - 2nd toe amputation     SURGICAL HISTORY OF -   3/07    Rt IOL - dr jimenez     SURGICAL HISTORY OF -       Lt IOL - dr jimenez     SURGICAL HISTORY OF -       Lt Knee replacement - dr gayle     SURGICAL HISTORY OF -       Rt Knee replacement - dr gayle     SURGICAL HISTORY OF -   9/15    Rt Second toe amputation - Dr White     THORACOSCOPIC WEDGE RESECTION LUNG Left 2016    Procedure: THORACOSCOPIC WEDGE RESECTION LUNG;  Surgeon:  Abdelrahman Thompson MD;  Location: [facility] OR     XR JOINT INJECTION HIP (HIB)  2/2015    Rt - Dr Terry       Current Medications    Current Outpatient Prescriptions   Medication Sig Dispense Refill     acetaminophen-codeine (TYLENOL #3) 300-30 MG per tablet Take 1-2 tablets by mouth every 6 hours as needed for moderate pain 90 tablet 0     albuterol (VENTOLIN HFA) 108 (90 BASE) MCG/ACT Inhaler INHALE TWO PUFFS INTO THE LUNGS EVERY 6 HOURS AS NEEDED FOR SHORTNESS OF BREATH/DYSPNEA 54 g 3     amLODIPine (NORVASC) 5 MG tablet Take 1 tablet (5 mg) by mouth 2 times daily 180 tablet 3     ASPIRIN NOT PRESCRIBED (INTENTIONAL) Please choose reason not prescribed, below 0 each 0     calcium carbonate (TUMS) 500 MG chewable tablet Take 2 chew tab by mouth daily as needed for heartburn       cephALEXin (KEFLEX) 500 MG capsule Take 1 capsule (500 mg) by mouth 3 times daily for 10 days 30 capsule 0     furosemide (LASIX) 20 MG tablet 40 mg in am 180 tablet 3     IBUPROFEN PO Take 400 mg by mouth every 6 hours as needed for moderate pain       ipratropium - albuterol 0.5 mg/2.5 mg/3 mL (DUONEB) 0.5-2.5 (3) MG/3ML neb solution Take 1 vial (3 mLs) by nebulization every 6 hours as needed for shortness of breath / dyspnea or wheezing 270 mL 3     losartan (COZAAR) 50 MG tablet Take 2 tablets (100 mg) by mouth daily 180 tablet 3     metoprolol succinate (TOPROL-XL) 50 MG 24 hr tablet Take 1 tablet (50 mg) by mouth 2 times daily (2 x 50mg = 100mg) 180 tablet 3     metroNIDAZOLE (METROGEL) 0.75 % topical gel Apply topically daily 45 g 3       Allergies    Allergies   Allergen Reactions     Prednisone      Yeast infection - swollen lips       Immunizations    Immunization History   Administered Date(s) Administered     Influenza (High Dose) 3 valent vaccine 10/07/2010, 09/19/2012, 11/04/2013, 10/08/2014, 11/03/2015, 09/16/2016, 10/02/2017     Influenza (IIV3) PF 10/19/2004, 11/15/2005     Pneumo Conj 13-V (2010&after) 11/03/2015      Pneumococcal 23 valent 04/25/2006     TD (ADULT, 7+) 03/03/1998, 01/23/2006, 06/21/2018     TDAP Vaccine (Boostrix) 08/27/2012     Zoster vaccine, live 10/24/2012       Family History    Family History   Problem Relation Age of Onset     Cerebrovascular Disease Mother      Cerebrovascular Disease Father      Cancer - colorectal Brother      Cancer - colorectal Brother      Breast Cancer Sister      Cancer Sister      lung     Cancer Sister      lung     Cancer Sister      lung     Scleroderma Sister        Social History    Social History     Social History     Marital status:      Spouse name: thanh     Number of children: Alex     Years of education: 12     Occupational History     part-time [employer] section at [employer]       Social History Main Topics     Smoking status: Former Smoker     Packs/day: 3.00     Years: 50.00     Types: Cigarettes     Quit date: 12/21/1993     Smokeless tobacco: Never Used      Comment: quit 1993     Alcohol use No     Drug use: No      Comment: no herbal meds either      Sexual activity: Not Currently      Comment:  for 24 years      Other Topics Concern     Caffeine Concern Yes     1 pot  qd - switched to decaff now     Special Diet Yes     Low salt     Exercise No     Seat Belt Yes     Self-Exams Yes     SBE encouraged motnhly      Parent/Sibling W/ Cabg, Mi Or Angioplasty Before 65f 55m? No     Social History Narrative    calcium - 3 large dairy servings/day - pt declines fosamax and other meds for her osteoporosis    flex sig/colonoscopy -last colonoscopy 7/03     sun precautions - discussed     mammogram - hasn't had one since 2001 at least - ordered     Td booster - 1/23/06    pneumovax -today 4/25/06    DEXA - pt declines repeat scan today 4/25/06 despite her osteoporosis     stool hemoccults - every year after age 40    ASA- easy bruising - can't take     mulvitamin - encouraged       All above reviewed and updated, all stable unless otherwise noted    Recent  "labs reviewed    ROS:  Constitutional, HEENT, cardiovascular, pulmonary, GI, , musculoskeletal, neuro, skin, endocrine and psych systems are negative, except as in HPI or otherwise noted     OBJECTIVE:                                                    /60 (BP Location: Right arm)  Pulse 75  Temp 98  F (36.7  C) (Oral)  Ht 5' 1\" (1.549 m)  Wt 161 lb (73 kg)  SpO2 91%  BMI 30.42 kg/m2  Body mass index is 30.42 kg/(m^2).  GENERAL: healthy, alert and no distress  HENT: ear canals and TM's normal upon viewing with otoscope, nose and mouth without ulcers or lesions upon viewing with otoscope  RESP: lungs clear to auscultation - no rales, no rhonchi, no wheezes  CV: regular rates and rhythm, normal S1 S2, no S3 or S4 and no murmur, no click or rub -  ABDOMEN: soft, no tenderness, no  hepatosplenomegaly, no masses, normal bowel sounds  MS: extremities- no gross deformities noted, no edema  SKIN: Left ankle: one laceration on medial ankle measuring 2 cm x 1.5 cm, one laceration on lateral calf (arrow shaped) measuring 3 cm long and 2 cm wide, otherwise no suspicious lesions, no rashes to visible skin  NEURO: mentation intact and speech normal  BACK: no CVA tenderness, no paralumbar tenderness  PSYCH: affect normal    DIAGNOSTICS/PROCEDURES:                                                      No results found for this or any previous visit (from the past 24 hour(s)).     ASSESSMENT/PLAN:                                                        ICD-10-CM    1. Laceration of right lower extremity, subsequent encounter S81.811D    2. Venous stasis of lower extremity I87.8    3. CKD (chronic kidney disease) stage 4, GFR 15-29 ml/min (H) N18.4    4. Hypertension goal BP (blood pressure) < 140/90 I10    5. Anemia in stage 4 chronic kidney disease (H) N18.4     D63.1    6. Primary osteoarthritis involving multiple joints M15.0    7. Class 1 obesity with serious comorbidity and body mass index (BMI) of 30.0 to 30.9 in " adult, unspecified obesity type E66.9     Z68.30    8. Medication monitoring encounter Z51.81        Discussed treatment/modality options, including risk and benefits, she desires medication refill(s), finish antibiotics if not improving will refill, wound cares reviewed, watch labs and BP, and observation. All diagnosis above reviewed and noted above, otherwise stable.  See [facility] orders for further details.  Follow up in 2 week(s) and as needed.    Follow up for reevaluation in 2-4 weeks    Return in about 2 weeks (around 7/11/2018), or if symptoms worsen or fail to improve, for Routine Visit.    Health Maintenance Due   Topic Date Due     URINE DRUG SCREEN Q1 YR  01/25/1950     COPD ACTION PLAN Q1 YR  06/01/2016     LIPID MONITORING Q1 YEAR  03/09/2017     This document serves as a record of the services and decisions personally performed and made by Rashi Calle MD [facility]. It was created on her behalf by Christian Hurst, a trained medical scribe. The creation of this document is based on the provider's statements to the medical scribe.  Christian Hurst 11:25 AM June 27, 2018    The information in this document, created by the medical scribe for me, accurately reflects the services I personally performed and the decisions made by me. I have reviewed and approved this document for accuracy prior to leaving the patient care area.  June 27, 2018 11:25 AM           MD ASNON [address]  [address]  [phone] Fax

## 2018-06-27 NOTE — MR AVS SNAPSHOT
After Visit Summary   6/27/2018    Anat Correa    MRN: [id number]           Patient Information     Date Of Birth          1935        Visit Information        Provider Department      6/27/2018 11:00 AM Rashi Calle MD [facility]        Today's Diagnoses     Laceration of right lower extremity, subsequent encounter    -  1    Venous stasis of lower extremity        CKD (chronic kidney disease) stage 4, GFR 15-29 ml/min (H)        Hypertension goal BP (blood pressure) < 140/90        Anemia in stage 4 chronic kidney disease (H)        Primary osteoarthritis involving multiple joints        Class 1 obesity with serious comorbidity and body mass index (BMI) of 30.0 to 30.9 in adult, unspecified obesity type        Medication monitoring encounter           Follow-ups after your visit        Follow-up notes from your care team     Return in about 2 weeks (around 7/11/2018), or if symptoms worsen or fail to improve, for Routine Visit.      Who to contact     If you have questions or need follow up information about today's clinic visit or your schedule please contact [facility] directly at [phone].  Normal or non-critical lab and imaging results will be communicated to you by Gigamonhart, letter or phone within 4 business days after the clinic has received the results. If you do not hear from us within 7 days, please contact the clinic through Gigamon[employer] or phone. If you have a critical or abnormal lab result, we will notify you by phone as soon as possible.  Submit refill requests through TrashOut or call your pharmacy and they will forward the refill request to us. Please allow 3 business days for your refill to be completed.          Additional Information About Your Visit        MyChart Information     TrashOut lets you send messages to your doctor, view your test results, renew your prescriptions, schedule appointments and more. To sign up, go to  [email or web address] . Click on \"Log in\" on the left side of the screen, which will take you to the Welcome page. Then click on \"Sign up Now\" on the right side of the page.     You will be asked to enter the access code listed below, as well as some personal information. Please follow the directions to create your username and password.     Your access code is: [email or web address]  Expires: 2018  3:15 PM     Your access code will  in 90 days. If you need help or a new code, please call your [facility] clinic or [phone].        Care EveryWhere ID     This is your Care EveryWhere ID. This could be used by other organizations to access your [facility] medical records  [id number]        Your Vitals Were     Pulse Temperature Height Pulse Oximetry BMI (Body Mass Index)       75 98  F (36.7  C) (Oral) 5' 1\" (1.549 m) 91% 30.42 kg/m2        Blood Pressure from Last 3 Encounters:   18 164/60   18 130/70   18 190/80    Weight from Last 3 Encounters:   18 161 lb (73 kg)   18 164 lb (74.4 kg)   18 165 lb (74.8 kg)              Today, you had the following     No orders found for display       Primary Care Provider Office Phone # Fax #    Rashi Calle [phone]       [address] [place] [address]        Equal Access to Services     [facility] AH: Hadii aad ku hadasho Soomaali, waaxda luqadaha, qaybta kaalmada adeegyada, adriana calderon. So [facility] [phone].    ATENCIÓN: Si habla español, tiene a [place] disposición servicios gratuitos de asistencia lingüística. Llame al [phone].    We comply with applicable federal civil rights laws and [place] laws. We do not discriminate on the basis of race, color, national origin, age, disability, sex, sexual orientation, or gender identity.            Thank you!     Thank you for choosing [facility]  for your care. Our goal is always to provide you with excellent care. " Hearing back from our patients is one way we can continue to improve our services. Please take a few minutes to complete the written survey that you may receive in the mail after your visit with us. Thank you!             Your Updated Medication List - Protect others around you: Learn how to safely use, store and throw away your medicines at www.disposemymeds.org.          This list is accurate as of 6/27/18 11:47 AM.  Always use your most recent med list.                   Brand Name Dispense Instructions for use Diagnosis    acetaminophen-codeine 300-30 MG per tablet    TYLENOL #3    90 tablet    Take 1-2 tablets by mouth every 6 hours as needed for moderate pain    Primary osteoarthritis involving multiple joints       albuterol 108 (90 Base) MCG/ACT Inhaler    VENTOLIN HFA    54 g    INHALE TWO PUFFS INTO THE LUNGS EVERY 6 HOURS AS NEEDED FOR SHORTNESS OF BREATH/DYSPNEA    COPD, moderate (H)       amLODIPine 5 MG tablet    NORVASC    180 tablet    Take 1 tablet (5 mg) by mouth 2 times daily    Hypertension goal BP (blood pressure) < 140/90, CKD (chronic kidney disease) stage 4, GFR 15-29 ml/min (H)       ASPIRIN NOT PRESCRIBED    INTENTIONAL    0 each    Please choose reason not prescribed, below    Hypertension goal BP (blood pressure) < 140/90       calcium carbonate 500 MG chewable tablet    TUMS     Take 2 chew tab by mouth daily as needed for heartburn        cephALEXin 500 MG capsule    KEFLEX    30 capsule    Take 1 capsule (500 mg) by mouth 3 times daily for 10 days    Laceration of lower leg, right, initial encounter, Cellulitis of right lower extremity, Chronic venous stasis dermatitis of both lower extremities       furosemide 20 MG tablet    LASIX    180 tablet    40 mg in am    Hypertension goal BP (blood pressure) < 140/90, CKD (chronic kidney disease) stage 4, GFR 15-29 ml/min (H)       IBUPROFEN PO      Take 400 mg by mouth every 6 hours as needed for moderate pain        ipratropium - albuterol  0.5 mg/2.5 mg/3 mL 0.5-2.5 (3) MG/3ML neb solution    DUONEB    270 mL    Take 1 vial (3 mLs) by nebulization every 6 hours as needed for shortness of breath / dyspnea or wheezing    COPD, moderate (H)       losartan 50 MG tablet    COZAAR    180 tablet    Take 2 tablets (100 mg) by mouth daily    Hypertension goal BP (blood pressure) < 140/90, CKD (chronic kidney disease) stage 4, GFR 15-29 ml/min (H)       metoprolol succinate 50 MG 24 hr tablet    TOPROL-XL    180 tablet    Take 1 tablet (50 mg) by mouth 2 times daily (2 x 50mg = 100mg)    Hypertension goal BP (blood pressure) < 140/90, CKD (chronic kidney disease) stage 4, GFR 15-29 ml/min (H)       metroNIDAZOLE 0.75 % topical gel    METROGEL    45 g    Apply topically daily    Rosacea

## 2018-07-02 ENCOUNTER — ALLIED HEALTH/NURSE VISIT (OUTPATIENT)
Dept: FAMILY MEDICINE | Facility: CLINIC | Age: 83
End: 2018-07-02
Payer: MEDICARE

## 2018-07-02 VITALS — DIASTOLIC BLOOD PRESSURE: 69 MMHG | SYSTOLIC BLOOD PRESSURE: 155 MMHG

## 2018-07-02 DIAGNOSIS — I10 HYPERTENSION GOAL BP (BLOOD PRESSURE) < 140/90: Primary | ICD-10-CM

## 2018-07-02 PROCEDURE — 99207 ZZC NO CHARGE NURSE ONLY: CPT | Performed by: FAMILY MEDICINE

## 2018-07-02 NOTE — MR AVS SNAPSHOT
"              After Visit Summary   7/2/2018    Anat Correa    MRN: [id number]           Patient Information     Date Of Birth          1935        Visit Information        Provider Department      7/2/2018 6:03 PM Rashi Calle MD [facility]        Today's Diagnoses     Hypertension goal BP (blood pressure) < 140/90    -  1       Follow-ups after your visit        Your next 10 appointments already scheduled     Jul 11, 2018 10:20 AM CDT   SHORT with Rashi Calle MD   [facility] ([facility])    [address] [place] [id number] [phone]              Who to contact     If you have questions or need follow up information about today's clinic visit or your schedule please contact [facility] directly at [phone].  Normal or non-critical lab and imaging results will be communicated to you by PrimÃ¢â‚¬â„¢Visionhart, letter or phone within 4 business days after the clinic has received the results. If you do not hear from us within 7 days, please contact the clinic through MyChart or phone. If you have a critical or abnormal lab result, we will notify you by phone as soon as possible.  Submit refill requests through Call Britannia or call your pharmacy and they will forward the refill request to us. Please allow 3 business days for your refill to be completed.          Additional Information About Your Visit        MyChart Information     Call Britannia lets you send messages to your doctor, view your test results, renew your prescriptions, schedule appointments and more. To sign up, go to [email or web address] . Click on \"Log in\" on the left side of the screen, which will take you to the Welcome page. Then click on \"Sign up Now\" on the right side of the page.     You will be asked to enter the access code listed below, as well as some personal information. Please follow the directions to create your username and password.     Your access " code is: [email or web address]  Expires: 2018  3:15 PM     Your access code will  in 90 days. If you need help or a new code, please call your [place] clinic or [phone].        Care EveryWhere ID     This is your Care EveryWhere ID. This could be used by other organizations to access your [place] medical records  [id number]         Blood Pressure from Last 3 Encounters:   18 155/69   18 164/60   18 130/70    Weight from Last 3 Encounters:   18 161 lb (73 kg)   18 164 lb (74.4 kg)   18 165 lb (74.8 kg)              Today, you had the following     No orders found for display       Primary Care Provider Office Phone # Fax #    Rashi Calle [phone] [address] [facility] [address]        Equal Access to Services     JUNE LOW AH: Hadii aad ku hadasho Soomaali, waaxda luqadaha, qaybta kaalmada adeegyada, waxay idiin hayaan christiana khararajendra farias . So [facility] [phone].    ATENCIÓN: Si brooksla español, tiene a winchester disposición servicios gratuitos de asistencia lingüística. Llame al [phone].    We comply with applicable federal civil rights laws and [place] laws. We do not discriminate on the basis of race, color, national origin, age, disability, sex, sexual orientation, or gender identity.            Thank you!     Thank you for choosing [facility]  for your care. Our goal is always to provide you with excellent care. Hearing back from our patients is one way we can continue to improve our services. Please take a few minutes to complete the written survey that you may receive in the mail after your visit with us. Thank you!             Your Updated Medication List - Protect others around you: Learn how to safely use, store and throw away your medicines at www.disposemymeds.org.          This list is accurate as of 18 11:59 PM.  Always use your most recent med list.                   Brand Name Dispense Instructions  for use Diagnosis    acetaminophen-codeine 300-30 MG per tablet    TYLENOL #3    90 tablet    Take 1-2 tablets by mouth every 6 hours as needed for moderate pain    Primary osteoarthritis involving multiple joints       albuterol 108 (90 Base) MCG/ACT Inhaler    VENTOLIN HFA    54 g    INHALE TWO PUFFS INTO THE LUNGS EVERY 6 HOURS AS NEEDED FOR SHORTNESS OF BREATH/DYSPNEA    COPD, moderate (H)       amLODIPine 5 MG tablet    NORVASC    180 tablet    Take 1 tablet (5 mg) by mouth 2 times daily    Hypertension goal BP (blood pressure) < 140/90, CKD (chronic kidney disease) stage 4, GFR 15-29 ml/min (H)       ASPIRIN NOT PRESCRIBED    INTENTIONAL    0 each    Please choose reason not prescribed, below    Hypertension goal BP (blood pressure) < 140/90       calcium carbonate 500 MG chewable tablet    TUMS     Take 2 chew tab by mouth daily as needed for heartburn        furosemide 20 MG tablet    LASIX    180 tablet    40 mg in am    Hypertension goal BP (blood pressure) < 140/90, CKD (chronic kidney disease) stage 4, GFR 15-29 ml/min (H)       IBUPROFEN PO      Take 400 mg by mouth every 6 hours as needed for moderate pain        ipratropium - albuterol 0.5 mg/2.5 mg/3 mL 0.5-2.5 (3) MG/3ML neb solution    DUONEB    270 mL    Take 1 vial (3 mLs) by nebulization every 6 hours as needed for shortness of breath / dyspnea or wheezing    COPD, moderate (H)       losartan 50 MG tablet    COZAAR    180 tablet    Take 2 tablets (100 mg) by mouth daily    Hypertension goal BP (blood pressure) < 140/90, CKD (chronic kidney disease) stage 4, GFR 15-29 ml/min (H)       metoprolol succinate 50 MG 24 hr tablet    TOPROL-XL    180 tablet    Take 1 tablet (50 mg) by mouth 2 times daily (2 x 50mg = 100mg)    Hypertension goal BP (blood pressure) < 140/90, CKD (chronic kidney disease) stage 4, GFR 15-29 ml/min (H)       metroNIDAZOLE 0.75 % topical gel    METROGEL    45 g    Apply topically daily    Rosacea

## 2018-07-02 NOTE — PROGRESS NOTES
Anat Correa is enrolled/participating in the retail pharmacy Blood Pressure Goals Achievement Program (BPGAP).  Anat Correa was evaluated at [facility] on July 2, 2018 at which time her blood pressure was:    BP Readings from Last 3 Encounters:   07/02/18 155/69   06/27/18 164/60   06/22/18 130/70     Reviewed lifestyle modifications for blood pressure control and reduction: including making healthy food choices, managing weight, getting regular exercise, smoking cessation, reducing alcohol consumption, monitoring blood pressure regularly.     Anat Correa is not experiencing symptoms.    Follow-Up: BP is not at goal of < 140/90mmHg (patient 18+ years of age with or without diabetes), Recommended follow-up in 1 month at the pharmacy. Routing to PCP as an FYI.    Recommendation to Provider: First lower reading taken with manual cuff.  Heartrate very irregular.  Second (higher) reading done with electronic cuff.  Likely more accurate.  Recheck 1 month    Anat Correa was evaluated for enrollment into the PGEN study today.    Patient eligible for enrollment:  No  Patient interested in enrollment:  No    Completed by: Thank you,  Alysa Hays RPh, Mgr [facility] [phone]

## 2018-07-11 ENCOUNTER — OFFICE VISIT (OUTPATIENT)
Dept: FAMILY MEDICINE | Facility: CLINIC | Age: 83
End: 2018-07-11
Payer: MEDICARE

## 2018-07-11 VITALS
TEMPERATURE: 98 F | HEIGHT: 61 IN | DIASTOLIC BLOOD PRESSURE: 64 MMHG | SYSTOLIC BLOOD PRESSURE: 160 MMHG | WEIGHT: 158 LBS | BODY MASS INDEX: 29.83 KG/M2 | OXYGEN SATURATION: 95 % | HEART RATE: 70 BPM

## 2018-07-11 DIAGNOSIS — I87.2 CHRONIC VENOUS STASIS DERMATITIS OF RIGHT LOWER EXTREMITY: ICD-10-CM

## 2018-07-11 DIAGNOSIS — Z51.81 MEDICATION MONITORING ENCOUNTER: ICD-10-CM

## 2018-07-11 DIAGNOSIS — C34.12 MALIGNANT NEOPLASM OF UPPER LOBE OF LEFT LUNG (H): ICD-10-CM

## 2018-07-11 DIAGNOSIS — S81.811D LACERATION OF RIGHT LOWER EXTREMITY, SUBSEQUENT ENCOUNTER: Primary | ICD-10-CM

## 2018-07-11 DIAGNOSIS — J44.9 COPD, MODERATE (H): ICD-10-CM

## 2018-07-11 DIAGNOSIS — Z23 NEED FOR SHINGLES VACCINE: ICD-10-CM

## 2018-07-11 DIAGNOSIS — M15.0 PRIMARY OSTEOARTHRITIS INVOLVING MULTIPLE JOINTS: ICD-10-CM

## 2018-07-11 DIAGNOSIS — N18.4 CKD (CHRONIC KIDNEY DISEASE) STAGE 4, GFR 15-29 ML/MIN (H): ICD-10-CM

## 2018-07-11 DIAGNOSIS — L03.115 CELLULITIS OF RIGHT LOWER EXTREMITY: ICD-10-CM

## 2018-07-11 DIAGNOSIS — E55.9 VITAMIN D DEFICIENCY: ICD-10-CM

## 2018-07-11 DIAGNOSIS — I10 HYPERTENSION GOAL BP (BLOOD PRESSURE) < 140/90: ICD-10-CM

## 2018-07-11 DIAGNOSIS — G89.29 OTHER CHRONIC PAIN: ICD-10-CM

## 2018-07-11 LAB
BASOPHILS # BLD AUTO: 0 10E9/L (ref 0–0.2)
BASOPHILS NFR BLD AUTO: 0.3 %
CRP SERPL-MCNC: 6.7 MG/L (ref 0–8)
DIFFERENTIAL METHOD BLD: ABNORMAL
EOSINOPHIL # BLD AUTO: 0.5 10E9/L (ref 0–0.7)
EOSINOPHIL NFR BLD AUTO: 5.4 %
ERYTHROCYTE [DISTWIDTH] IN BLOOD BY AUTOMATED COUNT: 11.8 % (ref 10–15)
ERYTHROCYTE [SEDIMENTATION RATE] IN BLOOD BY WESTERGREN METHOD: 36 MM/H (ref 0–30)
HCT VFR BLD AUTO: 32 % (ref 35–47)
HGB BLD-MCNC: 10.8 G/DL (ref 11.7–15.7)
LYMPHOCYTES # BLD AUTO: 2.1 10E9/L (ref 0.8–5.3)
LYMPHOCYTES NFR BLD AUTO: 21.7 %
MCH RBC QN AUTO: 30.5 PG (ref 26.5–33)
MCHC RBC AUTO-ENTMCNC: 33.8 G/DL (ref 31.5–36.5)
MCV RBC AUTO: 90 FL (ref 78–100)
MONOCYTES # BLD AUTO: 1 10E9/L (ref 0–1.3)
MONOCYTES NFR BLD AUTO: 10.7 %
NEUTROPHILS # BLD AUTO: 6 10E9/L (ref 1.6–8.3)
NEUTROPHILS NFR BLD AUTO: 61.9 %
PLATELET # BLD AUTO: 384 10E9/L (ref 150–450)
RBC # BLD AUTO: 3.54 10E12/L (ref 3.8–5.2)
WBC # BLD AUTO: 9.7 10E9/L (ref 4–11)

## 2018-07-11 PROCEDURE — 80053 COMPREHEN METABOLIC PANEL: CPT | Performed by: FAMILY MEDICINE

## 2018-07-11 PROCEDURE — 36415 COLL VENOUS BLD VENIPUNCTURE: CPT | Performed by: FAMILY MEDICINE

## 2018-07-11 PROCEDURE — 85025 COMPLETE CBC W/AUTO DIFF WBC: CPT | Performed by: FAMILY MEDICINE

## 2018-07-11 PROCEDURE — 86140 C-REACTIVE PROTEIN: CPT | Performed by: FAMILY MEDICINE

## 2018-07-11 PROCEDURE — 90471 IMMUNIZATION ADMIN: CPT | Performed by: FAMILY MEDICINE

## 2018-07-11 PROCEDURE — 85652 RBC SED RATE AUTOMATED: CPT | Performed by: FAMILY MEDICINE

## 2018-07-11 PROCEDURE — 99214 OFFICE O/P EST MOD 30 MIN: CPT | Mod: 25 | Performed by: FAMILY MEDICINE

## 2018-07-11 PROCEDURE — 90750 HZV VACC RECOMBINANT IM: CPT | Performed by: FAMILY MEDICINE

## 2018-07-11 RX ORDER — CEPHALEXIN 500 MG/1
500 CAPSULE ORAL 3 TIMES DAILY
Qty: 30 CAPSULE | Refills: 0 | Status: SHIPPED | OUTPATIENT
Start: 2018-07-11 | End: 2018-12-13

## 2018-07-11 RX ORDER — MUPIROCIN CALCIUM 20 MG/G
CREAM TOPICAL 3 TIMES DAILY
Qty: 30 G | Refills: 1 | Status: SHIPPED | OUTPATIENT
Start: 2018-07-11 | End: 2019-10-29

## 2018-07-11 NOTE — MR AVS SNAPSHOT
After Visit Summary   7/11/2018    Anat Correa    MRN: [id number]           Patient Information     Date Of Birth          1935        Visit Information        Provider Department      7/11/2018 10:20 AM Rashi Calle MD [facility]        Today's Diagnoses     Laceration of right lower extremity, subsequent encounter    -  1    Cellulitis of right lower extremity        Chronic venous stasis dermatitis of right lower extremity        CKD (chronic kidney disease) stage 4, GFR 15-29 ml/min (H)        Hypertension goal BP (blood pressure) < 140/90        Primary osteoarthritis involving multiple joints        Other chronic pain        Malignant neoplasm of upper lobe of left lung (H)        COPD, moderate        Vitamin D deficiency        Medication monitoring encounter        Need for shingles vaccine          Care Instructions        [facility]                        To reach your care team during and after hours:   [phone]  To reach our pharmacy:        [phone]    Clinic Hours                        Our clinic hours are:    Monday   7:30 am to 7:00 pm                  Tuesday through Friday 7:30 am to 5:00 pm                             Saturday   8:00 am to 12:00 pm      Sunday   Closed      Pharmacy Hours                        Our pharmacy hours are:    Monday   8:30 am to 7:00 pm       Tuesday to Friday  8:30 am to 6:00 pm                       Saturday    9:00 am to 1:00 pm              Sunday    Closed              There is also information available at our web site:  [email or web address]    If your provider ordered any lab tests and you do not receive the results within 10 business days, please call the clinic.    If you need a medication refill please contact your pharmacy.  Please allow 2-3 business days for your refill to be completed.    Our clinic offers telephone visits and e visits.  Please ask one of your team members to explain more.       Use Tilson (secure email communication and access to your chart) to send your primary care provider a message or make an appointment. Ask someone on your Team how to sign up for Tilson.  Immunizations                      Immunization History   Administered Date(s) Administered     Influenza (High Dose) 3 valent vaccine 10/07/2010, 09/19/2012, 11/04/2013, 10/08/2014, 11/03/2015, 09/16/2016, 10/02/2017     Influenza (IIV3) PF 10/19/2004, 11/15/2005     Pneumo Conj 13-V (2010&after) 11/03/2015     Pneumococcal 23 valent 04/25/2006     TD (ADULT, 7+) 03/03/1998, 01/23/2006, 06/21/2018     TDAP Vaccine (Boostrix) 08/27/2012     Zoster vaccine, live 10/24/2012        Health Maintenance                         Health Maintenance Due   Topic Date Due     URINE DRUG SCREEN Q1 YR  01/25/1950     COPD ACTION PLAN Q1 YR  06/01/2016     Cholesterol Lab - yearly  03/09/2017               Follow-ups after your visit        Follow-up notes from your care team     Return in about 2 months (around 9/11/2018), or if symptoms worsen or fail to improve, for Routine Visit.      Who to contact     If you have questions or need follow up information about today's clinic visit or your schedule please contact [facility] directly at [phone].  Normal or non-critical lab and imaging results will be communicated to you by [employer] srlhart, letter or phone within 4 business days after the clinic has received the results. If you do not hear from us within 7 days, please contact the clinic through KidNimblet or phone. If you have a critical or abnormal lab result, we will notify you by phone as soon as possible.  Submit refill requests through Tilson or call your pharmacy and they will forward the refill request to us. Please allow 3 business days for your refill to be completed.          Additional Information About Your Visit        [employer] Information     Tilson lets you send messages to your doctor, view your test results,  "renew your prescriptions, schedule appointments and more. To sign up, go to [email or web address] . Click on \"Log in\" on the left side of the screen, which will take you to the Welcome page. Then click on \"Sign up Now\" on the right side of the page.     You will be asked to enter the access code listed below, as well as some personal information. Please follow the directions to create your username and password.     Your access code is: [email or web address]  Expires: 2018  3:15 PM     Your access code will  in 90 days. If you need help or a new code, please call your [place] clinic or [phone].        Care EveryWhere ID     This is your Care EveryWhere ID. This could be used by other organizations to access your [place] medical records  [id number]        Your Vitals Were     Pulse Temperature Height Pulse Oximetry BMI (Body Mass Index)       70 98  F (36.7  C) (Oral) 5' 1\" (1.549 m) 95% 29.85 kg/m2        Blood Pressure from Last 3 Encounters:   18 160/64   18 155/69   18 164/60    Weight from Last 3 Encounters:   18 158 lb (71.7 kg)   18 161 lb (73 kg)   18 164 lb (74.4 kg)              We Performed the Following     CBC with platelets and differential     Comprehensive metabolic panel     CRP inflammation     Erythrocyte sedimentation rate auto     ZOSTER VACCINE RECOMBINANT ADJUVANTED IM NJX          Today's Medication Changes          These changes are accurate as of 18 11:40 AM.  If you have any questions, ask your nurse or doctor.               Start taking these medicines.        Dose/Directions    cephALEXin 500 MG capsule   Commonly known as:  KEFLEX   Used for:  Laceration of right lower extremity, subsequent encounter, Cellulitis of right lower extremity, Chronic venous stasis dermatitis of right lower extremity   Started by:  Rashi Calle MD        Dose:  500 mg   Take 1 capsule (500 mg) by mouth 3 times daily   Quantity:  30 capsule   Refills:  0    "    mupirocin 2 % cream   Commonly known as:  BACTROBAN   Used for:  Laceration of right lower extremity, subsequent encounter, Cellulitis of right lower extremity, Chronic venous stasis dermatitis of right lower extremity   Started by:  Rashi Calle MD        Apply topically 3 times daily   Quantity:  30 g   Refills:  1            Where to get your medicines      These medications were sent to [facility] - [place], [place] - [address]  [address], [facility] [address]     Phone:  [phone]     cephALEXin 500 MG capsule    mupirocin 2 % cream                Primary Care Provider Office Phone # Fax #    Rashi Calle [phone]       [address] [place] [address]        Equal Access to Services     JUNE LOW : Hadii aad ku hadasho Soomaali, waaxda luqadaha, qaybta kaalmada adeegyada, waxay idiin haydelln christiana farias . So [facility] [phone].    ATENCIÓN: Si habla español, tiene a winchester disposición servicios gratuitos de asistencia lingüística. Ll[facility] [phone].    We comply with applicable federal civil rights laws and [place] laws. We do not discriminate on the basis of race, color, national origin, age, disability, sex, sexual orientation, or gender identity.            Thank you!     Thank you for choosing [facility]  for your care. Our goal is always to provide you with excellent care. Hearing back from our patients is one way we can continue to improve our services. Please take a few minutes to complete the written survey that you may receive in the mail after your visit with us. Thank you!             Your Updated Medication List - Protect others around you: Learn how to safely use, store and throw away your medicines at www.disposemymeds.org.          This list is accurate as of 7/11/18 11:40 AM.  Always use your most recent med list.                   Brand Name Dispense Instructions for use Diagnosis     acetaminophen-codeine 300-30 MG per tablet    TYLENOL #3    90 tablet    Take 1-2 tablets by mouth every 6 hours as needed for moderate pain    Primary osteoarthritis involving multiple joints       albuterol 108 (90 Base) MCG/ACT Inhaler    VENTOLIN HFA    54 g    INHALE TWO PUFFS INTO THE LUNGS EVERY 6 HOURS AS NEEDED FOR SHORTNESS OF BREATH/DYSPNEA    COPD, moderate (H)       amLODIPine 5 MG tablet    NORVASC    180 tablet    Take 1 tablet (5 mg) by mouth 2 times daily    Hypertension goal BP (blood pressure) < 140/90, CKD (chronic kidney disease) stage 4, GFR 15-29 ml/min (H)       ASPIRIN NOT PRESCRIBED    INTENTIONAL    0 each    Please choose reason not prescribed, below    Hypertension goal BP (blood pressure) < 140/90       calcium carbonate 500 MG chewable tablet    TUMS     Take 2 chew tab by mouth daily as needed for heartburn        cephALEXin 500 MG capsule    KEFLEX    30 capsule    Take 1 capsule (500 mg) by mouth 3 times daily    Laceration of right lower extremity, subsequent encounter, Cellulitis of right lower extremity, Chronic venous stasis dermatitis of right lower extremity       furosemide 20 MG tablet    LASIX    180 tablet    40 mg in am    Hypertension goal BP (blood pressure) < 140/90, CKD (chronic kidney disease) stage 4, GFR 15-29 ml/min (H)       IBUPROFEN PO      Take 400 mg by mouth every 6 hours as needed for moderate pain        ipratropium - albuterol 0.5 mg/2.5 mg/3 mL 0.5-2.5 (3) MG/3ML neb solution    DUONEB    270 mL    Take 1 vial (3 mLs) by nebulization every 6 hours as needed for shortness of breath / dyspnea or wheezing    COPD, moderate (H)       losartan 50 MG tablet    COZAAR    180 tablet    Take 2 tablets (100 mg) by mouth daily    Hypertension goal BP (blood pressure) < 140/90, CKD (chronic kidney disease) stage 4, GFR 15-29 ml/min (H)       metoprolol succinate 50 MG 24 hr tablet    TOPROL-XL    180 tablet    Take 1 tablet (50 mg) by mouth 2 times daily (2 x 50mg =  100mg)    Hypertension goal BP (blood pressure) < 140/90, CKD (chronic kidney disease) stage 4, GFR 15-29 ml/min (H)       metroNIDAZOLE 0.75 % topical gel    METROGEL    45 g    Apply topically daily    Rosacea       mupirocin 2 % cream    BACTROBAN    30 g    Apply topically 3 times daily    Laceration of right lower extremity, subsequent encounter, Cellulitis of right lower extremity, Chronic venous stasis dermatitis of right lower extremity

## 2018-07-11 NOTE — PROGRESS NOTES
SUBJECTIVE:   Anat Correa is a 83 year old female who presents to clinic today for the following health issues:    7/11    Recheck on right leg infection - right lateral calf (15 x 20 mm) - right inner ankle (20 mm) - using bacitracin - calf feels better and ankle not improving, warm and tender, in past went to wound care without much help - no DC -     CKD/Htn    BP Readings from Last 3 Encounters:   07/11/18 160/64   07/02/18 155/69   06/27/18 164/60     Creatinine   Date Value Ref Range Status   04/24/2018 1.41 (H) 0.52 - 1.04 mg/dL Final     COPD/Lung cancer - stable - recent scans in May OK, next due in Fall, Dr Thompson/Shireen    Shingles - Pt asked for shingles vaccine today - pt was advised because she has medicare insurance that she should get it at the pharmacy.  Pt stated the pharmacy told her it was going to cost $144.09 because of deductible and she called her insurance and was told it would be covered in the clinic.  I advised patient that we could not verify the cost for having it done in the clinic    OA - stable    6/27    Cellulitis/Chronic venous staisis Follow Up  Anat was seen in clinic on 06/21/18 due to a laceration of her left lower leg after the leg was caught in a car door which tore some skin. The laceration occurred 3 weeks prior to her visit but was worsening in severity and had accompanying erythema and warmth. She was treated with Keflex 500 mg X10 days and her Td vaccine was updated. Upon her visit today, she reports that the lacerations have been improving and are no longer warm or erythematous.     CKD - 4/Hypertension/ACD (Dr Gauthier)- Prescribed amlodipine 5 mg BID, ferosemide 40 mg, metoprolol succinate 50 mg BID, and losartan 100 mg. She denies any edema or loose stools on the medications.     OA - stable    Problem list and histories reviewed & adjusted, as indicated.  Additional history: as documented    Reviewed and updated as needed this visit by clinical  staff  Tobacco  Allergies  Meds  Med Hx  Surg Hx  Fam Hx  Soc Hx      Reviewed and updated as needed this visit by Provider       body mass index is 29.85 kg/(m^2).    Wt Readings from Last 4 Encounters:   07/11/18 158 lb (71.7 kg)   06/27/18 161 lb (73 kg)   04/24/18 164 lb (74.4 kg)   03/19/18 165 lb (74.8 kg)       Health Maintenance    Health Maintenance Due   Topic Date Due     URINE DRUG SCREEN Q1 YR  01/25/1950     COPD ACTION PLAN Q1 YR  06/01/2016     LIPID MONITORING Q1 YEAR  03/09/2017       Current Problem List    Patient Active Problem List   Diagnosis     History of colonic polyps     Calculus of kidney     Lesion of plantar nerve     Osteoporosis     Primary iridocyclitis     Anemia     Hyperlipidemia LDL goal <100     OA (osteoarthritis)     Advanced directives, counseling/discussion     Hypertension goal BP (blood pressure) < 140/90     COPD, moderate     Controlled substance agreement signed     Vitamin D deficiency     CKD (chronic kidney disease) stage 4, GFR 15-29 ml/min (H)     Anemia in chronic renal disease     Secondary renal hyperparathyroidism (H)     Venous stasis of lower extremity     Peripheral neuropathy     Chronic pain     Lung nodule     Nodule of left lung     Malignant neoplasm of upper lobe of left lung (H)     Acute pain of right shoulder     S/P amputation of lesser toe, unspecified laterality (H)     Retinal hemorrhage of right eye       Past Medical History    Past Medical History:   Diagnosis Date     Anemia      Calculus of kidney 1998    dr hope     Chronic pain     OA     Chronic pain      CKD (chronic kidney disease) stage 4, GFR 15-29 ml/min (H) 2008    dr mcdermott     COPD, moderate (H) 2004    moderate obstruction, with pulm htn - dr francisco did AAP     Diverticulosis of colon (without mention of hemorrhage) 7/03     Hemorrhage of gastrointestinal tract, unspecified 4/08    dr su     Hyperlipidemia LDL goal <100 2006     Hypertension goal BP (blood pressure)  < 140/90      Internal hemorrhoids without mention of complication      Irritable bowel syndrome      Lesion of plantar nerve     bharti's neuroma dr connor     Lung nodule , 3/16    Dr Thompson, 1.4 x 1.1 cm, lingula, PET neg 3/16     Malignant neoplasm of upper lobe of left lung (H)     Dr Thompson - x 2 nodules - moderately differentiated adenocarcinoma (acinar predominant).  Final pathologic stage I2bN3T4, Final clinical stage IA, grade 2     Medication management     OA - 1 T#3 at HS with HX CKD     OA (osteoarthritis)     lower ext worse katya knees     Obesity (BMI 30.0-34.9)      Osteoporosis, unspecified     FOSAMAX  = upset stomach , pt declines further rx.      Other ulcerative colitis     collangenous collitis- last colonoscopy       Peripheral neuropathy     early - burning at      Personal history of colonic polyps     dr araujo     PONV (postoperative nausea and vomiting)      Primary iridocyclitis     iritis dr dominguez     Venous stasis of lower extremity        Past Surgical History    Past Surgical History:   Procedure Laterality Date     AMPUTATE TOE(S) Right 2015    Procedure: AMPUTATE TOE(S);  Surgeon: Ashia White, DPM, Pod;  Location: [facility] OR     C APPENDECTOMY       C  DELIVERY ONLY  1965    , Low Cervical     EXCISE MASS UPPER EXTREMITY  10/13/2011    Lt Forearm mass excision - dr ely     EXCISE MASS UPPER EXTREMITY  2012    Lt Forearm mass excision - dr ely     HC COLONOSCOPY THRU STOMA, DIAGNOSTIC      IBS/diverticula/hemmorhoids dr araujo     HC ESOPHAGOSCOPY, DIAGNOSTIC  , , 6/10    Gastric ulcer, healed, gastritis     HC HEMORRHOIDECTOMY,INT/EXT,SIMPLE       HC REPAIR SLIDING INGUINAL HERNIA      mitra     SURGICAL HISTORY OF -       mitra neck & back tumors     SURGICAL HISTORY OF -       neuroma excision - 2nd toe amputation     SURGICAL HISTORY OF -   3/07    Rt IOL - dr jimenez      SURGICAL HISTORY OF -   4/07    Lt IOL - dr jimenez     SURGICAL HISTORY OF -   9/04    Lt Knee replacement - dr gayle     SURGICAL HISTORY OF -   9/09    Rt Knee replacement - dr gayle     SURGICAL HISTORY OF -   9/15    Rt Second toe amputation - Dr White     THORACOSCOPIC WEDGE RESECTION LUNG Left 7/12/2016    Procedure: THORACOSCOPIC WEDGE RESECTION LUNG;  Surgeon: Abdelrahman Thompson MD;  Location: [facility] OR     XR JOINT INJECTION HIP (HIB)  2/2015    Rt - Dr Terry       Current Medications    Current Outpatient Prescriptions   Medication Sig Dispense Refill     acetaminophen-codeine (TYLENOL #3) 300-30 MG per tablet Take 1-2 tablets by mouth every 6 hours as needed for moderate pain 90 tablet 0     albuterol (VENTOLIN HFA) 108 (90 BASE) MCG/ACT Inhaler INHALE TWO PUFFS INTO THE LUNGS EVERY 6 HOURS AS NEEDED FOR SHORTNESS OF BREATH/DYSPNEA 54 g 3     amLODIPine (NORVASC) 5 MG tablet Take 1 tablet (5 mg) by mouth 2 times daily 180 tablet 3     ASPIRIN NOT PRESCRIBED (INTENTIONAL) Please choose reason not prescribed, below 0 each 0     calcium carbonate (TUMS) 500 MG chewable tablet Take 2 chew tab by mouth daily as needed for heartburn       cephALEXin (KEFLEX) 500 MG capsule Take 1 capsule (500 mg) by mouth 3 times daily 30 capsule 0     furosemide (LASIX) 20 MG tablet 40 mg in am 180 tablet 3     IBUPROFEN PO Take 400 mg by mouth every 6 hours as needed for moderate pain       ipratropium - albuterol 0.5 mg/2.5 mg/3 mL (DUONEB) 0.5-2.5 (3) MG/3ML neb solution Take 1 vial (3 mLs) by nebulization every 6 hours as needed for shortness of breath / dyspnea or wheezing 270 mL 3     losartan (COZAAR) 50 MG tablet Take 2 tablets (100 mg) by mouth daily 180 tablet 3     metoprolol succinate (TOPROL-XL) 50 MG 24 hr tablet Take 1 tablet (50 mg) by mouth 2 times daily (2 x 50mg = 100mg) 180 tablet 3     metroNIDAZOLE (METROGEL) 0.75 % topical gel Apply topically daily 45 g 3     mupirocin (BACTROBAN) 2 %  cream Apply topically 3 times daily 30 g 1       Allergies    Allergies   Allergen Reactions     Prednisone      Yeast infection - swollen lips       Immunizations    Immunization History   Administered Date(s) Administered     Influenza (High Dose) 3 valent vaccine 10/07/2010, 09/19/2012, 11/04/2013, 10/08/2014, 11/03/2015, 09/16/2016, 10/02/2017     Influenza (IIV3) PF 10/19/2004, 11/15/2005     Pneumo Conj 13-V (2010&after) 11/03/2015     Pneumococcal 23 valent 04/25/2006     TD (ADULT, 7+) 03/03/1998, 01/23/2006, 06/21/2018     TDAP Vaccine (Boostrix) 08/27/2012     Zoster vaccine recombinant adjuvanted (SHINGRIX) 07/11/2018     Zoster vaccine, live 10/24/2012       Family History    Family History   Problem Relation Age of Onset     Cerebrovascular Disease Mother      Cerebrovascular Disease Father      Cancer - colorectal Brother      Cancer - colorectal Brother      Breast Cancer Sister      Cancer Sister      lung     Cancer Sister      lung     Cancer Sister      lung     Scleroderma Sister        Social History    Social History     Social History     Marital status:      Spouse name: thanh     Number of children: 1     Years of education: 12     Occupational History     part-time [employer] section at [employer]       Social History Main Topics     Smoking status: Former Smoker     Packs/day: 3.00     Years: 50.00     Types: Cigarettes     Quit date: 12/21/1993     Smokeless tobacco: Never Used      Comment: quit 1993     Alcohol use No     Drug use: No      Comment: no herbal meds either      Sexual activity: Not Currently      Comment:  for 24 years      Other Topics Concern     Caffeine Concern Yes     1 pot  qd - switched to decaff now     Special Diet Yes     Low salt     Exercise No     Seat Belt Yes     Self-Exams Yes     SBE encouraged motnhly      Parent/Sibling W/ Cabg, Mi Or Angioplasty Before 65f 55m? No     Social History Narrative    calcium - 3 large dairy servings/day - pt  "declines fosamax and other meds for her osteoporosis    flex sig/colonoscopy -last colonoscopy 7/03     sun precautions - discussed     mammogram - hasn't had one since 2001 at least - ordered     Td booster - 1/23/06    pneumovax -today 4/25/06    DEXA - pt declines repeat scan today 4/25/06 despite her osteoporosis     stool hemoccults - every year after age 40    ASA- easy bruising - can't take     mulvitamin - encouraged       All above reviewed and updated, all stable unless otherwise noted    Recent labs reviewed    ROS:  CONSTITUTIONAL: NEGATIVE for fever, chills, change in weight  INTEGUMENTARY/SKIN: NEGATIVE for worrisome rashes, moles or lesions  EYES: NEGATIVE for vision changes or irritation  ENT/MOUTH: NEGATIVE for ear, mouth and throat problems  RESP: NEGATIVE for significant cough or SOB  CV: NEGATIVE for chest pain, palpitations or peripheral edema  GI: NEGATIVE for nausea, abdominal pain, heartburn, or change in bowel habits  : NEGATIVE for frequency, dysuria, or hematuria  MUSCULOSKELETAL: NEGATIVE for significant arthralgias or myalgia  NEURO: NEGATIVE for weakness, dizziness or paresthesias  ENDOCRINE: NEGATIVE for temperature intolerance, skin/hair changes  HEME: NEGATIVE for bleeding problems  PSYCHIATRIC: NEGATIVE for changes in mood or affect    OBJECTIVE:                                                    /64  Pulse 70  Temp 98  F (36.7  C) (Oral)  Ht 5' 1\" (1.549 m)  Wt 158 lb (71.7 kg)  SpO2 95%  BMI 29.85 kg/m2  Body mass index is 29.85 kg/(m^2).  GENERAL: healthy, alert and no distress  EYES: Eyes grossly normal to inspection, extraocular movements - intact, and PERRL  HENT: ear canals- normal; TMs- normal; Nose- normal; Mouth- no ulcers, no lesions  NECK: no tenderness, no adenopathy, no asymmetry, no masses, no stiffness; thyroid- normal to palpation  RESP: lungs clear to auscultation - no rales, no rhonchi, no wheezes  CV: regular rates and rhythm, normal S1 S2, no S3 or " S4 and no murmur, no click or rub -  ABDOMEN: soft, no tenderness, no  hepatosplenomegaly, no masses, normal bowel sounds  MS: extremities- no gross deformities noted, no edema  SKIN: see above  NEURO: strength and tone- normal, sensory exam- grossly normal, mentation- intact, speech- normal, reflexes- symmetric  BACK: no CVA tenderness, no paralumbar tenderness  PSYCH: Alert and oriented times 3; speech- coherent , normal rate and volume; able to articulate logical thoughts, able to abstract reason, no tangential thoughts, no hallucinations or delusions, affect- normal  LYMPHATICS: no cervical adenopathy    DIAGNOSTICS/PROCEDURES:                                                      none      ASSESSMENT/PLAN:                                                        ICD-10-CM    1. Laceration of right lower extremity, subsequent encounter S81.811D cephALEXin (KEFLEX) 500 MG capsule     mupirocin (BACTROBAN) 2 % cream     Comprehensive metabolic panel     CBC with platelets and differential     Erythrocyte sedimentation rate auto     CRP inflammation   2. Cellulitis of right lower extremity L03.115 cephALEXin (KEFLEX) 500 MG capsule     mupirocin (BACTROBAN) 2 % cream     Comprehensive metabolic panel     CBC with platelets and differential     Erythrocyte sedimentation rate auto     CRP inflammation   3. Chronic venous stasis dermatitis of right lower extremity I87.2 cephALEXin (KEFLEX) 500 MG capsule     mupirocin (BACTROBAN) 2 % cream     Comprehensive metabolic panel     CBC with platelets and differential     Erythrocyte sedimentation rate auto     CRP inflammation   4. CKD (chronic kidney disease) stage 4, GFR 15-29 ml/min (H) N18.4    5. Hypertension goal BP (blood pressure) < 140/90 I10    6. Primary osteoarthritis involving multiple joints M15.0    7. Other chronic pain G89.29    8. Malignant neoplasm of upper lobe of left lung (H) C34.12    9. COPD, moderate J44.9    10. Vitamin D deficiency E55.9    11.  Medication monitoring encounter Z51.81    12. Need for shingles vaccine Z23 ZOSTER VACCINE RECOMBINANT ADJUVANTED IM NJX     ADMIN 1st VACCINE       Discussed treatment/modality options, including risk and benefits, she desires labs, keflex, wound care, bactroban, hold on wound care clinic, watch BP, shingrix #1. All diagnosis above reviewed and noted above, otherwise stable.  See Health system orders for further details.  Follow up in 2 week(s) to 2 months and as needed.    Return in about 2 months (around 9/11/2018), or if symptoms worsen or fail to improve, for Routine Visit.    Health Maintenance Due   Topic Date Due     URINE DRUG SCREEN Q1 YR  01/25/1950     COPD ACTION PLAN Q1 YR  06/01/2016     LIPID MONITORING Q1 YEAR  03/09/2017       See Patient Instructions           Rashi Calle MD [address]  [id number] [phone] Fax

## 2018-07-11 NOTE — PATIENT INSTRUCTIONS
[facility]                        To reach your care team during and after hours:   [phone]  To reach our pharmacy:        [phone]    Clinic Hours                        Our clinic hours are:    Monday   7:30 am to 7:00 pm                  Tuesday through Friday 7:30 am to 5:00 pm                             Saturday   8:00 am to 12:00 pm      Sunday   Closed      Pharmacy Hours                        Our pharmacy hours are:    Monday   8:30 am to 7:00 pm       Tuesday to Friday  8:30 am to 6:00 pm                       Saturday    9:00 am to 1:00 pm              Sunday    Closed              There is also information available at our web site:  [email or web address]    If your provider ordered any lab tests and you do not receive the results within 10 business days, please call the clinic.    If you need a medication refill please contact your pharmacy.  Please allow 2-3 business days for your refill to be completed.    Our clinic offers telephone visits and e visits.  Please ask one of your team members to explain more.      Use [employer] (secure email communication and access to your chart) to send your primary care provider a message or make an appointment. Ask someone on your Team how to sign up for [employer].  Immunizations                      Immunization History   Administered Date(s) Administered     Influenza (High Dose) 3 valent vaccine 10/07/2010, 09/19/2012, 11/04/2013, 10/08/2014, 11/03/2015, 09/16/2016, 10/02/2017     Influenza (IIV3) PF 10/19/2004, 11/15/2005     Pneumo Conj 13-V (2010&after) 11/03/2015     Pneumococcal 23 valent 04/25/2006     TD (ADULT, 7+) 03/03/1998, 01/23/2006, 06/21/2018     TDAP Vaccine (Boostrix) 08/27/2012     Zoster vaccine recombinant adjuvanted (SHINGRIX) 07/11/2018     Zoster vaccine, live 10/24/2012        Health Maintenance                         Health Maintenance Due   Topic Date Due     URINE DRUG SCREEN Q1 YR  01/25/1950     COPD ACTION  PLAN Q1 YR  06/01/2016     Cholesterol Lab - yearly  03/09/2017

## 2018-07-12 LAB
ALBUMIN SERPL-MCNC: 4.2 G/DL (ref 3.4–5)
ALP SERPL-CCNC: 82 U/L (ref 40–150)
ALT SERPL W P-5'-P-CCNC: 21 U/L (ref 0–50)
ANION GAP SERPL CALCULATED.3IONS-SCNC: 8 MMOL/L (ref 3–14)
AST SERPL W P-5'-P-CCNC: 22 U/L (ref 0–45)
BILIRUB SERPL-MCNC: 0.4 MG/DL (ref 0.2–1.3)
BUN SERPL-MCNC: 26 MG/DL (ref 7–30)
CALCIUM SERPL-MCNC: 9.8 MG/DL (ref 8.5–10.1)
CHLORIDE SERPL-SCNC: 94 MMOL/L (ref 94–109)
CO2 SERPL-SCNC: 24 MMOL/L (ref 20–32)
CREAT SERPL-MCNC: 1.6 MG/DL (ref 0.52–1.04)
GFR SERPL CREATININE-BSD FRML MDRD: 31 ML/MIN/1.7M2
GLUCOSE SERPL-MCNC: 94 MG/DL (ref 70–99)
POTASSIUM SERPL-SCNC: 4.8 MMOL/L (ref 3.4–5.3)
PROT SERPL-MCNC: 7.7 G/DL (ref 6.8–8.8)
SODIUM SERPL-SCNC: 126 MMOL/L (ref 133–144)

## 2018-07-13 ENCOUNTER — TELEPHONE (OUTPATIENT)
Dept: FAMILY MEDICINE | Facility: CLINIC | Age: 83
End: 2018-07-13

## 2018-07-13 DIAGNOSIS — N18.4 CKD (CHRONIC KIDNEY DISEASE) STAGE 4, GFR 15-29 ML/MIN (H): Primary | ICD-10-CM

## 2018-07-13 DIAGNOSIS — E87.1 SODIUM BLOOD DECREASED: ICD-10-CM

## 2018-07-13 NOTE — TELEPHONE ENCOUNTER
Reason for Call:  Other returning call    Detailed comments: The patient is returning a call left by a nurse.    Phone Number Patient can be reached at: Home number on file [phone] (home)    Best Time: Anytime    Can we leave a detailed message on this number? YES    Call taken on 7/13/2018 at 10:06 AM by Leslie Garduno

## 2018-07-19 DIAGNOSIS — N18.4 CKD (CHRONIC KIDNEY DISEASE) STAGE 4, GFR 15-29 ML/MIN (H): ICD-10-CM

## 2018-07-19 LAB
ANION GAP SERPL CALCULATED.3IONS-SCNC: 10 MMOL/L (ref 3–14)
BUN SERPL-MCNC: 32 MG/DL (ref 7–30)
CALCIUM SERPL-MCNC: 9.9 MG/DL (ref 8.5–10.1)
CHLORIDE SERPL-SCNC: 94 MMOL/L (ref 94–109)
CO2 SERPL-SCNC: 23 MMOL/L (ref 20–32)
CREAT SERPL-MCNC: 1.6 MG/DL (ref 0.52–1.04)
GFR SERPL CREATININE-BSD FRML MDRD: 31 ML/MIN/1.7M2
GLUCOSE SERPL-MCNC: 68 MG/DL (ref 70–99)
POTASSIUM SERPL-SCNC: 4.8 MMOL/L (ref 3.4–5.3)
SODIUM SERPL-SCNC: 127 MMOL/L (ref 133–144)

## 2018-07-19 PROCEDURE — 80048 BASIC METABOLIC PNL TOTAL CA: CPT | Performed by: FAMILY MEDICINE

## 2018-07-19 PROCEDURE — 36415 COLL VENOUS BLD VENIPUNCTURE: CPT | Performed by: FAMILY MEDICINE

## 2018-07-20 DIAGNOSIS — E87.1 LOW SODIUM LEVELS: Primary | ICD-10-CM

## 2018-07-26 DIAGNOSIS — E87.1 LOW SODIUM LEVELS: ICD-10-CM

## 2018-07-26 LAB
ANION GAP SERPL CALCULATED.3IONS-SCNC: 7 MMOL/L (ref 3–14)
BUN SERPL-MCNC: 27 MG/DL (ref 7–30)
CALCIUM SERPL-MCNC: 9.3 MG/DL (ref 8.5–10.1)
CHLORIDE SERPL-SCNC: 97 MMOL/L (ref 94–109)
CO2 SERPL-SCNC: 24 MMOL/L (ref 20–32)
CREAT SERPL-MCNC: 1.6 MG/DL (ref 0.52–1.04)
GFR SERPL CREATININE-BSD FRML MDRD: 31 ML/MIN/1.7M2
GLUCOSE SERPL-MCNC: 85 MG/DL (ref 70–99)
POTASSIUM SERPL-SCNC: 4.6 MMOL/L (ref 3.4–5.3)
SODIUM SERPL-SCNC: 128 MMOL/L (ref 133–144)

## 2018-07-26 PROCEDURE — 80048 BASIC METABOLIC PNL TOTAL CA: CPT | Performed by: FAMILY MEDICINE

## 2018-07-26 PROCEDURE — 36415 COLL VENOUS BLD VENIPUNCTURE: CPT | Performed by: FAMILY MEDICINE

## 2018-07-27 DIAGNOSIS — R79.89 LOW SERUM SODIUM: Primary | ICD-10-CM

## 2018-08-14 ENCOUNTER — ALLIED HEALTH/NURSE VISIT (OUTPATIENT)
Dept: FAMILY MEDICINE | Facility: CLINIC | Age: 83
End: 2018-08-14
Payer: MEDICARE

## 2018-08-14 VITALS — DIASTOLIC BLOOD PRESSURE: 68 MMHG | SYSTOLIC BLOOD PRESSURE: 156 MMHG

## 2018-08-14 DIAGNOSIS — Z01.30 BP CHECK: Primary | ICD-10-CM

## 2018-08-14 PROCEDURE — 99207 ZZC NO CHARGE NURSE ONLY: CPT | Performed by: FAMILY MEDICINE

## 2018-08-14 NOTE — PROGRESS NOTES
Anat Correa is enrolled/participating in the retail pharmacy Blood Pressure Goals Achievement Program (BPGAP).  Anat Correa was evaluated at [facility] on August 14, 2018 at which time her blood pressure was:    BP Readings from Last 3 Encounters:   08/14/18 156/68   07/11/18 160/64   07/02/18 155/69     Reviewed lifestyle modifications for blood pressure control and reduction: including making healthy food choices, managing weight, getting regular exercise, smoking cessation, reducing alcohol consumption, monitoring blood pressure regularly.     Anat Correa is not experiencing symptoms.    Follow-Up: BP is not at goal of < 140/90mmHg (patient 18+ years of age with or without diabetes), Recommended follow-up with PCP.  Routing to PCP for further review.        Completed by: Tegan Busby [facility]   [phone]

## 2018-08-15 NOTE — PROGRESS NOTES
Patient notified of TS recommendation via phone.    Cassandra Kim, MACIE, RN, N  [facility]) [phone]

## 2018-09-13 ENCOUNTER — OFFICE VISIT (OUTPATIENT)
Dept: FAMILY MEDICINE | Facility: CLINIC | Age: 83
End: 2018-09-13
Payer: MEDICARE

## 2018-09-13 VITALS
HEIGHT: 61 IN | DIASTOLIC BLOOD PRESSURE: 60 MMHG | WEIGHT: 156 LBS | BODY MASS INDEX: 29.45 KG/M2 | SYSTOLIC BLOOD PRESSURE: 146 MMHG | HEART RATE: 70 BPM | OXYGEN SATURATION: 96 % | TEMPERATURE: 98.4 F

## 2018-09-13 DIAGNOSIS — M15.0 PRIMARY OSTEOARTHRITIS INVOLVING MULTIPLE JOINTS: Primary | ICD-10-CM

## 2018-09-13 DIAGNOSIS — N18.4 CKD (CHRONIC KIDNEY DISEASE) STAGE 4, GFR 15-29 ML/MIN (H): ICD-10-CM

## 2018-09-13 DIAGNOSIS — E55.9 VITAMIN D DEFICIENCY: ICD-10-CM

## 2018-09-13 DIAGNOSIS — D63.1 ANEMIA IN STAGE 4 CHRONIC KIDNEY DISEASE (H): ICD-10-CM

## 2018-09-13 DIAGNOSIS — I87.2 CHRONIC VENOUS STASIS DERMATITIS OF BOTH LOWER EXTREMITIES: ICD-10-CM

## 2018-09-13 DIAGNOSIS — G89.29 OTHER CHRONIC PAIN: ICD-10-CM

## 2018-09-13 DIAGNOSIS — M70.62 GREATER TROCHANTERIC BURSITIS OF LEFT HIP: ICD-10-CM

## 2018-09-13 DIAGNOSIS — J44.9 COPD, MODERATE (H): ICD-10-CM

## 2018-09-13 DIAGNOSIS — G63 POLYNEUROPATHY ASSOCIATED WITH UNDERLYING DISEASE (H): ICD-10-CM

## 2018-09-13 DIAGNOSIS — M81.0 OSTEOPOROSIS WITHOUT CURRENT PATHOLOGICAL FRACTURE, UNSPECIFIED OSTEOPOROSIS TYPE: ICD-10-CM

## 2018-09-13 DIAGNOSIS — R30.0 DYSURIA: ICD-10-CM

## 2018-09-13 DIAGNOSIS — N18.4 ANEMIA IN STAGE 4 CHRONIC KIDNEY DISEASE (H): ICD-10-CM

## 2018-09-13 DIAGNOSIS — Z51.81 MEDICATION MONITORING ENCOUNTER: ICD-10-CM

## 2018-09-13 DIAGNOSIS — I10 HYPERTENSION GOAL BP (BLOOD PRESSURE) < 140/90: ICD-10-CM

## 2018-09-13 DIAGNOSIS — Z23 NEED FOR SHINGLES VACCINE: ICD-10-CM

## 2018-09-13 DIAGNOSIS — Z23 NEED FOR PROPHYLACTIC VACCINATION AND INOCULATION AGAINST INFLUENZA: ICD-10-CM

## 2018-09-13 DIAGNOSIS — C34.12 MALIGNANT NEOPLASM OF UPPER LOBE OF LEFT LUNG (H): ICD-10-CM

## 2018-09-13 LAB
ALBUMIN UR-MCNC: NEGATIVE MG/DL
ANION GAP SERPL CALCULATED.3IONS-SCNC: 9 MMOL/L (ref 3–14)
APPEARANCE UR: CLEAR
BILIRUB UR QL STRIP: NEGATIVE
BUN SERPL-MCNC: 35 MG/DL (ref 7–30)
CALCIUM SERPL-MCNC: 10 MG/DL (ref 8.5–10.1)
CHLORIDE SERPL-SCNC: 97 MMOL/L (ref 94–109)
CO2 SERPL-SCNC: 23 MMOL/L (ref 20–32)
COLOR UR AUTO: YELLOW
CREAT SERPL-MCNC: 1.58 MG/DL (ref 0.52–1.04)
ERYTHROCYTE [DISTWIDTH] IN BLOOD BY AUTOMATED COUNT: 12.9 % (ref 10–15)
GFR SERPL CREATININE-BSD FRML MDRD: 31 ML/MIN/1.7M2
GLUCOSE SERPL-MCNC: 88 MG/DL (ref 70–99)
GLUCOSE UR STRIP-MCNC: NEGATIVE MG/DL
HCT VFR BLD AUTO: 33.1 % (ref 35–47)
HGB BLD-MCNC: 10.9 G/DL (ref 11.7–15.7)
HGB UR QL STRIP: NEGATIVE
KETONES UR STRIP-MCNC: NEGATIVE MG/DL
LEUKOCYTE ESTERASE UR QL STRIP: NEGATIVE
MCH RBC QN AUTO: 29.8 PG (ref 26.5–33)
MCHC RBC AUTO-ENTMCNC: 32.9 G/DL (ref 31.5–36.5)
MCV RBC AUTO: 90 FL (ref 78–100)
NITRATE UR QL: NEGATIVE
PH UR STRIP: 7 PH (ref 5–7)
PLATELET # BLD AUTO: 350 10E9/L (ref 150–450)
POTASSIUM SERPL-SCNC: 4.8 MMOL/L (ref 3.4–5.3)
RBC # BLD AUTO: 3.66 10E12/L (ref 3.8–5.2)
SODIUM SERPL-SCNC: 129 MMOL/L (ref 133–144)
SOURCE: NORMAL
SP GR UR STRIP: 1.01 (ref 1–1.03)
UROBILINOGEN UR STRIP-ACNC: 0.2 EU/DL (ref 0.2–1)
WBC # BLD AUTO: 10.5 10E9/L (ref 4–11)

## 2018-09-13 PROCEDURE — G0008 ADMIN INFLUENZA VIRUS VAC: HCPCS | Performed by: FAMILY MEDICINE

## 2018-09-13 PROCEDURE — 85027 COMPLETE CBC AUTOMATED: CPT | Performed by: FAMILY MEDICINE

## 2018-09-13 PROCEDURE — 90662 IIV NO PRSV INCREASED AG IM: CPT | Performed by: FAMILY MEDICINE

## 2018-09-13 PROCEDURE — 81003 URINALYSIS AUTO W/O SCOPE: CPT | Performed by: FAMILY MEDICINE

## 2018-09-13 PROCEDURE — 82306 VITAMIN D 25 HYDROXY: CPT | Performed by: FAMILY MEDICINE

## 2018-09-13 PROCEDURE — 20610 DRAIN/INJ JOINT/BURSA W/O US: CPT | Mod: LT | Performed by: FAMILY MEDICINE

## 2018-09-13 PROCEDURE — 36415 COLL VENOUS BLD VENIPUNCTURE: CPT | Performed by: FAMILY MEDICINE

## 2018-09-13 PROCEDURE — 80048 BASIC METABOLIC PNL TOTAL CA: CPT | Performed by: FAMILY MEDICINE

## 2018-09-13 PROCEDURE — 99214 OFFICE O/P EST MOD 30 MIN: CPT | Mod: 25 | Performed by: FAMILY MEDICINE

## 2018-09-13 ASSESSMENT — ANXIETY QUESTIONNAIRES
GAD7 TOTAL SCORE: 0
2. NOT BEING ABLE TO STOP OR CONTROL WORRYING: NOT AT ALL
3. WORRYING TOO MUCH ABOUT DIFFERENT THINGS: NOT AT ALL
1. FEELING NERVOUS, ANXIOUS, OR ON EDGE: NOT AT ALL
6. BECOMING EASILY ANNOYED OR IRRITABLE: NOT AT ALL
5. BEING SO RESTLESS THAT IT IS HARD TO SIT STILL: NOT AT ALL
IF YOU CHECKED OFF ANY PROBLEMS ON THIS QUESTIONNAIRE, HOW DIFFICULT HAVE THESE PROBLEMS MADE IT FOR YOU TO DO YOUR WORK, TAKE CARE OF THINGS AT HOME, OR GET ALONG WITH OTHER PEOPLE: NOT DIFFICULT AT ALL
7. FEELING AFRAID AS IF SOMETHING AWFUL MIGHT HAPPEN: NOT AT ALL

## 2018-09-13 ASSESSMENT — PATIENT HEALTH QUESTIONNAIRE - PHQ9: 5. POOR APPETITE OR OVEREATING: NOT AT ALL

## 2018-09-13 NOTE — LETTER
[address] [address]                  [phone]   September 15, 2018    Anat Correa  [address] [facility] [address]      Dear Anat,    Here is a summary of your recent test results:    Labs are overall stable/improved.     We advise:     Continue current cares.     For additional lab test information, [email or web address] is an excellent reference.     Let us know if you have any questions or concerns.     Thank you for choosing us for your health care needs    Your test results are enclosed.      Please contact me if you have any questions.    In addition, here is a list of due or overdue Health Maintenance reminders.    Health Maintenance Due   Topic Date Due     URINE DRUG SCREEN Q1 YR  01/25/1950     COPD ACTION PLAN Q1 YR  06/01/2016     Cholesterol Lab - yearly  03/09/2017     FALL RISK ASSESSMENT  10/02/2018     Mammogram - yearly  10/04/2018       Please call us at [phone] (or use [employer]) to address the above recommendations.            Thank you very much for trusting [facility]..     Healthy regards,        Rashi Calle M.D.        Results for orders placed or performed in visit on 09/13/18   *UA reflex to Microscopic and Culture ([place] and [facility] (except [place] and [place])   Result Value Ref Range    Color Urine Yellow     Appearance Urine Clear     Glucose Urine Negative NEG^Negative mg/dL    Bilirubin Urine Negative NEG^Negative    Ketones Urine Negative NEG^Negative mg/dL    Specific Gravity Urine 1.010 1.003 - 1.035    Blood Urine Negative NEG^Negative    pH Urine 7.0 5.0 - 7.0 pH    Protein Albumin Urine Negative NEG^Negative mg/dL    Urobilinogen Urine 0.2 0.2 - 1.0 EU/dL    Nitrite Urine Negative NEG^Negative    Leukocyte Esterase Urine Negative NEG^Negative    Source Midstream Urine    Basic metabolic panel  (Ca, Cl, CO2, Creat, Gluc, K, Na, BUN)   Result  Value Ref Range    Sodium 129 (L) 133 - 144 mmol/L    Potassium 4.8 3.4 - 5.3 mmol/L    Chloride 97 94 - 109 mmol/L    Carbon Dioxide 23 20 - 32 mmol/L    Anion Gap 9 3 - 14 mmol/L    Glucose 88 70 - 99 mg/dL    Urea Nitrogen 35 (H) 7 - 30 mg/dL    Creatinine 1.58 (H) 0.52 - 1.04 mg/dL    GFR Estimate 31 (L) >60 mL/min/1.7m2    GFR Estimate If Black 38 (L) >60 mL/min/1.7m2    Calcium 10.0 8.5 - 10.1 mg/dL   CBC with platelets   Result Value Ref Range    WBC 10.5 4.0 - 11.0 10e9/L    RBC Count 3.66 (L) 3.8 - 5.2 10e12/L    Hemoglobin 10.9 (L) 11.7 - 15.7 g/dL    Hematocrit 33.1 (L) 35.0 - 47.0 %    MCV 90 78 - 100 fl    MCH 29.8 26.5 - 33.0 pg    MCHC 32.9 31.5 - 36.5 g/dL    RDW 12.9 10.0 - 15.0 %    Platelet Count 350 150 - 450 10e9/L   Vitamin D Deficiency   Result Value Ref Range    Vitamin D Deficiency screening 40 20 - 75 ug/L

## 2018-09-13 NOTE — PROGRESS NOTES
SUBJECTIVE:   Anat Correa is a 83 year old female who presents to clinic today for the following health issues:    9/13    Osteoarthritis: Says that her arthritis pain could be better controlled (specifically in her hands, hip, and leg). Takes 2 ibuprofen in the morning due to stiffness, though she was told previously that she could take up to 4 per day due to her CKD. Also takes extra strength tylenol in the afternoon and takes tylenol PM occasionally as well. Takes acetaminophen-codeine every night before bed but doesn't take it at other times because it makes her tired. She is aware that she should not take more than 4g of tylenol per day. Hx bilateral knee replacement. She has still been able to do housework normally and have company over to her house. She is not interested in having any more surgery as treatment for her arthritis in the future.    New left hip pain: Has had similar pain in her bursa in the past which improved with a steroid shot. Would like a steroid shot today.    New left leg pain: the patient has noticed pain in a groove in her lateral left leg. The groove has been there for about 10 years but it recently became more painful.     Frequent Urination: The patient wonders whether her furosemide has been causing her to to have increased urination frequency at night. She takes the furosemide at around 5:30am each morning and does not notice symptoms during the day (for example is able to play cards for 4hr without urinating in the afternoon). She gets up about 5 times per night to urinate. This problem began around the same time that she started taking furosemide. She does not feel fatigued during the day but notes that she takes a daily nap around mid-day each day.    RLE wounds: the patient was seen 6/21/18 after catching her right leg in her car door. These wounds have improved, although they have been itching so she scratches them and they heal less quickly. She still applies the  bacitracin each day but does this mainly because it is part of her daily routine.    COPD: Doing well. Patient has a 150 pack/year smoking history per records (3ppd for 50 years). Previously saw Dr. Iyer but would not like to see her anymore because the patient waited for several hours at her clinic previously before being seen, and felt that one of the staff members was not respectful to her. Uses albuterol once per week and ipratropium/albuterol every morning.     Hx of Lung Cancer: Follows with Dr. Thompson; is willing to have further lung cancer screening.     CKD/HTN: currently taking amlodipine, furosemide, losartan, metoprolol and aside from increased urination at night (see above) has not noticed any side effects.    Creatinine   Date Value Ref Range Status   09/13/2018 1.58 (H) 0.52 - 1.04 mg/dL Final     BP Readings from Last 3 Encounters:   09/13/18 146/60   08/14/18 156/68   07/11/18 160/64     Rosacea: Was previously taking topical metronidazole for a few red spots on her nose but stopped applying it this past summer because the spots didn't bother her anymore.    7/11     Recheck on right leg infection - right lateral calf (15 x 20 mm) - right inner ankle (20 mm) - using bacitracin - calf feels better and ankle not improving, warm and tender, in past went to wound care without much help - no DC -      CKD/Htn         BP Readings from Last 3 Encounters:   07/11/18 160/64   07/02/18 155/69   06/27/18 164/60            Creatinine   Date Value Ref Range Status   04/24/2018 1.41 (H) 0.52 - 1.04 mg/dL Final      COPD/Lung cancer - stable - recent scans in May [place], next due in Fall, Dr Thompson/Shireen     Shingles - Pt asked for shingles vaccine today - pt was advised because she has medicare insurance that she should get it at the pharmacy.  Pt stated the pharmacy told her it was going to cost $144.09 because of deductible and she called her insurance and was told it would be covered in the clinic.  I advised  patient that we could not verify the cost for having it done in the clinic     OA - stable     6/27     Cellulitis/Chronic venous staisis Follow Up  Anat was seen in clinic on 06/21/18 due to a laceration of her left lower leg after the leg was caught in a car door which tore some skin. The laceration occurred 3 weeks prior to her visit but was worsening in severity and had accompanying erythema and warmth. She was treated with Keflex 500 mg X10 days and her Td vaccine was updated. Upon her visit today, she reports that the lacerations have been improving and are no longer warm or erythematous.      CKD - 4/Hypertension/ACD (Dr Gauthier)- Prescribed amlodipine 5 mg BID, ferosemide 40 mg, metoprolol succinate 50 mg BID, and losartan 100 mg. She denies any edema or loose stools on the medications.      OA - stable      Problem list and histories reviewed & adjusted, as indicated.  Additional history: as documented    Reviewed and updated as needed this visit by clinical staff  Tobacco  Allergies  Meds  Med Hx       Reviewed and updated as needed this visit by Provider         BP Readings from Last 3 Encounters:   09/13/18 146/60   08/14/18 156/68   07/11/18 160/64       body mass index is 29.48 kg/(m^2).    Wt Readings from Last 4 Encounters:   09/13/18 156 lb (70.8 kg)   07/11/18 158 lb (71.7 kg)   06/27/18 161 lb (73 kg)   04/24/18 164 lb (74.4 kg)       Health Maintenance    Health Maintenance Due   Topic Date Due     URINE DRUG SCREEN Q1 YR  01/25/1950     COPD ACTION PLAN Q1 YR  06/01/2016     LIPID MONITORING Q1 YEAR  03/09/2017     FALL RISK ASSESSMENT  10/02/2018     MAMMO Q1 YR  10/04/2018       Current Problem List    Patient Active Problem List   Diagnosis     History of colonic polyps     Calculus of kidney     Lesion of plantar nerve     Osteoporosis     Primary iridocyclitis     Anemia     Hyperlipidemia LDL goal <100     OA (osteoarthritis)     Advanced directives, counseling/discussion      Hypertension goal BP (blood pressure) < 140/90     COPD, moderate     Controlled substance agreement signed     Vitamin D deficiency     CKD (chronic kidney disease) stage 4, GFR 15-29 ml/min (H)     Anemia in chronic renal disease     Secondary renal hyperparathyroidism (H)     Venous stasis of lower extremity     Peripheral neuropathy     Chronic pain     Lung nodule     Nodule of left lung     Malignant neoplasm of upper lobe of left lung (H)     Acute pain of right shoulder     S/P amputation of lesser toe, unspecified laterality (H)     Retinal hemorrhage of right eye       Past Medical History    Past Medical History:   Diagnosis Date     Anemia      Calculus of kidney 1998    dr hope     Chronic pain     OA     CKD (chronic kidney disease) stage 4, GFR 15-29 ml/min (H) 2008    dr mcdermott     COPD, moderate (H) 2004    moderate obstruction, with pulm htn - dr francisco did AAP     Diverticulosis of colon (without mention of hemorrhage) 7/03     Hemorrhage of gastrointestinal tract, unspecified 4/08    dr su     Hyperlipidemia LDL goal <100 2006     Hypertension goal BP (blood pressure) < 140/90 2005     Internal hemorrhoids without mention of complication 7/03     Irritable bowel syndrome 7/03     Lesion of plantar nerve 8/98    hay's neuroma dr connor     Lung nodule 4/14, 3/16    Dr Thompson, 1.4 x 1.1 cm, lingula, PET neg 3/16     Malignant neoplasm of upper lobe of left lung (H) 7/16    Dr Thompson - x 2 nodules - moderately differentiated adenocarcinoma (acinar predominant).  Final pathologic stage [id number], Final clinical stage IA, grade 2     Medication management     OA - 1 T#3 at HS with HX CKD     OA (osteoarthritis) 1998    lower ext worse katya knees     Obesity (BMI 30.0-34.9)      Osteoporosis, unspecified 2/02    FOSAMAX  = upset stomach , pt declines further rx.      Other ulcerative colitis 7/03    collangenous collitis- last colonoscopy 7/03      Peripheral neuropathy     early - burning at       Personal history of colonic polyps     dr araujo     PONV (postoperative nausea and vomiting)      Primary iridocyclitis 1998    iritis dr dominguez     Venous stasis of lower extremity        Past Surgical History    Past Surgical History:   Procedure Laterality Date     AMPUTATE TOE(S) Right 2015    Procedure: AMPUTATE TOE(S);  Surgeon: Ashia White, DPM, Pod;  Location: [facility] OR     C APPENDECTOMY       C  DELIVERY ONLY  1965    , Low Cervical     EXCISE MASS UPPER EXTREMITY  10/13/2011    Lt Forearm mass excision - dr ely     EXCISE MASS UPPER EXTREMITY  2012    Lt Forearm mass excision - dr ely     HC COLONOSCOPY THRU STOMA, DIAGNOSTIC      IBS/diverticula/hemmorhoids dr araujo     HC ESOPHAGOSCOPY, DIAGNOSTIC  , , 6/10    Gastric ulcer, healed, gastritis     HC HEMORRHOIDECTOMY,INT/EXT,SIMPLE       HC REPAIR SLIDING INGUINAL HERNIA      mitra     SURGICAL HISTORY OF -       mitra neck & back tumors     SURGICAL HISTORY OF -       neuroma excision - 2nd toe amputation     SURGICAL HISTORY OF -   3/07    Rt IOL - dr jimenez     SURGICAL HISTORY OF -       Lt IOL - dr jimenez     SURGICAL HISTORY OF -       Lt Knee replacement - dr gayle     SURGICAL HISTORY OF -       Rt Knee replacement - dr gayle     SURGICAL HISTORY OF -   9/15    Rt Second toe amputation - Dr White     THORACOSCOPIC WEDGE RESECTION LUNG Left 2016    Procedure: THORACOSCOPIC WEDGE RESECTION LUNG;  Surgeon: Abdelrahman Thompson MD;  Location: [facility] OR     XR JOINT INJECTION HIP (HIB)  2015    Rt - Dr Terry       Current Medications    Current Outpatient Prescriptions   Medication Sig Dispense Refill     acetaminophen-codeine (TYLENOL #3) 300-30 MG per tablet Take 1-2 tablets by mouth every 6 hours as needed for moderate pain 90 tablet 0     albuterol (VENTOLIN HFA) 108 (90 BASE) MCG/ACT Inhaler INHALE TWO PUFFS INTO THE LUNGS EVERY 6 HOURS AS NEEDED  FOR SHORTNESS OF BREATH/DYSPNEA 54 g 3     amLODIPine (NORVASC) 5 MG tablet Take 1 tablet (5 mg) by mouth 2 times daily 180 tablet 3     ASPIRIN NOT PRESCRIBED (INTENTIONAL) Please choose reason not prescribed, below 0 each 0     calcium carbonate (TUMS) 500 MG chewable tablet Take 2 chew tab by mouth daily as needed for heartburn       cephALEXin (KEFLEX) 500 MG capsule Take 1 capsule (500 mg) by mouth 3 times daily 30 capsule 0     furosemide (LASIX) 20 MG tablet 40 mg in am 180 tablet 3     IBUPROFEN PO Take 400 mg by mouth every 6 hours as needed for moderate pain       ipratropium - albuterol 0.5 mg/2.5 mg/3 mL (DUONEB) 0.5-2.5 (3) MG/3ML neb solution Take 1 vial (3 mLs) by nebulization every 6 hours as needed for shortness of breath / dyspnea or wheezing 270 mL 3     losartan (COZAAR) 50 MG tablet Take 2 tablets (100 mg) by mouth daily 180 tablet 3     metoprolol succinate (TOPROL-XL) 50 MG 24 hr tablet Take 1 tablet (50 mg) by mouth 2 times daily (2 x 50mg = 100mg) 180 tablet 3     metroNIDAZOLE (METROGEL) 0.75 % topical gel Apply topically daily 45 g 3     mupirocin (BACTROBAN) 2 % cream Apply topically 3 times daily 30 g 1       Allergies    Allergies   Allergen Reactions     Prednisone      Yeast infection - swollen lips       Immunizations    Immunization History   Administered Date(s) Administered     Influenza (High Dose) 3 valent vaccine 10/07/2010, 09/19/2012, 11/04/2013, 10/08/2014, 11/03/2015, 09/16/2016, 10/02/2017, 09/13/2018     Influenza (IIV3) PF 10/19/2004, 11/15/2005     Pneumo Conj 13-V (2010&after) 11/03/2015     Pneumococcal 23 valent 04/25/2006     TD (ADULT, 7+) 03/03/1998, 01/23/2006, 06/21/2018     TDAP Vaccine (Boostrix) 08/27/2012     Zoster vaccine recombinant adjuvanted (SHINGRIX) 07/11/2018, 09/13/2018     Zoster vaccine, live 10/24/2012       Family History    Family History   Problem Relation Age of Onset     Cerebrovascular Disease Mother      Cerebrovascular Disease Father       Cancer - colorectal Brother      Cancer - colorectal Brother      Breast Cancer Sister      Cancer Sister      lung     Cancer Sister      lung     Cancer Sister      lung     Scleroderma Sister        Social History    Social History     Social History     Marital status:      Spouse name: thanh     Number of children: 1     Years of education: 12     Occupational History     part-time [employer] section at [employer]       Social History Main Topics     Smoking status: Former Smoker     Packs/day: 3.00     Years: 50.00     Types: Cigarettes     Quit date: 12/21/1993     Smokeless tobacco: Never Used      Comment: quit 1993     Alcohol use No     Drug use: No      Comment: no herbal meds either      Sexual activity: Not Currently      Comment:  for 24 years      Other Topics Concern     Caffeine Concern Yes     1 pot  qd - switched to decaff now     Special Diet Yes     Low salt     Exercise No     Seat Belt Yes     Self-Exams Yes     SBE encouraged motnhly      Parent/Sibling W/ Cabg, Mi Or Angioplasty Before 65f 55m? No     Social History Narrative    calcium - 3 large dairy servings/day - pt declines fosamax and other meds for her osteoporosis    flex sig/colonoscopy -last colonoscopy 7/03     sun precautions - discussed     mammogram - hasn't had one since 2001 at least - ordered     Td booster - 1/23/06    pneumovax -today 4/25/06    DEXA - pt declines repeat scan today 4/25/06 despite her osteoporosis     stool hemoccults - every year after age 40    ASA- easy bruising - can't take     mulvitamin - encouraged       All above reviewed and updated, all stable unless otherwise noted    Recent labs reviewed    ROS:  Constitutional, HEENT, cardiovascular, pulmonary, GI, , musculoskeletal, neuro, skin, endocrine and psych systems are negative, except as otherwise noted.     OBJECTIVE:                                                    /60  Pulse 70  Temp 98.4  F (36.9  C) (Oral)  Ht 5'  "1\" (1.549 m)  Wt 156 lb (70.8 kg)  SpO2 96%  BMI 29.48 kg/m2  Body mass index is 29.48 kg/(m^2).  GENERAL: healthy, alert and no distress  EYES: Eyes grossly normal to inspection  HENT: ear canals- normal; TMs- normal; Nose- normal; Mouth- no ulcers, no lesions  RESP: lungs clear to auscultation - no rales, no rhonchi, no wheezes  CV: 1+ edema in legs and ankles bilaterally. regular rates and rhythm, normal S1 S2, no S3 or S4 and no murmur, no click or rub  MS: Left lateral shin soft tissue indentation palpated; moderate tenderness to palpation in this area. Moderate tenderness to palpation approximately 5cm posterior to left greater trochanter of femur  SKIN: Well-healing wounds on right medial ankle and right lateral leg - appropriate erythema, no induration, no signs of infection. Hair loss and mild darkening of distal lower extremities consistent with chronic venous stasis.  no suspicious lesions, no rashes  NEURO: sensory exam- grossly normal, mentation- intact, speech- normal, reflexes- symmetric  BACK: no CVA tenderness  PSYCH: speech- coherent , normal rate and volume; able to articulate logical thoughts, able to abstract reason, no tangential thoughts, no hallucinations or delusions, affect- normal    DIAGNOSTICS/PROCEDURES:                                                      No results found for this or any previous visit (from the past 24 hour(s)).      ASSESSMENT/PLAN:                                                        ICD-10-CM    1. Primary osteoarthritis involving multiple joints M15.0 acetaminophen-codeine (TYLENOL #3) 300-30 MG per tablet   2. Greater trochanteric bursitis of left hip M70.62 DRAIN/INJECT LARGE JOINT/BURSA   3. Chronic venous stasis dermatitis of both lower extremities I87.2    4. Other chronic pain G89.29    5. COPD, moderate J44.9 PULMONARY MEDICINE REFERRAL   6. Malignant neoplasm of upper lobe of left lung (H) C34.12 Basic metabolic panel  (Ca, Cl, CO2, Creat, Gluc, K, " Na, BUN)     CBC with platelets   7. CKD (chronic kidney disease) stage 4, GFR 15-29 ml/min (H) N18.4 Basic metabolic panel  (Ca, Cl, CO2, Creat, Gluc, K, Na, BUN)     CBC with platelets   8. Anemia in stage 4 chronic kidney disease (H) N18.4 Basic metabolic panel  (Ca, Cl, CO2, Creat, Gluc, K, Na, BUN)    D63.1 CBC with platelets   9. Hypertension goal BP (blood pressure) < 140/90 I10 Basic metabolic panel  (Ca, Cl, CO2, Creat, Gluc, K, Na, BUN)     CBC with platelets   10. Vitamin D deficiency E55.9 Vitamin D Deficiency   11. Osteoporosis without current pathological fracture, unspecified osteoporosis type M81.0 Basic metabolic panel  (Ca, Cl, CO2, Creat, Gluc, K, Na, BUN)   12. Polyneuropathy associated with underlying disease (H) G63    13. Dysuria R30.0 *UA reflex to Microscopic and Culture ([facility] (except [place] and [place])   14. Need for prophylactic vaccination and inoculation against influenza Z23 FLU VACCINE, INCREASED ANTIGEN, PRESV FREE   15. Need for shingles vaccine Z23 ZOSTER VACCINE RECOMBINANT ADJUVANTED IM NJX   16. Medication monitoring encounter Z51.81 Basic metabolic panel  (Ca, Cl, CO2, Creat, Gluc, K, Na, BUN)     CBC with platelets     Vitamin D Deficiency     Received flu shot and second dose of Shingrix today.    Received steroid injection in left greater trochanteric bursa. Sterile prep, hibiclens, 1 ml 40 mg/ml Kenolog, 2 ml 1% lidocaine without. No post injection concerns.    Patient was advised that her pain was a bit inferior lateral to the trochanter but the patient still wanted the injection saying that it had helped in the past.     Advised the patient to use Salonpas lidocaine gel as well as heat and ice on the painful area of her left leg    Discussed treatment/modality options, including risk and benefits, she desires to try the conservative therapy for her leg to start. All diagnosis above reviewed and noted above, otherwise stable.  See [facility] orders  for further details.  Follow up as needed.    Return in about 3 months (around 12/13/2018), or if symptoms worsen or fail to improve, for Medication Recheck Visit.    Health Maintenance Due   Topic Date Due     URINE DRUG SCREEN Q1 YR  01/25/1950     COPD ACTION PLAN Q1 YR  06/01/2016     LIPID MONITORING Q1 YEAR  03/09/2017     FALL RISK ASSESSMENT  10/02/2018     MAMMO Q1 YR  10/04/2018       Catrachito Huff, MS3 Student, has participated in the care of this patient.     Provider Disclosure:  I agree with above History, Review of Systems, Physical exam and Plan.  I have reviewed the content of the documentation and have edited it as needed. I have personally performed the services documented here and the documentation accurately represents those services and the decisions I have made.      Electronically signed by:        Rashi Calle M.D.     [address]  [address] [phone] Fax

## 2018-09-13 NOTE — MR AVS SNAPSHOT
After Visit Summary   9/13/2018    Anat Correa    MRN: [id number]           Patient Information     Date Of Birth          1935        Visit Information        Provider Department      9/13/2018 8:00 AM Rashi Calle MD [facility]        Today's Diagnoses     COPD, moderate    -  1    CKD (chronic kidney disease) stage 4, GFR 15-29 ml/min (H)        Hypertension goal BP (blood pressure) < 140/90        Anemia in stage 4 chronic kidney disease (H)        Malignant neoplasm of upper lobe of left lung (H)        Primary osteoarthritis involving multiple joints        Vitamin D deficiency        Osteoporosis without current pathological fracture, unspecified osteoporosis type        Other chronic pain        Venous stasis of lower extremity        Polyneuropathy associated with underlying disease (H)        Dysuria        Need for prophylactic vaccination and inoculation against influenza        Need for shingles vaccine        Medication monitoring encounter        Greater trochanteric bursitis of left hip        Chronic venous stasis dermatitis of both lower extremities          Care Instructions    salonpas lidocaine    [facility]                        To reach your care team during and after hours:   [phone]  To reach our pharmacy:        [phone]    Clinic Hours                        Our clinic hours are:    Monday   7:30 am to 7:00 pm                  Tuesday through Friday 7:30 am to 5:00 pm                             Saturday   8:00 am to 12:00 pm      Sunday   Closed      Pharmacy Hours                        Our pharmacy hours are:    Monday   8:30 am to 7:00 pm       Tuesday to Friday  8:30 am to 6:00 pm                       Saturday    9:00 am to 1:00 pm              Sunday    Closed              There is also information available at our web site:  [email or web address]    If your provider ordered any lab tests and you do not receive the  results within 10 business days, please call the clinic.    If you need a medication refill please contact your pharmacy.  Please allow 2-3 business days for your refill to be completed.    Our clinic offers telephone visits and e visits.  Please ask one of your team members to explain more.      Use [email or web address] (secure email communication and access to your chart) to send your primary care provider a message or make an appointment. Ask someone on your Team how to sign up for [email or web address].  Immunizations                      Immunization History   Administered Date(s) Administered     Influenza (High Dose) 3 valent vaccine 10/07/2010, 09/19/2012, 11/04/2013, 10/08/2014, 11/03/2015, 09/16/2016, 10/02/2017     Influenza (IIV3) PF 10/19/2004, 11/15/2005     Pneumo Conj 13-V (2010&after) 11/03/2015     Pneumococcal 23 valent 04/25/2006     TD (ADULT, 7+) 03/03/1998, 01/23/2006, 06/21/2018     TDAP Vaccine (Boostrix) 08/27/2012     Zoster vaccine recombinant adjuvanted (SHINGRIX) 07/11/2018     Zoster vaccine, live 10/24/2012        Health Maintenance                         Health Maintenance Due   Topic Date Due     URINE DRUG SCREEN Q1 YR  01/25/1950     COPD ACTION PLAN Q1 YR  06/01/2016     Cholesterol Lab - yearly  03/09/2017     Flu Vaccine (1) 09/01/2018     FALL RISK ASSESSMENT  10/02/2018     Depression Assessment - yearly  10/02/2018     Mammogram - yearly  10/04/2018               Follow-ups after your visit        Additional Services     PULMONARY MEDICINE REFERRAL       Your provider has referred you to: N: [facility] [phone]   [email or web address]    Please be aware that coverage of these services is subject to the terms and limitations of your health insurance plan.  Call member services at your health plan with any benefit or coverage questions.      Please bring the following with you to your appointment:    (1) Any X-Rays, CTs or MRIs which have been performed.  Contact the facility  "where they were done to arrange for  prior to your scheduled appointment.    (2) List of current medications   (3) This referral request   (4) Any documents/labs given to you for this referral                  Who to contact     If you have questions or need follow up information about today's clinic visit or your schedule please contact [facility] directly at [phone].  Normal or non-critical lab and imaging results will be communicated to you by MyChart, letter or phone within 4 business days after the clinic has received the results. If you do not hear from us within 7 days, please contact the clinic through SocialGlimpzhart or phone. If you have a critical or abnormal lab result, we will notify you by phone as soon as possible.  Submit refill requests through Ahead or call your pharmacy and they will forward the refill request to us. Please allow 3 business days for your refill to be completed.          Additional Information About Your Visit        SocialGlimpzharDaily Pic Information     Ahead lets you send messages to your doctor, view your test results, renew your prescriptions, schedule appointments and more. To sign up, go to [email or web address] . Click on \"Log in\" on the left side of the screen, which will take you to the Welcome page. Then click on \"Sign up Now\" on the right side of the page.     You will be asked to enter the access code listed below, as well as some personal information. Please follow the directions to create your username and password.     Your access code is: [email or web address]  Expires: 2018  3:15 PM     Your access code will  in 90 days. If you need help or a new code, please call your [place] clinic or [phone].        Care EveryWhere ID     This is your Care EveryWhere ID. This could be used by other organizations to access your [place] medical records  [id number]        Your Vitals Were     Pulse Temperature Height Pulse Oximetry BMI (Body Mass Index)    " "   70 98.4  F (36.9  C) (Oral) 5' 1\" (1.549 m) 96% 29.48 kg/m2        Blood Pressure from Last 3 Encounters:   09/13/18 146/60   08/14/18 156/68   07/11/18 160/64    Weight from Last 3 Encounters:   09/13/18 156 lb (70.8 kg)   07/11/18 158 lb (71.7 kg)   06/27/18 161 lb (73 kg)              We Performed the Following     *UA reflex to Microscopic and Culture ([facility] (except [place] and [place])     Basic metabolic panel  (Ca, Cl, CO2, Creat, Gluc, K, Na, BUN)     CBC with platelets     FLU VACCINE, INCREASED ANTIGEN, PRESV FREE     PULMONARY MEDICINE REFERRAL     Vitamin D Deficiency     ZOSTER VACCINE RECOMBINANT ADJUVANTED IM NJX          Where to get your medicines      Some of these will need a paper prescription and others can be bought over the counter.  Ask your nurse if you have questions.     Bring a paper prescription for each of these medications     acetaminophen-codeine 300-30 MG per tablet         Information about OPIOIDS     PRESCRIPTION OPIOIDS: WHAT YOU NEED TO KNOW   We gave you an opioid (narcotic) pain medicine. It is important to manage your pain, but opioids are not always the best choice. You should first try all the other options your care team gave you. Take this medicine for as short a time (and as few doses) as possible.    Some activities can increase your pain, such as bandage changes or therapy sessions. It may help to take your pain medicine 30 to 60 minutes before these activities. Reduce your stress by getting enough sleep, working on hobbies you enjoy and practicing relaxation or meditation. Talk to your care team about ways to manage your pain beyond prescription opioids.    These medicines have risks:    DO NOT drive when on new or higher doses of pain medicine. These medicines can affect your alertness and reaction times, and you could be arrested for driving under the influence (DUI). If you need to use opioids long-term, talk to your care team about " driving.    DO NOT operate heavy machinery    DO NOT do any other dangerous activities while taking these medicines.    DO NOT drink any alcohol while taking these medicines.     If the opioid prescribed includes acetaminophen, DO NOT take with any other medicines that contain acetaminophen. Read all labels carefully. Look for the word  acetaminophen  or  Tylenol.  Ask your pharmacist if you have questions or are unsure.    You can get addicted to pain medicines, especially if you have a history of addiction (chemical, alcohol or substance dependence). Talk to your care team about ways to reduce this risk.    All opioids tend to cause constipation. Drink plenty of water and eat foods that have a lot of fiber, such as fruits, vegetables, prune juice, apple juice and high-fiber cereal. Take a laxative (Miralax, milk of magnesia, Colace, Senna) if you don t move your bowels at least every other day. Other side effects include upset stomach, sleepiness, dizziness, throwing up, tolerance (needing more of the medicine to have the same effect), physical dependence and slowed breathing.    Store your pills in a secure place, locked if possible. We will not replace any lost or stolen medicine. If you don t finish your medicine, please throw away (dispose) as directed by your pharmacist. The [place] [employer]ution [employer] has more information about safe disposal: https://www.pca.[facility].mn.us/living-green/managing-unwanted-medications         Primary Care Provider Office Phone # Fax #    Rashi Calle [phone]       [address] [place] [address]        Equal Access to Services     DIANELYS LOW : Hadii bob velazquezo Soruy, waaxda luqadaha, qaybta kaalmada adriana simon . So [facility] [phone].    ATENCIÓN: Si habla español, tiene a [place] disposición servicios gratuitos de asistencia lingüística. Llame al [phone].    We comply with applicable federal  civil rights laws and [place] laws. We do not discriminate on the basis of race, color, national origin, age, disability, sex, sexual orientation, or gender identity.            Thank you!     Thank you for choosing [facility]  for your care. Our goal is always to provide you with excellent care. Hearing back from our patients is one way we can continue to improve our services. Please take a few minutes to complete the written survey that you may receive in the mail after your visit with us. Thank you!             Your Updated Medication List - Protect others around you: Learn how to safely use, store and throw away your medicines at www.disposemymeds.org.          This list is accurate as of 9/13/18  9:09 AM.  Always use your most recent med list.                   Brand Name Dispense Instructions for use Diagnosis    acetaminophen-codeine 300-30 MG per tablet    TYLENOL #3    90 tablet    Take 1-2 tablets by mouth every 6 hours as needed for moderate pain    Primary osteoarthritis involving multiple joints       albuterol 108 (90 Base) MCG/ACT inhaler    VENTOLIN HFA    54 g    INHALE TWO PUFFS INTO THE LUNGS EVERY 6 HOURS AS NEEDED FOR SHORTNESS OF BREATH/DYSPNEA    COPD, moderate (H)       amLODIPine 5 MG tablet    NORVASC    180 tablet    Take 1 tablet (5 mg) by mouth 2 times daily    Hypertension goal BP (blood pressure) < 140/90, CKD (chronic kidney disease) stage 4, GFR 15-29 ml/min (H)       ASPIRIN NOT PRESCRIBED    INTENTIONAL    0 each    Please choose reason not prescribed, below    Hypertension goal BP (blood pressure) < 140/90       calcium carbonate 500 MG chewable tablet    TUMS     Take 2 chew tab by mouth daily as needed for heartburn        cephALEXin 500 MG capsule    KEFLEX    30 capsule    Take 1 capsule (500 mg) by mouth 3 times daily    Laceration of right lower extremity, subsequent encounter, Cellulitis of right lower extremity, Chronic venous stasis dermatitis of right  lower extremity       furosemide 20 MG tablet    LASIX    180 tablet    40 mg in am    Hypertension goal BP (blood pressure) < 140/90, CKD (chronic kidney disease) stage 4, GFR 15-29 ml/min (H)       IBUPROFEN PO      Take 400 mg by mouth every 6 hours as needed for moderate pain        ipratropium - albuterol 0.5 mg/2.5 mg/3 mL 0.5-2.5 (3) MG/3ML neb solution    DUONEB    270 mL    Take 1 vial (3 mLs) by nebulization every 6 hours as needed for shortness of breath / dyspnea or wheezing    COPD, moderate (H)       losartan 50 MG tablet    COZAAR    180 tablet    Take 2 tablets (100 mg) by mouth daily    Hypertension goal BP (blood pressure) < 140/90, CKD (chronic kidney disease) stage 4, GFR 15-29 ml/min (H)       metoprolol succinate 50 MG 24 hr tablet    TOPROL-XL    180 tablet    Take 1 tablet (50 mg) by mouth 2 times daily (2 x 50mg = 100mg)    Hypertension goal BP (blood pressure) < 140/90, CKD (chronic kidney disease) stage 4, GFR 15-29 ml/min (H)       metroNIDAZOLE 0.75 % topical gel    METROGEL    45 g    Apply topically daily    Rosacea       mupirocin 2 % cream    BACTROBAN    30 g    Apply topically 3 times daily    Laceration of right lower extremity, subsequent encounter, Cellulitis of right lower extremity, Chronic venous stasis dermatitis of right lower extremity

## 2018-09-13 NOTE — PATIENT INSTRUCTIONS
salonpas lidocaine    [facility]                        To reach your care team during and after hours:   [phone]  To reach our pharmacy:        [phone]    Clinic Hours                        Our clinic hours are:    Monday   7:30 am to 7:00 pm                  Tuesday through Friday 7:30 am to 5:00 pm                             Saturday   8:00 am to 12:00 pm      Sunday   Closed      Pharmacy Hours                        Our pharmacy hours are:    Monday   8:30 am to 7:00 pm       Tuesday to Friday  8:30 am to 6:00 pm                       Saturday    9:00 am to 1:00 pm              Sunday    Closed              There is also information available at our web site:  [email or web address]    If your provider ordered any lab tests and you do not receive the results within 10 business days, please call the clinic.    If you need a medication refill please contact your pharmacy.  Please allow 2-3 business days for your refill to be completed.    Our clinic offers telephone visits and e visits.  Please ask one of your team members to explain more.      Use expressor software (secure email communication and access to your chart) to send your primary care provider a message or make an appointment. Ask someone on your Team how to sign up for expressor software.  Immunizations                      Immunization History   Administered Date(s) Administered     Influenza (High Dose) 3 valent vaccine 10/07/2010, 09/19/2012, 11/04/2013, 10/08/2014, 11/03/2015, 09/16/2016, 10/02/2017     Influenza (IIV3) PF 10/19/2004, 11/15/2005     Pneumo Conj 13-V (2010&after) 11/03/2015     Pneumococcal 23 valent 04/25/2006     TD (ADULT, 7+) 03/03/1998, 01/23/2006, 06/21/2018     TDAP Vaccine (Boostrix) 08/27/2012     Zoster vaccine recombinant adjuvanted (SHINGRIX) 07/11/2018     Zoster vaccine, live 10/24/2012        Health Maintenance                         Health Maintenance Due   Topic Date Due     URINE DRUG SCREEN Q1 YR  01/25/1950      COPD ACTION PLAN Q1 YR  06/01/2016     Cholesterol Lab - yearly  03/09/2017     Flu Vaccine (1) 09/01/2018     FALL RISK ASSESSMENT  10/02/2018     Depression Assessment - yearly  10/02/2018     Mammogram - yearly  10/04/2018

## 2018-09-14 LAB — DEPRECATED CALCIDIOL+CALCIFEROL SERPL-MC: 40 UG/L (ref 20–75)

## 2018-09-14 ASSESSMENT — ANXIETY QUESTIONNAIRES: GAD7 TOTAL SCORE: 0

## 2018-09-14 ASSESSMENT — PATIENT HEALTH QUESTIONNAIRE - PHQ9: SUM OF ALL RESPONSES TO PHQ QUESTIONS 1-9: 4

## 2018-10-18 ENCOUNTER — ALLIED HEALTH/NURSE VISIT (OUTPATIENT)
Dept: FAMILY MEDICINE | Facility: CLINIC | Age: 83
End: 2018-10-18
Payer: MEDICARE

## 2018-10-18 VITALS — SYSTOLIC BLOOD PRESSURE: 142 MMHG | DIASTOLIC BLOOD PRESSURE: 52 MMHG

## 2018-10-18 DIAGNOSIS — Z01.30 BP CHECK: Primary | ICD-10-CM

## 2018-10-18 PROCEDURE — 99207 ZZC NO CHARGE NURSE ONLY: CPT | Performed by: FAMILY MEDICINE

## 2018-10-18 NOTE — PROGRESS NOTES
Anat Correa is enrolled/participating in the retail pharmacy Blood Pressure Goals Achievement Program (BPGAP).  Anat Correa was evaluated at [facility] on October 18, 2018 at which time her blood pressure was:    BP Readings from Last 3 Encounters:   10/18/18 142/52   09/13/18 146/60   08/14/18 156/68     Reviewed lifestyle modifications for blood pressure control and reduction: including making healthy food choices, managing weight, getting regular exercise, smoking cessation, reducing alcohol consumption, monitoring blood pressure regularly.     Anat Correa is not experiencing symptoms.    Follow-Up: BP is at goal of < 150/90 mmHg (patient 60+ years of age without diabetes).  Recommended follow-up at pharmacy in 6 months.     Recommendation to Provider: no change at this time. Patient did indicate that her BP typically runs high.    Anat Correa was evaluated for enrollment into the PGEN study today.    PLEASE INITIATE ENROLLMENT DISCUSSION WITH HTN PTS  1) Between 30-80 years old                                                                                                               2) BMI between 19-50                                                                                                        3) BP ?140/90 AND ?170/110 patients aged 30-59         BP ?150/90 AND ?170/110 non-diabetic patients aged 60-80       BP ?140/90 AND ?170/110 diabetic patients aged 60-80  4) Additional requirements for uncontrolled HTN patients:        Pt on only 1 class of medication  5) EXCLUDE patient if confirmation of:                  ? Cardiac disease                  ? Chronic Kidney Disease                  ? Pregnancy/Breastfeeding                  ? Secondary Hypertension/Pre-eclampsia                                 ? Vascular disease    Patient eligible for enrollment:  No  Patient interested in enrollment:  No    This note completed by: Alysa Fernandez, PharmD  [facility]  Pharmacy  PH: [phone]

## 2018-10-18 NOTE — MR AVS SNAPSHOT
"              After Visit Summary   10/18/2018    Anat Correa    MRN: [id number]           Patient Information     Date Of Birth          1935        Visit Information        Provider Department      10/18/2018 12:32 PM Rashi Calle MD [facility]        Today's Diagnoses     BP check    -  1       Follow-ups after your visit        Who to contact     If you have questions or need follow up information about today's clinic visit or your schedule please contact [facility] directly at [phone].  Normal or non-critical lab and imaging results will be communicated to you by MyChart, letter or phone within 4 business days after the clinic has received the results. If you do not hear from us within 7 days, please contact the clinic through Adaptlyhart or phone. If you have a critical or abnormal lab result, we will notify you by phone as soon as possible.  Submit refill requests through 1CLICK or call your pharmacy and they will forward the refill request to us. Please allow 3 business days for your refill to be completed.          Additional Information About Your Visit        MyChart Information     1CLICK lets you send messages to your doctor, view your test results, renew your prescriptions, schedule appointments and more. To sign up, go to [email or web address] . Click on \"Log in\" on the left side of the screen, which will take you to the Welcome page. Then click on \"Sign up Now\" on the right side of the page.     You will be asked to enter the access code listed below, as well as some personal information. Please follow the directions to create your username and password.     Your access code is: [email or web address]  Expires: 2019 12:34 PM     Your access code will  in 90 days. If you need help or a new code, please call your [place] clinic or [phone].        Care EveryWhere ID     This is your Care EveryWhere ID. This could be used by other organizations to " access your [place] medical records  [id number]         Blood Pressure from Last 3 Encounters:   10/18/18 142/52   09/13/18 146/60   08/14/18 156/68    Weight from Last 3 Encounters:   09/13/18 156 lb (70.8 kg)   07/11/18 158 lb (71.7 kg)   06/27/18 161 lb (73 kg)              Today, you had the following     No orders found for display       Primary Care Provider Office Phone # Fax #    Rashi Calle [phone]       [address] [place] [address]        Equal Access to Services     [facility]: Hadii aad ku hadasho Soomaali, waaxda luqadaha, qaybta kaalmada adeegyada, adriana green adehector farias . So [facility] [phone].    ATENCIÓN: Si habla español, tiene a [place] disposición servicios gratuitos de asistencia lingüística. [facility] [phone].    We comply with applicable federal civil rights laws and [place] laws. We do not discriminate on the basis of race, color, national origin, age, disability, sex, sexual orientation, or gender identity.            Thank you!     Thank you for choosing [facility]  for your care. Our goal is always to provide you with excellent care. Hearing back from our patients is one way we can continue to improve our services. Please take a few minutes to complete the written survey that you may receive in the mail after your visit with us. Thank you!             Your Updated Medication List - Protect others around you: Learn how to safely use, store and throw away your medicines at www.disposemymeds.org.          This list is accurate as of 10/18/18 12:34 PM.  Always use your most recent med list.                   Brand Name Dispense Instructions for use Diagnosis    acetaminophen-codeine 300-30 MG per tablet    TYLENOL #3    90 tablet    Take 1-2 tablets by mouth every 6 hours as needed for moderate pain    Primary osteoarthritis involving multiple joints       albuterol 108 (90 Base) MCG/ACT inhaler    VENTOLIN HFA    54 g     INHALE TWO PUFFS INTO THE LUNGS EVERY 6 HOURS AS NEEDED FOR SHORTNESS OF BREATH/DYSPNEA    COPD, moderate (H)       amLODIPine 5 MG tablet    NORVASC    180 tablet    Take 1 tablet (5 mg) by mouth 2 times daily    Hypertension goal BP (blood pressure) < 140/90, CKD (chronic kidney disease) stage 4, GFR 15-29 ml/min (H)       ASPIRIN NOT PRESCRIBED    INTENTIONAL    0 each    Please choose reason not prescribed, below    Hypertension goal BP (blood pressure) < 140/90       calcium carbonate 500 MG chewable tablet    TUMS     Take 2 chew tab by mouth daily as needed for heartburn        cephALEXin 500 MG capsule    KEFLEX    30 capsule    Take 1 capsule (500 mg) by mouth 3 times daily    Laceration of right lower extremity, subsequent encounter, Cellulitis of right lower extremity, Chronic venous stasis dermatitis of right lower extremity       furosemide 20 MG tablet    LASIX    180 tablet    40 mg in am    Hypertension goal BP (blood pressure) < 140/90, CKD (chronic kidney disease) stage 4, GFR 15-29 ml/min (H)       IBUPROFEN PO      Take 400 mg by mouth every 6 hours as needed for moderate pain        ipratropium - albuterol 0.5 mg/2.5 mg/3 mL 0.5-2.5 (3) MG/3ML neb solution    DUONEB    270 mL    Take 1 vial (3 mLs) by nebulization every 6 hours as needed for shortness of breath / dyspnea or wheezing    COPD, moderate (H)       losartan 50 MG tablet    COZAAR    180 tablet    Take 2 tablets (100 mg) by mouth daily    Hypertension goal BP (blood pressure) < 140/90, CKD (chronic kidney disease) stage 4, GFR 15-29 ml/min (H)       metoprolol succinate 50 MG 24 hr tablet    TOPROL-XL    180 tablet    Take 1 tablet (50 mg) by mouth 2 times daily (2 x 50mg = 100mg)    Hypertension goal BP (blood pressure) < 140/90, CKD (chronic kidney disease) stage 4, GFR 15-29 ml/min (H)       metroNIDAZOLE 0.75 % topical gel    METROGEL    45 g    Apply topically daily    Rosacea       mupirocin 2 % cream    BACTROBAN    30 g     Apply topically 3 times daily    Laceration of right lower extremity, subsequent encounter, Cellulitis of right lower extremity, Chronic venous stasis dermatitis of right lower extremity

## 2018-11-10 DIAGNOSIS — N18.4 CKD (CHRONIC KIDNEY DISEASE) STAGE 4, GFR 15-29 ML/MIN (H): ICD-10-CM

## 2018-11-10 DIAGNOSIS — I10 HYPERTENSION GOAL BP (BLOOD PRESSURE) < 140/90: ICD-10-CM

## 2018-11-12 RX ORDER — LOSARTAN POTASSIUM 50 MG/1
TABLET ORAL
Qty: 180 TABLET | Refills: 3 | OUTPATIENT
Start: 2018-11-12

## 2018-11-12 NOTE — TELEPHONE ENCOUNTER
"Requested Prescriptions   Pending Prescriptions Disp Refills     losartan (COZAAR) 50 MG tablet [Pharmacy Med Name: LOSARTAN POTASSIUM 50MG TABS] 180 tablet 3    Last Written Prescription Date:  2.19.18  Last Fill Quantity: 180,  # refills: 3   Last Office Visit: 9/13/2018   Future Office Visit:      Sig: TAKE TWO TABLETS BY MOUTH EVERY DAY    Angiotensin-II Receptors Failed    11/10/2018 10:14 AM       Failed - Blood pressure under 140/90 in past 12 months    BP Readings from Last 3 Encounters:   10/18/18 142/52   09/13/18 146/60   08/14/18 156/68                Failed - Normal serum creatinine on file in past 12 months    Recent Labs   Lab Test  09/13/18   0919   12/07/12   1220   CR  1.58*   < >   --    CREAT   --    --   1.5*    < > = values in this interval not displayed.            Passed - Recent (12 mo) or future (30 days) visit within the authorizing provider's specialty    Patient had office visit in the last 12 months or has a visit in the next 30 days with authorizing provider or within the authorizing provider's specialty.  See \"Patient Info\" tab in inbasket, or \"Choose Columns\" in Meds & Orders section of the refill encounter.             Passed - Patient is age 18 or older       Passed - No active pregnancy on record       Passed - Normal serum potassium on file in past 12 months    Recent Labs   Lab Test  09/13/18   0919   POTASSIUM  4.8                   Passed - No positive pregnancy test in past 12 months          "

## 2018-12-11 ENCOUNTER — TELEPHONE (OUTPATIENT)
Dept: FAMILY MEDICINE | Facility: CLINIC | Age: 83
End: 2018-12-11

## 2018-12-11 NOTE — TELEPHONE ENCOUNTER
Name of caller: Anat  Relationship of Patient: Self    Reason for Call: PT called in to schedule with Dr. Calle for a cortisone shot for her hip and back. She stated she would like to be seen this week, tomorrow if possible.     Best phone number to reach pt at is: [phone]  Ok to leave a message with medical info? Yes    Adriana Blanca  Patient Representative - [facility]

## 2018-12-12 NOTE — TELEPHONE ENCOUNTER
Called patient @ # below    Advised of MD GENESIS message below - Patient stated an understanding and agreed with plan.  OV scheduled for 12/13/2018      Annika Novak RN  [facility]

## 2018-12-12 NOTE — TELEPHONE ENCOUNTER
Need to triage    Called patient @ # below    Joint Pain - Bilateral Hips (L is the worst) & legs just below hip    Onset: 2 Months have been bad    Description:   Location: Bilateral Hips (L is the worst) radiating down legs  Character: constant pain    Intensity: 9/10    Progression of Symptoms: worsening    Accompanying Signs & Symptoms:  Other symptoms: radiation of pain to legs, numbness in feet sometimes, stated sometimes her buttocks gets very hot     History:   Previous similar pain: YES- patient has received several cortisone injections which help    Precipitating factors:   Trauma or overuse: no     Alleviating factors:  Improved by: acetaminophen    Therapies Tried and outcome: Tylenol - will help for a couple hours     DENIES: CP, SOB, Difficulty Breathing, Dizziness/Lightheadedness, Tingling, HA, Vision/Hearing Changes, N/V, weakness, warmth, swelling, redness and discoloration    Patient would like cortisone injection in both hips if there is time, otherwise just the L for now.     Routing to PCP for further review/recommendations/orders.  When would you like to see pt?      Annika Novak RN  [facility]

## 2018-12-12 NOTE — TELEPHONE ENCOUNTER
We have done bilateral greater trochanteric injections in the past, tomorrow at 12, she may have to wait

## 2018-12-13 ENCOUNTER — OFFICE VISIT (OUTPATIENT)
Dept: FAMILY MEDICINE | Facility: CLINIC | Age: 83
End: 2018-12-13
Payer: MEDICARE

## 2018-12-13 VITALS
SYSTOLIC BLOOD PRESSURE: 138 MMHG | OXYGEN SATURATION: 95 % | HEART RATE: 84 BPM | WEIGHT: 152 LBS | HEIGHT: 61 IN | TEMPERATURE: 98 F | BODY MASS INDEX: 28.7 KG/M2 | DIASTOLIC BLOOD PRESSURE: 68 MMHG

## 2018-12-13 DIAGNOSIS — M15.0 PRIMARY OSTEOARTHRITIS INVOLVING MULTIPLE JOINTS: ICD-10-CM

## 2018-12-13 DIAGNOSIS — N18.4 CKD (CHRONIC KIDNEY DISEASE) STAGE 4, GFR 15-29 ML/MIN (H): ICD-10-CM

## 2018-12-13 DIAGNOSIS — M81.0 OSTEOPOROSIS WITHOUT CURRENT PATHOLOGICAL FRACTURE, UNSPECIFIED OSTEOPOROSIS TYPE: ICD-10-CM

## 2018-12-13 DIAGNOSIS — D63.1 ANEMIA IN STAGE 4 CHRONIC KIDNEY DISEASE (H): ICD-10-CM

## 2018-12-13 DIAGNOSIS — I87.2 CHRONIC VENOUS STASIS DERMATITIS OF BOTH LOWER EXTREMITIES: ICD-10-CM

## 2018-12-13 DIAGNOSIS — J44.9 COPD, MODERATE (H): ICD-10-CM

## 2018-12-13 DIAGNOSIS — Z51.81 MEDICATION MONITORING ENCOUNTER: ICD-10-CM

## 2018-12-13 DIAGNOSIS — M70.61 GREATER TROCHANTERIC BURSITIS OF BOTH HIPS: Primary | ICD-10-CM

## 2018-12-13 DIAGNOSIS — I10 HYPERTENSION GOAL BP (BLOOD PRESSURE) < 140/90: ICD-10-CM

## 2018-12-13 DIAGNOSIS — C34.12 MALIGNANT NEOPLASM OF UPPER LOBE OF LEFT LUNG (H): ICD-10-CM

## 2018-12-13 DIAGNOSIS — N18.4 ANEMIA IN STAGE 4 CHRONIC KIDNEY DISEASE (H): ICD-10-CM

## 2018-12-13 DIAGNOSIS — M70.62 GREATER TROCHANTERIC BURSITIS OF BOTH HIPS: Primary | ICD-10-CM

## 2018-12-13 PROCEDURE — 20610 DRAIN/INJ JOINT/BURSA W/O US: CPT | Mod: 50 | Performed by: FAMILY MEDICINE

## 2018-12-13 PROCEDURE — 99213 OFFICE O/P EST LOW 20 MIN: CPT | Mod: 25 | Performed by: FAMILY MEDICINE

## 2018-12-13 RX ORDER — TRIAMCINOLONE ACETONIDE 40 MG/ML
40 INJECTION, SUSPENSION INTRA-ARTICULAR; INTRAMUSCULAR ONCE
Status: COMPLETED | OUTPATIENT
Start: 2018-12-13 | End: 2018-12-13

## 2018-12-13 RX ADMIN — TRIAMCINOLONE ACETONIDE 40 MG: 40 INJECTION, SUSPENSION INTRA-ARTICULAR; INTRAMUSCULAR at 13:57

## 2018-12-13 RX ADMIN — TRIAMCINOLONE ACETONIDE 40 MG: 40 INJECTION, SUSPENSION INTRA-ARTICULAR; INTRAMUSCULAR at 14:15

## 2018-12-13 RX ADMIN — TRIAMCINOLONE ACETONIDE 40 MG: 40 INJECTION, SUSPENSION INTRA-ARTICULAR; INTRAMUSCULAR at 14:12

## 2018-12-13 ASSESSMENT — MIFFLIN-ST. JEOR: SCORE: 1086.85

## 2018-12-13 NOTE — PATIENT INSTRUCTIONS
[facility]                        To reach your care team during and after hours:   [phone]  To reach our pharmacy:        [phone]    Clinic Hours                        Our clinic hours are:    Monday   7:30 am to 7:00 pm                  Tuesday through Friday 7:30 am to 5:00 pm                             Saturday   8:00 am to 12:00 pm      Sunday   Closed      Pharmacy Hours                        Our pharmacy hours are:    Monday   8:30 am to 7:00 pm       Tuesday to Friday  8:30 am to 6:00 pm                       Saturday    9:00 am to 1:00 pm              Sunday    Closed              There is also information available at our web site:  [email or web address]    If your provider ordered any lab tests and you do not receive the results within 10 business days, please call the clinic.    If you need a medication refill please contact your pharmacy.  Please allow 2-3 business days for your refill to be completed.    Our clinic offers telephone visits and e visits.  Please ask one of your team members to explain more.      Use [employer] (secure email communication and access to your chart) to send your primary care provider a message or make an appointment. Ask someone on your Team how to sign up for [employer].  Immunizations                      Immunization History   Administered Date(s) Administered     Influenza (High Dose) 3 valent vaccine 10/07/2010, 09/19/2012, 11/04/2013, 10/08/2014, 11/03/2015, 09/16/2016, 10/02/2017, 09/13/2018     Influenza (IIV3) PF 10/19/2004, 11/15/2005     Pneumo Conj 13-V (2010&after) 11/03/2015     Pneumococcal 23 valent 04/25/2006     TD (ADULT, 7+) 03/03/1998, 01/23/2006, 06/21/2018     TDAP Vaccine (Boostrix) 08/27/2012     Zoster vaccine recombinant adjuvanted (SHINGRIX) 07/11/2018, 09/13/2018     Zoster vaccine, live 10/24/2012        Health Maintenance                         Health Maintenance Due   Topic Date Due     URINE DRUG SCREEN Q1 YR   01/25/1950     COPD ACTION PLAN Q1 YR  06/01/2016     Cholesterol Lab - yearly  03/09/2017     Mammogram - yearly  10/04/2018

## 2018-12-13 NOTE — PROGRESS NOTES
SUBJECTIVE:   Anat Correa is a 83 year old female who presents to clinic today for the following health issues:    Concern - Would like Bilateral Hip injections    Hx of osteoarthritis/greater trochanteric bursitis - hx of kenalog injections in the past with success - noting increasing hip pain with activity - no trauma - no redness - no warmth - no swelling.    Hypertension/CKD: Patient presents today as normotensive. Patient is currently prescribed 5 mg Amlodipine daily, 100 mg Losartan daily, and 50 mg Metoprolol Succinate daily for hypertension management.    BP Readings from Last 5 Encounters:   12/13/18 138/68   10/18/18 142/52   09/13/18 146/60   08/14/18 156/68   07/11/18 160/64     Creatinine   Date Value Ref Range Status   09/13/2018 1.58 (H) 0.52 - 1.04 mg/dL Final     COPD/ACD/Lung Cancer/Chronic venous stasis/Osteoporosis:    All stable - no concerns.    Problem list and histories reviewed & adjusted, as indicated.  Additional history: as documented    Patient Active Problem List   Diagnosis     History of colonic polyps     Calculus of kidney     Lesion of plantar nerve     Osteoporosis     Primary iridocyclitis     Anemia     Hyperlipidemia LDL goal <100     OA (osteoarthritis)     Advanced directives, counseling/discussion     Hypertension goal BP (blood pressure) < 140/90     COPD, moderate     Controlled substance agreement signed     Vitamin D deficiency     CKD (chronic kidney disease) stage 4, GFR 15-29 ml/min (H)     Anemia in chronic renal disease     Secondary renal hyperparathyroidism (H)     Venous stasis of lower extremity     Peripheral neuropathy     Chronic pain     Lung nodule     Nodule of left lung     Malignant neoplasm of upper lobe of left lung (H)     Acute pain of right shoulder     S/P amputation of lesser toe, unspecified laterality (H)     Retinal hemorrhage of right eye       Past Medical History:   Diagnosis Date     Anemia      Calculus of kidney 1998    dr hope      Chronic pain     OA     CKD (chronic kidney disease) stage 4, GFR 15-29 ml/min (H)     dr mcdermott     COPD, moderate (H)     moderate obstruction, with pulm htn - dr francisco did AAP     Diverticulosis of colon (without mention of hemorrhage)      Hemorrhage of gastrointestinal tract, unspecified     dr su     Hyperlipidemia LDL goal <100      Hypertension goal BP (blood pressure) < 140/90      Internal hemorrhoids without mention of complication      Irritable bowel syndrome      Lesion of plantar nerve     hay's neuroma dr connor     Lung nodule , 3/16    Dr Thompson, 1.4 x 1.1 cm, lingula, PET neg 3/16     Malignant neoplasm of upper lobe of left lung (H)     Dr Thompson - x 2 nodules - moderately differentiated adenocarcinoma (acinar predominant).  Final pathologic stage [id number], Final clinical stage IA, grade 2     Medication management     OA - 1 T#3 at HS with HX CKD     OA (osteoarthritis)     lower ext worse katya knees     Obesity (BMI 30.0-34.9)      Osteoporosis, unspecified     FOSAMAX  = upset stomach , pt declines further rx.      Other ulcerative colitis     collangenous collitis- last colonoscopy       Peripheral neuropathy     early - burning at HS     Personal history of colonic polyps     dr van SANTOS (postoperative nausea and vomiting)      Primary iridocyclitis     iritis dr dominguez     Venous stasis of lower extremity        Past Surgical History:   Procedure Laterality Date     AMPUTATE TOE(S) Right 2015    Procedure: AMPUTATE TOE(S);  Surgeon: Ashia White, DPM, Pod;  Location: [facility] OR     C APPENDECTOMY       C  DELIVERY ONLY  1965    , Low Cervical     EXCISE MASS UPPER EXTREMITY  10/13/2011    Lt Forearm mass excision - dr ely     EXCISE MASS UPPER EXTREMITY  2012    Lt Forearm mass excision - dr ely     HC COLONOSCOPY THRU STOMA, DIAGNOSTIC      IBS/diverticula/hemmorhoids  "dr araujo      ESOPHAGOSCOPY, DIAGNOSTIC  4/08, 6/08, 6/10    Gastric ulcer, healed, gastritis     HC HEMORRHOIDECTOMY,INT/EXT,SIMPLE  1983     HC REPAIR SLIDING INGUINAL HERNIA  1960    mitra     SURGICAL HISTORY OF -   1970    mitra neck & back tumors     SURGICAL HISTORY OF -   9/06    neuroma excision - 2nd toe amputation     SURGICAL HISTORY OF -   3/07    Rt IOL - dr jimenez     SURGICAL HISTORY OF -   4/07    Lt IOL - dr jimenez     SURGICAL HISTORY OF -   9/04    Lt Knee replacement - dr gayle     SURGICAL HISTORY OF -   9/09    Rt Knee replacement - dr gayle     SURGICAL HISTORY OF -   9/15    Rt Second toe amputation - Dr White     THORACOSCOPIC WEDGE RESECTION LUNG Left 7/12/2016    Procedure: THORACOSCOPIC WEDGE RESECTION LUNG;  Surgeon: Abdelrahman Thompson MD;  Location: [facility] OR     XR JOINT INJECTION HIP (HIB)  2/2015    Rt - Dr Terry     Review of Systems:  Constitutional, HEENT, cardiovascular, pulmonary, GI, , musculoskeletal, neuro, skin, endocrine and psych systems are negative, except as otherwise noted.    Examination:    /68   Pulse 84   Temp 98  F (36.7  C) (Tympanic)   Ht 1.549 m (5' 1\")   Wt 68.9 kg (152 lb)   SpO2 95%   Breastfeeding? No   BMI 28.72 kg/m    GENERAL APPEARANCE: healthy, alert, no distress  RESP: lungs clear to auscultation - no rales, rhonchi or wheezes  CV: regular rates and rhythm, normal S1 S2, no S3 or S4, no murmur, click or rub, no irregular beats  MS: Extremities normal:  No gross deformities noted, normal strength, sensation, and circulation noted, Bilateral hip tenderness left greater than right over greater trochanteric bursa  SKIN: no suspicious lesions or rashes  NEURO: Normal strength and tone, sensory exam grossly normal, mentation intact, speech normal  PSYCH: mentation appears normal., affect normal/bright    Procedure:  Cortisone Injection into bilateral hip Bursa    Consent:  Risks and benefits of procedure discussed, including: "  infection, nerve injury and scarring    Pattient desires to proceed, pause for cause completed, reviewed risks and benefits    Procedure completed in room:  Exam room 4    Prep:  betadine    Anesthesia:  1% lidocaine without epi    Notes:  40 mg Kenalog with 2 mL 1% lidocaine without epi injected into bilateral greater bursa of hips due to pain caused by osteoarthritis.    Wound repaired with:  Band Aid with bacitracin    Wound care discussed    Path Sent:  No    Discussed treatment/modality options, including risk and benefits, she desires advised dentist every 6 months, advised diet and exercise and advised opthalmologist every 1-2 years. All diagnosis above reviewed and noted above, otherwise stable.  See [employer] orders for further details.      1) Patient received cortisone injections into bilateral hip bursa today. Patient's 300-30 mg Acetaminophen-codeine refilled today for pain management, steady use 1 daily, at HS    2) Patient presents today as normotensive. Patient is currently prescribed 5 mg Amlodipine daily, 100 mg Losartan daily, and 50 mg Metoprolol Succinate daily for hypertension management.    3) Follow up in 2 months for medication recheck visit.    Health Maintenance Due   Topic Date Due     URINE DRUG SCREEN Q1 YR  01/25/1950     COPD ACTION PLAN Q1 YR  06/01/2016     LIPID MONITORING Q1 YEAR  03/09/2017     MAMMO Q1 YR  10/04/2018     This document serves as a record of the services and decisions personally performed and made by Rashi Calle MD. It was created on his behalf by Flash Sutton, a trained medical scribe. The creation of this document is based on the provider's statements to the medical scribe.  Flash Sutton December 13, 2018 12:58 PM     The information in this document, created by the medical scribe for me, accurately reflects the services I personally performed and the decisions made by me. I have reviewed and approved this document for accuracy prior to leaving the patient care  area.  December 13, 2018          Rashi Calle MD, FAAFP    [address]  [id number] [phone] Fax

## 2019-02-23 DIAGNOSIS — N18.4 CKD (CHRONIC KIDNEY DISEASE) STAGE 4, GFR 15-29 ML/MIN (H): ICD-10-CM

## 2019-02-23 DIAGNOSIS — I10 HYPERTENSION GOAL BP (BLOOD PRESSURE) < 140/90: ICD-10-CM

## 2019-02-23 NOTE — TELEPHONE ENCOUNTER
"Requested Prescriptions   Pending Prescriptions Disp Refills     metoprolol succinate ER (TOPROL-XL) 50 MG 24 hr tablet [Pharmacy Med Name: METOPROLOL SUCCINATE ER 50MG TB24]    Last Written Prescription Date:  2/19/18  Last Fill Quantity: 180,  # refills: 3   Last office visit: 12/13/2018 with prescribing provider:  Luc Mares Office Visit:   Next 5 appointments (look out 90 days)    Mar 19, 2019  8:00 AM CDT  [facility] with [facility] [address] [place] [id number] [phone]          180 tablet 3     Sig: TAKE ONE TABLET BY MOUTH TWICE A DAY    Beta-Blockers Protocol Passed - 2/23/2019  7:39 AM       Passed - Blood pressure under 140/90 in past 12 months    BP Readings from Last 3 Encounters:   12/13/18 138/68   10/18/18 142/52   09/13/18 146/60                Passed - Patient is age 6 or older       Passed - Recent (12 mo) or future (30 days) visit within the authorizing provider's specialty    Patient had office visit in the last 12 months or has a visit in the next 30 days with authorizing provider or within the authorizing provider's specialty.  See \"Patient Info\" tab in inbasket, or \"Choose Columns\" in Meds & Orders section of the refill encounter.             Passed - Medication is active on med list          "

## 2019-02-26 RX ORDER — METOPROLOL SUCCINATE 50 MG/1
TABLET, EXTENDED RELEASE ORAL
Qty: 180 TABLET | Refills: 3 | Status: SHIPPED | OUTPATIENT
Start: 2019-02-26 | End: 2019-10-29

## 2019-02-26 NOTE — TELEPHONE ENCOUNTER
Routing refill request to provider for review/approval because:  Patient needs to be seen because:  Due for a medication check    Lorin QUIJANON, RN   [facility]

## 2019-02-27 ENCOUNTER — ALLIED HEALTH/NURSE VISIT (OUTPATIENT)
Dept: FAMILY MEDICINE | Facility: CLINIC | Age: 84
End: 2019-02-27
Payer: MEDICARE

## 2019-02-27 VITALS — DIASTOLIC BLOOD PRESSURE: 52 MMHG | SYSTOLIC BLOOD PRESSURE: 165 MMHG

## 2019-02-27 DIAGNOSIS — I10 HYPERTENSION GOAL BP (BLOOD PRESSURE) < 140/90: Primary | ICD-10-CM

## 2019-02-27 PROCEDURE — 99207 ZZC NO CHARGE NURSE ONLY: CPT | Performed by: FAMILY MEDICINE

## 2019-02-27 NOTE — TELEPHONE ENCOUNTER
rx done, due for med check fasting soon    Lab Results   Component Value Date    CR 1.58 09/13/2018     BP Readings from Last 3 Encounters:   12/13/18 138/68   10/18/18 142/52   09/13/18 146/60

## 2019-02-27 NOTE — PROGRESS NOTES
Anat Correa is enrolled/participating in the retail pharmacy Blood Pressure Goals Achievement Program (BPGAP).  Anat Correa was evaluated at [facility] on February 27, 2019 at which time her blood pressure was:    BP Readings from Last 3 Encounters:   02/27/19 165/52   12/13/18 138/68   10/18/18 142/52     Reviewed lifestyle modifications for blood pressure control and reduction: including making healthy food choices, managing weight, getting regular exercise, smoking cessation, reducing alcohol consumption, monitoring blood pressure regularly.     Anat Correa is experiencing symtoms: headache pain level 5 on scale 1-10. She took some tylenol for arthritis and now headache is gone.  She hasn't missed any doses of BP meds. She said it is not uncommon for her BP to be high. She said at dental visit not too long ago systolic was 199.  She seemed a bit unsteady when she stood up but when I addressed it she said that is normal.  She did not want to wait while I spoke with MD about symptoms and would just like a phone call.    Follow-Up: BP is not at goal of < 140/90mmHg (patient 18+ years of age with or without diabetes), Recommended follow-up with PCP.  Routing to PCP for further review.    Recommendation to Provider: I would suggest she get a phone call regarding her elevated BP and symptoms to see if she needs a dose adjustment / recheck soon.      This note completed by: Alysa Fernandez, PharmD  [facility]  PH: [phone]

## 2019-02-28 ENCOUNTER — TELEPHONE (OUTPATIENT)
Dept: FAMILY MEDICINE | Facility: CLINIC | Age: 84
End: 2019-02-28

## 2019-02-28 NOTE — TELEPHONE ENCOUNTER
Called patient @ [phone]    Patient stated that she no longer has HA. Feels fine today - symptoms improved yesterday afternoon.   Patient states she takes her BP pills faithfully.     Advised of MD GENESIS message below - Patient stated an understanding and agreed with plan.  Patient stated she will call back to schedule a nurse only visit when she gets her calendar.     Advised patient that if new or worsening symptoms appear (reviewed new & worsening symptoms) to call the clinic or be seen in the the ER  Patient stated an understanding and agreed with plan.    Annika Novak RN  [facility]

## 2019-02-28 NOTE — TELEPHONE ENCOUNTER
Progress Notes     Alysa Fernandez [facility] at 2/27/2019  2:27 PM     Status: Signed      Anat Correa is enrolled/participating in the retail pharmacy Blood Pressure Goals Achievement Program (BPGAP).  Anat Correa was evaluated at [facility] on February 27, 2019 at which time her blood pressure was:         BP Readings from Last 3 Encounters:   02/27/19 165/52   12/13/18 138/68   10/18/18 142/52      Reviewed lifestyle modifications for blood pressure control and reduction: including making healthy food choices, managing weight, getting regular exercise, smoking cessation, reducing alcohol consumption, monitoring blood pressure regularly.      Anat Correa is experiencing symtoms: headache pain level 5 on scale 1-10. She took some tylenol for arthritis and now headache is gone.  She hasn't missed any doses of BP meds. She said it is not uncommon for her BP to be high. She said at dental visit not too long ago systolic was 199.  She seemed a bit unsteady when she stood up but when I addressed it she said that is normal.  She did not want to wait while I spoke with MD about symptoms and would just like a phone call.     Follow-Up: BP is not at goal of < 140/90mmHg (patient 18+ years of age with or without diabetes), Recommended follow-up with PCP.  Routing to PCP for further review.     Recommendation to Provider: I would suggest she get a phone call regarding her elevated BP and symptoms to see if she needs a dose adjustment / recheck soon.        This note completed by: Alysa Fernandez, PharmD  [facility]  PH: [phone]            Rashi Calle MD at 2/27/2019  2:27 PM     Status: Signed      Advise triage/BP recheck visit

## 2019-03-04 DIAGNOSIS — J44.9 COPD, MODERATE (H): ICD-10-CM

## 2019-03-04 RX ORDER — IPRATROPIUM BROMIDE AND ALBUTEROL SULFATE 2.5; .5 MG/3ML; MG/3ML
SOLUTION RESPIRATORY (INHALATION)
Qty: 270 ML | Refills: 3 | Status: SHIPPED | OUTPATIENT
Start: 2019-03-04 | End: 2019-10-29

## 2019-03-04 NOTE — TELEPHONE ENCOUNTER
"Requested Prescriptions   Pending Prescriptions Disp Refills     ipratropium - albuterol 0.5 mg/2.5 mg/3 mL (DUONEB) 0.5-2.5 (3) MG/3ML neb solution [Pharmacy Med Name: IPRATROPIUM-ALBUTEROL 0.5-2.5 SOLN] 270 mL 3        Last Written Prescription Date:  2.19.18  Last Fill Quantity: 270 ml,  # refills: 3   Last office visit: 12/13/2018 with prescribing provider:     Future Office Visit:   Next 5 appointments (look out 90 days)    Mar 06, 2019  1:30 PM CST  Nurse [facility] with [facility]  [facility] [address] [place] [id number]  [phone]   Mar 19, 2019  8:00 AM CDT  Office Visit with Rashi Calle MD  [facility] [address] [place] [id number]  [phone]          Sig: NEBULIZE CONTENTS OF ONE VIAL EVERY 6 HOURS AS NEEDED FOR SHORTNESS OF BREATH/DIFFICULT BREATHING    Short-Acting Beta Agonist Inhalers Protocol  Passed - 3/4/2019  1:13 PM       Passed - Patient is age 12 or older       Passed - Recent (12 mo) or future (30 days) visit within the authorizing provider's specialty    Patient had office visit in the last 12 months or has a visit in the next 30 days with authorizing provider or within the authorizing provider's specialty.  See \"Patient Info\" tab in inbasket, or \"Choose Columns\" in Meds & Orders section of the refill encounter.             Passed - Medication is active on med list          "

## 2019-03-06 ENCOUNTER — ALLIED HEALTH/NURSE VISIT (OUTPATIENT)
Dept: NURSING | Facility: CLINIC | Age: 84
End: 2019-03-06
Payer: MEDICARE

## 2019-03-06 VITALS — OXYGEN SATURATION: 94 % | HEART RATE: 74 BPM | DIASTOLIC BLOOD PRESSURE: 60 MMHG | SYSTOLIC BLOOD PRESSURE: 166 MMHG

## 2019-03-06 DIAGNOSIS — I10 HYPERTENSION GOAL BP (BLOOD PRESSURE) < 140/90: Primary | ICD-10-CM

## 2019-03-06 NOTE — PROGRESS NOTES
Hypertension Follow-up      Outpatient blood pressures are not being checked.    Low Salt Diet: not monitoring salt      Amount of exercise or physical activity: None    Problems taking medications regularly: No    Medication side effects: none    Diet: regular (no restrictions)    Denies: CP, SOB , headaches or blurred vision    Pt does have a f/u on 3/19/2019 with MD GENESIS      best # [phone] ok LM     pharmacy is  [facility]    Please advise on BP     BP Readings from Last 3 Encounters:   03/06/19 170/68   02/27/19 165/52   12/13/18 138/68          Ariadna Starks RN, BSN  [facility]

## 2019-03-19 ENCOUNTER — OFFICE VISIT (OUTPATIENT)
Dept: FAMILY MEDICINE | Facility: CLINIC | Age: 84
End: 2019-03-19
Payer: MEDICARE

## 2019-03-19 VITALS
OXYGEN SATURATION: 95 % | TEMPERATURE: 97.7 F | HEART RATE: 79 BPM | HEIGHT: 61 IN | WEIGHT: 157 LBS | BODY MASS INDEX: 29.64 KG/M2 | SYSTOLIC BLOOD PRESSURE: 138 MMHG | DIASTOLIC BLOOD PRESSURE: 60 MMHG

## 2019-03-19 DIAGNOSIS — C34.12 MALIGNANT NEOPLASM OF UPPER LOBE OF LEFT LUNG (H): ICD-10-CM

## 2019-03-19 DIAGNOSIS — Z51.81 MEDICATION MONITORING ENCOUNTER: ICD-10-CM

## 2019-03-19 DIAGNOSIS — M81.0 OSTEOPOROSIS WITHOUT CURRENT PATHOLOGICAL FRACTURE, UNSPECIFIED OSTEOPOROSIS TYPE: ICD-10-CM

## 2019-03-19 DIAGNOSIS — I10 HYPERTENSION GOAL BP (BLOOD PRESSURE) < 140/90: ICD-10-CM

## 2019-03-19 DIAGNOSIS — D63.1 ANEMIA IN STAGE 4 CHRONIC KIDNEY DISEASE (H): ICD-10-CM

## 2019-03-19 DIAGNOSIS — E78.5 HYPERLIPIDEMIA LDL GOAL <100: ICD-10-CM

## 2019-03-19 DIAGNOSIS — M15.0 PRIMARY OSTEOARTHRITIS INVOLVING MULTIPLE JOINTS: ICD-10-CM

## 2019-03-19 DIAGNOSIS — N18.4 ANEMIA IN STAGE 4 CHRONIC KIDNEY DISEASE (H): ICD-10-CM

## 2019-03-19 DIAGNOSIS — R82.90 NONSPECIFIC FINDING ON EXAMINATION OF URINE: ICD-10-CM

## 2019-03-19 DIAGNOSIS — M70.62 GREATER TROCHANTERIC BURSITIS OF BOTH HIPS: ICD-10-CM

## 2019-03-19 DIAGNOSIS — R68.89 COLD INTOLERANCE: ICD-10-CM

## 2019-03-19 DIAGNOSIS — N25.81 SECONDARY RENAL HYPERPARATHYROIDISM (H): ICD-10-CM

## 2019-03-19 DIAGNOSIS — R53.83 FATIGUE, UNSPECIFIED TYPE: ICD-10-CM

## 2019-03-19 DIAGNOSIS — R30.0 DYSURIA: ICD-10-CM

## 2019-03-19 DIAGNOSIS — E55.9 VITAMIN D DEFICIENCY: ICD-10-CM

## 2019-03-19 DIAGNOSIS — I87.2 CHRONIC VENOUS STASIS DERMATITIS OF BOTH LOWER EXTREMITIES: ICD-10-CM

## 2019-03-19 DIAGNOSIS — N18.4 CKD (CHRONIC KIDNEY DISEASE) STAGE 4, GFR 15-29 ML/MIN (H): ICD-10-CM

## 2019-03-19 DIAGNOSIS — G63 POLYNEUROPATHY ASSOCIATED WITH UNDERLYING DISEASE (H): ICD-10-CM

## 2019-03-19 DIAGNOSIS — J44.9 COPD, MODERATE (H): Primary | ICD-10-CM

## 2019-03-19 DIAGNOSIS — M70.61 GREATER TROCHANTERIC BURSITIS OF BOTH HIPS: ICD-10-CM

## 2019-03-19 LAB
ALBUMIN SERPL-MCNC: 3.7 G/DL (ref 3.4–5)
ALBUMIN UR-MCNC: 30 MG/DL
ALP SERPL-CCNC: 74 U/L (ref 40–150)
ALT SERPL W P-5'-P-CCNC: 20 U/L (ref 0–50)
ANION GAP SERPL CALCULATED.3IONS-SCNC: 7 MMOL/L (ref 3–14)
APPEARANCE UR: CLEAR
AST SERPL W P-5'-P-CCNC: 20 U/L (ref 0–45)
BACTERIA #/AREA URNS HPF: ABNORMAL /HPF
BILIRUB SERPL-MCNC: 0.3 MG/DL (ref 0.2–1.3)
BILIRUB UR QL STRIP: NEGATIVE
BUN SERPL-MCNC: 44 MG/DL (ref 7–30)
CALCIUM SERPL-MCNC: 9.8 MG/DL (ref 8.5–10.1)
CHLORIDE SERPL-SCNC: 98 MMOL/L (ref 94–109)
CO2 SERPL-SCNC: 24 MMOL/L (ref 20–32)
COLOR UR AUTO: YELLOW
CREAT SERPL-MCNC: 1.72 MG/DL (ref 0.52–1.04)
ERYTHROCYTE [DISTWIDTH] IN BLOOD BY AUTOMATED COUNT: 12.6 % (ref 10–15)
GFR SERPL CREATININE-BSD FRML MDRD: 27 ML/MIN/{1.73_M2}
GLUCOSE SERPL-MCNC: 83 MG/DL (ref 70–99)
GLUCOSE UR STRIP-MCNC: NEGATIVE MG/DL
HCT VFR BLD AUTO: 31.2 % (ref 35–47)
HGB BLD-MCNC: 10.4 G/DL (ref 11.7–15.7)
HGB UR QL STRIP: ABNORMAL
KETONES UR STRIP-MCNC: NEGATIVE MG/DL
LEUKOCYTE ESTERASE UR QL STRIP: ABNORMAL
MCH RBC QN AUTO: 31 PG (ref 26.5–33)
MCHC RBC AUTO-ENTMCNC: 33.3 G/DL (ref 31.5–36.5)
MCV RBC AUTO: 93 FL (ref 78–100)
NITRATE UR QL: NEGATIVE
PH UR STRIP: 7 PH (ref 5–7)
PLATELET # BLD AUTO: 362 10E9/L (ref 150–450)
POTASSIUM SERPL-SCNC: 5 MMOL/L (ref 3.4–5.3)
PROT SERPL-MCNC: 7.3 G/DL (ref 6.8–8.8)
PTH-INTACT SERPL-MCNC: 13 PG/ML (ref 18–80)
RBC # BLD AUTO: 3.35 10E12/L (ref 3.8–5.2)
RBC #/AREA URNS AUTO: ABNORMAL /HPF
SODIUM SERPL-SCNC: 129 MMOL/L (ref 133–144)
SOURCE: ABNORMAL
SP GR UR STRIP: 1.01 (ref 1–1.03)
TSH SERPL DL<=0.005 MIU/L-ACNC: 2.81 MU/L (ref 0.4–4)
UROBILINOGEN UR STRIP-ACNC: 0.2 EU/DL (ref 0.2–1)
WBC # BLD AUTO: 11 10E9/L (ref 4–11)
WBC #/AREA URNS AUTO: >100 /HPF

## 2019-03-19 PROCEDURE — 87088 URINE BACTERIA CULTURE: CPT | Performed by: FAMILY MEDICINE

## 2019-03-19 PROCEDURE — 36415 COLL VENOUS BLD VENIPUNCTURE: CPT | Performed by: FAMILY MEDICINE

## 2019-03-19 PROCEDURE — 85027 COMPLETE CBC AUTOMATED: CPT | Performed by: FAMILY MEDICINE

## 2019-03-19 PROCEDURE — 87086 URINE CULTURE/COLONY COUNT: CPT | Performed by: FAMILY MEDICINE

## 2019-03-19 PROCEDURE — 87186 SC STD MICRODIL/AGAR DIL: CPT | Performed by: FAMILY MEDICINE

## 2019-03-19 PROCEDURE — 99214 OFFICE O/P EST MOD 30 MIN: CPT | Performed by: FAMILY MEDICINE

## 2019-03-19 PROCEDURE — 80053 COMPREHEN METABOLIC PANEL: CPT | Performed by: FAMILY MEDICINE

## 2019-03-19 PROCEDURE — 84443 ASSAY THYROID STIM HORMONE: CPT | Performed by: FAMILY MEDICINE

## 2019-03-19 PROCEDURE — 81001 URINALYSIS AUTO W/SCOPE: CPT | Performed by: FAMILY MEDICINE

## 2019-03-19 PROCEDURE — 83970 ASSAY OF PARATHORMONE: CPT | Performed by: FAMILY MEDICINE

## 2019-03-19 PROCEDURE — 82306 VITAMIN D 25 HYDROXY: CPT | Performed by: FAMILY MEDICINE

## 2019-03-19 ASSESSMENT — MIFFLIN-ST. JEOR: SCORE: 1104.53

## 2019-03-19 NOTE — LETTER
[facility] [place]  03/19/19    Patient: Anat Correa  YOB: 1935  Medical Record Number: [id number]                                                                  Opioid / Opioid Plus Controlled Substance Agreement    I understand that my care provider has prescribed an opioid (narcotic) controlled substance to help manage my condition(s). I am taking this medicine to help me function or work. I know this is strong medicine, and that it can cause serious side effects. Opioid medicine can be sedating, addicting and may cause a dependency on the drug. They can affect my ability to drive or think, and cause depression. They need to be taken exactly as prescribed. Combining opioids with certain medicines or chemicals (such as cocaine, sedatives and tranquilizers, sleeping pills, meth) can be dangerous or even fatal. Also, if I stop opioids suddenly, I may have severe withdrawal symptoms. Last, I understand that opioids do not work for all types of pain nor for all patients. If not helpful, I may be asked to stop them.    The risks, benefits, and side effects of these medicine(s) were explained to me. I agree that:    1. I will take part in other treatments as advised by my care team. This may be psychiatry or counseling, physical therapy, behavioral therapy, group treatment or a referral to a pain clinic. I will reduce or stop my medicine when my care team tells me to do so.  2. I will take my medicines as prescribed. I will not change the dose or schedule unless my care team tells me to. There will be no refills if I  run out early.   I may be contactedwithout warning and asked to complete a urine drug test or pill count at any time.   3. I will keep all my appointments, and understand this is part of the monitoring of opioids. My care team may require an office visit for EVERY opioid/controlled substance refill. If I miss appointments or don t follow instructions, my care team may stop  my medicine.  4. I will not ask other providers to prescribe controlled substances, and I will not accept controlled substances from other people. If I need another prescribed controlled substance for a new reason, I will tell my care team within 1 business day.  5. I will use one pharmacy to fill all of my controlled substance prescriptions, and it is up to me to make sure that I do not run out of my medicines on weekends or holidays. If my care team is willing to refill my opioid prescription without a visit, I must request refills only during office hours, refills may take up to 3 days to process, and it may take up to 5 to 7 days for my medicine to be mailed and ready at my pharmacy. Prescriptions will not be mailed anywhere except my pharmacy.        [id number]  Rev 12/18         Registration to scan to EHR                             Page 1 of 2               Controlled Substance Agreement Opioid        [facility] [place]  03/19/19  Patient: Anat Correa  YOB: 1935  Medical Record Number: [id number]                                                                  6. I am responsible for my prescriptions. If the medicine/prescription is lost or stolen, it will not be replaced. I also agree not to share controlled substance medicines with anyone.  7. I agree to not use ANY illegal or recreational drugs. This includes marijuana, cocaine, bath salts or other drugs. I agree not to use alcohol unless my care team says I may.          I agree to give urine samples whenever asked. If I don t give a urine sample, the care team may stop my medicine.    8. If I enroll in the [place] Medical Marijuana program, I will tell my care team. I will also sign an agreement to share my medical records with my care team.   9. I will bring in my list of medicines (or my medicine bottles) each time I come to the clinic.   10. I will tell my care team right away if I become pregnant or have a new medical  problem treated outside of my regular clinic.  11. I understand that this medicine can affect my thinking and judgment. It may be unsafe for me to drive, use machinery and do dangerous tasks. I will not do any of these things until I know how the medicine affects me. If my dose changes, I will wait to see how it affects me. I will contact my care team if I have concerns about medicine side effects.    I understand that if I do not follow any of the conditions above, my prescriptions or treatment may be stopped.      I agree that my provider, clinic care team, and pharmacy may work with any city, state or federal law enforcement agency that investigates the misuse, sale, or other diversion of my controlled medicine. I will allow my provider to discuss my care with or share a copy of this agreement with any other treating provider, pharmacy or emergency room where I receive care. I agree to give up (waive) any right of privacy or confidentiality with respect to these consents.     I have read this agreement and have asked questions about anything I did not understand.      ________________________________________________________________________  Patient signature - Date/Time -  Anat Correa                                      ________________________________________________________________________  Witness signature                                                            ________________________________________________________________________  Provider signature - Rashi Calle MD      [id number]  Rev 12/18         Registration to scan to EHR                         Page 2 of 2                   Controlled Substance Agreement Opioid           Page 1 of 2  Opioid Pain Medicines (also known as Narcotics)  What You Need to Know    What are opioids?   Opioids are pain medicines that must be prescribed by a doctor.  They are also known as narcotics.    Examples are:     morphine (MS Contin, Melinda)    oxycodone  (Oxycontin)    oxycodone and acetaminophen (Percocet)    hydrocodone and acetaminophen (Vicodin, Norco)     fentanyl patch (Duragesic)     hydromorphone (Dilaudid)     methadone     What do opioids do well?   Opioids are best for short-term pain after a surgery or injury. They also work well for cancer pain. Unlike other pain medicines, they do not cause liver or kidney failure or ulcers. They may help some people with long-lasting (chronic) pain.     What do opioids NOT do well?   Opioids never get rid of pain entirely, and they do not work well for most patients with chronic pain. Opioids do not reduce swelling, one of the causes of pain. They also don t work well for nerve pain.                           For informational purposes only.  Not to replace the advice of your care provider.  Copyright 201 [facility]. All right reserved. [employer] 159528-Xxg 02/18.      Page 2 of 2    Risks and side effects   Talk to your doctor before you start or decide to keep taking one of these medicines. Side effects include:    Lowering your breathing rate enough to cause death    Overdose, including death, especially if taking higher than prescribed doses    Long-term opioid use    Worse depression symptoms; less pleasure in things you usually enjoy    Feeling tired or sluggish    Slower thoughts or cloudy thinking    Being more sensitive to pain over time; pain is harder to control    Trouble sleeping or restless sleep    Changes in hormone levels (for example, less testosterone)    Changes in sex drive or ability to have sex    Constipation    Unsafe driving    Itching and sweating    Feeling dizzy    Nausea, vomiting and dry mouth    What else should I know about opioids?  When someone takes opioids for too long or too often, they become dependent. This means that if you stop or reduce the medicine too quickly, you will have withdrawal symptoms.    Dependence is not the same as addiction. Addiction is when  people keep using a substance that harms their body, their mind or their relations with others. If you have a history of drug or alcohol abuse, taking opioids can cause a relapse.    Over time, opioids don t work as well. Most people will need higher and higher doses. The higher the dose, the more serious the side effects. We don t know the long-term effects of opioids.      Prescribed opioids aren't the best way to manage chronic pain    Other ways to manage pain include:      Ibuprofen or acetaminophen.  You should always try this first.      Treat health problems that may be causing pain.      acupuncture or massage, deep breathing, meditation, visual imagery, aromatherapy.      Use heat or ice at the pain site      Physical therapy and exercise      Stop smoking      See a counselor or therapist                                                  People who have used opioids for a long time may have a lower quality of life, worse depression, higher levels of pain and more visits to doctors.    Never share your opioids with others. Be sure to store opioids in a secure place, locked if possible.Young children can easily swallow them and overdose.     You can overdose on opioids.  Signs of overdose include decrease or loss of consciousness, slowed breathing, trouble waking and blue lips.  If someone is worried about overdose, they should call 911.    If you are at risk for overdose, you may get naloxone (Narcan, a medicine that reverses the effects of opioids.  If you overdose, a friend or family member can give you Narcan while waiting for the ambulance.  They need to know the signs of overdose and how to give Narcan.    While you're taking opioids:    Don't use alcohol or street drugs. Taking them together can cause death.    Don't take any of these medicines unless your doctor says its okay.  Taking these with opioids can cause death.    Benzodiazepines (such as lorazepam         or diazepam)    Muscle relaxers  (such as cyclobenzaprine)    sleeping pills    other opioids    Safe disposal of opioids  Find your area drug take-back program, your pharmacy mail-back program, buy a special disposal bag (such as Deterra) from your pharmacy or flush them down the toilet.  Use the guidelines at:  www.fda.gov/drugs/resourcesforyou

## 2019-03-19 NOTE — PROGRESS NOTES
SUBJECTIVE:   Anat Correa is a 84 year old female who presents to clinic today for the following health issues:    Hypertension/CKD: Patient presents today as normotensive. Patient is currently prescribed 5 mg Amlodipine daily, 100 mg Losartan daily, and 50 mg Metoprolol Succinate daily for hypertension management. Patient reports blood pressure has been running high when she has been having checked in the pharmacy. Follows with Dr Gauthier.    BP Readings from Last 5 Encounters:   03/19/19 138/60   03/06/19 166/60   02/27/19 165/52   12/13/18 138/68   10/18/18 142/52     Creatinine   Date Value Ref Range Status   09/13/2018 1.58 (H) 0.52 - 1.04 mg/dL Final     Lung Cancer/COPD: Stable. Patient is currently prescribed an albuterol inhaler and Duoneb for lung cancer/COPD management. Patient consults with Dr. Thompson/Dr Iyer for further lung cancer/COPD management. Patient reports her recent lung scan was clear and that she has an appointment coming up with Dr. Parr.    Possible Infection?: Patient reports she has been having some chills and sweats with some fatigue. She denies fevers. Patient presented today with a urine sample. She would like to have it checked for possible infection. Patient reports some polyuria.    Patient requests a refill of Tylenol #3. Controlled Substance agreement signed today. Uses 1 per night to help with sleep for OA, stable long term use.    Bilateral hip pain, stable.    Recent fatigue, cold intolerance, urinary frequency.    Problem list and histories reviewed & adjusted, as indicated.  Additional history: as documented    BP Readings from Last 3 Encounters:   03/19/19 138/60   03/06/19 166/60   02/27/19 165/52       body mass index is 29.66 kg/m .    Wt Readings from Last 4 Encounters:   03/19/19 71.2 kg (157 lb)   12/13/18 68.9 kg (152 lb)   09/13/18 70.8 kg (156 lb)   07/11/18 71.7 kg (158 lb)       Health Maintenance    Health Maintenance Due   Topic Date Due     URINE DRUG  SCREEN Q1 YR  01/25/1950     COPD ACTION PLAN Q1 YR  06/01/2016     LIPID MONITORING Q1 YEAR  03/09/2017     MAMMO Q1 YR  10/04/2018     MICROALBUMIN Q1 YEAR  02/19/2019       Current Problem List    Patient Active Problem List   Diagnosis     History of colonic polyps     Calculus of kidney     Lesion of plantar nerve     Osteoporosis     Primary iridocyclitis     Anemia     Hyperlipidemia LDL goal <100     OA (osteoarthritis)     Advanced directives, counseling/discussion     Hypertension goal BP (blood pressure) < 140/90     COPD, moderate     Controlled substance agreement signed     Vitamin D deficiency     CKD (chronic kidney disease) stage 4, GFR 15-29 ml/min (H)     Anemia in chronic renal disease     Secondary renal hyperparathyroidism (H)     Venous stasis of lower extremity     Peripheral neuropathy     Chronic pain     Lung nodule     Nodule of left lung     Malignant neoplasm of upper lobe of left lung (H)     Acute pain of right shoulder     S/P amputation of lesser toe, unspecified laterality (H)     Retinal hemorrhage of right eye       Past Medical History    Past Medical History:   Diagnosis Date     Anemia      Calculus of kidney 1998    dr hope     Chronic pain     OA     CKD (chronic kidney disease) stage 4, GFR 15-29 ml/min (H) 2008    dr mcdermott     COPD, moderate (H) 2004    moderate obstruction, with pulm htn - dr francisco did AAP     Diverticulosis of colon (without mention of hemorrhage) 7/03     Hemorrhage of gastrointestinal tract, unspecified 4/08    dr su     Hyperlipidemia LDL goal <100 2006     Hypertension goal BP (blood pressure) < 140/90 2005     Internal hemorrhoids without mention of complication 7/03     Irritable bowel syndrome 7/03     Lesion of plantar nerve 8/98    hay's neuroma dr connor     Lung nodule 4/14, 3/16    Dr Thompson, 1.4 x 1.1 cm, lingula, PET neg 3/16     Malignant neoplasm of upper lobe of left lung (H) 7/16    Dr Thompson - x 2 nodules - moderately  differentiated adenocarcinoma (acinar predominant).  Final pathologic stage [id number], Final clinical stage IA, grade 2     Medication management     OA - 1 T#3 at HS with HX CKD     OA (osteoarthritis)     lower ext worse katay knees     Obesity (BMI 30.0-34.9)      Osteoporosis, unspecified     FOSAMAX  = upset stomach , pt declines further rx.      Other ulcerative colitis     collangenous collitis- last colonoscopy       Peripheral neuropathy     early - burning at HS     Personal history of colonic polyps     dr araujo     PONV (postoperative nausea and vomiting)      Primary iridocyclitis     iritis dr dominguez     Venous stasis of lower extremity        Past Surgical History    Past Surgical History:   Procedure Laterality Date     AMPUTATE TOE(S) Right 2015    Procedure: AMPUTATE TOE(S);  Surgeon: Ashia White, DPM, Pod;  Location: [facility] OR     C APPENDECTOMY       C  DELIVERY ONLY      , Low Cervical     EXCISE MASS UPPER EXTREMITY  10/13/2011    Lt Forearm mass excision - dr ely     EXCISE MASS UPPER EXTREMITY  2012    Lt Forearm mass excision - dr ely     HC COLONOSCOPY THRU STOMA, DIAGNOSTIC      IBS/diverticula/hemmorhoids dr araujo     HC ESOPHAGOSCOPY, DIAGNOSTIC  , , 6/10    Gastric ulcer, healed, gastritis     HC HEMORRHOIDECTOMY,INT/EXT,SIMPLE       HC REPAIR SLIDING INGUINAL HERNIA      mitra     SURGICAL HISTORY OF -       mitra neck & back tumors     SURGICAL HISTORY OF -       neuroma excision - 2nd toe amputation     SURGICAL HISTORY OF -   3/07    Rt IOL - dr jimenez     SURGICAL HISTORY OF -       Lt IOL - dr jimenez     SURGICAL HISTORY OF -       Lt Knee replacement - dr gayle     SURGICAL HISTORY OF -       Rt Knee replacement - dr gayle     SURGICAL HISTORY OF -   9/15    Rt Second toe amputation - Dr White     THORACOSCOPIC WEDGE RESECTION LUNG Left 2016    Procedure: THORACOSCOPIC  WEDGE RESECTION LUNG;  Surgeon: Abdelrahman Thompson MD;  Location: [facility] OR     XR JOINT INJECTION HIP (HIB)  2/2015    Rt - Dr Terry       Current Medications    Current Outpatient Medications   Medication Sig Dispense Refill     acetaminophen-codeine (TYLENOL #3) 300-30 MG tablet Take 1-2 tablets by mouth every 6 hours as needed for moderate pain 90 tablet 0     albuterol (VENTOLIN HFA) 108 (90 BASE) MCG/ACT Inhaler INHALE TWO PUFFS INTO THE LUNGS EVERY 6 HOURS AS NEEDED FOR SHORTNESS OF BREATH/DYSPNEA 54 g 3     amLODIPine (NORVASC) 5 MG tablet Take 1 tablet (5 mg) by mouth 2 times daily 180 tablet 3     ASPIRIN NOT PRESCRIBED (INTENTIONAL) Please choose reason not prescribed, below 0 each 0     calcium carbonate (TUMS) 500 MG chewable tablet Take 2 chew tab by mouth daily as needed for heartburn       Fluticasone-Umeclidin-Vilanterol (TRELEGY ELLIPTA) 100-62.5-25 MCG/INH oral inhaler Inhale 1 puff into the lungs daily 1 Inhaler 3     furosemide (LASIX) 20 MG tablet 40 mg in am 180 tablet 3     IBUPROFEN PO Take 400 mg by mouth every 6 hours as needed for moderate pain       ipratropium - albuterol 0.5 mg/2.5 mg/3 mL (DUONEB) 0.5-2.5 (3) MG/3ML neb solution NEBULIZE CONTENTS OF ONE VIAL EVERY 6 HOURS AS NEEDED FOR SHORTNESS OF BREATH/DIFFICULT BREATHING 270 mL 3     losartan (COZAAR) 50 MG tablet Take 2 tablets (100 mg) by mouth daily 180 tablet 3     metoprolol succinate ER (TOPROL-XL) 50 MG 24 hr tablet TAKE ONE TABLET BY MOUTH TWICE A  tablet 3     metroNIDAZOLE (METROGEL) 0.75 % topical gel Apply topically daily 45 g 3     mupirocin (BACTROBAN) 2 % cream Apply topically 3 times daily 30 g 1       Allergies    Allergies   Allergen Reactions     Prednisone      Yeast infection - swollen lips       Immunizations    Immunization History   Administered Date(s) Administered     Influenza (High Dose) 3 valent vaccine 10/07/2010, 09/19/2012, 11/04/2013, 10/08/2014, 11/03/2015, 09/16/2016, 10/02/2017,  2018     Influenza (IIV3) PF 10/19/2004, 11/15/2005     Pneumo Conj 13-V (2010&after) 2015     Pneumococcal 23 valent 2006     TD (ADULT, 7+) 1998, 2006, 2018     TDAP Vaccine (Boostrix) 2012     Zoster vaccine recombinant adjuvanted (SHINGRIX) 2018, 2018     Zoster vaccine, live 10/24/2012       Family History    Family History   Problem Relation Age of Onset     Cerebrovascular Disease Mother      Cerebrovascular Disease Father      Cancer - colorectal Brother      Cancer - colorectal Brother      Breast Cancer Sister      Cancer Sister         lung     Cancer Sister         lung     Cancer Sister         lung     Scleroderma Sister        Social History    Social History     Socioeconomic History     Marital status:      Spouse name: thanh     Number of children: Alex     Years of education: 12     Highest education level: Not on file   Occupational History     Occupation: part-time [employer] section at [employer]     Financial resource strain: Not on file     Food insecurity:     Worry: Not on file     Inability: Not on file     Transportation needs:     Medical: Not on file     Non-medical: Not on file   Tobacco Use     Smoking status: Former Smoker     Packs/day: 3.00     Years: 50.00     Pack years: 150.00     Types: Cigarettes     Last attempt to quit: 1993     Years since quittin.2     Smokeless tobacco: Never Used     Tobacco comment: quit    Substance and Sexual Activity     Alcohol use: No     Drug use: No     Comment: no herbal meds either      Sexual activity: Not Currently     Comment:  for 24 years    Lifestyle     Physical activity:     Days per week: Not on file     Minutes per session: Not on file     Stress: Not on file   Relationships     Social connections:     Talks on phone: Not on file     Gets together: Not on file     Attends [employer]ology service: Not on file     Active member of club or  "organization: Not on file     Attends meetings of clubs or organizations: Not on file     Relationship status: Not on file     Intimate partner violence:     Fear of current or ex partner: Not on file     Emotionally abused: Not on file     Physically abused: Not on file     Forced sexual activity: Not on file   Other Topics Concern      Service Not Asked     Blood Transfusions Not Asked     Caffeine Concern Yes     Comment: 1 pot  qd - switched to decaff now     Occupational Exposure Not Asked     Hobby Hazards Not Asked     Sleep Concern Not Asked     Stress Concern Not Asked     Weight Concern Not Asked     Special Diet Yes     Comment: Low salt     Back Care Not Asked     Exercise No     Bike Helmet Not Asked     Seat Belt Yes     Self-Exams Yes     Comment: SBE encouraged motnhly      Parent/sibling w/ CABG, MI or angioplasty before 65F 55M? No   Social History Narrative    calcium - 3 large dairy servings/day - pt declines fosamax and other meds for her osteoporosis    flex sig/colonoscopy -last colonoscopy 7/03     sun precautions - discussed     mammogram - hasn't had one since 2001 at least - ordered     Td booster - 1/23/06    pneumovax -today 4/25/06    DEXA - pt declines repeat scan today 4/25/06 despite her osteoporosis     stool hemoccults - every year after age 40    ASA- easy bruising - can't take     mulvitamin - encouraged       All above reviewed and updated, all stable unless otherwise noted    Recent labs reviewed    ROS:  Constitutional, HEENT, cardiovascular, pulmonary, GI, , musculoskeletal, neuro, skin, endocrine and psych systems are negative, except as otherwise noted.    OBJECTIVE:                                                    /60   Pulse 79   Temp 97.7  F (36.5  C) (Oral)   Ht 1.549 m (5' 1\")   Wt 71.2 kg (157 lb)   SpO2 95%   BMI 29.66 kg/m    Body mass index is 29.66 kg/m .  GENERAL: healthy, alert and no distress  EYES: Eyes grossly normal to " inspection  HENT:ear canals and TM's normal upon viewing with otoscope, nose and mouth without ulcers or lesions upon viewing with otoscope  NECK: no tenderness, no adenopathy, no asymmetry, no masses, no stiffness; thyroid- normal to palpation  RESP: lungs clear to auscultation - no rales, no rhonchi, no wheezes  CV: regular rates and rhythm, normal S1 S2, no S3 or S4 and no murmur, no click or rub -  ABDOMEN: soft, no tenderness, no  hepatosplenomegaly, no masses, normal bowel sounds  MS: extremities- no gross deformities noted, no edema  SKIN: no suspicious lesions, no rashes  NEURO: strength and tone- normal, sensory exam- grossly normal, mentation- intact, speech- normal  BACK: no CVA tenderness, no paralumbar tenderness  PSYCH: Alert and oriented times 3; speech- coherent , normal rate and volume; able to articulate logical thoughts, able to abstract reason, no tangential thoughts, no hallucinations or delusions, affect- normal    DIAGNOSTICS/PROCEDURES:                                                      Results for orders placed or performed in visit on 03/19/19 (from the past 24 hour(s))   *UA reflex to Microscopic and Culture ([place] and [facility] (except [place] and [place])   Result Value Ref Range    Color Urine Yellow     Appearance Urine Clear     Glucose Urine Negative NEG^Negative mg/dL    Bilirubin Urine Negative NEG^Negative    Ketones Urine Negative NEG^Negative mg/dL    Specific Gravity Urine 1.010 1.003 - 1.035    Blood Urine Small (A) NEG^Negative    pH Urine 7.0 5.0 - 7.0 pH    Protein Albumin Urine 30 (A) NEG^Negative mg/dL    Urobilinogen Urine 0.2 0.2 - 1.0 EU/dL    Nitrite Urine Negative NEG^Negative    Leukocyte Esterase Urine Large (A) NEG^Negative    Source Midstream Urine    Urine Microscopic   Result Value Ref Range    WBC Urine >100 (A) OTO5^0 - 5 /HPF    RBC Urine 10-25 (A) OTO2^O - 2 /HPF    Bacteria Urine Many (A) NEG^Negative /HPF        Labs Pending      ASSESSMENT/PLAN:                                                        ICD-10-CM    1. COPD, moderate J44.9 Fluticasone-Umeclidin-Vilanterol (TRELEGY ELLIPTA) 100-62.5-25 MCG/INH oral inhaler     Urine Microscopic   2. Fatigue, unspecified type R53.83 Comprehensive metabolic panel     CBC with platelets     TSH with free T4 reflex     *UA reflex to Microscopic and Culture ([place] and [facility] (except [place] and [place])     Parathyroid Hormone Intact     Vitamin D Deficiency   3. Cold intolerance R68.89 TSH with free T4 reflex     Parathyroid Hormone Intact   4. Dysuria R30.0 *UA reflex to Microscopic and Culture ([facility] (except [place] and [place])     Urine Culture Aerobic Bacterial     Urine Microscopic   5. Malignant neoplasm of upper lobe of left lung (H) C34.12 Comprehensive metabolic panel     CBC with platelets   6. CKD (chronic kidney disease) stage 4, GFR 15-29 ml/min (H) N18.4 Comprehensive metabolic panel     *UA reflex to Microscopic and Culture ([facility] (except [place] and [place])   7. Anemia in stage 4 chronic kidney disease (H) N18.4 CBC with platelets    D63.1    8. Hypertension goal BP (blood pressure) < 140/90 I10 Comprehensive metabolic panel   9. Hyperlipidemia LDL goal <100 E78.5 Comprehensive metabolic panel   10. Primary osteoarthritis involving multiple joints M15.0 acetaminophen-codeine (TYLENOL #3) 300-30 MG tablet   11. Greater trochanteric bursitis of both hips M70.61 acetaminophen-codeine (TYLENOL #3) 300-30 MG tablet    M70.62    12. Chronic venous stasis dermatitis of both lower extremities I87.2    13. Osteoporosis without current pathological fracture, unspecified osteoporosis type M81.0 Comprehensive metabolic panel     Vitamin D Deficiency   14. Secondary renal hyperparathyroidism (H) N25.81 Comprehensive metabolic panel     TSH with free T4 reflex     Parathyroid Hormone Intact   15. Vitamin D deficiency E55.9  Comprehensive metabolic panel     Vitamin D Deficiency   16. Polyneuropathy associated with underlying disease (H) G63 acetaminophen-codeine (TYLENOL #3) 300-30 MG tablet   17. Medication monitoring encounter Z51.81 Comprehensive metabolic panel     CBC with platelets     TSH with free T4 reflex     *UA reflex to Microscopic and Culture ([place] [facility] (except [place] and [place])     Parathyroid Hormone Intact     Vitamin D Deficiency   18. Nonspecific finding on examination of urine R82.90 Urine Culture Aerobic Bacterial       Discussed treatment/modality options, including risk and benefits, she desires advised 1 multivitamin per day, advised calcium 6579-5548 mg/d and Vitamin D 800-1200 IU/d, advised dentist every 6 months, advised diet and exercise and advised opthalmologist every 1-2 years. All diagnosis above reviewed and noted above, otherwise stable.  See [employer] orders for further details.  Follow up as needed.    1) Patient presents today as normotensive. Patient is currently prescribed 5 mg Amlodipine daily, 100 mg Losartan daily, and 50 mg Metoprolol Succinate daily for hypertension management. Advised continued use. Follows with Dr Gauthier.    2) Patient is currently prescribed an albuterol inhaler and Duoneb for lung cancer/COPD management. Advised continued use. Patient consults with Dr. Thompson/Dr Iyer for further lung cancer/COPD management. Recommend continued follow up. Patient prescribed Trelegy inhaler today for further COPD management.     3) UA performed today to check for possible infection. Patient will be informed of results.     4) Tylenol #3 prescription refilled today. Controlled Substance reviewed/agreement signed today.     5) Follow up in 4 months for medication recheck visit.     Health Maintenance Due   Topic Date Due     URINE DRUG SCREEN Q1 YR  01/25/1950     COPD ACTION PLAN Q1 YR  06/01/2016     LIPID MONITORING Q1 YEAR  03/09/2017     MAMMO Q1 YR  10/04/2018      MICROALBUMIN Q1 YEAR  02/19/2019     This document serves as a record of the services and decisions personally performed and made by Rashi Calle MD. It was created on his behalf by Flash Sutton, a trained medical scribe. The creation of this document is based on the provider's statements to the medical scribe.  Flash Sutton March 19, 2019 8:12 AM     The information in this document, created by the medical scribe for me, accurately reflects the services I personally performed and the decisions made by me. I have reviewed and approved this document for accuracy prior to leaving the patient care area.  March 19, 2019            Rashi Calle MD [address]  [phone] [phone] Fax

## 2019-03-19 NOTE — PATIENT INSTRUCTIONS
[facility]                        To reach your care team during and after hours:   [phone]  To reach our pharmacy:        [phone]    Clinic Hours                        Our clinic hours are:    Monday   7:30 am to 7:00 pm                  Tuesday through Friday 7:30 am to 5:00 pm                             Saturday   8:00 am to 12:00 pm      Sunday   Closed      Pharmacy Hours                        Our pharmacy hours are:    Monday   8:30 am to 7:00 pm       Tuesday to Friday  8:30 am to 6:00 pm                       Saturday    9:00 am to 1:00 pm              Sunday    Closed              There is also information available at our web site:  [email or web address]    If your provider ordered any lab tests and you do not receive the results within 10 business days, please call the clinic.    If you need a medication refill please contact your pharmacy.  Please allow 2-3 business days for your refill to be completed.    Our clinic offers telephone visits and e visits.  Please ask one of your team members to explain more.      Use [employer] (secure email communication and access to your chart) to send your primary care provider a message or make an appointment. Ask someone on your Team how to sign up for [employer].  Immunizations                      Immunization History   Administered Date(s) Administered     Influenza (High Dose) 3 valent vaccine 10/07/2010, 09/19/2012, 11/04/2013, 10/08/2014, 11/03/2015, 09/16/2016, 10/02/2017, 09/13/2018     Influenza (IIV3) PF 10/19/2004, 11/15/2005     Pneumo Conj 13-V (2010&after) 11/03/2015     Pneumococcal 23 valent 04/25/2006     TD (ADULT, 7+) 03/03/1998, 01/23/2006, 06/21/2018     TDAP Vaccine (Boostrix) 08/27/2012     Zoster vaccine recombinant adjuvanted (SHINGRIX) 07/11/2018, 09/13/2018     Zoster vaccine, live 10/24/2012        Health Maintenance                         Health Maintenance Due   Topic Date Due     URINE DRUG SCREEN Q1 YR   01/25/1950     COPD ACTION PLAN Q1 YR  06/01/2016     Cholesterol Lab - yearly  03/09/2017     Mammogram - yearly  10/04/2018     Microalbumin Lab - yearly  02/19/2019

## 2019-03-21 LAB
BACTERIA SPEC CULT: ABNORMAL
DEPRECATED CALCIDIOL+CALCIFEROL SERPL-MC: 21 UG/L (ref 20–75)
Lab: ABNORMAL
SPECIMEN SOURCE: ABNORMAL

## 2019-03-22 ENCOUNTER — TELEPHONE (OUTPATIENT)
Dept: FAMILY MEDICINE | Facility: CLINIC | Age: 84
End: 2019-03-22

## 2019-03-22 DIAGNOSIS — N18.4 CKD (CHRONIC KIDNEY DISEASE) STAGE 4, GFR 15-29 ML/MIN (H): ICD-10-CM

## 2019-03-22 DIAGNOSIS — D64.9 LOW HEMOGLOBIN: ICD-10-CM

## 2019-03-22 DIAGNOSIS — E55.9 VITAMIN D DEFICIENCY: Primary | ICD-10-CM

## 2019-03-22 DIAGNOSIS — N39.0 URINARY TRACT INFECTION: ICD-10-CM

## 2019-03-22 DIAGNOSIS — R79.89 LOW SERUM SODIUM: ICD-10-CM

## 2019-03-22 DIAGNOSIS — R79.89 LOW SERUM PARATHYROID HORMONE (PTH): ICD-10-CM

## 2019-03-22 RX ORDER — CEFDINIR 300 MG/1
300 CAPSULE ORAL DAILY
Qty: 10 CAPSULE | Refills: 0 | Status: SHIPPED | OUTPATIENT
Start: 2019-03-22 | End: 2019-07-29

## 2019-03-22 RX ORDER — CHOLECALCIFEROL (VITAMIN D3) 50 MCG
2 TABLET ORAL DAILY
Qty: 180 TABLET | Refills: 0 | Status: SHIPPED | OUTPATIENT
Start: 2019-03-22 | End: 2019-10-29

## 2019-03-22 NOTE — TELEPHONE ENCOUNTER
Called pt regarding results. Pt wanted a letter printed and picked up at the  today. Printed and brought to .    Nathalie Joseph RN  [facility]

## 2019-03-30 DIAGNOSIS — I10 HYPERTENSION GOAL BP (BLOOD PRESSURE) < 140/90: ICD-10-CM

## 2019-03-30 DIAGNOSIS — N18.4 CKD (CHRONIC KIDNEY DISEASE) STAGE 4, GFR 15-29 ML/MIN (H): ICD-10-CM

## 2019-04-01 NOTE — TELEPHONE ENCOUNTER
"Requested Prescriptions   Pending Prescriptions Disp Refills     furosemide (LASIX) 20 MG tablet [Pharmacy Med Name: FUROSEMIDE 20MG TABS] 180 tablet 3      Last Written Prescription Date:  2.19.18  Last Fill Quantity: 180 tablet,  # refills: 3   Last office visit: 3/19/2019 with prescribing provider:  Rashi Calle MD         Future Office Visit:       Sig: TAKE TWO TABLETS (40 MG) BY MOUTH EVERY MORNING    Diuretics (Including Combos) Protocol Failed - 3/30/2019  9:52 AM       Failed - Normal serum creatinine on file in past 12 months    Recent Labs   Lab Test 03/19/19  0835   CR 1.72*             Failed - Normal serum sodium on file in past 12 months    Recent Labs   Lab Test 03/19/19  0835   *             Passed - Blood pressure under 140/90 in past 12 months    BP Readings from Last 3 Encounters:   03/19/19 138/60   03/06/19 166/60   02/27/19 165/52                Passed - Recent (12 mo) or future (30 days) visit within the authorizing provider's specialty    Patient had office visit in the last 12 months or has a visit in the next 30 days with authorizing provider or within the authorizing provider's specialty.  See \"Patient Info\" tab in inbasket, or \"Choose Columns\" in Meds & Orders section of the refill encounter.             Passed - Medication is active on med list       Passed - Patient is age 18 or older       Passed - No active pregancy on record       Passed - Normal serum potassium on file in past 12 months    Recent Labs   Lab Test 03/19/19  0835   POTASSIUM 5.0                   Passed - No positive pregnancy test in past 12 months        "

## 2019-04-02 RX ORDER — FUROSEMIDE 20 MG
TABLET ORAL
Qty: 180 TABLET | Refills: 1 | Status: SHIPPED | OUTPATIENT
Start: 2019-04-02 | End: 2019-10-01

## 2019-04-17 DIAGNOSIS — J44.9 COPD, MODERATE (H): ICD-10-CM

## 2019-04-17 RX ORDER — BUDESONIDE AND FORMOTEROL FUMARATE DIHYDRATE 160; 4.5 UG/1; UG/1
AEROSOL RESPIRATORY (INHALATION)
Qty: 30.6 G | Refills: 3 | Status: SHIPPED | OUTPATIENT
Start: 2019-04-17 | End: 2019-10-29

## 2019-04-17 NOTE — TELEPHONE ENCOUNTER
"Requested Prescriptions   Pending Prescriptions Disp Refills     SYMBICORT 160-4.5 MCG/ACT Inhaler [Pharmacy Med Name: SYMBICORT 160-4.5MCG/ACT AERO] 10.2 g 11     Sig: INHALE TWO PUFFS BY MOUTH TWICE A DAY       Inhaled Steroids Protocol Failed - 4/17/2019  2:16 PM        Failed - Medication is active on med list        Passed - Patient is age 12 or older        Passed - Recent (12 mo) or future (30 days) visit within the authorizing provider's specialty     Patient had office visit in the last 12 months or has a visit in the next 30 days with authorizing provider or within the authorizing provider's specialty.  See \"Patient Info\" tab in inbasket, or \"Choose Columns\" in Meds & Orders section of the refill encounter.              Pts chart notes Trelegy and Albuterol inhalers.    Pt noted they didn't use this the last two years. The Trelegy is $558. Pt used this for a month and it did not work at all. Symbicort is $350.     Routing to PCP for further review/recommendations/orders.  No need to call pt back. Please approve/advise and send through.     Nathalie Joseph RN  [facility] [facility]    "

## 2019-05-04 DIAGNOSIS — I10 HYPERTENSION GOAL BP (BLOOD PRESSURE) < 140/90: ICD-10-CM

## 2019-05-04 DIAGNOSIS — N18.4 CKD (CHRONIC KIDNEY DISEASE) STAGE 4, GFR 15-29 ML/MIN (H): ICD-10-CM

## 2019-05-06 RX ORDER — LOSARTAN POTASSIUM 50 MG/1
TABLET ORAL
Qty: 180 TABLET | Refills: 0 | Status: SHIPPED | OUTPATIENT
Start: 2019-05-06 | End: 2019-08-03

## 2019-05-06 NOTE — TELEPHONE ENCOUNTER
Medication is being filled for 1 time refill only due to:  Patient needs labs see below note.   Nathalie Joseph RN  [facility]

## 2019-05-06 NOTE — TELEPHONE ENCOUNTER
"Requested Prescriptions   Pending Prescriptions Disp Refills     losartan (COZAAR) 50 MG tablet [Pharmacy Med Name: LOSARTAN POTASSIUM 50MG TABS]      Last Written Prescription Date:  2.19.18  Last Fill Quantity: 180 tablet,  # refills: 3   Last office visit: 3/19/2019 with prescribing provider:  Rashi Calle MD       Future Office Visit:       180 tablet 3     Sig: TAKE TWO TABLETS BY MOUTH DAILY       Angiotensin-II Receptors Failed - 5/4/2019 10:39 AM        Failed - Normal serum creatinine on file in past 12 months     Recent Labs   Lab Test 03/19/19  0835  12/07/12  1220   CR 1.72*   < >  --    CREAT  --   --  1.5*    < > = values in this interval not displayed.             Passed - Blood pressure under 140/90 in past 12 months     BP Readings from Last 3 Encounters:   03/19/19 138/60   03/06/19 166/60   02/27/19 165/52                 Passed - Recent (12 mo) or future (30 days) visit within the authorizing provider's specialty     Patient had office visit in the last 12 months or has a visit in the next 30 days with authorizing provider or within the authorizing provider's specialty.  See \"Patient Info\" tab in inbasket, or \"Choose Columns\" in Meds & Orders section of the refill encounter.              Passed - Medication is active on med list        Passed - Patient is age 18 or older        Passed - No active pregnancy on record        Passed - Normal serum potassium on file in past 12 months     Recent Labs   Lab Test 03/19/19  0835   POTASSIUM 5.0                    Passed - No positive pregnancy test in past 12 months        "

## 2019-05-14 ENCOUNTER — TELEPHONE (OUTPATIENT)
Dept: FAMILY MEDICINE | Facility: CLINIC | Age: 84
End: 2019-05-14

## 2019-05-14 DIAGNOSIS — N18.4 CKD (CHRONIC KIDNEY DISEASE) STAGE 4, GFR 15-29 ML/MIN (H): ICD-10-CM

## 2019-05-14 DIAGNOSIS — N39.0 URINARY TRACT INFECTION: ICD-10-CM

## 2019-05-14 DIAGNOSIS — E55.9 VITAMIN D DEFICIENCY: ICD-10-CM

## 2019-05-14 DIAGNOSIS — D64.9 LOW HEMOGLOBIN: ICD-10-CM

## 2019-05-14 DIAGNOSIS — R79.89 LOW SERUM PARATHYROID HORMONE (PTH): ICD-10-CM

## 2019-05-14 DIAGNOSIS — R79.89 LOW SERUM SODIUM: ICD-10-CM

## 2019-05-14 LAB
ALBUMIN UR-MCNC: NEGATIVE MG/DL
APPEARANCE UR: CLEAR
BASOPHILS # BLD AUTO: 0.1 10E9/L (ref 0–0.2)
BASOPHILS NFR BLD AUTO: 0.5 %
BILIRUB UR QL STRIP: NEGATIVE
COLOR UR AUTO: YELLOW
DEPRECATED CALCIDIOL+CALCIFEROL SERPL-MC: 26 UG/L (ref 20–75)
DIFFERENTIAL METHOD BLD: ABNORMAL
EOSINOPHIL # BLD AUTO: 0.5 10E9/L (ref 0–0.7)
EOSINOPHIL NFR BLD AUTO: 4.8 %
ERYTHROCYTE [DISTWIDTH] IN BLOOD BY AUTOMATED COUNT: 12.4 % (ref 10–15)
GLUCOSE UR STRIP-MCNC: NEGATIVE MG/DL
HCT VFR BLD AUTO: 31.4 % (ref 35–47)
HGB BLD-MCNC: 10.4 G/DL (ref 11.7–15.7)
HGB UR QL STRIP: NEGATIVE
KETONES UR STRIP-MCNC: NEGATIVE MG/DL
LEUKOCYTE ESTERASE UR QL STRIP: NEGATIVE
LYMPHOCYTES # BLD AUTO: 2 10E9/L (ref 0.8–5.3)
LYMPHOCYTES NFR BLD AUTO: 20.8 %
MCH RBC QN AUTO: 30.3 PG (ref 26.5–33)
MCHC RBC AUTO-ENTMCNC: 33.1 G/DL (ref 31.5–36.5)
MCV RBC AUTO: 92 FL (ref 78–100)
MONOCYTES # BLD AUTO: 1.3 10E9/L (ref 0–1.3)
MONOCYTES NFR BLD AUTO: 12.9 %
NEUTROPHILS # BLD AUTO: 5.9 10E9/L (ref 1.6–8.3)
NEUTROPHILS NFR BLD AUTO: 61 %
NITRATE UR QL: NEGATIVE
PH UR STRIP: 7 PH (ref 5–7)
PLATELET # BLD AUTO: 324 10E9/L (ref 150–450)
PTH-INTACT SERPL-MCNC: 9 PG/ML (ref 18–80)
RBC # BLD AUTO: 3.43 10E12/L (ref 3.8–5.2)
SOURCE: NORMAL
SP GR UR STRIP: 1.01 (ref 1–1.03)
UROBILINOGEN UR STRIP-ACNC: 0.2 EU/DL (ref 0.2–1)
WBC # BLD AUTO: 9.7 10E9/L (ref 4–11)

## 2019-05-14 PROCEDURE — 36415 COLL VENOUS BLD VENIPUNCTURE: CPT | Performed by: FAMILY MEDICINE

## 2019-05-14 PROCEDURE — 85025 COMPLETE CBC W/AUTO DIFF WBC: CPT | Performed by: FAMILY MEDICINE

## 2019-05-14 PROCEDURE — 81003 URINALYSIS AUTO W/O SCOPE: CPT | Performed by: FAMILY MEDICINE

## 2019-05-14 PROCEDURE — 80048 BASIC METABOLIC PNL TOTAL CA: CPT | Performed by: FAMILY MEDICINE

## 2019-05-14 PROCEDURE — 82306 VITAMIN D 25 HYDROXY: CPT | Performed by: FAMILY MEDICINE

## 2019-05-14 PROCEDURE — 87086 URINE CULTURE/COLONY COUNT: CPT | Performed by: FAMILY MEDICINE

## 2019-05-14 PROCEDURE — 83970 ASSAY OF PARATHORMONE: CPT | Performed by: FAMILY MEDICINE

## 2019-05-14 NOTE — TELEPHONE ENCOUNTER
Reason for Call:  Other     Detailed comments: The patient would like a copy of her lab results from 05/14/2019 mailed to her home address as she is seeing a kidney specialist in the next few weeks. [address]    [address] [facility] [place] [address]    Phone Number Patient can be reached at: Home number on file [phone] (home)    Best Time: Anytime    Can we leave a detailed message on this number? YES    Call taken on 5/14/2019 at 7:40 AM by Leslie Garduno

## 2019-05-15 LAB
ANION GAP SERPL CALCULATED.3IONS-SCNC: 9 MMOL/L (ref 3–14)
BUN SERPL-MCNC: 51 MG/DL (ref 7–30)
CALCIUM SERPL-MCNC: 10 MG/DL (ref 8.5–10.1)
CHLORIDE SERPL-SCNC: 99 MMOL/L (ref 94–109)
CO2 SERPL-SCNC: 21 MMOL/L (ref 20–32)
CREAT SERPL-MCNC: 1.88 MG/DL (ref 0.52–1.04)
GFR SERPL CREATININE-BSD FRML MDRD: 24 ML/MIN/{1.73_M2}
GLUCOSE SERPL-MCNC: 85 MG/DL (ref 70–99)
POTASSIUM SERPL-SCNC: 4.8 MMOL/L (ref 3.4–5.3)
SODIUM SERPL-SCNC: 129 MMOL/L (ref 133–144)

## 2019-05-17 LAB
BACTERIA SPEC CULT: NO GROWTH
SPECIMEN SOURCE: NORMAL

## 2019-05-17 NOTE — RESULT ENCOUNTER NOTE
Please call pt with results below:     -All of your labs are normal or pretty stable from previous with exception of your creatininine/kidney function test which is slightly worse from previous. Sodium level is stable , but still a bit low at 129 which it has been for the last 8-9 months.   Urinalysis - routine testing of your urine - was normal.   Urine culture was negative for any signs of infection.   Parathyroid level is a bit lower than previous, but your calcium level = normal. This is good.  Recommend seeing endocrinology re: this. Triage, please place order for   [facility] - dx: low serum parathyroid level.     Daly Amezquita MD for Dr. Rashi Calle  out of office.      For additional lab test information, [email or web address] is an excellent reference.

## 2019-06-04 ENCOUNTER — TRANSFERRED RECORDS (OUTPATIENT)
Dept: HEALTH INFORMATION MANAGEMENT | Facility: CLINIC | Age: 84
End: 2019-06-04

## 2019-06-17 DIAGNOSIS — M70.61 GREATER TROCHANTERIC BURSITIS OF BOTH HIPS: ICD-10-CM

## 2019-06-17 DIAGNOSIS — G63 POLYNEUROPATHY ASSOCIATED WITH UNDERLYING DISEASE (H): ICD-10-CM

## 2019-06-17 DIAGNOSIS — M70.62 GREATER TROCHANTERIC BURSITIS OF BOTH HIPS: ICD-10-CM

## 2019-06-17 DIAGNOSIS — M15.0 PRIMARY OSTEOARTHRITIS INVOLVING MULTIPLE JOINTS: ICD-10-CM

## 2019-06-17 NOTE — TELEPHONE ENCOUNTER
Controlled Substance Refill Request for acetaminophen-codeine (TYLENOL #3) 300-30 MG tablet  Problem List Complete:  Yes    Last Written Prescription Date:  3.19.19  Last Fill Quantity: 90 tablet,   # refills: 0    [facility] Office Visit with [facility] [place]: 3.19.19    Future Office visit:     Controlled substance agreement:   Encounter-Level CSA:    There are no encounter-level csa.     Patient-Level CSA:    Controlled Substance Agreement - Opioid - Scan on 3/20/2019  8:22 AM (below)           Last Urine Drug Screen: No results found for: CDAUT, No results found for: COMDAT, No results found for: THC13, PCP13, COC13, MAMP13, OPI13, AMP13, BZO13, TCA13, MTD13, BAR13, OXY13, PPX13, BUP13     Processing:  Fax Rx to listed pharmacy    [email or web address]   checked in past 3 months?  No, route to RN

## 2019-06-18 NOTE — TELEPHONE ENCOUNTER
Routing refill request to provider for review/approval because:  Drug not on the [facility] refill protocol     Ariadna Starks RN, BSN  [facility]

## 2019-06-29 DIAGNOSIS — I10 HYPERTENSION GOAL BP (BLOOD PRESSURE) < 140/90: ICD-10-CM

## 2019-06-29 DIAGNOSIS — N18.4 CKD (CHRONIC KIDNEY DISEASE) STAGE 4, GFR 15-29 ML/MIN (H): ICD-10-CM

## 2019-07-01 RX ORDER — AMLODIPINE BESYLATE 5 MG/1
TABLET ORAL
Qty: 180 TABLET | Refills: 1 | Status: SHIPPED | OUTPATIENT
Start: 2019-07-01 | End: 2019-10-29

## 2019-07-01 NOTE — TELEPHONE ENCOUNTER
"Requested Prescriptions   Pending Prescriptions Disp Refills     amLODIPine (NORVASC) 5 MG tablet [Pharmacy Med Name: AMLODIPINE BESYLATE 5MG TABS]            Last Written Prescription Date:  4.24.18  Last Fill Quantity: 180 tablet,  # refills: 3   Last office visit: 3/19/2019 with prescribing provider:  Rashi Calle MD               Future Office Visit:       180 tablet 3     Sig: TAKE ONE TABLET BY MOUTH TWO TIMES A DAY       Calcium Channel Blockers Protocol  Failed - 6/29/2019 11:48 AM        Failed - Normal serum creatinine on file in past 12 months     Recent Labs   Lab Test 05/14/19  0730  12/07/12  1220   CR 1.88*   < >  --    CREAT  --   --  1.5*    < > = values in this interval not displayed.             Passed - Blood pressure under 140/90 in past 12 months     BP Readings from Last 3 Encounters:   03/19/19 138/60   03/06/19 166/60   02/27/19 165/52                 Passed - Recent (12 mo) or future (30 days) visit within the authorizing provider's specialty     Patient had office visit in the last 12 months or has a visit in the next 30 days with authorizing provider or within the authorizing provider's specialty.  See \"Patient Info\" tab in inbasket, or \"Choose Columns\" in Meds & Orders section of the refill encounter.              Passed - Medication is active on med list        Passed - Patient is age 18 or older        Passed - No active pregnancy on record        Passed - No positive pregnancy test in past 12 months        "

## 2019-07-01 NOTE — TELEPHONE ENCOUNTER
Routing refill request to provider for review/approval because:  Labs out of range:  MACIE AquinoN, RN  [facility]

## 2019-07-10 ENCOUNTER — TELEPHONE (OUTPATIENT)
Dept: FAMILY MEDICINE | Facility: CLINIC | Age: 84
End: 2019-07-10

## 2019-07-10 DIAGNOSIS — I10 HYPERTENSION GOAL BP (BLOOD PRESSURE) < 140/90: ICD-10-CM

## 2019-07-10 DIAGNOSIS — N18.4 CKD (CHRONIC KIDNEY DISEASE) STAGE 4, GFR 15-29 ML/MIN (H): ICD-10-CM

## 2019-07-10 DIAGNOSIS — R79.9 ABNORMAL FINDING OF BLOOD CHEMISTRY: ICD-10-CM

## 2019-07-10 DIAGNOSIS — H35.61 RETINAL HEMORRHAGE OF RIGHT EYE: Primary | ICD-10-CM

## 2019-07-10 NOTE — TELEPHONE ENCOUNTER
Patient calling, she was advised by her eye doctor to contact her pcp re: diabetes. She is wondering if this was request at her last lab visit. Does Dr. Calle have any suggestions? Please advise  [phone] (home)   Ok to leave detailed message: yes    Thank you  Juliana Stevenson

## 2019-07-10 NOTE — TELEPHONE ENCOUNTER
No recent evidence    Glucose   Date Value Ref Range Status   05/14/2019 85 70 - 99 mg/dL Final   03/19/2019 83 70 - 99 mg/dL Final   09/13/2018 88 70 - 99 mg/dL Final   07/26/2018 85 70 - 99 mg/dL Final   07/19/2018 68 (L) 70 - 99 mg/dL Final     Lab Results   Component Value Date    A1C 5.6 10/02/2017     Please recheck fasting labs (BMP, A1c)

## 2019-07-10 NOTE — TELEPHONE ENCOUNTER
Called patient @ # below -     Advised of MD GENESIS message below  Lab Only scheduled for 7/13/19      Annika Novak RN  [facility]

## 2019-07-10 NOTE — TELEPHONE ENCOUNTER
"Patient calling again    Stated she went to the eye doctor this morning and they put a shot in her eye. It has gotten worse now. That provider stated he is \"beginning to wonder if she has diabetes\".     Current sx: urinary frequency (mainly @ NOC, chronic issue)     DENIES: Frequent urination, Increased thirst, Blurred vision, Fatigue, Shakiness, Sweating, Hunger, Irritability/moodiness, Anxiety/nervousness    Patient wondering if she can do just lab only appointment to look at diabetes (A1C) to satisfy her eye doctor.     Last Glucose done on 5/14/19:   Glucose 85   70 - 99 mg/dL Final     Lab Results   Component Value Date    A1C 5.6 10/02/2017     Routing to PCP for further review/recommendations/orders.  Glucose, A1C pended    Annika Novak RN  [facility]  "

## 2019-07-13 DIAGNOSIS — I10 HYPERTENSION GOAL BP (BLOOD PRESSURE) < 140/90: ICD-10-CM

## 2019-07-13 DIAGNOSIS — H35.61 RETINAL HEMORRHAGE OF RIGHT EYE: ICD-10-CM

## 2019-07-13 DIAGNOSIS — N18.4 CKD (CHRONIC KIDNEY DISEASE) STAGE 4, GFR 15-29 ML/MIN (H): ICD-10-CM

## 2019-07-13 DIAGNOSIS — R79.9 ABNORMAL FINDING OF BLOOD CHEMISTRY: ICD-10-CM

## 2019-07-13 LAB
ANION GAP SERPL CALCULATED.3IONS-SCNC: 9 MMOL/L (ref 3–14)
BUN SERPL-MCNC: 43 MG/DL (ref 7–30)
CALCIUM SERPL-MCNC: 10.2 MG/DL (ref 8.5–10.1)
CHLORIDE SERPL-SCNC: 98 MMOL/L (ref 94–109)
CO2 SERPL-SCNC: 22 MMOL/L (ref 20–32)
CREAT SERPL-MCNC: 2.04 MG/DL (ref 0.52–1.04)
GFR SERPL CREATININE-BSD FRML MDRD: 22 ML/MIN/{1.73_M2}
GLUCOSE SERPL-MCNC: 92 MG/DL (ref 70–99)
HBA1C MFR BLD: 5.5 % (ref 0–5.6)
POTASSIUM SERPL-SCNC: 5 MMOL/L (ref 3.4–5.3)
SODIUM SERPL-SCNC: 129 MMOL/L (ref 133–144)

## 2019-07-13 PROCEDURE — 36415 COLL VENOUS BLD VENIPUNCTURE: CPT | Performed by: FAMILY MEDICINE

## 2019-07-13 PROCEDURE — 80048 BASIC METABOLIC PNL TOTAL CA: CPT | Performed by: FAMILY MEDICINE

## 2019-07-13 PROCEDURE — 83036 HEMOGLOBIN GLYCOSYLATED A1C: CPT | Performed by: FAMILY MEDICINE

## 2019-07-15 ENCOUNTER — TELEPHONE (OUTPATIENT)
Dept: FAMILY MEDICINE | Facility: CLINIC | Age: 84
End: 2019-07-15

## 2019-07-15 NOTE — TELEPHONE ENCOUNTER
Patient calling for lab results.  Patient was notified with information noted by provider and agreed with plan.  Patient would like to follow up with PCP, no openings available, per patient she was seen last week by nephrologist and was told she is getting older and her kidney functioning is decreasing as a part of the aging process.      No openings with PCP, routing to team to see where patient can be seen.  Also, please send letter with results to patient.        MACIE UribeN, RN  [facility] [employer] [facility]

## 2019-07-16 NOTE — TELEPHONE ENCOUNTER
Called & left detailed message about appointment. Told  Patient to only call us back if she couldn't make that date & time.  Scheduled patient.  Yudelka Delong, Clinic Receptionist

## 2019-07-29 ENCOUNTER — OFFICE VISIT (OUTPATIENT)
Dept: FAMILY MEDICINE | Facility: CLINIC | Age: 84
End: 2019-07-29
Payer: MEDICARE

## 2019-07-29 VITALS
SYSTOLIC BLOOD PRESSURE: 176 MMHG | BODY MASS INDEX: 29.83 KG/M2 | TEMPERATURE: 98 F | HEART RATE: 80 BPM | HEIGHT: 61 IN | OXYGEN SATURATION: 92 % | DIASTOLIC BLOOD PRESSURE: 68 MMHG | WEIGHT: 158 LBS

## 2019-07-29 DIAGNOSIS — R79.89 LOW SERUM SODIUM: ICD-10-CM

## 2019-07-29 DIAGNOSIS — Z51.81 MEDICATION MONITORING ENCOUNTER: ICD-10-CM

## 2019-07-29 DIAGNOSIS — I87.2 CHRONIC VENOUS STASIS DERMATITIS OF BOTH LOWER EXTREMITIES: ICD-10-CM

## 2019-07-29 DIAGNOSIS — E78.5 HYPERLIPIDEMIA LDL GOAL <100: ICD-10-CM

## 2019-07-29 DIAGNOSIS — M81.0 OSTEOPOROSIS WITHOUT CURRENT PATHOLOGICAL FRACTURE, UNSPECIFIED OSTEOPOROSIS TYPE: ICD-10-CM

## 2019-07-29 DIAGNOSIS — N18.4 CKD (CHRONIC KIDNEY DISEASE) STAGE 4, GFR 15-29 ML/MIN (H): Primary | ICD-10-CM

## 2019-07-29 DIAGNOSIS — R79.89 LOW SERUM PARATHYROID HORMONE (PTH): ICD-10-CM

## 2019-07-29 DIAGNOSIS — D63.1 ANEMIA IN STAGE 4 CHRONIC KIDNEY DISEASE (H): ICD-10-CM

## 2019-07-29 DIAGNOSIS — I10 HYPERTENSION GOAL BP (BLOOD PRESSURE) < 140/90: ICD-10-CM

## 2019-07-29 DIAGNOSIS — N25.81 SECONDARY RENAL HYPERPARATHYROIDISM (H): ICD-10-CM

## 2019-07-29 DIAGNOSIS — N18.4 ANEMIA IN STAGE 4 CHRONIC KIDNEY DISEASE (H): ICD-10-CM

## 2019-07-29 DIAGNOSIS — G63 POLYNEUROPATHY ASSOCIATED WITH UNDERLYING DISEASE (H): ICD-10-CM

## 2019-07-29 DIAGNOSIS — M15.0 PRIMARY OSTEOARTHRITIS INVOLVING MULTIPLE JOINTS: ICD-10-CM

## 2019-07-29 DIAGNOSIS — E55.9 VITAMIN D DEFICIENCY: ICD-10-CM

## 2019-07-29 DIAGNOSIS — C34.12 MALIGNANT NEOPLASM OF UPPER LOBE OF LEFT LUNG (H): ICD-10-CM

## 2019-07-29 DIAGNOSIS — D64.9 LOW HEMOGLOBIN: ICD-10-CM

## 2019-07-29 DIAGNOSIS — J44.9 COPD, MODERATE (H): ICD-10-CM

## 2019-07-29 PROCEDURE — 99214 OFFICE O/P EST MOD 30 MIN: CPT | Performed by: FAMILY MEDICINE

## 2019-07-29 ASSESSMENT — MIFFLIN-ST. JEOR: SCORE: 1109.06

## 2019-07-29 NOTE — PROGRESS NOTES
SUBJECTIVE:                                                    Anat Correa is a 84 year old female who presents to clinic today for the following health issues:    Follow up on labs from 5/2019    Patient presented today because she was recently seen by ophthalmology who told her there might be some diabetic changes to her eyes. Patient's glucose labs were normal.    Glucose   Date Value Ref Range Status   07/13/2019 92 70 - 99 mg/dL Final   05/14/2019 85 70 - 99 mg/dL Final   03/19/2019 83 70 - 99 mg/dL Final   09/13/2018 88 70 - 99 mg/dL Final   07/26/2018 85 70 - 99 mg/dL Final     Lab Results   Component Value Date    A1C 5.5 07/13/2019    A1C 5.6 10/02/2017     Hypertension/CKD: Patient presents today as hypertensive(176/86). Patient is currently prescribed 5 mg Amlodipine daily, 100 mg Losartan daily, and 50 mg Metoprolol Succinate daily for hypertension management. Patient consults with Dr. Gauthier of nephrology for further hypertension/CKD management.     BP Readings from Last 5 Encounters:   07/29/19 (!) 176/68   03/19/19 138/60   03/06/19 166/60   02/27/19 165/52   12/13/18 138/68     Creatinine   Date Value Ref Range Status   07/13/2019 2.04 (H) 0.52 - 1.04 mg/dL Final     Lung Cancer/COPD: Stable. Patient is currently prescribed an albuterol inhaler, a Trelegy inhaler, and Duoneb for lung cancer/COPD management. Patient consults with Dr. Thompson/Dr Iyer for further lung cancer/COPD management.    Reviewed and updated as needed this visit by Provider    body mass index is 29.85 kg/m .    Wt Readings from Last 4 Encounters:   07/29/19 71.7 kg (158 lb)   03/19/19 71.2 kg (157 lb)   12/13/18 68.9 kg (152 lb)   09/13/18 70.8 kg (156 lb)       Health Maintenance    Health Maintenance Due   Topic Date Due     URINE DRUG SCREEN  1935     LIPID  03/09/2017     MAMMO SCREENING  10/04/2018     MICROALBUMIN  02/19/2019     MEDICARE ANNUAL WELLNESS VISIT  04/24/2019       Current Problem List    Patient  Active Problem List   Diagnosis     History of colonic polyps     Calculus of kidney     Lesion of plantar nerve     Osteoporosis     Primary iridocyclitis     Anemia     Hyperlipidemia LDL goal <100     OA (osteoarthritis)     Advanced directives, counseling/discussion     Hypertension goal BP (blood pressure) < 140/90     COPD, moderate     Controlled substance agreement signed     Vitamin D deficiency     CKD (chronic kidney disease) stage 4, GFR 15-29 ml/min (H)     Anemia in chronic renal disease     Secondary renal hyperparathyroidism (H)     Venous stasis of lower extremity     Peripheral neuropathy     Chronic pain     Lung nodule     Nodule of left lung     Malignant neoplasm of upper lobe of left lung (H)     Acute pain of right shoulder     S/P amputation of lesser toe, unspecified laterality (H)     Retinal hemorrhage of right eye       Past Medical History    Past Medical History:   Diagnosis Date     Anemia      Calculus of kidney 1998    dr hope     Chronic pain     OA     CKD (chronic kidney disease) stage 4, GFR 15-29 ml/min (H) 2008    dr mcdermott     COPD, moderate (H) 2004    moderate obstruction, with pulm htn - dr francisco did AAP     Diverticulosis of colon (without mention of hemorrhage) 7/03     Hemorrhage of gastrointestinal tract, unspecified 4/08    dr su     Hyperlipidemia LDL goal <100 2006     Hypertension goal BP (blood pressure) < 140/90 2005     Internal hemorrhoids without mention of complication 7/03     Irritable bowel syndrome 7/03     Lesion of plantar nerve 8/98    hay's neuroma dr connor     Lung nodule 4/14, 3/16    Dr Thompson, 1.4 x 1.1 cm, lingula, PET neg 3/16     Malignant neoplasm of upper lobe of left lung (H) 7/16    Dr Thompson - x 2 nodules - moderately differentiated adenocarcinoma (acinar predominant).  Final pathologic stage [id number], Final clinical stage IA, grade 2     Medication management     OA - 1 T#3 at HS with HX CKD     OA (osteoarthritis) 1998     lower ext worse katya knees     Obesity (BMI 30.0-34.9)      Osteoporosis, unspecified     FOSAMAX  = upset stomach , pt declines further rx.      Other ulcerative colitis     collangenous collitis- last colonoscopy       Peripheral neuropathy     early - burning at HS     Personal history of colonic polyps     dr araujo     PONV (postoperative nausea and vomiting)      Primary iridocyclitis     iritis dr dominguez     Venous stasis of lower extremity        Past Surgical History    Past Surgical History:   Procedure Laterality Date     AMPUTATE TOE(S) Right 2015    Procedure: AMPUTATE TOE(S);  Surgeon: Ashia White, DPM, Pod;  Location: [facility] OR     C APPENDECTOMY       C  DELIVERY ONLY      , Low Cervical     EXCISE MASS UPPER EXTREMITY  10/13/2011    Lt Forearm mass excision - dr ely     EXCISE MASS UPPER EXTREMITY  2012    Lt Forearm mass excision - dr ely     HC COLONOSCOPY THRU STOMA, DIAGNOSTIC      IBS/diverticula/hemmorhoids dr araujo     HC ESOPHAGOSCOPY, DIAGNOSTIC  , , 6/10    Gastric ulcer, healed, gastritis     HC HEMORRHOIDECTOMY,INT/EXT,SIMPLE       HC REPAIR SLIDING INGUINAL HERNIA      mitra     SURGICAL HISTORY OF -       mitra neck & back tumors     SURGICAL HISTORY OF -       neuroma excision - 2nd toe amputation     SURGICAL HISTORY OF -   3/07    Rt IOL - dr jimenez     SURGICAL HISTORY OF -       Lt IOL - dr jimenez     SURGICAL HISTORY OF -       Lt Knee replacement - dr gayle     SURGICAL HISTORY OF -       Rt Knee replacement - dr gayle     SURGICAL HISTORY OF -   9/15    Rt Second toe amputation - Dr White     THORACOSCOPIC WEDGE RESECTION LUNG Left 2016    Procedure: THORACOSCOPIC WEDGE RESECTION LUNG;  Surgeon: Abdelarhman Thompson MD;  Location: [facility] OR     XR JOINT INJECTION HIP (HIB)  2015    Rt - Dr Terry       Current Medications    Current Outpatient Medications    Medication Sig Dispense Refill     acetaminophen-codeine (TYLENOL #3) 300-30 MG tablet Take 1-2 tablets by mouth every 6 hours as needed for moderate pain 90 tablet 0     albuterol (VENTOLIN HFA) 108 (90 BASE) MCG/ACT Inhaler INHALE TWO PUFFS INTO THE LUNGS EVERY 6 HOURS AS NEEDED FOR SHORTNESS OF BREATH/DYSPNEA 54 g 3     amLODIPine (NORVASC) 5 MG tablet TAKE ONE TABLET BY MOUTH TWO TIMES A  tablet 1     ASPIRIN NOT PRESCRIBED (INTENTIONAL) Please choose reason not prescribed, below 0 each 0     calcium carbonate (TUMS) 500 MG chewable tablet Take 2 chew tab by mouth daily as needed for heartburn       Fluticasone-Umeclidin-Vilanterol (TRELEGY ELLIPTA) 100-62.5-25 MCG/INH oral inhaler Inhale 1 puff into the lungs daily 1 Inhaler 3     furosemide (LASIX) 20 MG tablet TAKE TWO TABLETS (40 MG) BY MOUTH EVERY MORNING 180 tablet 1     IBUPROFEN PO Take 400 mg by mouth every 6 hours as needed for moderate pain       ipratropium - albuterol 0.5 mg/2.5 mg/3 mL (DUONEB) 0.5-2.5 (3) MG/3ML neb solution NEBULIZE CONTENTS OF ONE VIAL EVERY 6 HOURS AS NEEDED FOR SHORTNESS OF BREATH/DIFFICULT BREATHING 270 mL 3     losartan (COZAAR) 50 MG tablet TAKE TWO TABLETS BY MOUTH DAILY 180 tablet 0     metoprolol succinate ER (TOPROL-XL) 50 MG 24 hr tablet TAKE ONE TABLET BY MOUTH TWICE A  tablet 3     metroNIDAZOLE (METROGEL) 0.75 % topical gel Apply topically daily 45 g 3     mupirocin (BACTROBAN) 2 % cream Apply topically 3 times daily 30 g 1     SYMBICORT 160-4.5 MCG/ACT Inhaler INHALE TWO PUFFS BY MOUTH TWICE A DAY 30.6 g 3     vitamin D3 (CHOLECALCIFEROL) 2000 units tablet Take 2 tablets by mouth daily 180 tablet 0       Allergies    Allergies   Allergen Reactions     Prednisone      Yeast infection - swollen lips       Immunizations    Immunization History   Administered Date(s) Administered     Influenza (High Dose) 3 valent vaccine 10/07/2010, 09/19/2012, 11/04/2013, 10/08/2014, 11/03/2015, 09/16/2016,  10/02/2017, 2018     Influenza (IIV3) PF 10/19/2004, 11/15/2005     Pneumo Conj 13-V (2010&after) 2015     Pneumococcal 23 valent 2006     TD (ADULT, 7+) 1998, 2006, 2018     TDAP Vaccine (Boostrix) 2012     Zoster vaccine recombinant adjuvanted (SHINGRIX) 2018, 2018     Zoster vaccine, live 10/24/2012       Family History    Family History   Problem Relation Age of Onset     Cerebrovascular Disease Mother      Cerebrovascular Disease Father      Cancer - colorectal Brother      Cancer - colorectal Brother      Breast Cancer Sister      Cancer Sister         lung     Cancer Sister         lung     Cancer Sister         lung     Scleroderma Sister        Social History    Social History     Socioeconomic History     Marital status:      Spouse name: thanh     Number of children: 1     Years of education: 12     Highest education level: Not on file   Occupational History     Occupation: part-time [employer] section at [employer]    [employer] Needs     Financial resource strain: Not on file     Food insecurity:     Worry: Not on file     Inability: Not on file     Transportation needs:     Medical: Not on file     Non-medical: Not on file   Tobacco Use     Smoking status: Former Smoker     Packs/day: 3.00     Years: 50.00     Pack years: 150.00     Types: Cigarettes     Last attempt to quit: 1993     Years since quittin.6     Smokeless tobacco: Never Used     Tobacco comment: quit    Substance and Sexual Activity     Alcohol use: No     Drug use: No     Comment: no herbal meds either      Sexual activity: Not Currently     Comment:  for 24 years    Lifestyle     Physical activity:     Days per week: Not on file     Minutes per session: Not on file     Stress: Not on file   Relationships     Social connections:     Talks on phone: Not on file     Gets together: Not on file     Attends Hinduism service: Not on file     Active member of club  "or organization: Not on file     Attends meetings of clubs or organizations: Not on file     Relationship status: Not on file     Intimate partner violence:     Fear of current or ex partner: Not on file     Emotionally abused: Not on file     Physically abused: Not on file     Forced sexual activity: Not on file   Other Topics Concern      Service Not Asked     Blood Transfusions Not Asked     Caffeine Concern Yes     Comment: 1 pot  qd - switched to decaff now     Occupational Exposure Not Asked     Hobby Hazards Not Asked     Sleep Concern Not Asked     Stress Concern Not Asked     Weight Concern Not Asked     Special Diet Yes     Comment: Low salt     Back Care Not Asked     Exercise No     Bike Helmet Not Asked     Seat Belt Yes     Self-Exams Yes     Comment: SBE encouraged motnhly      Parent/sibling w/ CABG, MI or angioplasty before 65F 55M? No   Social History Narrative    calcium - 3 large dairy servings/day - pt declines fosamax and other meds for her osteoporosis    flex sig/colonoscopy -last colonoscopy 7/03     sun precautions - discussed     mammogram - hasn't had one since 2001 at least - ordered     Td booster - 1/23/06    pneumovax -today 4/25/06    DEXA - pt declines repeat scan today 4/25/06 despite her osteoporosis     stool hemoccults - every year after age 40    ASA- easy bruising - can't take     mulvitamin - encouraged       All above reviewed and updated, all stable unless otherwise noted    Recent labs reviewed    ROS:  Constitutional, HEENT, cardiovascular, pulmonary, GI, , musculoskeletal, neuro, skin, endocrine and psych systems are negative, except as otherwise noted.    OBJECTIVE:                                                    BP (!) 176/68   Pulse 80   Temp 98  F (36.7  C) (Oral)   Ht 1.549 m (5' 1\")   Wt 71.7 kg (158 lb)   SpO2 92%   BMI 29.85 kg/m    Body mass index is 29.85 kg/m .  GENERAL: healthy, alert and no distress  EYES: Eyes grossly normal to " inspection  HENT:ear canals and TM's normal upon viewing with otoscope, nose and mouth without ulcers or lesions upon viewing with otoscope  NECK: no tenderness, no adenopathy, no asymmetry, no masses, no stiffness; thyroid- normal to palpation  RESP: lungs clear to auscultation - no rales, no rhonchi, no wheezes  CV: regular rates and rhythm, normal S1 S2, no S3 or S4 and no murmur, no click or rub -  ABDOMEN: soft, no tenderness, no  hepatosplenomegaly, no masses, normal bowel sounds  MS: extremities- no gross deformities noted, no edema  SKIN: no suspicious lesions, no rashes, chronic venostasis of bilateral LEs  NEURO: strength and tone- normal, sensory exam- grossly normal, mentation- intact, speech- normal, reflexes- symmetric  BACK: no CVA tenderness, no paralumbar tenderness  PSYCH: Alert and oriented times 3; speech- coherent , normal rate and volume; able to articulate logical thoughts, able to abstract reason, no tangential thoughts, no hallucinations or delusions, affect- normal    DIAGNOSTICS/PROCEDURES:                                                      No results found for this or any previous visit (from the past 24 hour(s)).     ASSESSMENT:                                                        ICD-10-CM    1. CKD (chronic kidney disease) stage 4, GFR 15-29 ml/min (H) N18.4    2. Hypertension goal BP (blood pressure) < 140/90 I10    3. Primary osteoarthritis involving multiple joints M15.0    4. Polyneuropathy associated with underlying disease (H) G63    5. COPD, moderate J44.9    6. Vitamin D deficiency E55.9    7. Low hemoglobin D64.9    8. Low serum sodium R79.89    9. Low serum parathyroid hormone (PTH) R79.89    10. Malignant neoplasm of upper lobe of left lung (H) C34.12    11. Anemia in stage 4 chronic kidney disease (H) N18.4     D63.1    12. Hyperlipidemia LDL goal <100 E78.5    13. Chronic venous stasis dermatitis of both lower extremities I87.2    14. Osteoporosis without current  pathological fracture, unspecified osteoporosis type M81.0    15. Secondary renal hyperparathyroidism (H) N25.81    16. Medication monitoring encounter Z51.81        PLAN:                                                    Discussed treatment/modality options, including risk and benefits she desires:    advised 1 multivitamin per day, advised calcium 7630-8416 mg/d and Vitamin D 800-1200 IU/d, advised dentist every 6 months, advised diet and exercise and advised opthalmologist every 1-2 years.     1) Patient presents today as hypertensive(176/86). Patient is currently prescribed 5 mg Amlodipine daily, 100 mg Losartan daily, and 50 mg Metoprolol Succinate daily for hypertension management. Advised continued use. Patient consults with Dr. Gauthier of nephrology for further hypertension/CKD management. Recommend continued follow up. Recommend follow up in 1-2 weeks for blood pressure recheck.    2) Patient is currently prescribed an albuterol inhaler, a Trelegy inhaler, and Duoneb for lung cancer/COPD management. Advised continued use. Patient consults with Dr. Thompson/Dr Iyer for further lung cancer/COPD management. Recommend continued follow up.     3) Previous labs reviewed and patient does not have diabetes.     4) Follow up in 3-4 months for fasting medication recheck visit and labs.     All diagnosis above reviewed and noted above, otherwise stable.  [facility] for [facility].     Return in about 3 months (around 10/29/2019), or if symptoms worsen or fail to improve, for Follow Up Chronic, Medication Recheck Visit.    Health Maintenance Due   Topic Date Due     URINE DRUG SCREEN  1935     LIPID  03/09/2017     MAMMO SCREENING  10/04/2018     MICROALBUMIN  02/19/2019     MEDICARE ANNUAL WELLNESS VISIT  04/24/2019     This document serves as a record of the services and decisions personally performed and made by Rashi Calle MD. It was created on his behalf by Flash Sutton, a trained medical scribe.  The creation of this document is based on the provider's statements to the medical scribe.  Flash Sutton July 29, 2019 3:52 PM     The information in this document, created by the medical scribe for me, accurately reflects the services I personally performed and the decisions made by me. I have reviewed and approved this document for accuracy prior to leaving the patient care area.  July 29, 2019           Rashi Calle MD [address]  [id number] [phone] Fax

## 2019-08-03 DIAGNOSIS — I10 HYPERTENSION GOAL BP (BLOOD PRESSURE) < 140/90: ICD-10-CM

## 2019-08-03 DIAGNOSIS — N18.4 CKD (CHRONIC KIDNEY DISEASE) STAGE 4, GFR 15-29 ML/MIN (H): ICD-10-CM

## 2019-08-05 RX ORDER — LOSARTAN POTASSIUM 50 MG/1
TABLET ORAL
Qty: 180 TABLET | Refills: 0 | Status: SHIPPED | OUTPATIENT
Start: 2019-08-05 | End: 2019-10-29

## 2019-08-05 NOTE — TELEPHONE ENCOUNTER
7/29/19 OV Notes:   1) Patient presents today as hypertensive(176/86). Patient is currently prescribed 5 mg Amlodipine daily, 100 mg Losartan daily, and 50 mg Metoprolol Succinate daily for hypertension management. Advised continued use. Patient consults with Dr. Gauthier of nephrology for further hypertension/CKD management. Recommend continued follow up. Recommend follow up in 1-2 weeks for blood pressure recheck.    Refilled per RN Protocol.     Annika Novak RN  [facility]

## 2019-08-05 NOTE — TELEPHONE ENCOUNTER
"Requested Prescriptions   Pending Prescriptions Disp Refills     losartan (COZAAR) 50 MG tablet [Pharmacy Med Name: LOSARTAN POTASSIUM  Last Written Prescription Date:  5/6/19  Last Fill Quantity: 180,  # refills: 0   Last Office Visit: 7/29/2019   Future Office Visit:    Next 5 appointments (look out 90 days)    Oct 29, 2019  7:40 AM CDT  [facility] [address] [place] [address] [phone]          50MG TABS] 180 tablet 0     Sig: TAKE TWO TABLETS BY MOUTH ONCE DAILY       Angiotensin-II Receptors Failed - 8/3/2019 10:34 AM        Failed - Blood pressure under 140/90 in past 12 months     BP Readings from Last 3 Encounters:   07/29/19 (!) 176/68   03/19/19 138/60   03/06/19 166/60           Failed - Normal serum creatinine on file in past 12 months     Recent Labs   Lab Test 07/13/19  0754  12/07/12  1220   CR 2.04*   < >  --    CREAT  --   --  1.5*    < > = values in this interval not displayed.           Passed - Recent (12 mo) or future (30 days) visit within the authorizing provider's specialty     Patient had office visit in the last 12 months or has a visit in the next 30 days with authorizing provider or within the authorizing provider's specialty.  See \"Patient Info\" tab in inbasket, or \"Choose Columns\" in Meds & Orders section of the refill encounter.            Passed - Medication is active on med list        Passed - Patient is age 18 or older        Passed - No active pregnancy on record        Passed - Normal serum potassium on file in past 12 months     Recent Labs   Lab Test 07/13/19  0754   POTASSIUM 5.0           Passed - No positive pregnancy test in past 12 months          "

## 2019-08-24 ENCOUNTER — ALLIED HEALTH/NURSE VISIT (OUTPATIENT)
Dept: FAMILY MEDICINE | Facility: CLINIC | Age: 84
End: 2019-08-24
Payer: MEDICARE

## 2019-08-24 VITALS — DIASTOLIC BLOOD PRESSURE: 58 MMHG | SYSTOLIC BLOOD PRESSURE: 138 MMHG

## 2019-08-24 DIAGNOSIS — I10 HYPERTENSION GOAL BP (BLOOD PRESSURE) < 140/90: Primary | ICD-10-CM

## 2019-08-24 PROCEDURE — 99207 ZZC NO CHARGE NURSE ONLY: CPT | Performed by: FAMILY MEDICINE

## 2019-08-24 NOTE — PROGRESS NOTES
Anat Correa was evaluated at [facility] on August 24, 2019 at which time her blood pressure was:    BP Readings from Last 3 Encounters:   08/24/19 138/58   07/29/19 (!) 176/68   03/19/19 138/60     Pulse Readings from Last 3 Encounters:   07/29/19 80   03/19/19 79   03/06/19 74       Reviewed lifestyle modifications for blood pressure control and reduction: including making healthy food choices, managing weight, getting regular exercise, smoking cessation, reducing alcohol consumption, monitoring blood pressure regularly.     Symptoms: None    BP Goal:< 140/90 mmHg    BP Assessment:  BP at goal    Potential Reasons for BP too high: NA - Not applicable    BP Follow-Up Plan: Recheck BP in 6 months at pharmacy    Recommendation to Provider: no change      Note completed by: Thank you,  Alysa Hays RPh, r [facility] [place] [phone]

## 2019-09-16 DIAGNOSIS — M70.62 GREATER TROCHANTERIC BURSITIS OF BOTH HIPS: ICD-10-CM

## 2019-09-16 DIAGNOSIS — M15.0 PRIMARY OSTEOARTHRITIS INVOLVING MULTIPLE JOINTS: ICD-10-CM

## 2019-09-16 DIAGNOSIS — G63 POLYNEUROPATHY ASSOCIATED WITH UNDERLYING DISEASE (H): ICD-10-CM

## 2019-09-16 DIAGNOSIS — M70.61 GREATER TROCHANTERIC BURSITIS OF BOTH HIPS: ICD-10-CM

## 2019-09-16 NOTE — TELEPHONE ENCOUNTER
Routing refill request to provider for review/approval because:  Drug not on the [facility] refill protocol   No CSA on file        MACIE Sullivan, RN, N  [facility] [phone]

## 2019-09-16 NOTE — TELEPHONE ENCOUNTER
Controlled Substance Refill Request for acetaminophen-codeine (TYLENOL #3) 300-30 MG tablet  Problem List Complete:  Yes    Last Written Prescription Date:  6.18.19  Last Fill Quantity: 90 tablet,   # refills: 0        Last Office Visit with [facility] primary care provider: 7.29.19    Future Office visit:   Next 5 appointments (look out 90 days)    Oct 29, 2019  7:40 AM CDT  Office Visit with Rashi [facility]le MD  [facility] [address] [place] [phone]  [phone]          Controlled substance agreement:   Encounter-Level CSA:    There are no encounter-level csa.     Patient-Level CSA:    Controlled Substance Agreement - Opioid - Scan on 3/20/2019  8:22 AM (below)           Last Urine Drug Screen: No results found for: CDAUT, No results found for: COMDAT, No results found for: THC13, PCP13, COC13, MAMP13, OPI13, AMP13, BZO13, TCA13, MTD13, BAR13, OXY13, PPX13, BUP13     Processing:  Fax Rx to listed pharmacy    [email or web address]   checked in past 3 months?  No, route to RN

## 2019-10-01 DIAGNOSIS — I10 HYPERTENSION GOAL BP (BLOOD PRESSURE) < 140/90: ICD-10-CM

## 2019-10-01 DIAGNOSIS — N18.4 CKD (CHRONIC KIDNEY DISEASE) STAGE 4, GFR 15-29 ML/MIN (H): ICD-10-CM

## 2019-10-01 RX ORDER — FUROSEMIDE 20 MG
TABLET ORAL
Qty: 180 TABLET | Refills: 2 | Status: SHIPPED | OUTPATIENT
Start: 2019-10-01 | End: 2019-10-29

## 2019-10-01 NOTE — TELEPHONE ENCOUNTER
rx done, recent visit, follows with Dr Gauthier    BP Readings from Last 3 Encounters:   08/24/19 138/58   07/29/19 (!) 176/68   03/19/19 138/60     Creatinine   Date Value Ref Range Status   07/13/2019 2.04 (H) 0.52 - 1.04 mg/dL Final     GFR Estimate   Date Value Ref Range Status   07/13/2019 22 (L) >60 mL/min/[1.73_m2] Final     Comment:     Non  GFR Calc  Starting 12/18/2018, serum creatinine based estimated GFR (eGFR) will be   calculated using the Chronic Kidney Disease Epidemiology Collaboration   (CKD-EPI) equation.

## 2019-10-01 NOTE — TELEPHONE ENCOUNTER
Routing refill request to provider for review/approval because:  Labs out of range:  Leopoldo Zaidi BSN, RN  [facility]

## 2019-10-01 NOTE — TELEPHONE ENCOUNTER
"Requested Prescriptions   Pending Prescriptions Disp Refills     furosemide (LASIX) 20 MG tablet [Pharmacy Med Name: FUROSEMIDE 20MG TABS]          Last Written Prescription Date:  4.2.19  Last Fill Quantity: 180 tablet,  # refills: 1   Last office visit: 7/29/2019 with prescribing provider:  Rashi Calle MD             Future Office Visit:   Next 5 appointments (look out 90 days)    Oct 29, 2019  7:40 AM CDT  Office Visit with Rashi Calle MD  [facility] ([facility]) [address] [place] [phone]            180 tablet 1     Sig: TAKE TWO TABLETS (40 MG) BY MOUTH EVERY MORNING       Diuretics (Including Combos) Protocol Failed - 10/1/2019  8:42 AM        Failed - Normal serum creatinine on file in past 12 months     Recent Labs   Lab Test 07/13/19  0754   CR 2.04*              Failed - Normal serum sodium on file in past 12 months     Recent Labs   Lab Test 07/13/19  0754   *              Passed - Blood pressure under 140/90 in past 12 months     BP Readings from Last 3 Encounters:   08/24/19 138/58   07/29/19 (!) 176/68   03/19/19 138/60                 Passed - Recent (12 mo) or future (30 days) visit within the authorizing provider's specialty     Patient has had an office visit with the authorizing provider or a provider within the authorizing providers department within the previous 12 mos or has a future within next 30 days. See \"Patient Info\" tab in inbasket, or \"Choose Columns\" in Meds & Orders section of the refill encounter.              Passed - Medication is active on med list        Passed - Patient is age 18 or older        Passed - No active pregancy on record        Passed - Normal serum potassium on file in past 12 months     Recent Labs   Lab Test 07/13/19  0754   POTASSIUM 5.0                    Passed - No positive pregnancy test in past 12 months        "

## 2019-10-29 ENCOUNTER — ANCILLARY PROCEDURE (OUTPATIENT)
Dept: GENERAL RADIOLOGY | Facility: CLINIC | Age: 84
End: 2019-10-29
Attending: FAMILY MEDICINE
Payer: MEDICARE

## 2019-10-29 ENCOUNTER — OFFICE VISIT (OUTPATIENT)
Dept: FAMILY MEDICINE | Facility: CLINIC | Age: 84
End: 2019-10-29
Payer: MEDICARE

## 2019-10-29 VITALS
TEMPERATURE: 97.6 F | WEIGHT: 153 LBS | BODY MASS INDEX: 28.89 KG/M2 | HEART RATE: 73 BPM | OXYGEN SATURATION: 93 % | SYSTOLIC BLOOD PRESSURE: 154 MMHG | DIASTOLIC BLOOD PRESSURE: 64 MMHG | HEIGHT: 61 IN

## 2019-10-29 DIAGNOSIS — G89.29 OTHER CHRONIC PAIN: ICD-10-CM

## 2019-10-29 DIAGNOSIS — N18.4 CKD (CHRONIC KIDNEY DISEASE) STAGE 4, GFR 15-29 ML/MIN (H): Primary | ICD-10-CM

## 2019-10-29 DIAGNOSIS — N25.81 SECONDARY RENAL HYPERPARATHYROIDISM (H): ICD-10-CM

## 2019-10-29 DIAGNOSIS — R79.89 LOW SERUM SODIUM: ICD-10-CM

## 2019-10-29 DIAGNOSIS — E78.5 HYPERLIPIDEMIA LDL GOAL <100: ICD-10-CM

## 2019-10-29 DIAGNOSIS — R79.89 LOW SERUM PARATHYROID HORMONE (PTH): ICD-10-CM

## 2019-10-29 DIAGNOSIS — R53.83 FATIGUE, UNSPECIFIED TYPE: ICD-10-CM

## 2019-10-29 DIAGNOSIS — M25.551 HIP PAIN, RIGHT: ICD-10-CM

## 2019-10-29 DIAGNOSIS — I10 HYPERTENSION GOAL BP (BLOOD PRESSURE) < 140/90: ICD-10-CM

## 2019-10-29 DIAGNOSIS — Z51.81 MEDICATION MONITORING ENCOUNTER: ICD-10-CM

## 2019-10-29 DIAGNOSIS — G63 POLYNEUROPATHY ASSOCIATED WITH UNDERLYING DISEASE (H): ICD-10-CM

## 2019-10-29 DIAGNOSIS — M70.61 GREATER TROCHANTERIC BURSITIS OF BOTH HIPS: ICD-10-CM

## 2019-10-29 DIAGNOSIS — I87.2 CHRONIC VENOUS STASIS DERMATITIS OF BOTH LOWER EXTREMITIES: ICD-10-CM

## 2019-10-29 DIAGNOSIS — C34.12 MALIGNANT NEOPLASM OF UPPER LOBE OF LEFT LUNG (H): ICD-10-CM

## 2019-10-29 DIAGNOSIS — M81.0 OSTEOPOROSIS WITHOUT CURRENT PATHOLOGICAL FRACTURE, UNSPECIFIED OSTEOPOROSIS TYPE: ICD-10-CM

## 2019-10-29 DIAGNOSIS — M15.0 PRIMARY OSTEOARTHRITIS INVOLVING MULTIPLE JOINTS: ICD-10-CM

## 2019-10-29 DIAGNOSIS — D63.1 ANEMIA IN STAGE 4 CHRONIC KIDNEY DISEASE (H): ICD-10-CM

## 2019-10-29 DIAGNOSIS — J44.9 COPD, MODERATE (H): ICD-10-CM

## 2019-10-29 DIAGNOSIS — E55.9 VITAMIN D DEFICIENCY: ICD-10-CM

## 2019-10-29 DIAGNOSIS — D64.9 LOW HEMOGLOBIN: ICD-10-CM

## 2019-10-29 DIAGNOSIS — N18.4 ANEMIA IN STAGE 4 CHRONIC KIDNEY DISEASE (H): ICD-10-CM

## 2019-10-29 DIAGNOSIS — M70.62 GREATER TROCHANTERIC BURSITIS OF BOTH HIPS: ICD-10-CM

## 2019-10-29 LAB
ALBUMIN SERPL-MCNC: 4.1 G/DL (ref 3.4–5)
ALBUMIN UR-MCNC: NEGATIVE MG/DL
ALP SERPL-CCNC: 73 U/L (ref 40–150)
ALT SERPL W P-5'-P-CCNC: 20 U/L (ref 0–50)
AMORPH CRY #/AREA URNS HPF: ABNORMAL /HPF
ANION GAP SERPL CALCULATED.3IONS-SCNC: 7 MMOL/L (ref 3–14)
APPEARANCE UR: CLEAR
AST SERPL W P-5'-P-CCNC: 16 U/L (ref 0–45)
BACTERIA #/AREA URNS HPF: ABNORMAL /HPF
BILIRUB SERPL-MCNC: 0.4 MG/DL (ref 0.2–1.3)
BILIRUB UR QL STRIP: NEGATIVE
BUN SERPL-MCNC: 41 MG/DL (ref 7–30)
CALCIUM SERPL-MCNC: 10.5 MG/DL (ref 8.5–10.1)
CHLORIDE SERPL-SCNC: 96 MMOL/L (ref 94–109)
CO2 SERPL-SCNC: 25 MMOL/L (ref 20–32)
COLOR UR AUTO: YELLOW
CREAT SERPL-MCNC: 1.92 MG/DL (ref 0.52–1.04)
CREAT UR-MCNC: 30 MG/DL
DEPRECATED CALCIDIOL+CALCIFEROL SERPL-MC: 21 UG/L (ref 20–75)
ERYTHROCYTE [DISTWIDTH] IN BLOOD BY AUTOMATED COUNT: 12.3 % (ref 10–15)
FERRITIN SERPL-MCNC: 42 NG/ML (ref 8–252)
FOLATE SERPL-MCNC: 38.3 NG/ML
GFR SERPL CREATININE-BSD FRML MDRD: 24 ML/MIN/{1.73_M2}
GLUCOSE SERPL-MCNC: 79 MG/DL (ref 70–99)
GLUCOSE UR STRIP-MCNC: NEGATIVE MG/DL
HCT VFR BLD AUTO: 32.6 % (ref 35–47)
HGB BLD-MCNC: 10.8 G/DL (ref 11.7–15.7)
HGB UR QL STRIP: NEGATIVE
IRON SATN MFR SERPL: 31 % (ref 15–46)
IRON SERPL-MCNC: 126 UG/DL (ref 35–180)
KETONES UR STRIP-MCNC: NEGATIVE MG/DL
LEUKOCYTE ESTERASE UR QL STRIP: ABNORMAL
MCH RBC QN AUTO: 30.7 PG (ref 26.5–33)
MCHC RBC AUTO-ENTMCNC: 33.1 G/DL (ref 31.5–36.5)
MCV RBC AUTO: 93 FL (ref 78–100)
MICROALBUMIN UR-MCNC: 12 MG/L
MICROALBUMIN/CREAT UR: 38.16 MG/G CR (ref 0–25)
NITRATE UR QL: NEGATIVE
NON-SQ EPI CELLS #/AREA URNS LPF: ABNORMAL /LPF
PH UR STRIP: 7.5 PH (ref 5–7)
PLATELET # BLD AUTO: 351 10E9/L (ref 150–450)
POTASSIUM SERPL-SCNC: 5.2 MMOL/L (ref 3.4–5.3)
PROT SERPL-MCNC: 7.5 G/DL (ref 6.8–8.8)
PTH-INTACT SERPL-MCNC: <7 PG/ML (ref 18–80)
RBC # BLD AUTO: 3.52 10E12/L (ref 3.8–5.2)
RBC #/AREA URNS AUTO: ABNORMAL /HPF
SODIUM SERPL-SCNC: 128 MMOL/L (ref 133–144)
SOURCE: ABNORMAL
SP GR UR STRIP: 1.01 (ref 1–1.03)
TIBC SERPL-MCNC: 407 UG/DL (ref 240–430)
TSH SERPL DL<=0.005 MIU/L-ACNC: 2.65 MU/L (ref 0.4–4)
UROBILINOGEN UR STRIP-ACNC: 0.2 EU/DL (ref 0.2–1)
VIT B12 SERPL-MCNC: 648 PG/ML (ref 193–986)
WBC # BLD AUTO: 9.5 10E9/L (ref 4–11)
WBC #/AREA URNS AUTO: ABNORMAL /HPF

## 2019-10-29 PROCEDURE — 82607 VITAMIN B-12: CPT | Performed by: FAMILY MEDICINE

## 2019-10-29 PROCEDURE — 82306 VITAMIN D 25 HYDROXY: CPT | Performed by: FAMILY MEDICINE

## 2019-10-29 PROCEDURE — 36415 COLL VENOUS BLD VENIPUNCTURE: CPT | Performed by: FAMILY MEDICINE

## 2019-10-29 PROCEDURE — 90662 IIV NO PRSV INCREASED AG IM: CPT | Performed by: FAMILY MEDICINE

## 2019-10-29 PROCEDURE — 81001 URINALYSIS AUTO W/SCOPE: CPT | Performed by: FAMILY MEDICINE

## 2019-10-29 PROCEDURE — 83550 IRON BINDING TEST: CPT | Performed by: FAMILY MEDICINE

## 2019-10-29 PROCEDURE — 82043 UR ALBUMIN QUANTITATIVE: CPT | Performed by: FAMILY MEDICINE

## 2019-10-29 PROCEDURE — G0008 ADMIN INFLUENZA VIRUS VAC: HCPCS | Performed by: FAMILY MEDICINE

## 2019-10-29 PROCEDURE — 72170 X-RAY EXAM OF PELVIS: CPT | Mod: FY

## 2019-10-29 PROCEDURE — 83970 ASSAY OF PARATHORMONE: CPT | Performed by: FAMILY MEDICINE

## 2019-10-29 PROCEDURE — 85027 COMPLETE CBC AUTOMATED: CPT | Performed by: FAMILY MEDICINE

## 2019-10-29 PROCEDURE — 84443 ASSAY THYROID STIM HORMONE: CPT | Performed by: FAMILY MEDICINE

## 2019-10-29 PROCEDURE — 82728 ASSAY OF FERRITIN: CPT | Performed by: FAMILY MEDICINE

## 2019-10-29 PROCEDURE — 80053 COMPREHEN METABOLIC PANEL: CPT | Performed by: FAMILY MEDICINE

## 2019-10-29 PROCEDURE — 82746 ASSAY OF FOLIC ACID SERUM: CPT | Performed by: FAMILY MEDICINE

## 2019-10-29 PROCEDURE — 99214 OFFICE O/P EST MOD 30 MIN: CPT | Mod: 25 | Performed by: FAMILY MEDICINE

## 2019-10-29 PROCEDURE — 83540 ASSAY OF IRON: CPT | Performed by: FAMILY MEDICINE

## 2019-10-29 RX ORDER — FUROSEMIDE 20 MG
40 TABLET ORAL DAILY
Qty: 180 TABLET | Refills: 3 | Status: ON HOLD | OUTPATIENT
Start: 2019-10-29 | End: 2021-01-01

## 2019-10-29 RX ORDER — IPRATROPIUM BROMIDE AND ALBUTEROL SULFATE 2.5; .5 MG/3ML; MG/3ML
1 SOLUTION RESPIRATORY (INHALATION) EVERY 4 HOURS PRN
Qty: 270 ML | Refills: 3 | Status: SHIPPED | OUTPATIENT
Start: 2019-10-29 | End: 2020-07-14

## 2019-10-29 RX ORDER — ALBUTEROL SULFATE 90 UG/1
AEROSOL, METERED RESPIRATORY (INHALATION)
Qty: 54 G | Refills: 3 | Status: SHIPPED | OUTPATIENT
Start: 2019-10-29

## 2019-10-29 RX ORDER — LOSARTAN POTASSIUM 50 MG/1
100 TABLET ORAL DAILY
Qty: 180 TABLET | Refills: 3 | Status: SHIPPED | OUTPATIENT
Start: 2019-10-29 | End: 2021-01-01

## 2019-10-29 RX ORDER — BUDESONIDE AND FORMOTEROL FUMARATE DIHYDRATE 160; 4.5 UG/1; UG/1
2 AEROSOL RESPIRATORY (INHALATION) 2 TIMES DAILY
Qty: 30.6 G | Refills: 3 | Status: SHIPPED | OUTPATIENT
Start: 2019-10-29 | End: 2020-07-09

## 2019-10-29 RX ORDER — AMLODIPINE BESYLATE 5 MG/1
5 TABLET ORAL 2 TIMES DAILY
Qty: 180 TABLET | Refills: 3 | Status: SHIPPED | OUTPATIENT
Start: 2019-10-29 | End: 2020-07-09

## 2019-10-29 RX ORDER — METOPROLOL SUCCINATE 50 MG/1
50 TABLET, EXTENDED RELEASE ORAL 2 TIMES DAILY
Qty: 180 TABLET | Refills: 3 | Status: SHIPPED | OUTPATIENT
Start: 2019-10-29 | End: 2020-07-09

## 2019-10-29 ASSESSMENT — MIFFLIN-ST. JEOR: SCORE: 1086.38

## 2019-10-29 NOTE — PROGRESS NOTES
SUBJECTIVE:                                                      Anat Correa is a 84 year old female who presents to clinic today for the following health issues:    CKD/Hypertension/Anemia/Low PTH Follow-up      Do you check your blood pressure regularly outside of the clinic? Yes at the pharmacy    Are you following a low salt diet? Yes    Are your blood pressures ever more than 140 on the top number (systolic) OR more   than 90 on the bottom number (diastolic), for example 140/90? No     BP Readings from Last 3 Encounters:   10/29/19 (!) 154/64   08/24/19 138/58   07/29/19 (!) 176/68     Lab Results   Component Value Date    CR 2.04 07/13/2019     Recent visit with Dr Gauthier - all stable    Ears feels plugged - bilateral - alternating    Right>Left hip pain - not able to sleep anymore due to the pain - hurts to lay on it, hurts by midday - Dr Thompson    Always tired, up every morning at 4 am, in bed by 10 pm, naps x 1 hr at noon    No other concerns - no cp - no sob - no edema - normal bowels and bladder - appetite good    Reviewed and updated as needed this visit by Provider    body mass index is 28.91 kg/m .    Wt Readings from Last 4 Encounters:   10/29/19 69.4 kg (153 lb)   07/29/19 71.7 kg (158 lb)   03/19/19 71.2 kg (157 lb)   12/13/18 68.9 kg (152 lb)       Health Maintenance    Health Maintenance Due   Topic Date Due     URINE DRUG SCREEN  1935     LIPID  03/09/2017     MAMMO SCREENING  10/04/2018     MICROALBUMIN  02/19/2019     MEDICARE ANNUAL WELLNESS VISIT  04/24/2019     INFLUENZA VACCINE (1) 09/01/2019     WILLIAM ASSESSMENT  09/13/2019     PHQ-9  09/13/2019       Current Problem List    Patient Active Problem List   Diagnosis     History of colonic polyps     Calculus of kidney     Lesion of plantar nerve     Osteoporosis     Primary iridocyclitis     Anemia     Hyperlipidemia LDL goal <100     OA (osteoarthritis)     Advanced directives, counseling/discussion     Hypertension goal BP  (blood pressure) < 140/90     COPD, moderate     Controlled substance agreement signed     Vitamin D deficiency     CKD (chronic kidney disease) stage 4, GFR 15-29 ml/min (H)     Anemia in chronic renal disease     Secondary renal hyperparathyroidism (H)     Venous stasis of lower extremity     Peripheral neuropathy     Chronic pain     Lung nodule     Nodule of left lung     Malignant neoplasm of upper lobe of left lung (H)     Acute pain of right shoulder     S/P amputation of lesser toe, unspecified laterality (H)     Retinal hemorrhage of right eye       Past Medical History    Past Medical History:   Diagnosis Date     Anemia      Calculus of kidney 1998    dr hope     Chronic pain     OA     CKD (chronic kidney disease) stage 4, GFR 15-29 ml/min (H) 2008    dr mcdermott     COPD, moderate (H) 2004    moderate obstruction, with pulm htn - dr francisco did AAP     Diverticulosis of colon (without mention of hemorrhage) 7/03     Hemorrhage of gastrointestinal tract, unspecified 4/08    dr su     Hyperlipidemia LDL goal <100 2006     Hypertension goal BP (blood pressure) < 140/90 2005     Internal hemorrhoids without mention of complication 7/03     Irritable bowel syndrome 7/03     Lesion of plantar nerve 8/98    hay's neuroma dr connor     Lung nodule 4/14, 3/16    Dr Thompson, 1.4 x 1.1 cm, lingula, PET neg 3/16     Malignant neoplasm of upper lobe of left lung (H) 7/16    Dr Thompson - x 2 nodules - moderately differentiated adenocarcinoma (acinar predominant).  Final pathologic stage [id number], Final clinical stage IA, grade 2     Medication management     OA - 1 T#3 at HS with HX CKD     OA (osteoarthritis) 1998    lower ext worse katya knees     Obesity (BMI 30.0-34.9)      Osteoporosis, unspecified 2/02    FOSAMAX  = upset stomach , pt declines further rx.      Other ulcerative colitis 7/03    collangenous collitis- last colonoscopy 7/03      Peripheral neuropathy     early - burning at HS     Personal history  of colonic polyps     dr araujo     PONV (postoperative nausea and vomiting)      Primary iridocyclitis     iritis dr dominguez     Venous stasis of lower extremity        Past Surgical History    Past Surgical History:   Procedure Laterality Date     AMPUTATE TOE(S) Right 2015    Procedure: AMPUTATE TOE(S);  Surgeon: Ashia White, DPM, Pod;  Location: [facility] OR     C APPENDECTOMY       C  DELIVERY ONLY  1965    , Low Cervical     EXCISE MASS UPPER EXTREMITY  10/13/2011    Lt Forearm mass excision - dr ely     EXCISE MASS UPPER EXTREMITY  2012    Lt Forearm mass excision - dr ely     HC COLONOSCOPY THRU STOMA, DIAGNOSTIC      IBS/diverticula/hemmorhoids dr araujo     HC ESOPHAGOSCOPY, DIAGNOSTIC  , , 6/10    Gastric ulcer, healed, gastritis     HC HEMORRHOIDECTOMY,INT/EXT,SIMPLE       HC REPAIR SLIDING INGUINAL HERNIA      mitra     SURGICAL HISTORY OF -       [facility] neck & back tumors     SURGICAL HISTORY OF -       neuroma excision - 2nd toe amputation     SURGICAL HISTORY OF -   3/07    Rt IOL - dr jimenez     SURGICAL HISTORY OF -       Lt IOL - dr jimenez     SURGICAL HISTORY OF -       Lt Knee replacement - dr gayle     SURGICAL HISTORY OF -       Rt Knee replacement - dr gayle     SURGICAL HISTORY OF -   9/15    Rt Second toe amputation - Dr White     THORACOSCOPIC WEDGE RESECTION LUNG Left 2016    Procedure: THORACOSCOPIC WEDGE RESECTION LUNG;  Surgeon: Abdelrahman Thompson MD;  Location: [facility] OR     XR JOINT INJECTION HIP (HIB)  2015    Rt - Dr Terry       Current Medications    Current Outpatient Medications   Medication Sig Dispense Refill     acetaminophen-codeine (TYLENOL #3) 300-30 MG tablet TAKE ONE TO TWO TABLETS BY MOUTH EVERY 6 HOURS AS NEEDED FOR MODERATE PAIN 90 tablet 0     albuterol (VENTOLIN HFA) 108 (90 Base) MCG/ACT inhaler INHALE TWO PUFFS INTO THE LUNGS EVERY 6 HOURS AS NEEDED FOR SHORTNESS OF  BREATH/DYSPNEA 54 g 3     amLODIPine (NORVASC) 5 MG tablet Take 1 tablet (5 mg) by mouth 2 times daily 180 tablet 3     ASPIRIN NOT PRESCRIBED (INTENTIONAL) Please choose reason not prescribed, below 0 each 0     budesonide-formoterol (SYMBICORT) 160-4.5 MCG/ACT Inhaler Inhale 2 puffs into the lungs 2 times daily 30.6 g 3     calcium carbonate (TUMS) 500 MG chewable tablet Take 2 chew tab by mouth daily as needed for heartburn       furosemide (LASIX) 20 MG tablet Take 2 tablets (40 mg) by mouth daily 180 tablet 3     IBUPROFEN PO Take 400 mg by mouth every 6 hours as needed for moderate pain       ipratropium - albuterol 0.5 mg/2.5 mg/3 mL (DUONEB) 0.5-2.5 (3) MG/3ML neb solution Take 1 vial (3 mLs) by nebulization every 4 hours as needed for shortness of breath / dyspnea or wheezing 270 mL 3     losartan (COZAAR) 50 MG tablet Take 2 tablets (100 mg) by mouth daily 180 tablet 3     metoprolol succinate ER (TOPROL-XL) 50 MG 24 hr tablet Take 1 tablet (50 mg) by mouth 2 times daily 180 tablet 3       Allergies    Allergies   Allergen Reactions     Prednisone      Yeast infection - swollen lips       Immunizations    Immunization History   Administered Date(s) Administered     Influenza (High Dose) 3 valent vaccine 10/07/2010, 09/19/2012, 11/04/2013, 10/08/2014, 11/03/2015, 09/16/2016, 10/02/2017, 09/13/2018, 10/29/2019     Influenza (IIV3) PF 10/19/2004, 11/15/2005     Pneumo Conj 13-V (2010&after) 11/03/2015     Pneumococcal 23 valent 04/25/2006     TD (ADULT, 7+) 03/03/1998, 01/23/2006, 06/21/2018     TDAP Vaccine (Boostrix) 08/27/2012     Zoster vaccine recombinant adjuvanted (SHINGRIX) 07/11/2018, 09/13/2018     Zoster vaccine, live 10/24/2012       Family History    Family History   Problem Relation Age of Onset     Cerebrovascular Disease Mother      Cerebrovascular Disease Father      Cancer - colorectal Brother      Cancer - colorectal Brother      Breast Cancer Sister      Cancer Sister         lung      Cancer Sister         lung     Cancer Sister         lung     Scleroderma Sister        Social History    Social History     Socioeconomic History     Marital status:      Spouse name: thanh     Number of children: 1     Years of education: 12     Highest education level: Not on file   Occupational History     Occupation: part-time [employer] section at [employer]     Financial resource strain: Not on file     Food insecurity:     Worry: Not on file     Inability: Not on file     Transportation needs:     Medical: Not on file     Non-medical: Not on file   Tobacco Use     Smoking status: Former Smoker     Packs/day: 3.00     Years: 50.00     Pack years: 150.00     Types: Cigarettes     Last attempt to quit: 1993     Years since quittin.8     Smokeless tobacco: Never Used     Tobacco comment: quit    Substance and Sexual Activity     Alcohol use: No     Drug use: No     Comment: no herbal meds either      Sexual activity: Not Currently     Comment:  for 24 years    Lifestyle     Physical activity:     Days per week: Not on file     Minutes per session: Not on file     Stress: Not on file   Relationships     Social connections:     Talks on phone: Not on file     Gets together: Not on file     Attends Jewish service: Not on file     Active member of club or organization: Not on file     Attends meetings of clubs or organizations: Not on file     Relationship status: Not on file     Intimate partner violence:     Fear of current or ex partner: Not on file     Emotionally abused: Not on file     Physically abused: Not on file     Forced sexual activity: Not on file   Other Topics Concern      Service Not Asked     Blood Transfusions Not Asked     Caffeine Concern Yes     Comment: 1 pot  qd - switched to decaff now     Occupational Exposure Not Asked     Hobby Hazards Not Asked     Sleep Concern Not Asked     Stress Concern Not Asked     Weight Concern Not Asked      "Special Diet Yes     Comment: Low salt     Back Care Not Asked     Exercise No     Bike Helmet Not Asked     Seat Belt Yes     Self-Exams Yes     Comment: SBE encouraged motnhly      Parent/sibling w/ CABG, MI or angioplasty before 65F 55M? No   Social History Narrative    calcium - 3 large dairy servings/day - pt declines fosamax and other meds for her osteoporosis    flex sig/colonoscopy -last colonoscopy 7/03     sun precautions - discussed     mammogram - hasn't had one since 2001 at least - ordered     Td booster - 1/23/06    pneumovax -today 4/25/06    DEXA - pt declines repeat scan today 4/25/06 despite her osteoporosis     stool hemoccults - every year after age 40    ASA- easy bruising - can't take     mulvitamin - encouraged       All above reviewed and updated, all stable unless otherwise noted    Recent labs reviewed    ROS:  CONSTITUTIONAL: NEGATIVE for fever, chills, change in weight  INTEGUMENTARY/SKIN: NEGATIVE for worrisome rashes, moles or lesions  EYES: NEGATIVE for vision changes or irritation  ENT/MOUTH: NEGATIVE for ear, mouth and throat problems  RESP: NEGATIVE for significant cough or SOB  CV: NEGATIVE for chest pain, palpitations or peripheral edema  GI: NEGATIVE for nausea, abdominal pain, heartburn, or change in bowel habits  : NEGATIVE for frequency, dysuria, or hematuria  MUSCULOSKELETAL: NEGATIVE for significant arthralgias or myalgia  NEURO: NEGATIVE for weakness, dizziness or paresthesias  ENDOCRINE: NEGATIVE for temperature intolerance, skin/hair changes  HEME: NEGATIVE for bleeding problems  PSYCHIATRIC: NEGATIVE for changes in mood or affect    OBJECTIVE:                                                    BP (!) 154/64   Pulse 73   Temp 97.6  F (36.4  C) (Oral)   Ht 1.549 m (5' 1\")   Wt 69.4 kg (153 lb)   SpO2 93%   BMI 28.91 kg/m    Body mass index is 28.91 kg/m .  GENERAL: healthy, alert and no distress  EYES: Eyes grossly normal to inspection, extraocular movements - " intact, and PERRL  HENT: ear canals- normal; TMs- normal; Nose- normal; Mouth- no ulcers, no lesions  NECK: no tenderness, no adenopathy, no asymmetry, no masses, no stiffness; thyroid- normal to palpation  RESP: lungs clear to auscultation - no rales, no rhonchi, no wheezes  CV: regular rates and rhythm, normal S1 S2, no S3 or S4 and no murmur, no click or rub -  ABDOMEN: soft, no tenderness, no  hepatosplenomegaly, no masses, normal bowel sounds  MS: extremities- no gross deformities noted, no edema  SKIN: no suspicious lesions, no rashes  NEURO: strength and tone- normal, sensory exam- grossly normal, mentation- intact, speech- normal, reflexes- symmetric  BACK: no CVA tenderness, no paralumbar tenderness  PSYCH: Alert and oriented times 3; speech- coherent , normal rate and volume; able to articulate logical thoughts, able to abstract reason, no tangential thoughts, no hallucinations or delusions, affect- normal  LYMPHATICS: no cervical adenopathy    DIAGNOSTICS/PROCEDURES:                                                      Pending     ASSESSMENT:                                                        ICD-10-CM    1. CKD (chronic kidney disease) stage 4, GFR 15-29 ml/min (H) N18.4 Comprehensive metabolic panel     CBC with platelets     TSH with free T4 reflex     Parathyroid Hormone Intact     *UA reflex to Microscopic and Culture ([facility] (except [place] and [place])     Albumin Random Urine Quantitative with Creat Ratio     amLODIPine (NORVASC) 5 MG tablet     furosemide (LASIX) 20 MG tablet     losartan (COZAAR) 50 MG tablet     metoprolol succinate ER (TOPROL-XL) 50 MG 24 hr tablet     Urine Microscopic   2. Anemia in stage 4 chronic kidney disease (H) N18.4 Comprehensive metabolic panel    D63.1 CBC with platelets     TSH with free T4 reflex     Iron and iron binding capacity     Ferritin     Vitamin B12     Folate     Parathyroid Hormone Intact   3. Secondary renal  hyperparathyroidism (H) N25.81 Comprehensive metabolic panel     CBC with platelets     TSH with free T4 reflex     Parathyroid Hormone Intact   4. Fatigue, unspecified type R53.83 Comprehensive metabolic panel     CBC with platelets     TSH with free T4 reflex     Vitamin D Deficiency     Iron and iron binding capacity     Ferritin     Vitamin B12     Folate     Parathyroid Hormone Intact     *UA reflex to Microscopic and Culture ([facility] (except [place] and [place])     Albumin Random Urine Quantitative with Creat Ratio   5. Hypertension goal BP (blood pressure) < 140/90 I10 Comprehensive metabolic panel     TSH with free T4 reflex     *UA reflex to Microscopic and Culture ([facility] (except [place] and [place])     Albumin Random Urine Quantitative with Creat Ratio     amLODIPine (NORVASC) 5 MG tablet     furosemide (LASIX) 20 MG tablet     losartan (COZAAR) 50 MG tablet     metoprolol succinate ER (TOPROL-XL) 50 MG 24 hr tablet   6. Hip pain, right M25.551    7. Primary osteoarthritis involving multiple joints M15.0    8. Greater trochanteric bursitis of both hips M70.61     M70.62    9. Polyneuropathy associated with underlying disease (H) G63    10. Other chronic pain G89.29    11. COPD, moderate J44.9 albuterol (VENTOLIN HFA) 108 (90 Base) MCG/ACT inhaler     ipratropium - albuterol 0.5 mg/2.5 mg/3 mL (DUONEB) 0.5-2.5 (3) MG/3ML neb solution     budesonide-formoterol (SYMBICORT) 160-4.5 MCG/ACT Inhaler   12. Vitamin D deficiency E55.9 Vitamin D Deficiency   13. Low hemoglobin D64.9 CBC with platelets   14. Low serum sodium R79.89 Comprehensive metabolic panel   15. Low serum parathyroid hormone (PTH) R79.89 Comprehensive metabolic panel     Parathyroid Hormone Intact   16. Malignant neoplasm of upper lobe of left lung (H) C34.12 Comprehensive metabolic panel     CBC with platelets   17. Hyperlipidemia LDL goal <100 E78.5    18. Chronic venous stasis dermatitis of  both lower extremities I87.2    19. Osteoporosis without current pathological fracture, unspecified osteoporosis type M81.0 Comprehensive metabolic panel     Vitamin D Deficiency   20. Medication monitoring encounter Z51.81 XR Pelvis 1/2 Views     Comprehensive metabolic panel     CBC with platelets     TSH with free T4 reflex     Vitamin D Deficiency     Iron and iron binding capacity     Ferritin     Vitamin B12     Folate     Parathyroid Hormone Intact     *UA reflex to Microscopic and Culture ([facility] (except [place] and [place])     Albumin Random Urine Quantitative with Creat Ratio         PLAN:                                                      Discussed treatment/modality options, including risk and benefits she desires:    Continue same.  Labs.  Med refills.  Set up for bilateral greater troch injections.   Follow with Dr Gauthier.  Follow with Dr Thompson.  Consider FSOC.    All diagnosis above reviewed and noted above, otherwise stable.  See [facility] orders for further details.     Return in about 3 months (around 1/29/2020), or if symptoms worsen or fail to improve, for Medication Recheck Visit, Follow Up Chronic.    Health Maintenance Due   Topic Date Due     URINE DRUG SCREEN  1935     LIPID  03/09/2017     MAMMO SCREENING  10/04/2018     MICROALBUMIN  02/19/2019     MEDICARE ANNUAL WELLNESS VISIT  04/24/2019     INFLUENZA VACCINE (1) 09/01/2019     WILLIAM ASSESSMENT  09/13/2019     PHQ-9  09/13/2019       See Patient Instructions           Rashi Calle MD [address]  [address] [phone] Fax

## 2019-10-30 DIAGNOSIS — R79.89 LOW SERUM SODIUM: Primary | ICD-10-CM

## 2019-10-31 ENCOUNTER — OFFICE VISIT (OUTPATIENT)
Dept: FAMILY MEDICINE | Facility: CLINIC | Age: 84
End: 2019-10-31
Payer: MEDICARE

## 2019-10-31 VITALS
DIASTOLIC BLOOD PRESSURE: 63 MMHG | OXYGEN SATURATION: 92 % | TEMPERATURE: 97.4 F | HEART RATE: 50 BPM | SYSTOLIC BLOOD PRESSURE: 134 MMHG | BODY MASS INDEX: 28.91 KG/M2 | WEIGHT: 153 LBS

## 2019-10-31 DIAGNOSIS — M70.62 GREATER TROCHANTERIC BURSITIS OF BOTH HIPS: Primary | ICD-10-CM

## 2019-10-31 DIAGNOSIS — I10 HYPERTENSION GOAL BP (BLOOD PRESSURE) < 140/90: ICD-10-CM

## 2019-10-31 DIAGNOSIS — Z51.81 MEDICATION MONITORING ENCOUNTER: ICD-10-CM

## 2019-10-31 DIAGNOSIS — N18.4 CKD (CHRONIC KIDNEY DISEASE) STAGE 4, GFR 15-29 ML/MIN (H): ICD-10-CM

## 2019-10-31 DIAGNOSIS — M70.61 GREATER TROCHANTERIC BURSITIS OF BOTH HIPS: Primary | ICD-10-CM

## 2019-10-31 PROCEDURE — 20610 DRAIN/INJ JOINT/BURSA W/O US: CPT | Mod: 50 | Performed by: FAMILY MEDICINE

## 2019-10-31 ASSESSMENT — PATIENT HEALTH QUESTIONNAIRE - PHQ9
SUM OF ALL RESPONSES TO PHQ QUESTIONS 1-9: 4
5. POOR APPETITE OR OVEREATING: NOT AT ALL

## 2019-10-31 ASSESSMENT — ANXIETY QUESTIONNAIRES
IF YOU CHECKED OFF ANY PROBLEMS ON THIS QUESTIONNAIRE, HOW DIFFICULT HAVE THESE PROBLEMS MADE IT FOR YOU TO DO YOUR WORK, TAKE CARE OF THINGS AT HOME, OR GET ALONG WITH OTHER PEOPLE: NOT DIFFICULT AT ALL
6. BECOMING EASILY ANNOYED OR IRRITABLE: NOT AT ALL
7. FEELING AFRAID AS IF SOMETHING AWFUL MIGHT HAPPEN: NOT AT ALL
GAD7 TOTAL SCORE: 0
1. FEELING NERVOUS, ANXIOUS, OR ON EDGE: NOT AT ALL
5. BEING SO RESTLESS THAT IT IS HARD TO SIT STILL: NOT AT ALL
2. NOT BEING ABLE TO STOP OR CONTROL WORRYING: NOT AT ALL
3. WORRYING TOO MUCH ABOUT DIFFERENT THINGS: NOT AT ALL

## 2019-10-31 NOTE — PROGRESS NOTES
SUBJECTIVE:                                                      Anat Correa is a 84 year old female who presents to clinic today for the following health issues:    Joint injection bilateral hip - greater trochanter     Hx of bilateral greater trochanter bursitis with recurrence - difficulty laying on R>L side to sleep - desires repeat injection - no redness - no warmth - no swelling    Recent visit - doing well overall.    Htn    BP Readings from Last 3 Encounters:   10/31/19 134/63   10/29/19 (!) 154/64   08/24/19 138/58     Lab Results   Component Value Date    CR 1.92 10/29/2019     Reviewed and updated as needed this visit by Provider         BP Readings from Last 3 Encounters:   10/31/19 134/63   10/29/19 (!) 154/64   08/24/19 138/58       body mass index is 28.91 kg/m .    Wt Readings from Last 4 Encounters:   10/31/19 69.4 kg (153 lb)   10/29/19 69.4 kg (153 lb)   07/29/19 71.7 kg (158 lb)   03/19/19 71.2 kg (157 lb)       Health Maintenance    Health Maintenance Due   Topic Date Due     URINE DRUG SCREEN  1935     LIPID  03/09/2017     MAMMO SCREENING  10/04/2018     MEDICARE ANNUAL WELLNESS VISIT  04/24/2019     WILLIAM ASSESSMENT  09/13/2019       Current Problem List    Patient Active Problem List   Diagnosis     History of colonic polyps     Calculus of kidney     Lesion of plantar nerve     Osteoporosis     Primary iridocyclitis     Anemia     Hyperlipidemia LDL goal <100     OA (osteoarthritis)     Advanced directives, counseling/discussion     Hypertension goal BP (blood pressure) < 140/90     COPD, moderate     Controlled substance agreement signed     Vitamin D deficiency     CKD (chronic kidney disease) stage 4, GFR 15-29 ml/min (H)     Anemia in chronic renal disease     Secondary renal hyperparathyroidism (H)     Venous stasis of lower extremity     Peripheral neuropathy     Chronic pain     Lung nodule     Nodule of left lung     Malignant neoplasm of upper lobe of left lung (H)      Acute pain of right shoulder     S/P amputation of lesser toe, unspecified laterality (H)     Retinal hemorrhage of right eye       Past Medical History    Past Medical History:   Diagnosis Date     Anemia      Calculus of kidney     dr hope     Chronic pain     OA     CKD (chronic kidney disease) stage 4, GFR 15-29 ml/min (H)     dr mcdermott     COPD, moderate (H)     moderate obstruction, with pulm htn - dr francisco did AAP     Diverticulosis of colon (without mention of hemorrhage)      Hemorrhage of gastrointestinal tract, unspecified     dr su     Hyperlipidemia LDL goal <100      Hypertension goal BP (blood pressure) < 140/90      Internal hemorrhoids without mention of complication      Irritable bowel syndrome      Lesion of plantar nerve     hay's neuroma dr connor     Lung nodule , 3/16    Dr Thompson, 1.4 x 1.1 cm, lingula, PET neg 3/16     Malignant neoplasm of upper lobe of left lung (H)     Dr Thompson - x 2 nodules - moderately differentiated adenocarcinoma (acinar predominant).  Final pathologic stage [id number], Final clinical stage IA, grade 2     Medication management     OA - 1 T#3 at HS with HX CKD     OA (osteoarthritis)     lower ext worse katya knees     Obesity (BMI 30.0-34.9)      Osteoporosis, unspecified     FOSAMAX  = upset stomach , pt declines further rx.      Other ulcerative colitis     collangenous collitis- last colonoscopy       Peripheral neuropathy     early - burning at HS     Personal history of colonic polyps     dr araujo     PONV (postoperative nausea and vomiting)      Primary iridocyclitis 1998    iritis dr dominguez     Venous stasis of lower extremity        Past Surgical History    Past Surgical History:   Procedure Laterality Date     AMPUTATE TOE(S) Right 2015    Procedure: AMPUTATE TOE(S);  Surgeon: Ashia White, DPM, Pod;  Location: [facility] OR     C APPENDECTOMY       C  DELIVERY ONLY       , Low Cervical     EXCISE MASS UPPER EXTREMITY  10/13/2011    Lt Forearm mass excision - dr ely     EXCISE MASS UPPER EXTREMITY  2012    Lt Forearm mass excision - dr ely     HC COLONOSCOPY THRU STOMA, DIAGNOSTIC      IBS/diverticula/hemmorhoids dr araujo     HC ESOPHAGOSCOPY, DIAGNOSTIC  , , 6/10    Gastric ulcer, healed, gastritis     HC HEMORRHOIDECTOMY,INT/EXT,SIMPLE       HC REPAIR SLIDING INGUINAL HERNIA  1960    [facility]ra     SURGICAL HISTORY OF -       [facility] neck & back tumors     SURGICAL HISTORY OF -       neuroma excision - 2nd toe amputation     SURGICAL HISTORY OF -   3/07    Rt IOL - dr jimenez     SURGICAL HISTORY OF -       Lt IOL - dr jimenez     SURGICAL HISTORY OF -       Lt Knee replacement - dr gayle     SURGICAL HISTORY OF -       Rt Knee replacement - dr gayle     SURGICAL HISTORY OF -   9/15    Rt Second toe amputation - Dr White     THORACOSCOPIC WEDGE RESECTION LUNG Left 2016    Procedure: THORACOSCOPIC WEDGE RESECTION LUNG;  Surgeon: Abdelrahman Thompson MD;  Location: [facility] OR     XR JOINT INJECTION HIP (HIB)  2015    Rt - Dr Terry       Current Medications    Current Outpatient Medications   Medication Sig Dispense Refill     acetaminophen-codeine (TYLENOL #3) 300-30 MG tablet TAKE ONE TO TWO TABLETS BY MOUTH EVERY 6 HOURS AS NEEDED FOR MODERATE PAIN 90 tablet 0     albuterol (VENTOLIN HFA) 108 (90 Base) MCG/ACT inhaler INHALE TWO PUFFS INTO THE LUNGS EVERY 6 HOURS AS NEEDED FOR SHORTNESS OF BREATH/DYSPNEA 54 g 3     amLODIPine (NORVASC) 5 MG tablet Take 1 tablet (5 mg) by mouth 2 times daily 180 tablet 3     ASPIRIN NOT PRESCRIBED (INTENTIONAL) Please choose reason not prescribed, below 0 each 0     budesonide-formoterol (SYMBICORT) 160-4.5 MCG/ACT Inhaler Inhale 2 puffs into the lungs 2 times daily 30.6 g 3     calcium carbonate (TUMS) 500 MG chewable tablet Take 2 chew tab by mouth daily as needed for heartburn        furosemide (LASIX) 20 MG tablet Take 2 tablets (40 mg) by mouth daily 180 tablet 3     IBUPROFEN PO Take 400 mg by mouth every 6 hours as needed for moderate pain       ipratropium - albuterol 0.5 mg/2.5 mg/3 mL (DUONEB) 0.5-2.5 (3) MG/3ML neb solution Take 1 vial (3 mLs) by nebulization every 4 hours as needed for shortness of breath / dyspnea or wheezing 270 mL 3     losartan (COZAAR) 50 MG tablet Take 2 tablets (100 mg) by mouth daily 180 tablet 3     metoprolol succinate ER (TOPROL-XL) 50 MG 24 hr tablet Take 1 tablet (50 mg) by mouth 2 times daily 180 tablet 3       Allergies    Allergies   Allergen Reactions     Prednisone      Yeast infection - swollen lips       Immunizations    Immunization History   Administered Date(s) Administered     Influenza (High Dose) 3 valent vaccine 10/07/2010, 09/19/2012, 11/04/2013, 10/08/2014, 11/03/2015, 09/16/2016, 10/02/2017, 09/13/2018, 10/29/2019     Influenza (IIV3) PF 10/19/2004, 11/15/2005     Pneumo Conj 13-V (2010&after) 11/03/2015     Pneumococcal 23 valent 04/25/2006     TD (ADULT, 7+) 03/03/1998, 01/23/2006, 06/21/2018     TDAP Vaccine (Boostrix) 08/27/2012     Zoster vaccine recombinant adjuvanted (SHINGRIX) 07/11/2018, 09/13/2018     Zoster vaccine, live 10/24/2012       Family History    Family History   Problem Relation Age of Onset     Cerebrovascular Disease Mother      Cerebrovascular Disease Father      Cancer - colorectal Brother      Cancer - colorectal Brother      Breast Cancer Sister      Cancer Sister         lung     Cancer Sister         lung     Cancer Sister         lung     Scleroderma Sister        Social History    Social History     Socioeconomic History     Marital status:      Spouse name: thanh     Number of children: 1     Years of education: 12     Highest education level: Not on file   Occupational History     Occupation: part-time [employer] section at [employer]     Financial resource strain: Not on file      Food insecurity:     Worry: Not on file     Inability: Not on file     Transportation needs:     Medical: Not on file     Non-medical: Not on file   Tobacco Use     Smoking status: Former Smoker     Packs/day: 3.00     Years: 50.00     Pack years: 150.00     Types: Cigarettes     Last attempt to quit: 1993     Years since quittin.8     Smokeless tobacco: Never Used     Tobacco comment: quit    Substance and Sexual Activity     Alcohol use: No     Drug use: No     Comment: no herbal meds either      Sexual activity: Not Currently     Comment:  for 24 years    Lifestyle     Physical activity:     Days per week: Not on file     Minutes per session: Not on file     Stress: Not on file   Relationships     Social connections:     Talks on phone: Not on file     Gets together: Not on file     Attends Hinduism service: Not on file     Active member of club or organization: Not on file     Attends meetings of clubs or organizations: Not on file     Relationship status: Not on file     Intimate partner violence:     Fear of current or ex partner: Not on file     Emotionally abused: Not on file     Physically abused: Not on file     Forced sexual activity: Not on file   Other Topics Concern      Service Not Asked     Blood Transfusions Not Asked     Caffeine Concern Yes     Comment: 1 pot  qd - switched to decaff now     Occupational Exposure Not Asked     Hobby Hazards Not Asked     Sleep Concern Not Asked     Stress Concern Not Asked     Weight Concern Not Asked     Special Diet Yes     Comment: Low salt     Back Care Not Asked     Exercise No     Bike Helmet Not Asked     Seat Belt Yes     Self-Exams Yes     Comment: SBE encouraged motnhly      Parent/sibling w/ CABG, MI or angioplasty before 65F 55M? No   Social History Narrative    calcium - 3 large dairy servings/day - pt declines fosamax and other meds for her osteoporosis    flex sig/colonoscopy -last colonoscopy      sun precautions  - discussed     mammogram - hasn't had one since 2001 at least - ordered     Td booster - 1/23/06    pneumovax -today 4/25/06    DEXA - pt declines repeat scan today 4/25/06 despite her osteoporosis     stool hemoccults - every year after age 40    ASA- easy bruising - can't take     mulvitamin - encouraged       All above reviewed and updated, all stable unless otherwise noted    Recent labs reviewed    ROS:  CONSTITUTIONAL: NEGATIVE for fever, chills, change in weight  INTEGUMENTARY/SKIN: NEGATIVE for worrisome rashes, moles or lesions  EYES: NEGATIVE for vision changes or irritation  ENT/MOUTH: NEGATIVE for ear, mouth and throat problems  RESP: NEGATIVE for significant cough or SOB  CV: NEGATIVE for chest pain, palpitations or peripheral edema  GI: NEGATIVE for nausea, abdominal pain, heartburn, or change in bowel habits  : NEGATIVE for frequency, dysuria, or hematuria  MUSCULOSKELETAL: NEGATIVE for significant arthralgias or myalgia  NEURO: NEGATIVE for weakness, dizziness or paresthesias  ENDOCRINE: NEGATIVE for temperature intolerance, skin/hair changes  HEME: NEGATIVE for bleeding problems  PSYCHIATRIC: NEGATIVE for changes in mood or affect    OBJECTIVE:                                                    /63   Pulse 50   Temp 97.4  F (36.3  C) (Oral)   Wt 69.4 kg (153 lb)   SpO2 92%   BMI 28.91 kg/m    Body mass index is 28.91 kg/m .  GENERAL: healthy, alert and no distress  EYES: Eyes grossly normal to inspection, extraocular movements - intact, and PERRL  HENT: ear canals- normal; TMs- normal; Nose- normal; Mouth- no ulcers, no lesions  RESP: lungs clear to auscultation - no rales, no rhonchi, no wheezes  CV: regular rates and rhythm, normal S1 S2, no S3 or S4 and no murmur, no click or rub -  MS: bilateral R>L greater trochanteric bursa tenderness  PSYCH: Alert and oriented times 3; speech- coherent , normal rate and volume; able to articulate logical thoughts, able to abstract reason, no  tangential thoughts, no hallucinations or delusions, affect- normal  LYMPHATICS: ant. cervical- normal, post. cervical- normal, axillary- normal, supraclavicular- normal, inguinal- normal    DIAGNOSTICS/PROCEDURES:                                                      Procedure(s)    Bilateral greater trochanteric bursa kenalog injection    Betadine prep.  Landmarks identified.  20 mg kenalog with 1 ml lidocaine 1%, total 2 ml.  Cleansed with hibiclens  Bandage applied     ASSESSMENT:                                                        ICD-10-CM    1. Greater trochanteric bursitis of both hips M70.61 DRAIN/INJECT LARGE JOINT/BURSA    M70.62 DRAIN/INJECT LARGE JOINT/BURSA     triamcinolone acetonide (KENALOG-10) injection 40 mg   2. CKD (chronic kidney disease) stage 4, GFR 15-29 ml/min (H) N18.4    3. Hypertension goal BP (blood pressure) < 140/90 I10    4. Medication monitoring encounter Z51.81        PLAN:                                                      Discussed treatment/modality options, including risk and benefits she desires:    Bilateral kenalog greater trochanter kenalog injections.  Wound cares reviewed.  Med check every 3-4 months.     All diagnosis above reviewed and noted above, otherwise stable.  See Closely orders for further details.     Return in about 4 months (around 2/29/2020), or if symptoms worsen or fail to improve, for Medication Recheck Visit, Follow Up Chronic.    Health Maintenance Due   Topic Date Due     URINE DRUG SCREEN  1935     LIPID  03/09/2017     MAMMO SCREENING  10/04/2018     MEDICARE ANNUAL WELLNESS VISIT  04/24/2019     WILLIAM ASSESSMENT  09/13/2019       See Patient Instructions           Rashi Calle MD [address]  [address] [phone] Fax

## 2019-11-02 ASSESSMENT — ANXIETY QUESTIONNAIRES: GAD7 TOTAL SCORE: 0

## 2019-11-20 DIAGNOSIS — R79.89 LOW SERUM SODIUM: ICD-10-CM

## 2019-11-20 LAB
ALBUMIN SERPL-MCNC: 3.6 G/DL (ref 3.4–5)
ALP SERPL-CCNC: 77 U/L (ref 40–150)
ALT SERPL W P-5'-P-CCNC: 20 U/L (ref 0–50)
ANION GAP SERPL CALCULATED.3IONS-SCNC: 9 MMOL/L (ref 3–14)
AST SERPL W P-5'-P-CCNC: 16 U/L (ref 0–45)
BILIRUB SERPL-MCNC: 0.4 MG/DL (ref 0.2–1.3)
BUN SERPL-MCNC: 44 MG/DL (ref 7–30)
CALCIUM SERPL-MCNC: 10.6 MG/DL (ref 8.5–10.1)
CHLORIDE SERPL-SCNC: 97 MMOL/L (ref 94–109)
CO2 SERPL-SCNC: 23 MMOL/L (ref 20–32)
CREAT SERPL-MCNC: 1.86 MG/DL (ref 0.52–1.04)
GFR SERPL CREATININE-BSD FRML MDRD: 24 ML/MIN/{1.73_M2}
GLUCOSE SERPL-MCNC: 89 MG/DL (ref 70–99)
POTASSIUM SERPL-SCNC: 4.2 MMOL/L (ref 3.4–5.3)
PROT SERPL-MCNC: 7 G/DL (ref 6.8–8.8)
SODIUM SERPL-SCNC: 129 MMOL/L (ref 133–144)

## 2019-11-20 PROCEDURE — 80053 COMPREHEN METABOLIC PANEL: CPT | Performed by: FAMILY MEDICINE

## 2019-11-20 PROCEDURE — 36415 COLL VENOUS BLD VENIPUNCTURE: CPT | Performed by: FAMILY MEDICINE

## 2019-12-16 DIAGNOSIS — M70.61 GREATER TROCHANTERIC BURSITIS OF BOTH HIPS: ICD-10-CM

## 2019-12-16 DIAGNOSIS — M15.0 PRIMARY OSTEOARTHRITIS INVOLVING MULTIPLE JOINTS: ICD-10-CM

## 2019-12-16 DIAGNOSIS — G63 POLYNEUROPATHY ASSOCIATED WITH UNDERLYING DISEASE (H): ICD-10-CM

## 2019-12-16 DIAGNOSIS — M70.62 GREATER TROCHANTERIC BURSITIS OF BOTH HIPS: ICD-10-CM

## 2019-12-16 NOTE — TELEPHONE ENCOUNTER
Controlled Substance Refill Request for acetaminophen-codeine (TYLENOL #3) 300-30 MG tablet  Problem List Complete:  Yes    Last Written Prescription Date:  9.16.19  Last Fill Quantity: 90 tablet,   # refills: 0      [place] [facility] [place]    Future Office visit:     Controlled substance agreement:   Encounter-Level CSA:    There are no encounter-level csa.     Patient-Level CSA:    Controlled Substance Agreement - Opioid - Scan on 3/20/2019  8:22 AM         Last Urine Drug Screen: No results found for: CDAUT, No results found for: COMDAT, No results found for: THC13, PCP13, COC13, MAMP13, OPI13, AMP13, BZO13, TCA13, MTD13, BAR13, OXY13, PPX13, BUP13     Processing:  Fax Rx to listed pharmacy    [email or web address]   checked in past 3 months?  No, route to RN

## 2019-12-16 NOTE — TELEPHONE ENCOUNTER
Routing refill request to provider for review/approval because:   checked. No concerns noted. Tylenol #3 last filled 9/17/2019 for #90.    MACIE Sullivan, RN, PHN  [facility]  Office: [phone]  Fax: [phone]

## 2020-01-30 ENCOUNTER — OFFICE VISIT (OUTPATIENT)
Dept: FAMILY MEDICINE | Facility: CLINIC | Age: 85
End: 2020-01-30
Payer: MEDICARE

## 2020-01-30 VITALS
HEART RATE: 81 BPM | DIASTOLIC BLOOD PRESSURE: 74 MMHG | TEMPERATURE: 98.1 F | BODY MASS INDEX: 28.51 KG/M2 | SYSTOLIC BLOOD PRESSURE: 138 MMHG | HEIGHT: 61 IN | OXYGEN SATURATION: 95 % | WEIGHT: 151 LBS

## 2020-01-30 DIAGNOSIS — C34.12 MALIGNANT NEOPLASM OF UPPER LOBE OF LEFT LUNG (H): ICD-10-CM

## 2020-01-30 DIAGNOSIS — Z51.81 MEDICATION MONITORING ENCOUNTER: ICD-10-CM

## 2020-01-30 DIAGNOSIS — M15.0 PRIMARY OSTEOARTHRITIS INVOLVING MULTIPLE JOINTS: ICD-10-CM

## 2020-01-30 DIAGNOSIS — J44.9 COPD, MODERATE (H): ICD-10-CM

## 2020-01-30 DIAGNOSIS — G63 POLYNEUROPATHY ASSOCIATED WITH UNDERLYING DISEASE (H): ICD-10-CM

## 2020-01-30 DIAGNOSIS — H69.93 DYSFUNCTION OF BOTH EUSTACHIAN TUBES: Primary | ICD-10-CM

## 2020-01-30 DIAGNOSIS — I10 HYPERTENSION GOAL BP (BLOOD PRESSURE) < 140/90: ICD-10-CM

## 2020-01-30 DIAGNOSIS — N18.4 CKD (CHRONIC KIDNEY DISEASE) STAGE 4, GFR 15-29 ML/MIN (H): ICD-10-CM

## 2020-01-30 PROCEDURE — 99214 OFFICE O/P EST MOD 30 MIN: CPT | Performed by: FAMILY MEDICINE

## 2020-01-30 ASSESSMENT — MIFFLIN-ST. JEOR: SCORE: 1072.31

## 2020-01-30 NOTE — PROGRESS NOTES
[facility]    Anat Correa is a 85 year old female who presents to clinic today for the following health issues:    Acute Illness     Acute illness concerns: ear pressure    Onset: 3-4 days     Fever: no    Chills/Sweats: no    Headache (location?): no    Sinus Pressure:no    Conjunctivitis:  no    Ear Pain: YES- bilateral ear pressure - something stuck in there    Rhinorrhea: YES    Congestion: no    Sore Throat: no     Cough: no    Wheeze: no    Decreased Appetite: no    Nausea: no    Vomiting: no    Diarrhea:  no    Dysuria/Freq.: no    Fatigue/Achiness: no    Sick/Strep Exposure: no     Therapies Tried and outcome: nothing     HTN/CKD    BP Readings from Last 3 Encounters:   01/30/20 138/74   10/31/19 134/63   10/29/19 (!) 154/64     Creatinine   Date Value Ref Range Status   11/20/2019 1.86 (H) 0.52 - 1.04 mg/dL Final     Notes currently all is stable - no other issues    Reviewed and updated as needed this visit by Provider    body mass index is 28.53 kg/m .    Wt Readings from Last 4 Encounters:   01/30/20 68.5 kg (151 lb)   10/31/19 69.4 kg (153 lb)   10/29/19 69.4 kg (153 lb)   07/29/19 71.7 kg (158 lb)       Health Maintenance    Health Maintenance Due   Topic Date Due     URINE DRUG SCREEN  1935     LIPID  03/09/2017     MAMMO SCREENING  10/04/2018     MEDICARE ANNUAL WELLNESS VISIT  04/24/2019     FALL RISK ASSESSMENT  12/13/2019     PHQ-2  01/01/2020       Current Problem List    Patient Active Problem List   Diagnosis     History of colonic polyps     Calculus of kidney     Lesion of plantar nerve     Osteoporosis     Primary iridocyclitis     Anemia     Hyperlipidemia LDL goal <100     OA (osteoarthritis)     Advanced directives, counseling/discussion     Hypertension goal BP (blood pressure) < 140/90     COPD, moderate     Controlled substance agreement signed     Vitamin D deficiency     CKD (chronic kidney disease) stage 4, GFR 15-29 ml/min (H)      Anemia in chronic renal disease     Secondary renal hyperparathyroidism (H)     Venous stasis of lower extremity     Peripheral neuropathy     Chronic pain     Lung nodule     Nodule of left lung     Malignant neoplasm of upper lobe of left lung (H)     Acute pain of right shoulder     S/P amputation of lesser toe, unspecified laterality (H)     Retinal hemorrhage of right eye       Past Medical History    Past Medical History:   Diagnosis Date     Anemia      Calculus of kidney 1998    dr hope     Chronic pain     OA     CKD (chronic kidney disease) stage 4, GFR 15-29 ml/min (H) 2008    dr mcdermott     COPD, moderate (H) 2004    moderate obstruction, with pulm htn - dr francisco did AAP     Diverticulosis of colon (without mention of hemorrhage) 7/03     Hemorrhage of gastrointestinal tract, unspecified 4/08    dr su     Hyperlipidemia LDL goal <100 2006     Hypertension goal BP (blood pressure) < 140/90 2005     Internal hemorrhoids without mention of complication 7/03     Irritable bowel syndrome 7/03     Lesion of plantar nerve 8/98    hay's neuroma dr connor     Lung nodule 4/14, 3/16    Dr Thompson, 1.4 x 1.1 cm, lingula, PET neg 3/16     Malignant neoplasm of upper lobe of left lung (H) 7/16    Dr Thompson - x 2 nodules - moderately differentiated adenocarcinoma (acinar predominant).  Final pathologic stage [id number], Final clinical stage IA, grade 2     Medication management     OA - 1 T#3 at HS with HX CKD     OA (osteoarthritis) 1998    lower ext worse katya knees     Obesity (BMI 30.0-34.9)      Osteoporosis, unspecified 2/02    FOSAMAX  = upset stomach , pt declines further rx.      Other ulcerative colitis 7/03    collangenous collitis- last colonoscopy 7/03      Peripheral neuropathy     early - burning at HS     Personal history of colonic polyps 7/98    dr van SANTOS (postoperative nausea and vomiting)      Primary iridocyclitis 1998    iritis dr dominguez     Venous stasis of lower extremity        Past  Surgical History    Past Surgical History:   Procedure Laterality Date     AMPUTATE TOE(S) Right 2015    Procedure: AMPUTATE TOE(S);  Surgeon: Ashia White, DPM, Pod;  Location: [facility] OR     C APPENDECTOMY       C  DELIVERY ONLY  1965    , Low Cervical     EXCISE MASS UPPER EXTREMITY  10/13/2011    Lt Forearm mass excision - dr ely     EXCISE MASS UPPER EXTREMITY  2012    Lt Forearm mass excision - dr ely     HC COLONOSCOPY THRU STOMA, DIAGNOSTIC      IBS/diverticula/hemmorhoids dr araujo     HC ESOPHAGOSCOPY, DIAGNOSTIC  , , 6/10    Gastric ulcer, healed, gastritis     HC HEMORRHOIDECTOMY,INT/EXT,SIMPLE       HC REPAIR SLIDING INGUINAL HERNIA      mitra     SURGICAL HISTORY OF -       mitra neck & back tumors     SURGICAL HISTORY OF -       neuroma excision - 2nd toe amputation     SURGICAL HISTORY OF -   3/07    Rt IOL - dr jimenez     SURGICAL HISTORY OF -       Lt IOL - dr jimenez     SURGICAL HISTORY OF -       Lt Knee replacement - dr gayle     SURGICAL HISTORY OF -       Rt Knee replacement - dr gayle     SURGICAL HISTORY OF -   9/15    Rt Second toe amputation - Dr White     THORACOSCOPIC WEDGE RESECTION LUNG Left 2016    Procedure: THORACOSCOPIC WEDGE RESECTION LUNG;  Surgeon: Abdelrahman Thompson MD;  Location: [facility] OR     XR JOINT INJECTION HIP (HIB)  2015    Rt - Dr Terry       Current Medications    Current Outpatient Medications   Medication Sig Dispense Refill     acetaminophen-codeine (TYLENOL #3) 300-30 MG tablet TAKE 1-2 TABLETS BY MOUTH EVERY 6 HOURS AS NEEDED FOR MODERATE PAIN 90 tablet 0     albuterol (VENTOLIN HFA) 108 (90 Base) MCG/ACT inhaler INHALE TWO PUFFS INTO THE LUNGS EVERY 6 HOURS AS NEEDED FOR SHORTNESS OF BREATH/DYSPNEA 54 g 3     amLODIPine (NORVASC) 5 MG tablet Take 1 tablet (5 mg) by mouth 2 times daily 180 tablet 3     ASPIRIN NOT PRESCRIBED (INTENTIONAL) Please choose reason not  prescribed, below 0 each 0     budesonide-formoterol (SYMBICORT) 160-4.5 MCG/ACT Inhaler Inhale 2 puffs into the lungs 2 times daily 30.6 g 3     calcium carbonate (TUMS) 500 MG chewable tablet Take 2 chew tab by mouth daily as needed for heartburn       furosemide (LASIX) 20 MG tablet Take 2 tablets (40 mg) by mouth daily 180 tablet 3     IBUPROFEN PO Take 400 mg by mouth every 6 hours as needed for moderate pain       ipratropium - albuterol 0.5 mg/2.5 mg/3 mL (DUONEB) 0.5-2.5 (3) MG/3ML neb solution Take 1 vial (3 mLs) by nebulization every 4 hours as needed for shortness of breath / dyspnea or wheezing 270 mL 3     losartan (COZAAR) 50 MG tablet Take 2 tablets (100 mg) by mouth daily 180 tablet 3     metoprolol succinate ER (TOPROL-XL) 50 MG 24 hr tablet Take 1 tablet (50 mg) by mouth 2 times daily 180 tablet 3       Allergies    Allergies   Allergen Reactions     Prednisone      Yeast infection - swollen lips       Immunizations    Immunization History   Administered Date(s) Administered     Influenza (High Dose) 3 valent vaccine 10/07/2010, 09/19/2012, 11/04/2013, 10/08/2014, 11/03/2015, 09/16/2016, 10/02/2017, 09/13/2018, 10/29/2019     Influenza (IIV3) PF 10/19/2004, 11/15/2005     Pneumo Conj 13-V (2010&after) 11/03/2015     Pneumococcal 23 valent 04/25/2006     TD (ADULT, 7+) 03/03/1998, 01/23/2006, 06/21/2018     TDAP Vaccine (Boostrix) 08/27/2012     Zoster vaccine recombinant adjuvanted (SHINGRIX) 07/11/2018, 09/13/2018     Zoster vaccine, live 10/24/2012       Family History    Family History   Problem Relation Age of Onset     Cerebrovascular Disease Mother      Cerebrovascular Disease Father      Cancer - colorectal Brother      Cancer - colorectal Brother      Breast Cancer Sister      Cancer Sister         lung     Cancer Sister         lung     Cancer Sister         lung     Scleroderma Sister        Social History    Social History     Socioeconomic History     Marital status:      Spouse  name: thanh     Number of children: 1     Years of education: 12     Highest education level: Not on file   Occupational History     Occupation: part-time [employer] section at [employer]     Financial resource strain: Not on file     Food insecurity:     Worry: Not on file     Inability: Not on file     Transportation needs:     Medical: Not on file     Non-medical: Not on file   Tobacco Use     Smoking status: Former Smoker     Packs/day: 3.00     Years: 50.00     Pack years: 150.00     Types: Cigarettes     Last attempt to quit: 1993     Years since quittin.1     Smokeless tobacco: Never Used     Tobacco comment: quit    Substance and Sexual Activity     Alcohol use: No     Drug use: No     Comment: no herbal meds either      Sexual activity: Not Currently     Comment:  for 24 years    Lifestyle     Physical activity:     Days per week: Not on file     Minutes per session: Not on file     Stress: Not on file   Relationships     Social connections:     Talks on phone: Not on file     Gets together: Not on file     Attends Latter day service: Not on file     Active member of club or organization: Not on file     Attends meetings of clubs or organizations: Not on file     Relationship status: Not on file     Intimate partner violence:     Fear of current or ex partner: Not on file     Emotionally abused: Not on file     Physically abused: Not on file     Forced sexual activity: Not on file   Other Topics Concern      Service Not Asked     Blood Transfusions Not Asked     Caffeine Concern Yes     Comment: 1 pot  qd - switched to decaff now     Occupational Exposure Not Asked     Hobby Hazards Not Asked     Sleep Concern Not Asked     Stress Concern Not Asked     Weight Concern Not Asked     Special Diet Yes     Comment: Low salt     Back Care Not Asked     Exercise No     Bike Helmet Not Asked     Seat Belt Yes     Self-Exams Yes     Comment: SBE encouraged motnhly       "Parent/sibling w/ CABG, MI or angioplasty before 65F 55M? No   Social History Narrative    calcium - 3 large dairy servings/day - pt declines fosamax and other meds for her osteoporosis    flex sig/colonoscopy -last colonoscopy 7/03     sun precautions - discussed     mammogram - hasn't had one since 2001 at least - ordered     Td booster - 1/23/06    pneumovax -today 4/25/06    DEXA - pt declines repeat scan today 4/25/06 despite her osteoporosis     stool hemoccults - every year after age 40    ASA- easy bruising - can't take     mulvitamin - encouraged       All above reviewed and updated, all stable unless otherwise noted    Recent labs reviewed    ROS    CONSTITUTIONAL: NEGATIVE for fever, chills, change in weight  INTEGUMENTARY/SKIN: NEGATIVE for worrisome rashes, moles or lesions  EYES: NEGATIVE for vision changes or irritation  ENT/MOUTH: NEGATIVE for ear, mouth and throat problems  RESP: NEGATIVE for significant cough or SOB  CV: NEGATIVE for chest pain, palpitations or peripheral edema  GI: NEGATIVE for nausea, abdominal pain, heartburn, or change in bowel habits  : NEGATIVE for frequency, dysuria, or hematuria  MUSCULOSKELETAL: NEGATIVE for significant arthralgias or myalgia  NEURO: NEGATIVE for weakness, dizziness or paresthesias  ENDOCRINE: NEGATIVE for temperature intolerance, skin/hair changes  HEME: NEGATIVE for bleeding problems  PSYCHIATRIC: NEGATIVE for changes in mood or affect    OBJECTIVE    /74   Pulse 81   Temp 98.1  F (36.7  C) (Oral)   Ht 1.549 m (5' 1\")   Wt 68.5 kg (151 lb)   SpO2 95%   BMI 28.53 kg/m    Body mass index is 28.53 kg/m .  GENERAL: healthy, alert and no distress  EYES: Eyes grossly normal to inspection, extraocular movements - intact, and PERRL  HENT: ear canals- normal; TMs- normal; Nose- normal; Mouth- no ulcers, no lesions  NECK: no tenderness, no adenopathy, no asymmetry, no masses, no stiffness; thyroid- normal to palpation  RESP: lungs clear to " auscultation - no rales, no rhonchi, no wheezes  CV: regular rates and rhythm, normal S1 S2, no S3 or S4 and no murmur, no click or rub -  ABDOMEN: soft, no tenderness, no  hepatosplenomegaly, no masses, normal bowel sounds  MS: extremities- no gross deformities noted, no edema  SKIN: no suspicious lesions, no rashes  NEURO: strength and tone- normal, sensory exam- grossly normal, mentation- intact, speech- normal, reflexes- symmetric  BACK: no CVA tenderness, no paralumbar tenderness  PSYCH: Alert and oriented times 3; speech- coherent , normal rate and volume; able to articulate logical thoughts, able to abstract reason, no tangential thoughts, no hallucinations or delusions, affect- normal  LYMPHATICS: no cervical adenopathy    DIAGNOSTICS/PROCEDURE    none      ASSESSMENT      ICD-10-CM    1. Dysfunction of both eustachian tubes H69.83    2. CKD (chronic kidney disease) stage 4, GFR 15-29 ml/min (H) N18.4    3. Hypertension goal BP (blood pressure) < 140/90 I10    4. COPD, moderate J44.9    5. Primary osteoarthritis involving multiple joints M15.0    6. Malignant neoplasm of upper lobe of left lung (H) C34.12    7. Polyneuropathy associated with underlying disease (H) G63    8. Medication monitoring encounter Z51.81        PLAN    Discussed treatment/modality options, including risk and benefits she desires:    1) observation, reviewed ETD, popping ears, advise hold on sudafed secondary to BP    2) close follow up    All diagnosis above reviewed and noted above, otherwise stable.  See [facility] orders for further details.     Return in about 1 week (around 2/6/2020), or if symptoms worsen or fail to improve, for Follow Up Acute.    Health Maintenance Due   Topic Date Due     URINE DRUG SCREEN  1935     LIPID  03/09/2017     MAMMO SCREENING  10/04/2018     MEDICARE ANNUAL WELLNESS VISIT  04/24/2019     FALL RISK ASSESSMENT  12/13/2019     PHQ-2  01/01/2020       See Patient Instructions             Rashi  MD Luc, FAAFP     [facility]  [address] [place] [id number]  [email or web address]   Office: [phone]  Fax: [phone]  Pager: [phone]

## 2020-03-11 DIAGNOSIS — G63 POLYNEUROPATHY ASSOCIATED WITH UNDERLYING DISEASE (H): ICD-10-CM

## 2020-03-11 DIAGNOSIS — M15.0 PRIMARY OSTEOARTHRITIS INVOLVING MULTIPLE JOINTS: ICD-10-CM

## 2020-03-11 DIAGNOSIS — M70.62 GREATER TROCHANTERIC BURSITIS OF BOTH HIPS: ICD-10-CM

## 2020-03-11 DIAGNOSIS — M70.61 GREATER TROCHANTERIC BURSITIS OF BOTH HIPS: ICD-10-CM

## 2020-03-11 NOTE — TELEPHONE ENCOUNTER
Controlled Substance Refill Request for acetaminophen-codeine (TYLENOL #3) 300-30 MG tablet  Problem List Complete:  Yes    Last Written Prescription Date:  12.16.19  Last Fill Quantity: 90 tablet,   # refills: 0      Last Office Visit with [facility] primary care provider: 1.30.20    Future Office visit:     Controlled substance agreement:   Encounter-Level CSA:    There are no encounter-level csa.     Patient-Level CSA:    Controlled Substance Agreement - Opioid - Scan on 3/20/2019  8:22 AM         Last Urine Drug Screen: No results found for: CDAUT, No results found for: COMDAT, No results found for: THC13, PCP13, COC13, MAMP13, OPI13, AMP13, BZO13, TCA13, MTD13, BAR13, OXY13, PPX13, BUP13     Processing:  Fax Rx to listed pharmacy    [email or web address]   checked in past 3 months?  No, route to RN

## 2020-05-02 ENCOUNTER — APPOINTMENT (OUTPATIENT)
Dept: GENERAL RADIOLOGY | Facility: CLINIC | Age: 85
End: 2020-05-02
Attending: EMERGENCY MEDICINE
Payer: MEDICARE

## 2020-05-02 ENCOUNTER — HOSPITAL ENCOUNTER (EMERGENCY)
Facility: CLINIC | Age: 85
Discharge: HOME | End: 2020-05-02
Attending: EMERGENCY MEDICINE | Admitting: EMERGENCY MEDICINE
Payer: MEDICARE

## 2020-05-02 ENCOUNTER — APPOINTMENT (OUTPATIENT)
Dept: CT IMAGING | Facility: CLINIC | Age: 85
End: 2020-05-02
Attending: EMERGENCY MEDICINE
Payer: MEDICARE

## 2020-05-02 VITALS
HEART RATE: 83 BPM | TEMPERATURE: 98.2 F | RESPIRATION RATE: 18 BRPM | DIASTOLIC BLOOD PRESSURE: 58 MMHG | OXYGEN SATURATION: 94 % | SYSTOLIC BLOOD PRESSURE: 135 MMHG

## 2020-05-02 DIAGNOSIS — R62.7 FAILURE TO THRIVE IN ADULT: ICD-10-CM

## 2020-05-02 DIAGNOSIS — R29.6 FALLS FREQUENTLY: ICD-10-CM

## 2020-05-02 DIAGNOSIS — M25.551 HIP PAIN, RIGHT: ICD-10-CM

## 2020-05-02 LAB
ALBUMIN UR-MCNC: NEGATIVE MG/DL
ANION GAP SERPL CALCULATED.3IONS-SCNC: 8 MMOL/L (ref 3–14)
APPEARANCE UR: CLEAR
BASOPHILS # BLD AUTO: 0.1 10E9/L (ref 0–0.2)
BASOPHILS NFR BLD AUTO: 0.5 %
BILIRUB UR QL STRIP: NEGATIVE
BUN SERPL-MCNC: 42 MG/DL (ref 7–30)
CALCIUM SERPL-MCNC: 11.4 MG/DL (ref 8.5–10.1)
CHLORIDE SERPL-SCNC: 98 MMOL/L (ref 94–109)
CO2 SERPL-SCNC: 24 MMOL/L (ref 20–32)
COLOR UR AUTO: ABNORMAL
CREAT SERPL-MCNC: 2.14 MG/DL (ref 0.52–1.04)
DIFFERENTIAL METHOD BLD: ABNORMAL
EOSINOPHIL # BLD AUTO: 0.3 10E9/L (ref 0–0.7)
EOSINOPHIL NFR BLD AUTO: 2.4 %
ERYTHROCYTE [DISTWIDTH] IN BLOOD BY AUTOMATED COUNT: 12.6 % (ref 10–15)
GFR SERPL CREATININE-BSD FRML MDRD: 21 ML/MIN/{1.73_M2}
GLUCOSE SERPL-MCNC: 114 MG/DL (ref 70–99)
GLUCOSE UR STRIP-MCNC: NEGATIVE MG/DL
HCT VFR BLD AUTO: 32.2 % (ref 35–47)
HGB BLD-MCNC: 10.4 G/DL (ref 11.7–15.7)
HGB UR QL STRIP: NEGATIVE
HYALINE CASTS #/AREA URNS LPF: 27 /LPF (ref 0–2)
IMM GRANULOCYTES # BLD: 0 10E9/L (ref 0–0.4)
IMM GRANULOCYTES NFR BLD: 0.4 %
INTERPRETATION ECG - MUSE: NORMAL
KETONES UR STRIP-MCNC: NEGATIVE MG/DL
LEUKOCYTE ESTERASE UR QL STRIP: NEGATIVE
LYMPHOCYTES # BLD AUTO: 1.2 10E9/L (ref 0.8–5.3)
LYMPHOCYTES NFR BLD AUTO: 10.6 %
MCH RBC QN AUTO: 31 PG (ref 26.5–33)
MCHC RBC AUTO-ENTMCNC: 32.3 G/DL (ref 31.5–36.5)
MCV RBC AUTO: 96 FL (ref 78–100)
MONOCYTES # BLD AUTO: 1.1 10E9/L (ref 0–1.3)
MONOCYTES NFR BLD AUTO: 9.8 %
MUCOUS THREADS #/AREA URNS LPF: PRESENT /LPF
NEUTROPHILS # BLD AUTO: 8.5 10E9/L (ref 1.6–8.3)
NEUTROPHILS NFR BLD AUTO: 76.3 %
NITRATE UR QL: NEGATIVE
NRBC # BLD AUTO: 0 10*3/UL
NRBC BLD AUTO-RTO: 0 /100
PH UR STRIP: 6 PH (ref 5–7)
PLATELET # BLD AUTO: 348 10E9/L (ref 150–450)
POTASSIUM SERPL-SCNC: 4.6 MMOL/L (ref 3.4–5.3)
RBC # BLD AUTO: 3.36 10E12/L (ref 3.8–5.2)
RBC #/AREA URNS AUTO: <1 /HPF (ref 0–2)
SODIUM SERPL-SCNC: 130 MMOL/L (ref 133–144)
SOURCE: ABNORMAL
SP GR UR STRIP: 1.02 (ref 1–1.03)
TRANS CELLS #/AREA URNS HPF: <1 /HPF (ref 0–1)
UROBILINOGEN UR STRIP-MCNC: NORMAL MG/DL (ref 0–2)
WBC # BLD AUTO: 11.1 10E9/L (ref 4–11)
WBC #/AREA URNS AUTO: 1 /HPF (ref 0–5)

## 2020-05-02 PROCEDURE — 73502 X-RAY EXAM HIP UNI 2-3 VIEWS: CPT

## 2020-05-02 PROCEDURE — 99285 EMERGENCY DEPT VISIT HI MDM: CPT | Mod: 25

## 2020-05-02 PROCEDURE — 81001 URINALYSIS AUTO W/SCOPE: CPT | Performed by: EMERGENCY MEDICINE

## 2020-05-02 PROCEDURE — 93005 ELECTROCARDIOGRAM TRACING: CPT

## 2020-05-02 PROCEDURE — 80048 BASIC METABOLIC PNL TOTAL CA: CPT | Performed by: EMERGENCY MEDICINE

## 2020-05-02 PROCEDURE — 85025 COMPLETE CBC W/AUTO DIFF WBC: CPT | Performed by: EMERGENCY MEDICINE

## 2020-05-02 PROCEDURE — 72192 CT PELVIS W/O DYE: CPT

## 2020-05-02 ASSESSMENT — ENCOUNTER SYMPTOMS
DIARRHEA: 0
SHORTNESS OF BREATH: 0
NECK PAIN: 0
FEVER: 0
BACK PAIN: 0
COUGH: 0
HEADACHES: 0

## 2020-05-02 NOTE — DISCHARGE INSTRUCTIONS
An Emergency Dept follow up phone appointment has been made for you on May 07, 2020 at11:30 AM CDT   , if you would like to make any changes, please phone them at:      Telephone Visit with Rashi Calle MD   [facility]  [address] [place] [phone] [phone]    [facility]  Note: this is not an onsite visit; there is no need to come to the facility.  Please have a list of all current medications available for appointment.        Your home care referral was sent to [facility]  If you haven't heard from them within the next 24-48 hours,  Please call them at [phone]        Return for fevers, worsening pain, new symptoms.

## 2020-05-02 NOTE — ED PROVIDER NOTES
History     Chief Complaint:  Fall      HPI   Anat Correa is a 85 year old female with a complex history including COPD, and lung cancer who presents with a fall. The patient reports today, she lost her balance and was unable to get up off the floor. She denies hitting her head or loss of consciousness at this time. She notes earlier today, she lost her balance at the store but was able to get up. She states when she fell at her apartment, she was able to call her neighbor as her phone was in her pocket.  Currently she has a lot of pain on the outside of her right hip.  Her neighbor called EMS and they were concerned for the patient living alone. She denies fever, cough, diarrhea, neck pain, back pain, headache, chest pain, and increased difficulty breathing from her baseline COPD. She states she feels safe at home.     Allergies:  Prednisone  Penicillins    Medications:    Tylenol #3  Kenalog  Albuterol inhaler  Norvasc  Symbicort inhaler  Lasix  Cozaar  Toprol    Past Medical History:    Anemia  Calculus of kidney  Chronic pain  Chronic kidney disease stage 4  COPD  Diverticulosis  Hemorrhage of gastrointestinal tract  Hyperlipidemia  Hypertension  Internal hemorrhoids  Irritable bowel syndrome  Lesion of plantar nerve  Lung nodule  Malignant neoplasm of upper lobe of left lung  Osteoarthritis  Obesity  Osteoporosis  Ulcerative colitis  Peripheral neuropathy  Primary iridocyclitis    Past Surgical History:    Amputate toe (R)  Appendectomy   section  Left forearm mass excision (x2)  Hemorrhoidectomy  Repair sliding inguinal hernia  Kin neck and back tumors  Neuroma excision  IOL (Bilateral)  Knee replacement (bilateral)  Second toe amputation  Wedge resection lung (L)  Joint injection hip    Family History:    Cerebrovascular disease (Mother, Father)  Colorectal cancer (Bother x2)  Breast cancer (Sister)  Lung Cancer (Sister x3)  Scleroderma (Sister)    Social History:  Smoking status: Former,  quit 1993  Alcohol use: No  Drug use: No  PCP: Rashi Calle  Lives alone in an apartment  Marital Status:   [5]    Review of Systems   Constitutional: Negative for fever.   Respiratory: Negative for cough and shortness of breath.    Cardiovascular: Negative for chest pain.   Gastrointestinal: Negative for diarrhea.   Musculoskeletal: Negative for back pain and neck pain.   Neurological: Negative for syncope and headaches.   All other systems reviewed and are negative.    Physical Exam     Patient Vitals for the past 24 hrs:   BP Temp Temp src Pulse Resp SpO2   05/02/20 1000 130/54 -- -- 83 -- 94 %   05/02/20 0957 (!) 140/55 98.2  F (36.8  C) Oral 85 18 94 %     Physical Exam  VS: Reviewed per above  HENT: Mucous membranes moist, no external signs trauma.  EYES: sclera anicteric, pupils equal  CV: Rate as noted, regular rhythm.   RESP: Effort normal. Breath sounds are normal bilaterally.  GI: no tenderness/rebound/guarding, not distended.  NEURO: Alert, moving all extremities  MSK: No deformity of the extremities.  No midline C/T/L spine ttp.  SKIN: Warm and dry    Emergency Department Course   ECG (10:16:42):  Rate 82 bpm. NM interval 170. QRS duration 98. QT/QTc 374/436. P-R-T axes 60 17 46. Normal sinus rhythm. Normal ECG. T wave upright in V2 compared to ECG dated 7/1/16. Interpreted at 1029 by Kelton Mccall MD.     Imaging:  Radiographic findings were communicated with the patient who voiced understanding of the findings.    CT Pelvis Bone wo Contrast  1.  Negative for acute fracture.   2.  Chronic tearing of the right gluteal tendons, with moderate-severe   atrophy of the right gluteal muscles. No acute intramuscular hematoma   or acute tendon tear identified.   3.  Mild degenerative arthrosis in both hips. No joint effusions.   4.  Advanced degenerative changes in the lower lumbar spine at L4-5.   5.  Heavy atherosclerosis.   As read by Radiology.    XR Pelvis and Hip right View  Both hips are  negative for fracture or dislocation.   Visualized pelvis negative for fracture. Vascular calcifications.   Degenerative change at both hip joints.   As read by Radiology.    Laboratory:  CBC: WBC 11.1 (H), HGB 10.4 (L) o/w WNL ()  BMP:  (L), Glucose 114 (H), BUN 42 (H), Creatinine 2.14 (H), GFR 21 (L), Calcium 1.4 (H) o/w WNL   UA: Mucous present, Hyaline Casts 27 (H), o/w Negative    Procedures:  None    Emergency Department Course:  Past medical records, nursing notes, and vitals reviewed.  1001: I performed an exam of the patient and obtained history, as documented above.  EKG performed, results above.  The patient was sent for a right hip and pelvis x-ray and a pelvis bone CT while in the emergency department, findings above.  Blood drawn.    1300: I rechecked the patient. Explained findings to the patient.    1302: I spoke to the patient's son.    1308: I spoke to the Care Coordinator.     Findings and plan explained to the Patient. Patient discharged home with instructions regarding supportive care, medications, and reasons to return. The importance of close follow-up was reviewed.     Impression & Plan    Medical Decision Making:  Patient presents to the ER for evaluation of right hip pain after fall this morning.  On arrival vital signs are within normal limits.  On exam she has tenderness over the right lateral hip but no focal neuro deficits and no external signs of head trauma.  Initial x-ray of the hip is negative but given ongoing pain, CT of the pelvis was obtained.  Fortunately did not reveal evidence of fracture.  Patient was able to ambulate without issue.  No evidence of UTI.  Basic labs are without significant concern.  Patient would like to go home and feels safe at home.  I discussed with her son who does not have concerns about her safety but admitted that she would benefit from being checked on more frequently.  Both patient and son were amenable to outpatient PT and RN visits  given falls.  She remained stable in the ER prior to being picked up by her son.    Diagnosis:    ICD-10-CM    1. Falls frequently  R29.6 HOME CARE NURSING REFERRAL   2. Failure to thrive in adult  R62.7 HOME CARE NURSING REFERRAL   3. Hip pain, right  M25.551 HOME CARE NURSING REFERRAL     Disposition:  discharged to home    Mook Hampton  5/2/2020   [facility] EMERGENCY DEPARTMENT  I, Mook Hampton, am serving as a scribe at 10:01 AM on 5/2/2020 to document services personally performed by Kelton Mccall MD based on my observations and the provider's statements to me.        Kelton Mccall MD  05/02/20 [id number]

## 2020-05-02 NOTE — ED AVS SNAPSHOT
[facility] Emergency Department  [address]  [facility] [address]  Phone:  [phone]  Fax:  [phone]                                    Anat Correa   MRN: [id number]    Department:  [facility] Emergency Department   Date of Visit:  5/2/2020           After Visit Summary Signature Page    I have received my discharge instructions, and my questions have been answered. I have discussed any challenges I see with this plan with the nurse or doctor.    ..........................................................................................................................................  Patient/Patient Representative Signature      ..........................................................................................................................................  Patient Representative Print Name and Relationship to Patient    ..................................................               ................................................  Date                                   Time    ..........................................................................................................................................  Reviewed by Signature/Title    ...................................................              ..............................................  Date                                               Time          22EPIC Rev 08/18

## 2020-05-02 NOTE — ED TRIAGE NOTES
Pt reports fall at home at 0830 today on carpeted floor. Pt was unable to get up and called neighbor. EMS was called. Pt denies hitting head or loc.  EMS reports neighbors concern for pt living alone.

## 2020-05-02 NOTE — ED NOTES
"ED Care Manager Note      Met with: Patient, and spoke with son, Jose M, [phone] over the phone.    Data:     Reason for ED visit:   Fall with pain, and inability to get herself up.  Cognitive Status: awake, alert and oriented.  Primary Care Clinic Name: [facility]  Primary Care MD Name: RIYA Calle  Contact information and PCP information verified: Yes  Lives With: alone    Quality of Family Relationships: involved, helpful, supportive    Who is your support system?: Children     Insurance concerns: No Insurance issues identified     Assessment:    Identified issues/concerns regarding health management:     Pt had a fall at home and a store recently.  Today she was not able to get herself up.  Pt appears to be well kempt.  She reports that she has a Wheeled walker, stored in her garage, and she usually uses a cane for assistance.  Pt lives alone, cooks for herself, and performs her own ADLS and IADLS.  She shops for her own groceries at [employer].  Son reports that he has been grocery shopping for his mom d/t COVID precautions, and he is frustrated that she still drives herself to the store and shops.     Action/Resources:    I educated Pt that she is considered \"Homebound\", and d/t her frequent falls, that until she gets stronger and we have a COVID vaccine, that she should stay home.  I also educated her that she should use her wheeled walker at all times, and to buy protein powder to supplement her diet.    ED MD ordered home care of: physical therapy, nurse, and [facility].    Pt/family were provided with the Medicare Compare list for Home Care.  Discussed associated Medicare star ratings to assist with choice for referrals/discharge planning: Yes.    Education was given to pt/family that star ratings are updated/maintained by Medicare and can be reviewed by visiting www.medicare.gov:  Yes.    Pt selected [facility] as their home care provider and a referral was emailed to [facility].  I made a follow up " appointment with PCP:  Rashi Calle.  All information was communicated to Patient and son, both verbally and in writing in the discharge instructions.    Plan:    Will continue to follow and assist as needed.      Kayleen Prater RN Care Coordinator,  SHARAD BAZANN, CCM, EVELYN  Inpatient Care Coordination - Emergency Department  [facility]   [phone]

## 2020-05-02 NOTE — ED NOTES
Bed: ED21  Expected date:   Expected time:   Means of arrival:   Comments:  Omer 512 fall R hip pain

## 2020-05-04 ENCOUNTER — TELEPHONE (OUTPATIENT)
Dept: FAMILY MEDICINE | Facility: CLINIC | Age: 85
End: 2020-05-04

## 2020-05-04 NOTE — TELEPHONE ENCOUNTER
All ok,    Please med rec with patient    Total tylenol daily consumption needs to be 3000 mg or less

## 2020-05-04 NOTE — TELEPHONE ENCOUNTER
Called patient @ [phone] -     Tylenol #3 - 1 tab just at night  Symbicort - does NOT use, too expensive  Ibuprofen - just PRN (RN advised of dosing - NOT more than 2400mg within 24hr period)  Duoneb - NOT taking    RN went over dosing again for ibuprofen/NSAIDs & acetaminophen - Patient stated an understanding and agreed with plan.    Routing to PCP for further review/recommendations/orders.  Do you want med list updated per above?      Annika Novak RN  [facility]

## 2020-05-04 NOTE — TELEPHONE ENCOUNTER
Lupe with [facility] calling. She saw patient today for start of care. She would like verbal orders for skilled nursing 1 time a week for 3 weeks with 2 PRN visits, OT and PT eval and treat and home health aide 1 time a week for 3 weeks.    Verbal OK given for the above orders.    Of note, Lupe wanted Dr. Calle to be aware that patient seems to be taking a lot of OTC, medications for pain management. Of particular concerns is her use of Tylenol. She is taking Tylenol #3 as directed, but also takes 2-500mg tabs of Tylenol a couple of times a day and then takes a half of a tablet of Tylenol PM before bed. She is also taking ibuprofen 400mg a couple of times a day. Lupe reports that she did review with patient to watch he intake of the Tylenol, but Lupe wanted MD GENESIS to be aware as well.    SHANT routed to MD GENESIS.    TANNER Antony, RN  [facility]

## 2020-05-05 ENCOUNTER — TELEPHONE (OUTPATIENT)
Dept: FAMILY MEDICINE | Facility: CLINIC | Age: 85
End: 2020-05-05

## 2020-05-05 NOTE — TELEPHONE ENCOUNTER
Attempt # 1    Called # [phone]     Writer calling to inquire how Pt is doing, Fall seen in ER. Has telephone visit with PCP that need either reschedule or seen by another provider.      Left a non detailed VM to call back at [phone] and ask for any available Triage Nurse.    Rakan Dawn RN   [facility] Triage

## 2020-05-06 NOTE — TELEPHONE ENCOUNTER
This may have been a typo but the 3000 mg is for Tylenol (acetaminophen) and not the ibuprofen which is usually max dose of 2400 mg daily.

## 2020-05-06 NOTE — TELEPHONE ENCOUNTER
Called patient @ # below -     Stated she is feeling better, just tired.     DENIES: CP, SOB, Difficulty Breathing, Dizziness/Lightheadedness, Numbness/Tingling, HA, Vision/Hearing Changes, N/V, Palpitations    RN advised of visit with Dr. Amezquita tomorrow - Patient stated an understanding and agreed with plan.      Annika Novak RN  [facility]

## 2020-05-06 NOTE — PROGRESS NOTES
"  SUBJECTIVE:                                                    Anat Correa is a 85 year old female who is being evaluated via a telephone visit, secondary to COVID-19 coronavirus pandemic, as we are trying to minimize patient exposure to the virus,  which is now widespread in the state.      The patient has been notified of following:     \"This telephone visit will be conducted via a call between you and your physician/provider. We have found that certain health care needs can be provided without the need for a physical exam.  This service lets us provide the care you need with a short phone conversation.  If a prescription is necessary we can send it directly to your pharmacy.  If lab work is needed we can place an order for that and you can then stop by our lab to have the test done at a later time.    Telephone visits are billed at different rates depending on your insurance coverage. During this emergency period, for some insurers they may be billed the same as an in-person visit.  Please reach out to your insurance provider with any questions.    If during the course of the call the physician/provider feels a telephone visit is not appropriate, you will not be charged for this service.\"     Patient has given verbal consent for Telephone visit?  Yes    Anat Correa complains of need for follow up after falling.       ALLERGIES  Patient has no known allergies.    ED/UC Followup:    Facility:  [facility]  Date of visit: 5/2/2020   Reason for visit: Falls frequently , then fell at store and again at home - couldn't get up on her own.      Failure to thrive in adult      Hip pain, right - no fracture seen. Walking with a walker now just fine.  Son brought her groceries last night.  Has wonderful neighbors that look out for her as well.      Current Status: doing ok now has a nurse come out and occupational therapy once a wk.  Has a home health aid that comes out to help her bathe.  She declines " help with cleaning her home.   Has a one level townhome.  No stairs.  Using a walker now for support at home and out of doors.    Was using a cane prior to her fall.       Had CT scan: IMPRESSION:  1.  Negative for acute fracture.  2.  Chronic tearing of the right gluteal tendons, with moderate-severe  atrophy of the right gluteal muscles. No acute intramuscular hematoma  or acute tendon tear identified.  3.  Mild degenerative arthrosis in both hips. No joint effusions.  4.  Advanced degenerative changes in the lower lumbar spine at L4-5.  5.  Heavy atherosclerosis.      Pt denies any dizziness and is using the 4 wheeled walker with handbrakes well.           Patient Active Problem List   Diagnosis     History of colonic polyps     Calculus of kidney     Lesion of plantar nerve     Osteoporosis     Primary iridocyclitis     Anemia     Hyperlipidemia LDL goal <100     OA (osteoarthritis)     Advanced directives, counseling/discussion     Hypertension goal BP (blood pressure) < 140/90     COPD, moderate     Controlled substance agreement signed     Vitamin D deficiency     CKD (chronic kidney disease) stage 4, GFR 15-29 ml/min (H)     Anemia in chronic renal disease     Secondary renal hyperparathyroidism (H)     Venous stasis of lower extremity     Peripheral neuropathy     Chronic pain     Lung nodule     Nodule of left lung     Malignant neoplasm of upper lobe of left lung (H)     Acute pain of right shoulder     S/P amputation of lesser toe, unspecified laterality (H)     Retinal hemorrhage of right eye     Past Surgical History:   Procedure Laterality Date     AMPUTATE TOE(S) Right 2015    Procedure: AMPUTATE TOE(S);  Surgeon: Ashia White, DPM, Pod;  Location: [facility] OR     C APPENDECTOMY       C  DELIVERY ONLY  1965    , Low Cervical     EXCISE MASS UPPER EXTREMITY  10/13/2011    Lt Forearm mass excision - dr ely     EXCISE MASS UPPER EXTREMITY  2012    Lt Forearm mass excision  - dr ely     HC COLONOSCOPY THRU STOMA, DIAGNOSTIC      IBS/diverticula/hemmorhoids dr araujo     HC ESOPHAGOSCOPY, DIAGNOSTIC  , , 6/10    Gastric ulcer, healed, gastritis     HC HEMORRHOIDECTOMY,INT/EXT,SIMPLE       HC REPAIR SLIDING INGUINAL HERNIA      mitra     SURGICAL HISTORY OF -       mitra neck & back tumors     SURGICAL HISTORY OF -       neuroma excision - 2nd toe amputation     SURGICAL HISTORY OF -   3/07    Rt IOL - dr jimenez     SURGICAL HISTORY OF -       Lt IOL - dr jimenez     SURGICAL HISTORY OF -       Lt Knee replacement - dr gayle     SURGICAL HISTORY OF -       Rt Knee replacement - dr gayle     SURGICAL HISTORY OF -   9/15    Rt Second toe amputation - Dr White     THORACOSCOPIC WEDGE RESECTION LUNG Left 2016    Procedure: THORACOSCOPIC WEDGE RESECTION LUNG;  Surgeon: Abdelrahman Thompson MD;  Location: [facility] OR     XR JOINT INJECTION HIP (HIB)  2015    Rt - Dr Terry       Social History     Tobacco Use     Smoking status: Former Smoker     Packs/day: 3.00     Years: 50.00     Pack years: 150.00     Types: Cigarettes     Last attempt to quit: 1993     Years since quittin.3     Smokeless tobacco: Never Used     Tobacco comment: quit    Substance Use Topics     Alcohol use: No     Family History   Problem Relation Age of Onset     Cerebrovascular Disease Mother      Cerebrovascular Disease Father      Cancer - colorectal Brother      Cancer - colorectal Brother      Breast Cancer Sister      Cancer Sister         lung     Cancer Sister         lung     Cancer Sister         lung     Scleroderma Sister          Current Outpatient Medications   Medication Sig Dispense Refill     acetaminophen-codeine (TYLENOL #3) 300-30 MG tablet Take 1 tablet by mouth every 8 hours as needed for severe pain 90 tablet 0     albuterol (VENTOLIN HFA) 108 (90 Base) MCG/ACT inhaler INHALE TWO PUFFS INTO THE LUNGS EVERY 6 HOURS AS NEEDED FOR  SHORTNESS OF BREATH/DYSPNEA 54 g 3     amLODIPine (NORVASC) 5 MG tablet Take 1 tablet (5 mg) by mouth 2 times daily 180 tablet 3     ASPIRIN NOT PRESCRIBED (INTENTIONAL) Please choose reason not prescribed, below 0 each 0     budesonide-formoterol (SYMBICORT) 160-4.5 MCG/ACT Inhaler Inhale 2 puffs into the lungs 2 times daily 30.6 g 3     calcium carbonate (TUMS) 500 MG chewable tablet Take 2 chew tab by mouth daily as needed for heartburn       furosemide (LASIX) 20 MG tablet Take 2 tablets (40 mg) by mouth daily 180 tablet 3     IBUPROFEN PO Take 400 mg by mouth every 6 hours as needed for moderate pain       ipratropium - albuterol 0.5 mg/2.5 mg/3 mL (DUONEB) 0.5-2.5 (3) MG/3ML neb solution Take 1 vial (3 mLs) by nebulization every 4 hours as needed for shortness of breath / dyspnea or wheezing 270 mL 3     losartan (COZAAR) 50 MG tablet Take 2 tablets (100 mg) by mouth daily 180 tablet 3     metoprolol succinate ER (TOPROL-XL) 50 MG 24 hr tablet Take 1 tablet (50 mg) by mouth 2 times daily 180 tablet 3     Allergies   Allergen Reactions     Prednisone      Yeast infection - swollen lips     Penicillins Rash     Recent Labs   Lab Test 05/02/20  1018 11/20/19  0808 10/29/19  0813 07/13/19  0754  03/19/19  0835  10/02/17  0955  03/09/16  0810  06/01/15  0818  09/25/12  0723   A1C  --   --   --  5.5  --   --   --  5.6  --   --   --   --   --   --    LDL  --   --   --   --   --   --   --   --   --  130*  --  128  --  83   HDL  --   --   --   --   --   --   --   --   --  54  --  55  --  49*   TRIG  --   --   --   --   --   --   --   --   --  136  --  132  --  118   ALT  --  20 20  --   --  20   < >  --    < > 28   < > 20   < >  --    CR 2.14* 1.86* 1.92* 2.04*   < > 1.72*   < > 1.56*   < > 1.33*   < > 1.38*   < > 1.59*   GFRESTIMATED 21* 24* 24* 22*   < > 27*   < > 32*   < > 38*   < > 37*   < > 32*   GFRESTBLACK 24* 28* 27* 25*   < > 31*   < > 38*   < > 46*   < > 45*   < > 38*   POTASSIUM 4.6 4.2 5.2 5.0   < > 5.0   <  > 5.2   < > 4.4   < > 4.2   < > 4.4   TSH  --   --  2.65  --   --  2.81   < >  --    < > 2.92  --  2.85   < >  --     < > = values in this interval not displayed.      BP Readings from Last 3 Encounters:   05/02/20 135/58   01/30/20 138/74   10/31/19 134/63    Wt Readings from Last 3 Encounters:   01/30/20 68.5 kg (151 lb)   10/31/19 69.4 kg (153 lb)   10/29/19 69.4 kg (153 lb)                Reviewed and updated as needed this visit by Provider         Review of Systems :   ROS COMP: Constitutional, HEENT, cardiovascular, pulmonary, GI, , musculoskeletal, neuro, skin, endocrine and psych systems are negative, except as otherwise noted.       Objective : BP was 148/70 with home care RN this am.   Reported vitals:  There were no vitals taken for this visit.   healthy, alert and no distress  Psych: Alert and oriented times 3; coherent speech, normal   rate and volume, able to articulate logical thoughts, able   to abstract reason, no tangential thoughts, no hallucinations   or delusions  Her affect is normal and upbeat.        Diagnostic Test Results:  Labs and xrays reviewed in Epic.         Assessment/Plan:     ICD-10-CM    1. Fall at home, initial encounter  W19.XXXA     Y92.009    2. Recurrent falls  R29.6      Continue home health care RN, aid, and OT/PT as well.     Return in about 1 month (around 6/7/2020) for for recheck on chronic medical problems and recurrent falls with Dr. Rashi Calle .- pt's PCP.      Rest for today.  Ok to try to do some mild walking in her home tomorrow and perhaps go outside with assistance of one of her children this weekend.   Please, call or return to clinic or go to the ER immediately if signs or symptoms worsen or fail to improve as anticipated.     Phone call duration:  9 minutes 40 seconds.               Daly Amezquita MD  For Dr. Rashi Calle  out of office.      [facility]

## 2020-05-07 ENCOUNTER — VIRTUAL VISIT (OUTPATIENT)
Dept: FAMILY MEDICINE | Facility: CLINIC | Age: 85
End: 2020-05-07
Payer: MEDICARE

## 2020-05-07 VITALS — DIASTOLIC BLOOD PRESSURE: 70 MMHG | SYSTOLIC BLOOD PRESSURE: 148 MMHG

## 2020-05-07 DIAGNOSIS — R29.6 RECURRENT FALLS: ICD-10-CM
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
## 2020-05-07 PROCEDURE — 99441 ZZC PHYSICIAN TELEPHONE EVALUATION 5-10 MIN: CPT | Performed by: FAMILY MEDICINE

## 2020-05-21 ENCOUNTER — TELEPHONE (OUTPATIENT)
Dept: FAMILY MEDICINE | Facility: CLINIC | Age: 85
End: 2020-05-21

## 2020-05-21 NOTE — TELEPHONE ENCOUNTER
Pt being discharged from home care episode.    MD Summary  Pt has been seen for 5 PT sessions.   Pt has been educated and encouraged to use walker but she refuses stating the cane is better for her.  Pt remains at high fall risk.   Pt has been educated in [facility] for strength and balance.  She does not feel she needs any further PT sessions at this time and will be discharged per her request.  Pt also requested to be discharged from OT and her HHA.   Pt feels she can manage herself and does not want further A.  Pt does have limited insight into deficits or safety concerns, was recommended by OT to follow up with MD prior to returning to drive.   Pt overall reports pain in hip is better controlled and has met nursing goals.   Pt will be discharged from home care this date.     Tere Theodore, PT  [facility]  [phone]

## 2020-05-22 ENCOUNTER — PATIENT OUTREACH (OUTPATIENT)
Dept: CARE COORDINATION | Facility: CLINIC | Age: 85
End: 2020-05-22
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
## 2020-05-22 ASSESSMENT — ACTIVITIES OF DAILY LIVING (ADL): DEPENDENT_IADLS:: INDEPENDENT

## 2020-05-22 NOTE — TELEPHONE ENCOUNTER
Attempt # 1  Called # [phone]     Line was busy at time of calling. Will attempt again later.    Rakan Dawn RN   [facility]

## 2020-05-22 NOTE — TELEPHONE ENCOUNTER
Please advise patient    Home care advises - continued PT/OT/HHA to continue - use of savannah Salmeron evaluation for driving - care coordination

## 2020-05-22 NOTE — PROGRESS NOTES
Clinic Care Coordination Contact  [facility]/Voicemail    Referral Source: PCP  Clinical Data: Care Coordinator Outreach  [facility] ED 5/2 - fall, FTT  [facility] discharge on 5/21  Outreach attempted x 1.  Left message on patient's voicemail with call back information and requested return call.  Plan:Care Coordinator will try to reach patient again in 3-5 business days.  Good candidate for community paramedic program, will plan to off to patient.     Josee Pereira RN-BSN  Care Coordination  Phone:  [phone]  Email: [email or web address]  [facility], [place], [place] and [facility]

## 2020-05-22 NOTE — LETTER
[place] CARE COORDINATION  [address] [facility] [address]    May 28, 2020    Anat Correa  [address] [place] [address]      Dear Anat,    I am a clinic care coordinator who works with Rashi Calle MD at [facility]. I wanted to thank you for spending the time to talk with me.  Below is a description of clinic care coordination and how I can further assist you.      The clinic care coordination team is made up of a registered nurse,  and community health worker who understand the health care system. The goal of clinic care coordination is to help you manage your health and improve access to the health care system in the most efficient manner. The team can assist you in meeting your health care goals by providing education, coordinating services, strengthening the communication among your providers and supporting you with any resource needs.    Please feel free to contact me at [phone] with any questions or concerns. We are focused on providing you with the highest-quality healthcare experience possible and that all starts with you.     Sincerely,     Josee Pereira RN-BSN  Care Coordination  Phone:  [phone]  Email: [email or web address], [email or web address] and [facility]          Enclosed: I have enclosed a copy of the Complex Care Plan. This has helpful information and goals that we have talked about. Please keep this in an easy to access place to use as needed.

## 2020-05-22 NOTE — LETTER
[facility]  Complex Care Plan  About Me:    Patient Name:  Anat Correa    YOB: 1935  Age:         85 year old   [place] MRN:    [id number] Telephone Information:  Home Phone [phone]   Mobile none       Address:  [address] [place] [place] [facility] [address] Email address:  No e-mail address on record      Emergency Contact(s)    Name Relationship Lgl Grd Work Phone Home Phone Mobile Phone   1WAI CHEEMA Son No [phone]   2. DECLINED, PER * Declined               Primary language:  English     needed? No   [place] [employer] [facility]:  [phone] op. 1  Other communication barriers: Glasses, [place] (Hard of hearing)  Preferred Method of Communication:  Mail  Current living arrangement: I live alone  Mobility Status/ Medical Equipment: Independent w/Device    Health Maintenance  Health Maintenance Reviewed: Due/Overdue     My Access Plan  Medical Emergency 911   Primary Clinic Line [facility] [phone]   24 Hour Appointment Line [phone] or  [phone]) (toll-free)   24 Hour Nurse Line [phone] (toll-free)   Preferred Urgent Care     Preferred Hospital [facility]  [phone]   Preferred Pharmacy [place] [address] [facility]     Behavioral Health Crisis Line The National Suicide Prevention Lifeline at 1-505.346.1351 or 911       My Care Team Members  Patient Care Team       Relationship Specialty Notifications Start End    Rashi Calle MD PCP - General   9/30/03     Phone: [phone] Fax: [phone] [address] [address]    Rashi Calle MD Assigned PCP   10/4/12     Phone: [phone] Fax: [phone] [address] [facility] [address]    Josee Pereira, RN Lead Care Coordinator Primary Care - CC  5/22/20     Phone: [phone]         Elizabeth Munroe  Community Health Worker   5/28/20             My Care Plans  Self Management and Treatment Plan  Goals and (Comments)  Goals        General    Pain Management (pt-stated)     Notes - Note created  5/28/2020 10:11 AM by Josee Pereira RN    Goal Statement: I would like better control of pain in hips.  Date Goal set: 5/28/20  Barriers: recent falls.  Patient lives alone.  Strengths: Patient is hopeful.  She agreed to try home physical therapy to rebuild strength and balance.  Agreeable to help/feedback.  Son is very supportive and checks in on her frequently.  Date to Achieve By: 7/28/20  Patient expressed understanding of goal: yes  Action steps to achieve this goal:  1. I will remain free from falls. I will use cane or walker when ambulating.  2. I will complete home physical therapy and do as they recommend.  3. I will call RN CC if I have questions or additional needs.                     Advance Care Plans/Directives Type:   Type Advanced Care Plans/Directives: Advanced Directive - On File    My Medical and Care Information  Problem List   Patient Active Problem List   Diagnosis     History of colonic polyps     Calculus of kidney     Lesion of plantar nerve     Osteoporosis     Primary iridocyclitis     Anemia     Hyperlipidemia LDL goal <100     OA (osteoarthritis)     Advanced directives, counseling/discussion     Hypertension goal BP (blood pressure) < 140/90     COPD, moderate     Controlled substance agreement signed     Vitamin D deficiency     CKD (chronic kidney disease) stage 4, GFR 15-29 ml/min (H)     Anemia in chronic renal disease     Secondary renal hyperparathyroidism (H)     Venous stasis of lower extremity     Peripheral neuropathy     Chronic pain     Lung nodule     Nodule of left lung     Malignant neoplasm of upper lobe of left lung (H)     Acute pain of right shoulder     S/P amputation of lesser toe, unspecified laterality (H)     Retinal hemorrhage of right eye        Care Coordination  Start Date: 5/22/2020   Frequency of Care Coordination: monthly   Form Last Updated: 05/28/2020

## 2020-05-26 ENCOUNTER — TELEPHONE (OUTPATIENT)
Dept: FAMILY MEDICINE | Facility: CLINIC | Age: 85
End: 2020-05-26

## 2020-05-26 DIAGNOSIS — M15.0 PRIMARY OSTEOARTHRITIS INVOLVING MULTIPLE JOINTS: ICD-10-CM
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
## 2020-05-26 DIAGNOSIS — I10 HYPERTENSION GOAL BP (BLOOD PRESSURE) < 140/90: ICD-10-CM
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
## 2020-05-26 DIAGNOSIS — C34.12 MALIGNANT NEOPLASM OF UPPER LOBE OF LEFT LUNG (H): ICD-10-CM

## 2020-05-26 DIAGNOSIS — R29.6 RECURRENT FALLS: ICD-10-CM

## 2020-05-26 DIAGNOSIS — J44.9 COPD, MODERATE (H): ICD-10-CM

## 2020-05-26 NOTE — TELEPHONE ENCOUNTER
Reason for Call:   call back    Detailed comments: Pt called to  informed Dr. Calle that she is doing fine. She will call and make an appt if she need any help. But should like to chat with a nurse.     Phone Number Patient can be reached at: Cell number on file:    Telephone Information:   Mobile none       Best Time: any     Can we leave a detailed message on this number? YES    Call taken on 5/26/2020 at 2:46 PM by Ashly Garvin

## 2020-05-26 NOTE — TELEPHONE ENCOUNTER
Called # [phone] (home) none (work)    Pt stated she talked with an Rn about her PT discharge   Pt state she will do PT -     Called Tere PT - [phone] - stated its fine to restrat this however - pt is very non complainat and refuses all services when there.     Routing to PCP as an FYI     Ariadna Starks RN, BSN  [facility]

## 2020-05-26 NOTE — TELEPHONE ENCOUNTER
Called # [phone] (home) none (work)    Advised pt on the information below     Pt stated that she does not think she needs it and she is getting along just fine  If something was changing, then she will let clinic know         Ariadna Starks RN, BSN  [facility]

## 2020-05-27 ENCOUNTER — TELEPHONE (OUTPATIENT)
Dept: CARE COORDINATION | Facility: CLINIC | Age: 85
End: 2020-05-27

## 2020-05-27 NOTE — TELEPHONE ENCOUNTER
Anat Rausch does not want Home care PT to do Start of Care until next week on Monday 5/1.    Please REPLY TO THIS MESSAGE in order to give authorization for this order.  This is considered a verbal order, you will still receive a faxed copy of orders for signature.  Thank you for your timely assistance.    Order for Anat Correa; MRN [id number]      Sincerely [facility]  Isamar Lazar RN  [phone]

## 2020-05-28 ASSESSMENT — ACTIVITIES OF DAILY LIVING (ADL): DEPENDENT_IADLS:: INDEPENDENT

## 2020-05-28 NOTE — PROGRESS NOTES
Clinic Care Coordination Contact    Referral Information:  Referral Source: PCP  Reason for referral:  Fragility, frequent falls.    RN CC spoke with patient over phone.  Introduced self and role in clinic.  Patient has been enrolled with care coordination.    Primary Diagnosis: Injury/Fall    Chief Complaint   Patient presents with     Clinic Care Coordination - Initial     PCP referral        Universal Utilization: ED on 5//2/20  Clinic Utilization  Difficulty keeping appointments:: No  Compliance Concerns: No  No-Show Concerns: No  No PCP office visit in Past Year: No  Utilization    Last refreshed: 5/26/2020  5:16 PM:  Hospital Admissions 0           Last refreshed: 5/26/2020  5:16 PM:  ED Visits 1           Last refreshed: 5/26/2020  5:16 PM:  No Show Count (past year) 0              Current as of: 5/26/2020  5:16 PM              Clinical Concerns:  Current Medical Concerns:    Frequent falls-  Patient had virtual f/u with PCP on 5/7/20.   Home care was initially involved, but patient had declined services.    Patient states she is doing well.  She has no concerns. RN CC noted on chart review patient is agreeable to having physical therapy come out to her home.  Patient stated she thinks physical therapy will help with the pain she is having in her hips and her balance.  Patient reports happily that she has not had any recent falls since the last one on 5/2.  She endorses using the cane when ambulating.  She has a 4WW that she does not like to use, stating that it is hard for her to maneuver.  Rn CC explained that physical therapy can help her learn how to use the walker, which is probably saver than the cane at this point.  Patient states she is doing fine with her cane.  Patient does not have any visits scheduled with home physical therapy yet, but thinks she will be starting up again in next week.    For hip pain, patient takes tylenol or ibuprofen which is helpful.      Patient lives in a 1 story .  No  stairs.  Son is very involved and checks on her frequently throughout the week.      RN CC discussed community services patient could possibly benefit from.  Patient declined.    Current Behavioral Concerns: none   Education Provided to patient: care coordination.  community services.     Pain  Pain (GOAL):: Yes  Type: Chronic (>3mo)  Location of chronic pain:: hips  Radiating: No  Progression: Constant  Description of pain: Aching, Nagging  Limitation of routine activities due to chronic pain:: No  Alleviating Factors: Pain Medication, Rest(tylenol or ibuprofen)  Aggravating Factors: Activity, Positioning  Health Maintenance Reviewed: Due/Overdue     Medication Management:  Medication reconciliation status: Medications reviewed and reconciled.  Continue medications without change.  Patient has no questions or concerns about medications.    Functional Status:  Dependent ADLs:: Ambulation-cane  Dependent IADLs:: Independent  Bed or wheelchair confined:: No  Mobility Status: Independent w/Device  Fallen 2 or more times in the past year?: Yes  Any fall with injury in the past year?: No    Living Situation:  Current living arrangement:: I live alone  Type of residence:: Town home(one level. No stairs.)    Lifestyle & Psychosocial Needs:  Diet:: Regular  Inadequate nutrition (GOAL):: No  Tube Feeding: No  Inadequate activity/exercise (GOAL):: No  Significant changes in sleep pattern (GOAL): No  Transportation means:: Regular car(pt drives self.  stays close to home.)     Yazidism or spiritual beliefs that impact treatment:: No  Mental health DX:: No  Mental health management concern (GOAL):: No  Informal Support system:: Children, Family   Socioeconomic History     Marital status:      Spouse name: thanh     Number of children: 1     Years of education: 12     Highest education level: Not on file   Occupational History     Occupation: part-time [employer] section at [employer]      Tobacco Use     Smoking status:  Former Smoker     Packs/day: 3.00     Years: 50.00     Pack years: 150.00     Types: Cigarettes     Last attempt to quit: 1993     Years since quittin.4     Smokeless tobacco: Never Used     Tobacco comment: quit    Substance and Sexual Activity     Alcohol use: No     Drug use: No     Comment: no herbal meds either      Sexual activity: Not Currently     Comment:  for 24 years         Resources and Interventions:  Current Resources:   List of home care services:: Physicial Therapy  Community Resources: Home Care  Supplies used at home:: None  Equipment Currently Used at Home: cane, straight, walker, rolling    Advance Care Plan/Directive  Advanced Care Plans/Directives on file:: Yes  Type Advanced Care Plans/Directives: Advanced Directive - On File  Advanced Care Plan/Directive Status: Not Applicable    Referrals Placed: None     Goals:   Goals        General    Pain Management (pt-stated)     Notes - Note created  2020 10:11 AM by Josee Pereira RN    Goal Statement: I would like better control of pain in hips.  Date Goal set: 20  Barriers: recent falls.  Patient lives alone.  Strengths: Patient is hopeful.  She agreed to try home physical therapy to rebuild strength and balance.  Agreeable to help/feedback.  Son is very supportive and checks in on her frequently.  Date to Achieve By: 20  Patient expressed understanding of goal: yes  Action steps to achieve this goal:  1. I will remain free from falls. I will use cane or walker when ambulating.  2. I will complete home physical therapy and do as they recommend.  3. I will call RN CC if I have questions or additional needs.              Pt reports understanding and denies any additional questions or concerns at this times. RN CC engaged in [employer] communication during encounter.    Outreach Frequency: monthly      Plan: patient agreed to CC enrollment.  Rn CC will outreach monthly to check on patient status and goal progression.   Will also continue to evaluate patient for additional services.    Patient will continue to follow plan of care.  She will get re-established with home physical therapy.   Patient will use appropriate DME and remain free from falls.  Patient was provided Rn CC contact info and is encouraged to call with additional questions or concerns.    Josee Pereira RN-BSN  Care Coordination  Phone:  [phone]  Email: [email or web address]  [facility], [place] and [facility]

## 2020-06-08 ENCOUNTER — TELEPHONE (OUTPATIENT)
Dept: FAMILY MEDICINE | Facility: CLINIC | Age: 85
End: 2020-06-08

## 2020-06-08 NOTE — TELEPHONE ENCOUNTER
Date Forms was received: June 8, 2020    Forms received by: unknown     Purpose of Form:  Handicap parking certification    How the form needs to be returned for patient:  unknown    Form currently placed  [employer]    Dr. Calle reviewed and signed

## 2020-06-11 ENCOUNTER — PATIENT OUTREACH (OUTPATIENT)
Dept: CARE COORDINATION | Facility: CLINIC | Age: 85
End: 2020-06-11

## 2020-06-11 NOTE — PROGRESS NOTES
Clinic Care Coordination Contact  Care Coordination Communication      Home Care Contact:              Home Care Agency: [facility] physical therapy, occupational therapy, [facility].              Contact name () and phone number: OSWALDO Armijo                Chart reviewed in home care chart and Epic. Patient is making slight progress, however still refused to use walker.  Patient is high risk for falls.  SW vist scheduled for 6/12.    Plan: Patient's needs are being met by home care.  No clinic care coordination needs identified at this time.  No further outreaches will be made at this time unless a new referral is made or a change in the pt's status occurs. Patient was provided with this writer's contact information and encouraged to call with any questions or concerns.    Josee Pereira RN-BSN  Care Coordination  Phone:  [phone]  Email: [email or web address]  [facility], [place] and [facility]

## 2020-06-15 DIAGNOSIS — M70.61 GREATER TROCHANTERIC BURSITIS OF BOTH HIPS: ICD-10-CM

## 2020-06-15 DIAGNOSIS — G63 POLYNEUROPATHY ASSOCIATED WITH UNDERLYING DISEASE (H): ICD-10-CM

## 2020-06-15 DIAGNOSIS — M15.0 PRIMARY OSTEOARTHRITIS INVOLVING MULTIPLE JOINTS: ICD-10-CM

## 2020-06-15 DIAGNOSIS — M70.62 GREATER TROCHANTERIC BURSITIS OF BOTH HIPS: ICD-10-CM

## 2020-06-15 NOTE — TELEPHONE ENCOUNTER
Controlled Substance Refill Request for acetaminophen-codeine (TYLENOL #3) 300-30 MG tablet   Problem List Complete:  Yes    Last Written Prescription Date:  3-11-20  Last Fill Quantity: 90 tablet,   # refills: 0      [place] Office [place] [facility] [place]: 1/30/20    Future Office visit:     Controlled substance agreement:   Encounter-Level CSA:    There are no encounter-level csa.     Patient-Level CSA:    Controlled Substance Agreement - Opioid - Scan on 3/20/2019  8:22 AM         Last Urine Drug Screen: No results found for: CDAUT, No results found for: COMDAT, No results found for: THC13, PCP13, COC13, MAMP13, OPI13, AMP13, BZO13, TCA13, MTD13, BAR13, OXY13, PPX13, BUP13     Processing:  Fax Rx to listed pharmacy    [email or web address]   checked in past 3 months?  No, route to RN

## 2020-06-16 NOTE — TELEPHONE ENCOUNTER
rx done, please make sure patient continuing with PT/OT    Advise phone/video visit follow up soon    Fasting labs soon    CPX when able

## 2020-07-02 ENCOUNTER — APPOINTMENT (OUTPATIENT)
Dept: CT IMAGING | Facility: CLINIC | Age: 85
DRG: 564 | End: 2020-07-02
Attending: EMERGENCY MEDICINE
Payer: MEDICARE

## 2020-07-02 ENCOUNTER — HOSPITAL ENCOUNTER (INPATIENT)
Facility: CLINIC | Age: 85
LOS: 2 days | Discharge: NURSING, REHAB OR LONG-TERM CARE | DRG: 564 | End: 2020-07-04
Attending: EMERGENCY MEDICINE | Admitting: INTERNAL MEDICINE
Payer: MEDICARE

## 2020-07-02 ENCOUNTER — APPOINTMENT (OUTPATIENT)
Dept: GENERAL RADIOLOGY | Facility: CLINIC | Age: 85
DRG: 564 | End: 2020-07-02
Attending: EMERGENCY MEDICINE
Payer: MEDICARE

## 2020-07-02 DIAGNOSIS — S10.93XA CONTUSION OF FACE, SCALP AND NECK, INITIAL ENCOUNTER: ICD-10-CM
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
## 2020-07-02 DIAGNOSIS — S00.83XA CONTUSION OF FACE, SCALP AND NECK, INITIAL ENCOUNTER: ICD-10-CM

## 2020-07-02 DIAGNOSIS — S00.03XA CONTUSION OF FACE, SCALP AND NECK, INITIAL ENCOUNTER: ICD-10-CM

## 2020-07-02 DIAGNOSIS — R79.89 ELEVATED TROPONIN: ICD-10-CM

## 2020-07-02 DIAGNOSIS — E86.0 DEHYDRATION: ICD-10-CM

## 2020-07-02 DIAGNOSIS — T79.6XXA TRAUMATIC RHABDOMYOLYSIS, INITIAL ENCOUNTER (H): ICD-10-CM

## 2020-07-02 PROBLEM — M62.82 RHABDOMYOLYSIS: Status: ACTIVE | Noted: 2020-07-02

## 2020-07-02 LAB
ABO + RH BLD: NORMAL
ABO + RH BLD: NORMAL
ALBUMIN SERPL-MCNC: 3.5 G/DL (ref 3.4–5)
ALP SERPL-CCNC: 64 U/L (ref 40–150)
ALT SERPL W P-5'-P-CCNC: 30 U/L (ref 0–50)
ANION GAP SERPL CALCULATED.3IONS-SCNC: 10 MMOL/L (ref 3–14)
AST SERPL W P-5'-P-CCNC: 78 U/L (ref 0–45)
BASOPHILS # BLD AUTO: 0 10E9/L (ref 0–0.2)
BASOPHILS NFR BLD AUTO: 0.2 %
BILIRUB SERPL-MCNC: 0.7 MG/DL (ref 0.2–1.3)
BLD GP AB SCN SERPL QL: NORMAL
BLOOD BANK CMNT PATIENT-IMP: NORMAL
BUN SERPL-MCNC: 43 MG/DL (ref 7–30)
CALCIUM SERPL-MCNC: 11.6 MG/DL (ref 8.5–10.1)
CHLORIDE SERPL-SCNC: 96 MMOL/L (ref 94–109)
CK SERPL-CCNC: 792 U/L (ref 30–225)
CO2 SERPL-SCNC: 22 MMOL/L (ref 20–32)
CREAT SERPL-MCNC: 2.3 MG/DL (ref 0.52–1.04)
DIFFERENTIAL METHOD BLD: ABNORMAL
EOSINOPHIL # BLD AUTO: 0 10E9/L (ref 0–0.7)
EOSINOPHIL NFR BLD AUTO: 0 %
ERYTHROCYTE [DISTWIDTH] IN BLOOD BY AUTOMATED COUNT: 12.3 % (ref 10–15)
GFR SERPL CREATININE-BSD FRML MDRD: 19 ML/MIN/{1.73_M2}
GLUCOSE SERPL-MCNC: 113 MG/DL (ref 70–99)
HCT VFR BLD AUTO: 32 % (ref 35–47)
HGB BLD-MCNC: 10.5 G/DL (ref 11.7–15.7)
IMM GRANULOCYTES # BLD: 0.1 10E9/L (ref 0–0.4)
IMM GRANULOCYTES NFR BLD: 0.6 %
INR PPP: 1.12 (ref 0.86–1.14)
INTERPRETATION ECG - MUSE: NORMAL
LACTATE BLD-SCNC: 2.2 MMOL/L (ref 0.7–2)
LYMPHOCYTES # BLD AUTO: 0.7 10E9/L (ref 0.8–5.3)
LYMPHOCYTES NFR BLD AUTO: 4 %
MAGNESIUM SERPL-MCNC: 1.9 MG/DL (ref 1.6–2.3)
MCH RBC QN AUTO: 30.9 PG (ref 26.5–33)
MCHC RBC AUTO-ENTMCNC: 32.8 G/DL (ref 31.5–36.5)
MCV RBC AUTO: 94 FL (ref 78–100)
MONOCYTES # BLD AUTO: 1.9 10E9/L (ref 0–1.3)
MONOCYTES NFR BLD AUTO: 10.4 %
NEUTROPHILS # BLD AUTO: 15.4 10E9/L (ref 1.6–8.3)
NEUTROPHILS NFR BLD AUTO: 84.8 %
NRBC # BLD AUTO: 0 10*3/UL
NRBC BLD AUTO-RTO: 0 /100
PLATELET # BLD AUTO: 294 10E9/L (ref 150–450)
POTASSIUM SERPL-SCNC: 4.3 MMOL/L (ref 3.4–5.3)
PROT SERPL-MCNC: 6.9 G/DL (ref 6.8–8.8)
RBC # BLD AUTO: 3.4 10E12/L (ref 3.8–5.2)
SARS-COV-2 PCR COMMENT: NORMAL
SARS-COV-2 RNA SPEC QL NAA+PROBE: NEGATIVE
SARS-COV-2 RNA SPEC QL NAA+PROBE: NORMAL
SODIUM SERPL-SCNC: 128 MMOL/L (ref 133–144)
SPECIMEN EXP DATE BLD: NORMAL
SPECIMEN SOURCE: NORMAL
SPECIMEN SOURCE: NORMAL
TROPONIN I SERPL-MCNC: 0.65 UG/L (ref 0–0.04)
TROPONIN I SERPL-MCNC: 1.21 UG/L (ref 0–0.04)
WBC # BLD AUTO: 18.2 10E9/L (ref 4–11)

## 2020-07-02 PROCEDURE — 99223 1ST HOSP IP/OBS HIGH 75: CPT | Mod: AI | Performed by: INTERNAL MEDICINE

## 2020-07-02 PROCEDURE — 85610 PROTHROMBIN TIME: CPT | Performed by: EMERGENCY MEDICINE

## 2020-07-02 PROCEDURE — 96374 THER/PROPH/DIAG INJ IV PUSH: CPT

## 2020-07-02 PROCEDURE — 25800030 ZZH RX IP 258 OP 636: Performed by: INTERNAL MEDICINE

## 2020-07-02 PROCEDURE — 83605 ASSAY OF LACTIC ACID: CPT | Performed by: EMERGENCY MEDICINE

## 2020-07-02 PROCEDURE — 85025 COMPLETE CBC W/AUTO DIFF WBC: CPT | Performed by: EMERGENCY MEDICINE

## 2020-07-02 PROCEDURE — 25800030 ZZH RX IP 258 OP 636: Performed by: EMERGENCY MEDICINE

## 2020-07-02 PROCEDURE — 96361 HYDRATE IV INFUSION ADD-ON: CPT

## 2020-07-02 PROCEDURE — 80053 COMPREHEN METABOLIC PANEL: CPT | Performed by: EMERGENCY MEDICINE

## 2020-07-02 PROCEDURE — 82550 ASSAY OF CK (CPK): CPT | Performed by: EMERGENCY MEDICINE

## 2020-07-02 PROCEDURE — U0003 INFECTIOUS AGENT DETECTION BY NUCLEIC ACID (DNA OR RNA); SEVERE ACUTE RESPIRATORY SYNDROME CORONAVIRUS 2 (SARS-COV-2) (CORONAVIRUS DISEASE [COVID-19]), AMPLIFIED PROBE TECHNIQUE, MAKING USE OF HIGH THROUGHPUT TECHNOLOGIES AS DESCRIBED BY CMS-2020-01-R: HCPCS | Performed by: EMERGENCY MEDICINE

## 2020-07-02 PROCEDURE — 86850 RBC ANTIBODY SCREEN: CPT | Performed by: EMERGENCY MEDICINE

## 2020-07-02 PROCEDURE — 12000000 ZZH R&B MED SURG/OB

## 2020-07-02 PROCEDURE — 86901 BLOOD TYPING SEROLOGIC RH(D): CPT | Performed by: EMERGENCY MEDICINE

## 2020-07-02 PROCEDURE — C9803 HOPD COVID-19 SPEC COLLECT: HCPCS

## 2020-07-02 PROCEDURE — 93005 ELECTROCARDIOGRAM TRACING: CPT

## 2020-07-02 PROCEDURE — 84484 ASSAY OF TROPONIN QUANT: CPT | Performed by: INTERNAL MEDICINE

## 2020-07-02 PROCEDURE — 71045 X-RAY EXAM CHEST 1 VIEW: CPT

## 2020-07-02 PROCEDURE — 70486 CT MAXILLOFACIAL W/O DYE: CPT

## 2020-07-02 PROCEDURE — 25000132 ZZH RX MED GY IP 250 OP 250 PS 637: Mod: GY | Performed by: INTERNAL MEDICINE

## 2020-07-02 PROCEDURE — 84484 ASSAY OF TROPONIN QUANT: CPT | Performed by: EMERGENCY MEDICINE

## 2020-07-02 PROCEDURE — 70450 CT HEAD/BRAIN W/O DYE: CPT

## 2020-07-02 PROCEDURE — 86900 BLOOD TYPING SEROLOGIC ABO: CPT | Performed by: EMERGENCY MEDICINE

## 2020-07-02 PROCEDURE — 72125 CT NECK SPINE W/O DYE: CPT

## 2020-07-02 PROCEDURE — 36415 COLL VENOUS BLD VENIPUNCTURE: CPT

## 2020-07-02 PROCEDURE — 83735 ASSAY OF MAGNESIUM: CPT | Performed by: EMERGENCY MEDICINE

## 2020-07-02 PROCEDURE — 25000128 H RX IP 250 OP 636: Performed by: EMERGENCY MEDICINE

## 2020-07-02 PROCEDURE — 36415 COLL VENOUS BLD VENIPUNCTURE: CPT | Performed by: INTERNAL MEDICINE

## 2020-07-02 PROCEDURE — 99291 CRITICAL CARE FIRST HOUR: CPT | Mod: 25

## 2020-07-02 PROCEDURE — 87040 BLOOD CULTURE FOR BACTERIA: CPT | Performed by: EMERGENCY MEDICINE

## 2020-07-02 RX ORDER — AMLODIPINE BESYLATE 5 MG/1
5 TABLET ORAL 2 TIMES DAILY
Status: DISCONTINUED | OUTPATIENT
Start: 2020-07-02 | End: 2020-07-04 | Stop reason: HOSPADM

## 2020-07-02 RX ORDER — ONDANSETRON 4 MG/1
4 TABLET, ORALLY DISINTEGRATING ORAL EVERY 6 HOURS PRN
Status: DISCONTINUED | OUTPATIENT
Start: 2020-07-02 | End: 2020-07-04 | Stop reason: HOSPADM

## 2020-07-02 RX ORDER — METOPROLOL SUCCINATE 50 MG/1
50 TABLET, EXTENDED RELEASE ORAL 2 TIMES DAILY
Status: DISCONTINUED | OUTPATIENT
Start: 2020-07-02 | End: 2020-07-04 | Stop reason: HOSPADM

## 2020-07-02 RX ORDER — ACETAMINOPHEN 325 MG/1
650 TABLET ORAL EVERY 4 HOURS PRN
Status: DISCONTINUED | OUTPATIENT
Start: 2020-07-02 | End: 2020-07-04 | Stop reason: HOSPADM

## 2020-07-02 RX ORDER — NALOXONE HYDROCHLORIDE 0.4 MG/ML
.1-.4 INJECTION, SOLUTION INTRAMUSCULAR; INTRAVENOUS; SUBCUTANEOUS
Status: DISCONTINUED | OUTPATIENT
Start: 2020-07-02 | End: 2020-07-04 | Stop reason: HOSPADM

## 2020-07-02 RX ORDER — LIDOCAINE 40 MG/G
CREAM TOPICAL
Status: DISCONTINUED | OUTPATIENT
Start: 2020-07-02 | End: 2020-07-04 | Stop reason: HOSPADM

## 2020-07-02 RX ORDER — LIDOCAINE 40 MG/G
CREAM TOPICAL
Status: DISCONTINUED | OUTPATIENT
Start: 2020-07-02 | End: 2020-07-02

## 2020-07-02 RX ORDER — ONDANSETRON 2 MG/ML
4 INJECTION INTRAMUSCULAR; INTRAVENOUS EVERY 30 MIN PRN
Status: DISCONTINUED | OUTPATIENT
Start: 2020-07-02 | End: 2020-07-02

## 2020-07-02 RX ORDER — SODIUM CHLORIDE 9 MG/ML
1000 INJECTION, SOLUTION INTRAVENOUS CONTINUOUS
Status: DISCONTINUED | OUTPATIENT
Start: 2020-07-02 | End: 2020-07-02

## 2020-07-02 RX ORDER — SODIUM CHLORIDE, SODIUM LACTATE, POTASSIUM CHLORIDE, CALCIUM CHLORIDE 600; 310; 30; 20 MG/100ML; MG/100ML; MG/100ML; MG/100ML
INJECTION, SOLUTION INTRAVENOUS CONTINUOUS
Status: DISCONTINUED | OUTPATIENT
Start: 2020-07-02 | End: 2020-07-04

## 2020-07-02 RX ORDER — LIDOCAINE HYDROCHLORIDE 20 MG/ML
20 JELLY TOPICAL
Status: DISCONTINUED | OUTPATIENT
Start: 2020-07-02 | End: 2020-07-02

## 2020-07-02 RX ORDER — ALBUTEROL SULFATE 90 UG/1
2 AEROSOL, METERED RESPIRATORY (INHALATION) EVERY 6 HOURS PRN
Status: DISCONTINUED | OUTPATIENT
Start: 2020-07-02 | End: 2020-07-04 | Stop reason: HOSPADM

## 2020-07-02 RX ORDER — IPRATROPIUM BROMIDE AND ALBUTEROL SULFATE 2.5; .5 MG/3ML; MG/3ML
1 SOLUTION RESPIRATORY (INHALATION) EVERY 4 HOURS PRN
Status: DISCONTINUED | OUTPATIENT
Start: 2020-07-02 | End: 2020-07-04 | Stop reason: HOSPADM

## 2020-07-02 RX ORDER — POLYETHYLENE GLYCOL 3350 17 G/17G
17 POWDER, FOR SOLUTION ORAL DAILY PRN
Status: DISCONTINUED | OUTPATIENT
Start: 2020-07-02 | End: 2020-07-04 | Stop reason: HOSPADM

## 2020-07-02 RX ORDER — BUDESONIDE AND FORMOTEROL FUMARATE DIHYDRATE 160; 4.5 UG/1; UG/1
2 AEROSOL RESPIRATORY (INHALATION) 2 TIMES DAILY
Status: DISCONTINUED | OUTPATIENT
Start: 2020-07-02 | End: 2020-07-03

## 2020-07-02 RX ORDER — CALCIUM CARBONATE 500 MG/1
1000 TABLET, CHEWABLE ORAL 2 TIMES DAILY PRN
Status: DISCONTINUED | OUTPATIENT
Start: 2020-07-02 | End: 2020-07-04 | Stop reason: HOSPADM

## 2020-07-02 RX ORDER — ONDANSETRON 2 MG/ML
4 INJECTION INTRAMUSCULAR; INTRAVENOUS EVERY 6 HOURS PRN
Status: DISCONTINUED | OUTPATIENT
Start: 2020-07-02 | End: 2020-07-04 | Stop reason: HOSPADM

## 2020-07-02 RX ADMIN — METOPROLOL SUCCINATE 50 MG: 50 TABLET, EXTENDED RELEASE ORAL at 22:30

## 2020-07-02 RX ADMIN — AMLODIPINE BESYLATE 5 MG: 5 TABLET ORAL at 22:30

## 2020-07-02 RX ADMIN — SODIUM CHLORIDE, POTASSIUM CHLORIDE, SODIUM LACTATE AND CALCIUM CHLORIDE: 600; 310; 30; 20 INJECTION, SOLUTION INTRAVENOUS at 17:38

## 2020-07-02 RX ADMIN — SODIUM CHLORIDE 1000 ML: 9 INJECTION, SOLUTION INTRAVENOUS at 13:37

## 2020-07-02 RX ADMIN — ONDANSETRON 4 MG: 2 INJECTION INTRAMUSCULAR; INTRAVENOUS at 13:38

## 2020-07-02 RX ADMIN — ASPIRIN 325 MG: 325 TABLET, COATED ORAL at 20:08

## 2020-07-02 ASSESSMENT — ACTIVITIES OF DAILY LIVING (ADL): ADLS_ACUITY_SCORE: 21

## 2020-07-02 ASSESSMENT — MIFFLIN-ST. JEOR: SCORE: 1073.22

## 2020-07-02 NOTE — ED NOTES
[facility]  ED Nurse Handoff Report    Anat Correa is a 85 year old female   ED Chief complaint: found on floor, facial trauma, not feeling well  . ED Diagnosis:   Final diagnoses:   Fall, initial encounter   Dehydration   Traumatic rhabdomyolysis, initial encounter (H)   Contusion of face, scalp and neck, initial encounter   Elevated troponin     Allergies:   Allergies   Allergen Reactions     Prednisone      Yeast infection - swollen lips     Penicillins Rash       Code Status: Full Code  Activity level - Baseline/Home:  Independent. Activity Level - Current:   Total Care. Lift room needed: No. Bariatric: No   Needed: No   Isolation: No. Infection: Not Applicable.     Vital Signs:   Vitals:    07/02/20 1158 07/02/20 1200 07/02/20 1230 07/02/20 1245   BP: 117/51 116/47 103/46 112/45   Pulse:  82 75 72   Resp: 22 22     SpO2: 94% 94% 99% 99%       Cardiac Rhythm:  ,      Pain level:    Patient confused: Yes. Patient Falls Risk: Yes.   Elimination Status: Has voided   Patient Report - Initial Complaint: found on floor, unknown how long she was there. Focused Assessment: found on floor by neighbors.  Unsure how long patient was there, potentially since 1400 Wednesday.  Patient thinks that today is Wednesday and that she fell about 1400.  Patient has bruising to bilateral knees, around right side of mouth, and swelling to right lower cheek.  Patient did not want to come to hospital, PD put patient on a hold for transport to ED.  ABCs intact, but is confused.  MD has spoken to patient's son, Jose M regarding findings in ED and plan for admission.     Tests Performed: labs, CTs. Abnormal Results:   Labs Ordered and Resulted from Time of ED Arrival Up to the Time of Departure from the ED   CBC WITH PLATELETS DIFFERENTIAL - Abnormal; Notable for the following components:       Result Value    WBC 18.2 (*)     RBC Count 3.40 (*)     Hemoglobin 10.5 (*)     Hematocrit 32.0 (*)     Absolute  Neutrophil 15.4 (*)     Absolute Lymphocytes 0.7 (*)     Absolute Monocytes 1.9 (*)     All other components within normal limits   COMPREHENSIVE METABOLIC PANEL - Abnormal; Notable for the following components:    Sodium 128 (*)     Glucose 113 (*)     Urea Nitrogen 43 (*)     Creatinine 2.30 (*)     GFR Estimate 19 (*)     GFR Estimate If Black 22 (*)     Calcium 11.6 (*)     AST 78 (*)     All other components within normal limits   LACTIC ACID WHOLE BLOOD - Abnormal; Notable for the following components:    Lactic Acid 2.2 (*)     All other components within normal limits   TROPONIN I - Abnormal; Notable for the following components:    Troponin I ES 0.647 (*)     All other components within normal limits   CK TOTAL - Abnormal; Notable for the following components:    CK Total 792 (*)     All other components within normal limits   INR   MAGNESIUM   COVID-19 VIRUS (CORONAVIRUS) BY PCR   ROUTINE UA WITH MICROSCOPIC REFLEX TO CULTURE   PULSE OXIMETRY NURSING   CARDIAC CONTINUOUS MONITORING   PERIPHERAL IV CATHETER   IV ACCESS   IP ACUTE INDWELLING URINARY CATHETER ([place])   ABO/RH TYPE AND SCREEN   BLOOD CULTURE   BLOOD CULTURE   .   Treatments provided: fluids, zofran, monitoring  Family Comments: MD  Spoke with Jsoe M, patient's son   OBS brochure/video discussed/provided to patient:  N/A  ED Medications:   Medications   lidocaine 1 % 0.1-1 mL (has no administration in time range)   lidocaine (LMX4) cream (has no administration in time range)   sodium chloride (PF) 0.9% PF flush 3 mL (has no administration in time range)   sodium chloride (PF) 0.9% PF flush 3 mL (has no administration in time range)   lidocaine (XYLOCAINE) 2 % external gel 20 mL (has no administration in time range)   0.9% sodium chloride BOLUS (1,000 mLs Intravenous New Bag 7/2/20 6197)     Followed by   sodium chloride 0.9% infusion (has no administration in time range)   ondansetron (ZOFRAN) injection 4 mg (4 mg Intravenous Given 7/2/20 4788)      Drips infusing:  No  For the majority of the shift, the patient's behavior Green. Interventions performed were n/a.    Sepsis treatment initiated: No       ED Nurse Name/Phone Number: Ivania Sorenson RN,   2:31 PM  *[id number]    RECEIVING UNIT ED HANDOFF REVIEW    Above ED Nurse Handoff Report was reviewed: Yes  Reviewed by: Cassandra Huynh RN on July 2, 2020 at 4:01 PM

## 2020-07-02 NOTE — ED PROVIDER NOTES
History     Chief Complaint:  Altered Mental Status and Fall     HPI   Anat Correa is a 85 year old female who presents with altered mental status with a fall and possible fever. Patient lives alone in a townhome and was found by EMS, after receiving a call from her neighbor, on the floor with facial bleeding. Patient was originally confused and unable to talk. Her mentation has improved with fluids during transport. She's unable to recall the events that led to her fall. She does have multiple bruises from falling at home. She denies any chest pain, SOB, abdominal pain, nausea, vomiting, hip pain. History is limited by mental status.    Allergies:  Prednisone  Penicillins      Medications:    Albuterol  Norvasc  Aspirin  Symbicort   Lasix   Cozaar  Duoneb   Toprol XL     Past Medical History:    Anemia   Calculus of kidney   Chronic pain   CKD  COPD  Diverticulosis of colon   Hemorrhage of gastrointestinal tract,  Hyperlipidemia   Hypertension  Internal hemorrhoids without mention of complication   Irritable bowel syndrome   Lesion of plantar nerve   Lung nodule   Malignant neoplasm of upper lobe of left lung (  Medication management   OA    Obesity   Osteoporosis   ulcerative colitis   Peripheral neuropathy   Personal history of colonic polyps   PONV  Primary iridocyclitis   Venous stasis of lower extremity     Past Surgical History:    Amputate toes  Appendectomy  c section  Excise mass upper extreimty  Colonoscopy  Esophagoscopy   Hemorrhoidectomy   Repair sliding inguinal hernia  Knee replacement   Thoracoscopic wedge resection lung   LT IOL   Neuroma excision     Family History:    Mother: Cerebrovascular Disease  Father: Cerebrovascular Disease  Brother: colorectal cancer  Sister: breast cancer, lung cancer, scleroderma     Social History:  The patient was accompanied to the ED by EMS.  Smoking Status: Former Smoker  Smokeless Tobacco: Never Used  Alcohol Use: Negative   Drug Use: Negative  PCP:  Rashi Calle   Marital Status:       Review of Systems   Unable to perform ROS: Mental status change     Physical Exam     Patient Vitals for the past 24 hrs:   BP Pulse Heart Rate Resp SpO2   07/02/20 1245 112/45 72 -- -- 99 %   07/02/20 1230 103/46 75 -- -- 99 %   07/02/20 1200 116/47 82 84 22 94 %   07/02/20 1158 117/51 -- 82 22 94 %      Physical Exam     GEN- alert, cooperative  HEENT- R lower facial bruising and swelling, PERRL, EOMI, MMM, oral pharynx with ecchymosis throughout but no lacerations. Both lips with bruising but without lacerations. no dental injuries, midface stable, TM's clear bilaterally  NECK- ROM, soft, supple, no midline C spine tenderness to palpation, no abrasions  RESP- CTAB, no w/r/r, chest wall nontender, no crepitus, symmetrical chest wall movement  CV- RRR, no m/r/g  ABD- soft, NT/ND, +BS  MSK- normal ROM in all extremities, pelvis stable to AP and lateral compression, no T and L spinal tenderness in the midline, 5/5 strength in all extremities  NEURO- GCS 15, speech slow, alert, 4/5 strength x 4, sensation to light touch intact in all extremities,  strong bilaterally  SKIN- no rash    Emergency Department Course     ECG:  ECG taken at 1207  Normal sinus rhythm  Normal ECG  Rate 80 bpm. FL interval 156 ms. QRS duration 96 ms. QT/QTc 368/424 ms. P-R-T axes 51 33 44.    Imaging:  Radiology findings were communicated with the patient.    XR Chest Port 1 View  The cardiac silhouette and pulmonary vasculature are  within normal limits. Left lower lung scarring. A left midlung opacity  has resolved since 7/14/2016. Blunting of the costophrenic angles  likely related to chronic pleural thickening. Biapical scarring.  Calcified granulomas left lung and calcified left hilar lymph nodes.  TIFFANIE SINGLETON MD  Reading per radiology    CT Facial Bones without Contrast  1. No evidence of facial fracture.  2. Right lower facial soft tissue swelling.  WILBUR PARDO MD  Reading per  radiology    Cervical spine CT w/o contrast  No evidence of acute fracture or posttraumatic  subluxation.  WILBUR PARDO MD  Reading per radiology    CT Head w/o Contrast  1. No acute intracranial abnormality.  2. Patchy confluent white matter hypoattenuation likely represents  advanced chronic small vessel ischemic change.  WILBUR PARDO MD  Reading per radiology      Laboratory:  Laboratory findings were communicated with the patient.    Symptomatic COVID-19 Virus (Coronavirus) by PCR Nasopharyngeal swab: Pending     UA: pending    CBC: WBC 18.2 (H), HGB 10.5 (L),   CMP:  (L), Glucose 113 (H), BUN 43 (H), GFR 19 (L), Calcium 11.6 (H), AST 78 (H), o/w WNL (Creatinine 2.30 (H))    Lactic Acid (Resulted: 1222): 2.2 (H)    Troponin (Collected 1201): 0.647 (HH)     INR: 1.12    CK Total: 792 (H)    Magnesium: 1.9    ABO/Rh type and screen: O positive, antibody negative      Blood Culture x2: Pending     Procedures:    Interventions:  1337 NS 1000 mL IV  1338 Zofran 4 mg IV      Emergency Department Course:     Nursing notes and vitals reviewed. I performed an exam of the patient as documented above.     1201 IV was inserted and blood was drawn for laboratory testing, results above.     1202 A COVID-19 nasal swab PCR sample was obtained for laboratory testing as documented above.     1207 EKG obtained as noted above.     1215 The patient was sent for a CT while in the emergency department, results above.      1238 The patient was sent for a XR while in the emergency department, results above.      1415 I spoke with Dr. Kellee Scott of the hospitalist service from [facility] regarding patient's presentation, findings, and plan of care.      I talked with the son of the patient regarding the patient's presentation and recommended admission.     Prior to admission, I personally reviewed the results with the patient and all related questions were answered.     Impression & Plan      Medical Decision  Making:  Anat Correa is a 85 year old female who presents to the emergency department today for evaluation of altered mental status and a fall at home. It was unknown when she had this fall. His son Jose M and MARAL spoke on the phone, and he saw her 2 days ago while dropping off groceries. She is very independent and refuses to go to assisted living. He is concerned about her frequent falls. She also has COPD but refused oxygen repeatedly in the past. She has mild rhabdo due to the fall, and she also has CKD with some evidence of dehydration and worsening renal insufficiency. Her trauma work up is negative other than facial bruising due to the fall. She requires admission for monitoring and treatment. Family and patient are in agreement with the plans. Patient is admitted to the hospitalist service. Her elevated troponin is likely due to demand ischemia. No EKG changes were seen.    Covid-19  Anat Correa was evaluated during a global COVID-19 pandemic, which necessitated consideration that the patient might be at risk for infection with the SARS-CoV-2 virus that causes COVID-19.   Applicable protocols for evaluation were followed during the patient's care.   COVID-19 was considered as part of the patient's evaluation. The plan for testing is:  a test was obtained during this visit.     Diagnosis:    ICD-10-CM    1. Fall, initial encounter  W19.XXXA    2. Dehydration  E86.0    3. Traumatic rhabdomyolysis, initial encounter (H)  T79.6XXA    4. Contusion of face, scalp and neck, initial encounter  S00.83XA     S00.03XA     S10.93XA    5. Elevated troponin  R79.89      Disposition:   The patient is admitted into the care of Dr. Porter Feldman Disclosure:  I, Orla Severson, am serving as a scribe at 12:04 PM on 7/2/2020 to document services personally performed by Bouchra Tan MD based on my observations and the provider's statements to me.   [facility] EMERGENCY DEPARTMENT       Bouchra Tan,  MD  07/02/20 9235

## 2020-07-02 NOTE — PROGRESS NOTES
Notified of elevated troponin from 0.647--->1.208.    Will trend troponin given contusion in lower lip. Has no active chest pain.

## 2020-07-02 NOTE — H&P
[facility]    History and Physical - Hospitalist Service       Date of Admission:  7/2/2020     Assessment & Plan   Anat Correa is a 85 year old female with a history of CKD stage IV, hypertension, and COPD who is admitted to the hospital service after a fall at home with elevated creatinine, rhabdomyolysis and hypercalcemia.    Fall  - Probably mechanical/debility in etiology.  No preceding symptoms. EKG no ischemia. Elevated trop probably demand-related.  - Trauma evaluation in the ED negative for any acute issues.  - Has some bruising on the face but no fractures seen there  - Consult PT/OT.    Rhabdomyolysis  - Mild, CK elevated to 792 on admission. Hx of CKD increases risk for NENA  - Continuous LR at 100 mL/hr overnight, trend CK    CKD IV  - Creatinine is 2.3 on admission, baseline closer to 2. Follows with [facility] consultants, Dr. Gauthier.  - Monitor Cr and urine output    Hypercalcemia  - Chronic issue, has been elevated dating back at least one year.   - Previous PTH was suppressed  - Calcium 11.6 on admission here, will give fluids. Does not seem to be symptomatic.  - Workup ordered with PTH, vitamin D, 1,25 vitamin D, SPEP. No proteinuria so cannot do UPEP.    Elevated troponin  - Trop elevated to 0.647 on admission with no chest pain or ischemic change on EKG. Probably type II process from rhabdo.  - Trend to peak. No indication for heparin right now.    Leukocytosis  - WBC 18.2, probably stress response, no sign of infection. No pneumonia, no infection on UA. BCx pending.  - Trend.     COVID 19 status  - Not symptomatic.     Chronic conditions:  HTN - Continue amlodipine. Hold losartan while at risk from rhabdo.  COPD - Continue inhalers.    Diet: Regular  DVT Prophylaxis: Pneumatic Compression Devices  Melendez Catheter: Not present  Code Status: DNR/DNI    Disposition Plan   Admitted to inpatient. Discussed with son Jose M. Patient tends to need a lot of help at home but is very  stubborn and declines a lot of help. This is her second fall in a few months. Will get PT/OT/SW involved but anticipate patient would decline TCU if offered.    Chai Wade MD  [facility]    ______________________________________________________________________    Chief Complaint   Altered mental status  Fall    History of Present Illness   Anat Correa is a 85 year old female with a history of CKD stage IV, hypertension, and COPD, who presented to the ED for evaluation of altered mental status and a fall.    History obtained from the patient as well as from her son, Jose M.  Jose M reports that the patient is pretty stubborn and lives independently despite having had issues with falls.  She had a fall earlier this year where she came to the ED but was discharged to home.  Today, the patient was found laying on the floor bleeding from her face by her neighbor.  Patient herself says her legs got weak and that is why she fell down to the ground.  She denied chest pain or any issues before this episode.  She was brought to the ED, where her vital signs were normal.  She had a lot of lab abnormalities, including sodium of 128, creatinine of 2.3 up from baseline of closer to 2, calcium of 11.6, , lactic acid 2.2, troponin 0.647, WBC count 18.2.  CT imaging of the head, neck and facial bones did not show any acute process.  Chest x-ray was negative.  Patient was given IV fluids.    On interview, she says she feels fine and wants to go home.  She keeps asking why she has come in to the hospital.  She denies have any chest pain or shortness of breath.  She admits to having generally weak legs.  No fevers, chills, cough or shortness of breath.    Review of Systems    The 10 point Review of Systems is negative other than noted in the HPI or here.     Physical Exam   BP (!) 144/83   Pulse 66   Resp 22   SpO2 93%        General: Not in acute distress.    HEENT: No scleral icterus.  Oropharynx moist. R facial bruising.    Neck: Supple.    Pulmonary: Normal work of breathing. Clear to auscultation bilaterally.    Cardiovascular: Regular rate and rhythm without murmur or extra heart sounds.    Abdomen: Soft and non-tender.    Extremities: Trace bilateral lower extremity edema. No clubbing or cyanosis.     Neurologic: Awake, alert, appropriate. Oriented x3.     Skin: Warm and dry.    Psychiatric: Normal affect and mood.     Data   Data reviewed today: I have reviewed all labs and imaging results.    Recent Labs   Lab 07/02/20  1201   WBC 18.2*   HGB 10.5*   MCV 94      INR 1.12   *   POTASSIUM 4.3   CHLORIDE 96   CO2 22   BUN 43*   CR 2.30*   ANIONGAP 10   DUSTY 11.6*   *   ALBUMIN 3.5   PROTTOTAL 6.9   BILITOTAL 0.7   ALKPHOS 64   ALT 30   AST 78*   TROPI 0.647*     Recent Results (from the past 24 hour(s))   CT Head w/o Contrast    Narrative    CT SCAN OF THE HEAD WITHOUT CONTRAST   7/2/2020 12:34 PM     HISTORY: Altered level of consciousness (LOC), altered mental status,  unexplained.    TECHNIQUE:  Axial images of the head and coronal reformations without  IV contrast material. Radiation dose for this scan was reduced using  automated exposure control, adjustment of the mA and/or kV according  to patient size, or iterative reconstruction technique.    COMPARISON: None.    FINDINGS:  Moderate volume loss is present. Patchy and confluent white  matter hypoattenuation likely represents advanced chronic small vessel  ischemic change. The cerebral hemispheres, brainstem, and cerebellum  otherwise demonstrate normal morphology and attenuation. No evidence  of acute ischemia, hemorrhage, mass, mass effect or hydrocephalus. The  visualized calvarium, tympanic cavities, mastoid cavities, and  paranasal sinuses are unremarkable.      Impression    IMPRESSION:   1. No acute intracranial abnormality.  2. Patchy confluent white matter hypoattenuation likely represents  advanced chronic  small vessel ischemic change.    WILBUR PARDO MD   Cervical spine CT w/o contrast    Narrative    CT CERVICAL SPINE WITHOUT CONTRAST   7/2/2020 12:35 PM     HISTORY: Neck pain, initial exam; found on floor with possible fall.     TECHNIQUE: Axial images of the cervical spine were obtained without  intravenous contrast. Multiplanar reformations were performed.   Radiation dose for this scan was reduced using automated exposure  control, adjustment of the mA and/or kV according to patient size, or  iterative reconstruction technique.    COMPARISON: None.    FINDINGS: No evidence of acute fracture or posttraumatic subluxation.  Alignment is significant for reversal of the the normal cervical  lordosis, dextroconvex curvature, and grade 1 anterolisthesis of C4 on  C5. Severe degenerative change is present. Fusion across the left  C3-C6 facet joints. Fusion across the right C3-C4 facet joint. Fusion  across the C5-C6 disc space. No high-grade spinal canal stenosis.  Severe foraminal stenosis on the left at C3-C4. Paraspinal soft  tissues demonstrate pleural/subpleural apical lung nodular opacities,  presumably chronic scarring. Scattered vascular calcifications are  present with dense calcifications at the bilateral carotid  bifurcations.      Impression    IMPRESSION: No evidence of acute fracture or posttraumatic  subluxation.    WILBUR PARDO MD   CT Facial Bones without Contrast    Narrative    CT FACIAL BONES WITHOUT CONTRAST 7/2/2020 12:36 PM     HISTORY: Nasal fracture suspected.    TECHNIQUE: Axial CT images of the facial bones were completed with  sagittal and coronal reformations. Radiation dose for this scan was  reduced using automated exposure control, adjustment of the mA and/or  kV according to patient size, or iterative reconstruction technique.     COMPARISON: None.    FINDINGS: No displaced nasal bone fracture is identified. Subtle nasal  bone irregularity is present. Paranasal sinuses are well  aerated.  Orbits are unremarkable. Bilateral lens replacements are present. The  mandible and temporomandibular joints are unremarkable. Right lower  facial soft tissue swelling is present.      Impression    IMPRESSION:   1. No evidence of facial fracture.  2. Right lower facial soft tissue swelling.    WILBUR PARDO MD   XR Chest Port 1 View    Narrative    XR CHEST PORT 1 VW 7/2/2020 1:23 PM    HISTORY: sob    COMPARISON: Chest x-ray 7/14/2016      Impression    IMPRESSION: The cardiac silhouette and pulmonary vasculature are  within normal limits. Left lower lung scarring. A left midlung opacity  has resolved since 7/14/2016. Blunting of the costophrenic angles  likely related to chronic pleural thickening. Biapical scarring.  Calcified granulomas left lung and calcified left hilar lymph nodes.    TIFFANIE SINGLETON MD       Past Medical History    I have reviewed this patient's medical history and updated it with pertinent information if needed.   Past Medical History:   Diagnosis Date     Anemia      Calculus of kidney 1998    dr hope     Chronic pain     OA     CKD (chronic kidney disease) stage 4, GFR 15-29 ml/min (H) 2008    dr mcdermott     COPD, moderate (H) 2004    moderate obstruction, with pulm htn - dr francisco did AAP     Diverticulosis of colon (without mention of hemorrhage) 7/03     Hemorrhage of gastrointestinal tract, unspecified 4/08    dr su     Hyperlipidemia LDL goal <100 2006     Hypertension goal BP (blood pressure) < 140/90 2005     Internal hemorrhoids without mention of complication 7/03     Irritable bowel syndrome 7/03     Lesion of plantar nerve 8/98    hay's neuroma dr connor     Lung nodule 4/14, 3/16    Dr Thompson, 1.4 x 1.1 cm, lingula, PET neg 3/16     Malignant neoplasm of upper lobe of left lung (H) 7/16    Dr Thompson - x 2 nodules - moderately differentiated adenocarcinoma (acinar predominant).  Final pathologic stage [id number], Final clinical stage IA, grade 2     Medication  management     OA - 1 T#3 at HS with HX CKD     OA (osteoarthritis)     lower ext worse katya knees     Obesity (BMI 30.0-34.9)      Osteoporosis, unspecified     FOSAMAX  = upset stomach , pt declines further rx.      Other ulcerative colitis     collangenous collitis- last colonoscopy       Peripheral neuropathy     early - burning at HS     Personal history of colonic polyps     dr araujo     PONV (postoperative nausea and vomiting)      Primary iridocyclitis     iritis dr dominguez     Venous stasis of lower extremity         Past Surgical History    I have reviewed this patient's surgical history and updated it with pertinent information if needed.  Past Surgical History:   Procedure Laterality Date     AMPUTATE TOE(S) Right 2015    Procedure: AMPUTATE TOE(S);  Surgeon: Ashia White, DPM, Pod;  Location: [facility] OR     C APPENDECTOMY       C  DELIVERY ONLY      , Low Cervical     EXCISE MASS UPPER EXTREMITY  10/13/2011    Lt Forearm mass excision - dr ely     EXCISE MASS UPPER EXTREMITY  2012    Lt Forearm mass excision - dr ely     HC COLONOSCOPY THRU STOMA, DIAGNOSTIC      IBS/diverticula/hemmorhoids dr araujo     HC ESOPHAGOSCOPY, DIAGNOSTIC  , , 6/10    Gastric ulcer, healed, gastritis     HC HEMORRHOIDECTOMY,INT/EXT,SIMPLE       HC REPAIR SLIDING INGUINAL HERNIA      mitra     SURGICAL HISTORY OF -       mitra neck & back tumors     SURGICAL HISTORY OF -       neuroma excision - 2nd toe amputation     SURGICAL HISTORY OF -   3/07    Rt IOL - dr jimenez     SURGICAL HISTORY OF -       Lt IOL - dr jimenez     SURGICAL HISTORY OF -       Lt Knee replacement - dr gayle     SURGICAL HISTORY OF -       Rt Knee replacement - dr gayle     SURGICAL HISTORY OF -   9/15    Rt Second toe amputation - Dr White     THORACOSCOPIC WEDGE RESECTION LUNG Left 2016    Procedure: THORACOSCOPIC WEDGE RESECTION LUNG;   Surgeon: Abdelrahman Thompson MD;  Location: [facility] OR     XR JOINT INJECTION HIP (HIB)  2015    Rt - Dr Terry        Social History    I have reviewed this patient's social history and updated it with pertinent information if needed.  Social History     Tobacco Use     Smoking status: Former Smoker     Packs/day: 3.00     Years: 50.00     Pack years: 150.00     Types: Cigarettes     Last attempt to quit: 1993     Years since quittin.5     Smokeless tobacco: Never Used     Tobacco comment: quit    Substance Use Topics     Alcohol use: No     Drug use: No     Comment: no herbal meds either           Family History    I have reviewed this patient's family history and updated it with pertinent information if needed.   Family History   Problem Relation Age of Onset     Cerebrovascular Disease Mother      Cerebrovascular Disease Father      Cancer - colorectal Brother      Cancer - colorectal Brother      Breast Cancer Sister      Cancer Sister         lung     Cancer Sister         lung     Cancer Sister         lung     Scleroderma Sister           Prior to Admission Medications    Prior to Admission Medications   Prescriptions Last Dose Informant Patient Reported? Taking?   ASPIRIN NOT PRESCRIBED (INTENTIONAL)   No Yes   Sig: Please choose reason not prescribed, below   IBUPROFEN PO  Self Yes No   Sig: Take 400 mg by mouth every 6 hours as needed for moderate pain   acetaminophen-codeine (TYLENOL #3) 300-30 MG tablet   No No   Sig: Take 1 tablet by mouth every 8 hours as needed for severe pain   albuterol (VENTOLIN HFA) 108 (90 Base) MCG/ACT inhaler   No No   Sig: INHALE TWO PUFFS INTO THE LUNGS EVERY 6 HOURS AS NEEDED FOR SHORTNESS OF BREATH/DYSPNEA   amLODIPine (NORVASC) 5 MG tablet   No Yes   Sig: Take 1 tablet (5 mg) by mouth 2 times daily   budesonide-formoterol (SYMBICORT) 160-4.5 MCG/ACT Inhaler   No Yes   Sig: Inhale 2 puffs into the lungs 2 times daily   calcium carbonate (TUMS) 500 MG  chewable tablet  Self Yes No   Sig: Take 2 chew tab by mouth daily as needed for heartburn   furosemide (LASIX) 20 MG tablet   No Yes   Sig: Take 2 tablets (40 mg) by mouth daily   ipratropium - albuterol 0.5 mg/2.5 mg/3 mL (DUONEB) 0.5-2.5 (3) MG/3ML neb solution   No No   Sig: Take 1 vial (3 mLs) by nebulization every 4 hours as needed for shortness of breath / dyspnea or wheezing   losartan (COZAAR) 50 MG tablet   No Yes   Sig: Take 2 tablets (100 mg) by mouth daily   metoprolol succinate ER (TOPROL-XL) 50 MG 24 hr tablet   No Yes   Sig: Take 1 tablet (50 mg) by mouth 2 times daily      Facility-Administered Medications Last Administration Doses Remaining   triamcinolone acetonide (KENALOG-10) injection 40 mg None recorded 1           Allergies    Allergies   Allergen Reactions     Prednisone      Yeast infection - swollen lips     Penicillins Rash

## 2020-07-02 NOTE — ED TRIAGE NOTES
Patient was found on floor by neighbors.  Has facial trauma, bruising around her mouth, dried blood around mouth.  Family stated patient has not been feeling well for a couple of weeks.  Patient did not want to come in, PD put patient on a hold for transport.  Patient leaning to the left, alert, taking.  Dark red and blue bruising to bilateral knees.  Abrasion to base of right great toe.  c-collar applied upon arrival in ED.  ABCs intact.

## 2020-07-02 NOTE — ED NOTES
Bed: ED02  Expected date: 7/2/20  Expected time: 11:38 AM  Means of arrival: Ambulance  Comments:  Red pt

## 2020-07-03 ENCOUNTER — APPOINTMENT (OUTPATIENT)
Dept: CARDIOLOGY | Facility: CLINIC | Age: 85
DRG: 564 | End: 2020-07-03
Attending: INTERNAL MEDICINE
Payer: MEDICARE

## 2020-07-03 ENCOUNTER — APPOINTMENT (OUTPATIENT)
Dept: OCCUPATIONAL THERAPY | Facility: CLINIC | Age: 85
DRG: 564 | End: 2020-07-03
Attending: INTERNAL MEDICINE
Payer: MEDICARE

## 2020-07-03 ENCOUNTER — APPOINTMENT (OUTPATIENT)
Dept: PHYSICAL THERAPY | Facility: CLINIC | Age: 85
DRG: 564 | End: 2020-07-03
Attending: INTERNAL MEDICINE
Payer: MEDICARE

## 2020-07-03 ENCOUNTER — APPOINTMENT (OUTPATIENT)
Dept: GENERAL RADIOLOGY | Facility: CLINIC | Age: 85
DRG: 564 | End: 2020-07-03
Attending: INTERNAL MEDICINE
Payer: MEDICARE

## 2020-07-03 LAB
ANION GAP SERPL CALCULATED.3IONS-SCNC: 6 MMOL/L (ref 3–14)
BUN SERPL-MCNC: 46 MG/DL (ref 7–30)
CALCIUM SERPL-MCNC: 9.7 MG/DL (ref 8.5–10.1)
CHLORIDE SERPL-SCNC: 99 MMOL/L (ref 94–109)
CK SERPL-CCNC: 2210 U/L (ref 30–225)
CO2 SERPL-SCNC: 25 MMOL/L (ref 20–32)
CREAT SERPL-MCNC: 1.85 MG/DL (ref 0.52–1.04)
DEPRECATED CALCIDIOL+CALCIFEROL SERPL-MC: 22 UG/L (ref 20–75)
ERYTHROCYTE [DISTWIDTH] IN BLOOD BY AUTOMATED COUNT: 12.5 % (ref 10–15)
GFR SERPL CREATININE-BSD FRML MDRD: 24 ML/MIN/{1.73_M2}
GLUCOSE SERPL-MCNC: 85 MG/DL (ref 70–99)
HCT VFR BLD AUTO: 31.1 % (ref 35–47)
HGB BLD-MCNC: 9.9 G/DL (ref 11.7–15.7)
MCH RBC QN AUTO: 30.3 PG (ref 26.5–33)
MCHC RBC AUTO-ENTMCNC: 31.8 G/DL (ref 31.5–36.5)
MCV RBC AUTO: 95 FL (ref 78–100)
PLATELET # BLD AUTO: 283 10E9/L (ref 150–450)
POTASSIUM SERPL-SCNC: 3.4 MMOL/L (ref 3.4–5.3)
PTH-INTACT SERPL-MCNC: 8 PG/ML (ref 18–80)
RBC # BLD AUTO: 3.27 10E12/L (ref 3.8–5.2)
SODIUM SERPL-SCNC: 130 MMOL/L (ref 133–144)
TROPONIN I SERPL-MCNC: 0.8 UG/L (ref 0–0.04)
TROPONIN I SERPL-MCNC: 1.15 UG/L (ref 0–0.04)
WBC # BLD AUTO: 14.6 10E9/L (ref 4–11)

## 2020-07-03 PROCEDURE — 80048 BASIC METABOLIC PNL TOTAL CA: CPT | Performed by: INTERNAL MEDICINE

## 2020-07-03 PROCEDURE — 83970 ASSAY OF PARATHORMONE: CPT | Performed by: INTERNAL MEDICINE

## 2020-07-03 PROCEDURE — 82550 ASSAY OF CK (CPK): CPT | Performed by: INTERNAL MEDICINE

## 2020-07-03 PROCEDURE — 00000402 ZZHCL STATISTIC TOTAL PROTEIN: Performed by: INTERNAL MEDICINE

## 2020-07-03 PROCEDURE — 25000132 ZZH RX MED GY IP 250 OP 250 PS 637: Mod: GY | Performed by: INTERNAL MEDICINE

## 2020-07-03 PROCEDURE — 25800030 ZZH RX IP 258 OP 636: Performed by: INTERNAL MEDICINE

## 2020-07-03 PROCEDURE — 97530 THERAPEUTIC ACTIVITIES: CPT | Mod: GP

## 2020-07-03 PROCEDURE — 82652 VIT D 1 25-DIHYDROXY: CPT | Performed by: INTERNAL MEDICINE

## 2020-07-03 PROCEDURE — 93306 TTE W/DOPPLER COMPLETE: CPT

## 2020-07-03 PROCEDURE — 73030 X-RAY EXAM OF SHOULDER: CPT | Mod: RT

## 2020-07-03 PROCEDURE — 85027 COMPLETE CBC AUTOMATED: CPT | Performed by: INTERNAL MEDICINE

## 2020-07-03 PROCEDURE — 36415 COLL VENOUS BLD VENIPUNCTURE: CPT | Performed by: INTERNAL MEDICINE

## 2020-07-03 PROCEDURE — 97116 GAIT TRAINING THERAPY: CPT | Mod: GP

## 2020-07-03 PROCEDURE — 97535 SELF CARE MNGMENT TRAINING: CPT | Mod: GO

## 2020-07-03 PROCEDURE — 97161 PT EVAL LOW COMPLEX 20 MIN: CPT | Mod: GP

## 2020-07-03 PROCEDURE — 99233 SBSQ HOSP IP/OBS HIGH 50: CPT | Performed by: INTERNAL MEDICINE

## 2020-07-03 PROCEDURE — 84165 PROTEIN E-PHORESIS SERUM: CPT | Performed by: INTERNAL MEDICINE

## 2020-07-03 PROCEDURE — 12000000 ZZH R&B MED SURG/OB

## 2020-07-03 PROCEDURE — 82306 VITAMIN D 25 HYDROXY: CPT | Performed by: INTERNAL MEDICINE

## 2020-07-03 PROCEDURE — 97165 OT EVAL LOW COMPLEX 30 MIN: CPT | Mod: GO

## 2020-07-03 PROCEDURE — 84484 ASSAY OF TROPONIN QUANT: CPT | Performed by: INTERNAL MEDICINE

## 2020-07-03 PROCEDURE — 93306 TTE W/DOPPLER COMPLETE: CPT | Mod: 26 | Performed by: INTERNAL MEDICINE

## 2020-07-03 PROCEDURE — 82542 COL CHROMOTOGRAPHY QUAL/QUAN: CPT | Performed by: INTERNAL MEDICINE

## 2020-07-03 RX ORDER — DOCUSATE SODIUM 100 MG/1
100 CAPSULE, LIQUID FILLED ORAL 2 TIMES DAILY PRN
COMMUNITY

## 2020-07-03 RX ORDER — LOPERAMIDE HYDROCHLORIDE 2 MG/1
2 TABLET ORAL 4 TIMES DAILY PRN
Status: ON HOLD | COMMUNITY
End: 2021-01-01

## 2020-07-03 RX ADMIN — AMLODIPINE BESYLATE 5 MG: 5 TABLET ORAL at 08:05

## 2020-07-03 RX ADMIN — AMLODIPINE BESYLATE 5 MG: 5 TABLET ORAL at 22:14

## 2020-07-03 RX ADMIN — ASPIRIN 325 MG: 325 TABLET, COATED ORAL at 09:19

## 2020-07-03 RX ADMIN — ACETAMINOPHEN 650 MG: 325 TABLET, FILM COATED ORAL at 14:54

## 2020-07-03 RX ADMIN — SODIUM CHLORIDE, POTASSIUM CHLORIDE, SODIUM LACTATE AND CALCIUM CHLORIDE: 600; 310; 30; 20 INJECTION, SOLUTION INTRAVENOUS at 15:21

## 2020-07-03 RX ADMIN — METOPROLOL SUCCINATE 50 MG: 50 TABLET, EXTENDED RELEASE ORAL at 08:05

## 2020-07-03 RX ADMIN — METOPROLOL SUCCINATE 50 MG: 50 TABLET, EXTENDED RELEASE ORAL at 22:14

## 2020-07-03 RX ADMIN — SODIUM CHLORIDE, POTASSIUM CHLORIDE, SODIUM LACTATE AND CALCIUM CHLORIDE: 600; 310; 30; 20 INJECTION, SOLUTION INTRAVENOUS at 04:22

## 2020-07-03 RX ADMIN — ACETAMINOPHEN 650 MG: 325 TABLET, FILM COATED ORAL at 19:05

## 2020-07-03 ASSESSMENT — ACTIVITIES OF DAILY LIVING (ADL)
ADLS_ACUITY_SCORE: 20
ADLS_ACUITY_SCORE: 18
ADLS_ACUITY_SCORE: 20
ADLS_ACUITY_SCORE: 20
ADLS_ACUITY_SCORE: 18
PREVIOUS_RESPONSIBILITIES: MEAL PREP;HOUSEKEEPING;LAUNDRY;MEDICATION MANAGEMENT;FINANCES;DRIVING;SHOPPING
ADLS_ACUITY_SCORE: 18

## 2020-07-03 NOTE — PROGRESS NOTES
07/03/20 1651   Quick Adds   Type of Visit Initial PT Evaluation   Living Environment   Lives With alone   Living Arrangements house   Home Accessibility stairs to enter home   Number of Stairs, Main Entrance 2   Transportation Anticipated car, drives self   Self-Care   Usual Activity Tolerance moderate   Current Activity Tolerance poor   Equipment Currently Used at Home cane, straight;walker, rolling   Activity/Exercise/Self-Care Comment Patient has walker, does not us   Functional Level Prior   Ambulation 1-->assistive equipment   Transferring 1-->assistive equipment   Toileting 0-->independent   Bathing 0-->independent   Fall history within last six months yes   Number of times patient has fallen within last six months 2   Which of the above functional risks had a recent onset or change? ambulation;transferring;fall history   Prior Functional Level Comment Patient uses a cane at baseline, has history of falls.   General Information   Onset of Illness/Injury or Date of Surgery - Date 07/02/20   Referring Physician Chai Wade MD    Patient/Family Goals Statement decrease pain   Pertinent History of Current Problem (include personal factors and/or comorbidities that impact the POC) Patient with PMH including CKD stage IV, HTN, and COPD now admitted following fall at home with elevated creatinine, rhabdomyolysis and hypercalcemia.   Precautions/Limitations fall precautions   General Info Comments [place] IRA   Cognitive Status Examination   Orientation orientation to person, place and time   Pain Assessment   Patient Currently in Pain Yes, see Vital Sign flowsheet  (right UE, face, knees, back)   Integumentary/Edema   Integumentary/Edema Comments multiple areas of bruising   Posture    Posture Forward head position;Protracted shoulders   Range of Motion (ROM)   ROM Comment decreased right UE ROM   Strength   Strength Comments decreased global strength   Bed Mobility   Bed Mobility Comments mod-max A   "  Transfer Skills   Transfer Comments min A and 2WW   Gait   Gait Comments amb 10 feet with 2WW and min A   Balance   Balance Comments High fall risk, unsteady gait, hx of falls   General Therapy Interventions   Planned Therapy Interventions balance training;bed mobility training;gait training;strengthening;transfer training;home program guidelines;progressive activity/exercise   Clinical Impression   Criteria for Skilled Therapeutic Intervention yes, treatment indicated   PT Diagnosis decreased independence with mobility   Influenced by the following impairments pain, decreased balance   Functional limitations due to impairments difficulty with gait   Clinical Presentation Stable/Uncomplicated   Clinical Presentation Rationale complex pmh, pain, fair social support   Clinical Decision Making (Complexity) Low complexity   Therapy Frequency 5x/week   Predicted Duration of Therapy Intervention (days/wks) 3 days   Anticipated Discharge Disposition Transitional Care Facility   Risk & Benefits of therapy have been explained Yes   Patient, Family & other staff in agreement with plan of care Yes   [facility] TM \"6 Clicks\"   2016, Trustees of [facility], under license to [employer].  All rights reserved.   6 Clicks Short Forms Basic Mobility Inpatient Short Form   [facility]  \"6 Clicks\" V.2 Basic Mobility Inpatient Short Form   1. Turning from your back to your side while in a flat bed without using bedrails? 4 - None   2. Moving from lying on your back to sitting on the side of a flat bed without using bedrails? 3 - A Little   3. Moving to and from a bed to a chair (including a wheelchair)? 2 - A Lot   4. Standing up from a chair using your arms (e.g., wheelchair, or bedside chair)? 3 - A Little   5. To walk in hospital room? 2 - A Lot   6. Climbing 3-5 steps with a railing? 1 - Total   Basic Mobility Raw Score (Score out of 24.Lower scores equate to lower levels of function) 15   Total " Evaluation Time   Total Evaluation Time (Minutes) 5

## 2020-07-03 NOTE — PHARMACY-ADMISSION MEDICATION HISTORY
Admission medication history interview status for this patient is complete. See [facility] admission navigator for allergy information, prior to admission medications and immunization status.     Medication history interview done via telephone during Covid-19 pandemic, indicate source(s): Vincenzo Salguero [phone]  Medication history resources (including written lists, pill bottles, clinic record): Son read med list from home, read pill bottles    Changes made to PTA medication list:  Added: Docusate; Loperamide;  Deleted: None  Changed: None    Actions taken by pharmacist (provider contacted, etc):None     Additional medication history information:    Patient's son, Jose M, could not verify nebulizers or Symbicort inhaler. Last administration times are unknown - possibly morning of 07/02/20 but more likely 07/01/20.    Medication reconciliation/reorder completed by provider prior to medication history?  Yes        Prior to Admission medications    Medication Sig Last Dose Taking? Auth Provider   acetaminophen-codeine (TYLENOL #3) 300-30 MG tablet Take 1 tablet by mouth every 8 hours as needed for severe pain  Yes Rashi Calle MD   albuterol (VENTOLIN HFA) 108 (90 Base) MCG/ACT inhaler INHALE TWO PUFFS INTO THE LUNGS EVERY 6 HOURS AS NEEDED FOR SHORTNESS OF BREATH/DYSPNEA  Yes Rashi Calle MD   amLODIPine (NORVASC) 5 MG tablet Take 1 tablet (5 mg) by mouth 2 times daily  Yes Rashi Calle MD   ASPIRIN NOT PRESCRIBED (INTENTIONAL) Please choose reason not prescribed, below  Yes Rashi Calle MD   calcium carbonate (TUMS) 500 MG chewable tablet Take 2 chew tab by mouth daily as needed for heartburn  Yes Reported, Patient   docusate sodium (COLACE) 100 MG capsule Take 100 mg by mouth 2 times daily as needed for constipation  Yes Unknown, Entered By History   furosemide (LASIX) 20 MG tablet Take 2 tablets (40 mg) by mouth daily  Yes Rashi Calle MD   IBUPROFEN PO Take 400 mg by mouth every 6 hours as needed for moderate pain  Yes  Reported, Patient   loperamide (IMODIUM A-D) 2 MG tablet Take 2 mg by mouth 4 times daily as needed for diarrhea  Yes Unknown, Entered By History   losartan (COZAAR) 50 MG tablet Take 2 tablets (100 mg) by mouth daily  Yes Rashi Calle MD   metoprolol succinate ER (TOPROL-XL) 50 MG 24 hr tablet Take 1 tablet (50 mg) by mouth 2 times daily  Yes Rashi Calle MD   budesonide-formoterol (SYMBICORT) 160-4.5 MCG/ACT Inhaler Inhale 2 puffs into the lungs 2 times daily Unknown at Unknown time  Rashi Calle MD   ipratropium - albuterol 0.5 mg/2.5 mg/3 mL (DUONEB) 0.5-2.5 (3) MG/3ML neb solution Take 1 vial (3 mLs) by nebulization every 4 hours as needed for shortness of breath / dyspnea or wheezing Unknown at Unknown time  Rashi Calle MD

## 2020-07-03 NOTE — CONSULTS
Care Transition Initial Assessment - SW     Met with: Patient and Family  Spoke with AURA Mccormack   Active Problems:    Rhabdomyolysis       DATA  Lives With: alone   Living Arrangements: (rambler style [facility] home )  Quality of Family Relationships: helpful, involved  Description of Support System: Supportive, Involved  Who is your support system?: Children  Support Assessment: Lacks necessary supervision and assistance. Pt has been having falls at home.. Has a walker but has it stored in her garage   Identified issues/concerns regarding health management: Fall with  Rhabdomyolysis    @   Resources List: Home Care,- Has used [facility] in the past    Skilled Nursing Facility   Referrals sent for TCU placement.. Family would like pt to have private room and would pay for the fee.    Pt/family was educated on  the Medicare Compare list for with associated star ratings to assist with choice for referrals/discharge planning Yes    Education was given to pt/family that star ratings are updated/maintained by Medicare and can be reviewed by visiting www.medicare.gov Yes    Quality of Family Relationships: helpful, involved  Transportation Anticipated: car, drives self, family or friend will provide    ASSESSMENT  Cognitive Status:  awake, alert and oriented  Concerns to be addressed: Met with pt twice about d.c planning support.. OT note recommendation for TCU.. Pt lives alone, initially told SW NO to TCU support.. Pt stated she could stay with family or they would stay with her and she would agree to home care support.. SW spoke with AURA as son Jose M was unavailable..  Ksenia informed MITZI that pt is very stubborn and very Independent.. PT keeps a very clean house ( could eat off her floor) She thinks pt needs a gentle push to consider TCU and to assure her that its just for a week or so.. Pt's had a family member who went to TCU then never mad it out from rehab    SW went back and encouraged pt to be open minded  for TCU support..  Family are willing to pay for private room and pt updated that if d. kelly was tomorrow her  Medicare would still be able to pay due to COVID situation.   PT did agree to allow SW to send referral  [facility] full and [facility] does not accept weekend admissions    Family ok to any facility in the [facility]    PLAN  Referrals sent for TCU for this weekend to   1. [facility], [facility] and  [facility]      Weekend staff will need to follow up with facilities    Should pt end up going home will need to order [facility] support      Corinne White [facility] [place]  Inpatient Care Coordination   [phone]  [facility]

## 2020-07-03 NOTE — PLAN OF CARE
End of Shift Summary  For vital signs and complete assessments, please see documentation flowsheets.     Pertinent assessments:  Dim LS, no SOB. A1 with walker and GB. Frequency with urination. Tele SR. Son here to visit. Bruising to left side of face with swelling. Calls frequently. Needs assistance with most things, not wanting to care for self.    Major Shift Events: Echo. Enteric isolation discontinued.    Treatment Plan: IVF.

## 2020-07-03 NOTE — PLAN OF CARE
OT- Evaluation and treatment initiated. Pt lives independently in a [facility] home. Prior pt reported independence in all I/ADLs.   Discharge Planner OT   Patient plan for discharge: Home     Current status: While seated/standing pt completed LE dressing with moderate assist, set up, and extra time. Pt transferred to the bedside chair with minimum assist and walker, pt unsteady with functional transfers. Pt demonstrated decreased safety awareness with functional transfers.     Barriers to return to prior living situation: Lives alone, current level of A for I/ADLs, decreased safety awareness     Recommendations for discharge: TCU    Rationale for recommendations: Pt is below baseline in I/ADLs and will benefit from skilled therapy to address safety and independence in I/ADLs.   Pt motivated to return home, if home, pt will need 24/7 A/supervision for all I/ADLs and home OT.   Pt may benefit from a discussion regarding more supportive living.        Entered by: Alvarado Mota 07/03/2020 9:06 AM

## 2020-07-03 NOTE — PROVIDER NOTIFICATION
Dr Wade notified: Pt reports pain that is 8/10 in right shoulder. She states it has been painful since her fall at home. The shoulder and arm appear swollen. She has been given Tylenol and ice applied but pain remains 8/10. No imaging was ever done. Can I get an order for something to help with pain. Do you want imaging for this?

## 2020-07-03 NOTE — PLAN OF CARE
Orientation: Alert and oriented x4 to orientation questions. Forgetful. Intermittent confusion overnight. Slow to respond. Slow speech  VSS. 92% on RA. Afebrile.   Tele:SR/SB. HR 60s.   LS: dyspnea on exertion. Diminished LS throughout   GI: Passing gas. no BM. Denies N/V.   : Adequate urine output.   Skin: blanchable redness to coccyx. Bruising and scabbing throughout. Significant bruising and laceration to face and lip. Small amount of Oozing overnight abrasions.   Activity: 2 assist with Gb and walker to bedside commode. Pt slept comfortably throughout shift.   Pain: 0/10. Denies pain throughout shift. No PRN interventions throughout  Plan: Continue with current cares.

## 2020-07-03 NOTE — PROGRESS NOTES
07/03/20 0826   Quick Adds   Type of Visit Initial Occupational Therapy Evaluation   Living Environment   Lives With alone   Living Arrangements   ([facility] home )   Home Accessibility stairs to enter home   Transportation Anticipated family or friend will provide  (Pt typically drives )   Self-Care   Usual Activity Tolerance good   Current Activity Tolerance fair   Equipment Currently Used at Home cane, straight;walker, rolling;shower chair   Activity/Exercise/Self-Care Comment Pt reported that she uses her cane in the home and outside in the community.    Functional Level   Ambulation 1-->assistive equipment   Transferring 1-->assistive equipment   Toileting 0-->independent   Bathing 0-->independent   Dressing 0-->independent   Fall history within last six months yes   Number of times patient has fallen within last six months 1  (per pt )   General Information   Onset of Illness/Injury or Date of Surgery - Date 07/02/20   Referring Physician Chai Wade MD    Patient/Family Goals Statement Home    Additional Occupational Profile Info/Pertinent History of Current Problem Per chart: Anat Correa is a 85 year old female with a history of CKD stage IV, hypertension, and COPD, who presented to the ED for evaluation of altered mental status and a fall. per chart: Has some bruising on the face but no fractures seen there. See chart for details.    General Info Comments Pt is hard of hearing,    Cognitive Status Examination   Orientation orientation to person, place and time   Level of Consciousness alert   Organization/Problem Solving Problem solving impaired   Executive Function Impulsive;Self awareness/monitoring impaired   Pain Assessment   Patient Currently in Pain Yes, see Vital Sign flowsheet   Transfer Skill: Bed to Chair/Chair to Bed   Level of [place]: Bed to Chair minimum assist (75% patients effort)   Physical Assist/Nonphysical Assist: Bed to Chair set-up  "required;verbal cues;1 person assist   Transfer Skill: Sit to Stand   Level of [place]: Sit/Stand contact guard   Physical Assist/Nonphysical Assist: Sit/Stand set-up required;verbal cues;1 person assist   Lower Body Dressing   Level of [place]: Dress Lower Body moderate assist (50% patients effort)   Physical Assist/Nonphysical Assist: Dress Lower Body set-up required;verbal cues;1 person assist   Instrumental Activities of Daily Living (IADL)   Previous Responsibilities meal prep;housekeeping;laundry;medication management;finances;driving;shopping   General Therapy Interventions   Planned Therapy Interventions ADL retraining;IADL retraining;cognition;transfer training;strengthening   Clinical Impression   Criteria for Skilled Therapeutic Interventions Met yes, treatment indicated   OT Diagnosis Decreased independence in I/ADLs   Influenced by the following impairments Decreased independence in I/ADLs   Assessment of Occupational Performance 1-3 Performance Deficits   Identified Performance Deficits Decreased independence in I/ADLs (Dressing, bathing, toileting)   Clinical Decision Making (Complexity) Low complexity   Therapy Frequency 5x/week   Predicted Duration of Therapy Intervention (days/wks) 4 days   Anticipated Discharge Disposition Transitional Care Facility   Risks and Benefits of Treatment have been explained. Yes   Patient, Family & other staff in agreement with plan of care Yes   [facility]-PAC TM \"6 Clicks\"   2016, Trustees of [facility], under license to [employer].  All rights reserved.   6 Clicks Short Forms Daily Activity Inpatient Short Form   [facility] AM-PAC  \"6 Clicks\" Daily Activity Inpatient Short Form   1. Putting on and taking off regular lower body clothing? 2 - A Lot   2. Bathing (including washing, rinsing, drying)? 2 - A Lot   3. Toileting, which includes using toilet, bedpan or urinal? 2 - A Lot   4. Putting on and taking off regular upper body " clothing? 4 - None   5. Taking care of personal grooming such as brushing teeth? 3 - A Little   6. Eating meals? 4 - None   Daily Activity Raw Score (Score out of 24.Lower scores equate to lower levels of function) 17   Total Evaluation Time   Total Evaluation Time (Minutes) 8

## 2020-07-03 NOTE — PLAN OF CARE
PT: Patient seen by physical therapy for evaluation and treatment.  Patient with PMH including CKD stage IV, HTN, and COPD now admitted following fall at home with elevated creatinine, rhabdomyolysis and hypercalcemia.  At baseline, patient lives alone in a single story home, continues to drive.  Patient uses a cane at baseline, has history of falls.  Patient is hard of hearing.    Discharge Planner PT   Patient plan for discharge: patient initially indicating she would like to discharge to home; following session, agreeable to TCU.  Son reports that patient has a history of agreeing to increased assistance (home to son's home with assist), then once discharge from hospital, refusing increased support from family.  Current status: Patient supine upon arrival.  Agreeable to participation in session.  Patient reporting increased pain as the day has gone on; reports especially bad at right shoulder/arm.  Patient transferred to sitting at EOB with mod A.  Patient reporting increased pain at right UE.  Patient transferred to standing with 2WW and min A.  Patient amb 10 feet within room with 2WW and min A for stability.  Patient with very slow gait, significant trunk flexion, poor walker management, poor foot clearance, decreased activity tolerance.  Patient experienced 1 loss of balance requiring min A to recover.  Patient returned to supine with max A.  Patient then decided that she wanted to use the toilet.  Recommended bedside commode.  Patient transferred to bedside commode via pivot transfer with min A x2.  Patient increasingly unsteady with transfer, requiring hand over hand assist to complete transfer safely.  Patient required max A with pericares.  Performed pivot transfer back to bed with mod A x2, returned to supine with max A x2.  Discussed recommendations for TCU, patient in agreement.  Therapist called patient's son (Jose M); explained TCU recommendation, son in agreement.  Son concerned that patient may  change her mind and refuse TCU stay once he picks her up from the hospital to transport her to TCU.  Discussed option of wheelchair transport.  Barriers to return to prior living situation: Lives alone, fall history, decreased strength, decreased balance, high level of assist required for mobility, decreased activity tolerance, pain, decreased safety awareness  Recommendations for discharge: TCU  Rationale for recommendations: Patient presents significantly below baseline for functional mobility.  Patient currently unable to ambulate household distances in order to access home environment.  Patient would benefit from ongoing physical therapy at TCU in order to increase strength, activity tolerance, balance and independence with mobility.       Entered by: Jayne Oliveira 07/03/2020 4:52 PM

## 2020-07-03 NOTE — PROGRESS NOTES
[facility]    Medicine Progress Note - Hospitalist Service       Date of Admission:  7/2/2020  Length of stay: 1 days    Assessment & Plan   Anat Correa is a 85 year old female with a history of CKD stage IV, hypertension, and COPD who is admitted to the hospital service after a fall at home with elevated creatinine, rhabdomyolysis and hypercalcemia.    Patient has had a couple of recent falls at home.  Son has been concerned about her ability to take care of herself.  This time, she was found down and brought into the ED, found to have rhabdomyolysis, elevated troponin probably secondary to type II MI and hypercalcemia.    Today, patient continues to ask to go home.  Troponin trended up to a maximum of 1.2 and an echo will be checked just to make sure were not missing some sort of wall motion abnormality.  Calcium has improved.  She will be evaluated by PT/OT and will discuss with social work disposition.  She will stay in the hospital overnight to monitor CK and creatinine again tomorrow.    Fall  - Probably mechanical/debility in etiology.  No preceding symptoms. EKG no ischemia. Elevated trop probably demand-related.  - Trauma evaluation in the ED negative for any acute issues.  - Has some bruising on the face but no fractures seen there  - Consult PT/OT. OT recommends TCU. PT evaluation pending.      Rhabdomyolysis  - Mild, CK elevated to 792 on admission. Hx of CKD increases risk for NENA  - CK increased to 2,000, will continue IV fluids     CKD IV  - Creatinine is 2.3 on admission, baseline closer to 2. Follows with [facility] consultants, Dr. Gauthier.  - Monitor Cr and urine output     Hypercalcemia  - Chronic issue, has been elevated dating back at least one year.   - Previous PTH was suppressed  - Calcium 11.6 on admission here gave IV fluids. Did not seem to be symptomatic.  - Workup ordered with PTH, vitamin D, 1,25 vitamin D, SPEP. No proteinuria so cannot do UPEP.  - Improved to 9.7 on  "7/3 after IV fluids overnight     Elevated troponin  - Trop elevated to 0.647 on admission with no chest pain or ischemic change on EKG. Probably type II process from rhabdo.  - Peaked at 1.208  - No chest pain at any time  - Echo ordered to look for WMA  - Aspirin 325 daily while working this up     Leukocytosis  - WBC 18.2 on admission, probably stress response, no sign of infection. No pneumonia, no infection on UA. BCx pending.  - Trend, improved to 14.6      Chronic conditions:  HTN - Continue amlodipine. Hold losartan while at risk from rhabdo.  COPD - Continue inhalers.    COVID-19 status: Negative  Diet: Regular  DVT Prophylaxis: Pneumatic Compression Devices  Malnutrition: Not present  Melendez Catheter: Not present  Code Status: DNR/DNI     Disposition Plan   Expected discharge:   Patient should stay admitted as inpatient again one more night to monitor CK. OT recommends TCU. PT pending, SW discussion pending.     Chai Wade MD  Hospitalist Service  [facility]  ______________________________________________________________________    Interval History   No acute overnight issues.    Patient is some low stools last night but those stopped.  Today, she is not having any pain.  She again asks to go home.  No new issues.    Data reviewed today: I reviewed all medications, new labs and imaging results over the last 24 hours.     Physical Exam   BP (!) 149/62 (BP Location: Right arm)   Pulse 65   Temp 97.8  F (36.6  C) (Oral)   Resp 18   Ht 1.651 m (5' 5\")   Wt 62.2 kg (137 lb 3.2 oz)   SpO2 92%   BMI 22.83 kg/m    137 lbs 3.2 oz       General: Laying in bed, no distress.    HEENT: No scleral icterus. Oropharynx moist. Bruising right mouth and face is progressing.    Neck: Supple. Normal range of motion.     Pulmonary: Normal work of breathing. Clear to auscultation bilaterally.    Cardiovascular: Regular rate and rhythm without murmur or extra heart sounds.    Abdomen: Soft and " non-tender.    Extremities: No peripheral edema. No clubbing or cyanosis.     Neurologic: Awake, alert, appropriate.    Skin: Warm and dry.    Psychiatric: Normal affect and mood.     Data    Recent Labs   Lab 07/03/20  0708 07/03/20  0013 07/02/20  1805 07/02/20  1201   WBC 14.6*  --   --  18.2*   HGB 9.9*  --   --  10.5*   MCV 95  --   --  94     --   --  294   INR  --   --   --  1.12   *  --   --  128*   POTASSIUM 3.4  --   --  4.3   CHLORIDE 99  --   --  96   CO2 25  --   --  22   BUN 46*  --   --  43*   CR 1.85*  --   --  2.30*   ANIONGAP 6  --   --  10   DUSYT 9.7  --   --  11.6*   GLC 85  --   --  113*   ALBUMIN  --   --   --  3.5   PROTTOTAL  --   --   --  6.9   BILITOTAL  --   --   --  0.7   ALKPHOS  --   --   --  64   ALT  --   --   --  30   AST  --   --   --  78*   TROPI 0.799* 1.147* 1.208* 0.647*     Recent Results (from the past 24 hour(s))   CT Head w/o Contrast    Narrative    CT SCAN OF THE HEAD WITHOUT CONTRAST   7/2/2020 12:34 PM     HISTORY: Altered level of consciousness (LOC), altered mental status,  unexplained.    TECHNIQUE:  Axial images of the head and coronal reformations without  IV contrast material. Radiation dose for this scan was reduced using  automated exposure control, adjustment of the mA and/or kV according  to patient size, or iterative reconstruction technique.    COMPARISON: None.    FINDINGS:  Moderate volume loss is present. Patchy and confluent white  matter hypoattenuation likely represents advanced chronic small vessel  ischemic change. The cerebral hemispheres, brainstem, and cerebellum  otherwise demonstrate normal morphology and attenuation. No evidence  of acute ischemia, hemorrhage, mass, mass effect or hydrocephalus. The  visualized calvarium, tympanic cavities, mastoid cavities, and  paranasal sinuses are unremarkable.      Impression    IMPRESSION:   1. No acute intracranial abnormality.  2. Patchy confluent white matter hypoattenuation likely  represents  advanced chronic small vessel ischemic change.    WILBUR PARDO MD   Cervical spine CT w/o contrast    Narrative    CT CERVICAL SPINE WITHOUT CONTRAST   7/2/2020 12:35 PM     HISTORY: Neck pain, initial exam; found on floor with possible fall.     TECHNIQUE: Axial images of the cervical spine were obtained without  intravenous contrast. Multiplanar reformations were performed.   Radiation dose for this scan was reduced using automated exposure  control, adjustment of the mA and/or kV according to patient size, or  iterative reconstruction technique.    COMPARISON: None.    FINDINGS: No evidence of acute fracture or posttraumatic subluxation.  Alignment is significant for reversal of the the normal cervical  lordosis, dextroconvex curvature, and grade 1 anterolisthesis of C4 on  C5. Severe degenerative change is present. Fusion across the left  C3-C6 facet joints. Fusion across the right C3-C4 facet joint. Fusion  across the C5-C6 disc space. No high-grade spinal canal stenosis.  Severe foraminal stenosis on the left at C3-C4. Paraspinal soft  tissues demonstrate pleural/subpleural apical lung nodular opacities,  presumably chronic scarring. Scattered vascular calcifications are  present with dense calcifications at the bilateral carotid  bifurcations.      Impression    IMPRESSION: No evidence of acute fracture or posttraumatic  subluxation.    WILBUR PARDO MD   CT Facial Bones without Contrast    Narrative    CT FACIAL BONES WITHOUT CONTRAST 7/2/2020 12:36 PM     HISTORY: Nasal fracture suspected.    TECHNIQUE: Axial CT images of the facial bones were completed with  sagittal and coronal reformations. Radiation dose for this scan was  reduced using automated exposure control, adjustment of the mA and/or  kV according to patient size, or iterative reconstruction technique.     COMPARISON: None.    FINDINGS: No displaced nasal bone fracture is identified. Subtle nasal  bone irregularity is present.  Paranasal sinuses are well aerated.  Orbits are unremarkable. Bilateral lens replacements are present. The  mandible and temporomandibular joints are unremarkable. Right lower  facial soft tissue swelling is present.      Impression    IMPRESSION:   1. No evidence of facial fracture.  2. Right lower facial soft tissue swelling.    WILBUR PARDO MD   XR Chest Port 1 View    Narrative    XR CHEST PORT 1 VW 7/2/2020 1:23 PM    HISTORY: sob    COMPARISON: Chest x-ray 7/14/2016      Impression    IMPRESSION: The cardiac silhouette and pulmonary vasculature are  within normal limits. Left lower lung scarring. A left midlung opacity  has resolved since 7/14/2016. Blunting of the costophrenic angles  likely related to chronic pleural thickening. Biapical scarring.  Calcified granulomas left lung and calcified left hilar lymph nodes.    TIFFANIE SINGLETON MD       Medications      Current Facility-Administered Medications   Medication Dose Route Frequency     amLODIPine  5 mg Oral BID     aspirin  325 mg Oral Daily     fluticasone-vilanterol  1 puff Inhalation Daily     metoprolol succinate ER  50 mg Oral BID     sodium chloride (PF)  3 mL Intracatheter Q8H

## 2020-07-03 NOTE — PLAN OF CARE
End of Shift Summary  For vital signs and complete assessments, please see documentation flowsheets.     Pertinent assessments: Pt had 5 loose BMs since arriving on the floor at 1630. Incontinent of bowel. On RA, AGUIRRE. Heavy assist of 2 with walker and GB - pt refusing to use bedside commode. Telemetry SR. Multiple bruises and scabs. Swelling to right side of lip with some bleeding.    Major Shift Events: Admitted    Treatment Plan: Serial troponin, IVF, telemetry.    Discharge Readiness: Medically active  Expected Discharge Date: TBD  Discharge Disposition: TBD  Barriers/Criteria for discharge: Generalized weakness - awaiting PT and OT consults.

## 2020-07-04 VITALS
DIASTOLIC BLOOD PRESSURE: 67 MMHG | RESPIRATION RATE: 18 BRPM | SYSTOLIC BLOOD PRESSURE: 149 MMHG | TEMPERATURE: 98.2 F | BODY MASS INDEX: 22.86 KG/M2 | HEIGHT: 65 IN | WEIGHT: 137.2 LBS | OXYGEN SATURATION: 93 % | HEART RATE: 65 BPM

## 2020-07-04 LAB
ANION GAP SERPL CALCULATED.3IONS-SCNC: 8 MMOL/L (ref 3–14)
BUN SERPL-MCNC: 38 MG/DL (ref 7–30)
CALCIUM SERPL-MCNC: 8.8 MG/DL (ref 8.5–10.1)
CHLORIDE SERPL-SCNC: 99 MMOL/L (ref 94–109)
CK SERPL-CCNC: 577 U/L (ref 30–225)
CO2 SERPL-SCNC: 21 MMOL/L (ref 20–32)
CREAT SERPL-MCNC: 1.39 MG/DL (ref 0.52–1.04)
ERYTHROCYTE [DISTWIDTH] IN BLOOD BY AUTOMATED COUNT: 12.3 % (ref 10–15)
GFR SERPL CREATININE-BSD FRML MDRD: 35 ML/MIN/{1.73_M2}
GLUCOSE SERPL-MCNC: 89 MG/DL (ref 70–99)
HCT VFR BLD AUTO: 28.7 % (ref 35–47)
HGB BLD-MCNC: 9.4 G/DL (ref 11.7–15.7)
LACTATE BLD-SCNC: 0.6 MMOL/L (ref 0.7–2)
MCH RBC QN AUTO: 30.8 PG (ref 26.5–33)
MCHC RBC AUTO-ENTMCNC: 32.8 G/DL (ref 31.5–36.5)
MCV RBC AUTO: 94 FL (ref 78–100)
PLATELET # BLD AUTO: 238 10E9/L (ref 150–450)
POTASSIUM SERPL-SCNC: 3.5 MMOL/L (ref 3.4–5.3)
RBC # BLD AUTO: 3.05 10E12/L (ref 3.8–5.2)
SODIUM SERPL-SCNC: 128 MMOL/L (ref 133–144)
WBC # BLD AUTO: 14 10E9/L (ref 4–11)

## 2020-07-04 PROCEDURE — 94640 AIRWAY INHALATION TREATMENT: CPT

## 2020-07-04 PROCEDURE — 36415 COLL VENOUS BLD VENIPUNCTURE: CPT | Performed by: INTERNAL MEDICINE

## 2020-07-04 PROCEDURE — 85027 COMPLETE CBC AUTOMATED: CPT | Performed by: INTERNAL MEDICINE

## 2020-07-04 PROCEDURE — 83605 ASSAY OF LACTIC ACID: CPT | Performed by: INTERNAL MEDICINE

## 2020-07-04 PROCEDURE — 99239 HOSP IP/OBS DSCHRG MGMT >30: CPT | Performed by: INTERNAL MEDICINE

## 2020-07-04 PROCEDURE — 25800030 ZZH RX IP 258 OP 636: Performed by: INTERNAL MEDICINE

## 2020-07-04 PROCEDURE — 25000132 ZZH RX MED GY IP 250 OP 250 PS 637: Mod: GY | Performed by: INTERNAL MEDICINE

## 2020-07-04 PROCEDURE — 82550 ASSAY OF CK (CPK): CPT | Performed by: INTERNAL MEDICINE

## 2020-07-04 PROCEDURE — 80048 BASIC METABOLIC PNL TOTAL CA: CPT | Performed by: INTERNAL MEDICINE

## 2020-07-04 RX ORDER — LOSARTAN POTASSIUM 100 MG/1
100 TABLET ORAL DAILY
Status: DISCONTINUED | OUTPATIENT
Start: 2020-07-05 | End: 2020-07-04 | Stop reason: HOSPADM

## 2020-07-04 RX ADMIN — ACETAMINOPHEN 650 MG: 325 TABLET, FILM COATED ORAL at 00:11

## 2020-07-04 RX ADMIN — AMLODIPINE BESYLATE 5 MG: 5 TABLET ORAL at 08:54

## 2020-07-04 RX ADMIN — METOPROLOL SUCCINATE 50 MG: 50 TABLET, EXTENDED RELEASE ORAL at 08:54

## 2020-07-04 RX ADMIN — FLUTICASONE FUROATE AND VILANTEROL TRIFENATATE 1 PUFF: 200; 25 POWDER RESPIRATORY (INHALATION) at 07:57

## 2020-07-04 RX ADMIN — SODIUM CHLORIDE, POTASSIUM CHLORIDE, SODIUM LACTATE AND CALCIUM CHLORIDE: 600; 310; 30; 20 INJECTION, SOLUTION INTRAVENOUS at 01:28

## 2020-07-04 RX ADMIN — ASPIRIN 325 MG: 325 TABLET, COATED ORAL at 08:54

## 2020-07-04 RX ADMIN — ACETAMINOPHEN 650 MG: 325 TABLET, FILM COATED ORAL at 06:57

## 2020-07-04 ASSESSMENT — ACTIVITIES OF DAILY LIVING (ADL)
ADLS_ACUITY_SCORE: 17
ADLS_ACUITY_SCORE: 22
ADLS_ACUITY_SCORE: 22
ADLS_ACUITY_SCORE: 17
ADLS_ACUITY_SCORE: 22

## 2020-07-04 NOTE — PROGRESS NOTES
Discharge Planning    SW spoke with patient to discuss discharge planning needs. Patient reports preferring to go to [facility]. SW explained not hearing back from [facility]. SW informed patient that [facility] is able to accept her today. Patient was in agreement with going and requested SW to update her son (Jose M). SW called and updated son about discharge plan.     Patient discharging to [facility] ([phone]) today. RN to fax discharge orders (fax:[phone]).    HE WC set up for 1600 today.     SW available if needs arise.       Mykel Lindquist [facility]  Care Transitions Services  [facility]    [phone]

## 2020-07-04 NOTE — PROVIDER NOTIFICATION
Admission pager contacted: Right shoulder xray results are back. There is no fracture but some abnormality found. Is there anything additional you want me to do for this tonight?

## 2020-07-04 NOTE — PROGRESS NOTES
Your information has been submitted on July 04th, 2020 at 02:12:32 PM CDT. The confirmation number is [id number]    Annabella Kent  Care Management Coordinator  [facility]  [phone]

## 2020-07-04 NOTE — PLAN OF CARE
End of Shift Summary  For vital signs and complete assessments, please see documentation flowsheets.     Pertinent assessments: No loose stools this shift. Right shoulder pain and swelling of RUE. XR right shoulder abnormal. Tylenol and ice for comfort. Pt up to BSC and chair with assist of two, walker and gait belt. Forgetful. Right cheek and jaw area swollen and bruised. Tele NSR.  Treatment Plan: PT/OT, encourage mobility. IVF, monitor CK.  Discharge Readiness: Medically active  Expected Discharge Date: TBD  Discharge Disposition: Transitional Care Unit

## 2020-07-04 NOTE — PROGRESS NOTES
[facility]    Medicine Progress Note - Hospitalist Service       Date of Admission:  7/2/2020  Length of stay: 2 days    Assessment & Plan   Anat Correa is a 85 year old female with a history of CKD stage IV, hypertension, and COPD who is admitted to the hospitalist service after a fall at home with elevated creatinine, rhabdomyolysis and hypercalcemia.    Patient has had a couple of recent falls at home.  Son has been concerned about her ability to take care of herself.  This time, she was found down and brought into the ED, found to have rhabdomyolysis, elevated troponin probably secondary to type II MI and hypercalcemia.    Her troponin peaked at a maximum of 1.2 and was never associated with any chest pain.  Echocardiogram showed no regional wall motion abnormality.  CK trended to a maximum of 2000 and then trended down in the 500 range.  Creatinine improved to 1.4 on 7/4 from 2.3 on admission.  Calcium trended down to normal after IV fluids but the work-up remains pending.  Patient was evaluated by PT/OT and recommended to go to TCU.  She was initially very hesitant to do so but ultimately agreed to that.    Today, patient's acute medical issues have resolved and she is stable for discharge at any time.   assistance is much appreciated.    Fall  - Probably mechanical/debility in etiology.  No preceding symptoms. EKG no ischemia. Elevated trop probably demand-related.  - Trauma evaluation in the ED negative for any acute issues.  - Has some bruising on the face but no fractures seen there  - Consult PT/OT.  OT and PT recommend TCU.  Patient ultimately agreeable.     Rhabdomyolysis  - Mild, CK elevated to 792 on admission. Hx of CKD increases risk for NENA  - CK increased to 2,000 then decreased  - IV fluids stopped on 7/4     NENA  CKD IV  - Creatinine is 2.3 on admission, baseline closer to 2. [facility] Dr. Gauth[facility].  - Monitor Cr and urine output  - As of  "7/4 creatinine much improved at 1.4     Hypercalcemia  - Chronic issue, has been elevated dating back at least one year.   - Previous PTH was suppressed  - Calcium 11.6 on admission here gave IV fluids. Did not seem to be symptomatic.  - Vitamin D WNL  - Parathyroid hormone low   - 1,25 hydroxy vitamin D, SPEP, PTHrP pending  - Improved to 9.7 on 7/3 after IV fluids overnight     Elevated troponin secondary to demand ischemia  - Trop elevated to 0.647 on admission with no chest pain or ischemic change on EKG. Probably type II process from rhabdo.  - Peaked at 1.208  - No chest pain at any time  - Echo ordered to look for WMA--negative      Leukocytosis  - WBC 18.2 on admission, probably stress response, no sign of infection. No pneumonia, no infection on UA. BCx pending.  - Trend, improved to 14.6 then 14.0, no further workup indicated.       Chronic conditions:  HTN - Continue amlodipine. Held losartan initially, resume 7/5.  COPD - Continue inhalers.    COVID-19 status: Negative  Diet: Regular  DVT Prophylaxis: Pneumatic Compression Devices  Malnutrition: Not present  Melendez Catheter: Not present  Code Status: DNR/DNI     Disposition Plan   Expected discharge:   Patient is agreeable to TCU.  She is now stable for discharge to TCU whenever a bed is found.    Chai Wade MD  Hospitalist Service  [facility]  ______________________________________________________________________    Interval History   No acute overnight issues.    Patient had some shoulder pain yesterday.  Still having some today but is not that bad.  Otherwise, no new issues.  Feeling reasonably well.    Data reviewed today: I reviewed all medications, new labs and imaging results over the last 24 hours.     Physical Exam   BP (!) 143/62 (BP Location: Right arm)   Pulse 65   Temp 96.5  F (35.8  C) (Oral)   Resp 20   Ht 1.651 m (5' 5\")   Wt 62.2 kg (137 lb 3.2 oz)   SpO2 92%   BMI 22.83 kg/m    137 lbs 3.2 oz "       General: Laying in bed, no distress.    HEENT: No scleral icterus. Oropharynx moist. Bruising right mouth and face looks little better.    Neck: Supple. Normal range of motion.     Pulmonary: Normal work of breathing. Clear to auscultation bilaterally.    Cardiovascular: Regular rate and rhythm without murmur or extra heart sounds.    Abdomen: Soft and non-tender.    Extremities: No peripheral edema. No clubbing or cyanosis.     Neurologic: Awake, alert, appropriate.    Skin: Warm and dry.    Psychiatric: Normal affect and mood.     Data    Recent Labs   Lab 07/04/20  0635 07/03/20  0708 07/03/20  0013 07/02/20  1805 07/02/20  1201   WBC 14.0* 14.6*  --   --  18.2*   HGB 9.4* 9.9*  --   --  10.5*   MCV 94 95  --   --  94    283  --   --  294   INR  --   --   --   --  1.12   * 130*  --   --  128*   POTASSIUM 3.5 3.4  --   --  4.3   CHLORIDE 99 99  --   --  96   CO2 21 25  --   --  22   BUN 38* 46*  --   --  43*   CR 1.39* 1.85*  --   --  2.30*   ANIONGAP 8 6  --   --  10   DUSTY 8.8 9.7  --   --  11.6*   GLC 89 85  --   --  113*   ALBUMIN  --   --   --   --  3.5   PROTTOTAL  --   --   --   --  6.9   BILITOTAL  --   --   --   --  0.7   ALKPHOS  --   --   --   --  64   ALT  --   --   --   --  30   AST  --   --   --   --  78*   TROPI  --  0.799* 1.147* 1.208* 0.647*     Recent Results (from the past 24 hour(s))   XR Shoulder Right 2 Views    Narrative    EXAM: XR SHOULDER 2 VIEW RIGHT  LOCATION: [facility]  DATE/TIME: 7/3/2020 6:34 PM    INDICATION: Pain after fall.  COMPARISON: 03/16/2017 radiograph.      Impression    IMPRESSION: There is no acute fracture about the shoulder. There is marked narrowing of the acromiohumeral interval, which can be seen with a rotator cuff tear versus insufficiency. Mild to moderate AC joint arthrosis. Bones are demineralized.       Medications      Current Facility-Administered Medications   Medication Dose Route Frequency     amLODIPine  5 mg Oral BID      aspirin  325 mg Oral Daily     fluticasone-vilanterol  1 puff Inhalation Daily     metoprolol succinate ER  50 mg Oral BID     sodium chloride (PF)  3 mL Intracatheter Q8H

## 2020-07-04 NOTE — DISCHARGE SUMMARY
"[facility]  Hospitalist Discharge Summary       Date of Admission:  7/2/2020  Date of Discharge:  7/4/2020  Discharging Provider: Chai Wade MD      Discharge Diagnoses   Mechanical fall  Debility  Rhabdomyolysis  Acute kidney injury  CKD stage IV  Hypercalcemia  Elevated troponin secondary to demand ischemia  Leukocytosis    Follow-ups Needed After Discharge   Follow-up Appointments     Follow Up and recommended labs and tests      Follow up with TCU MD.             Unresulted Labs Ordered in the Past 30 Days of this Admission     Date and Time Order Name Status Description    7/3/2020 0853 PTH Related Peptide Test In process     7/3/2020 0500 Protein electrophoresis In process     7/3/2020 0500 1,25 Dihydroxyvitamin D In process     7/2/2020 1209 Blood culture Preliminary     7/2/2020 1209 Blood culture Preliminary       These results will be followed up by [facility]    Discharge Disposition   Discharged to TCU  Condition at discharge: Stable    History of Present Illness   Per admission H&P:  \"Anat Correa is an 85 year old female with a history of CKD stage IV, hypertension, and COPD, who presented to the ED for evaluation of altered mental status and a fall.     History obtained from the patient as well as from her son, Jose M.  Jose M reports that the patient is pretty stubborn and lives independently despite having had issues with falls.  She had a fall earlier this year where she came to the ED but was discharged to home.  Today, the patient was found laying on the floor bleeding from her face by her neighbor.  Patient herself says her legs got weak and that is why she fell down to the ground.  She denied chest pain or any issues before this episode.  She was brought to the ED, where her vital signs were normal.  She had a lot of lab abnormalities, including sodium of 128, creatinine of 2.3 up from baseline of closer to 2, calcium of 11.6, , lactic acid 2.2, troponin 0.647, WBC " "count 18.2.  CT imaging of the head, neck and facial bones did not show any acute process.  Chest x-ray was negative.  Patient was given IV fluids.     On interview, she says she feels fine and wants to go home.  She keeps asking why she has come in to the hospital.  She denies have any chest pain or shortness of breath.  She admits to having generally weak legs.  No fevers, chills, cough or shortness of breath.\"    Hospital Course     Anat Correa is an 85 year old female with a history of CKD stage IV, hypertension, and COPD who was admitted to the hospitalist service after a fall at home with elevated creatinine, rhabdomyolysis and hypercalcemia.     Patient has had a couple of recent falls at home.  Son has been concerned about her ability to take care of herself.  This time, she was found down and brought into the ED, found to have rhabdomyolysis, elevated troponin probably secondary to type II MI and hypercalcemia. Her fall was deemed mechanical or related to debility.     Her troponin peaked at a maximum of 1.2 and was never associated with any chest pain.  Echocardiogram showed no regional wall motion abnormality.  CK trended to a maximum of 2000 and then trended down in the 500 range.  Creatinine improved to 1.4 on 7/4 from 2.3 on admission.  Calcium trended down to normal after IV fluids but the work-up remains pending.  Patient was evaluated by PT/OT and recommended to go to TCU.  She was initially very hesitant to do so but ultimately agreed to that,    Fall  - Probably mechanical/debility in etiology.  No preceding symptoms. EKG no ischemia. Elevated trop probably demand-related.  - Trauma evaluation in the ED negative for any acute issues.  - Has some bruising on the face but no fractures seen there  - Consult PT/OT.  OT and PT recommend TCU.  Patient ultimately agreeable.     Rhabdomyolysis  - Mild, CK elevated to 792 on admission. Hx of CKD increases risk for NENA  - CK increased to 2,000 then " decreased  - IV fluids stopped on 7/4     NENA  CKD IV  - Creatinine is 2.3 on admission, baseline closer to 2. [facility]  - As of 7/4 creatinine much improved at 1.4     Hypercalcemia  - Chronic issue, has been elevated dating back at least one year.   - Previous PTH was suppressed  - Calcium 11.6 on admission here gave IV fluids. Did not seem to be symptomatic.  - Vitamin D WNL  - Parathyroid hormone low   - 1,25 hydroxy vitamin D, SPEP, PTHrP pending  - Improved to 9.7 on 7/3 after IV fluids overnight     Elevated troponin secondary to demand ischemia  - Trop elevated to 0.647 on admission with no chest pain or ischemic change on EKG. Probably type II process from rhabdo.  - Peaked at 1.208  - No chest pain at any time  - Echo ordered to look for WMA--negative      Leukocytosis  - WBC 18.2 on admission, probably stress response, no sign of infection. No pneumonia, no infection on UA. BCx pending.  - Trend, improved to 14.6 then 14.0, no further workup indicated.    Shoulder pain  Since she was complaining of right shoulder pain in the setting of recent fall, checked x-ray, which did not show any acute fracture but potentially some rotator cuff insufficiency.  Can be followed up as an outpatient.    Consultations This Hospital Stay   SOCIAL WORK IP CONSULT  PHYSICAL THERAPY ADULT IP CONSULT  OCCUPATIONAL THERAPY ADULT IP CONSULT  NUTRITION SERVICES ADULT IP CONSULT  PHYSICAL THERAPY ADULT IP CONSULT  OCCUPATIONAL THERAPY ADULT IP CONSULT      Code Status   DNR/DNI    Time Spent on this Encounter   I, Chai Wade MD, personally saw the patient today and spent greater than 30 minutes discharging this patient.       Chai Wade MD  [facility]  ______________________________________________________________________         Primary Care Physician   Rashi Calle    Discharge Orders      General info for SNF    Length of Stay Estimate: Short Term Care:  Estimated # of Days <30  Condition at Discharge: Stable  Level of care:skilled   Rehabilitation Potential: Excellent  Admission H&P remains valid and up-to-date: Yes  Recent Chemotherapy: N/A  Use Nursing Home Standing Orders: Yes     Reason for your hospital stay    You were admitted after a fall. Thankfully we did not see any major injuries. There were some abnormal labs but these improved. You will be discharged to a skilled nursing facility.     Activity - Up with nursing assistance     Follow Up and recommended labs and tests    Follow up with TCU MD.     DNR/DNI     Physical Therapy Adult Consult    Evaluate and treat as clinically indicated.    Reason:  debility     Occupational Therapy Adult Consult    Evaluate and treat as clinically indicated.    Reason:  debility     Advance Diet as Tolerated    Follow this diet upon discharge: Regular       Significant Results and Procedures   Most Recent 3 CBC's:  Recent Labs   Lab Test 07/04/20  0635 07/03/20  0708 07/02/20  1201   WBC 14.0* 14.6* 18.2*   HGB 9.4* 9.9* 10.5*   MCV 94 95 94    283 294     Most Recent 3 BMP's:  Recent Labs   Lab Test 07/04/20  0635 07/03/20  0708 07/02/20  1201   * 130* 128*   POTASSIUM 3.5 3.4 4.3   CHLORIDE 99 99 96   CO2 21 25 22   BUN 38* 46* 43*   CR 1.39* 1.85* 2.30*   ANIONGAP 8 6 10   DUSTY 8.8 9.7 11.6*   GLC 89 85 113*     Most Recent 2 LFT's:  Recent Labs   Lab Test 07/02/20  1201 11/20/19  0808   AST 78* 16   ALT 30 20   ALKPHOS 64 77   BILITOTAL 0.7 0.4     Most Recent 3 INR's:  Recent Labs   Lab Test 07/02/20  1201 07/12/16  1055 05/09/13  1246   INR 1.12 0.93 2.43*   ,   Results for orders placed or performed during the hospital encounter of 07/02/20   CT Head w/o Contrast    Narrative    CT SCAN OF THE HEAD WITHOUT CONTRAST   7/2/2020 12:34 PM     HISTORY: Altered level of consciousness (LOC), altered mental status,  unexplained.    TECHNIQUE:  Axial images of the head and coronal reformations without  IV  contrast material. Radiation dose for this scan was reduced using  automated exposure control, adjustment of the mA and/or kV according  to patient size, or iterative reconstruction technique.    COMPARISON: None.    FINDINGS:  Moderate volume loss is present. Patchy and confluent white  matter hypoattenuation likely represents advanced chronic small vessel  ischemic change. The cerebral hemispheres, brainstem, and cerebellum  otherwise demonstrate normal morphology and attenuation. No evidence  of acute ischemia, hemorrhage, mass, mass effect or hydrocephalus. The  visualized calvarium, tympanic cavities, mastoid cavities, and  paranasal sinuses are unremarkable.      Impression    IMPRESSION:   1. No acute intracranial abnormality.  2. Patchy confluent white matter hypoattenuation likely represents  advanced chronic small vessel ischemic change.    WILBUR PARDO MD   Cervical spine CT w/o contrast    Narrative    CT CERVICAL SPINE WITHOUT CONTRAST   7/2/2020 12:35 PM     HISTORY: Neck pain, initial exam; found on floor with possible fall.     TECHNIQUE: Axial images of the cervical spine were obtained without  intravenous contrast. Multiplanar reformations were performed.   Radiation dose for this scan was reduced using automated exposure  control, adjustment of the mA and/or kV according to patient size, or  iterative reconstruction technique.    COMPARISON: None.    FINDINGS: No evidence of acute fracture or posttraumatic subluxation.  Alignment is significant for reversal of the the normal cervical  lordosis, dextroconvex curvature, and grade 1 anterolisthesis of C4 on  C5. Severe degenerative change is present. Fusion across the left  C3-C6 facet joints. Fusion across the right C3-C4 facet joint. Fusion  across the C5-C6 disc space. No high-grade spinal canal stenosis.  Severe foraminal stenosis on the left at C3-C4. Paraspinal soft  tissues demonstrate pleural/subpleural apical lung nodular  opacities,  presumably chronic scarring. Scattered vascular calcifications are  present with dense calcifications at the bilateral carotid  bifurcations.      Impression    IMPRESSION: No evidence of acute fracture or posttraumatic  subluxation.    WILBUR PARDO MD   CT Facial Bones without Contrast    Narrative    CT FACIAL BONES WITHOUT CONTRAST 7/2/2020 12:36 PM     HISTORY: Nasal fracture suspected.    TECHNIQUE: Axial CT images of the facial bones were completed with  sagittal and coronal reformations. Radiation dose for this scan was  reduced using automated exposure control, adjustment of the mA and/or  kV according to patient size, or iterative reconstruction technique.     COMPARISON: None.    FINDINGS: No displaced nasal bone fracture is identified. Subtle nasal  bone irregularity is present. Paranasal sinuses are well aerated.  Orbits are unremarkable. Bilateral lens replacements are present. The  mandible and temporomandibular joints are unremarkable. Right lower  facial soft tissue swelling is present.      Impression    IMPRESSION:   1. No evidence of facial fracture.  2. Right lower facial soft tissue swelling.    WILBUR PARDO MD   XR Chest Port 1 View    Narrative    XR CHEST PORT 1 VW 7/2/2020 1:23 PM    HISTORY: sob    COMPARISON: Chest x-ray 7/14/2016      Impression    IMPRESSION: The cardiac silhouette and pulmonary vasculature are  within normal limits. Left lower lung scarring. A left midlung opacity  has resolved since 7/14/2016. Blunting of the costophrenic angles  likely related to chronic pleural thickening. Biapical scarring.  Calcified granulomas left lung and calcified left hilar lymph nodes.    TIFFANIE SINGLETON MD   XR Shoulder Right 2 Views    Narrative    EXAM: XR SHOULDER 2 VIEW RIGHT  LOCATION: [facility]  DATE/TIME: 7/3/2020 6:34 PM    INDICATION: Pain after fall.  COMPARISON: 03/16/2017 radiograph.      Impression    IMPRESSION: There is no acute fracture about  the shoulder. There is marked narrowing of the acromiohumeral interval, which can be seen with a rotator cuff tear versus insufficiency. Mild to moderate AC joint arthrosis. Bones are demineralized.   Echocardiogram Complete    Narrative    [id number]  [id number]  [id number]  [id number]^YARELIS^JJ^ALTAGRACIA           [facility]  Echocardiography Laboratory  [address] [place] [address]        Name: AMITA CRESPO  MRN: [id number]  : 1935  Study Date: 2020 11:06 AM  Age: 85 yrs  Gender: Female  Patient Location: [facility]  Reason For Study: Syncope  Ordering Physician: JJ SANTOYO  Referring Physician: Rashi Calle  Performed By: Kelly Stewart RDCS     BSA: 1.7 m2  Height: 65 in  Weight: 137 lb  HR: 65  BP: 149/62 mmHg  _____________________________________________________________________________  __        Procedure  Complete Portable Echo Adult.  _____________________________________________________________________________  __        Interpretation Summary     The left ventricle is normal in size. There is mild concentric left  ventricular hypertrophy. Left ventricular systolic function is normal. The  visual ejection fraction is estimated at 55-60%. Grade I or early diastolic  dysfunction. Diastolic Doppler findings (E/E' ratio and/or other parameters)  suggest left ventricular filling pressures are increased. No regional wall  motion abnormalities noted  The right ventricle is mildly dilated. The right ventricular systolic function  is normal.  Mild to moderate bi-atrial enlargement.  Trace to mild mitral and tricuspid regurgitation.  No pericardial effusion.  In comparison to the previous report dated 2012, the findings are  similar.  _____________________________________________________________________________  __        Left Ventricle  The left ventricle is normal in size. There is mild concentric left  ventricular hypertrophy. Left ventricular systolic  function is normal. The  visual ejection fraction is estimated at 55-60%. Grade I or early diastolic  dysfunction. Diastolic Doppler findings (E/E' ratio and/or other parameters)  suggest left ventricular filling pressures are increased. No regional wall  motion abnormalities noted.     Right Ventricle  The right ventricle is mildly dilated. The right ventricular systolic function  is normal.     Atria  The left atrium is moderately dilated. The right atrium is mildly dilated.  There is no color Doppler evidence of an atrial shunt.     Mitral Valve  The mitral valve leaflets are mildly thickened. There is moderate mitral  annular calcification. There is trace mitral regurgitation.        Tricuspid Valve  There is mild (1+) tricuspid regurgitation. The right ventricular systolic  pressure is approximated at 40.5 mmHg plus the right atrial pressure. IVC  diameter <2.1 cm collapsing >50% with sniff suggests a normal RA pressure of 3  mmHg. Right ventricular systolic pressure is elevated, consistent with mild to  moderate pulmonary hypertension.     Aortic Valve  There is moderate trileaflet aortic sclerosis. The peak AoV pressure gradient  is 17.1 mmHg. The mean AoV pressure gradient is 8.5 mmHg.     Pulmonic Valve  There is no pulmonic valvular stenosis.     Vessels  Mild aortic root dilatation. Normal size ascending aorta. The inferior vena  cava is normal.     Pericardium  There is no pericardial effusion.        Rhythm  Sinus rhythm was noted.  _____________________________________________________________________________  __  MMode/2D Measurements & Calculations  IVSd: 1.2 cm     LVIDd: 3.8 cm  LVIDs: 2.6 cm  LVPWd: 1.3 cm  FS: 32.7 %  LV mass(C)d: 162.5 grams  LV mass(C)dI: 96.5 grams/m2  Ao root diam: 3.8 cm  LA dimension: 3.9 cm  asc Aorta Diam: 3.7 cm  LA/Ao: 1.0  LVOT diam: 2.1 cm  LVOT area: 3.4 cm2  LA Volume (BP): 70.0 ml  LA Volume Index (BP): 41.7 ml/m2  RWT: 0.69           Doppler Measurements &  Calculations  MV E max samuel: 83.4 cm/sec  MV A max samuel: 119.9 cm/sec  MV E/A: 0.70  MV dec time: 0.33 sec  Ao V2 max: 207.0 cm/sec  Ao max P.1 mmHg  Ao V2 mean: 135.7 cm/sec  Ao mean P.5 mmHg  Ao V2 VTI: 44.0 cm  CHERELLE(I,D): 1.6 cm2  CHERELLE(V,D): 1.7 cm2  LV V1 max P.3 mmHg  LV V1 max: 103.7 cm/sec  LV V1 VTI: 20.3 cm  SV(LVOT): 69.7 ml  SI(LVOT): 41.4 ml/m2  PA acc time: 0.15 sec  TR max samuel: 318.2 cm/sec  TR max P.5 mmHg  AV Samuel Ratio (DI): 0.50  CHERELLE Index (cm2/m2): 0.94  E/E' avg: 15.7  Lateral E/e': 14.0  Medial E/e': 17.5              _____________________________________________________________________________  __        Report approved by: Pastor Neal 2020 03:48 PM            Discharge Medications   Current Discharge Medication List      CONTINUE these medications which have NOT CHANGED    Details   albuterol (VENTOLIN HFA) 108 (90 Base) MCG/ACT inhaler INHALE TWO PUFFS INTO THE LUNGS EVERY 6 HOURS AS NEEDED FOR SHORTNESS OF BREATH/DYSPNEA  Qty: 54 g, Refills: 3    Comments: Pharmacy may dispense brand covered by insurance (Proair, or proventil or ventolin or generic albuterol inhaler)  Associated Diagnoses: COPD, moderate (H)      amLODIPine (NORVASC) 5 MG tablet Take 1 tablet (5 mg) by mouth 2 times daily  Qty: 180 tablet, Refills: 3    Associated Diagnoses: Hypertension goal BP (blood pressure) < 140/90; CKD (chronic kidney disease) stage 4, GFR 15-29 ml/min (H)      ASPIRIN NOT PRESCRIBED (INTENTIONAL) Please choose reason not prescribed, below  Qty: 0 each, Refills: 0    Associated Diagnoses: Hypertension goal BP (blood pressure) < 140/90      calcium carbonate (TUMS) 500 MG chewable tablet Take 2 chew tab by mouth daily as needed for heartburn      docusate sodium (COLACE) 100 MG capsule Take 100 mg by mouth 2 times daily as needed for constipation      furosemide (LASIX) 20 MG tablet Take 2 tablets (40 mg) by mouth daily  Qty: 180 tablet, Refills: 3    Associated Diagnoses:  Hypertension goal BP (blood pressure) < 140/90; CKD (chronic kidney disease) stage 4, GFR 15-29 ml/min (H)      loperamide (IMODIUM A-D) 2 MG tablet Take 2 mg by mouth 4 times daily as needed for diarrhea      losartan (COZAAR) 50 MG tablet Take 2 tablets (100 mg) by mouth daily  Qty: 180 tablet, Refills: 3    Associated Diagnoses: Hypertension goal BP (blood pressure) < 140/90; CKD (chronic kidney disease) stage 4, GFR 15-29 ml/min (H)      metoprolol succinate ER (TOPROL-XL) 50 MG 24 hr tablet Take 1 tablet (50 mg) by mouth 2 times daily  Qty: 180 tablet, Refills: 3    Associated Diagnoses: Hypertension goal BP (blood pressure) < 140/90; CKD (chronic kidney disease) stage 4, GFR 15-29 ml/min (H)      budesonide-formoterol (SYMBICORT) 160-4.5 MCG/ACT Inhaler Inhale 2 puffs into the lungs 2 times daily  Qty: 30.6 g, Refills: 3    Associated Diagnoses: COPD, moderate (H)      ipratropium - albuterol 0.5 mg/2.5 mg/3 mL (DUONEB) 0.5-2.5 (3) MG/3ML neb solution Take 1 vial (3 mLs) by nebulization every 4 hours as needed for shortness of breath / dyspnea or wheezing  Qty: 270 mL, Refills: 3    Associated Diagnoses: COPD, moderate (H)         STOP taking these medications       acetaminophen-codeine (TYLENOL #3) 300-30 MG tablet Comments:   Reason for Stopping:         IBUPROFEN PO Comments:   Reason for Stopping:             Allergies   Allergies   Allergen Reactions     Prednisone      Yeast infection - swollen lips     Penicillins Rash

## 2020-07-04 NOTE — PLAN OF CARE
Patient hospitalized for fall. Cleared for discharge to TCU today per MD. Discharge instructions, medications, and follow-ups reviewed with patient in detail. Patient verbalized understanding of discharge instructions. Belongings were returned to patient at time of discharge. Assisted transport providing transport to TCU. Son updated. IV removed and site without redness.

## 2020-07-05 ASSESSMENT — MIFFLIN-ST. JEOR
SCORE: 1122.2
SCORE: 1122.2

## 2020-07-05 NOTE — PLAN OF CARE
Occupational Therapy Discharge Summary    Reason for therapy discharge:    Discharged to transitional care facility.    Progress towards therapy goal(s). See [facility] on [facility] for goal details.  Goals not met.  Barriers to achieving goals:   discharge from facility.    Therapy recommendation(s):    Continued therapy is recommended.  Rationale/Recommendations:  maximize safety and independence with ADLs.

## 2020-07-05 NOTE — PLAN OF CARE
Physical Therapy Discharge Summary    Reason for therapy discharge:    Discharged to transitional care facility.    Progress towards therapy goal(s). See goals on Care Plan in [facility] electronic health record for goal details.  Goals not met.  Barriers to achieving goals:   discharge from facility.    Therapy recommendation(s):    Continued therapy is recommended.  Rationale/Recommendations:  Pt is currently functioning below baseline, will benefit from ongoing skilled PT intervention to improve safety and tolerance with mobility skills.

## 2020-07-06 ENCOUNTER — PATIENT OUTREACH (OUTPATIENT)
Dept: CARE COORDINATION | Facility: CLINIC | Age: 85
End: 2020-07-06

## 2020-07-06 ENCOUNTER — TELEPHONE (OUTPATIENT)
Dept: GERIATRICS | Facility: CLINIC | Age: 85
End: 2020-07-06

## 2020-07-06 ENCOUNTER — NURSING HOME VISIT (OUTPATIENT)
Dept: GERIATRICS | Facility: CLINIC | Age: 85
End: 2020-07-06
Payer: MEDICARE

## 2020-07-06 VITALS
HEART RATE: 86 BPM | HEIGHT: 65 IN | WEIGHT: 148 LBS | OXYGEN SATURATION: 96 % | SYSTOLIC BLOOD PRESSURE: 149 MMHG | DIASTOLIC BLOOD PRESSURE: 71 MMHG | BODY MASS INDEX: 24.66 KG/M2 | RESPIRATION RATE: 18 BRPM | TEMPERATURE: 97.3 F

## 2020-07-06 DIAGNOSIS — R53.81 PHYSICAL DECONDITIONING: ICD-10-CM

## 2020-07-06 DIAGNOSIS — E83.52 HYPERCALCEMIA: ICD-10-CM

## 2020-07-06 DIAGNOSIS — R79.89 TROPONIN LEVEL ELEVATED: ICD-10-CM

## 2020-07-06 DIAGNOSIS — R53.81 DEBILITY: ICD-10-CM

## 2020-07-06 DIAGNOSIS — N18.4 CKD (CHRONIC KIDNEY DISEASE) STAGE 4, GFR 15-29 ML/MIN (H): ICD-10-CM

## 2020-07-06 DIAGNOSIS — Z71.89 OTHER SPECIFIED COUNSELING: ICD-10-CM

## 2020-07-06 DIAGNOSIS — D72.829 LEUKOCYTOSIS, UNSPECIFIED TYPE: ICD-10-CM

## 2020-07-06 DIAGNOSIS — T79.6XXD TRAUMATIC RHABDOMYOLYSIS, SUBSEQUENT ENCOUNTER: ICD-10-CM
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
## 2020-07-06 DIAGNOSIS — N17.9 ACUTE KIDNEY INJURY (H): ICD-10-CM

## 2020-07-06 DIAGNOSIS — I24.89 DEMAND ISCHEMIA (H): ICD-10-CM

## 2020-07-06 LAB
ALBUMIN SERPL ELPH-MCNC: 3.2 G/DL (ref 3.7–5.1)
ALPHA1 GLOB SERPL ELPH-MCNC: 0.4 G/DL (ref 0.2–0.4)
ALPHA2 GLOB SERPL ELPH-MCNC: 0.7 G/DL (ref 0.5–0.9)
B-GLOBULIN SERPL ELPH-MCNC: 0.7 G/DL (ref 0.6–1)
GAMMA GLOB SERPL ELPH-MCNC: 0.7 G/DL (ref 0.7–1.6)
M PROTEIN SERPL ELPH-MCNC: 0 G/DL
PROT PATTERN SERPL ELPH-IMP: ABNORMAL

## 2020-07-06 PROCEDURE — 99207 ZZC CDG-CODE INCORRECT PER BILLING BASED ON TIME: CPT | Performed by: NURSE PRACTITIONER

## 2020-07-06 PROCEDURE — 99309 SBSQ NF CARE MODERATE MDM 30: CPT | Performed by: NURSE PRACTITIONER

## 2020-07-06 NOTE — PROGRESS NOTES
[place] [facility]  PRIMARY CARE PROVIDER AND CLINIC:  Rashi Calle MD, [address] [facility] [address]  Chief Complaint   Patient presents with     [facility]     [place] Medical Record Number:  [id number]  Place of Service where encounter took place:  [email or web address] (S) [[id number]]    Anat Correa  is a 85 year old  (1935), admitted to the above facility from  [facility]. Hospital stay 7/2/20 through 7/4/20..  Admitted to this facility for  rehab, medical management and nursing care.    HPI:    HPI information obtained from: facility chart records, facility staff, patient report and [facility] chart review.   Brief Summary of Hospital Course:   -Resident previously living independently in community. Admitted to hospital after having a mechanical fall sustaining injury to right face.   -She had a lot of lab abnormalities in ED, including sodium of 128, creatinine of 2.3 up from baseline of closer to 2, calcium of 11.6, , lactic acid 2.2, troponin 0.647, WBC count 18.2. CT imaging of the head, neck and facial bones did not show any acute process. Chest x-ray was negative.    -Her troponin peaked at a maximum of 1.2 and was never associated with any chest pain.  Echocardiogram performed and demonstrated no regional wall motion abnormality.  CK trended to a maximum of 2000 and then trended down in the 500 range.  Creatinine improved to 1.4 on 7/4 from 2.3 on admission with hydration.     Updates on Status Since Skilled nursing Admission:   -Resident resting in bed upon exam. Reports she feels fine and would like to discharge home. Does admit to several falls over previous year, but states she is at her baseline. Agreeable to physical and occupational therapy.   -Denies CP, SOB or lightheadedness.   -Son involved in care.     CODE STATUS/ADVANCE DIRECTIVES DISCUSSION:   DNR / DNI  Patient's living condition: lives alone  ALLERGIES:  Prednisone and Penicillins  PAST MEDICAL HISTORY:  has a past medical history of Anemia, Calculus of kidney (), Chronic pain, CKD (chronic kidney disease) stage 4, GFR 15-29 ml/min (H) (), COPD, moderate (H) (), Diverticulosis of colon (without mention of hemorrhage) (), Hemorrhage of gastrointestinal tract, unspecified (), Hyperlipidemia LDL goal <100 (), Hypertension goal BP (blood pressure) < 140/90 (), Internal hemorrhoids without mention of complication (), Irritable bowel syndrome (), Lesion of plantar nerve (), Lung nodule (, 3/16), Malignant neoplasm of upper lobe of left lung (H) (), Medication management, OA (osteoarthritis) (), Obesity (BMI 30.0-34.9), Osteoporosis, unspecified (), Other ulcerative colitis (), Peripheral neuropathy, Personal history of colonic polyps (), PONV (postoperative nausea and vomiting), Primary iridocyclitis (), and Venous stasis of lower extremity. She also has no past medical history of Chronic infection, Malignant hyperthermia, or Sleep apnea.  PAST SURGICAL HISTORY:   has a past surgical history that includes HEMORRHOIDECTOMY,INT/EXT,SIMPLE (); APPENDECTOMY ();  DELIVERY ONLY (); REPAIR SLIDING INGUINAL HERNIA (); surgical history of -  (); COLONOSCOPY THRU STOMA, DIAGNOSTIC (); surgical history of -  (); surgical history of -  (3/07); surgical history of -  (); ESOPHAGOSCOPY, DIAGNOSTIC (, , 6/10); surgical history of -  (); surgical history of -  (); Excise mass upper extremity (10/13/2011); Excise mass upper extremity (2012); XR Joint Injection Hip (2015); surgical history of -  (9/15); Amputate toe(s) (Right, 2015); and Thoracoscopic wedge resection lung (Left, 2016).  FAMILY HISTORY: family history includes Breast Cancer in her sister; Cancer in her sister, sister, and sister; Cancer - colorectal in her brother and brother;  Cerebrovascular Disease in her father and mother; Scleroderma in her sister.  SOCIAL HISTORY:   reports that she quit smoking about 26 years ago. Her smoking use included cigarettes. She has a 150.00 pack-year smoking history. She has never used smokeless tobacco. She reports that she does not drink alcohol or use drugs.    Post Discharge Medication Reconciliation Status: discharge medications reconciled, continue medications without change    Current Outpatient Medications   Medication Sig Dispense Refill     albuterol (VENTOLIN HFA) 108 (90 Base) MCG/ACT inhaler INHALE TWO PUFFS INTO THE LUNGS EVERY 6 HOURS AS NEEDED FOR SHORTNESS OF BREATH/DYSPNEA 54 g 3     amLODIPine (NORVASC) 5 MG tablet Take 1 tablet (5 mg) by mouth 2 times daily 180 tablet 3     ASPIRIN NOT PRESCRIBED (INTENTIONAL) Please choose reason not prescribed, below 0 each 0     budesonide-formoterol (SYMBICORT) 160-4.5 MCG/ACT Inhaler Inhale 2 puffs into the lungs 2 times daily 30.6 g 3     calcium carbonate (TUMS) 500 MG chewable tablet Take 2 chew tab by mouth daily as needed for heartburn       docusate sodium (COLACE) 100 MG capsule Take 100 mg by mouth 2 times daily as needed for constipation       furosemide (LASIX) 20 MG tablet Take 2 tablets (40 mg) by mouth daily 180 tablet 3     ipratropium - albuterol 0.5 mg/2.5 mg/3 mL (DUONEB) 0.5-2.5 (3) MG/3ML neb solution Take 1 vial (3 mLs) by nebulization every 4 hours as needed for shortness of breath / dyspnea or wheezing 270 mL 3     loperamide (IMODIUM A-D) 2 MG tablet Take 2 mg by mouth 4 times daily as needed for diarrhea       losartan (COZAAR) 50 MG tablet Take 2 tablets (100 mg) by mouth daily 180 tablet 3     metoprolol succinate ER (TOPROL-XL) 50 MG 24 hr tablet Take 1 tablet (50 mg) by mouth 2 times daily 180 tablet 3         ROS:  4 point ROS including Respiratory, CV, GI and , other than that noted in the HPI,  is negative    Vitals:  BP (!) 149/71   Pulse 86   Temp 97.3  F (36.3  " C)   Resp 18   Ht 1.651 m (5' 5\")   Wt 67.1 kg (148 lb)   SpO2 96%   BMI 24.63 kg/m    Exam:  GENERAL APPEARANCE:  Alert  ENT:  Mouth and posterior oropharynx normal, moist mucous membranes, [place]  EYES:  EOM, conjunctivae, lids, pupils and irises normal  NECK:  No adenopathy,masses or thyromegaly  RESP:  respiratory effort and palpation of chest normal, lungs clear to auscultation , no respiratory distress  CV:  Palpation and auscultation of heart done , regular rate and rhythm, no murmur, rub, or gallop  ABDOMEN:  normal bowel sounds, soft, nontender, no hepatosplenomegaly or other masses  M/S:   Gait and station normal  SKIN:  wound healing well, no signs of infection right face  NEURO:   Cranial nerves 2-12 are normal tested and grossly at patient's baseline  PSYCH:  memory impaired , alert and oriented with forgetfulness    Lab/Diagnostic data:  Labs done in SNF are in [facility]. Please refer to them using [facility] Everywhere. and Recent labs in [facility] reviewed by me today.     ASSESSMENT/PLAN:  (W19.XXXA) Fall, initial encounter  (primary encounter diagnosis)  (R53.81) Debility  Comment: Improving, agreeable to physical and occupational therapy.   Plan:   -Appreciate OT/PT consult     (M62.82) Rhabdomyolysis  Comment: Mild, CK elevated to 792 on admission.   - CK increased to 2,000 then decreased  Plan: No changes at this time. Monitor     (N17.9) Acute kidney injury (H)  (N18.4) CKD (chronic kidney disease) stage 4, GFR 15-29 ml/min (H)  Comment: Creatinine improved to 1.4 on discharge. Baseline 2.   Plan:   -BMP next lab day  -Avoid nephrotoxic medications and renal dose when indicated.     (E83.52) Hypercalcemia  Comment: Chronic issue, has been elevated dating back at least one year. Previous PTH was suppressed. Improved with IV fluids.   Plan: No change at this time.     (R79.89) Troponin level elevated  (I24.8) Demand ischemia (H)  Comment:   Trop elevated to 0.647 on admission with no chest pain " or ischemic change on EKG. Probably type II process from rhabdo. Trop Peaked at 1.208. Echo neg for WMA  Plan:   Monitor resident and update NP with changes.     (D72.829) Leukocytosis  Comment: WBC improved 14.0 at discharge.   Plan:   -CBC next lab day    (R53.81) Physical deconditioning  Comment: Improving.   Plan:   -Appreciate OT/PT    Orders written by provider at facility  CBC and BMP next lab day.     Total time spent with patient visit at the skilled nursing facility was 35 min including patient visit and review of past records. Greater than 50% of total time spent with counseling and coordinating care due to new admission  .     Electronically signed by:  CAMACHO Turner [facility]

## 2020-07-06 NOTE — PROGRESS NOTES
Patient identified as high risk for readmission.  Patient meets criteria for care coordination outreach.    Cynthia Mccann, [facility]  Clinic Care Coordinator  Ph. [phone]  [email or web address]

## 2020-07-06 NOTE — TELEPHONE ENCOUNTER
[place] [facility] TELEPHONE ENCOUNTER    Anat Correa is a 85 year old  (1935),Nurse called today to report: New admit s/p fall - noted was on T3# and NSAID PRN prior to hosp - now not on any pain medications and nsg calling pt c/o - generalized but mainly R shoulder pain (noted in hosp and X ray neg)    ASSESSMENT/PLAN  R shoulder pain     Will start TYlenol 1000 QID    Offered video visit today, but nsg states no clinical coordinator to do it today - could do if really needed, but nsg believes the tylenol will be enough for now and can wait until WEd with Primary FGS NP is on site.  - agreed, but encouraged nsg to call us back if pain not controlled and we could start a different agent and set up a video visit.   Electronically signed by:   CAMACHO Jaramillo CNP

## 2020-07-06 NOTE — LETTER
7/6/2020        RE: Anat Correa  [address]  [facility] [address]        [place] [facility]  PRIMARY CARE PROVIDER AND CLINIC:  Rashi Calle MD, [address] [facility] [address]  Chief Complaint   Patient presents with     Establish Care     [place] Medical Record Number:  [id number]  Place of Service where encounter took place:  [facility] (S) [[id number]]    Anat Correa  is a 85 year old  (1935), admitted to the above facility from  [facility]. Hospital stay 7/2/20 through 7/4/20..  Admitted to this facility for  rehab, medical management and nursing care.    HPI:    HPI information obtained from: facility chart records, facility staff, patient report and [facility] chart review.   Brief Summary of Hospital Course:   -Resident previously living independently in community. Admitted to hospital after having a mechanical fall sustaining injury to right face.   -She had a lot of lab abnormalities in ED, including sodium of 128, creatinine of 2.3 up from baseline of closer to 2, calcium of 11.6, , lactic acid 2.2, troponin 0.647, WBC count 18.2. CT imaging of the head, neck and facial bones did not show any acute process. Chest x-ray was negative.    -Her troponin peaked at a maximum of 1.2 and was never associated with any chest pain.  Echocardiogram performed and demonstrated no regional wall motion abnormality.  CK trended to a maximum of 2000 and then trended down in the 500 range.  Creatinine improved to 1.4 on 7/4 from 2.3 on admission with hydration.     Updates on Status Since Skilled nursing Admission:   -Resident resting in bed upon exam. Reports she feels fine and would like to discharge home. Does admit to several falls over previous year, but states she is at her baseline. Agreeable to physical and occupational therapy.   -Denies CP, SOB or lightheadedness.   -Son involved in care.      CODE STATUS/ADVANCE DIRECTIVES DISCUSSION:   DNR / DNI  Patient's living condition: lives alone  ALLERGIES: Prednisone and Penicillins  PAST MEDICAL HISTORY:  has a past medical history of Anemia, Calculus of kidney (), Chronic pain, CKD (chronic kidney disease) stage 4, GFR 15-29 ml/min (H) (), COPD, moderate (H) (), Diverticulosis of colon (without mention of hemorrhage) (), Hemorrhage of gastrointestinal tract, unspecified (), Hyperlipidemia LDL goal <100 (), Hypertension goal BP (blood pressure) < 140/90 (), Internal hemorrhoids without mention of complication (), Irritable bowel syndrome (), Lesion of plantar nerve (), Lung nodule (, 3/16), Malignant neoplasm of upper lobe of left lung (H) (), Medication management, OA (osteoarthritis) (), Obesity (BMI 30.0-34.9), Osteoporosis, unspecified (), Other ulcerative colitis (), Peripheral neuropathy, Personal history of colonic polyps (), PONV (postoperative nausea and vomiting), Primary iridocyclitis (), and Venous stasis of lower extremity. She also has no past medical history of Chronic infection, Malignant hyperthermia, or Sleep apnea.  PAST SURGICAL HISTORY:   has a past surgical history that includes HEMORRHOIDECTOMY,INT/EXT,SIMPLE (); APPENDECTOMY ();  DELIVERY ONLY (); REPAIR SLIDING INGUINAL HERNIA (); surgical history of -  (); COLONOSCOPY THRU STOMA, DIAGNOSTIC (); surgical history of -  (); surgical history of -  (3/07); surgical history of -  (); ESOPHAGOSCOPY, DIAGNOSTIC (, , 6/10); surgical history of -  (); surgical history of -  (); Excise mass upper extremity (10/13/2011); Excise mass upper extremity (2012); XR Joint Injection Hip (2015); surgical history of -  (9/15); Amputate toe(s) (Right, 2015); and Thoracoscopic wedge resection lung (Left, 2016).  FAMILY HISTORY: family history includes Breast Cancer  in her sister; Cancer in her sister, sister, and sister; Cancer - colorectal in her brother and brother; Cerebrovascular Disease in her father and mother; Scleroderma in her sister.  SOCIAL HISTORY:   reports that she quit smoking about 26 years ago. Her smoking use included cigarettes. She has a 150.00 pack-year smoking history. She has never used smokeless tobacco. She reports that she does not drink alcohol or use drugs.    Post Discharge Medication Reconciliation Status: discharge medications reconciled, continue medications without change    Current Outpatient Medications   Medication Sig Dispense Refill     albuterol (VENTOLIN HFA) 108 (90 Base) MCG/ACT inhaler INHALE TWO PUFFS INTO THE LUNGS EVERY 6 HOURS AS NEEDED FOR SHORTNESS OF BREATH/DYSPNEA 54 g 3     amLODIPine (NORVASC) 5 MG tablet Take 1 tablet (5 mg) by mouth 2 times daily 180 tablet 3     ASPIRIN NOT PRESCRIBED (INTENTIONAL) Please choose reason not prescribed, below 0 each 0     budesonide-formoterol (SYMBICORT) 160-4.5 MCG/ACT Inhaler Inhale 2 puffs into the lungs 2 times daily 30.6 g 3     calcium carbonate (TUMS) 500 MG chewable tablet Take 2 chew tab by mouth daily as needed for heartburn       docusate sodium (COLACE) 100 MG capsule Take 100 mg by mouth 2 times daily as needed for constipation       furosemide (LASIX) 20 MG tablet Take 2 tablets (40 mg) by mouth daily 180 tablet 3     ipratropium - albuterol 0.5 mg/2.5 mg/3 mL (DUONEB) 0.5-2.5 (3) MG/3ML neb solution Take 1 vial (3 mLs) by nebulization every 4 hours as needed for shortness of breath / dyspnea or wheezing 270 mL 3     loperamide (IMODIUM A-D) 2 MG tablet Take 2 mg by mouth 4 times daily as needed for diarrhea       losartan (COZAAR) 50 MG tablet Take 2 tablets (100 mg) by mouth daily 180 tablet 3     metoprolol succinate ER (TOPROL-XL) 50 MG 24 hr tablet Take 1 tablet (50 mg) by mouth 2 times daily 180 tablet 3         ROS:  4 point ROS including Respiratory, CV, GI and ,  "other than that noted in the HPI,  is negative    Vitals:  BP (!) 149/71   Pulse 86   Temp 97.3  F (36.3  C)   Resp 18   Ht 1.651 m (5' 5\")   Wt 67.1 kg (148 lb)   SpO2 96%   BMI 24.63 kg/m    Exam:  GENERAL APPEARANCE:  Alert  ENT:  Mouth and posterior oropharynx normal, moist mucous membranes, [place]  EYES:  EOM, conjunctivae, lids, pupils and irises normal  NECK:  No adenopathy,masses or thyromegaly  RESP:  respiratory effort and palpation of chest normal, lungs clear to auscultation , no respiratory distress  CV:  Palpation and auscultation of heart done , regular rate and rhythm, no murmur, rub, or gallop  ABDOMEN:  normal bowel sounds, soft, nontender, no hepatosplenomegaly or other masses  M/S:   Gait and station normal  SKIN:  wound healing well, no signs of infection right face  NEURO:   Cranial nerves 2-12 are normal tested and grossly at patient's baseline  PSYCH:  memory impaired , alert and oriented with forgetfulness    Lab/Diagnostic data:  Labs done in SNF are in [place] [facility]. Please refer to them using [facility] Everywhere. and Recent labs in [facility] reviewed by me today.     ASSESSMENT/PLAN:  (W19.XXXA) Fall, initial encounter  (primary encounter diagnosis)  (R53.81) Debility  Comment: Improving, agreeable to physical and occupational therapy.   Plan:   -Appreciate OT/PT consult     (M62.82) Rhabdomyolysis  Comment: Mild, CK elevated to 792 on admission.   - CK increased to 2,000 then decreased  Plan: No changes at this time. Monitor     (N17.9) Acute kidney injury (H)  (N18.4) CKD (chronic kidney disease) stage 4, GFR 15-29 ml/min (H)  Comment: Creatinine improved to 1.4 on discharge. Baseline 2.   Plan:   -BMP next lab day  -Avoid nephrotoxic medications and renal dose when indicated.     (E83.52) Hypercalcemia  Comment: Chronic issue, has been elevated dating back at least one year. Previous PTH was suppressed. Improved with IV fluids.   Plan: No change at this time.     (R79.89) Troponin " level elevated  (I24.8) Demand ischemia (H)  Comment:   Trop elevated to 0.647 on admission with no chest pain or ischemic change on EKG. Probably type II process from rhabdo. Trop Peaked at 1.208. Echo neg for WMA  Plan:   Monitor resident and update NP with changes.     (D72.829) Leukocytosis  Comment: WBC improved 14.0 at discharge.   Plan:   -CBC next lab day    (R53.81) Physical deconditioning  Comment: Improving.   Plan:   -Appreciate OT/PT    Orders written by provider at facility  CBC and BMP next lab day.     Total time spent with patient visit at the skilled nursing facility was 35 min including patient visit and review of past records. Greater than 50% of total time spent with counseling and coordinating care due to new admission  .     Electronically signed by:  CAMACHO Turner [facility]                       Sincerely,        Andree Dawson NP

## 2020-07-06 NOTE — PROGRESS NOTES
Clinic Care Coordination Contact  Care Coordination Transition Communication    Referral Source: IP Report    Clinical Data: Patient was hospitalized at [facility] from 7.2.2020 to 7.4.2020 with diagnoses of:  Mechanical fall  Debility  Rhabdomyolysis  Acute kidney injury  CKD stage IV  Hypercalcemia  Elevated troponin secondary to demand ischemia  Leukocytosis.     Transition to Facility:              Facility Name: [facility]              Contact name and phone number/fax:    Ph. [phone], Fax. [phone].    Plan: SW Care Coordinator will await notification from facility staff informing SW Care Coordinator of patient's discharge plans/needs. SW Care Coordinator will review chart and outreach to facility staff every 4 weeks and as needed.       Cynthia Mccann [facility]  Clinic Care Coordinator  Ph. [phone]  [email or web address]

## 2020-07-06 NOTE — LETTER
[facility]   To:   [facility]          Please give to facility    From:   Cynthia Mccann  [facility]  Care Coordinator   [facility]     Patient Name:  Anat Correa YOB: 1935   Admit date: 7.4.2020      *Information Needed:  Please contact me when the patient will discharge (or if they will move to long term care)- include the discharge date, disposition, and main diagnosis   - If the patient is discharged with home care services, please provide the name of the agency    Also- Please inform me if a care conference is being held.   Phone, Fax or Email with information       Cynthia Mccann, [facility]  Clinic Care Coordinator  Ph. [phone]  [email or web address]

## 2020-07-07 ENCOUNTER — RECORDS - HEALTHEAST (OUTPATIENT)
Dept: LAB | Facility: CLINIC | Age: 85
End: 2020-07-07

## 2020-07-07 LAB
ANION GAP SERPL CALCULATED.3IONS-SCNC: 10 MMOL/L (ref 5–18)
BUN SERPL-MCNC: 49 MG/DL (ref 8–28)
CALCIUM SERPL-MCNC: 8.1 MG/DL (ref 8.5–10.5)
CHLORIDE BLD-SCNC: 94 MMOL/L (ref 98–107)
CO2 SERPL-SCNC: 18 MMOL/L (ref 22–31)
CREAT SERPL-MCNC: 1.33 MG/DL (ref 0.6–1.1)
GFR SERPL CREATININE-BSD FRML MDRD: 38 ML/MIN/1.73M2
GLUCOSE BLD-MCNC: 99 MG/DL (ref 70–125)
POTASSIUM BLD-SCNC: 4.2 MMOL/L (ref 3.5–5)
PTH RELATED PROT SERPL-SCNC: 5.9 PMOL/L (ref 0–3.4)
SODIUM SERPL-SCNC: 122 MMOL/L (ref 136–145)

## 2020-07-08 ENCOUNTER — NURSING HOME VISIT (OUTPATIENT)
Dept: GERIATRICS | Facility: CLINIC | Age: 85
End: 2020-07-08
Payer: MEDICARE

## 2020-07-08 ENCOUNTER — TELEPHONE (OUTPATIENT)
Dept: FAMILY MEDICINE | Facility: CLINIC | Age: 85
End: 2020-07-08

## 2020-07-08 ENCOUNTER — RECORDS - HEALTHEAST (OUTPATIENT)
Dept: LAB | Facility: CLINIC | Age: 85
End: 2020-07-08

## 2020-07-08 ENCOUNTER — TELEPHONE (OUTPATIENT)
Dept: GERIATRICS | Facility: CLINIC | Age: 85
End: 2020-07-08
Payer: MEDICARE

## 2020-07-08 VITALS
RESPIRATION RATE: 18 BRPM | BODY MASS INDEX: 24.66 KG/M2 | SYSTOLIC BLOOD PRESSURE: 109 MMHG | HEIGHT: 65 IN | WEIGHT: 148 LBS | OXYGEN SATURATION: 98 % | HEART RATE: 90 BPM | DIASTOLIC BLOOD PRESSURE: 61 MMHG | TEMPERATURE: 98.1 F

## 2020-07-08 DIAGNOSIS — R19.5 DARK STOOLS: ICD-10-CM

## 2020-07-08 DIAGNOSIS — E83.52 HYPERCALCEMIA: ICD-10-CM

## 2020-07-08 DIAGNOSIS — D62 ANEMIA DUE TO BLOOD LOSS, ACUTE: Primary | ICD-10-CM

## 2020-07-08 DIAGNOSIS — E87.1 HYPONATREMIA: ICD-10-CM

## 2020-07-08 DIAGNOSIS — D72.829 LEUKOCYTOSIS, UNSPECIFIED TYPE: ICD-10-CM

## 2020-07-08 DIAGNOSIS — K92.2 GASTROINTESTINAL HEMORRHAGE, UNSPECIFIED GASTROINTESTINAL HEMORRHAGE TYPE: Primary | ICD-10-CM

## 2020-07-08 DIAGNOSIS — N18.4 CKD (CHRONIC KIDNEY DISEASE) STAGE 4, GFR 15-29 ML/MIN (H): ICD-10-CM

## 2020-07-08 DIAGNOSIS — N17.9 ACUTE KIDNEY INJURY (H): ICD-10-CM

## 2020-07-08 DIAGNOSIS — R53.81 PHYSICAL DECONDITIONING: ICD-10-CM

## 2020-07-08 LAB
1,25(OH)2D SERPL-MCNC: 9.8 PG/ML (ref 19.9–79.3)
ANION GAP SERPL CALCULATED.3IONS-SCNC: 8 MMOL/L (ref 5–18)
BACTERIA SPEC CULT: NO GROWTH
BACTERIA SPEC CULT: NO GROWTH
BASOPHILS # BLD AUTO: 0.1 THOU/UL (ref 0–0.2)
BASOPHILS NFR BLD AUTO: 0 % (ref 0–2)
BUN SERPL-MCNC: 48 MG/DL (ref 8–28)
CALCIUM SERPL-MCNC: 8 MG/DL (ref 8.5–10.5)
CHLORIDE BLD-SCNC: 94 MMOL/L (ref 98–107)
CO2 SERPL-SCNC: 19 MMOL/L (ref 22–31)
CREAT SERPL-MCNC: 1.38 MG/DL (ref 0.6–1.1)
EOSINOPHIL # BLD AUTO: 0.3 THOU/UL (ref 0–0.4)
EOSINOPHIL NFR BLD AUTO: 2 % (ref 0–6)
ERYTHROCYTE [DISTWIDTH] IN BLOOD BY AUTOMATED COUNT: 12.7 % (ref 11–14.5)
GFR SERPL CREATININE-BSD FRML MDRD: 36 ML/MIN/1.73M2
GLUCOSE BLD-MCNC: 89 MG/DL (ref 70–125)
HCT VFR BLD AUTO: 23.4 % (ref 35–47)
HGB BLD-MCNC: 7.7 G/DL (ref 12–16)
LYMPHOCYTES # BLD AUTO: 2.1 THOU/UL (ref 0.8–4.4)
LYMPHOCYTES NFR BLD AUTO: 14 % (ref 20–40)
MCH RBC QN AUTO: 30.6 PG (ref 27–34)
MCHC RBC AUTO-ENTMCNC: 32.9 G/DL (ref 32–36)
MCV RBC AUTO: 93 FL (ref 80–100)
MONOCYTES # BLD AUTO: 1.7 THOU/UL (ref 0–0.9)
MONOCYTES NFR BLD AUTO: 12 % (ref 2–10)
NEUTROPHILS # BLD AUTO: 10.4 THOU/UL (ref 2–7.7)
NEUTROPHILS NFR BLD AUTO: 70 % (ref 50–70)
PLATELET # BLD AUTO: 320 THOU/UL (ref 140–440)
PMV BLD AUTO: 9.7 FL (ref 8.5–12.5)
POTASSIUM BLD-SCNC: 3.9 MMOL/L (ref 3.5–5)
RBC # BLD AUTO: 2.52 MILL/UL (ref 3.8–5.4)
SODIUM SERPL-SCNC: 121 MMOL/L (ref 136–145)
SPECIMEN SOURCE: NORMAL
SPECIMEN SOURCE: NORMAL
WBC: 14.8 THOU/UL (ref 4–11)

## 2020-07-08 PROCEDURE — 99309 SBSQ NF CARE MODERATE MDM 30: CPT | Performed by: NURSE PRACTITIONER

## 2020-07-08 NOTE — PROGRESS NOTES
[facility]  INITIAL VISIT NOTE  July 9, 2020    PRIMARY CARE PROVIDER AND CLINIC:  Rashi Calle [address] [facility] / [facility] [address]    CHIEF COMPLAINT:  Hospital follow-up/Initial visit    HPI:    Anat Correa is a 85 year old  (1935) female who was seen at [facility] in [place] on July 9, 2020 for an initial visit.     Medical history is notable for hypertension, dyslipidemia, COPD, CKD stage III, chronic anemia, lung cancer, kidney stones, peripheral neuropathy, IBS, osteoarthritis, and osteoporosis.    She was hospitalized at [facility] from July 2 through July 4, 2020 after she presented for evaluation after a mechanical fall and altered mental status.  Reportedly, patient had history of recurrent falls and son was concerned about her ability to care for herself.  Work-up revealed acute kidney injury with creatinine of 2.3, mild rhabdomyolysis with total CK of 792, deja elevated troponin I of 0.647 (with a peak level of 1.208), slightly low sodium level of 128, and elevated calcium level of 11.6.  Hematology profile was significant for white count of 8.2 and hemoglobin of 10.5.  EKG showed normal sinus rhythm with a heart rate of 80 bpm and no ST-T wave abnormality.  CT head was negative for acute intracranial abnormality.  CT cervical spine showed no fracture or subluxation.  CT facial was remarkable for right lower facial soft tissue swelling but no fracture.  Chest x-ray revealed blunting of costophrenic angles likely related to chronic pleural thickening as well as biapical scarring, calcified granuloma in the left lung, and calcified left hilar lymph nodes.  X-ray of the right shoulder was negative for fracture.  There was marked narrowing of the acromiohumeral interval which could be seen with a rotator cuff tear versus insufficiency.    Hypercalcemia resolved and renal function improved (creatinine down to 1.39) with fluid therapy..   Vitamin D was low normal at 22 and 1,25 dihydroxy vitamin D level was low at 9.8.  Notably intact PTH was 8,  below the reference range and PTH related peptide was slightly elevated at 5.9.  Protein electrophoresis showed hypoalbuminemia in an otherwise essentially normal electrophoretic pattern.    Elevated troponin was attributed to demand ischemia.  Echocardiogram, on July 3, demonstrated mild concentric LV hypertrophy, normal LV systolic function with EF of 55 to 60%, grade 1 or early diastolic dysfunction, and no regional wall motion abnormalities.  There was trace to mild mitral and tricuspid regurgitation and mild to moderate biatrial enlargement.    Patient is admitted to this facility for medical management, nursing care, and rehab.     On 07/08, patient was noted to have black tarry stool.    Patient was seen today in her room, while sitting in a chair.  She appears frail.  She feels quite weak and complains of discomfort all over her body by sitting all morning.  She had another melenic stool this morning.  Hemoglobin yesterday was 7.7, down from her baseline of around 10.  She feels somewhat short of breath but reports no chest pain, palpitation, lightheadedness, nausea, vomiting, abdominal pain, or urinary symptoms.    CODE STATUS:   DNR / DNI    PAST MEDICAL HISTORY:   Left upper lobe lung adenocarcinoma, status post video-assisted thoracoscopy and wedge resection in July 2016  Hypertension  Dyslipidemia  Grade 1 or early diastolic dysfunction, noted on echocardiogram on July 3, 2020  COPD  CKD stage IV with history of solitary kidney, baseline creatinine 1.4-1.8  Chronic anemia, baseline Hgb around 10  Chronic hyponatremia, baseline Na around 128-130  GI bleed (history of gastric ulcer in June 2010)  Kidney stones  Peripheral neuropathy  Chronic pain  Cunha's neuroma  Internal hemorrhoids  Colon polyps  Diverticulosis  IBS  Osteoporosis  Osteoarthritis  Vitamin D deficiency  Venous stasis of lower  extremities    Past Medical History:   Diagnosis Date     Anemia      Calculus of kidney     dr hope     Chronic pain     OA     CKD (chronic kidney disease) stage 4, GFR 15-29 ml/min (H)     dr mcdermott     COPD, moderate (H)     moderate obstruction, with pulm htn - dr francisco did AAP     Diverticulosis of colon (without mention of hemorrhage)      Hemorrhage of gastrointestinal tract, unspecified     dr su     Hyperlipidemia LDL goal <100      Hypertension goal BP (blood pressure) < 140/90      Internal hemorrhoids without mention of complication      Irritable bowel syndrome      Lesion of plantar nerve     hay's neuroma dr connor     Lung nodule , 3/16    Dr Thompson, 1.4 x 1.1 cm, lingula, PET neg 3/16     Malignant neoplasm of upper lobe of left lung (H)     Dr Thompson - x 2 nodules - moderately differentiated adenocarcinoma (acinar predominant).  Final pathologic stage [id number], Final clinical stage IA, grade 2     Medication management     OA - 1 T#3 at HS with HX CKD     OA (osteoarthritis)     lower ext worse katya knees     Obesity (BMI 30.0-34.9)      Osteoporosis, unspecified     FOSAMAX  = upset stomach , pt declines further rx.      Other ulcerative colitis     collangenous collitis- last colonoscopy       Peripheral neuropathy     early - burning at HS     Personal history of colonic polyps     dr araujo     PONJERE (postoperative nausea and vomiting)      Primary iridocyclitis     iritis dr dominguez     Venous stasis of lower extremity        PAST SURGICAL HISTORY:   Past Surgical History:   Procedure Laterality Date     AMPUTATE TOE(S) Right 2015    Procedure: AMPUTATE TOE(S);  Surgeon: Ashia White, DPM, Pod;  Location: [facility] OR     C APPENDECTOMY       C  DELIVERY ONLY  1965    , Low Cervical     EXCISE MASS UPPER EXTREMITY  10/13/2011    Lt Forearm mass excision - dr ely     EXCISE MASS UPPER EXTREMITY   2012    Lt Forearm mass excision - dr ely     HC COLONOSCOPY THRU STOMA, DIAGNOSTIC      IBS/diverticula/hemmorhoids dr araujo     HC ESOPHAGOSCOPY, DIAGNOSTIC  , , 6/10    Gastric ulcer, healed, gastritis     HC HEMORRHOIDECTOMY,INT/EXT,SIMPLE       HC REPAIR SLIDING INGUINAL HERNIA  1960    mitra     SURGICAL HISTORY OF -       [facility]ra neck & back tumors     SURGICAL HISTORY OF -       neuroma excision - 2nd toe amputation     SURGICAL HISTORY OF -   3/07    Rt IOL - dr jimenez     SURGICAL HISTORY OF -       Lt IOL - dr jimenez     SURGICAL HISTORY OF -       Lt Knee replacement - dr gayle     SURGICAL HISTORY OF -       Rt Knee replacement - dr gayle     SURGICAL HISTORY OF -   9/15    Rt Second toe amputation - Dr White     THORACOSCOPIC WEDGE RESECTION LUNG Left 2016    Procedure: THORACOSCOPIC WEDGE RESECTION LUNG;  Surgeon: Abdelrahman Thompson MD;  Location: [facility] OR     XR JOINT INJECTION HIP (HIB)  2015    Rt - Dr Terry       FAMILY HISTORY:   Family History   Problem Relation Age of Onset     Cerebrovascular Disease Mother      Cerebrovascular Disease Father      Cancer - colorectal Brother      Cancer - colorectal Brother      Breast Cancer Sister      Cancer Sister         lung     Cancer Sister         lung     Cancer Sister         lung     Scleroderma Sister        SOCIAL HISTORY:  Patient is .  She lives in a town home.  She does use a cane for ambulation.    Social History     Tobacco Use     Smoking status: Former Smoker     Packs/day: 3.00     Years: 50.00     Pack years: 150.00     Types: Cigarettes     Last attempt to quit: 1993     Years since quittin.5     Smokeless tobacco: Never Used     Tobacco comment: quit    Substance Use Topics     Alcohol use: No       MEDICATIONS:  Current Outpatient Medications   Medication Sig Dispense Refill     acetaminophen (TYLENOL) 325 MG tablet Take 650 mg by mouth 4 times  daily as needed for mild pain or pain Limit tylenol to 3g/24hrs.       albuterol (VENTOLIN HFA) 108 (90 Base) MCG/ACT inhaler INHALE TWO PUFFS INTO THE LUNGS EVERY 6 HOURS AS NEEDED FOR SHORTNESS OF BREATH/DYSPNEA 54 g 3     amLODIPine (NORVASC) 5 MG tablet Take 1 tablet (5 mg) by mouth 2 times daily 180 tablet 3     ASPIRIN NOT PRESCRIBED (INTENTIONAL) Please choose reason not prescribed, below 0 each 0     budesonide-formoterol (SYMBICORT) 160-4.5 MCG/ACT Inhaler Inhale 2 puffs into the lungs 2 times daily 30.6 g 3     calcium carbonate (TUMS) 500 MG chewable tablet Take 2 chew tab by mouth daily as needed for heartburn       docusate sodium (COLACE) 100 MG capsule Take 100 mg by mouth 2 times daily as needed for constipation **7/8/20: hold in am on 7/9 until seen by MD       furosemide (LASIX) 20 MG tablet Take 2 tablets (40 mg) by mouth daily 180 tablet 3     ipratropium - albuterol 0.5 mg/2.5 mg/3 mL (DUONEB) 0.5-2.5 (3) MG/3ML neb solution Take 1 vial (3 mLs) by nebulization every 4 hours as needed for shortness of breath / dyspnea or wheezing 270 mL 3     loperamide (IMODIUM A-D) 2 MG tablet Take 2 mg by mouth 4 times daily as needed for diarrhea       losartan (COZAAR) 50 MG tablet Take 2 tablets (100 mg) by mouth daily 180 tablet 3     metoprolol succinate ER (TOPROL-XL) 50 MG 24 hr tablet Take 1 tablet (50 mg) by mouth 2 times daily 180 tablet 3     pantoprazole (PROTONIX) 40 MG EC tablet Take 40 mg by mouth 2 times daily Possible GI Bleed         ALLERGIES:  Allergies   Allergen Reactions     Prednisone      Yeast infection - swollen lips     Penicillins Rash       ROS:  10 point ROS neg other than the symptoms noted above in the HPI.    PHYSICAL EXAM:  Vital signs were reviewed in the chart.  Blood pressure 109/61, heart rate 90, respiratory rate 18, temperature 98.1  F, oxygen saturation 98%, weight 148 LBS  Gen: Frail and weak appearing but in no acute distress  HEENT: Conjunctival pallor+  Card:  Normal S1, S2, RRR  Resp: Lungs clear to auscultation bilaterally  GI: Abdomen soft, non-tender, non-distended, +BS  Ext: 2+ bilateral LE edema  Neuro: CX II-XII grossly intact; ROM in all four extremities grossly intact  Psych: Alert and oriented almost x3; normal affect  Skin: No acute rash    LABORATORY/IMAGING DATA:    Most Recent 3 CBC's:  Hematology profile (July 8): White count 14.8, hemoglobin 7.7, platelet count 320,000  BMP (July 8): Sodium 121, potassium 3.9, creatinine 1.38    Recent Labs   Lab Test 07/04/20  0635 07/03/20  0708 07/02/20  1201   WBC 14.0* 14.6* 18.2*   HGB 9.4* 9.9* 10.5*   MCV 94 95 94    283 294     Most Recent 3 BMP's:  Recent Labs   Lab Test 07/04/20  0635 07/03/20  0708 07/02/20  1201   * 130* 128*   POTASSIUM 3.5 3.4 4.3   CHLORIDE 99 99 96   CO2 21 25 22   BUN 38* 46* 43*   CR 1.39* 1.85* 2.30*   ANIONGAP 8 6 10   DUSTY 8.8 9.7 11.6*   GLC 89 85 113*     Most Recent 2 LFT's:  Recent Labs   Lab Test 07/02/20  1201 11/20/19  0808   AST 78* 16   ALT 30 20   ALKPHOS 64 77   BILITOTAL 0.7 0.4     Most Recent 3 INR's:  Recent Labs   Lab Test 07/02/20  1201 07/12/16  1055 05/09/13  1246   INR 1.12 0.93 2.43*       ASSESSMENT/PLAN:  Falls, recurrent,  Physical deconditioning.  Imaging studies negative for acute traumatic injury.  Plan:  Fall precaution  PT/OT evaluation and therapy    Acute kidney injury, superimposed on chronic kidney disease stage IV, baseline creatinine 1.4-1.8,  Mild traumatic rhabdomyolysis,  Hypercalcemia,  Dehydration.  Renal function and hypercalcemia both improved with IV fluid therapy.  At discharge from hospital, calcium level was 8.8, creatinine was 1.39, and total CK was 577.  Last Cr 1.33 and Ca level 8.3 on 07/07.  Plan:  Keep patient adequately hydrated  Avoid NSAIDs and nephrotoxins  Monitor renal function and calcium level periodically    GI bleed with melena,  Chronic anemia.  Baseline Hgb around 10.  Her hemoglobin was 9.4 on July 4 and last  hemoglobin was down to 7.7 on July 8.  Noted to have black tarry stool on 07/08 and earlier today, July 9.  Patient has known history of GI bleed  (upper GI endoscopy in June 2010 had revealed gastric ulcer and hiatal hernia).  Started on Protonix 40 mg p.o. twice daily.  Plan:  Continue Protonix 40 mg p.o. twice daily  Discussed with her son, Jose M, and I explained that his mother needs to be sent to the emergency department for possible admission to the hospital regarding work-up for GI bleed.  He and his mother are in agreement.    Acute on chronic hyponatremia.  Na level was 122 on 07/07 and 121 on 07/08.  Baseline Na normally in the range of 128-130.  Started on fluid restriction 1500 mL per 24 hours on July 8.  Plan:  Continue fluid restriction 1500 mL per 24-hour  Check urine and plasma osmolarity (will defer the labs to be done in the hospital)  Monitor Na level    Demand ischemia.  Patient noted to have slightly elevated troponin with a peak level of 1.208.  EKG showed no ischemic changes and echo revealed normal LV systolic function, grade 1 or early diastolic function, and no wall motion abnormalities.  Plan:  No further work-up indicated at this time.    Leukocytosis.  In part due to hemoconcentration/dehydration and in part reactive due to GI bleed.  No evidence for localizing infection.  Plan:  Monitor white count periodically    Left upper lobe lung adenocarcinoma, status post video-assisted thoracoscopy and wedge resection in July 2016.  No evidence for recurrence.    Hypertension,  Grade 1 diastolic dysfunction,  Venous stasis of lower extremities.  Blood pressure is adequately controlled.  Plan:  Continue metoprolol ER 50 mg p.o. twice daily, losartan 100 mg p.o. daily, amlodipine 5 mg p.o. daily, and furosemide 40 mg p.o. daily.  Monitor blood pressure    Dyslipidemia.  Not on any lipid regimen.    COPD.  Stable.  Plan:  Continue Symbicort 160-4.5, 2 puffs twice daily  Continue PRN  albuterol-ipratropium  Monitor respiratory status    Orders written by provider at facility:  Transfer to [facility] emergency department for GI bleed as well as severe hyponatremia.    Total unit/floor time of 50 minutes consisted of the following: examination of patient, reviewing the record including pertinent labs and imaging. More than 50% of this time was spent in coordination of care with the patient, sone, nursing staff, and other healthcare providers. This time was spent on discussing the care plan including work-up for GI bleed, management of hyponatremia, and the necessity for further evaluation in the hospital.        Electronically signed by:  Wilma Gant MD

## 2020-07-08 NOTE — PROGRESS NOTES
[facility]  [place] [facility] Record Number:  [id number]  Place of Service where encounter took place:  [facility] [[id number]]  Chief Complaint   Patient presents with     Nursing Home Acute       HPI:    Anat Correa  is a 85 year old (1935), who is being seen today for an episodic care visit.  HPI information obtained from: facility chart records, facility staff, patient report and [facility] chart review. Today's concern is: on call called that pt had dark tarry stools x 3 during the late night/early am today.       Dark stools  Anemia due to blood loss, acute  Leukocytosis, unspecified type  Acute kidney injury (H)  CKD (chronic kidney disease) stage 4, GFR 15-29 ml/min (H)  Hypercalcemia  Hyponatremia  Physical deconditioning      Past Medical and Surgical History reviewed in Epic today.    MEDICATIONS:     Current Outpatient Medications   Medication Sig Dispense Refill     furosemide (LASIX) 20 MG tablet Take 2 tablets (40 mg) by mouth daily 180 tablet 3     acetaminophen (TYLENOL) 325 MG tablet Take 650 mg by mouth 4 times daily as needed for mild pain or pain Limit tylenol to 3g/24hrs.       albuterol (VENTOLIN HFA) 108 (90 Base) MCG/ACT inhaler INHALE TWO PUFFS INTO THE LUNGS EVERY 6 HOURS AS NEEDED FOR SHORTNESS OF BREATH/DYSPNEA 54 g 3     amLODIPine (NORVASC) 5 MG tablet Take 1 tablet (5 mg) by mouth 2 times daily 180 tablet 3     ASPIRIN NOT PRESCRIBED (INTENTIONAL) Please choose reason not prescribed, below 0 each 0     budesonide-formoterol (SYMBICORT) 160-4.5 MCG/ACT Inhaler Inhale 2 puffs into the lungs 2 times daily 30.6 g 3     calcium carbonate (TUMS) 500 MG chewable tablet Take 2 chew tab by mouth daily as needed for heartburn       docusate sodium (COLACE) 100 MG capsule Take 100 mg by mouth 2 times daily as needed for constipation **7/8/20: hold in am on 7/9 until seen by MD       ipratropium - albuterol 0.5 mg/2.5 mg/3 mL  "(DUONEB) 0.5-2.5 (3) MG/3ML neb solution Take 1 vial (3 mLs) by nebulization every 4 hours as needed for shortness of breath / dyspnea or wheezing 270 mL 3     loperamide (IMODIUM A-D) 2 MG tablet Take 2 mg by mouth 4 times daily as needed for diarrhea       losartan (COZAAR) 50 MG tablet Take 2 tablets (100 mg) by mouth daily 180 tablet 3     metoprolol succinate ER (TOPROL-XL) 50 MG 24 hr tablet Take 1 tablet (50 mg) by mouth 2 times daily 180 tablet 3     pantoprazole (PROTONIX) 40 MG EC tablet Take 40 mg by mouth 2 times daily Possible GI Bleed        TODAY DURING EXAM/ROS:  No CP, SOB, Cough, dizziness, fevers, chills, HA, N/V, dysuria or Bowel Abnormalities. Appetite is fair.  No pain.    Objective:  /61   Pulse 90   Temp 98.1  F (36.7  C)   Resp 18   Ht 1.651 m (5' 5\")   Wt 67.1 kg (148 lb)   SpO2 98%   BMI 24.63 kg/m    Exam:  GENERAL APPEARANCE:  Alert, in no distress, oriented, cooperative  ENT:  Mouth and posterior oropharynx normal, moist mucous membranes, normal hearing acuity  EYES:  EOM, conjunctivae, lids, pupils and irises normal  RESP:  respiratory effort and palpation of chest normal, lungs clear to auscultation , no respiratory distress, diminished breath sounds    CV:  Palpation and auscultation of heart done , regular rate and rhythm, no murmur, rub, or gallop.  Has  trace pedal edema, +2 pedal pulses  ABDOMEN:  normal bowel sounds, soft, nontender.  M/S:   SOLARES equally  SKIN:  Inspection of skin and subcutaneous tissue baseline except right side face has healing scrapes--dry.  NEURO:   Cranial nerves 2-12 are normal tested and grossly at patient's baseline  PSYCH:  oriented X 3, affect and mood normal    Labs:     Recent Labs   Lab Test 7/8/20 7/7/20 07/04/20  0635 07/03/20  0708    122 128* 130*   POTASSIUM 3.9 4.2 3.5 3.4   CHLORIDE 94 94 99 99   CO2 19 18 21 25   ANIONGAP 8 10 8 6   GLC 89 99 89 85   BUN 48 49 38* 46*   CR 1.38/GFR36 1.33/38 1.39* 1.85*   DUSTY 8.0 8.1 8.8 " 9.7     CBC RESULTS:    Recent Labs   Lab Test 7/8/20 07/04/20  0635   WBC 14.8 14.0*   RBC  3.05*   HGB 7.7 9.4*   HCT  28.7*   MCV  94   MCH  30.8   MCHC  32.8   RDW  12.3   PLT 320K 238   HGBS ON 5/2/20:  10.4 AND 10/5 ON 7/2/20      ASSESSMENT / PLAN:  (D62) Anemia due to blood loss, acute  (primary encounter diagnosis)   (R19.5) Dark stools  Comment/Plan: no more dark stools today as of 5pm.  guaiac pending. ?GIB--per prob list had hx in past?. Check Hgb in am and may need hospitalization and further w/u if conts to drop.    (D72.829) Leukocytosis, unspecified type  Comment/Plan: still up--no acute infection, thought to be stress related, consider recheck next week    (N17.9) Acute kidney injury (H)   (N18.4) CKD (chronic kidney disease) stage 4, GFR 15-29 ml/min (H)  Comment/Plan: appears at baseline, monitor prn    (E87.1) Hyponatremia  Comment/Plan: worse, will restrict fluids to 1500c/24hr, no free water, Gatorade if wants clear liqs, hold lasix tomorrow until seen by MD visit,, recheck BMP in am    (E83.52) Hypercalcemia  Comment/Plan: chronic issue--PTH issues, back to normal range, monitor prn    (R53.81) Physical deconditioning  Comment/Plan: PT OT      Electronically signed by:  CAMACHO Adams CNP

## 2020-07-08 NOTE — TELEPHONE ENCOUNTER
[facility] TELEPHONE ENCOUNTER    RFC: black tarry stool    Anat Correa is a 85 year old  (1935),Nurse called today to report: asymptomatic. Not on iron. VSS    ASSESSMENT/PLAN      GIB. Check CBC, guaiac stool, start Protonix 40 mg bid, update PCP      Electronically signed by:   Berkley Ford MD

## 2020-07-08 NOTE — Clinical Note
HI pt was seen by me today--dropping Hgb. Will be seen by my partner Dr Jacky Gant Thursday.  If questions, let us know.

## 2020-07-08 NOTE — TELEPHONE ENCOUNTER
Reason for Call:  Other  Patient had a fall. Did have a head injury. Was seen at [facility] & is now in [facility] & fell again yesterday.      Detailed comments: Patient's Son, Jose M called & said she is getting weaker every day. Would like a call back to triage her. C2c on file.     Phone Number Patient can be reached at: Cell number on file:    Telephone Information:   Mobile [phone]       Best Time: any    Can we leave a detailed message on this number? YES    Call taken on 7/8/2020 at 4:58 PM by Deann Delong

## 2020-07-08 NOTE — Clinical Note
HI I saw this pt because of dark stools during noc shift.  Hgb is down today--got call at 1730 hrs.  Thanks Bhavani

## 2020-07-09 ENCOUNTER — NURSING HOME VISIT (OUTPATIENT)
Dept: GERIATRICS | Facility: CLINIC | Age: 85
End: 2020-07-09
Payer: MEDICARE

## 2020-07-09 ENCOUNTER — HOSPITAL ENCOUNTER (OUTPATIENT)
Facility: CLINIC | Age: 85
Setting detail: OBSERVATION
Discharge: NURSING, REHAB OR LONG-TERM CARE | End: 2020-07-13
Attending: EMERGENCY MEDICINE | Admitting: INTERNAL MEDICINE
Payer: MEDICARE

## 2020-07-09 ENCOUNTER — RECORDS - HEALTHEAST (OUTPATIENT)
Dept: LAB | Facility: CLINIC | Age: 85
End: 2020-07-09

## 2020-07-09 VITALS
SYSTOLIC BLOOD PRESSURE: 142 MMHG | TEMPERATURE: 87.2 F | DIASTOLIC BLOOD PRESSURE: 68 MMHG | HEART RATE: 84 BPM | OXYGEN SATURATION: 97 % | RESPIRATION RATE: 18 BRPM

## 2020-07-09 DIAGNOSIS — K92.1 GASTROINTESTINAL HEMORRHAGE WITH MELENA: ICD-10-CM

## 2020-07-09 DIAGNOSIS — I10 ESSENTIAL HYPERTENSION: ICD-10-CM

## 2020-07-09 DIAGNOSIS — Z85.118 HISTORY OF LUNG CANCER: ICD-10-CM

## 2020-07-09 DIAGNOSIS — I51.89 DIASTOLIC DYSFUNCTION: ICD-10-CM

## 2020-07-09 DIAGNOSIS — D72.829 LEUKOCYTOSIS, UNSPECIFIED TYPE: ICD-10-CM

## 2020-07-09 DIAGNOSIS — J44.9 CHRONIC OBSTRUCTIVE PULMONARY DISEASE, UNSPECIFIED COPD TYPE (H): ICD-10-CM

## 2020-07-09 DIAGNOSIS — R53.81 PHYSICAL DECONDITIONING: ICD-10-CM

## 2020-07-09 DIAGNOSIS — E78.5 DYSLIPIDEMIA: ICD-10-CM

## 2020-07-09 DIAGNOSIS — E87.1 HYPONATREMIA: ICD-10-CM

## 2020-07-09 DIAGNOSIS — I24.89 DEMAND ISCHEMIA (H): ICD-10-CM

## 2020-07-09 DIAGNOSIS — N17.9 ACUTE KIDNEY INJURY (H): ICD-10-CM

## 2020-07-09 DIAGNOSIS — D64.9 CHRONIC ANEMIA: ICD-10-CM

## 2020-07-09 DIAGNOSIS — I87.8 VENOUS STASIS OF BOTH LOWER EXTREMITIES: ICD-10-CM

## 2020-07-09 DIAGNOSIS — K92.1 MELANOTIC STOOLS: Primary | ICD-10-CM

## 2020-07-09 DIAGNOSIS — E83.52 HYPERCALCEMIA: ICD-10-CM

## 2020-07-09 DIAGNOSIS — T79.6XXD TRAUMATIC RHABDOMYOLYSIS, SUBSEQUENT ENCOUNTER: ICD-10-CM

## 2020-07-09 DIAGNOSIS — E86.0 DEHYDRATION: ICD-10-CM

## 2020-07-09 DIAGNOSIS — N18.4 CKD (CHRONIC KIDNEY DISEASE) STAGE 4, GFR 15-29 ML/MIN (H): ICD-10-CM
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
## 2020-07-09 LAB
ABO + RH BLD: NORMAL
ABO + RH BLD: NORMAL
ALBUMIN SERPL-MCNC: 2.9 G/DL (ref 3.4–5)
ALP SERPL-CCNC: 64 U/L (ref 40–150)
ALT SERPL W P-5'-P-CCNC: 174 U/L (ref 0–50)
ANION GAP SERPL CALCULATED.3IONS-SCNC: 7 MMOL/L (ref 3–14)
ANION GAP SERPL CALCULATED.3IONS-SCNC: 8 MMOL/L (ref 5–18)
AST SERPL W P-5'-P-CCNC: 51 U/L (ref 0–45)
BASOPHILS # BLD AUTO: 0 10E9/L (ref 0–0.2)
BASOPHILS NFR BLD AUTO: 0.3 %
BILIRUB SERPL-MCNC: 0.4 MG/DL (ref 0.2–1.3)
BLD GP AB SCN SERPL QL: NORMAL
BLD PROD TYP BPU: NORMAL
BLD PROD TYP BPU: NORMAL
BLD UNIT ID BPU: 0
BLOOD BANK CMNT PATIENT-IMP: NORMAL
BLOOD PRODUCT CODE: NORMAL
BPU ID: NORMAL
BUN SERPL-MCNC: 37 MG/DL (ref 7–30)
BUN SERPL-MCNC: 37 MG/DL (ref 8–28)
CALCIUM SERPL-MCNC: 8.5 MG/DL (ref 8.5–10.1)
CALCIUM SERPL-MCNC: 8.6 MG/DL (ref 8.5–10.5)
CHLORIDE BLD-SCNC: 98 MMOL/L (ref 98–107)
CHLORIDE SERPL-SCNC: 99 MMOL/L (ref 94–109)
CO2 SERPL-SCNC: 20 MMOL/L (ref 22–31)
CO2 SERPL-SCNC: 21 MMOL/L (ref 20–32)
CREAT SERPL-MCNC: 1.21 MG/DL (ref 0.52–1.04)
CREAT SERPL-MCNC: 1.24 MG/DL (ref 0.6–1.1)
DIFFERENTIAL METHOD BLD: ABNORMAL
EOSINOPHIL # BLD AUTO: 0.3 10E9/L (ref 0–0.7)
EOSINOPHIL NFR BLD AUTO: 2.4 %
ERYTHROCYTE [DISTWIDTH] IN BLOOD BY AUTOMATED COUNT: 13 % (ref 10–15)
GFR SERPL CREATININE-BSD FRML MDRD: 41 ML/MIN/1.73M2
GFR SERPL CREATININE-BSD FRML MDRD: 41 ML/MIN/{1.73_M2}
GLUCOSE BLD-MCNC: 83 MG/DL (ref 70–125)
GLUCOSE SERPL-MCNC: 95 MG/DL (ref 70–99)
HCT VFR BLD AUTO: 24.8 % (ref 35–47)
HEMOCCULT STL QL: POSITIVE
HGB BLD-MCNC: 7.4 G/DL (ref 11.7–15.7)
HGB BLD-MCNC: 7.7 G/DL (ref 12–16)
HGB BLD-MCNC: 8.2 G/DL (ref 11.7–15.7)
HGB BLD-MCNC: 8.8 G/DL (ref 11.7–15.7)
IMM GRANULOCYTES # BLD: 0.3 10E9/L (ref 0–0.4)
IMM GRANULOCYTES NFR BLD: 2.2 %
INR PPP: 0.94 (ref 0.86–1.14)
INTERPRETATION ECG - MUSE: NORMAL
LYMPHOCYTES # BLD AUTO: 2 10E9/L (ref 0.8–5.3)
LYMPHOCYTES NFR BLD AUTO: 14.8 %
MCH RBC QN AUTO: 30.9 PG (ref 26.5–33)
MCHC RBC AUTO-ENTMCNC: 33.1 G/DL (ref 31.5–36.5)
MCV RBC AUTO: 94 FL (ref 78–100)
MONOCYTES # BLD AUTO: 1.5 10E9/L (ref 0–1.3)
MONOCYTES NFR BLD AUTO: 10.9 %
NEUTROPHILS # BLD AUTO: 9.4 10E9/L (ref 1.6–8.3)
NEUTROPHILS NFR BLD AUTO: 69.4 %
NRBC # BLD AUTO: 0 10*3/UL
NRBC BLD AUTO-RTO: 0 /100
NUM BPU REQUESTED: 1
PLATELET # BLD AUTO: 369 10E9/L (ref 150–450)
POTASSIUM BLD-SCNC: 4.3 MMOL/L (ref 3.5–5)
POTASSIUM SERPL-SCNC: 4.1 MMOL/L (ref 3.4–5.3)
PROT SERPL-MCNC: 6.7 G/DL (ref 6.8–8.8)
RBC # BLD AUTO: 2.65 10E12/L (ref 3.8–5.2)
SODIUM SERPL-SCNC: 126 MMOL/L (ref 136–145)
SODIUM SERPL-SCNC: 127 MMOL/L (ref 133–144)
SODIUM SERPL-SCNC: 130 MMOL/L (ref 133–144)
SPECIMEN EXP DATE BLD: NORMAL
TRANSFUSION STATUS PATIENT QL: NORMAL
TRANSFUSION STATUS PATIENT QL: NORMAL
WBC # BLD AUTO: 13.5 10E9/L (ref 4–11)

## 2020-07-09 PROCEDURE — 25800030 ZZH RX IP 258 OP 636: Performed by: PHYSICIAN ASSISTANT

## 2020-07-09 PROCEDURE — P9016 RBC LEUKOCYTES REDUCED: HCPCS | Performed by: EMERGENCY MEDICINE

## 2020-07-09 PROCEDURE — 80053 COMPREHEN METABOLIC PANEL: CPT | Performed by: EMERGENCY MEDICINE

## 2020-07-09 PROCEDURE — 99220 ZZC INITIAL OBSERVATION CARE,LEVL III: CPT | Performed by: PHYSICIAN ASSISTANT

## 2020-07-09 PROCEDURE — 25000128 H RX IP 250 OP 636: Performed by: EMERGENCY MEDICINE

## 2020-07-09 PROCEDURE — 93005 ELECTROCARDIOGRAM TRACING: CPT

## 2020-07-09 PROCEDURE — C9113 INJ PANTOPRAZOLE SODIUM, VIA: HCPCS | Performed by: PHYSICIAN ASSISTANT

## 2020-07-09 PROCEDURE — C9113 INJ PANTOPRAZOLE SODIUM, VIA: HCPCS | Performed by: EMERGENCY MEDICINE

## 2020-07-09 PROCEDURE — 86901 BLOOD TYPING SEROLOGIC RH(D): CPT | Performed by: EMERGENCY MEDICINE

## 2020-07-09 PROCEDURE — 84295 ASSAY OF SERUM SODIUM: CPT | Mod: 91 | Performed by: PHYSICIAN ASSISTANT

## 2020-07-09 PROCEDURE — 86850 RBC ANTIBODY SCREEN: CPT | Performed by: EMERGENCY MEDICINE

## 2020-07-09 PROCEDURE — 96361 HYDRATE IV INFUSION ADD-ON: CPT

## 2020-07-09 PROCEDURE — 96374 THER/PROPH/DIAG INJ IV PUSH: CPT

## 2020-07-09 PROCEDURE — G0378 HOSPITAL OBSERVATION PER HR: HCPCS

## 2020-07-09 PROCEDURE — 36430 TRANSFUSION BLD/BLD COMPNT: CPT

## 2020-07-09 PROCEDURE — 85025 COMPLETE CBC W/AUTO DIFF WBC: CPT | Performed by: EMERGENCY MEDICINE

## 2020-07-09 PROCEDURE — 85610 PROTHROMBIN TIME: CPT | Performed by: EMERGENCY MEDICINE

## 2020-07-09 PROCEDURE — U0003 INFECTIOUS AGENT DETECTION BY NUCLEIC ACID (DNA OR RNA); SEVERE ACUTE RESPIRATORY SYNDROME CORONAVIRUS 2 (SARS-COV-2) (CORONAVIRUS DISEASE [COVID-19]), AMPLIFIED PROBE TECHNIQUE, MAKING USE OF HIGH THROUGHPUT TECHNOLOGIES AS DESCRIBED BY CMS-2020-01-R: HCPCS | Performed by: EMERGENCY MEDICINE

## 2020-07-09 PROCEDURE — 36415 COLL VENOUS BLD VENIPUNCTURE: CPT | Performed by: PHYSICIAN ASSISTANT

## 2020-07-09 PROCEDURE — 25000132 ZZH RX MED GY IP 250 OP 250 PS 637: Mod: GY | Performed by: PHYSICIAN ASSISTANT

## 2020-07-09 PROCEDURE — 86923 COMPATIBILITY TEST ELECTRIC: CPT | Performed by: EMERGENCY MEDICINE

## 2020-07-09 PROCEDURE — 85018 HEMOGLOBIN: CPT | Mod: 91 | Performed by: PHYSICIAN ASSISTANT

## 2020-07-09 PROCEDURE — 25000128 H RX IP 250 OP 636: Performed by: PHYSICIAN ASSISTANT

## 2020-07-09 PROCEDURE — 82272 OCCULT BLD FECES 1-3 TESTS: CPT | Performed by: EMERGENCY MEDICINE

## 2020-07-09 PROCEDURE — 25800030 ZZH RX IP 258 OP 636: Performed by: EMERGENCY MEDICINE

## 2020-07-09 PROCEDURE — 86900 BLOOD TYPING SEROLOGIC ABO: CPT | Performed by: EMERGENCY MEDICINE

## 2020-07-09 PROCEDURE — 99306 1ST NF CARE HIGH MDM 50: CPT | Performed by: INTERNAL MEDICINE

## 2020-07-09 PROCEDURE — 99285 EMERGENCY DEPT VISIT HI MDM: CPT | Mod: 25

## 2020-07-09 PROCEDURE — 96375 TX/PRO/DX INJ NEW DRUG ADDON: CPT

## 2020-07-09 PROCEDURE — C9803 HOPD COVID-19 SPEC COLLECT: HCPCS

## 2020-07-09 RX ORDER — NALOXONE HYDROCHLORIDE 0.4 MG/ML
.1-.4 INJECTION, SOLUTION INTRAMUSCULAR; INTRAVENOUS; SUBCUTANEOUS
Status: DISCONTINUED | OUTPATIENT
Start: 2020-07-09 | End: 2020-07-09

## 2020-07-09 RX ORDER — NALOXONE HYDROCHLORIDE 0.4 MG/ML
.1-.4 INJECTION, SOLUTION INTRAMUSCULAR; INTRAVENOUS; SUBCUTANEOUS
Status: DISCONTINUED | OUTPATIENT
Start: 2020-07-09 | End: 2020-07-13 | Stop reason: HOSPADM

## 2020-07-09 RX ORDER — FENTANYL CITRATE 50 UG/ML
25 INJECTION, SOLUTION INTRAMUSCULAR; INTRAVENOUS EVERY 5 MIN PRN
Status: DISCONTINUED | OUTPATIENT
Start: 2020-07-09 | End: 2020-07-10 | Stop reason: CLARIF

## 2020-07-09 RX ORDER — PROCHLORPERAZINE 25 MG
12.5 SUPPOSITORY, RECTAL RECTAL EVERY 12 HOURS PRN
Status: DISCONTINUED | OUTPATIENT
Start: 2020-07-09 | End: 2020-07-13 | Stop reason: HOSPADM

## 2020-07-09 RX ORDER — AMLODIPINE BESYLATE 5 MG/1
5 TABLET ORAL DAILY
Status: DISCONTINUED | OUTPATIENT
Start: 2020-07-10 | End: 2020-07-13 | Stop reason: HOSPADM

## 2020-07-09 RX ORDER — METOPROLOL SUCCINATE 50 MG/1
50 TABLET, EXTENDED RELEASE ORAL 2 TIMES DAILY
Status: DISCONTINUED | OUTPATIENT
Start: 2020-07-09 | End: 2020-07-13 | Stop reason: HOSPADM

## 2020-07-09 RX ORDER — ONDANSETRON 4 MG/1
4 TABLET, ORALLY DISINTEGRATING ORAL EVERY 6 HOURS PRN
Status: DISCONTINUED | OUTPATIENT
Start: 2020-07-09 | End: 2020-07-13 | Stop reason: HOSPADM

## 2020-07-09 RX ORDER — ONDANSETRON 2 MG/ML
4 INJECTION INTRAMUSCULAR; INTRAVENOUS EVERY 30 MIN PRN
Status: DISCONTINUED | OUTPATIENT
Start: 2020-07-09 | End: 2020-07-09

## 2020-07-09 RX ORDER — LOSARTAN POTASSIUM 100 MG/1
100 TABLET ORAL DAILY
Status: DISCONTINUED | OUTPATIENT
Start: 2020-07-09 | End: 2020-07-13 | Stop reason: HOSPADM

## 2020-07-09 RX ORDER — PROCHLORPERAZINE MALEATE 5 MG
5 TABLET ORAL EVERY 6 HOURS PRN
Status: DISCONTINUED | OUTPATIENT
Start: 2020-07-09 | End: 2020-07-13 | Stop reason: HOSPADM

## 2020-07-09 RX ORDER — ONDANSETRON 2 MG/ML
4 INJECTION INTRAMUSCULAR; INTRAVENOUS EVERY 6 HOURS PRN
Status: DISCONTINUED | OUTPATIENT
Start: 2020-07-09 | End: 2020-07-13 | Stop reason: HOSPADM

## 2020-07-09 RX ORDER — DOCUSATE SODIUM 100 MG/1
100 CAPSULE, LIQUID FILLED ORAL 2 TIMES DAILY PRN
Status: DISCONTINUED | OUTPATIENT
Start: 2020-07-09 | End: 2020-07-13 | Stop reason: HOSPADM

## 2020-07-09 RX ORDER — FENTANYL CITRATE 50 UG/ML
50 INJECTION, SOLUTION INTRAMUSCULAR; INTRAVENOUS
Status: DISCONTINUED | OUTPATIENT
Start: 2020-07-09 | End: 2020-07-10 | Stop reason: CLARIF

## 2020-07-09 RX ORDER — ACETAMINOPHEN 325 MG/1
650 TABLET ORAL 4 TIMES DAILY PRN
Status: DISCONTINUED | OUTPATIENT
Start: 2020-07-09 | End: 2020-07-09

## 2020-07-09 RX ORDER — ACETAMINOPHEN 325 MG/1
650 TABLET ORAL EVERY 4 HOURS PRN
Status: DISCONTINUED | OUTPATIENT
Start: 2020-07-09 | End: 2020-07-13 | Stop reason: HOSPADM

## 2020-07-09 RX ORDER — AMLODIPINE BESYLATE 5 MG/1
5 TABLET ORAL DAILY
COMMUNITY
End: 2021-01-01

## 2020-07-09 RX ORDER — FLUMAZENIL 0.1 MG/ML
0.2 INJECTION, SOLUTION INTRAVENOUS
Status: DISCONTINUED | OUTPATIENT
Start: 2020-07-09 | End: 2020-07-10 | Stop reason: CLARIF

## 2020-07-09 RX ADMIN — DEXTROSE AND SODIUM CHLORIDE: 5; 900 INJECTION, SOLUTION INTRAVENOUS at 14:31

## 2020-07-09 RX ADMIN — PANTOPRAZOLE SODIUM 40 MG: 40 INJECTION, POWDER, FOR SOLUTION INTRAVENOUS at 23:40

## 2020-07-09 RX ADMIN — SODIUM CHLORIDE 1000 ML: 9 INJECTION, SOLUTION INTRAVENOUS at 10:12

## 2020-07-09 RX ADMIN — LOSARTAN POTASSIUM 100 MG: 100 TABLET, FILM COATED ORAL at 15:39

## 2020-07-09 RX ADMIN — ONDANSETRON 4 MG: 2 INJECTION INTRAMUSCULAR; INTRAVENOUS at 10:12

## 2020-07-09 RX ADMIN — METOPROLOL SUCCINATE 50 MG: 50 TABLET, EXTENDED RELEASE ORAL at 19:43

## 2020-07-09 RX ADMIN — PANTOPRAZOLE SODIUM 40 MG: 40 INJECTION, POWDER, FOR SOLUTION INTRAVENOUS at 10:12

## 2020-07-09 ASSESSMENT — ENCOUNTER SYMPTOMS
DIARRHEA: 0
NAUSEA: 0
ABDOMINAL PAIN: 0
RECTAL PAIN: 0
FEVER: 0
BLOOD IN STOOL: 1
VOMITING: 0

## 2020-07-09 ASSESSMENT — MIFFLIN-ST. JEOR: SCORE: 1080.47

## 2020-07-09 ASSESSMENT — COLUMBIA-SUICIDE SEVERITY RATING SCALE - C-SSRS: 1. IN THE PAST MONTH, HAVE YOU WISHED YOU WERE DEAD OR WISHED YOU COULD GO TO SLEEP AND NOT WAKE UP?: YES

## 2020-07-09 NOTE — ED PROVIDER NOTES
History     Chief Complaint:  Melena    HPI   Anat Correa is a 85 year old female with a history of GI bleeds, on Protonix, hypertension, hyperlipidemia, CKD, COPD, and IBS who presents via EMS from [facility] with concerns for melena. She has had dark stools over the last 2-3 days and when her labs were checked yesterday, her hemoglobin had dropped to 7.7 when it is usually around 10, and she was also found to be hyponatremic to 121. The staff at the facility became concerned that she may have another GI bleed, so they called 911. Here, the patient denies any other significant symptoms, including abdominal pain, nausea, vomiting, diarrhea, or chest pain. She does have a history of frequent falls with noted bruising, however she has not fallen today.     Allergies:  Prednisone  Penicillins    Medications:    Albuterol inhaler  Amlodipine   Symbicort inhaler  Colace  Lasix  DuoNeb  Imodium   Losartan   Metoprolol  Protonix    Past Medical History:    Anemia   Kidney stone   Chronic pain syndrome   CKD, stage 4   COPD  Diverticulosis  GI bleed  Hyperlipidemia  Hypertension   Internal hemorrhoids  IBS  Lung cancer   Osteoarthritis   Osteoporosis   Obesity   Colonic polyps   Ulcerative colitis   Primary iridocyclitis   Venous stasis, lower extremity   Peripheral neuropathy   Renal hyperparathyroidism     Past Surgical History:    Neuroma excision, 2nd toe amputation, right   Appendectomy      Left forearm mass excision x2  EGD - gastric ulcer   Colonoscopy   Hemorrhoidectomy, internal   Repair sliding inguinal hernia, bilateral   Bilateral neck & back tumor removal   Bilateral knee replacements  Bilateral cataract IOL   Left thoracoscopic wedge resection     Family History:    Cerebrovascular disease  Colorectal cancer  Breast cancer  Lung cancer   Scleroderma     Social History:  Marital Status:   [5]  Former smoker. Quit .   Negative for alcohol use.  Negative for drug use.       Review of Systems   Constitutional: Negative for fever.   Cardiovascular: Negative for chest pain.   Gastrointestinal: Positive for blood in stool. Negative for abdominal pain, diarrhea, nausea, rectal pain and vomiting.   All other systems reviewed and are negative.        Physical Exam     Patient Vitals for the past 24 hrs:   Temp Temp src Heart Rate SpO2 Weight   07/09/20 0953 97.6  F (36.4  C) Axillary 93 98 % 63.5 kg (140 lb)      Physical Exam  Constitutional:       Appearance: She is well-developed.   HENT:      Head: Atraumatic.      Right Ear: External ear normal.      Left Ear: External ear normal.      Mouth/Throat:      Mouth: Mucous membranes are moist.      Pharynx: Oropharynx is clear. No oropharyngeal exudate or posterior oropharyngeal erythema.   Eyes:      General: No scleral icterus.     Conjunctiva/sclera: Conjunctivae normal.      Pupils: Pupils are equal, round, and reactive to light.   Neck:      Musculoskeletal: Normal range of motion and neck supple.   Cardiovascular:      Rate and Rhythm: Normal rate and regular rhythm.      Pulses: Normal pulses.      Heart sounds: Normal heart sounds. No murmur. No friction rub. No gallop.    Pulmonary:      Effort: Pulmonary effort is normal. No respiratory distress.      Breath sounds: Normal breath sounds. No wheezing or rales.   Abdominal:      General: Bowel sounds are normal. There is no distension.      Palpations: Abdomen is soft. There is no mass.      Tenderness: There is no abdominal tenderness.   Genitourinary:     Rectum: Guaiac result positive.      Comments: melena  Musculoskeletal: Normal range of motion.   Skin:     General: Skin is warm and dry.      Capillary Refill: Capillary refill takes less than 2 seconds.      Findings: Bruising present. No rash.      Comments: Multiple areas of old bruises on leg and arms from falls. R cheek with abrasion   Neurological:      General: No focal deficit present.      Mental Status: She is  alert.           Emergency Department Course   ECG:  Indication: Melena   Time: 0959  Vent. Rate 92 bpm. FL interval 158. QRS duration 96. QT/QTc 364/450. P-R-T axis 62 9 37.    Normal sinus rhythm. No significant change compared to EKG dated 7/2/2020. Read time: 1000.    Laboratory:  CBC: WBC: 13.5 (H), HGB: 8.2 (L), PLT: 369  CMP: Na: 127 (L), BUN: 37 (H), Creatinine: 1.21 (H), GFR: 41 (L), Albumin: 2.9 (L), Protein total: 6.7 (L), ALT: 174 (H), AST: 51 (H), o/w WNL     INR: 0.94    Occult blood stool: Positive (A)    Blood type & screen: O positive     Asymptomatic COVID-19 Virus PCR: Pending     Interventions:  1012 NS 1L IV    Zofran, 4 mg, IV injection    Protonix, 40 mg, IV push injection    Emergency Department Course:  Nursing notes and vitals reviewed.   0955: I performed an exam of the patient as documented above. This included a rectal exam, as documented above. Stool sample was obtained and sent for laboratory analysis, findings above.    IV was inserted and blood was drawn for laboratory testing, results above. Medicine administered as documented above.     EKG obtained in the ED, see results above.      1103: I rechecked the patient and discussed the results of her workup thus far.     1126: I consulted with Shruthi Yost PA-C of the hospitalist services. She is in agreement to accept the patient for admission on behalf of Dr. Buenrostro.     Findings and plan explained to the Patient who consents to admission. Discussed the patient with Shruthi Yost, who will admit the patient to an observation bed for further monitoring, evaluation, and treatment.    I personally reviewed the laboratory results with the Patient and answered all related questions prior to admission.    Impression & Plan      Covid-19  Anat Correa was evaluated during a global COVID-19 pandemic, which necessitated consideration that the patient might be at risk for infection with the SARS-CoV-2 virus that causes COVID-19.    Applicable protocols for evaluation were followed during the patient's care.   COVID-19 was considered as part of the patient's evaluation. The plan for testing is:  a test was obtained during this visit.    Medical Decision Making:  Anat Correa is a 85 year old female who presents with several days of black tarry stool and dropping hemoglobin.  Patient is 2010 EGD did show gastric ulcer.  She is already on Protonix p.o. for the last 2 days.  Due to her anemia and melena, she does need to be admitted for evaluation of this upper GI bleed.  We had discussed this multiple times with the patient but she seemed to be somewhat resistant to our treatment plan.  I did talk to her son who agrees she needs to be admitted.  Patient will be admitted to the hospitalist service for further evaluation of her GI bleed.    Diagnosis:    ICD-10-CM    1. Gastrointestinal hemorrhage with melena  K92.1        Disposition:  Admitted to obs under the care of Dr. Buenrostro.     Scribe Disclosure:  I, Madison Lowery, am serving as a scribe on 7/9/2020 at 9:52 AM to personally document services performed by Bouchra Tan MD based on my observations and the provider's statements to me.       7/9/2020   [facility] EMERGENCY DEPARTMENT       Bouchra Tan MD  07/09/20 7000

## 2020-07-09 NOTE — LETTER
7/9/2020        RE: Anat Correa  [address]  [facility] [address]        [place] [facility]  INITIAL VISIT NOTE  July 9, 2020    PRIMARY CARE PROVIDER AND CLINIC:  Rashi Calle [address] [facility] [address]    CHIEF COMPLAINT:  Hospital follow-up/Initial visit    HPI:    Anat Correa is a 85 year old  (1935) female who was seen at [facility] in [place] on July 9, 2020 for an initial visit.     Medical history is notable for hypertension, dyslipidemia, COPD, CKD stage III, chronic anemia, lung cancer, kidney stones, peripheral neuropathy, IBS, osteoarthritis, and osteoporosis.    She was hospitalized at [facility] from July 2 through July 4, 2020 after she presented for evaluation after a mechanical fall and altered mental status.  Reportedly, patient had history of recurrent falls and son was concerned about her ability to care for herself.  Work-up revealed acute kidney injury with creatinine of 2.3, mild rhabdomyolysis with total CK of 792, deja elevated troponin I of 0.647 (with a peak level of 1.208), slightly low sodium level of 128, and elevated calcium level of 11.6.  Hematology profile was significant for white count of 8.2 and hemoglobin of 10.5.  EKG showed normal sinus rhythm with a heart rate of 80 bpm and no ST-T wave abnormality.  CT head was negative for acute intracranial abnormality.  CT cervical spine showed no fracture or subluxation.  CT facial was remarkable for right lower facial soft tissue swelling but no fracture.  Chest x-ray revealed blunting of costophrenic angles likely related to chronic pleural thickening as well as biapical scarring, calcified granuloma in the left lung, and calcified left hilar lymph nodes.  X-ray of the right shoulder was negative for fracture.  There was marked narrowing of the acromiohumeral interval which could be seen with a rotator cuff tear versus  insufficiency.    Hypercalcemia resolved and renal function improved (creatinine down to 1.39) with fluid therapy..  Vitamin D was low normal at 22 and 1,25 dihydroxy vitamin D level was low at 9.8.  Notably intact PTH was 8,  below the reference range and PTH related peptide was slightly elevated at 5.9.  Protein electrophoresis showed hypoalbuminemia in an otherwise essentially normal electrophoretic pattern.    Elevated troponin was attributed to demand ischemia.  Echocardiogram, on July 3, demonstrated mild concentric LV hypertrophy, normal LV systolic function with EF of 55 to 60%, grade 1 or early diastolic dysfunction, and no regional wall motion abnormalities.  There was trace to mild mitral and tricuspid regurgitation and mild to moderate biatrial enlargement.    Patient is admitted to this facility for medical management, nursing care, and rehab.     On 07/08, patient was noted to have black tarry stool.    Patient was seen today in her room, while sitting in a chair.  She appears frail.  She feels quite weak and complains of discomfort all over her body by sitting all morning.  She had another melenic stool this morning.  Hemoglobin yesterday was 7.7, down from her baseline of around 10.  She feels somewhat short of breath but reports no chest pain, palpitation, lightheadedness, nausea, vomiting, abdominal pain, or urinary symptoms.    CODE STATUS:   DNR / DNI    PAST MEDICAL HISTORY:   Left upper lobe lung adenocarcinoma, status post video-assisted thoracoscopy and wedge resection in July 2016  Hypertension  Dyslipidemia  Grade 1 or early diastolic dysfunction, noted on echocardiogram on July 3, 2020  COPD  CKD stage IV with history of solitary kidney, baseline creatinine 1.4-1.8  Chronic anemia, baseline Hgb around 10  Chronic hyponatremia, baseline Na around 128-130  GI bleed (history of gastric ulcer in June 2010)  Kidney stones  Peripheral neuropathy  Chronic pain  Cunha's neuroma  Internal  hemorrhoids  Colon polyps  Diverticulosis  IBS  Osteoporosis  Osteoarthritis  Vitamin D deficiency  Venous stasis of lower extremities    Past Medical History:   Diagnosis Date     Anemia      Calculus of kidney     dr hope     Chronic pain     OA     CKD (chronic kidney disease) stage 4, GFR 15-29 ml/min (H)     dr mcdermott     COPD, moderate (H)     moderate obstruction, with pulm htn - dr francisco did AAP     Diverticulosis of colon (without mention of hemorrhage)      Hemorrhage of gastrointestinal tract, unspecified     dr su     Hyperlipidemia LDL goal <100      Hypertension goal BP (blood pressure) < 140/90      Internal hemorrhoids without mention of complication      Irritable bowel syndrome      Lesion of plantar nerve     hay's neuroma dr connor     Lung nodule , 3/16    Dr Thompson, 1.4 x 1.1 cm, lingula, PET neg 3/16     Malignant neoplasm of upper lobe of left lung (H)     Dr Thompson - x 2 nodules - moderately differentiated adenocarcinoma (acinar predominant).  Final pathologic stage [id number], Final clinical stage IA, grade 2     Medication management     OA - 1 T#3 at HS with HX CKD     OA (osteoarthritis)     lower ext worse katya knees     Obesity (BMI 30.0-34.9)      Osteoporosis, unspecified     FOSAMAX  = upset stomach , pt declines further rx.      Other ulcerative colitis     collangenous collitis- last colonoscopy       Peripheral neuropathy     early - burning at HS     Personal history of colonic polyps     dr araujo     PONV (postoperative nausea and vomiting)      Primary iridocyclitis 1998    iritis dr dominguez     Venous stasis of lower extremity        PAST SURGICAL HISTORY:   Past Surgical History:   Procedure Laterality Date     AMPUTATE TOE(S) Right 2015    Procedure: AMPUTATE TOE(S);  Surgeon: Ashia White, DPM, Pod;  Location: [facility] OR     C APPENDECTOMY       C  DELIVERY ONLY      , Low  Cervical     EXCISE MASS UPPER EXTREMITY  10/13/2011    Lt Forearm mass excision - dr ely     EXCISE MASS UPPER EXTREMITY  2012    Lt Forearm mass excision - dr ely     HC COLONOSCOPY THRU STOMA, DIAGNOSTIC      IBS/diverticula/hemmorhoids dr araujo     HC ESOPHAGOSCOPY, DIAGNOSTIC  , , 6/10    Gastric ulcer, healed, gastritis     HC HEMORRHOIDECTOMY,INT/EXT,SIMPLE       HC REPAIR SLIDING INGUINAL HERNIA  1960    mitra     SURGICAL HISTORY OF -       mitra neck & back tumors     SURGICAL HISTORY OF -       neuroma excision - 2nd toe amputation     SURGICAL HISTORY OF -   3/07    Rt IOL - dr jimenez     SURGICAL HISTORY OF -       Lt IOL - dr jimenez     SURGICAL HISTORY OF -       Lt Knee replacement - dr gayle     SURGICAL HISTORY OF -       Rt Knee replacement - dr gayle     SURGICAL HISTORY OF -   9/15    Rt Second toe amputation - Dr White     THORACOSCOPIC WEDGE RESECTION LUNG Left 2016    Procedure: THORACOSCOPIC WEDGE RESECTION LUNG;  Surgeon: Abdelrahman Thompson MD;  Location: [facility] OR     XR JOINT INJECTION HIP (HIB)  2015    Rt - Dr Terry       FAMILY HISTORY:   Family History   Problem Relation Age of Onset     Cerebrovascular Disease Mother      Cerebrovascular Disease Father      Cancer - colorectal Brother      Cancer - colorectal Brother      Breast Cancer Sister      Cancer Sister         lung     Cancer Sister         lung     Cancer Sister         lung     Scleroderma Sister        SOCIAL HISTORY:  Patient is .  She lives in a town home.  She does use a cane for ambulation.    Social History     Tobacco Use     Smoking status: Former Smoker     Packs/day: 3.00     Years: 50.00     Pack years: 150.00     Types: Cigarettes     Last attempt to quit: 1993     Years since quittin.5     Smokeless tobacco: Never Used     Tobacco comment: quit    Substance Use Topics     Alcohol use: No       MEDICATIONS:  Current  Outpatient Medications   Medication Sig Dispense Refill     acetaminophen (TYLENOL) 325 MG tablet Take 650 mg by mouth 4 times daily as needed for mild pain or pain Limit tylenol to 3g/24hrs.       albuterol (VENTOLIN HFA) 108 (90 Base) MCG/ACT inhaler INHALE TWO PUFFS INTO THE LUNGS EVERY 6 HOURS AS NEEDED FOR SHORTNESS OF BREATH/DYSPNEA 54 g 3     amLODIPine (NORVASC) 5 MG tablet Take 1 tablet (5 mg) by mouth 2 times daily 180 tablet 3     ASPIRIN NOT PRESCRIBED (INTENTIONAL) Please choose reason not prescribed, below 0 each 0     budesonide-formoterol (SYMBICORT) 160-4.5 MCG/ACT Inhaler Inhale 2 puffs into the lungs 2 times daily 30.6 g 3     calcium carbonate (TUMS) 500 MG chewable tablet Take 2 chew tab by mouth daily as needed for heartburn       docusate sodium (COLACE) 100 MG capsule Take 100 mg by mouth 2 times daily as needed for constipation **7/8/20: hold in am on 7/9 until seen by MD       furosemide (LASIX) 20 MG tablet Take 2 tablets (40 mg) by mouth daily 180 tablet 3     ipratropium - albuterol 0.5 mg/2.5 mg/3 mL (DUONEB) 0.5-2.5 (3) MG/3ML neb solution Take 1 vial (3 mLs) by nebulization every 4 hours as needed for shortness of breath / dyspnea or wheezing 270 mL 3     loperamide (IMODIUM A-D) 2 MG tablet Take 2 mg by mouth 4 times daily as needed for diarrhea       losartan (COZAAR) 50 MG tablet Take 2 tablets (100 mg) by mouth daily 180 tablet 3     metoprolol succinate ER (TOPROL-XL) 50 MG 24 hr tablet Take 1 tablet (50 mg) by mouth 2 times daily 180 tablet 3     pantoprazole (PROTONIX) 40 MG EC tablet Take 40 mg by mouth 2 times daily Possible GI Bleed         ALLERGIES:  Allergies   Allergen Reactions     Prednisone      Yeast infection - swollen lips     Penicillins Rash       ROS:  10 point ROS neg other than the symptoms noted above in the HPI.    PHYSICAL EXAM:  Vital signs were reviewed in the chart.  Blood pressure 109/61, heart rate 90, respiratory rate 18, temperature 98.1  F, oxygen  saturation 98%, weight 148 LBS  Gen: Frail and weak appearing but in no acute distress  HEENT: Conjunctival pallor+  Card: Normal S1, S2, RRR  Resp: Lungs clear to auscultation bilaterally  GI: Abdomen soft, non-tender, non-distended, +BS  Ext: 2+ bilateral LE edema  Neuro: CX II-XII grossly intact; ROM in all four extremities grossly intact  Psych: Alert and oriented almost x3; normal affect  Skin: No acute rash    LABORATORY/IMAGING DATA:    Most Recent 3 CBC's:  Hematology profile (July 8): White count 14.8, hemoglobin 7.7, platelet count 320,000  BMP (July 8): Sodium 121, potassium 3.9, creatinine 1.38    Recent Labs   Lab Test 07/04/20  0635 07/03/20  0708 07/02/20  1201   WBC 14.0* 14.6* 18.2*   HGB 9.4* 9.9* 10.5*   MCV 94 95 94    283 294     Most Recent 3 BMP's:  Recent Labs   Lab Test 07/04/20  0635 07/03/20  0708 07/02/20  1201   * 130* 128*   POTASSIUM 3.5 3.4 4.3   CHLORIDE 99 99 96   CO2 21 25 22   BUN 38* 46* 43*   CR 1.39* 1.85* 2.30*   ANIONGAP 8 6 10   DUSTY 8.8 9.7 11.6*   GLC 89 85 113*     Most Recent 2 LFT's:  Recent Labs   Lab Test 07/02/20  1201 11/20/19  0808   AST 78* 16   ALT 30 20   ALKPHOS 64 77   BILITOTAL 0.7 0.4     Most Recent 3 INR's:  Recent Labs   Lab Test 07/02/20  1201 07/12/16  1055 05/09/13  1246   INR 1.12 0.93 2.43*       ASSESSMENT/PLAN:  Falls, recurrent,  Physical deconditioning.  Imaging studies negative for acute traumatic injury.  Plan:  Fall precaution  PT/OT evaluation and therapy    Acute kidney injury, superimposed on chronic kidney disease stage IV, baseline creatinine 1.4-1.8,  Mild traumatic rhabdomyolysis,  Hypercalcemia,  Dehydration.  Renal function and hypercalcemia both improved with IV fluid therapy.  At discharge from hospital, calcium level was 8.8, creatinine was 1.39, and total CK was 577.  Last Cr 1.33 and Ca level 8.3 on 07/07.  Plan:  Keep patient adequately hydrated  Avoid NSAIDs and nephrotoxins  Monitor renal function and calcium level  periodically    GI bleed with melena,  Chronic anemia.  Baseline Hgb around 10.  Her hemoglobin was 9.4 on July 4 and last hemoglobin was down to 7.7 on July 8.  Noted to have black tarry stool on 07/08 and earlier today, July 9.  Patient has known history of GI bleed  (upper GI endoscopy in June 2010 had revealed gastric ulcer and hiatal hernia).  Started on Protonix 40 mg p.o. twice daily.  Plan:  Continue Protonix 40 mg p.o. twice daily  Discussed with her son, Jose M, and I explained that his mother needs to be sent to the emergency department for possible admission to the hospital regarding work-up for GI bleed.  He and his mother are in agreement.    Acute on chronic hyponatremia.  Na level was 122 on 07/07 and 121 on 07/08.  Baseline Na normally in the range of 128-130.  Started on fluid restriction 1500 mL per 24 hours on July 8.  Plan:  Continue fluid restriction 1500 mL per 24-hour  Check urine and plasma osmolarity (will defer the labs to be done in the hospital)  Monitor Na level    Demand ischemia.  Patient noted to have slightly elevated troponin with a peak level of 1.208.  EKG showed no ischemic changes and echo revealed normal LV systolic function, grade 1 or early diastolic function, and no wall motion abnormalities.  Plan:  No further work-up indicated at this time.    Leukocytosis.  In part due to hemoconcentration/dehydration and in part reactive due to GI bleed.  No evidence for localizing infection.  Plan:  Monitor white count periodically    Left upper lobe lung adenocarcinoma, status post video-assisted thoracoscopy and wedge resection in July 2016.  No evidence for recurrence.    Hypertension,  Grade 1 diastolic dysfunction,  Venous stasis of lower extremities.  Blood pressure is adequately controlled.  Plan:  Continue metoprolol ER 50 mg p.o. twice daily, losartan 100 mg p.o. daily, amlodipine 5 mg p.o. daily, and furosemide 40 mg p.o. daily.  Monitor blood pressure    Dyslipidemia.  Not on  any lipid regimen.    COPD.  Stable.  Plan:  Continue Symbicort 160-4.5, 2 puffs twice daily  Continue PRN albuterol-ipratropium  Monitor respiratory status    Orders written by provider at facility:  Transfer to [facility] emergency department for GI bleed as well as severe hyponatremia.    Total unit/floor time of 50 minutes consisted of the following: examination of patient, reviewing the record including pertinent labs and imaging. More than 50% of this time was spent in coordination of care with the patient, sone, nursing staff, and other healthcare providers. This time was spent on discussing the care plan including work-up for GI bleed, management of hyponatremia, and the necessity for further evaluation in the hospital.        Electronically signed by:  Wilma Gant MD                        Sincerely,        Wilma Gant MD

## 2020-07-09 NOTE — H&P
History and Physical     Anat Correa MRN# [id number]   YOB: 1935 Age: 85 year old      Date of Admission:  7/9/2020    Primary care provider: Rashi Calle          Assessment and Plan:   Anat Correa is a 85 year old female with a PMH significant for hx of GIB (gastric ulcer 2010), HTN, HLP, CKD, COPD, IBS and recent admission (7/2-7/4) for mechanical fall, debility and Rhabdo and elevated trop due to demand ischemia (Currently at TCU) who is brought to ED for few days of melanotic stool.  Work up in ED shows mild hyponatremia Na 127. Hgb decline to 8.2 from 9.4 at discharge 7/4. Guiac positive but remains HD stable. She is being admitted for medical optimization and GI evaluation.     1. Acute blood loss anemia due to GIB--sounds melanotic in nature. (NO NSAID ASA use)  --Hgb did drift to 7.4 this afternoon (after IVF) but remains asymptomatic stable and no further bleeding since here in hospital.  --Transfuse 1u PRBC now, repeat Hgb at 22:00, transfuse if Hgb <8  --Call GI if pt starts to display HD instability or increased active rebleed  --O/w plan for EGD in am  --Cont NPO, gentle fluids  --Protonix BID    2. HTN-BP somewhat elevated in the 150's.   --Cont to monitor   --Ok to resume Toprol and Losartan and Norvasc with parameters  --Hold Lasix    3. COPD: breathing stable.   --Cont to monitor.   --Uses PRN albuterol and duoneb but given pt is asymptomatic will not order given OBS status  --Ok to cont symbicort if able to bring inhaler in    4. CKD--Cr around baseline.   --Cont to monitor.   -No Nsaids    [place] to OBS  CODe: DNR/DNI (d/w pt and family)  Dispo: pt does not want to go back to TCU family ok with home with increased service. Will get home cares set up with            Chief Complaint:   Dark stools       History of Present Illness:   Anat Correa is a 85 year old female with a PMH significant for hx of GIB (gastric ulcer 2010), HTN, HLP, CKD, COPD, IBS and  recent admission (7/2-7/4) for mechanical fall, debility and Rhabdo and elevated trop due to demand ischemia (Currently at TCU) who is brought to ED for few days of melanotic stool. She just left to go tot TCU and apparently has had a hard time since she has been asking to go home everyday. She is not on any NSAIDS or OAC but did reveal that she had few episodes of melanotic stools. She denies any lightheadedness, dizziness or chest pain. She does have a hx of GIB in the past with EGD revealing a gastric ulcer in 2010.     Work up in ED shows mild hyponatremia Na 127. Hgb decline to 8.2 from 9.4 at discharge 7/4. Guiac positive but remains HD stable. She is being admitted for medical optimization and GI evaluation.          Past Medical History:     Past Medical History:   Diagnosis Date     Anemia      Calculus of kidney 1998    dr hope     Chronic pain     OA     CKD (chronic kidney disease) stage 4, GFR 15-29 ml/min (H) 2008    dr mcdermott     COPD, moderate (H) 2004    moderate obstruction, with pulm htn - dr francisco did AAP     Diverticulosis of colon (without mention of hemorrhage) 7/03     Hemorrhage of gastrointestinal tract, unspecified 4/08    dr su     Hyperlipidemia LDL goal <100 2006     Hypertension goal BP (blood pressure) < 140/90 2005     Internal hemorrhoids without mention of complication 7/03     Irritable bowel syndrome 7/03     Lesion of plantar nerve 8/98    hay's neuroma dr connor     Lung nodule 4/14, 3/16    Dr Thompson, 1.4 x 1.1 cm, lingula, PET neg 3/16     Malignant neoplasm of upper lobe of left lung (H) 7/16    Dr Thompson - x 2 nodules - moderately differentiated adenocarcinoma (acinar predominant).  Final pathologic stage [id number], Final clinical stage IA, grade 2     Medication management     OA - 1 T#3 at HS with HX CKD     OA (osteoarthritis) 1998    lower ext worse katya knees     Obesity (BMI 30.0-34.9)      Osteoporosis, unspecified 2/02    FOSAMAX  = upset stomach , pt  declines further rx.      Other ulcerative colitis     collangenous collitis- last colonoscopy       Peripheral neuropathy     early - burning at HS     Personal history of colonic polyps     dr araujo     PONV (postoperative nausea and vomiting)      Primary iridocyclitis 1998    iritis dr dominguez     Venous stasis of lower extremity                Past Surgical History:     Past Surgical History:   Procedure Laterality Date     AMPUTATE TOE(S) Right 2015    Procedure: AMPUTATE TOE(S);  Surgeon: Ashia White, DPM, Pod;  Location: [facility] OR     C APPENDECTOMY       C  DELIVERY ONLY  1965    , Low Cervical     EXCISE MASS UPPER EXTREMITY  10/13/2011    Lt Forearm mass excision - dr ely     EXCISE MASS UPPER EXTREMITY  2012    Lt Forearm mass excision - dr ely     HC COLONOSCOPY THRU STOMA, DIAGNOSTIC      IBS/diverticula/hemmorhoids dr araujo     HC ESOPHAGOSCOPY, DIAGNOSTIC  , , 6/10    Gastric ulcer, healed, gastritis     HC HEMORRHOIDECTOMY,INT/EXT,SIMPLE       HC REPAIR SLIDING INGUINAL HERNIA      mitra     SURGICAL HISTORY OF -       mitra neck & back tumors     SURGICAL HISTORY OF -       neuroma excision - 2nd toe amputation     SURGICAL HISTORY OF -   3/07    Rt IOL - dr jimenez     SURGICAL HISTORY OF -       Lt IOL - dr jimenez     SURGICAL HISTORY OF -       Lt Knee replacement - dr gayle     SURGICAL HISTORY OF -       Rt Knee replacement - dr gayle     SURGICAL HISTORY OF -   9/15    Rt Second toe amputation - Dr White     THORACOSCOPIC WEDGE RESECTION LUNG Left 2016    Procedure: THORACOSCOPIC WEDGE RESECTION LUNG;  Surgeon: Abdelrahman Thompson MD;  Location: [facility] OR     XR JOINT INJECTION HIP (HIB)  2015    Rt - Dr Terry               Social History:     Social History     Socioeconomic History     Marital status:      Spouse name: thanh     Number of children: 1     Years of education: 12      Highest education level: Not on file   Occupational History     Occupation: part-time [employer] section at [employer]    Social Needs     Financial resource strain: Not on file     Food insecurity     Worry: Not on file     Inability: Not on file     Transportation needs     Medical: Not on file     Non-medical: Not on file   Tobacco Use     Smoking status: Former Smoker     Packs/day: 3.00     Years: 50.00     Pack years: 150.00     Types: Cigarettes     Last attempt to quit: 1993     Years since quittin.5     Smokeless tobacco: Never Used     Tobacco comment: quit    Substance and Sexual Activity     Alcohol use: No     Drug use: No     Comment: no herbal meds either      Sexual activity: Not Currently     Comment:  for 24 years    Lifestyle     Physical activity     Days per week: Not on file     Minutes per session: Not on file     Stress: Not on file   Relationships     Social connections     Talks on phone: Not on file     Gets together: Not on file     Attends Quaker service: Not on file     Active member of club or organization: Not on file     Attends meetings of clubs or organizations: Not on file     Relationship status: Not on file     Intimate partner violence     Fear of current or ex partner: Not on file     Emotionally abused: Not on file     Physically abused: Not on file     Forced sexual activity: Not on file   Other Topics Concern      Service Not Asked     Blood Transfusions Not Asked     Caffeine Concern Yes     Comment: 1 pot  qd - switched to decaff now     Occupational Exposure Not Asked     Hobby Hazards Not Asked     Sleep Concern Not Asked     Stress Concern Not Asked     Weight Concern Not Asked     Special Diet Yes     Comment: Low salt     Back Care Not Asked     Exercise No     Bike Helmet Not Asked     Seat Belt Yes     Self-Exams Yes     Comment: SBE encouraged motnhly      Parent/sibling w/ CABG, MI or angioplasty before 65F 55M? No   Social  History Narrative    calcium - 3 large dairy servings/day - pt declines fosamax and other meds for her osteoporosis    flex sig/colonoscopy -last colonoscopy 7/03     sun precautions - discussed     mammogram - hasn't had one since 2001 at least - ordered     Td booster - 1/23/06    pneumovax -today 4/25/06    DEXA - pt declines repeat scan today 4/25/06 despite her osteoporosis     stool hemoccults - every year after age 40    ASA- easy bruising - can't take     mulvitamin - encouraged               Family History:     Family History   Problem Relation Age of Onset     Cerebrovascular Disease Mother      Cerebrovascular Disease Father      Cancer - colorectal Brother      Cancer - colorectal Brother      Breast Cancer Sister      Cancer Sister         lung     Cancer Sister         lung     Cancer Sister         lung     Scleroderma Sister               Allergies:      Allergies   Allergen Reactions     Prednisone      Yeast infection - swollen lips     Penicillins Rash               Medications:     Prior to Admission medications    Medication Sig Last Dose Taking? Auth Provider   acetaminophen (TYLENOL) 325 MG tablet Take 650 mg by mouth 4 times daily as needed for mild pain or pain Limit tylenol to 3g/24hrs. 7/6/2020 at AM Yes Reported, Patient   albuterol (VENTOLIN HFA) 108 (90 Base) MCG/ACT inhaler INHALE TWO PUFFS INTO THE LUNGS EVERY 6 HOURS AS NEEDED FOR SHORTNESS OF BREATH/DYSPNEA 7/6/2020 at AM Yes Rashi Calle MD   amLODIPine (NORVASC) 5 MG tablet Take 5 mg by mouth daily 7/9/2020 at AM Yes Unknown, Entered By History   furosemide (LASIX) 20 MG tablet Take 2 tablets (40 mg) by mouth daily 7/7/2020 at AM--per MAR on hold as of 7/8/20 Yes Rashi Calle MD   losartan (COZAAR) 50 MG tablet Take 2 tablets (100 mg) by mouth daily 7/7/2020 at AM Yes Rashi Calle MD   Metoprolol Succinate 50 MG CS24 Take 1 capful by mouth 2 times daily 7/9/2020 at AM Yes Unknown, Entered By History   pantoprazole (PROTONIX) 40 MG  "EC tablet Take 40 mg by mouth 2 times daily Possible GI Bleed 7/9/2020 at AM Yes Reported, Patient   ASPIRIN NOT PRESCRIBED (INTENTIONAL) Please choose reason not prescribed, below   Rashi Calle MD   calcium carbonate (TUMS) 500 MG chewable tablet Take 2 chew tab by mouth daily as needed for heartburn   Reported, Patient   docusate sodium (COLACE) 100 MG capsule Take 100 mg by mouth 2 times daily as needed for constipation **7/8/20: hold in am on 7/9 until seen by MD   Unknown, Entered By History   ipratropium - albuterol 0.5 mg/2.5 mg/3 mL (DUONEB) 0.5-2.5 (3) MG/3ML neb solution Take 1 vial (3 mLs) by nebulization every 4 hours as needed for shortness of breath / dyspnea or wheezing   Rashi Calle MD   loperamide (IMODIUM A-D) 2 MG tablet Take 2 mg by mouth 4 times daily as needed for diarrhea   Unknown, Entered By History              Review of Systems:     A Comprehensive greater than 10 system review of systems was carried out.  Pertinent positives and negatives are noted above.  Otherwise negative for contributory information.          Physical Exam:   Blood pressure (!) 149/64, pulse 93, temperature 95.7  F (35.4  C), temperature source Oral, resp. rate 20, height 1.651 m (5' 5\"), weight 63 kg (138 lb 12.8 oz), SpO2 96 %, not currently breastfeeding.  Exam:  GENERAL:  Comfortable.  PSYCH: pleasant, oriented, No acute distress.  HEENT:  PERRLA. Normal conjunctiva, normal hearing, nasal mucosa and Oropharynx are normal.  NECK:  Supple, no neck vein distention, adenopathy or bruits, normal thyroid.  HEART:  Normal S1, S2 with no murmur, no pericardial rub, gallops or S3 or S4.  LUNGS:  Clear to auscultation, normal Respiratory effort. No wheezing, rales or ronchi.  ABDOMEN:  Soft, no hepatosplenomegaly, normal bowel sounds. Non-tender, non distended.   EXTREMITIES:  No pedal edema, +2 pulses bilateral and equal.  SKIN:  Dry to touch, No rash, wound or ulcerations.  NEUROLOGIC:  CN 2-12 intact, BL 5/5 symmetric " upper and lower extremity strength, sensation is intact with no focal deficits.               Data:     Recent Labs   Lab 07/09/20  1620 07/09/20  1008 07/04/20  0635 07/03/20  0708   WBC  --  13.5* 14.0* 14.6*   HGB 7.4* 8.2* 9.4* 9.9*   HCT  --  24.8* 28.7* 31.1*   MCV  --  94 94 95   PLT  --  369 238 283     Recent Labs   Lab 07/09/20  1620 07/09/20  1008 07/04/20  0635 07/03/20  0708   * 127* 128* 130*   POTASSIUM  --  4.1 3.5 3.4   CHLORIDE  --  99 99 99   CO2  --  21 21 25   ANIONGAP  --  7 8 6   GLC  --  95 89 85   BUN  --  37* 38* 46*   CR  --  1.21* 1.39* 1.85*   GFRESTIMATED  --  41* 35* 24*   GFRESTBLACK  --  47* 40* 28*   DUSTY  --  8.5 8.8 9.7       Shruthi Yost PA-C

## 2020-07-09 NOTE — CONSULTS
"[facility]  Gastroenterology Consultation         Harry Winter MD    Patient Name: Anat Correa MRN# [id number]   YOB: 1935 Age: 85 year old      Date of Admission:  7/9/2020  Today's Date: 07/09/2020         Assessment and Plan:     Impression:  1.  Clinical history of melena and concomitant acute blood loss anemia in an 83-year-old female with a remote history of GI bleeding.  Differential diagnosis includes esophagitis, gastritis, duodenitis, peptic ulcer disease, nonsteroidal gastropathy, small bowel sources less likely, colonic sources unlikely given presentation.  Hemodynamics are stable.  She is somewhat hyponatremic and overall needs to be stabilized prior to endoscopy.  2.  Multiple medical problems as outlined below under the care of patient's admitting team.    Recommendations:  1.  EGD early tomorrow morning.  This will allow for medical stabilization and COVID negative testing.   2.  Continue pantoprazole 40 mg IV twice daily.  3.  We will place the patient n.p.o. except medications in case significant bleeding occurs overnight.  4.  Supportive care and treatment of other medical issues per admitting team.  5.  Case and findings discussed with admitting hospitalist team.  6.  Follow laboratories and transfuse red cell mass as needed/indicated.  7.  Further recommendations pending above and course please call any questions.            Primary Care Physician:     Rashi Calle [phone]            Requesting Physician:        Shruthi Yost PA-C.         Chief Complaint:     Melena         History of Present Illness:     Anat Correa is a 85 year old female who we are asked to evaluate for melena.  She has a remote history of GI bleeding with her last EGD based on record review in 2010.  She had a history of gastric ulcer at that point.  Over the last 2 to 3 days she was noted to have \"dark\" stools at her assisted living facility.  Her hemoglobin was " then checked and it has dropped to 7.7 with reported baseline hemoglobin of 10. She was also hyponatremic with a sodium of 121.  There was concern for recurrent GI bleed given her history shows she was transferred to [facility] via ambulance.    There is no reported abdominal pain, nausea, vomiting, heartburn, chest pain, dysphagia, odynophagia, rectal bleeding.  She does have reported melena as above.    Last EGD was June 29, 2010 based on record review.  She had a normal esophagus, hiatal hernia and a clean-based gastric ulcer with a normal duodenum.  Follow-up recommended in 6 weeks to check healing.  In June 2008 she had an upper GI endoscopy for follow-up of gastric ulcer and that exam was negative.  In April 2008 she had a colonoscopy which was negative except for diverticulosis no further recommendations were made for follow-up based on screening guidelines.  Finally in April 2008 she had an EGD and she was noted to have gastric erosions, hiatal hernia and no other significant findings.    Patient is on pantoprazole 40 mg twice daily as an outpatient.  There is no significant aspirin use or history of nonsteroidals based on record review or by the patient's history.           Past Medical History:     Past Medical History:   Diagnosis Date     Anemia      Calculus of kidney 1998    dr hope     Chronic pain     OA     CKD (chronic kidney disease) stage 4, GFR 15-29 ml/min (H) 2008    dr mcdermott     COPD, moderate (H) 2004    moderate obstruction, with pulm htn - dr francisco did AAP     Diverticulosis of colon (without mention of hemorrhage) 7/03     Hemorrhage of gastrointestinal tract, unspecified 4/08    dr su     Hyperlipidemia LDL goal <100 2006     Hypertension goal BP (blood pressure) < 140/90 2005     Internal hemorrhoids without mention of complication 7/03     Irritable bowel syndrome 7/03     Lesion of plantar nerve 8/98    hay's neuroma dr connor     Lung nodule 4/14, 3/16      Jay, 1.4 x 1.1 cm, lingula, PET neg 3/16     Malignant neoplasm of upper lobe of left lung (H)     Dr Thompson - x 2 nodules - moderately differentiated adenocarcinoma (acinar predominant).  Final pathologic stage [id number], Final clinical stage IA, grade 2     Medication management     OA - 1 T#3 at HS with HX CKD     OA (osteoarthritis)     lower ext worse katya knees     Obesity (BMI 30.0-34.9)      Osteoporosis, unspecified     FOSAMAX  = upset stomach , pt declines further rx.      Other ulcerative colitis     collangenous collitis- last colonoscopy       Peripheral neuropathy     early - burning at HS     Personal history of colonic polyps     dr araujo     PONV (postoperative nausea and vomiting)      Primary iridocyclitis     iritis dr dominguez     Venous stasis of lower extremity              Past Surgical History:     Past Surgical History:   Procedure Laterality Date     AMPUTATE TOE(S) Right 2015    Procedure: AMPUTATE TOE(S);  Surgeon: Ashia White, DPM, Pod;  Location: [facility] OR     C APPENDECTOMY       C  DELIVERY ONLY  1965    , Low Cervical     EXCISE MASS UPPER EXTREMITY  10/13/2011    Lt Forearm mass excision - dr ely     EXCISE MASS UPPER EXTREMITY  2012    Lt Forearm mass excision - dr ely     HC COLONOSCOPY THRU STOMA, DIAGNOSTIC      IBS/diverticula/hemmorhoids dr araujo     HC ESOPHAGOSCOPY, DIAGNOSTIC  , , 6/10    Gastric ulcer, healed, gastritis     HC HEMORRHOIDECTOMY,INT/EXT,SIMPLE       HC REPAIR SLIDING INGUINAL HERNIA      mitra     SURGICAL HISTORY OF -       mitra neck & back tumors     SURGICAL HISTORY OF -       neuroma excision - 2nd toe amputation     SURGICAL HISTORY OF -   3/07    Rt IOL - dr jimenez     SURGICAL HISTORY OF -       Lt IOL - dr jimenez     SURGICAL HISTORY OF -       Lt Knee replacement - dr gayle     SURGICAL HISTORY OF -       Rt Knee replacement - dr gayle      SURGICAL HISTORY OF -   9/15    Rt Second toe amputation - Dr White     THORACOSCOPIC WEDGE RESECTION LUNG Left 7/12/2016    Procedure: THORACOSCOPIC WEDGE RESECTION LUNG;  Surgeon: Abdelrahman Thompson MD;  Location: [facility] OR     XR JOINT INJECTION HIP (HIB)  2/2015    Rt - Dr Terry            Home Medications:     Prior to Admission medications    Medication Sig Last Dose Taking? Auth Provider   acetaminophen (TYLENOL) 325 MG tablet Take 650 mg by mouth 4 times daily as needed for mild pain or pain Limit tylenol to 3g/24hrs.   Reported, Patient   albuterol (VENTOLIN HFA) 108 (90 Base) MCG/ACT inhaler INHALE TWO PUFFS INTO THE LUNGS EVERY 6 HOURS AS NEEDED FOR SHORTNESS OF BREATH/DYSPNEA   Rashi Calle MD   ASPIRIN NOT PRESCRIBED (INTENTIONAL) Please choose reason not prescribed, below   Rashi Calle MD   calcium carbonate (TUMS) 500 MG chewable tablet Take 2 chew tab by mouth daily as needed for heartburn   Reported, Patient   docusate sodium (COLACE) 100 MG capsule Take 100 mg by mouth 2 times daily as needed for constipation **7/8/20: hold in am on 7/9 until seen by MD   Unknown, Entered By History   furosemide (LASIX) 20 MG tablet Take 2 tablets (40 mg) by mouth daily   Rashi Calle MD   ipratropium - albuterol 0.5 mg/2.5 mg/3 mL (DUONEB) 0.5-2.5 (3) MG/3ML neb solution Take 1 vial (3 mLs) by nebulization every 4 hours as needed for shortness of breath / dyspnea or wheezing   Rashi Calle MD   loperamide (IMODIUM A-D) 2 MG tablet Take 2 mg by mouth 4 times daily as needed for diarrhea   Unknown, Entered By History   losartan (COZAAR) 50 MG tablet Take 2 tablets (100 mg) by mouth daily   Rashi Calle MD   metoprolol succinate ER (TOPROL-XL) 50 MG 24 hr tablet Take 1 tablet (50 mg) by mouth 2 times daily   Rashi Calle MD   pantoprazole (PROTONIX) 40 MG EC tablet Take 40 mg by mouth 2 times daily Possible GI Bleed   Reported, Patient            Current Medications:           fentaNYL  50 mcg Intravenous  Once within 24 hrs     midazolam  1 mg Intravenous Once within 24 hrs     fentaNYL **FOLLOWED BY** fentaNYL, flumazenil, midazolam **FOLLOWED BY** midazolam, naloxone, ondansetron         Allergies:     Allergies   Allergen Reactions     Prednisone      Yeast infection - swollen lips     Penicillins Rash            Social History:     Anat Correa  reports that she quit smoking about 26 years ago. Her smoking use included cigarettes. She has a 150.00 pack-year smoking history. She has never used smokeless tobacco. She reports that she does not drink alcohol or use drugs.          Family History:     Family History   Problem Relation Age of Onset     Cerebrovascular Disease Mother      Cerebrovascular Disease Father      Cancer - colorectal Brother      Cancer - colorectal Brother      Breast Cancer Sister      Cancer Sister         lung     Cancer Sister         lung     Cancer Sister         lung     Scleroderma Sister             Review of Systems:     Comprehensive GI review of systems is completed and is negative except as above.             Physical Exam:     Vital Signs with Ranges  Temp:  [87.2  F (30.7  C)-97.6  F (36.4  C)] 97.6  F (36.4  C)  Pulse:  [84-93] 93  Heart Rate:  [87-96] 96  Resp:  [15-18] 15  BP: (141-161)/() 157/67  SpO2:  [97 %-98 %] 98 %  I/O's Last 24 hours  No intake/output data recorded.    Constitutional:  Alert and no distress, appears weak.   HEENT: PERRL, EOMI, sclera nonicteric, conjunctiva pink and moist.  NECK: Supple, nontender. No lymphadenopathy, JVD or bruits noted.  PULMONARY: Clear to auscultation bilaterally.  CARDIOVASCULAR: S1, S2, no S3, no S4, no murmur, regular rate and rhythm  ABDOMEN: Soft, nontender, nondistended, no tympany, no organomegaly, no fullness, guarding or rebound are noted.   NEURO: Alert and oriented to place, time and person. Neurological exam grossly nonfocal, CN II through XII are grossly intact. Detailed neurological exam is not  performed.  EXT: No cyanosis, clubbing or edema.         Data:   All new lab and imaging data was reviewed.  .  Recent Labs   Lab Test 07/09/20  1008 07/04/20  0635 07/03/20  0708 07/02/20  1201  07/12/16  1055   WBC 13.5* 14.0* 14.6* 18.2*   < > 8.2   HGB 8.2* 9.4* 9.9* 10.5*   < > 11.7   MCV 94 94 95 94   < > 90    238 283 294   < > 304   INR 0.94  --   --  1.12  --  0.93    < > = values in this interval not displayed.     Recent Labs   Lab Test 07/09/20  1008 07/04/20  0635 07/03/20  0708   * 128* 130*   POTASSIUM 4.1 3.5 3.4   CHLORIDE 99 99 99   CO2 21 21 25   BUN 37* 38* 46*   CR 1.21* 1.39* 1.85*   ANIONGAP 7 8 6     Recent Labs   Lab Test 07/09/20  1008 07/02/20  1201 05/02/20  1150 11/20/19  0808  10/29/19  0803 05/14/19  0731   ALBUMIN 2.9* 3.5  --  3.6   < >  --   --    BILITOTAL 0.4 0.7  --  0.4   < >  --   --    * 30  --  20   < >  --   --    AST 51* 78*  --  16   < >  --   --    ALKPHOS 64 64  --  77   < >  --   --    PROTEIN  --   --  Negative  --   --  Negative Negative    < > = values in this interval not displayed.            Harry Winter MD, FACG  [facility]  [phone]

## 2020-07-09 NOTE — ED NOTES
Bed: ED02  Expected date: 7/9/20  Expected time: 9:32 AM  Means of arrival: Ambulance  Comments:  Omer Mckeon

## 2020-07-09 NOTE — ED TRIAGE NOTES
Patient arrived by EMS from [facility].  Staff reports patient has had dark, tarry stools for 2-3 days.  Patient has a history of falls.

## 2020-07-09 NOTE — PHARMACY-ADMISSION MEDICATION HISTORY
Admission medication history interview status for this patient is complete. See [facility] admission navigator for allergy information, prior to admission medications and immunization status.     Medication history done via telephone during Covid-19 pandemic, indicate source(s): MAR from [facility]  Medication history resources (including written lists, pill bottles, clinic record): MAR, discharge summary 7/4, chart notes. No SureScripts fill history available - MAR indicates TCU fills at [facility].     Changes made to PTA medication list:  Added: N/A  Deleted: Symbicort 160-4.5 2 puffs twice daily (not on MAR, written 10/29/19)  Changed: amlodipine from twice daily to once daily, metoprolol succinate tablet changed to sprinkle capsule    Actions taken by pharmacist (provider contacted, etc):None     Additional medication history information: None    Medication reconciliation/reorder completed by provider prior to medication history?  N     For patients on insulin therapy: N    Prior to Admission medications    Medication Sig Last Dose Taking? Auth Provider   acetaminophen (TYLENOL) 325 MG tablet Take 650 mg by mouth 4 times daily as needed for mild pain or pain Limit tylenol to 3g/24hrs. 7/6/2020 at AM Yes Reported, Patient   albuterol (VENTOLIN HFA) 108 (90 Base) MCG/ACT inhaler INHALE TWO PUFFS INTO THE LUNGS EVERY 6 HOURS AS NEEDED FOR SHORTNESS OF BREATH/DYSPNEA 7/6/2020 at AM Yes Rashi aClle MD   amLODIPine (NORVASC) 5 MG tablet Take 5 mg by mouth daily 7/9/2020 at AM Yes Unknown, Entered By History   furosemide (LASIX) 20 MG tablet Take 2 tablets (40 mg) by mouth daily 7/7/2020 at AM Yes Rashi Calle MD   losartan (COZAAR) 50 MG tablet Take 2 tablets (100 mg) by mouth daily 7/7/2020 at AM Yes Rashi Calle MD   Metoprolol Succinate 50 MG CS24 Take 1 capful by mouth 2 times daily 7/9/2020 at AM Yes Unknown, Entered By History   pantoprazole (PROTONIX) 40 MG EC tablet Take 40 mg by mouth 2  times daily Possible GI Bleed 7/9/2020 at AM Yes Reported, Patient   ASPIRIN NOT PRESCRIBED (INTENTIONAL) Please choose reason not prescribed, below   Rashi Calle MD   calcium carbonate (TUMS) 500 MG chewable tablet Take 2 chew tab by mouth daily as needed for heartburn   Reported, Patient   docusate sodium (COLACE) 100 MG capsule Take 100 mg by mouth 2 times daily as needed for constipation **7/8/20: hold in am on 7/9 until seen by MD   Unknown, Entered By History   ipratropium - albuterol 0.5 mg/2.5 mg/3 mL (DUONEB) 0.5-2.5 (3) MG/3ML neb solution Take 1 vial (3 mLs) by nebulization every 4 hours as needed for shortness of breath / dyspnea or wheezing   Rashi Calle MD   loperamide (IMODIUM A-D) 2 MG tablet Take 2 mg by mouth 4 times daily as needed for diarrhea   Unknown, Entered By History       Jayne Tesfaye  PGY-1 Pharmacy Practice Resident

## 2020-07-09 NOTE — ED NOTES
[facility]  ED Nurse Handoff Report    Anat Correa is a 85 year old female   ED Chief complaint: Melena  . ED Diagnosis:   Final diagnoses:   Gastrointestinal hemorrhage with melena     Allergies:   Allergies   Allergen Reactions     Prednisone      Yeast infection - swollen lips     Penicillins Rash       Code Status: Full Code  Activity level - Baseline/Home:  Independent. Activity Level - Current:   Assist X 2. Lift room needed: No. Bariatric: No   Needed: No   Isolation: No. Infection: Not Applicable.     Vital Signs:   Vitals:    07/09/20 0953   Temp: 97.6  F (36.4  C)   TempSrc: Axillary   SpO2: 98%   Weight: 63.5 kg (140 lb)       Cardiac Rhythm:  ,      Pain level:    Patient confused: No. Patient Falls Risk: Yes.   Elimination Status: Has voided   Patient Report - Initial Complaint: melena Focused Assessment: Patient arrived by EMS from [facility].  Staff reports patient has had dark, tarry stools for 2-3 days.  Patient has a history of falls.    Tests Performed: labs Abnormal Results:   Labs Ordered and Resulted from Time of ED Arrival Up to the Time of Departure from the ED   CBC WITH PLATELETS DIFFERENTIAL - Abnormal; Notable for the following components:       Result Value    WBC 13.5 (*)     RBC Count 2.65 (*)     Hemoglobin 8.2 (*)     Hematocrit 24.8 (*)     Absolute Neutrophil 9.4 (*)     Absolute Monocytes 1.5 (*)     All other components within normal limits   COMPREHENSIVE METABOLIC PANEL - Abnormal; Notable for the following components:    Sodium 127 (*)     Urea Nitrogen 37 (*)     Creatinine 1.21 (*)     GFR Estimate 41 (*)     GFR Estimate If Black 47 (*)     Albumin 2.9 (*)     Protein Total 6.7 (*)      (*)     AST 51 (*)     All other components within normal limits   OCCULT BLOOD STOOL - Abnormal; Notable for the following components:    Occult Blood Positive (*)     All other components within normal limits   INR   COVID-19 VIRUS  (CORONAVIRUS) BY PCR   CARDIAC CONTINUOUS MONITORING   PULSE OXIMETRY NURSING   PERIPHERAL IV CATHETER   ABO/RH TYPE AND SCREEN       Treatments provided: monitoring  Family Comments: n/a  OBS brochure/video discussed/provided to patient:  Yes  ED Medications:   Medications   ondansetron (ZOFRAN) injection 4 mg (4 mg Intravenous Given 7/9/20 1012)   0.9% sodium chloride BOLUS (1,000 mLs Intravenous New Bag 7/9/20 1012)   pantoprazole (PROTONIX) 40 mg IV push injection (40 mg Intravenous Given 7/9/20 1012)     Drips infusing:  No  For the majority of the shift, the patient's behavior Green. Interventions performed were n/a    Sepsis treatment initiated: No       ED Nurse Name/Phone Number: Shaista Luu RN,   11:19 AM    RECEIVING UNIT ED HANDOFF REVIEW    Above ED Nurse Handoff Report was reviewed: Yes  Reviewed by: Daly George RN on July 9, 2020 at 12:47 PM

## 2020-07-10 ENCOUNTER — APPOINTMENT (OUTPATIENT)
Dept: GENERAL RADIOLOGY | Facility: CLINIC | Age: 85
End: 2020-07-10
Attending: HOSPITALIST
Payer: MEDICARE

## 2020-07-10 ENCOUNTER — APPOINTMENT (OUTPATIENT)
Dept: PHYSICAL THERAPY | Facility: CLINIC | Age: 85
End: 2020-07-10
Attending: HOSPITALIST
Payer: MEDICARE

## 2020-07-10 LAB
ANION GAP SERPL CALCULATED.3IONS-SCNC: 8 MMOL/L (ref 3–14)
BUN SERPL-MCNC: 21 MG/DL (ref 7–30)
CALCIUM SERPL-MCNC: 8 MG/DL (ref 8.5–10.1)
CHLORIDE SERPL-SCNC: 106 MMOL/L (ref 94–109)
CO2 SERPL-SCNC: 18 MMOL/L (ref 20–32)
CREAT SERPL-MCNC: 1.12 MG/DL (ref 0.52–1.04)
ERYTHROCYTE [DISTWIDTH] IN BLOOD BY AUTOMATED COUNT: 14.5 % (ref 10–15)
GFR SERPL CREATININE-BSD FRML MDRD: 45 ML/MIN/{1.73_M2}
GLUCOSE SERPL-MCNC: 90 MG/DL (ref 70–99)
HCT VFR BLD AUTO: 26.6 % (ref 35–47)
HGB BLD-MCNC: 8.6 G/DL (ref 11.7–15.7)
MCH RBC QN AUTO: 30.3 PG (ref 26.5–33)
MCHC RBC AUTO-ENTMCNC: 32.3 G/DL (ref 31.5–36.5)
MCV RBC AUTO: 94 FL (ref 78–100)
PLATELET # BLD AUTO: 344 10E9/L (ref 150–450)
POTASSIUM SERPL-SCNC: 4.2 MMOL/L (ref 3.4–5.3)
RBC # BLD AUTO: 2.84 10E12/L (ref 3.8–5.2)
SARS-COV-2 RNA SPEC QL NAA+PROBE: NOT DETECTED
SODIUM SERPL-SCNC: 132 MMOL/L (ref 133–144)
SPECIMEN SOURCE: NORMAL
UPPER GI ENDOSCOPY: NORMAL
WBC # BLD AUTO: 12.8 10E9/L (ref 4–11)

## 2020-07-10 PROCEDURE — 99226 ZZC SUBSEQUENT OBSERVATION CARE,LEVEL III: CPT | Performed by: HOSPITALIST

## 2020-07-10 PROCEDURE — 43239 EGD BIOPSY SINGLE/MULTIPLE: CPT | Performed by: INTERNAL MEDICINE

## 2020-07-10 PROCEDURE — 25000132 ZZH RX MED GY IP 250 OP 250 PS 637: Mod: GY | Performed by: INTERNAL MEDICINE

## 2020-07-10 PROCEDURE — 85027 COMPLETE CBC AUTOMATED: CPT | Performed by: HOSPITALIST

## 2020-07-10 PROCEDURE — 25000128 H RX IP 250 OP 636: Performed by: HOSPITALIST

## 2020-07-10 PROCEDURE — 97161 PT EVAL LOW COMPLEX 20 MIN: CPT | Mod: GP | Performed by: PHYSICAL THERAPIST

## 2020-07-10 PROCEDURE — 88305 TISSUE EXAM BY PATHOLOGIST: CPT | Performed by: INTERNAL MEDICINE

## 2020-07-10 PROCEDURE — 73610 X-RAY EXAM OF ANKLE: CPT | Mod: LT

## 2020-07-10 PROCEDURE — 40000104 ZZH STATISTIC MODERATE SEDATION < 10 MIN: Performed by: INTERNAL MEDICINE

## 2020-07-10 PROCEDURE — 25000132 ZZH RX MED GY IP 250 OP 250 PS 637: Mod: GY | Performed by: PHYSICIAN ASSISTANT

## 2020-07-10 PROCEDURE — 80048 BASIC METABOLIC PNL TOTAL CA: CPT | Performed by: HOSPITALIST

## 2020-07-10 PROCEDURE — 25000125 ZZHC RX 250: Performed by: INTERNAL MEDICINE

## 2020-07-10 PROCEDURE — 25800030 ZZH RX IP 258 OP 636: Performed by: PHYSICIAN ASSISTANT

## 2020-07-10 PROCEDURE — 25000128 H RX IP 250 OP 636: Performed by: INTERNAL MEDICINE

## 2020-07-10 PROCEDURE — 97116 GAIT TRAINING THERAPY: CPT | Mod: GP | Performed by: PHYSICAL THERAPIST

## 2020-07-10 PROCEDURE — 88342 IMHCHEM/IMCYTCHM 1ST ANTB: CPT | Performed by: INTERNAL MEDICINE

## 2020-07-10 PROCEDURE — 36415 COLL VENOUS BLD VENIPUNCTURE: CPT | Performed by: HOSPITALIST

## 2020-07-10 PROCEDURE — 88305 TISSUE EXAM BY PATHOLOGIST: CPT | Mod: 26 | Performed by: INTERNAL MEDICINE

## 2020-07-10 PROCEDURE — 88342 IMHCHEM/IMCYTCHM 1ST ANTB: CPT | Mod: 26 | Performed by: INTERNAL MEDICINE

## 2020-07-10 PROCEDURE — G0378 HOSPITAL OBSERVATION PER HR: HCPCS

## 2020-07-10 PROCEDURE — 97530 THERAPEUTIC ACTIVITIES: CPT | Mod: GP | Performed by: PHYSICAL THERAPIST

## 2020-07-10 RX ORDER — FLUMAZENIL 0.1 MG/ML
0.2 INJECTION, SOLUTION INTRAVENOUS
Status: ACTIVE | OUTPATIENT
Start: 2020-07-10 | End: 2020-07-10

## 2020-07-10 RX ORDER — PANTOPRAZOLE SODIUM 40 MG/1
40 TABLET, DELAYED RELEASE ORAL DAILY
Qty: 60 TABLET | Refills: 1 | Status: SHIPPED | OUTPATIENT
Start: 2020-07-10

## 2020-07-10 RX ORDER — PANTOPRAZOLE SODIUM 40 MG/1
40 TABLET, DELAYED RELEASE ORAL
Status: DISCONTINUED | OUTPATIENT
Start: 2020-07-10 | End: 2020-07-13 | Stop reason: HOSPADM

## 2020-07-10 RX ORDER — FENTANYL CITRATE 50 UG/ML
INJECTION, SOLUTION INTRAMUSCULAR; INTRAVENOUS PRN
Status: DISCONTINUED | OUTPATIENT
Start: 2020-07-10 | End: 2020-07-10 | Stop reason: HOSPADM

## 2020-07-10 RX ORDER — LANOLIN ALCOHOL/MO/W.PET/CERES
3 CREAM (GRAM) TOPICAL
Status: DISCONTINUED | OUTPATIENT
Start: 2020-07-10 | End: 2020-07-13 | Stop reason: HOSPADM

## 2020-07-10 RX ORDER — LIDOCAINE 40 MG/G
CREAM TOPICAL
Status: DISCONTINUED | OUTPATIENT
Start: 2020-07-10 | End: 2020-07-10 | Stop reason: HOSPADM

## 2020-07-10 RX ORDER — NALOXONE HYDROCHLORIDE 0.4 MG/ML
.1-.4 INJECTION, SOLUTION INTRAMUSCULAR; INTRAVENOUS; SUBCUTANEOUS
Status: ACTIVE | OUTPATIENT
Start: 2020-07-10 | End: 2020-07-11

## 2020-07-10 RX ADMIN — MELATONIN TAB 3 MG 3 MG: 3 TAB at 21:29

## 2020-07-10 RX ADMIN — METOPROLOL SUCCINATE 50 MG: 50 TABLET, EXTENDED RELEASE ORAL at 21:28

## 2020-07-10 RX ADMIN — ACETAMINOPHEN 650 MG: 325 TABLET, FILM COATED ORAL at 15:08

## 2020-07-10 RX ADMIN — METOPROLOL SUCCINATE 50 MG: 50 TABLET, EXTENDED RELEASE ORAL at 09:59

## 2020-07-10 RX ADMIN — AMLODIPINE BESYLATE 5 MG: 5 TABLET ORAL at 09:59

## 2020-07-10 RX ADMIN — LOSARTAN POTASSIUM 100 MG: 100 TABLET, FILM COATED ORAL at 09:58

## 2020-07-10 RX ADMIN — ENOXAPARIN SODIUM 30 MG: 30 INJECTION SUBCUTANEOUS at 11:46

## 2020-07-10 RX ADMIN — DEXTROSE AND SODIUM CHLORIDE: 5; 900 INJECTION, SOLUTION INTRAVENOUS at 05:54

## 2020-07-10 RX ADMIN — PANTOPRAZOLE SODIUM 40 MG: 40 TABLET, DELAYED RELEASE ORAL at 09:59

## 2020-07-10 NOTE — PLAN OF CARE
PRIMARY DIAGNOSIS: GI BLEED    OUTPATIENT/OBSERVATION GOALS TO BE MET BEFORE DISCHARGE  1. Orthostatic performed: N/A    2. Stable Hgb Yes.   Recent Labs   Lab Test 07/09/20  2202 07/09/20  1620 07/09/20  1008   HGB 8.8* 7.4* 8.2*         3. Resolved or declined bleeding episodes: Yes Last episode: At home 7/8/20 per pt.    4. Appropriate testing complete: EGD tomorrow    5. Cleared for discharge by consultants (if involved): No      Discharge Planner Nurse   Safe discharge environment identified: Pt lives at [facility] but will not go back there. Would like home with increased services. SW consulted.   Barriers to discharge: Yes       Entered by: Forest Carter 07/10/2020 2:33 AM    Vitals are Temp: 97.4  F (36.3  C) Temp src: Oral BP: (!) 151/55 Pulse: 80 Heart Rate: 84 Resp: 16 SpO2: 96 %.  Patient is alert, disoriented to situation at times. Up with assist of 2. NPO. Denies pain. IVFs at 100 mL/hr. Denies dizziness, SOB, and nausea. On tele, SR in 60s per tele tech. Purewick in place. +2 BLE edema. Denies numbness and tingling. Scattered bruises throughout body. Abrasion to right check. Band aid in place. Covid pending. Plan for EGD in the morning. GI following. Will continue to monitor.      Please review provider order for any additional goals.   Nurse to notify provider when observation goals have been met and patient is ready for discharge.

## 2020-07-10 NOTE — PLAN OF CARE
PRIMARY DIAGNOSIS: GI BLEED    OUTPATIENT/OBSERVATION GOALS TO BE MET BEFORE DISCHARGE  Orthostatic performed: N/A    Stable Hgb Yes.   Recent Labs   Lab Test 07/09/20  2202 07/09/20  1620 07/09/20  1008   HGB 8.8* 7.4* 8.2*         Resolved or declined bleeding episodes: Yes Last episode: At home 7/8/20 per pt.    Appropriate testing complete: EGD tomorrow    Cleared for discharge by consultants (if involved): No      Discharge Planner Nurse   Safe discharge environment identified: [facility] but will not go back there. Would like home with increased services. SW consulted.   Barriers to discharge: Yes       Entered by: Forest Carter 07/10/2020 2:17 AM    Vitals are Temp: 97.4  F (36.3  C) Temp src: Oral BP: (!) 151/55 Pulse: 80 Heart Rate: 84 Resp: 16 SpO2: 96 %.  Patient is alert, disoriented to situation at times. Up with assist of 2. NPO but may have sips of clears. Denies pain. PIV infusing blood at 100 mL/hr. Hgb recheck at 2230. Denies dizziness, SOB, and nausea. Purewick in place. +2 BLE edema. Denies numbness and tingling. Scattered bruises throughout body. Abrasion to right check. Band aid in place. Covid pending. Plan for EGD tomorrow. GI following.     Please review provider order for any additional goals.   Nurse to notify provider when observation goals have been met and patient is ready for discharge.

## 2020-07-10 NOTE — PLAN OF CARE
PRIMARY DIAGNOSIS: Melanotic Stool    OUTPATIENT/OBSERVATION GOALS TO BE MET BEFORE DISCHARGE  Orthostatic performed: No    Stable Hgb Yes.   Recent Labs   Lab Test 07/10/20  0947 07/09/20  2202 07/09/20  1620   HGB 8.6* 8.8* 7.4*         Resolved or declined bleeding episodes: Yes Last episode: before admission    Appropriate testing complete: Yes    Cleared for discharge by consultants (if involved): No    Safe discharge environment identified: Yes    Discharge Planner Nurse   Safe discharge environment identified: Yes  Barriers to discharge: Yes       Entered by: LILIAN VIRGEN 07/10/2020   Vital signs:  Temp: 97.8  F (36.6  C) Temp src: Oral BP: 138/51 Pulse: 73 Heart Rate: 77 Resp: 16 SpO2: 93 % O2 Device: None (Room air) Alert and oriented x4 with some forgetfulness. No BM today. Incontinent of bladder. Purewick in use. Had x-ray to the left ankle due to pain up on walking. Result pending. Regular diet. Had 100% for breakfast. Saline locked. PT and SW consulted. Assist of x2 with walker and gelt belt. Will continue to monitor.   Please review provider order for any additional goals.   Nurse to notify provider when observation goals have been met and patient is ready for discharge.

## 2020-07-10 NOTE — PLAN OF CARE
PRIMARY DIAGNOSIS: GI BLEED    OUTPATIENT/OBSERVATION GOALS TO BE MET BEFORE DISCHARGE  1. Orthostatic performed: N/A    2. Stable Hgb Yes.   Recent Labs   Lab Test 07/09/20  2202 07/09/20  1620 07/09/20  1008   HGB 8.8* 7.4* 8.2*         3. Resolved or declined bleeding episodes: Yes Last episode: At home 7/8/20 per pt.    4. Appropriate testing complete: EGD tomorrow    5. Cleared for discharge by consultants (if involved): No      Discharge Planner Nurse   Safe discharge environment identified: Pt lives at [facility] but will not go back there. Would like home with increased services. SW consulted.   Barriers to discharge: Yes       Entered by: Forest Carter 07/10/2020 5:35 AM    Vitals are Temp: (P) 97.6  F (36.4  C) Temp src: (P) Oral BP: (!) (P) 150/64 Pulse: 80 Heart Rate: (P) 78 Resp: (P) 16 SpO2: (P) 95 %.  Patient is alert, disoriented to situation at times. Up with assist of 2. NPO. No pain reported. IVFs at 100 mL/hr. On tele, SR per tele tech. Purewick in place. Scattered bruises throughout body. Abrasion to right check. Band aid in place. Covid pending.  EGD in the morning. GI following. Stable and resting in bed. Will continue to monitor and provide supportive cares.     Please review provider order for any additional goals.   Nurse to notify provider when observation goals have been met and patient is ready for discharge.

## 2020-07-10 NOTE — PROGRESS NOTES
[facility]      OUTPATIENT PHYSICAL THERAPY EVALUATION  PLAN OF TREATMENT FOR OUTPATIENT REHABILITATION  (COMPLETE FOR INITIAL CLAIMS ONLY)  Patient's Last Name, First Name, M.I.  YOB: 1935  Anat Correa                        Provider's Name  [facility] Medical Record No.  [id number]                               Onset Date:  07/09/20   Start of Care Date:  07/10/20      Type:     _X_PT   ___OT   ___SLP Medical Diagnosis:  gastric ulcer, low hgb, L ankle sprain                        PT Diagnosis:  Impaired gait and balance   Visits from SOC:  1   _________________________________________________________________________________  Plan of Treatment/Functional Goals    Planned Interventions:  ,    balance training, bed mobility training, gait training, ROM, strengthening, transfer training, risk factor education, home program guidelines, progressive activity/exercise,       Goals: See Physical Therapy Goals on Care Plan in [employer] electronic health record.    Therapy Frequency: 5x/week  Predicted Duration of Therapy Intervention: 3 days  _________________________________________________________________________________    I CERTIFY THE NEED FOR THESE SERVICES FURNISHED UNDER        THIS PLAN OF TREATMENT AND WHILE UNDER MY CARE     (Physician co-signature of this document indicates review and certification of the therapy plan).                Certification date from: 07/10/20, Certification date to: 07/13/20    Referring Physician: Dr. Niko Can            Initial Assessment        See Physical Therapy evaluation dated 07/10/20 in Epic electronic health record.

## 2020-07-10 NOTE — PLAN OF CARE
ROOM # 227    Living Situation (if not independent, order SW consult):  Facility name:[facility] (but will not go back there)   : Jose M Correa    Activity level at baseline: Independent  Activity level on admit: Assist x2      Patient registered to observation; given Patient Bill of Rights; given the opportunity to ask questions about observation status and their plan of care.  Patient has been oriented to the observation room, bathroom and call light is in place.    Discussed discharge goals and expectations with patient/family.

## 2020-07-10 NOTE — PLAN OF CARE
PT orders received, eval completed and treatment initiated.    Anat Correa is a 85 year old female with a PMH significant for hx of GIB (gastric ulcer 2010), HTN, HLP, CKD, COPD, IBS and recent admission (7/2-7/4) for mechanical fall, debility and Rhabdo and elevated trop due to demand ischemia (Currently at TCU) who is brought to ED for few days of melanotic stool.  EGD showed duodenal ulcers and gastritis d/t NSAID use.  Pt given 1 unit RBCs.  HGB 8.6.  L ankle xray negative for fx but does show a large amount of soft tissue swelling along the lateral malleolus with possible lateral ankle sprain.    Pt lives alone in a 1 level home with 1 step to enter.  Pt has been driving up until ~ 1 month ago when her son started driving her.  Pt was I with all ADLs and IADLs.  Pt has a tub/shower with a shower bench.  Pt does own light cooking and cleaning.  Her home association takes care of the yardwork.  Pt uses a SEC occasionally for mobility.  Pt has a FWW but does not use it.     Discharge Planner PT   Patient plan for discharge: Home  Current status: Pt lying in bed with son present and agreeable to therapy.  Sup > sit Mod A at trunk to come to long sit.  Pt able to scoot to EOB.  Pt anxious about mobility and expresses fear of falling.  Sit > stand to FWW with Mod A and repeated VCs and hand over hand assist for safe hand placement.  Pt amb ~20' with FWW and Min A with R step to L pattern with wide FLEX and R LE externally rotated.  Sit > sup Max A at trunk and LEs.  L ankle ace wrapped, awaiting ankle brace.  Pt wanting to return to bed.  Chair brought into room an patient encouraged to get up to chair 3x/day for all meals to increase activity tolerance.  Barriers to return to prior living situation: Lives alone, current level of assist, hx of falls, 1 step to enter home  Recommendations for discharge: TCU  Rationale for recommendations: Pt below baseline with mobility.  Continued skilled PT in the acute and TCU  setting to maximize level of independence with all mobility and determine most appropriate long term living situation.         Entered by: Kandace Sanchez 07/10/2020 4:26 PM

## 2020-07-10 NOTE — PROGRESS NOTES
"   07/10/20 1200   Quick Adds   Type of Visit Initial PT Evaluation   Living Environment   Lives With alone   Living Arrangements house   Home Accessibility stairs to enter home   Number of Stairs, Main Entrance 1   Transportation Anticipated car, drives self   Living Environment Comment Pt lives alone in a 1 level home with 1 step to enter.  Pt has been driving up until ~ 1 month ago when her son started driving her.    Self-Care   Usual Activity Tolerance moderate   Current Activity Tolerance fair   Equipment Currently Used at Home cane, straight;walker, rolling   Activity/Exercise/Self-Care Comment Pt was I with all ADLs and IADLs.  Pt has a tub/shower with a shower bench.  Pt does own light cooking and cleaning.  Her home association takes care of the yardwork.  Pt uses a SEC occasionally for mobility.  Pt has a FWW but does not use it.    Functional Level Prior   Ambulation 1-->assistive equipment   Transferring 1-->assistive equipment   Toileting 0-->independent   Bathing 1-->assistive equipment   Communication 0-->understands/communicates without difficulty   Swallowing 0-->swallows foods/liquids without difficulty   Cognition 0 - no cognition issues reported   Fall history within last six months yes   Number of times patient has fallen within last six months 2   Which of the above functional risks had a recent onset or change? ambulation;transferring   General Information   Onset of Illness/Injury or Date of Surgery - Date 07/09/20   Referring Physician Dr. Niko Can   Patient/Family Goals Statement \"I want to be able to walk and get dressed and get the hell out of here.\"   Pertinent History of Current Problem (include personal factors and/or comorbidities that impact the POC) Anat Correa is a 85 year old female with a PMH significant for hx of GIB (gastric ulcer 2010), HTN, HLP, CKD, COPD, IBS and recent admission (7/2-7/4) for mechanical fall, debility and Rhabdo and elevated trop due to demand " ischemia (Currently at TCU) who is brought to ED for few days of melanotic stool.  EGD showed duodenal ulcers and gastritis d/t NSAID use.  Pt given 1 unit RBCs.  HGB 8.6.  L ankle xray negative for fx but does show a large amount of soft tissue swelling along the lateral malleolus with possible lateral ankle sprain.   Precautions/Limitations fall precautions   Heart Disease Risk Factors High blood pressure;Medical history;Age   General Observations Pt lying in bed with son present   General Info Comments Vitals in supine: /65, HR 74 bpm, O2 sats 93% on RA.     Cognitive Status Examination   Orientation orientation to person, place and time   Level of Consciousness alert   Follows Commands and Answers Questions 75% of the time   Personal Safety and Judgment impaired;at risk behaviors demonstrated   Memory intact   Cognitive Comment Pt anxious and fearful of falling   Pain Assessment   Patient Currently in Pain Yes, see Vital Sign flowsheet  (L ankle pain during gait - did not rate)   Integumentary/Edema   Integumentary/Edema Comments multiple areas of bruising.  Mild L ankle edema with ace wrap.  Awaiting ankle brace.  R hand mildly swollen   Posture    Posture Forward head position;Protracted shoulders   Range of Motion (ROM)   ROM Comment Decreased B UE AROM to ~ 30 deg flex.  AAROM to ~ 120 deg.  Tight hamstrings B with SLR to ~ 50 deg.  Tight gastrocs with B ankle DF to ~ 5-10 deg   Strength   Strength Comments Decreased global strength.  B hip flex 3/5, B knee ext ~ 4/5 and B ankle DF ~4+/5   Bed Mobility   Bed Mobility Comments Sup > sit Mod A at trunk to come to long sit.  Pt able to scoot to EOB.  Sit > sup Max A at trunk and LEs.     Transfer Skills   Transfer Comments Sit > stand to FWW with Mod A and repeated VCs and hand over hand assist for safe hand placement.     Gait   Gait Comments Pt amb ~20' with FWW and Min A with R step to L pattern with wide FLEX and R LE externally rotated   Balance  "  Balance Comments Pt requires CGA for safety while sitting on EOB.  Pt requires B UE support on FWW and Min A at trunk for safe standing balance.  High falls risk, unsteady gait, hx of falls   Sensory Examination   Sensory Perception Comments NT   Coordination   Coordination no deficits were identified   Muscle Tone   Muscle Tone no deficits were identified   General Therapy Interventions   Planned Therapy Interventions balance training;bed mobility training;gait training;ROM;strengthening;transfer training;risk factor education;home program guidelines;progressive activity/exercise   Clinical Impression   Criteria for Skilled Therapeutic Intervention yes, treatment indicated   PT Diagnosis Impaired gait and balance   Influenced by the following impairments Impaired gait and balance, decreased strength globally, L ankle pain and swelling, anxiety, decreased activity tolerance.     Functional limitations due to impairments Increased falls risk, unable to amb functional household or community distances, unable to perform ADLs   Clinical Presentation Stable/Uncomplicated   Clinical Presentation Rationale Multiple comorbidities, support from son, pain   Clinical Decision Making (Complexity) Low complexity   Therapy Frequency 5x/week   Predicted Duration of Therapy Intervention (days/wks) 3 days   Anticipated Discharge Disposition Transitional Care Facility   Risk & Benefits of therapy have been explained Yes   Patient, Family & other staff in agreement with plan of care Yes   [facility] [employer]-PAC TM \"6 Clicks\"   2016, Trustees of [facility], under license to [employer] ([employer]).  All rights reserved.   6 Clicks Short Forms Basic Mobility Inpatient Short Form   [facility] AM-PAC  \"6 Clicks\" V.2 Basic Mobility Inpatient Short Form   1. Turning from your back to your side while in a flat bed without using bedrails? 4 - None   2. Moving from lying on your back to sitting on the side of a flat bed without using " bedrails? 3 - A Little   3. Moving to and from a bed to a chair (including a wheelchair)? 2 - A Lot   4. Standing up from a chair using your arms (e.g., wheelchair, or bedside chair)? 2 - A Lot   5. To walk in hospital room? 3 - A Little   6. Climbing 3-5 steps with a railing? 1 - Total   Basic Mobility Raw Score (Score out of 24.Lower scores equate to lower levels of function) 15   Total Evaluation Time   Total Evaluation Time (Minutes) 15

## 2020-07-10 NOTE — PLAN OF CARE
PRIMARY DIAGNOSIS: Melanotic stool    OUTPATIENT/OBSERVATION GOALS TO BE MET BEFORE DISCHARGE  1. Orthostatic performed: No    2. Stable Hgb Yes.   Recent Labs   Lab Test 07/10/20  0947 07/09/20  2202 07/09/20  1620   HGB 8.6* 8.8* 7.4*         3. Resolved or declined bleeding episodes: Yes Last episode: before admission     4. Appropriate testing complete: Yes    5. Cleared for discharge by consultants (if involved): N/A    6. Safe discharge environment identified: Yes    Discharge Planner Nurse   Safe discharge environment identified: Yes  Barriers to discharge: Yes    Vital signs:  Temp: 97.8  F (36.6  C) Temp src: Oral BP: 138/51 Pulse: 73 Heart Rate: 77 Resp: 16 SpO2: 93 % O2 Device: None (Room air) Alert and oriented x4 with some forgetfulness. No BM since this morning. Had EGD this morning. Unable to walk due to pain on the left leg. X-ray to the left ankle ordered. SW and PT consulted. Saline locked. Regular diet. Will continue to monitor.         Entered by: LILIAN VIRGEN 07/10/2020   Please review provider order for any additional goals.   Nurse to notify provider when observation goals have been met and patient is ready for discharge.

## 2020-07-10 NOTE — PROGRESS NOTES
[facility]    Medicine Progress Note - Hospitalist Service       Date of Admission:  7/9/2020  Assessment & Plan   Anat Correa is a 85 year old female with a PMH significant for hx of GIB (gastric ulcer 2010), HTN, HLP, CKD, COPD, IBS and recent admission (7/2-7/4) for mechanical fall, debility and rhabdo and elevated trop due to demand ischemia (Currently at TCU) who is brought to ED for few days of melanotic stool.  Work up in ED shows mild hyponatremia Na 127. Hgb decline to 8.2 from 9.4 at discharge 7/4. Guiac positive but remains HD stable. EGD done today: gastritis and duodenal ulcers due to NSAID use    Acute blood loss anemia due to GIB    -s/p EGD today. I spoke with Dr. Winter: duodenal ulcers and gastritis due to NSAID use    - s/p transfused 1u PRBC    - Hb stable    - cont PPI daily    - NO NSAIDS OR ASA    - advance diet to regular    Weakness and left ankle pain    - was from TCU    - could not ambulate with staff due to weakness and new ankle pain    - will order xrays    - formal PT eval    - will need TCU ([facility]    HTN    - now stable    - cont PTA Toprol and Losartan and Norvasc with parameters    - holding Lasix     COPD    - breathing stable.     - uses PRN albuterol and duoneb but given pt is asymptomatic will not order given OBS status    - ok to cont symbicort if able to bring inhaler in     CKD    - at baseline    Called family twice: updated daughter in law and son. Answered questions       Diet: Regular Diet Adult    DVT Prophylaxis: Enoxaparin (Lovenox) SQ  Melendez Catheter: not present  Code Status: DNR         Disposition Plan   Expected discharge: Tomorrow, recommended to transitional care unit once safe disposition plan/ TCU bed available.  Entered: Niko Can MD 07/10/2020, 10:49 AM       The patient's care was discussed with the Patient, Patient's Family and GI Consultant.    Niko Can,  MD  Hospitalist Service  [facility]    ______________________________________________________________________    Interval History   Patient seen multiple times. Denied symptoms this am. Wanted to discharge home. Ate breakfast. Was unable to walk with staff due to left ankle pain.    Data reviewed today: I reviewed all medications, new labs and imaging results over the last 24 hours. I personally reviewed the ankle xray image(s) showing pending.    Physical Exam   Vital Signs: Temp: 97.8  F (36.6  C) Temp src: Oral BP: 138/51 Pulse: 73 Heart Rate: 77 Resp: 16 SpO2: 93 % O2 Device: None (Room air) Oxygen Delivery: 6 LPM  Weight: 138 lbs 12.8 oz  Constitutional: awake, alert, cooperative, no apparent distress, and appears stated age  s1s2 rrr, lungs clear  abdo soft, NT +BS  Left ankle: some swelling/edema. No erythema. Some tenderness over lat malleolus. Pain with passive and active movement  Data   Recent Labs   Lab 07/10/20  0947 07/09/20  2202 07/09/20  1620 07/09/20  1008 07/04/20  0635   WBC 12.8*  --   --  13.5* 14.0*   HGB 8.6* 8.8* 7.4* 8.2* 9.4*   MCV 94  --   --  94 94     --   --  369 238   INR  --   --   --  0.94  --    *  --  130* 127* 128*   POTASSIUM 4.2  --   --  4.1 3.5   CHLORIDE 106  --   --  99 99   CO2 18*  --   --  21 21   BUN 21  --   --  37* 38*   CR 1.12*  --   --  1.21* 1.39*   ANIONGAP 8  --   --  7 8   DUSTY 8.0*  --   --  8.5 8.8   GLC 90  --   --  95 89   ALBUMIN  --   --   --  2.9*  --    PROTTOTAL  --   --   --  6.7*  --    BILITOTAL  --   --   --  0.4  --    ALKPHOS  --   --   --  64  --    ALT  --   --   --  174*  --    AST  --   --   --  51*  --      No results found for this or any previous visit (from the past 24 hour(s)).

## 2020-07-10 NOTE — PLAN OF CARE
PRIMARY DIAGNOSIS: GI BLEED    OUTPATIENT/OBSERVATION GOALS TO BE MET BEFORE DISCHARGE  Orthostatic performed: No    Stable Hgb Yes.   Recent Labs   Lab Test 07/10/20  0947 07/09/20  2202 07/09/20  1620   HGB 8.6* 8.8* 7.4*         Resolved or declined bleeding episodes: Yes Last episode: 7/8/20 per pt    Appropriate testing complete: Yes    Cleared for discharge by consultants (if involved): Yes    Safe discharge environment identified: No    Discharge Planner Nurse   Safe discharge environment identified: No- looking for placement  Barriers to discharge: Yes       Entered by: Alise Padilla 07/10/2020 6:24 PM     Please review provider order for any additional goals.   Nurse to notify provider when observation goals have been met and patient is ready for discharge.

## 2020-07-10 NOTE — PROGRESS NOTES
SPIRITUAL HEALTH SERVICES: Tele-Encounter  Patient Location:  Obs  Spoke with: Patient     Referral Source: Called pt per  referral     DATA:  Pt stated she did not want to talk long, but sounded on the verge of tears. She requested prayer for her recovery and that she'd be able to go home soon. She declined to pray with me in the encounter but requested to be prayed for.     PLAN:  Will pray for pt. Possible follow-up required in the coming days. SHS remains available.         Sebastian Mcnulty   Intern      ______________________________    Type of service:  Telephone Visit     has received verbal consent for a TelephoneVisit from the patient? YES    Distance Provider Location: designated [place] office or home office (secure setting)    Mode of Communication: telephone (via [facility] phone or [employer] [facility] tele-call-number ([phone]))

## 2020-07-10 NOTE — CONSULTS
Care Transition Initial Assessment - SW     Met with: Pt, Anat and her son Jose M  Active Problems:    Melanotic stools       DATA  Lives With: alone   Living Arrangements: house  Quality of Family Relationships: supportive    Identified issues/concerns regarding health management:    Pt came to hospital from [facility], she was there from 7/4/20 to 7/9/20.   She does not want to return to [facility].  Quality of Family Relationships: supportive  Transportation Anticipated: SW to arrange medical transportation per pt and son's permission.    Pt/family was educated on  the Medicare Compare list for with associated star ratings to assist with choice for referrals/discharge planning Yes     Education was given to pt/family that star ratings are updated/maintained by Medicare and can be reviewed by visiting www.medicare.gov Yes      ASSESSMENT  Cognitive Status:  awake, alert and oriented with slight confusion. Anat voices her opinion and is active in participating with her health care decisions as are her son and daughter in law.  Concerns to be addressed:  discharge to TCU,   (Plan for Home Care therapies upon discharge from TCU).     PLAN  Financial costs for the patient includes: Pt and family aware of private room charge.  Patient given options and choices for discharge. Yes,  Choices for TCU are:  1)[facility] TCU  2)[facility] TCU  3)[facility] TCU    Patient/family is agreeable to the plan?  Yes.  Transportation/person available to transport on day of discharge  is Medical Transportation to be arranged once facility is confirmed.  Patient Goals and Preferences: To eventually return home or to stay with son and his wife.  Patient anticipates discharging to:  TCU .    SW following and available as needed for any discharge planning needs.    Niko CADE Case Management  Inpatient   Maternal Child and ED  [facility]     [phone]

## 2020-07-10 NOTE — PROGRESS NOTES
PIV infiltrated after completion of blood infusion. PIV line flushed with saline. Leakage, erythema, and bruising noted to surrounding site. Charge nurse and ANS notified. PIV pulled. Pt denies any pain and discomfort. Will continue to monitor.

## 2020-07-10 NOTE — PLAN OF CARE
PRIMARY DIAGNOSIS: GI BLEED    OUTPATIENT/OBSERVATION GOALS TO BE MET BEFORE DISCHARGE  Orthostatic performed: N/A    Stable Hgb No.   Recent Labs   Lab Test 07/09/20  1620 07/09/20  1008 07/04/20  0635   HGB 7.4* 8.2* 9.4*         Resolved or declined bleeding episodes: Yes Last episode: Per pt, last BM was yesterday    Appropriate testing complete: Yes    Cleared for discharge by consultants (if involved): No    Safe discharge environment identified: Pt will discharge home, possibly with services and help from son.    Discharge Planner Nurse   Safe discharge environment identified: No  Barriers to discharge: No       Entered by: Daly George 07/09/2020 7:38 PM     Please review provider order for any additional goals.   Nurse to notify provider when observation goals have been met and patient is ready for discharge.    VSS except BP elevated at times. Alert, disoriented to situation.  LS-dim.  D5 NS infusing @ 100ml/h.  Hgb recheck was 7.4, pt to receive a unit of blood.  Hemoglobin recheck after transfusion and to receive another unit if under 8.  Purewick in place.  GI following, EGD tomorrow.  Pt NPO except for sips of clears, NPO at midnight.

## 2020-07-11 LAB
ANION GAP SERPL CALCULATED.3IONS-SCNC: 6 MMOL/L (ref 3–14)
BASOPHILS # BLD AUTO: 0 10E9/L (ref 0–0.2)
BASOPHILS NFR BLD AUTO: 0.4 %
BUN SERPL-MCNC: 18 MG/DL (ref 7–30)
CALCIUM SERPL-MCNC: 8 MG/DL (ref 8.5–10.1)
CHLORIDE SERPL-SCNC: 102 MMOL/L (ref 94–109)
CO2 SERPL-SCNC: 20 MMOL/L (ref 20–32)
CREAT SERPL-MCNC: 1.09 MG/DL (ref 0.52–1.04)
DIFFERENTIAL METHOD BLD: ABNORMAL
EOSINOPHIL # BLD AUTO: 0.3 10E9/L (ref 0–0.7)
EOSINOPHIL NFR BLD AUTO: 2.8 %
ERYTHROCYTE [DISTWIDTH] IN BLOOD BY AUTOMATED COUNT: 14.3 % (ref 10–15)
GFR SERPL CREATININE-BSD FRML MDRD: 46 ML/MIN/{1.73_M2}
GLUCOSE SERPL-MCNC: 92 MG/DL (ref 70–99)
HCT VFR BLD AUTO: 25.4 % (ref 35–47)
HGB BLD-MCNC: 8.2 G/DL (ref 11.7–15.7)
HGB BLD-MCNC: 9.1 G/DL (ref 11.7–15.7)
IMM GRANULOCYTES # BLD: 0.2 10E9/L (ref 0–0.4)
IMM GRANULOCYTES NFR BLD: 1.5 %
LYMPHOCYTES # BLD AUTO: 1.8 10E9/L (ref 0.8–5.3)
LYMPHOCYTES NFR BLD AUTO: 15.7 %
MCH RBC QN AUTO: 30 PG (ref 26.5–33)
MCHC RBC AUTO-ENTMCNC: 32.3 G/DL (ref 31.5–36.5)
MCV RBC AUTO: 93 FL (ref 78–100)
MONOCYTES # BLD AUTO: 1.5 10E9/L (ref 0–1.3)
MONOCYTES NFR BLD AUTO: 13 %
NEUTROPHILS # BLD AUTO: 7.5 10E9/L (ref 1.6–8.3)
NEUTROPHILS NFR BLD AUTO: 66.6 %
NRBC # BLD AUTO: 0 10*3/UL
NRBC BLD AUTO-RTO: 0 /100
PLATELET # BLD AUTO: 327 10E9/L (ref 150–450)
POTASSIUM SERPL-SCNC: 4.3 MMOL/L (ref 3.4–5.3)
RBC # BLD AUTO: 2.73 10E12/L (ref 3.8–5.2)
SODIUM SERPL-SCNC: 128 MMOL/L (ref 133–144)
WBC # BLD AUTO: 11.2 10E9/L (ref 4–11)

## 2020-07-11 PROCEDURE — 36415 COLL VENOUS BLD VENIPUNCTURE: CPT | Performed by: HOSPITALIST

## 2020-07-11 PROCEDURE — 25000132 ZZH RX MED GY IP 250 OP 250 PS 637: Mod: GY | Performed by: PHYSICIAN ASSISTANT

## 2020-07-11 PROCEDURE — 25000132 ZZH RX MED GY IP 250 OP 250 PS 637: Mod: GY | Performed by: INTERNAL MEDICINE

## 2020-07-11 PROCEDURE — 99225 ZZC SUBSEQUENT OBSERVATION CARE,LEVEL II: CPT | Performed by: PHYSICIAN ASSISTANT

## 2020-07-11 PROCEDURE — 85025 COMPLETE CBC W/AUTO DIFF WBC: CPT | Performed by: HOSPITALIST

## 2020-07-11 PROCEDURE — 80048 BASIC METABOLIC PNL TOTAL CA: CPT | Performed by: HOSPITALIST

## 2020-07-11 PROCEDURE — 36415 COLL VENOUS BLD VENIPUNCTURE: CPT | Performed by: PHYSICIAN ASSISTANT

## 2020-07-11 PROCEDURE — G0378 HOSPITAL OBSERVATION PER HR: HCPCS

## 2020-07-11 PROCEDURE — 85018 HEMOGLOBIN: CPT | Performed by: PHYSICIAN ASSISTANT

## 2020-07-11 RX ORDER — IPRATROPIUM BROMIDE AND ALBUTEROL SULFATE 2.5; .5 MG/3ML; MG/3ML
1 SOLUTION RESPIRATORY (INHALATION) EVERY 4 HOURS PRN
Status: DISCONTINUED | OUTPATIENT
Start: 2020-07-11 | End: 2020-07-13 | Stop reason: HOSPADM

## 2020-07-11 RX ADMIN — AMLODIPINE BESYLATE 5 MG: 5 TABLET ORAL at 08:59

## 2020-07-11 RX ADMIN — METOPROLOL SUCCINATE 50 MG: 50 TABLET, EXTENDED RELEASE ORAL at 20:08

## 2020-07-11 RX ADMIN — METOPROLOL SUCCINATE 50 MG: 50 TABLET, EXTENDED RELEASE ORAL at 08:59

## 2020-07-11 RX ADMIN — PANTOPRAZOLE SODIUM 40 MG: 40 TABLET, DELAYED RELEASE ORAL at 08:59

## 2020-07-11 RX ADMIN — DOCUSATE SODIUM 100 MG: 100 CAPSULE, LIQUID FILLED ORAL at 13:02

## 2020-07-11 RX ADMIN — LOSARTAN POTASSIUM 100 MG: 100 TABLET, FILM COATED ORAL at 08:59

## 2020-07-11 NOTE — PLAN OF CARE
PRIMARY DIAGNOSIS: Melanotic Stools    OUTPATIENT/OBSERVATION GOALS TO BE MET BEFORE DISCHARGE  Orthostatic performed: No    Stable Hgb Yes.   Recent Labs   Lab Test 07/11/20  0523 07/10/20  0947 07/09/20  2202   HGB 8.2* 8.6* 8.8*         Resolved or declined bleeding episodes: Yes Last episode: before admission    Appropriate testing complete: Yes    Cleared for discharge by consultants (if involved): N/A    Safe discharge environment identified: Yes    Discharge Planner Nurse   Safe discharge environment identified: Yes  Barriers to discharge: Yes       Entered by: LILIAN VIRGEN 07/11/2020   Vital signs:  Temp: 96.1  F (35.6  C) Temp src: Oral BP: (!) 156/63 Pulse: 76 Heart Rate: 76 Resp: 20 SpO2: 94 % O2 Device: None (Room air) Alert and oriented x4 with some forgetfulness. Hasn't had melanotic stool here. As needed stool softener given for not having BM for x2 days. IV line removed due to swelling on the right hand. PT consulted and recommended TCU. Referral sent out per SW and waiting for acceptance. Assist of x2 with walker and gelt belt. Assisted to get up and sit in a recliner. Will continue to monitor.      Please review provider order for any additional goals.   Nurse to notify provider when observation goals have been met and patient is ready for discharge.

## 2020-07-11 NOTE — PLAN OF CARE
PRIMARY DIAGNOSIS: GI BLEED    OUTPATIENT/OBSERVATION GOALS TO BE MET BEFORE DISCHARGE  1. Orthostatic performed: No    2. Stable Hgb: Yes   Recent Labs   Lab Test 07/10/20  0947 07/09/20  2202 07/09/20  1620   HGB 8.6* 8.8* 7.4*         3. Resolved or declined bleeding episodes: Yes    4. Appropriate testing complete: Yes    5. Cleared for discharge by consultants (if involved): Yes    6. Safe discharge environment identified: No, pending referrals    Discharge Planner Nurse   Safe discharge environment identified: No  Barriers to discharge: No       Entered by: Lorin Ordaz 07/11/2020      Please review provider order for any additional goals.   Nurse to notify provider when observation goals have been met and patient is ready for discharge.

## 2020-07-11 NOTE — UTILIZATION REVIEW
[facility]   Concurrent stay review; Secondary Review Determination     [facility]    7/9/2020  9:50 AM      Under the authority of the Utilization Management Committee, the utilization review process indicated a secondary review on the above patient.  The review outcome is based on review of the medical records, discussions with staff, and applying clinical experience noted on the date of the review.          (x) Observation Status Appropriate - Concurrent stay review    RATIONALE FOR DETERMINATION   84 yo female with COPD, HTN and recent admission with fall/debility who presented to the ER with melanotic stools. Labs with acute on chronic anemia for which she received 1 unit PRBCs. GI consulted and EGD revealed two non bleeding superficial ulcers, erythematous duodenopathy and non bleeding erosive gastropathy. Hgb is stable. She is tolerating a regular diet. Disposition is pending TCU placement. She remains appropriate for observation level care pending safe disposition.     The severity of illness, intensity of service provided, expected LOS and risk for adverse outcome make the care appropriate for further observation.    The information on this document is developed by the utilization review team in order for the business office to ensure compliance.  This only denotes the appropriateness of proper admission status and does not reflect the quality of care rendered.         The definitions of Inpatient Status and Observation Status used in making the determination above are those provided in the CMS Coverage Manual, Chapter 1 and Chapter 6, section 70.4.      Sincerely,     Peggy Buenrostro MD MPH  Utilization Management  [facility].

## 2020-07-11 NOTE — PLAN OF CARE
PRIMARY DIAGNOSIS: GI BLEED    OUTPATIENT/OBSERVATION GOALS TO BE MET BEFORE DISCHARGE  1. Orthostatic performed: No    2. Stable Hgb: Yes   Recent Labs   Lab Test 07/10/20  0947 07/09/20  2202 07/09/20  1620   HGB 8.6* 8.8* 7.4*         3. Resolved or declined bleeding episodes: Yes    4. Appropriate testing complete: Yes    5. Cleared for discharge by consultants (if involved): Yes    6. Safe discharge environment identified: No, pending    Discharge Planner Nurse   Safe discharge environment identified: No  Barriers to discharge: No       Entered by: Lorin Ordaz 07/10/2020      Please review provider order for any additional goals.   Nurse to notify provider when observation goals have been met and patient is ready for discharge.

## 2020-07-11 NOTE — PROGRESS NOTES
[facility]  Observation Unit  Progress Note    Date of Service: 7/11/2020    Patient: Anat Correa  MRN: [id number]  Admission Date: 7/9/2020  Hospital Day # 3    Assessment & Plan: Anat Correa is a 85 year old female with a PMH significant for hx of GIB (gastric ulcer 2010), HTN, HLP, CKD, COPD, IBS and recent admission (7/2-7/4) for mechanical fall, debility and Rhabdo and elevated trop due to demand ischemia who was brought to the ED on 7/9 for few days of melanotic stool.    Acute Blood Loss Anemia 2/2 GIB - pt presented with melanotic stools and hgb  7.4 s/p 1 unit prbc on 7/9.  GI consulted and underwent EGD 7/10 which revealed two non bleeding superficial ulcers, erythematous duodenopathy and non bleeding erosive gastropathy.  No further stools overnight.  Hgb stable this morning 8.2.  Tolerating a regular diet  - continue Protonix 40mg daily  - biopsy results pending  - monitor serial hgb levels    Left Ankle Sprain  Hx of Recurrent Falls - s/p mechanical fall earlier this month when hospitalized (7/2-7/4) and discharged to TCU.  Pt reported left ankle pain this admission and xray obtained 7/10 which revealed soft tissue swelling with no acute fracture.  - continue Ace Wrap and weight bearing as tolerated  - pain controlled with prn Tylenol.  No NSAIDs 2/2 GIB  - PT consulted and recommend TCU.  - SW consulted for placement.  Per chart review, patient requesting a new TCU.    HTN  Grade 1 Diastolic Dysfunction - bp stable.  Continue pta Toprol XL, Losartan, Norvasc with hold parameters.  Lasix has been on hold due to GI bleed.    Chronic COPD - no acute exacerbation.  Continue pta Duoneb prn      CKD - creatinine 1.09 improved from recent baseline.    Chronic Hyponatremia - sodium 128 with baseline (128-130)      CODE: DNR  DVT: scd  Diet/fluids: regular  Disposition: pending TCU placement    Kellee YUC  Hospitalist Physician Assistant  [facility]  Pager:  "[phone]      Subjective & Interval Hx:    Patient oriented to person, place and time but forgetful with short term memory.  She reports left ankle pain is controlled today but has not yet ambulated.  She denies chest pain.  No new shortness of breath.  Denies abdominal pain.      Last 24 hr care team notes reviewed.   ROS:  4 point ROS including Respiratory, CV, GI and , other than that noted in the HPI, is negative.    Physical Exam:    Blood pressure (!) 141/56, pulse 70, temperature 97.6  F (36.4  C), temperature source Oral, resp. rate 16, height 1.651 m (5' 5\"), weight 63 kg (138 lb 12.8 oz), SpO2 92 %, not currently breastfeeding.  General: Alert, interactive, NAD, lying in bed, asking me frequently when she is going to leave  HEENT: AT/NC, sclera anicteric  Resp: clear to auscultation bilaterally, no crackles or wheezes  Cardiac: regular rate and rhythm, no murmur  Abdomen: Soft, nontender, nondistended. +BS.  No rebound or guarding.  Extremities: 1+ BLE edema.  Left ankle with ace wrap in place.   Skin: Warm and dry, venous stasis changes bilaterally  Neuro: Alert & oriented x 3, moves all extremities equally    Labs & Images:  Reviewed in Epic   Medications:    Current Facility-Administered Medications   Medication     acetaminophen (TYLENOL) tablet 650 mg     amLODIPine (NORVASC) tablet 5 mg     docusate sodium (COLACE) capsule 100 mg     losartan (COZAAR) tablet 100 mg     May continue current IV fluids if patient has IV fluids infusing.     melatonin tablet 3 mg     metoprolol succinate ER (TOPROL-XL) 24 hr tablet 50 mg     naloxone (NARCAN) injection 0.1-0.4 mg     ondansetron (ZOFRAN-ODT) ODT tab 4 mg    Or     ondansetron (ZOFRAN) injection 4 mg     pantoprazole (PROTONIX) EC tablet 40 mg     prochlorperazine (COMPAZINE) injection 5 mg    Or     prochlorperazine (COMPAZINE) tablet 5 mg    Or     prochlorperazine (COMPAZINE) Suppository 12.5 mg     sodium chloride (PF) 0.9% PF flush 3 mL       "

## 2020-07-11 NOTE — PLAN OF CARE
PRIMARY DIAGNOSIS: Melanotic Stools     OUTPATIENT/OBSERVATION GOALS TO BE MET BEFORE DISCHARGE  Orthostatic performed: No    Stable Hgb Yes.   Recent Labs   Lab Test 07/11/20  0523 07/10/20  0947 07/09/20  2202   HGB 8.2* 8.6* 8.8*         Resolved or declined bleeding episodes: Yes Last episode: before admission    Appropriate testing complete: Yes    Cleared for discharge by consultants (if involved): N/A    Safe discharge environment identified: Yes    Discharge Planner Nurse   Safe discharge environment identified: Yes  Barriers to discharge: Yes       Entered by: LILIAN VIRGEN 07/11/2020   Vital signs:  Temp: 97.6  F (36.4  C) Temp src: Oral BP: (!) 141/56 Pulse: 70 Heart Rate: 76 Resp: 16 SpO2: 92 % O2 Device: None (Room air) Alert and oriented x4 with some forgetfulness. Hasn't had melanotic stool here. IV line removed due to swelling on the right hand. PT consulted and recommended TCU. Referral sent out per SW and waiting for acceptance. Assist of x2 with walker and gelt belt. Will continue to monitor.   Please review provider order for any additional goals.   Nurse to notify provider when observation goals have been met and patient is ready for discharge.

## 2020-07-11 NOTE — PLAN OF CARE
PRIMARY DIAGNOSIS: GI BLEED    OUTPATIENT/OBSERVATION GOALS TO BE MET BEFORE DISCHARGE  1. Orthostatic performed: No    2. Stable Hgb: Yes   Recent Labs   Lab Test 07/10/20  0947 07/09/20  2202 07/09/20  1620   HGB 8.6* 8.8* 7.4*         3. Resolved or declined bleeding episodes: Yes    4. Appropriate testing complete: Yes    5. Cleared for discharge by consultants (if involved): Yes    6. Safe discharge environment identified: No, pending referrals to TCUs    Discharge Planner Nurse   Safe discharge environment identified: No  Barriers to discharge: No       Entered by: Lorin Ordaz 07/11/2020      Please review provider order for any additional goals.   Nurse to notify provider when observation goals have been met and patient is ready for discharge.

## 2020-07-12 LAB — HGB BLD-MCNC: 8.5 G/DL (ref 11.7–15.7)

## 2020-07-12 PROCEDURE — 25000132 ZZH RX MED GY IP 250 OP 250 PS 637: Mod: GY | Performed by: INTERNAL MEDICINE

## 2020-07-12 PROCEDURE — 99225 ZZC SUBSEQUENT OBSERVATION CARE,LEVEL II: CPT | Performed by: PHYSICIAN ASSISTANT

## 2020-07-12 PROCEDURE — G0378 HOSPITAL OBSERVATION PER HR: HCPCS

## 2020-07-12 PROCEDURE — 25000132 ZZH RX MED GY IP 250 OP 250 PS 637: Mod: GY | Performed by: PHYSICIAN ASSISTANT

## 2020-07-12 PROCEDURE — 85018 HEMOGLOBIN: CPT | Performed by: PHYSICIAN ASSISTANT

## 2020-07-12 PROCEDURE — 36415 COLL VENOUS BLD VENIPUNCTURE: CPT | Performed by: PHYSICIAN ASSISTANT

## 2020-07-12 PROCEDURE — 99207 ZZC CDG-MDM COMPONENT: MEETS MODERATE - UP CODED: CPT | Performed by: PHYSICIAN ASSISTANT

## 2020-07-12 RX ADMIN — AMLODIPINE BESYLATE 5 MG: 5 TABLET ORAL at 08:42

## 2020-07-12 RX ADMIN — LOSARTAN POTASSIUM 100 MG: 100 TABLET, FILM COATED ORAL at 08:42

## 2020-07-12 RX ADMIN — PANTOPRAZOLE SODIUM 40 MG: 40 TABLET, DELAYED RELEASE ORAL at 08:42

## 2020-07-12 RX ADMIN — DOCUSATE SODIUM 100 MG: 100 CAPSULE, LIQUID FILLED ORAL at 21:47

## 2020-07-12 RX ADMIN — DOCUSATE SODIUM 100 MG: 100 CAPSULE, LIQUID FILLED ORAL at 08:42

## 2020-07-12 RX ADMIN — METOPROLOL SUCCINATE 50 MG: 50 TABLET, EXTENDED RELEASE ORAL at 08:42

## 2020-07-12 RX ADMIN — METOPROLOL SUCCINATE 50 MG: 50 TABLET, EXTENDED RELEASE ORAL at 21:47

## 2020-07-12 RX ADMIN — MELATONIN TAB 3 MG 3 MG: 3 TAB at 21:47

## 2020-07-12 NOTE — PLAN OF CARE
PRIMARY DIAGNOSIS: GI BLEED    OUTPATIENT/OBSERVATION GOALS TO BE MET BEFORE DISCHARGE  Orthostatic performed: No    Stable Hgb Yes.   Recent Labs   Lab Test 07/11/20  1818 07/11/20  0523 07/10/20  0947   HGB 9.1* 8.2* 8.6*         Resolved or declined bleeding episodes: Yes Last episode: before admission    Appropriate testing complete: Yes    Cleared for discharge by consultants (if involved): No    Safe discharge environment identified: No    Discharge Planner Nurse   Safe discharge environment identified: No  Barriers to discharge: Yes       Entered by: Benjamin Guerrero 07/11/2020          Please review provider order for any additional goals.   Nurse to notify provider when observation goals have been met and patient is ready for discharge.

## 2020-07-12 NOTE — PLAN OF CARE
"PRIMARY DIAGNOSIS: GI BLEED    OUTPATIENT/OBSERVATION GOALS TO BE MET BEFORE DISCHARGE  1. Orthostatic performed: No    2. Stable Hgb Yes.   Recent Labs   Lab Test 07/11/20  1818 07/11/20  0523 07/10/20  0947   HGB 9.1* 8.2* 8.6*         3. Resolved or declined bleeding episodes: Yes Last episode: before admission    4. Appropriate testing complete: Yes    5. Cleared for discharge by consultants (if involved): No    6. Safe discharge environment identified: No    Discharge Planner Nurse   Safe discharge environment identified: No  Barriers to discharge: Yes       Entered by: Benjamin Guerrero 07/11/2020   Pt alert and oriented, very forgetful though. Denies pain this evening. No BM's this evening. L ankle wrapped in ace wrap. CMS intact. Using pure wick. Regular diet. Hgb stable. PT recommends tcu. SW following. Will cont supportive cares.    BP (!) 158/66 (BP Location: Right arm)   Pulse 76   Temp 96.8  F (36  C) (Oral)   Resp 16   Ht 1.651 m (5' 5\")   Wt 63 kg (138 lb 12.8 oz)   SpO2 96%   BMI 23.10 kg/m           Please review provider order for any additional goals.   Nurse to notify provider when observation goals have been met and patient is ready for discharge.  "

## 2020-07-12 NOTE — PLAN OF CARE
PRIMARY DIAGNOSIS: Melanotic Stools     OUTPATIENT/OBSERVATION GOALS TO BE MET BEFORE DISCHARGE  Orthostatic performed: No    Stable Hgb Yes.   Recent Labs   Lab Test 07/12/20  0606 07/11/20  1818 07/11/20  0523   HGB 8.5* 9.1* 8.2*         Resolved or declined bleeding episodes: Yes Last episode: before admission    Appropriate testing complete: Yes    Cleared for discharge by consultants (if involved): N/A    Safe discharge environment identified: Yes    Discharge Planner Nurse   Safe discharge environment identified: Yes  Barriers to discharge: Yes       Entered by: LILIAN VIRGEN 07/12/2020   Vital signs:  Temp: 98.3  F (36.8  C) Temp src: Oral BP: (!) 152/67 Pulse: 76 Heart Rate: 80 Resp: 18 SpO2: 94 % O2 Device: None (Room air) Alert and oriented x4 with forgetfulness. Assist of x2 with walker and gelt belt. Assisted to the bathroom to let pt try having a BM but no success. Stool softener given. Up in recliner for breakfast and lunch. No IV access. On tele running SR/HR in the 80s. On regular diet. Ace wrap on the left ankle. TCU recommended by TCU. SW following with placement. Will continue to monitor and provide supportive care.     Please review provider order for any additional goals.   Nurse to notify provider when observation goals have been met and patient is ready for discharge.

## 2020-07-12 NOTE — PLAN OF CARE
PRIMARY DIAGNOSIS: MELANOTIC STOOL    OUTPATIENT/OBSERVATION GOALS TO BE MET BEFORE DISCHARGE  Orthostatic performed: No    Stable Hgb Yes.   Recent Labs   Lab Test 07/12/20  0606 07/11/20  1818 07/11/20  0523   HGB 8.5* 9.1* 8.2*         Resolved or declined bleeding episodes: Yes Last episode: before admission     Appropriate testing complete: Yes    Cleared for discharge by consultants (if involved): Yes    Safe discharge environment identified: Yes    Discharge Planner Nurse   Safe discharge environment identified: Yes  Barriers to discharge: Yes    Vital signs:  Temp: 98.2  F (36.8  C) Temp src: Oral BP: (!) 158/65 Pulse: 80 Heart Rate: 82 Resp: 18 SpO2: 93 % O2 Device: None (Room air) Alert and oriented x4 with forgetfulness. Assist of x2 with walker and gelt belt. Assisted to the bathroom to let pt try having a BM but no success. Stool softener given. Up in recliner for breakfast. No IV access. On regular diet. Ace wrap on the left ankle. TCU recommended by TCU. SW following with placement. Will continue to monitor and provide supportive care.           Entered by: LILIAN VIRGEN 07/12/2020   Please review provider order for any additional goals.   Nurse to notify provider when observation goals have been met and patient is ready for discharge.

## 2020-07-12 NOTE — PROGRESS NOTES
[facility]  Observation Unit  Progress Note    Date of Service: 7/12/2020    Patient: Anat Correa  MRN: [id number]  Admission Date: 7/9/2020  Hospital Day # 4    Assessment & Plan: Anat Correa is a 85 year old female with a PMH significant for hx of Nephrolithiasis, GIB (gastric ulcer 2010), HTN, HLP, CKD, COPD, Venous Stasis, Peripheral Neuropathy, IBS and recent admission (7/2-7/4) for mechanical fall, debility and Rhabdomyolysis and elevated trop due to demand ischemia who was brought to the ED on 7/9 for few days of melanotic stool.     Acute Blood Loss Anemia 2/2 GIB - pt presented with melanotic stools and hgb  7.4 s/p 1 unit prbc on 7/9.  GI consulted and underwent EGD 7/10 which revealed two non bleeding superficial ulcers, erythematous duodenopathy and non bleeding erosive gastropathy.  No further dark stools.  Hgb stable today at 8.5.  Tolerating a regular diet.  - continue Protonix 40mg daily  - biopsy results pending  - repeat hgb in am or sooner if dark stool noted     Left Ankle Sprain  Hx of Recurrent Falls - s/p mechanical fall earlier this month when hospitalized (7/2-7/4) and discharged to TCU.  Pt reported left ankle pain this admission and xray obtained 7/10 which revealed soft tissue swelling with no acute fracture.  - continue Ace Wrap and weight bearing as tolerated  - pain controlled with prn Tylenol.  No NSAIDs 2/2 GIB  - PT consulted and recommend TCU.  - SW consulted for placement.  Per chart review, patient requesting a new TCU.     HTN  Grade 1 Diastolic Dysfunction  Venous Stasis of BLE - bp stable.  Continue pta Toprol XL, Losartan, Norvasc with hold parameters.  Lasix has been on hold due to GI bleed which has been stable.  Reassess resuming Lasix on 7/13.     Chronic COPD - no acute exacerbation.  Continue pta Duoneb prn       CKD - creatinine 1.09 improved from recent baseline.     Chronic Hyponatremia - sodium 128 stable at recent baseline (128-130).   "Repeat bmp 7/13.     Left upper lobe lung adenocarcinoma, status post video-assisted thoracoscopy and wedge resection in July 2016 - No evidence for recurrence.     CODE: DNR  DVT: scd  Diet/fluids: regular  Disposition: pending TCU placement  Kelleeisaias Verduzcoshaun MCDONALD PA-C  Hospitalist Physician Assistant  [facility]  Pager: [phone]      Subjective & Interval Hx:    Patient reports left ankle pain is controlled.  She reports chronic shortness of breath at baseline.  Denies chest pain, abdominal pain, nausea, emesis.   Tolerating a diet.  Nursing reports no bloody stools overnight.    Last 24 hr care team notes reviewed.   ROS:  4 point ROS including Respiratory, CV, GI and , other than that noted in the HPI, is negative.    Physical Exam:    Blood pressure (!) 158/65, pulse 80, temperature 98.2  F (36.8  C), temperature source Oral, resp. rate 18, height 1.651 m (5' 5\"), weight 63 kg (138 lb 12.8 oz), SpO2 93 %, not currently breastfeeding. on room air  General: Alert, interactive, NAD, sitting up in a chair, pleasant and cooperative.  HEENT: AT/NC, sclera anicteric  Resp: clear to auscultation bilaterally, no crackles or wheezes  Cardiac: regular rate and rhythm, no murmur  Abdomen: Soft, nontender, nondistended. +BS.  No rebound or guarding.  Extremities: 1+ BLE edema, venous stasis changed.  LLE ace wrap in place.  Skin: Warm and dry  Neuro: Alert & oriented x 3 but forgetful, moves all extremities equally    Labs & Images:  Reviewed in Epic   Medications:    Current Facility-Administered Medications   Medication     acetaminophen (TYLENOL) tablet 650 mg     amLODIPine (NORVASC) tablet 5 mg     docusate sodium (COLACE) capsule 100 mg     ipratropium - albuterol 0.5 mg/2.5 mg/3 mL (DUONEB) neb solution 3 mL     losartan (COZAAR) tablet 100 mg     May continue current IV fluids if patient has IV fluids infusing.     melatonin tablet 3 mg     metoprolol succinate ER (TOPROL-XL) 24 hr tablet 50 mg     " naloxone (NARCAN) injection 0.1-0.4 mg     ondansetron (ZOFRAN-ODT) ODT tab 4 mg    Or     ondansetron (ZOFRAN) injection 4 mg     pantoprazole (PROTONIX) EC tablet 40 mg     prochlorperazine (COMPAZINE) injection 5 mg    Or     prochlorperazine (COMPAZINE) tablet 5 mg    Or     prochlorperazine (COMPAZINE) Suppository 12.5 mg     sodium chloride (PF) 0.9% PF flush 3 mL

## 2020-07-12 NOTE — PLAN OF CARE
PRIMARY DIAGNOSIS: GI BLEED     OUTPATIENT/OBSERVATION GOALS TO BE MET BEFORE DISCHARGE  1. Orthostatic performed: No     2. Stable Hgb Yes.         Recent Labs   Lab Test 07/11/20  1818 07/11/20  0523 07/10/20  0947   HGB 9.1* 8.2* 8.6*            3. Resolved or declined bleeding episodes: Yes Last episode: before admission     4. Appropriate testing complete: Yes     5. Cleared for discharge by consultants (if involved): No     6. Safe discharge environment identified: No     Discharge Planner Nurse   Safe discharge environment identified: No  Barriers to discharge: Yes       Entered by: Ced Huerta RN    Alert and Oriented x 4, forgetful. Pt needs encouragement to do her ADL's on her own. Denies pain. Bilateral lower extremities elevated on pillows. Has right ankle sprain, wrapped in ace bandage, denied ice. Pure wick in place, incontinent at baseline. Dispo pending placement, SW and PT following. Referrals sent.      Please review provider order for any additional goals.   Nurse to notify provider when observation goals have been met and patient is ready for discharge.

## 2020-07-12 NOTE — PLAN OF CARE
PRIMARY DIAGNOSIS: GI BLEED     OUTPATIENT/OBSERVATION GOALS TO BE MET BEFORE DISCHARGE  Orthostatic performed: No     Stable Hgb Yes.         Recent Labs   Lab Test 07/11/20  1818 07/11/20  0523 07/10/20  0947   HGB 9.1* 8.2* 8.6*            Resolved or declined bleeding episodes: Yes Last episode: before admission     Appropriate testing complete: Yes     Cleared for discharge by consultants (if involved): No     Safe discharge environment identified: No     Discharge Planner Nurse   Safe discharge environment identified: No  Barriers to discharge: Yes       Entered by: Ced Huerta RN    Alert and Oriented x 4, forgetful. Pt needs encouragement to do her ADL's on her own. Denies pain. Bilateral lower extremities elevated on pillows. Has right ankle sprain, wrapped in ace bandage, denied ice. Pure wick in place, incontinent at baseline. Dispo pending placement, SW and PT following. Referrals sent.      Please review provider order for any additional goals.   Nurse to notify provider when observation goals have been met and patient is ready for discharge.

## 2020-07-13 VITALS
OXYGEN SATURATION: 96 % | TEMPERATURE: 97.7 F | RESPIRATION RATE: 18 BRPM | WEIGHT: 138.8 LBS | DIASTOLIC BLOOD PRESSURE: 83 MMHG | SYSTOLIC BLOOD PRESSURE: 167 MMHG | HEIGHT: 65 IN | BODY MASS INDEX: 23.13 KG/M2 | HEART RATE: 80 BPM

## 2020-07-13 LAB
ANION GAP SERPL CALCULATED.3IONS-SCNC: 4 MMOL/L (ref 3–14)
BUN SERPL-MCNC: 16 MG/DL (ref 7–30)
CALCIUM SERPL-MCNC: 8.1 MG/DL (ref 8.5–10.1)
CHLORIDE SERPL-SCNC: 100 MMOL/L (ref 94–109)
CO2 SERPL-SCNC: 23 MMOL/L (ref 20–32)
CREAT SERPL-MCNC: 1.07 MG/DL (ref 0.52–1.04)
GFR SERPL CREATININE-BSD FRML MDRD: 47 ML/MIN/{1.73_M2}
GLUCOSE SERPL-MCNC: 97 MG/DL (ref 70–99)
HGB BLD-MCNC: 8.6 G/DL (ref 11.7–15.7)
POTASSIUM SERPL-SCNC: 4.7 MMOL/L (ref 3.4–5.3)
SODIUM SERPL-SCNC: 127 MMOL/L (ref 133–144)

## 2020-07-13 PROCEDURE — 25000132 ZZH RX MED GY IP 250 OP 250 PS 637: Mod: GY | Performed by: PHYSICIAN ASSISTANT

## 2020-07-13 PROCEDURE — 99217 ZZC OBSERVATION CARE DISCHARGE: CPT | Performed by: PHYSICIAN ASSISTANT

## 2020-07-13 PROCEDURE — 25000132 ZZH RX MED GY IP 250 OP 250 PS 637: Mod: GY | Performed by: INTERNAL MEDICINE

## 2020-07-13 PROCEDURE — 85018 HEMOGLOBIN: CPT | Performed by: PHYSICIAN ASSISTANT

## 2020-07-13 PROCEDURE — 36415 COLL VENOUS BLD VENIPUNCTURE: CPT | Performed by: PHYSICIAN ASSISTANT

## 2020-07-13 PROCEDURE — G0378 HOSPITAL OBSERVATION PER HR: HCPCS

## 2020-07-13 PROCEDURE — 80048 BASIC METABOLIC PNL TOTAL CA: CPT | Performed by: PHYSICIAN ASSISTANT

## 2020-07-13 RX ORDER — FUROSEMIDE 40 MG
40 TABLET ORAL DAILY
Status: DISCONTINUED | OUTPATIENT
Start: 2020-07-13 | End: 2020-07-13 | Stop reason: HOSPADM

## 2020-07-13 RX ADMIN — PANTOPRAZOLE SODIUM 40 MG: 40 TABLET, DELAYED RELEASE ORAL at 06:57

## 2020-07-13 RX ADMIN — ACETAMINOPHEN 650 MG: 325 TABLET, FILM COATED ORAL at 08:42

## 2020-07-13 RX ADMIN — METOPROLOL SUCCINATE 50 MG: 50 TABLET, EXTENDED RELEASE ORAL at 08:37

## 2020-07-13 RX ADMIN — LOSARTAN POTASSIUM 100 MG: 100 TABLET, FILM COATED ORAL at 08:37

## 2020-07-13 RX ADMIN — AMLODIPINE BESYLATE 5 MG: 5 TABLET ORAL at 08:37

## 2020-07-13 NOTE — PLAN OF CARE
"PRIMARY DIAGNOSIS: GI BLEED    OUTPATIENT/OBSERVATION GOALS TO BE MET BEFORE DISCHARGE  1. Orthostatic performed: No    2. Stable Hgb Yes.   Recent Labs   Lab Test 07/13/20  0543 07/12/20  0606 07/11/20  1818   HGB 8.6* 8.5* 9.1*       3. Resolved or declined bleeding episodes: Yes     4. Appropriate testing complete: yes    5. Cleared for discharge by consultants (if involved): Yes    6. Safe discharge environment identified: No    Discharge Planner Nurse   Safe discharge environment identified: No  Barriers to discharge: Yes       Entered by: Nadiya Negro 07/13/2020 6:41 AM     Please review provider order for any additional goals.   Nurse to notify provider when observation goals have been met and patient is ready for discharge.    BP (!) 151/68 (BP Location: Right arm)   Pulse 77   Temp 96.8  F (36  C) (Oral)   Resp 16   Ht 1.651 m (5' 5\")   Wt 63 kg (138 lb 12.8 oz)   SpO2 94%   BMI 23.10 kg/m    Patient alert and oriented.Calm and cooperative this shift.Repositioned, pure wick in place. Awaiting TCU placement. will continue to monitor and provide supportive cares.  "

## 2020-07-13 NOTE — PLAN OF CARE
Physical Therapy Discharge Summary    Reason for therapy discharge:    Discharged to transitional care facility.    Progress towards therapy goal(s). See goals on Care Plan in [facility] electronic health record for goal details.  Goals not met.  Barriers to achieving goals:   discharge from facility.    Therapy recommendation(s):    Continued therapy is recommended.  Rationale/Recommendations:  PT as indicated at TCU.     Note: Pt not seen by documenting PT on this date. Information obtained from chart review.

## 2020-07-13 NOTE — PROGRESS NOTES
Your information has been submitted on July 13th, 2020 at 03:36:29 PM CDT. The confirmation number is [id number]    Annabella Kent  Care Management Coordinator  [facility]  [phone]

## 2020-07-13 NOTE — PLAN OF CARE
PRIMARY DIAGNOSIS: GI BLEED    OUTPATIENT/OBSERVATION GOALS TO BE MET BEFORE DISCHARGE  Orthostatic performed: No    Stable Hgb Yes.   Recent Labs   Lab Test 07/12/20  0606 07/11/20  1818 07/11/20  0523   HGB 8.5* 9.1* 8.2*         Resolved or declined bleeding episodes: Yes     Appropriate testing complete: Yes    Cleared for discharge by consultants (if involved): Yes    Safe discharge environment identified: No    Discharge Planner Nurse   Safe discharge environment identified: No  Barriers to discharge: Yes       Entered by: Kalli Dunn 07/12/2020 10:51 PM     Please review provider order for any additional goals.   Nurse to notify provider when observation goals have been met and patient is ready for discharge.    VSS, on RA, afebrile. Up Ax1-2 with walker and gait belt. A&O x4. Son at bedside and supportive. Complains of bilateral leg pain, declined pain meds. Purewick in place, still incontinent at times. Regular diet- no appetite. Plan- encourage movement, placement- tcu.

## 2020-07-13 NOTE — PLAN OF CARE
PRIMARY DIAGNOSIS: GI BLEED    OUTPATIENT/OBSERVATION GOALS TO BE MET BEFORE DISCHARGE  1. Orthostatic performed: No    2. Stable Hgb Yes.   Recent Labs   Lab Test 07/13/20  0543 07/12/20  0606 07/11/20  1818   HGB 8.6* 8.5* 9.1*         3. Resolved or declined bleeding episodes: Yes     4. Appropriate testing complete: Yes    5. Cleared for discharge by consultants (if involved): Yes    6. Safe discharge environment identified: Yes    Patient is alert and oriented x4, forgetful. VSS. Denies pain. Awaiting for placement. Up with 1 or 2. TCU placement pending.     Discharge Planner Nurse   Safe discharge environment identified: No  Barriers to discharge: Yes       Entered by: Daly Villanueva 07/13/2020 2:03 PM     Please review provider order for any additional goals.   Nurse to notify provider when observation goals have been met and patient is ready for discharge.

## 2020-07-13 NOTE — PLAN OF CARE
Patient's After Visit Summary was reviewed with patient.  Patient verbalized understanding of After Visit Summary, recommended follow up and was given an opportunity to ask questions.   Discharge medications sent home with patient/family: No  Discharged with HE via w/c to [facility] TCU.       OBSERVATION patient END time: 5:53 PM

## 2020-07-13 NOTE — PLAN OF CARE
"PRIMARY DIAGNOSIS: GI BLEED     OUTPATIENT/OBSERVATION GOALS TO BE MET BEFORE DISCHARGE  1. Orthostatic performed: No     2. Stable Hgb Yes.             Recent Labs   Lab Test 07/12/20  0606 07/11/20  1818 07/11/20  0523   HGB 8.5* 9.1* 8.2*            3. Resolved or declined bleeding episodes: Yes      4. Appropriate testing complete: Yes     5. Cleared for discharge by consultants (if involved): Yes     6. Safe discharge environment identified: No     Discharge Planner Nurse   Safe discharge environment identified: No  Barriers to discharge: Yes       Entered by: Nadiya Negro 07/13/2020 12:51 AM     Please review provider order for any additional goals.   Nurse to notify provider when observation goals have been met and patient is ready for discharge.    /61 (BP Location: Right arm)   Pulse 83   Temp 98.9  F (37.2  C) (Oral)   Resp 16   Ht 1.651 m (5' 5\")   Wt 63 kg (138 lb 12.8 oz)   SpO2 93%   BMI 23.10 kg/m    Patient alert and oriented. Calm and cooperative this shift. Denies pain at this time. Pure wick in place. Patient repositioned. Will continue to monitor and provide supportive cares.    "

## 2020-07-13 NOTE — PLAN OF CARE
PRIMARY DIAGNOSIS: GI BLEED     OUTPATIENT/OBSERVATION GOALS TO BE MET BEFORE DISCHARGE  Orthostatic performed: No     Stable Hgb Yes.         Recent Labs   Lab Test 07/12/20  0606 07/11/20  1818 07/11/20  0523   HGB 8.5* 9.1* 8.2*            Resolved or declined bleeding episodes: Yes      Appropriate testing complete: Yes     Cleared for discharge by consultants (if involved): Yes     Safe discharge environment identified: No     Discharge Planner Nurse   Safe discharge environment identified: No  Barriers to discharge: Yes       Entered by: Kalli Dunn 07/12/2020 10:51 PM     Please review provider order for any additional goals.   Nurse to notify provider when observation goals have been met and patient is ready for discharge.     VSS, on RA, afebrile. Up Ax1-2 with walker and gait belt. A&O x4. Complains of bilateral leg pain, declined pain meds. Purewick in place, still incontinent at times. Regular diet- no appetite. Pt up in recliner chair this evening, needed encouragement. Pt stated she hadn't had a bowel movement since 7/8- colace given tonight with results. Plan- encourage movement, placement- tcu.

## 2020-07-13 NOTE — PLAN OF CARE
PRIMARY DIAGNOSIS: GI BLEED    OUTPATIENT/OBSERVATION GOALS TO BE MET BEFORE DISCHARGE  Orthostatic performed: No    Stable Hgb Yes.   Recent Labs   Lab Test 07/13/20  0543 07/12/20  0606 07/11/20  1818   HGB 8.6* 8.5* 9.1*         Resolved or declined bleeding episodes: Yes     Appropriate testing complete: Yes    Cleared for discharge by consultants (if involved): Yes    Safe discharge environment identified: Yes    Patient is alert and oriented x4, forgetful. VSS. Denies pain. Awaiting for placement. Up with 1 or 2. TCU placement pending.     Discharge Planner Nurse   Safe discharge environment identified: No  Barriers to discharge: Yes       Entered by: Daly Villanueva 07/13/2020 10:06 AM     Please review provider order for any additional goals.   Nurse to notify provider when observation goals have been met and patient is ready for discharge.

## 2020-07-13 NOTE — PROGRESS NOTES
Discharge Planner   Discharge Plans in progress: JOSE RAUL MENDEZ.   Barriers to discharge plan: None anticipated.  Follow up plan: JOSE RAUL RODRIGUEZU. PAS being completed. Son requesting HE WC transport. Reviewed out of pocket cost for [facility] transport, $75 for base rate and $5 per mile to the destination. Pt/family expressed understanding and are agreeable to this. HE WC transport arranged for 1800. Orders to be faxed to (f) [phone]. SW will remain available as needed.        Entered by: Tatyana Lovelace 07/13/2020 2:11 PM       RAYO Avalos   Inpatient Care Coordination  [facility]   [phone]

## 2020-07-13 NOTE — PROGRESS NOTES
[facility]  Hospitalist Progress Note  Shruthi Yost PA-C 07/13/2020    Reason for Stay (Diagnosis): melanotic stools, hospital day #5         Assessment and Plan:      Summary: Anat Correa is a 85 year old female with a PMH significant for hx of Nephrolithiasis, GIB (gastric ulcer 2010), HTN, HLP, CKD, COPD, Venous Stasis, Peripheral Neuropathy, IBS and recent admission (7/2-7/4) for mechanical fall, debility and Rhabdomyolysis and elevated trop due to demand ischemia who was brought to the ED on 7/9 for few days of melanotic stool.     Acute Blood Loss Anemia 2/2 GIB - pt presented with melanotic stools and hgb  7.4 s/p 1 unit prbc on 7/9.  GI consulted and underwent EGD 7/10 which revealed two non bleeding superficial ulcers, erythematous duodenopathy and non bleeding erosive gastropathy.  No further dark stools.  Hgb continues to be stable around mid 8.5.  Tolerating a regular diet.  No more labs needed.  - continue Protonix 40mg daily  - biopsy results pending    Left Ankle Sprain  Hx of Recurrent Falls - s/p mechanical fall earlier this month when hospitalized (7/2-7/4) and discharged to TCU.  Pt reported left ankle pain this admission and xray obtained 7/10 which revealed soft tissue swelling with no acute fracture.  - continue Ace Wrap and weight bearing as tolerated  - pain controlled with prn Tylenol.  No NSAIDs 2/2 GIB  - PT consulted and recommend TCU.  - SW consulted for placement.  Per chart review, patient requesting a new TCU.     HTN  Grade 1 Diastolic Dysfunction  Venous Stasis of BLE - bp stable.  Continue pta Toprol XL, Losartan, Norvasc with hold parameters.  Lasix has been on hold due to GI bleed which has been stable.  --Ok to resume home lasix     Chronic COPD - no acute exacerbation.  Continue pta Duoneb prn       CKD - creatinine 1.09 improved from recent baseline.     Chronic Hyponatremia - sodium 128 stable at recent baseline (126-130).    No more labs  "needed.     Left upper lobe lung adenocarcinoma, status post video-assisted thoracoscopy and wedge resection in July 2016 - No evidence for recurrence.    DVT Prophylaxis: Anti-embolisim stockings (TEDs)  Code Status: DNR / DNI  Discharge Dispo: TCU   Estimated Disch Date / # of Days until Disch: unclear, once TCU bed is available        Interval History (Subjective):      Feels unhappy that she's here in the hospital.   Wants to leave.                   Physical Exam:      Last Vital Signs:  BP (!) 153/63 (BP Location: Right arm)   Pulse 77   Temp 98.3  F (36.8  C) (Oral)   Resp 18   Ht 1.651 m (5' 5\")   Wt 63 kg (138 lb 12.8 oz)   SpO2 94%   BMI 23.10 kg/m        Constitutional: Awake, alert, cooperative, no apparent distress   Respiratory: Clear to auscultation bilaterally, no crackles or wheezing   Cardiovascular: Regular rate and rhythm, normal S1 and S2, and no murmur noted   Abdomen: Normal bowel sounds, soft, non-distended, non-tender   Skin: No rashes, no cyanosis, dry to touch   Neuro: Alert and oriented x3, no weakness, numbness, memory loss   Extremities: Minimal ankle edema, normal range of motion   Other(s):        All other systems: Negative          Medications:      All current medications were reviewed with changes reflected in problem list.         Data:      All new lab and imaging data was reviewed.   Labs:       Lab Results   Component Value Date     07/13/2020     07/11/2020     07/10/2020    Lab Results   Component Value Date    CHLORIDE 100 07/13/2020    CHLORIDE 102 07/11/2020    CHLORIDE 106 07/10/2020    Lab Results   Component Value Date    BUN 16 07/13/2020    BUN 18 07/11/2020    BUN 21 07/10/2020      Lab Results   Component Value Date    POTASSIUM 4.7 07/13/2020    POTASSIUM 4.3 07/11/2020    POTASSIUM 4.2 07/10/2020    Lab Results   Component Value Date    CO2 23 07/13/2020    CO2 20 07/11/2020    CO2 18 07/10/2020    Lab Results   Component Value Date    CR " 1.07 07/13/2020    CR 1.09 07/11/2020    CR 1.12 07/10/2020        Recent Labs   Lab 07/13/20  0543 07/12/20  0606 07/11/20  1818 07/11/20  0523 07/10/20  0947  07/09/20  1008   WBC  --   --   --  11.2* 12.8*  --  13.5*   HGB 8.6* 8.5* 9.1* 8.2* 8.6*   < > 8.2*   HCT  --   --   --  25.4* 26.6*  --  24.8*   MCV  --   --   --  93 94  --  94   PLT  --   --   --  327 344  --  369    < > = values in this interval not displayed.     No results for input(s): DD in the last 168 hours.  Recent Labs   Lab 07/13/20  0543 07/11/20  0523 07/10/20  0947 07/09/20  1008   GLC 97 92 90 95     Imaging:   Results for orders placed or performed during the hospital encounter of 07/09/20   XR Ankle Left G/E 3 Views    Narrative    LEFT ANKLE THREE OR MORE VIEWS   7/10/2020 11:22 AM     HISTORY: Pain, swelling.    COMPARISON: Left ankle x-rays dated 11/3/2015.    FINDINGS:  Irregularity of the midfoot, especially at the  talonavicular and naviculocuneiform joints, is again noted and  consistent with degenerative change. There is pes planus deformity of  the foot. Mild enthesopathic changes are noted at the plantar  aponeurosis origin at the calcaneus. No acute fracture or  malalignment. Ankle mortise and talar dome are symmetric and  well-maintained. Large amount of soft tissue swelling around the  lateral ankle has worsened since the prior study from 2015.       Impression    IMPRESSION:   1. 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  2. Large amount of soft tissue swelling along the lateral malleolus  could represent lateral ankle sprain.  3. Mild enthesopathic changes of the plantar aponeurosis origin.  4. Mild pes planus deformity of the foot is suggested.  5. No acute fracture or malalignment.    GLENN CAGLE MD

## 2020-07-13 NOTE — DISCHARGE SUMMARY
[facility]  Discharge Summary        Anat Correa MRN# [id number]   YOB: 1935 Age: 85 year old     Date of Admission:  7/9/2020  Date of Discharge:  7/13/2020  Admitting Physician:  Sonja Buenrostro MD  Discharge Physician: Shruthi Yost PA-C  Discharging Service: Hospitalist     Primary Provider: Rashi Calle  Primary Care Physician Phone Number: [phone]         Discharge Diagnoses/Problem Oriented Hospital Course (Providers):    Anat Correa was admitted on 7/9/2020 by Sonja Buenrostro MD and I would refer you to their history and physical.  The following problems were addressed during her hospitalization:    1. Acute GI blood loss anemia--sp EGD revealing 2 non-bleeding superficial gastric ulcers  --S/p 1u PRBC    2. Chronic hyponatremia (126-130)--back to baseline    3. Left ankle sprain from recent mechanical fall on 7/2     Secondary Dx:  HTN  Grade 1 Diastolic Dysfunction  Venous Stasis of BLE   Chronic COPD    CKD    Chronic Hyponatremia    Left upper lobe lung adenocarcinoma, status post video-assisted thoracoscopy and wedge resection in July 2016            Code Status:      DNR / DNI        Brief Hospital Stay Summary Sent Home With Patient in AVS:       Anat Correa is a 85 year old female with a PMH significant for hx of Nephrolithiasis, GIB (gastric ulcer 2010), HTN, HLP, CKD, COPD, Venous Stasis, Peripheral Neuropathy, IBS and recent admission (7/2-7/4) for mechanical fall, debility and Rhabdomyolysis and elevated trop due to demand ischemia who was brought to the ED on 7/9 for few days of melanotic stool.     Acute Blood Loss Anemia 2/2 GIB - pt presented with melanotic stools and hgb  7.4 s/p 1 unit prbc on 7/9.  GI consulted and underwent EGD 7/10 which revealed two non bleeding superficial ulcers, erythematous duodenopathy and non bleeding erosive gastropathy.  No further dark stools.  Hgb continues to be stable around mid 8.5.  Tolerating a  regular diet.  No more labs needed.  - continue Protonix 40mg daily  - biopsy results pending         Important Results:      Recent Labs   Lab 07/13/20  0543 07/12/20  0606 07/11/20  1818 07/11/20  0523 07/10/20  0947  07/09/20  1008   WBC  --   --   --  11.2* 12.8*  --  13.5*   HGB 8.6* 8.5* 9.1* 8.2* 8.6*   < > 8.2*   HCT  --   --   --  25.4* 26.6*  --  24.8*   MCV  --   --   --  93 94  --  94   PLT  --   --   --  327 344  --  369    < > = values in this interval not displayed.     Recent Labs   Lab 07/13/20  0543 07/11/20  0523 07/10/20  0947   * 128* 132*   POTASSIUM 4.7 4.3 4.2   CHLORIDE 100 102 106   CO2 23 20 18*   ANIONGAP 4 6 8   GLC 97 92 90   BUN 16 18 21   CR 1.07* 1.09* 1.12*   GFRESTIMATED 47* 46* 45*   GFRESTBLACK 55* 54* 52*   DUSTY 8.1* 8.0* 8.0*       Recent Labs   Lab 07/13/20 0543 07/12/20 0606 07/11/20  1818   HGB 8.6* 8.5* 9.1*              Pending Results:        Unresulted Labs Ordered in the Past 30 Days of this Admission     Date and Time Order Name Status Description    7/10/2020 0813 Surgical pathology exam In process             Discharge Instructions and Follow-Up:            Discharge Disposition:      Discharged to home         Discharge Medications:        Current Discharge Medication List      CONTINUE these medications which have CHANGED    Details   pantoprazole (PROTONIX) 40 MG EC tablet Take 1 tablet (40 mg) by mouth daily Possible GI Bleed  Qty: 60 tablet, Refills: 1    Associated Diagnoses: Melanotic stools         CONTINUE these medications which have NOT CHANGED    Details   acetaminophen (TYLENOL) 325 MG tablet Take 650 mg by mouth 4 times daily as needed for mild pain or pain Limit tylenol to 3g/24hrs.      albuterol (VENTOLIN HFA) 108 (90 Base) MCG/ACT inhaler INHALE TWO PUFFS INTO THE LUNGS EVERY 6 HOURS AS NEEDED FOR SHORTNESS OF BREATH/DYSPNEA  Qty: 54 g, Refills: 3    Comments: Pharmacy may dispense brand covered by insurance (Proair, or proventil or ventolin  or generic albuterol inhaler)  Associated Diagnoses: COPD, moderate (H)      amLODIPine (NORVASC) 5 MG tablet Take 5 mg by mouth daily      furosemide (LASIX) 20 MG tablet Take 2 tablets (40 mg) by mouth daily  Qty: 180 tablet, Refills: 3    Associated Diagnoses: Hypertension goal BP (blood pressure) < 140/90; CKD (chronic kidney disease) stage 4, GFR 15-29 ml/min (H)      losartan (COZAAR) 50 MG tablet Take 2 tablets (100 mg) by mouth daily  Qty: 180 tablet, Refills: 3    Associated Diagnoses: Hypertension goal BP (blood pressure) < 140/90; CKD (chronic kidney disease) stage 4, GFR 15-29 ml/min (H)      Metoprolol Succinate 50 MG CS24 Take 1 capful by mouth 2 times daily      ASPIRIN NOT PRESCRIBED (INTENTIONAL) Please choose reason not prescribed, below  Qty: 0 each, Refills: 0    Associated Diagnoses: Hypertension goal BP (blood pressure) < 140/90      calcium carbonate (TUMS) 500 MG chewable tablet Take 2 chew tab by mouth daily as needed for heartburn      docusate sodium (COLACE) 100 MG capsule Take 100 mg by mouth 2 times daily as needed for constipation **7/8/20: hold in am on 7/9 until seen by MD      ipratropium - albuterol 0.5 mg/2.5 mg/3 mL (DUONEB) 0.5-2.5 (3) MG/3ML neb solution Take 1 vial (3 mLs) by nebulization every 4 hours as needed for shortness of breath / dyspnea or wheezing  Qty: 270 mL, Refills: 3    Associated Diagnoses: COPD, moderate (H)      loperamide (IMODIUM A-D) 2 MG tablet Take 2 mg by mouth 4 times daily as needed for diarrhea               Allergies:         Allergies   Allergen Reactions     Prednisone      Yeast infection - swollen lips     Penicillins Rash           Consultations This Hospital Stay:      Consultation during this admission received from gastroenterology         Condition and Physical on Discharge:      Discharge condition: Stable   Vitals: Blood pressure (!) 153/63, pulse 77, temperature 98.3  F (36.8  C), temperature source Oral, resp. rate 18, height 1.651 m  "(5' 5\"), weight 63 kg (138 lb 12.8 oz), SpO2 94 %, not currently breastfeeding.  138 lbs 12.8 oz          Discharge Time:      <30 min        Image Results From This Hospital Stay (For Non-EPIC Providers):        Results for orders placed or performed during the hospital encounter of 07/09/20   XR Ankle Left G/E 3 Views    Narrative    LEFT ANKLE THREE OR MORE VIEWS   7/10/2020 11:22 AM     HISTORY: Pain, swelling.    COMPARISON: Left ankle x-rays dated 11/3/2015.    FINDINGS:  Irregularity of the midfoot, especially at the  talonavicular and naviculocuneiform joints, is again noted and  consistent with degenerative change. There is pes planus deformity of  the foot. Mild enthesopathic changes are noted at the plantar  aponeurosis origin at the calcaneus. No acute fracture or  malalignment. Ankle mortise and talar dome are symmetric and  well-maintained. Large amount of soft tissue swelling around the  lateral ankle has worsened since the prior study from 2015.       Impression    IMPRESSION:   1. 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  2. Large amount of soft tissue swelling along the lateral malleolus  could represent lateral ankle sprain.  3. Mild enthesopathic changes of the plantar aponeurosis origin.  4. Mild pes planus deformity of the foot is suggested.  5. No acute fracture or malalignment.    GLENN CAGLE MD     "

## 2020-07-13 NOTE — PLAN OF CARE
PRIMARY DIAGNOSIS: GI BLEED    OUTPATIENT/OBSERVATION GOALS TO BE MET BEFORE DISCHARGE  1. Orthostatic performed: No    2. Stable Hgb Yes.   Recent Labs   Lab Test 07/13/20  0543 07/12/20  0606 07/11/20  1818   HGB 8.6* 8.5* 9.1*         3. Resolved or declined bleeding episodes: Yes     4. Appropriate testing complete: Yes    5. Cleared for discharge by consultants (if involved): Yes    6. Safe discharge environment identified: Yes    Patient is alert and oriented x4, forgetful. VSS. Denies pain. Awaiting for placement. Up with 1 or 2.    Discharging today at 6pm via HE w/c.    Discharge Planner Nurse   Safe discharge environment identified: No  Barriers to discharge: Yes       Entered by: Daly Villanueva 07/13/2020 4:29 PM     Please review provider order for any additional goals.   Nurse to notify provider when observation goals have been met and patient is ready for discharge.

## 2020-07-14 VITALS
SYSTOLIC BLOOD PRESSURE: 157 MMHG | HEART RATE: 76 BPM | DIASTOLIC BLOOD PRESSURE: 66 MMHG | WEIGHT: 146 LBS | OXYGEN SATURATION: 95 % | RESPIRATION RATE: 16 BRPM | BODY MASS INDEX: 24.3 KG/M2 | TEMPERATURE: 97.7 F

## 2020-07-14 NOTE — PROGRESS NOTES
[facility]  PRIMARY CARE PROVIDER AND CLINIC:  Rashi Calle MD, [address] [facility] [address]  Chief Complaint   Patient presents with     Hospital F/U     [place] Medical Record Number:  [id number]  Place of Service where encounter took place:  [facility] ([facility] [[id number]]    Anat Correa  is a 85 year old  (1935), PMH of HTN, dCHF, CKD, COPD,  hyponatremia, venous stasis and left upper lobe lung adenocarcinoma presented to ED with melonic stools  admitted to the above facility from  [facility]. Hospital stay 7/9/20 through 7/13/20..  Admitted to this facility for  rehab, medical management and nursing care.    HPI:    HPI information obtained from: facility chart records, facility staff, patient report and [facility] chart review.   Brief Summary of Hospital Course:   Gastric ulcer/anemia; EGD 7/10 shows 2 non bleeding superficial ulcers, patient required 1 unit of PRBC's, Hgb at discharge was 8.6  Hyponatremia: chronic, Na back to baseline by DC (126-1430)  Left ankle sprain: from previous fall on 7/2/20  Updates on Status Since Skilled nursing Admission: ON exam today patient sitting up in WC, alert, pleasant denies abdominal pain or discomfort, denies blood in stools, states she is feeling well, a little tired, denies SOB, cough, congestion, N/V/D or constipation, states she slept well last night and appetite is good.     CODE STATUS/ADVANCE DIRECTIVES DISCUSSION:   DNR / DNI  Patient's living condition: .  ALLERGIES: Prednisone and Penicillins  PAST MEDICAL HISTORY:  has a past medical history of Anemia, Calculus of kidney (1998), Chronic pain, CKD (chronic kidney disease) stage 4, GFR 15-29 ml/min (H) (2008), COPD, moderate (H) (2004), Diverticulosis of colon (without mention of hemorrhage) (7/03), Hemorrhage of gastrointestinal tract, unspecified (4/08), Hyperlipidemia LDL goal <100 (2006), Hypertension goal BP (blood pressure) < 140/90  (), Internal hemorrhoids without mention of complication (), Irritable bowel syndrome (), Lesion of plantar nerve (), Lung nodule (, 3/16), Malignant neoplasm of upper lobe of left lung (H) (), Medication management, OA (osteoarthritis) (), Obesity (BMI 30.0-34.9), Osteoporosis, unspecified (), Other ulcerative colitis (), Peripheral neuropathy, Personal history of colonic polyps (), PONV (postoperative nausea and vomiting), Primary iridocyclitis (), and Venous stasis of lower extremity. She also has no past medical history of Chronic infection, Malignant hyperthermia, or Sleep apnea.  PAST SURGICAL HISTORY:   has a past surgical history that includes HEMORRHOIDECTOMY,INT/EXT,SIMPLE (); APPENDECTOMY ();  DELIVERY ONLY (); REPAIR SLIDING INGUINAL HERNIA (); surgical history of -  (); COLONOSCOPY THRU STOMA, DIAGNOSTIC (); surgical history of -  (); surgical history of -  (3/07); surgical history of -  (); ESOPHAGOSCOPY, DIAGNOSTIC (, , 6/10); surgical history of -  (); surgical history of -  (); Excise mass upper extremity (10/13/2011); Excise mass upper extremity (2012); XR Joint Injection Hip (2015); surgical history of -  (9/15); Amputate toe(s) (Right, 2015); Thoracoscopic wedge resection lung (Left, 2016); and Esophagoscopy, gastroscopy, duodenoscopy (EGD), combined (N/A, 7/10/2020).  FAMILY HISTORY: family history includes Breast Cancer in her sister; Cancer in her sister, sister, and sister; Cancer - colorectal in her brother and brother; Cerebrovascular Disease in her father and mother; Scleroderma in her sister.  SOCIAL HISTORY:   reports that she quit smoking about 26 years ago. Her smoking use included cigarettes. She has a 150.00 pack-year smoking history. She has never used smokeless tobacco. She reports that she does not drink alcohol or use drugs.    Post Discharge Medication  Reconciliation Status: discharge medications reconciled, continue medications without change    Current Outpatient Medications   Medication Sig Dispense Refill     acetaminophen (TYLENOL) 325 MG tablet Take 650 mg by mouth 4 times daily as needed for mild pain or pain Limit tylenol to 3g/24hrs.       albuterol (VENTOLIN HFA) 108 (90 Base) MCG/ACT inhaler INHALE TWO PUFFS INTO THE LUNGS EVERY 6 HOURS AS NEEDED FOR SHORTNESS OF BREATH/DYSPNEA 54 g 3     amLODIPine (NORVASC) 5 MG tablet Take 5 mg by mouth daily       ASPIRIN NOT PRESCRIBED (INTENTIONAL) Please choose reason not prescribed, below 0 each 0     calcium carbonate (TUMS) 500 MG chewable tablet Take 2 chew tab by mouth daily as needed for heartburn       docusate sodium (COLACE) 100 MG capsule Take 100 mg by mouth 2 times daily as needed for constipation **7/8/20: hold in am on 7/9 until seen by MD       furosemide (LASIX) 20 MG tablet Take 2 tablets (40 mg) by mouth daily 180 tablet 3     ipratropium - albuterol 0.5 mg/2.5 mg/3 mL (DUONEB) 0.5-2.5 (3) MG/3ML neb solution Take 1 vial (3 mLs) by nebulization every 4 hours as needed for shortness of breath / dyspnea or wheezing 270 mL 3     loperamide (IMODIUM A-D) 2 MG tablet Take 2 mg by mouth 4 times daily as needed for diarrhea       losartan (COZAAR) 50 MG tablet Take 2 tablets (100 mg) by mouth daily 180 tablet 3     Metoprolol Succinate 50 MG CS24 Take 1 capful by mouth 2 times daily       pantoprazole (PROTONIX) 40 MG EC tablet Take 1 tablet (40 mg) by mouth daily Possible GI Bleed 60 tablet 1         ROS:  10 point ROS of systems including Constitutional, Eyes, Respiratory, Cardiovascular, Gastroenterology, Genitourinary, Integumentary, Musculoskeletal, Psychiatric were all negative except for pertinent positives noted in my HPI.    Vitals:  BP (!) 157/66   Pulse 76   Temp 97.7  F (36.5  C)   Resp 16   Wt 66.2 kg (146 lb)   SpO2 95%   BMI 24.30 kg/m    Exam:  GENERAL APPEARANCE:  Alert, in no  distress  ENT:  Mouth and posterior oropharynx normal, moist mucous membranes, [place]  EYES:  EOM, conjunctivae, lids, pupils and irises normal, PERRL  RESP:  respiratory effort and palpation of chest normal, lungs clear to auscultation , no respiratory distress  CV:  Palpation and auscultation of heart done , regular rate and rhythm, no murmur, rub, or gallop, peripheral edema trace+ in ankles bilaterally  ABDOMEN:  normal bowel sounds, soft, nontender, no hepatosplenomegaly or other masses  M/S:   patient sitting up in WC, able to move all 4 extremities  SKIN:  Inspection of skin and subcutaneous tissue baseline  NEURO:   speech WNL  PSYCH:  affect and mood normal    Lab/Diagnostic data:    Most Recent 3 CBC's:  Recent Labs   Lab Test 07/13/20  0543 07/12/20  0606 07/11/20  1818 07/11/20  0523 07/10/20  0947  07/09/20  1008   WBC  --   --   --  11.2* 12.8*  --  13.5*   HGB 8.6* 8.5* 9.1* 8.2* 8.6*   < > 8.2*   MCV  --   --   --  93 94  --  94   PLT  --   --   --  327 344  --  369    < > = values in this interval not displayed.     Most Recent 3 BMP's:  Recent Labs   Lab Test 07/13/20  0543 07/11/20  0523 07/10/20  0947   * 128* 132*   POTASSIUM 4.7 4.3 4.2   CHLORIDE 100 102 106   CO2 23 20 18*   BUN 16 18 21   CR 1.07* 1.09* 1.12*   ANIONGAP 4 6 8   DUSTY 8.1* 8.0* 8.0*   GLC 97 92 90       ASSESSMENT/PLAN:  Gastrointestinal hemorrhage with melena  Anemia due to blood loss, acute  Physical deconditioning  Acute/ongoing: PT and OT for strengthening, protonix 40mg QD  CBC twice weekly    Hyponatremia  Ongoing: baseline Na 126-130 follow BMP     Hypertension goal BP (blood pressure) < 140/90  Chronic diastolic heart failure (H)  CKD (chronic kidney disease) stage 4, GFR 15-29 ml/min (H)  Acute/ongoing: daily weights, vitals daily and prn, BMP follow, continue lasix 40mg QD, cozaar 100mg QD, norvasc 5mg QD, metoprolol 50mg BID    Malignant neoplasm of upper lobe of left lung (H)  COPD, moderate  Ongoing: HAMZAH aguilar,  continue albuterol MDI 2 puffs q 6 hours prn       Orders written by provider at facility  HAMZAH aguilar  CBC twice weekly  BMP weekly    Total time spent with patient visit at the skilled nursing facility was 40 minutes including patient visit and review of past records. Greater than 50% of total time spent with counseling and coordinating care due to discussed lab monitoring, medications and plan of care, will work with therapy to get stronger and continue current POC.     Electronically signed by:  Tonya Lynn Haase, APRN CNP

## 2020-07-15 ENCOUNTER — NURSING HOME VISIT (OUTPATIENT)
Dept: GERIATRICS | Facility: CLINIC | Age: 85
End: 2020-07-15
Payer: MEDICARE

## 2020-07-15 DIAGNOSIS — N18.4 CKD (CHRONIC KIDNEY DISEASE) STAGE 4, GFR 15-29 ML/MIN (H): ICD-10-CM

## 2020-07-15 DIAGNOSIS — I50.32 CHRONIC DIASTOLIC HEART FAILURE (H): ICD-10-CM

## 2020-07-15 DIAGNOSIS — R53.81 PHYSICAL DECONDITIONING: ICD-10-CM

## 2020-07-15 DIAGNOSIS — J44.9 COPD, MODERATE (H): ICD-10-CM

## 2020-07-15 DIAGNOSIS — C34.12 MALIGNANT NEOPLASM OF UPPER LOBE OF LEFT LUNG (H): ICD-10-CM

## 2020-07-15 DIAGNOSIS — D62 ANEMIA DUE TO BLOOD LOSS, ACUTE: ICD-10-CM

## 2020-07-15 DIAGNOSIS — I10 HYPERTENSION GOAL BP (BLOOD PRESSURE) < 140/90: ICD-10-CM

## 2020-07-15 DIAGNOSIS — K92.1 GASTROINTESTINAL HEMORRHAGE WITH MELENA: Primary | ICD-10-CM

## 2020-07-15 DIAGNOSIS — E87.1 HYPONATREMIA: ICD-10-CM

## 2020-07-15 LAB — COPATH REPORT: NORMAL

## 2020-07-15 PROCEDURE — 99207 ZZC CDG-CODE INCORRECT PER BILLING BASED ON TIME: CPT | Performed by: NURSE PRACTITIONER

## 2020-07-15 PROCEDURE — 99309 SBSQ NF CARE MODERATE MDM 30: CPT | Performed by: NURSE PRACTITIONER

## 2020-07-15 NOTE — LETTER
7/15/2020        RE: Anat Correa  [address] [facility] [address]        [place] [facility]  PRIMARY CARE PROVIDER AND CLINIC:  Rashi Calle MD, [address] [facility] [address]  Chief Complaint   Patient presents with     Hospital F/U     [place] [facility] Record Number:  [id number]  Place of Service where encounter took place:  [facility] ([facility]) [[id number]]    Anat Correa  is a 85 year old  (1935), PMH of HTN, dCHF, CKD, COPD,  hyponatremia, venous stasis and left upper lobe lung adenocarcinoma presented to ED with melonic stools  admitted to the above facility from  [facility]. Hospital stay 7/9/20 through 7/13/20..  Admitted to this facility for  rehab, medical management and nursing care.    HPI:    HPI information obtained from: facility chart records, facility staff, patient report and [facility] chart review.   Brief Summary of Hospital Course:   Gastric ulcer/anemia; EGD 7/10 shows 2 non bleeding superficial ulcers, patient required 1 unit of PRBC's, Hgb at discharge was 8.6  Hyponatremia: chronic, Na back to baseline by DC (126-1430)  Left ankle sprain: from previous fall on 7/2/20  Updates on Status Since Skilled nursing Admission: ON exam today patient sitting up in WC, alert, pleasant denies abdominal pain or discomfort, denies blood in stools, states she is feeling well, a little tired, denies SOB, cough, congestion, N/V/D or constipation, states she slept well last night and appetite is good.     CODE STATUS/ADVANCE DIRECTIVES DISCUSSION:   DNR / DNI  Patient's living condition: .  ALLERGIES: Prednisone and Penicillins  PAST MEDICAL HISTORY:  has a past medical history of Anemia, Calculus of kidney (1998), Chronic pain, CKD (chronic kidney disease) stage 4, GFR 15-29 ml/min (H) (2008), COPD, moderate (H) (2004), Diverticulosis of colon (without mention of hemorrhage) (7/03), Hemorrhage of gastrointestinal tract,  unspecified (), Hyperlipidemia LDL goal <100 (), Hypertension goal BP (blood pressure) < 140/90 (), Internal hemorrhoids without mention of complication (), Irritable bowel syndrome (), Lesion of plantar nerve (), Lung nodule (, 3/16), Malignant neoplasm of upper lobe of left lung (H) (), Medication management, OA (osteoarthritis) (), Obesity (BMI 30.0-34.9), Osteoporosis, unspecified (), Other ulcerative colitis (), Peripheral neuropathy, Personal history of colonic polyps (), PONV (postoperative nausea and vomiting), Primary iridocyclitis (), and Venous stasis of lower extremity. She also has no past medical history of Chronic infection, Malignant hyperthermia, or Sleep apnea.  PAST SURGICAL HISTORY:   has a past surgical history that includes HEMORRHOIDECTOMY,INT/EXT,SIMPLE (); APPENDECTOMY ();  DELIVERY ONLY (); REPAIR SLIDING INGUINAL HERNIA (); surgical history of -  (); COLONOSCOPY THRU STOMA, DIAGNOSTIC (); surgical history of -  (); surgical history of -  (3/07); surgical history of -  (); ESOPHAGOSCOPY, DIAGNOSTIC (, , 6/10); surgical history of -  (); surgical history of -  (); Excise mass upper extremity (10/13/2011); Excise mass upper extremity (2012); XR Joint Injection Hip (2015); surgical history of -  (9/15); Amputate toe(s) (Right, 2015); Thoracoscopic wedge resection lung (Left, 2016); and Esophagoscopy, gastroscopy, duodenoscopy (EGD), combined (N/A, 7/10/2020).  FAMILY HISTORY: family history includes Breast Cancer in her sister; Cancer in her sister, sister, and sister; Cancer - colorectal in her brother and brother; Cerebrovascular Disease in her father and mother; Scleroderma in her sister.  SOCIAL HISTORY:   reports that she quit smoking about 26 years ago. Her smoking use included cigarettes. She has a 150.00 pack-year smoking history. She has never used smokeless  tobacco. She reports that she does not drink alcohol or use drugs.    Post Discharge Medication Reconciliation Status: discharge medications reconciled, continue medications without change    Current Outpatient Medications   Medication Sig Dispense Refill     acetaminophen (TYLENOL) 325 MG tablet Take 650 mg by mouth 4 times daily as needed for mild pain or pain Limit tylenol to 3g/24hrs.       albuterol (VENTOLIN HFA) 108 (90 Base) MCG/ACT inhaler INHALE TWO PUFFS INTO THE LUNGS EVERY 6 HOURS AS NEEDED FOR SHORTNESS OF BREATH/DYSPNEA 54 g 3     amLODIPine (NORVASC) 5 MG tablet Take 5 mg by mouth daily       ASPIRIN NOT PRESCRIBED (INTENTIONAL) Please choose reason not prescribed, below 0 each 0     calcium carbonate (TUMS) 500 MG chewable tablet Take 2 chew tab by mouth daily as needed for heartburn       docusate sodium (COLACE) 100 MG capsule Take 100 mg by mouth 2 times daily as needed for constipation **7/8/20: hold in am on 7/9 until seen by MD       furosemide (LASIX) 20 MG tablet Take 2 tablets (40 mg) by mouth daily 180 tablet 3     ipratropium - albuterol 0.5 mg/2.5 mg/3 mL (DUONEB) 0.5-2.5 (3) MG/3ML neb solution Take 1 vial (3 mLs) by nebulization every 4 hours as needed for shortness of breath / dyspnea or wheezing 270 mL 3     loperamide (IMODIUM A-D) 2 MG tablet Take 2 mg by mouth 4 times daily as needed for diarrhea       losartan (COZAAR) 50 MG tablet Take 2 tablets (100 mg) by mouth daily 180 tablet 3     Metoprolol Succinate 50 MG CS24 Take 1 capful by mouth 2 times daily       pantoprazole (PROTONIX) 40 MG EC tablet Take 1 tablet (40 mg) by mouth daily Possible GI Bleed 60 tablet 1         ROS:  10 point ROS of systems including Constitutional, Eyes, Respiratory, Cardiovascular, Gastroenterology, Genitourinary, Integumentary, Musculoskeletal, Psychiatric were all negative except for pertinent positives noted in my HPI.    Vitals:  BP (!) 157/66   Pulse 76   Temp 97.7  F (36.5  C)   Resp 16    Wt 66.2 kg (146 lb)   SpO2 95%   BMI 24.30 kg/m    Exam:  GENERAL APPEARANCE:  Alert, in no distress  ENT:  Mouth and posterior oropharynx normal, moist mucous membranes, [place]  EYES:  EOM, conjunctivae, lids, pupils and irises normal, PERRL  RESP:  respiratory effort and palpation of chest normal, lungs clear to auscultation , no respiratory distress  CV:  Palpation and auscultation of heart done , regular rate and rhythm, no murmur, rub, or gallop, peripheral edema trace+ in ankles bilaterally  ABDOMEN:  normal bowel sounds, soft, nontender, no hepatosplenomegaly or other masses  M/S:   patient sitting up in WC, able to move all 4 extremities  SKIN:  Inspection of skin and subcutaneous tissue baseline  NEURO:   speech WNL  PSYCH:  affect and mood normal    Lab/Diagnostic data:    Most Recent 3 CBC's:  Recent Labs   Lab Test 07/13/20  0543 07/12/20  0606 07/11/20  1818 07/11/20  0523 07/10/20  0947  07/09/20  1008   WBC  --   --   --  11.2* 12.8*  --  13.5*   HGB 8.6* 8.5* 9.1* 8.2* 8.6*   < > 8.2*   MCV  --   --   --  93 94  --  94   PLT  --   --   --  327 344  --  369    < > = values in this interval not displayed.     Most Recent 3 BMP's:  Recent Labs   Lab Test 07/13/20  0543 07/11/20  0523 07/10/20  0947   * 128* 132*   POTASSIUM 4.7 4.3 4.2   CHLORIDE 100 102 106   CO2 23 20 18*   BUN 16 18 21   CR 1.07* 1.09* 1.12*   ANIONGAP 4 6 8   DUSTY 8.1* 8.0* 8.0*   GLC 97 92 90       ASSESSMENT/PLAN:  Gastrointestinal hemorrhage with melena  Anemia due to blood loss, acute  Physical deconditioning  Acute/ongoing: PT and OT for strengthening, protonix 40mg QD  CBC twice weekly    Hyponatremia  Ongoing: baseline Na 126-130 follow BMP     Hypertension goal BP (blood pressure) < 140/90  Chronic diastolic heart failure (H)  CKD (chronic kidney disease) stage 4, GFR 15-29 ml/min (H)  Acute/ongoing: daily weights, vitals daily and prn, BMP follow, continue lasix 40mg QD, cozaar 100mg QD, norvasc 5mg QD, metoprolol 50mg  BID    Malignant neoplasm of upper lobe of left lung (H)  COPD, moderate  Ongoing: HAMZAH aguilar, continue albuterol MDI 2 puffs q 6 hours prn       Orders written by provider at facility  HAMZAH aguilar  CBC twice weekly  BMP weekly    Total time spent with patient visit at the [facility] nursing [facility] was 40 minutes including patient visit and review of past records. Greater than 50% of total time spent with counseling and coordinating care due to discussed lab monitoring, medications and plan of care, will work with therapy to get stronger and continue current POC.     Electronically signed by:  Tonya Lynn Haase, APRN CNP                         Sincerely,        Tonya Lynn Haase, APRN CNP

## 2020-07-17 ENCOUNTER — TRANSFERRED RECORDS (OUTPATIENT)
Dept: HEALTH INFORMATION MANAGEMENT | Facility: CLINIC | Age: 85
End: 2020-07-17

## 2020-07-17 ENCOUNTER — TELEPHONE (OUTPATIENT)
Dept: GERIATRICS | Facility: CLINIC | Age: 85
End: 2020-07-17

## 2020-07-17 NOTE — TELEPHONE ENCOUNTER
Staff called to report Lab results today:  Hgb 8.2 today (8.6 on 7/13), WBC 10.9 (improved from 11.2), RBC 2.7,  (last 7/11 327) and neutrophils 7.3.     No fevers - c/o chills    Hx of anemia, lung cancer. Otherwise stable per staff    PLAN  No new orders. Repeat CBC on Monday as planned. Monitor.     Electronically signed by CAMACHO Hager GNP

## 2020-07-18 ENCOUNTER — TELEPHONE (OUTPATIENT)
Dept: GERIATRICS | Facility: CLINIC | Age: 85
End: 2020-07-18

## 2020-07-18 NOTE — TELEPHONE ENCOUNTER
Staff called to report UTI symptoms of frequency and low grade fever.  Asking if a UA/UC should be obtained.    Orders:  UA/UC for the above symptoms    Electronically signed by Judy Grant RN, CNP

## 2020-07-20 NOTE — PROGRESS NOTES
[place] [facility]  [place] Medical Record Number:  [id number]  Place of Service where encounter took place:  [facility] ([facility]) [[id number]]  Chief Complaint   Patient presents with     RECHECK       HPI:    Anat Correa  is a 85 year old (1935), who is being seen today for an episodic care visit.  HPI information obtained from: facility chart records, facility staff, patient report and [facility] chart review. Today's concern is:  Recent hospitalization due to GIB due to gastric ulcers. She required one unit PRBC. PMH includes chronic hyponatremia, hypertension, diastolic dysfunction, CKD, lung cancer.   Since in TCU she was started on cipro for dysuria. uc grew out >100k e coli.      Past Medical and Surgical History reviewed in Epic today.    MEDICATIONS:    Current Outpatient Medications   Medication Sig Dispense Refill     acetaminophen (TYLENOL) 325 MG tablet Take 650 mg by mouth 4 times daily as needed for mild pain or pain Limit tylenol to 3g/24hrs.       albuterol (VENTOLIN HFA) 108 (90 Base) MCG/ACT inhaler INHALE TWO PUFFS INTO THE LUNGS EVERY 6 HOURS AS NEEDED FOR SHORTNESS OF BREATH/DYSPNEA 54 g 3     amLODIPine (NORVASC) 5 MG tablet Take 5 mg by mouth daily       ASPIRIN NOT PRESCRIBED (INTENTIONAL) Please choose reason not prescribed, below 0 each 0     calcium carbonate (TUMS) 500 MG chewable tablet Take 2 chew tab by mouth daily as needed for heartburn       docusate sodium (COLACE) 100 MG capsule Take 100 mg by mouth 2 times daily as needed for constipation **7/8/20: hold in am on 7/9 until seen by MD       furosemide (LASIX) 20 MG tablet Take 2 tablets (40 mg) by mouth daily 180 tablet 3     loperamide (IMODIUM A-D) 2 MG tablet Take 2 mg by mouth 4 times daily as needed for diarrhea       losartan (COZAAR) 50 MG tablet Take 2 tablets (100 mg) by mouth daily 180 tablet 3     Metoprolol Succinate 50 MG CS24 Take 1 capful by mouth 2 times daily       pantoprazole  "(PROTONIX) 40 MG EC tablet Take 1 tablet (40 mg) by mouth daily Possible GI Bleed 60 tablet 1         REVIEW OF SYSTEMS:  4 point ROS including Respiratory, CV, GI and , other than that noted in the HPI,  is negative    Objective:  /63   Pulse 60   Temp 98  F (36.7  C)   Resp 16   Ht 1.651 m (5' 5\")   Wt 66 kg (145 lb 8 oz)   SpO2 95%   BMI 24.21 kg/m    Exam:  GENERAL APPEARANCE:  Alert, in no distress  ENT:  Mouth and posterior oropharynx normal, moist mucous membranes, hearing acuity adequate   EYES:  EOM, conjunctivae, lids, pupils and irises normal    RESP:  respiratory effort and palpation of chest normal, no respiratory distress, Lung sounds clear  CV: Edema none  ABDOMEN:  normal bowel sounds, soft, nontender,  M/S:   Gait and station antalgic, Digits and nails normal   SKIN:  Inspection/Palpation of skin and subcutaneous tissue no rash  NEURO: 2-12 in normal limits and at patient's baseline  PSYCH:  insight and judgement, memory intact , affect and mood normal    Labs:   CBC RESULTS:   Recent Labs   Lab Test 07/13/20  0543 07/12/20  0606  07/11/20  0523 07/10/20  0947   WBC  --   --   --  11.2* 12.8*   RBC  --   --   --  2.73* 2.84*   HGB 8.6* 8.5*   < > 8.2* 8.6*   HCT  --   --   --  25.4* 26.6*   MCV  --   --   --  93 94   MCH  --   --   --  30.0 30.3   MCHC  --   --   --  32.3 32.3   RDW  --   --   --  14.3 14.5   PLT  --   --   --  327 344    < > = values in this interval not displayed.       Last Basic Metabolic Panel:   Recent Labs   Lab Test 7/20 07/13/20 0543 07/11/20  0523    127* 128*   POTASSIUM 4.2 4.7 4.3   CHLORIDE  100 102   DUSTY  8.1* 8.0*   CO2  23 20   BUN  16 18   CR 1.1 1.07* 1.09*   GLC  97 92       Liver Function Studies -   Recent Labs   Lab Test 07/09/20  1008 07/02/20  1201   PROTTOTAL 6.7* 6.9   ALBUMIN 2.9* 3.5   BILITOTAL 0.4 0.7   ALKPHOS 64 64   AST 51* 78*   * 30       TSH   Date Value Ref Range Status   10/29/2019 2.65 0.40 - 4.00 mU/L Final "   03/19/2019 2.81 0.40 - 4.00 mU/L Final   ]    Lab Results   Component Value Date    A1C 5.5 07/13/2019    A1C 5.6 10/02/2017           ASSESSMENT/PLAN:    S/p acute gastrointestinal bleeding  Acute blood loss anemia  Recent hospitalization due to acute GI bleed. She was having melanotic stools. hgb dropped to 7.4. she received one unit PRBC. EGD showed two non bleeding superficial ulcers, erythematous duodenopathy and non bleeding erosive gastropathy. Biopsy was negative for malignancy and Helicobacter. She was started on Protonix. hgb has been stable in 8s. Baseline hgb 10s.   -continue protonix  -monitor stool  -hgb 7/23    Chronic hyponatremia  Sodium runs 126-130. Sodium has been stable  -weekly BMP    Diastolic dysfunction  Hypertension  ckd IV  Echocardiogram on 7/3 showed mild concentric LV hypertrophy, EF 55-60%.   Baseline creat 1.4-1.8.   No signs of volume overload today. wts stable. BPs running 116/63, 142/76, 137/75  -continue norvasc 5mg q day  -continue lasix 40mg q day  -continue losartan 100mg q day  -weekly BMP    UTI  uc 7/19 shows >100k e coli. She was started on cipro 500mg q day for 7 days  She denies urinary sx today  -complete cipro    Physical deconditioning  Lives in house. Has had multiple falls lately. She is walking up to 75' with therapy.   -physical therapy and OT      Electronically signed by:  CAMACHO Parekh CNP

## 2020-07-21 ENCOUNTER — NURSING HOME VISIT (OUTPATIENT)
Dept: GERIATRICS | Facility: CLINIC | Age: 85
End: 2020-07-21
Payer: MEDICARE

## 2020-07-21 VITALS
BODY MASS INDEX: 24.24 KG/M2 | RESPIRATION RATE: 16 BRPM | HEART RATE: 60 BPM | WEIGHT: 145.5 LBS | DIASTOLIC BLOOD PRESSURE: 63 MMHG | OXYGEN SATURATION: 95 % | TEMPERATURE: 98 F | HEIGHT: 65 IN | SYSTOLIC BLOOD PRESSURE: 116 MMHG

## 2020-07-21 DIAGNOSIS — E87.1 HYPONATREMIA: ICD-10-CM

## 2020-07-21 DIAGNOSIS — K29.01 GASTROINTESTINAL HEMORRHAGE ASSOCIATED WITH ACUTE GASTRITIS: ICD-10-CM

## 2020-07-21 DIAGNOSIS — D62 ANEMIA DUE TO BLOOD LOSS, ACUTE: Primary | ICD-10-CM

## 2020-07-21 DIAGNOSIS — I51.89 DIASTOLIC DYSFUNCTION: ICD-10-CM

## 2020-07-21 DIAGNOSIS — I10 HYPERTENSION GOAL BP (BLOOD PRESSURE) < 140/90: ICD-10-CM

## 2020-07-21 DIAGNOSIS — R53.81 PHYSICAL DECONDITIONING: ICD-10-CM

## 2020-07-21 DIAGNOSIS — N30.00 ACUTE CYSTITIS WITHOUT HEMATURIA: ICD-10-CM

## 2020-07-21 PROCEDURE — 99309 SBSQ NF CARE MODERATE MDM 30: CPT | Performed by: NURSE PRACTITIONER

## 2020-07-21 ASSESSMENT — MIFFLIN-ST. JEOR: SCORE: 1110.86

## 2020-07-21 NOTE — LETTER
7/21/2020        RE: Anat Correa  [address] [facility] [address]        [place] [facility]  [place] Medical Record Number:  [id number]  Place of Service where encounter took place:  [facility] ([facility]) [[id number]]  Chief Complaint   Patient presents with     RECHECK       HPI:    Anat Correa  is a 85 year old (1935), who is being seen today for an episodic care visit.  HPI information obtained from: facility chart records, facility staff, patient report and [facility] chart review. Today's concern is:  Recent hospitalization due to GIB due to gastric ulcers. She required one unit PRBC. PMH includes chronic hyponatremia, hypertension, diastolic dysfunction, CKD, lung cancer.   Since in TCU she was started on cipro for dysuria. uc grew out >100k e coli.      Past Medical and Surgical History reviewed in Epic today.    MEDICATIONS:    Current Outpatient Medications   Medication Sig Dispense Refill     acetaminophen (TYLENOL) 325 MG tablet Take 650 mg by mouth 4 times daily as needed for mild pain or pain Limit tylenol to 3g/24hrs.       albuterol (VENTOLIN HFA) 108 (90 Base) MCG/ACT inhaler INHALE TWO PUFFS INTO THE LUNGS EVERY 6 HOURS AS NEEDED FOR SHORTNESS OF BREATH/DYSPNEA 54 g 3     amLODIPine (NORVASC) 5 MG tablet Take 5 mg by mouth daily       ASPIRIN NOT PRESCRIBED (INTENTIONAL) Please choose reason not prescribed, below 0 each 0     calcium carbonate (TUMS) 500 MG chewable tablet Take 2 chew tab by mouth daily as needed for heartburn       docusate sodium (COLACE) 100 MG capsule Take 100 mg by mouth 2 times daily as needed for constipation **7/8/20: hold in am on 7/9 until seen by MD       furosemide (LASIX) 20 MG tablet Take 2 tablets (40 mg) by mouth daily 180 tablet 3     loperamide (IMODIUM A-D) 2 MG tablet Take 2 mg by mouth 4 times daily as needed for diarrhea       losartan (COZAAR) 50 MG tablet Take 2 tablets (100 mg) by mouth daily 180  "tablet 3     Metoprolol Succinate 50 MG CS24 Take 1 capful by mouth 2 times daily       pantoprazole (PROTONIX) 40 MG EC tablet Take 1 tablet (40 mg) by mouth daily Possible GI Bleed 60 tablet 1         REVIEW OF SYSTEMS:  4 point ROS including Respiratory, CV, GI and , other than that noted in the HPI,  is negative    Objective:  /63   Pulse 60   Temp 98  F (36.7  C)   Resp 16   Ht 1.651 m (5' 5\")   Wt 66 kg (145 lb 8 oz)   SpO2 95%   BMI 24.21 kg/m    Exam:  GENERAL APPEARANCE:  Alert, in no distress  ENT:  Mouth and posterior oropharynx normal, moist mucous membranes, hearing acuity adequate   EYES:  EOM, conjunctivae, lids, pupils and irises normal    RESP:  respiratory effort and palpation of chest normal, no respiratory distress, Lung sounds clear  CV: Edema none  ABDOMEN:  normal bowel sounds, soft, nontender,  M/S:   Gait and station antalgic, Digits and nails normal   SKIN:  Inspection/Palpation of skin and subcutaneous tissue no rash  NEURO: 2-12 in normal limits and at patient's baseline  PSYCH:  insight and judgement, memory intact , affect and mood normal    Labs:   CBC RESULTS:   Recent Labs   Lab Test 07/13/20  0543 07/12/20  0606  07/11/20  0523 07/10/20  0947   WBC  --   --   --  11.2* 12.8*   RBC  --   --   --  2.73* 2.84*   HGB 8.6* 8.5*   < > 8.2* 8.6*   HCT  --   --   --  25.4* 26.6*   MCV  --   --   --  93 94   MCH  --   --   --  30.0 30.3   MCHC  --   --   --  32.3 32.3   RDW  --   --   --  14.3 14.5   PLT  --   --   --  327 344    < > = values in this interval not displayed.       Last Basic Metabolic Panel:   Recent Labs   Lab Test 7/20 07/13/20 0543 07/11/20  0523    127* 128*   POTASSIUM 4.2 4.7 4.3   CHLORIDE  100 102   DUSTY  8.1* 8.0*   CO2  23 20   BUN  16 18   CR 1.1 1.07* 1.09*   GLC  97 92       Liver Function Studies -   Recent Labs   Lab Test 07/09/20  1008 07/02/20  1201   PROTTOTAL 6.7* 6.9   ALBUMIN 2.9* 3.5   BILITOTAL 0.4 0.7   ALKPHOS 64 64   AST 51* 78* "   * 30       TSH   Date Value Ref Range Status   10/29/2019 2.65 0.40 - 4.00 mU/L Final   03/19/2019 2.81 0.40 - 4.00 mU/L Final   ]    Lab Results   Component Value Date    A1C 5.5 07/13/2019    A1C 5.6 10/02/2017           ASSESSMENT/PLAN:    S/p acute gastrointestinal bleeding  Acute blood loss anemia  Recent hospitalization due to acute GI bleed. She was having melanotic stools. hgb dropped to 7.4. she received one unit PRBC. EGD showed two non bleeding superficial ulcers, erythematous duodenopathy and non bleeding erosive gastropathy. Biopsy was negative for malignancy and Helicobacter. She was started on Protonix. hgb has been stable in 8s. Baseline hgb 10s.   -continue protonix  -monitor stool  -hgb 7/23    Chronic hyponatremia  Sodium runs 126-130. Sodium has been stable  -weekly BMP    Diastolic dysfunction  Hypertension  ckd IV  Echocardiogram on 7/3 showed mild concentric LV hypertrophy, EF 55-60%.   Baseline creat 1.4-1.8.   No signs of volume overload today. wts stable. BPs running 116/63, 142/76, 137/75  -continue norvasc 5mg q day  -continue lasix 40mg q day  -continue losartan 100mg q day  -weekly BMP    UTI  uc 7/19 shows >100k e coli. She was started on cipro 500mg q day for 7 days  She denies urinary sx today  -complete cipro    Physical deconditioning  Lives in house. Has had multiple falls lately. She is walking up to 75' with therapy.   -physical therapy and OT      Electronically signed by:  CAMACHO Parekh CNP               Sincerely,        CAMACHO Parekh CNP

## 2020-07-23 ENCOUNTER — TRANSFERRED RECORDS (OUTPATIENT)
Dept: HEALTH INFORMATION MANAGEMENT | Facility: CLINIC | Age: 85
End: 2020-07-23

## 2020-07-23 NOTE — PROGRESS NOTES
[facility] DISCHARGE SUMMARY  PATIENT'S NAME: Anat Correa  YOB: 1935  MEDICAL RECORD NUMBER:  [id number]  Place of Service where encounter took place:  [facility] ([facility] [[id number]]    PRIMARY CARE PROVIDER AND CLINIC RESPONSIBLE AFTER TRANSFER:   Rashi Calle MD, [address] / [address] [facility] [address]    [facility] Provider     Transferring providers: CAMACHO Parekh CNP, Renzo Russell MD  Recent Hospitalization/ED:  [facility] stay 7/9/20 to 7/13/20.  Date of SNF Admission: July / 13 / 2020  Date of SNF (anticipated) Discharge: July / 27 / 2020  Discharged to: previous independent home  Cognitive Scores: BIMS: 15/15 and Short blessed: 2  Physical Function: Ambulating 70 ft with walker  DME: none    CODE STATUS/ADVANCE DIRECTIVES DISCUSSION:  DNR / DNI   ALLERGIES: Prednisone and Penicillins    DISCHARGE DIAGNOSIS/NURSING FACILITY COURSE:   S/p acute gastrointestinal bleeding  Acute blood loss anemia  Recent hospitalization due to acute GI bleed. She was having melanotic stools. hgb dropped to 7.4. she received one unit PRBC. EGD showed two non bleeding superficial ulcers, erythematous duodenopathy and non bleeding erosive gastropathy. Biopsy was negative for malignancy and Helicobacter. She was started on Protonix. hgb has been stable in 8s. Baseline hgb 10s.   -continue protonix 40mg q day  -follow up with PCP       Chronic hyponatremia  Sodium runs 126-130. Sodium has been stable       Diastolic dysfunction  Hypertension  ckd IV  Echocardiogram on 7/3 showed mild concentric LV hypertrophy, EF 55-60%.   Baseline creat 1.4-1.8.   No signs of volume overload today. wts stable. BPs running 144/70, 127/68, 128/66  -continue norvasc 5mg q day  -continue lasix 40mg q day  -continue losartan 100mg q day       UTI  uc 7/19 shows >100k e coli. She was started on cipro 500mg q day for 7 days  She denies urinary sx today  -complete cipro on  7/27     Physical deconditioning  Lives in house. Has had multiple falls lately. She is walking up to 75' with therapy.   -discharge home with home care from [place]. physical therapy , OCCUPATIONAL THERAPY , RN and HHA for med management, strengthening.     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 She also has no past medical history of Chronic infection, Malignant hyperthermia, or Sleep apnea.    Discharge Medications:    Current Outpatient Medications   Medication Sig Dispense Refill     acetaminophen (TYLENOL) 325 MG tablet Take 650 mg by mouth 4 times daily as needed for mild pain or pain Limit tylenol to 3g/24hrs.       albuterol (VENTOLIN HFA) 108 (90 Base) MCG/ACT inhaler INHALE TWO PUFFS INTO THE LUNGS EVERY 6 HOURS AS NEEDED FOR SHORTNESS OF BREATH/DYSPNEA 54 g 3     amLODIPine (NORVASC) 5 MG tablet Take 5 mg by mouth daily       ASPIRIN NOT PRESCRIBED (INTENTIONAL) Please choose reason not prescribed, below 0 each 0     calcium carbonate (TUMS) 500 MG chewable tablet Take 2 chew tab by mouth daily as needed for heartburn    "    docusate sodium (COLACE) 100 MG capsule Take 100 mg by mouth 2 times daily as needed for constipation **7/8/20: hold in am on 7/9 until seen by MD       furosemide (LASIX) 20 MG tablet Take 2 tablets (40 mg) by mouth daily 180 tablet 3     loperamide (IMODIUM A-D) 2 MG tablet Take 2 mg by mouth 4 times daily as needed for diarrhea       losartan (COZAAR) 50 MG tablet Take 2 tablets (100 mg) by mouth daily 180 tablet 3     Metoprolol Succinate 50 MG CS24 Take 1 capful by mouth 2 times daily       pantoprazole (PROTONIX) 40 MG EC tablet Take 1 tablet (40 mg) by mouth daily Possible GI Bleed 60 tablet 1       Medication Changes/Rationale:     See above    Controlled medications sent with patient:   not applicable/none     ROS:   4 point ROS including Respiratory, CV, GI and , other than that noted in the HPI,  is negative    Physical Exam:   Vitals: BP (!) 144/70   Pulse 65   Temp 98.2  F (36.8  C)   Resp 18   Ht 1.651 m (5' 5\")   Wt 66.4 kg (146 lb 4.8 oz)   SpO2 97%   BMI 24.35 kg/m    BMI= Body mass index is 24.35 kg/m .  GENERAL APPEARANCE:  Alert, in no distress  ENT:  Mouth and posterior oropharynx normal, moist mucous membranes, hearing acuity adequate   EYES:  EOM, conjunctivae, lids, pupils and irises normal    RESP:  respiratory effort  normal, no respiratory distress,   CV:   Edema none  ABDOMEN:  normal bowel sounds, soft, nontender  M/S:   Gait and station not observed, Digits and nails normal   SKIN:  Inspection/Palpation of skin and subcutaneous tissue no rash  NEURO: 2-12 in normal limits and at patient's baseline  PSYCH:  insight and judgement, memory intact , affect and mood normal     SNF labs:   CBC RESULTS:    Recent Labs   Lab Test 7/23 07/13/20  0543 07/12/20  0606  07/11/20  0523 07/10/20  0947   WBC   --   --   --  11.2* 12.8*   RBC   --   --   --  2.73* 2.84*   HGB 8.4 8.6* 8.5*   < > 8.2* 8.6*   HCT   --   --   --  25.4* 26.6*   MCV   --   --   --  93 94   MCH   --   --   --  " 30.0 30.3   MCHC   --   --   --  32.3 32.3   RDW   --   --   --  14.3 14.5   PLT   --   --   --  327 344     < > = values in this interval not displayed.       Last Basic Metabolic Panel:   Recent Labs   Lab Test 7/20 07/13/20  0543 07/11/20  0523    127* 128*   POTASSIUM 4.2 4.7 4.3   CHLORIDE  100 102   DUSTY  8.1* 8.0*   CO2  23 20   BUN  16 18   CR 1.10 1.07* 1.09*   GLC  97 92       Liver Function Studies -   Recent Labs   Lab Test 07/09/20  1008 07/02/20  1201   PROTTOTAL 6.7* 6.9   ALBUMIN 2.9* 3.5   BILITOTAL 0.4 0.7   ALKPHOS 64 64   AST 51* 78*   * 30       TSH   Date Value Ref Range Status   10/29/2019 2.65 0.40 - 4.00 mU/L Final   03/19/2019 2.81 0.40 - 4.00 mU/L Final   ]    Lab Results   Component Value Date    A1C 5.5 07/13/2019    A1C 5.6 10/02/2017           DISCHARGE PLAN:    Follow up labs: No labs orders/due    Medical Follow Up:      Follow up with primary care provider in 1 weeks    MTM referral needed and placed by this provider: No    Current [place] scheduled appointments:  Next 5 appointments (look out 90 days)    Aug 03, 2020  4:10 PM CDT  [facility] [address] [place] [id number] [phone]           Discharge Services: Home Care:  Occupational Therapy, Physical Therapy, Registered Nurse and Home Health Aide    Discharge Instructions Verbalized to Patient at Discharge:     None      TOTAL DISCHARGE TIME:   Greater than 30 minutes  Electronically signed by:  CAMACHO Parekh CNP     Home care Face to Face documentation done in [facility] attached to Home care orders for [facility].

## 2020-07-24 ENCOUNTER — DISCHARGE SUMMARY NURSING HOME (OUTPATIENT)
Dept: GERIATRICS | Facility: CLINIC | Age: 85
End: 2020-07-24
Payer: MEDICARE

## 2020-07-24 VITALS
RESPIRATION RATE: 18 BRPM | SYSTOLIC BLOOD PRESSURE: 144 MMHG | OXYGEN SATURATION: 97 % | TEMPERATURE: 98.2 F | DIASTOLIC BLOOD PRESSURE: 70 MMHG | HEART RATE: 65 BPM | BODY MASS INDEX: 24.37 KG/M2 | HEIGHT: 65 IN | WEIGHT: 146.3 LBS

## 2020-07-24 DIAGNOSIS — I10 HYPERTENSION GOAL BP (BLOOD PRESSURE) < 140/90: ICD-10-CM

## 2020-07-24 DIAGNOSIS — R53.81 PHYSICAL DECONDITIONING: ICD-10-CM

## 2020-07-24 DIAGNOSIS — E87.1 HYPONATREMIA: ICD-10-CM

## 2020-07-24 DIAGNOSIS — N30.00 ACUTE CYSTITIS WITHOUT HEMATURIA: ICD-10-CM

## 2020-07-24 DIAGNOSIS — D62 ANEMIA DUE TO BLOOD LOSS, ACUTE: ICD-10-CM

## 2020-07-24 DIAGNOSIS — K29.01 GASTROINTESTINAL HEMORRHAGE ASSOCIATED WITH ACUTE GASTRITIS: Primary | ICD-10-CM

## 2020-07-24 DIAGNOSIS — I51.89 DIASTOLIC DYSFUNCTION: ICD-10-CM

## 2020-07-24 DIAGNOSIS — N18.4 CKD (CHRONIC KIDNEY DISEASE) STAGE 4, GFR 15-29 ML/MIN (H): ICD-10-CM

## 2020-07-24 PROCEDURE — 99316 NF DSCHRG MGMT 30 MIN+: CPT | Performed by: NURSE PRACTITIONER

## 2020-07-24 ASSESSMENT — MIFFLIN-ST. JEOR: SCORE: 1114.49

## 2020-07-24 NOTE — LETTER
7/24/2020        RE: Anat Correa  [address]  [place] [address]        [facility] DISCHARGE SUMMARY  PATIENT'S NAME: Anat Correa  YOB: 1935  MEDICAL RECORD NUMBER:  [id number]  Place of Service where encounter took place:  [facility] [[id number]]    PRIMARY CARE PROVIDER AND CLINIC RESPONSIBLE AFTER TRANSFER:   Rashi Calle MD, [address] [facility] [address] [facility] [address]    [facility] Provider     Transferring providers: CAMACHO Parekh CNP, Renzo Russell MD  Recent Hospitalization/ED:  [facility] stay 7/9/20 to 7/13/20.  Date of SNF Admission: July / 13 / 2020  Date of SNF (anticipated) Discharge: July / 27 / 2020  Discharged to: previous independent home  Cognitive Scores: BIMS: 15/15 and Short blessed: 2  Physical Function: Ambulating 70 ft with walker  DME: none    CODE STATUS/ADVANCE DIRECTIVES DISCUSSION:  DNR / DNI   ALLERGIES: Prednisone and Penicillins    DISCHARGE DIAGNOSIS/NURSING FACILITY COURSE:   S/p acute gastrointestinal bleeding  Acute blood loss anemia  Recent hospitalization due to acute GI bleed. She was having melanotic stools. hgb dropped to 7.4. she received one unit PRBC. EGD showed two non bleeding superficial ulcers, erythematous duodenopathy and non bleeding erosive gastropathy. Biopsy was negative for malignancy and Helicobacter. She was started on Protonix. hgb has been stable in 8s. Baseline hgb 10s.   -continue protonix 40mg q day  -follow up with PCP       Chronic hyponatremia  Sodium runs 126-130. Sodium has been stable       Diastolic dysfunction  Hypertension  ckd IV  Echocardiogram on 7/3 showed mild concentric LV hypertrophy, EF 55-60%.   Baseline creat 1.4-1.8.   No signs of volume overload today. wts stable. BPs running 144/70, 127/68, 128/66  -continue norvasc 5mg q day  -continue lasix 40mg q day  -continue losartan 100mg q day       UTI  uc 7/19 shows >100k  e coli. She was started on cipro 500mg q day for 7 days  She denies urinary sx today  -complete cipro on 7/27     Physical deconditioning  Lives in house. Has had multiple falls lately. She is walking up to 75' with therapy.   -discharge home with home care from [place]. physical therapy , OCCUPATIONAL THERAPY , RN and HHA for med management, strengthening.     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 She also has no past medical history of Chronic infection, Malignant hyperthermia, or Sleep apnea.    Discharge Medications:    Current Outpatient Medications   Medication Sig Dispense Refill     acetaminophen (TYLENOL) 325 MG tablet Take 650 mg by mouth 4 times daily as needed for mild pain or pain Limit tylenol to 3g/24hrs.       albuterol (VENTOLIN HFA) 108 (90 Base) MCG/ACT inhaler INHALE TWO PUFFS INTO THE LUNGS EVERY 6 HOURS AS NEEDED FOR SHORTNESS OF BREATH/DYSPNEA 54 g 3     amLODIPine (NORVASC) 5 MG tablet Take 5 mg by mouth daily       ASPIRIN NOT PRESCRIBED (INTENTIONAL) Please choose reason not prescribed, below 0 each 0      "calcium carbonate (TUMS) 500 MG chewable tablet Take 2 chew tab by mouth daily as needed for heartburn       docusate sodium (COLACE) 100 MG capsule Take 100 mg by mouth 2 times daily as needed for constipation **7/8/20: hold in am on 7/9 until seen by MD       furosemide (LASIX) 20 MG tablet Take 2 tablets (40 mg) by mouth daily 180 tablet 3     loperamide (IMODIUM A-D) 2 MG tablet Take 2 mg by mouth 4 times daily as needed for diarrhea       losartan (COZAAR) 50 MG tablet Take 2 tablets (100 mg) by mouth daily 180 tablet 3     Metoprolol Succinate 50 MG CS24 Take 1 capful by mouth 2 times daily       pantoprazole (PROTONIX) 40 MG EC tablet Take 1 tablet (40 mg) by mouth daily Possible GI Bleed 60 tablet 1       Medication Changes/Rationale:     See above    Controlled medications sent with patient:   not applicable/none     ROS:   4 point ROS including Respiratory, CV, GI and , other than that noted in the HPI,  is negative    Physical Exam:   Vitals: BP (!) 144/70   Pulse 65   Temp 98.2  F (36.8  C)   Resp 18   Ht 1.651 m (5' 5\")   Wt 66.4 kg (146 lb 4.8 oz)   SpO2 97%   BMI 24.35 kg/m    BMI= Body mass index is 24.35 kg/m .  GENERAL APPEARANCE:  Alert, in no distress  ENT:  Mouth and posterior oropharynx normal, moist mucous membranes, hearing acuity adequate   EYES:  EOM, conjunctivae, lids, pupils and irises normal    RESP:  respiratory effort  normal, no respiratory distress,   CV:   Edema none  ABDOMEN:  normal bowel sounds, soft, nontender  M/S:   Gait and station not observed, Digits and nails normal   SKIN:  Inspection/Palpation of skin and subcutaneous tissue no rash  NEURO: 2-12 in normal limits and at patient's baseline  PSYCH:  insight and judgement, memory intact , affect and mood normal     SNF labs:   CBC RESULTS:    Recent Labs   Lab Test 7/23 07/13/20  0543 07/12/20  0606  07/11/20  0523 07/10/20  0947   WBC   --   --   --  11.2* 12.8*   RBC   --   --   --  2.73* 2.84*   HGB 8.4 8.6* " 8.5*   < > 8.2* 8.6*   HCT   --   --   --  25.4* 26.6*   MCV   --   --   --  93 94   MCH   --   --   --  30.0 30.3   MCHC   --   --   --  32.3 32.3   RDW   --   --   --  14.3 14.5   PLT   --   --   --  327 344     < > = values in this interval not displayed.       Last Basic Metabolic Panel:   Recent Labs   Lab Test 7/20 07/13/20  0543 07/11/20  0523    127* 128*   POTASSIUM 4.2 4.7 4.3   CHLORIDE  100 102   DUSTY  8.1* 8.0*   CO2  23 20   BUN  16 18   CR 1.10 1.07* 1.09*   GLC  97 92       Liver Function Studies -   Recent Labs   Lab Test 07/09/20  1008 07/02/20  1201   PROTTOTAL 6.7* 6.9   ALBUMIN 2.9* 3.5   BILITOTAL 0.4 0.7   ALKPHOS 64 64   AST 51* 78*   * 30       TSH   Date Value Ref Range Status   10/29/2019 2.65 0.40 - 4.00 mU/L Final   03/19/2019 2.81 0.40 - 4.00 mU/L Final   ]    Lab Results   Component Value Date    A1C 5.5 07/13/2019    A1C 5.6 10/02/2017           DISCHARGE PLAN:    Follow up labs: No labs orders/due    Medical Follow Up:      Follow up with primary care provider in 1 weeks    MT referral needed and placed by this provider: No    Current [place] scheduled appointments:  Next 5 appointments (look out 90 days)    Aug 03, 2020  4:10 PM CDT  Office Visit with [facility] MD  [facility] [address] [place] [id number] [phone]           Discharge Services: Home Care:  Occupational Therapy, Physical Therapy, Registered Nurse and Home Health Aide    Discharge Instructions Verbalized to Patient at Discharge:     None      TOTAL DISCHARGE TIME:   Greater than 30 minutes  Electronically signed by:  CAMACHO Parekh CNP     Home care Face to Face documentation done in [facility] attached to Home care orders for [facility].                     Sincerely,        CAMACHO Parekh CNP

## 2020-07-28 ENCOUNTER — TELEPHONE (OUTPATIENT)
Dept: FAMILY MEDICINE | Facility: CLINIC | Age: 85
End: 2020-07-28

## 2020-07-28 NOTE — TELEPHONE ENCOUNTER
"Hospital/TCU/ED for chronic condition Discharge Protocol    \"Hi, my name is Annika Lawson RN, a registered nurse, and I am calling from [facility].  I am calling to follow up and see how things are going for you after your recent emergency visit/hospital/TCU stay.\"    Tell me how you are doing now that you are home?\" Patient to have home care arriving today      Discharge Instructions    \"Let's review your discharge instructions.  What is/are the follow-up recommendations?  Pt. Response: patient to follow up with PCP in 7 days    \"Has an appointment with your primary care provider been scheduled?\"   Yes. (confirm)    \"When you see the provider, I would recommend that you bring your medications with you.\"    Medications    \"Tell me what changed about your medicines when you discharged?\"    Changes to chronic meds?    0-1    \"What questions do you have about your medications?\"    None     New diagnoses of heart failure, COPD, diabetes, or MI?    No              Post Discharge Medication Reconciliation Status: discharge medications reconciled, continue medications without change.    Was MTM referral placed (*Make sure to put transitions as reason for referral)?   No    Call Summary    \"What questions or concerns do you have about your recent visit and your follow-up care?\"     none    \"If you have questions or things don't continue to improve, we encourage you contact us through the main clinic number (give number).  Even if the clinic is not open, triage nurses are available 24/7 to help you.     We would like you to know that our clinic has extended hours (provide information).  We also have urgent care (provide details on closest location and hours/contact info)\"      \"Thank you for your time and take care!\"    TANNER Giang, RN  [employer] Triage         "

## 2020-07-28 NOTE — TELEPHONE ENCOUNTER
Patient discharged from  [facility] for inpatient hospital stay on 7/27 for GI hemorrhage associated with acute gastritis.    Please contact patient to follow up;  appointment scheduled with TS 7/29 and 8/3.    ER / IP:  2/1    Care Coordination:  pete Parker

## 2020-07-29 ENCOUNTER — VIRTUAL VISIT (OUTPATIENT)
Dept: FAMILY MEDICINE | Facility: CLINIC | Age: 85
End: 2020-07-29
Payer: MEDICARE

## 2020-07-29 DIAGNOSIS — R53.81 DEBILITY: ICD-10-CM

## 2020-07-29 DIAGNOSIS — N18.4 ANEMIA IN STAGE 4 CHRONIC KIDNEY DISEASE (H): ICD-10-CM

## 2020-07-29 DIAGNOSIS — M81.0 OSTEOPOROSIS WITHOUT CURRENT PATHOLOGICAL FRACTURE, UNSPECIFIED OSTEOPOROSIS TYPE: ICD-10-CM

## 2020-07-29 DIAGNOSIS — D62 ANEMIA DUE TO BLOOD LOSS, ACUTE: ICD-10-CM

## 2020-07-29 DIAGNOSIS — D63.1 ANEMIA IN STAGE 4 CHRONIC KIDNEY DISEASE (H): ICD-10-CM

## 2020-07-29 DIAGNOSIS — N25.81 SECONDARY RENAL HYPERPARATHYROIDISM (H): ICD-10-CM

## 2020-07-29 DIAGNOSIS — G89.29 OTHER CHRONIC PAIN: ICD-10-CM

## 2020-07-29 DIAGNOSIS — N18.4 CKD (CHRONIC KIDNEY DISEASE) STAGE 4, GFR 15-29 ML/MIN (H): ICD-10-CM

## 2020-07-29 DIAGNOSIS — J44.9 COPD, MODERATE (H): ICD-10-CM

## 2020-07-29 DIAGNOSIS — E55.9 VITAMIN D DEFICIENCY: ICD-10-CM

## 2020-07-29 DIAGNOSIS — R29.6 RECURRENT FALLS: ICD-10-CM

## 2020-07-29 DIAGNOSIS — G63 POLYNEUROPATHY ASSOCIATED WITH UNDERLYING DISEASE (H): ICD-10-CM

## 2020-07-29 DIAGNOSIS — K92.2 UPPER GI BLEED: Primary | ICD-10-CM

## 2020-07-29 DIAGNOSIS — I87.8 VENOUS STASIS OF LOWER EXTREMITY: ICD-10-CM

## 2020-07-29 DIAGNOSIS — M15.0 PRIMARY OSTEOARTHRITIS INVOLVING MULTIPLE JOINTS: ICD-10-CM

## 2020-07-29 DIAGNOSIS — R35.0 FREQUENT URINATION: ICD-10-CM

## 2020-07-29 DIAGNOSIS — Z51.81 MEDICATION MONITORING ENCOUNTER: ICD-10-CM

## 2020-07-29 DIAGNOSIS — I51.89 DIASTOLIC DYSFUNCTION: ICD-10-CM

## 2020-07-29 DIAGNOSIS — I10 HYPERTENSION GOAL BP (BLOOD PRESSURE) < 140/90: ICD-10-CM

## 2020-07-29 DIAGNOSIS — E87.1 HYPONATREMIA: ICD-10-CM

## 2020-07-29 PROCEDURE — 99443 PR PHYSICIAN TELEPHONE EVALUATION 21-30 MIN: CPT | Performed by: FAMILY MEDICINE

## 2020-07-29 RX ORDER — PANTOPRAZOLE SODIUM 40 MG/1
40 TABLET, DELAYED RELEASE ORAL DAILY
Qty: 30 TABLET | Refills: 3 | Status: ON HOLD | OUTPATIENT
Start: 2020-07-29 | End: 2021-01-01

## 2020-07-29 NOTE — PROGRESS NOTES
[facility] [place]    Telephone visit  - [phone]    Subjective    Anat Correa is a 85 year old female who is being evaluated via a billable telephone visit.      Chief Complaint   Patient presents with     RECHECK     Follow up after discharge from [facility] after multiple falls at home - was supposed to be there a few more weeks, but discharged abruptly on 7/27, uncertain why, recent admission for GI Bleed hgb stable in 8's, and chronic hyponatremia in upper 120's, physical deconditioning, has 1 fall at home, while getting help from son, has wheelchair, transfer belt, commode, and walker    Son reports she is still very weak and needs additional help and being home may not be the safest for the patient - family with 24/7 since - home care referral yesterday - in process getting needed DME for home, awaiting recommendations    Son would like referral to hospice, patient desires DNR/DNI/Tube feeding, declines AL/NH, declines new meds, wants no further testing    Son is on phone call too and has many questions    [facility] Discharge Summary    DISCHARGE DIAGNOSIS/NURSING FACILITY COURSE:     S/p acute gastrointestinal bleeding  Acute blood loss anemia  Recent hospitalization due to acute GI bleed. She was having melanotic stools. hgb dropped to 7.4. she received one unit PRBC. EGD showed two non bleeding superficial ulcers, erythematous duodenopathy and non bleeding erosive gastropathy. Biopsy was negative for malignancy and Helicobacter. She was started on Protonix. hgb has been stable in 8s. Baseline hgb 10s.   -continue protonix 40mg q day  -follow up with PCP     Chronic hyponatremia  Sodium runs 126-130. Sodium has been stable     Diastolic dysfunction  Hypertension  ckd IV  Echocardiogram on 7/3 showed mild concentric LV hypertrophy, EF 55-60%.   Baseline creat 1.4-1.8.   No signs of volume overload today. wts stable. BPs running 144/70, 127/68, 128/66  -continue norvasc 5mg q  day  -continue lasix 40mg q day  -continue losartan 100mg q day     UTI  uc 7/19 shows >100k e coli. She was started on cipro 500mg q day for 7 days  She denies urinary sx today  -complete cipro on 7/27     Physical deconditioning  Lives in house. Has had multiple falls lately. She is walking up to 75' with therapy.   -discharge home with home care from [facility]. physical therapy , OCCUPATIONAL THERAPY , RN and HHA for med management, strengthening.     PMH    Past Medical History:   Diagnosis Date     Anemia      Calculus of kidney 1998    dr hope     Chronic pain     OA     CKD (chronic kidney disease) stage 4, GFR 15-29 ml/min (H) 2008    dr mcdermott     COPD, moderate (H) 2004    moderate obstruction, with pulm htn - dr francisco did AAP     Diverticulosis of colon (without mention of hemorrhage) 7/03     Hemorrhage of gastrointestinal tract, unspecified 4/08    dr su     Hyperlipidemia LDL goal <100 2006     Hypertension goal BP (blood pressure) < 140/90 2005     Internal hemorrhoids without mention of complication 7/03     Irritable bowel syndrome 7/03     Lesion of plantar nerve 8/98    hay's neuroma dr connor     Lung nodule 4/14, 3/16    Dr Thompson, 1.4 x 1.1 cm, lingula, PET neg 3/16     Malignant neoplasm of upper lobe of left lung (H) 7/16    Dr Thompson - x 2 nodules - moderately differentiated adenocarcinoma (acinar predominant).  Final pathologic stage [id number], Final clinical stage IA, grade 2     Medication management     OA - 1 T#3 at HS with HX CKD     OA (osteoarthritis) 1998    lower ext worse katya knees     Obesity (BMI 30.0-34.9)      Osteoporosis, unspecified 2/02    FOSAMAX  = upset stomach , pt declines further rx.      Other ulcerative colitis 7/03    collangenous collitis- last colonoscopy 7/03      Peripheral neuropathy     early - burning at HS     Personal history of colonic polyps 7/98    dr araujo     PONJERE (postoperative nausea and vomiting)      Primary iridocyclitis 1998    iritis  dr dominguez     Venous stasis of lower extremity        PSH    Past Surgical History:   Procedure Laterality Date     AMPUTATE TOE(S) Right 2015    Procedure: AMPUTATE TOE(S);  Surgeon: Ashia White, DPM, Pod;  Location: [facility] OR     C APPENDECTOMY       C  DELIVERY ONLY      , Low Cervical     ESOPHAGOSCOPY, GASTROSCOPY, DUODENOSCOPY (EGD), COMBINED N/A 7/10/2020    Procedure: ESOPHAGOGASTRODUODENOSCOPY, WITH BIOPSY with stomach biopsies by jumbo forceps;  Surgeon: Harry Winter MD;  Location: [facility] GI     EXCISE MASS UPPER EXTREMITY  10/13/2011    Lt Forearm mass excision - dr ely     EXCISE MASS UPPER EXTREMITY  2012    Lt Forearm mass excision - dr ely     HC COLONOSCOPY THRU STOMA, DIAGNOSTIC      IBS/diverticula/hemmorhoids dr araujo     HC ESOPHAGOSCOPY, DIAGNOSTIC  , , 6/10    Gastric ulcer, healed, gastritis     HC HEMORRHOIDECTOMY,INT/EXT,SIMPLE       HC REPAIR SLIDING INGUINAL HERNIA      mitra     SURGICAL HISTORY OF -       mitra neck & back tumors     SURGICAL HISTORY OF -       neuroma excision - 2nd toe amputation     SURGICAL HISTORY OF -   3/07    Rt IOL - dr jimenez     SURGICAL HISTORY OF -       Lt IOL - dr jimenez     SURGICAL HISTORY OF -       Lt Knee replacement - dr gayle     SURGICAL HISTORY OF -       Rt Knee replacement - dr gayle     SURGICAL HISTORY OF -   9/15    Rt Second toe amputation - Dr White     THORACOSCOPIC WEDGE RESECTION LUNG Left 2016    Procedure: THORACOSCOPIC WEDGE RESECTION LUNG;  Surgeon: Abdelrahman Thompson MD;  Location: [facility] OR     XR JOINT INJECTION HIP (HIB)  2015    Rt - Dr Terry       Medications    Current Outpatient Medications   Medication Sig Dispense Refill     acetaminophen (TYLENOL) 325 MG tablet Take 650 mg by mouth 4 times daily as needed for mild pain or pain Limit tylenol to 3g/24hrs.       albuterol (VENTOLIN HFA) 108 (90 Base) MCG/ACT inhaler  INHALE TWO PUFFS INTO THE LUNGS EVERY 6 HOURS AS NEEDED FOR SHORTNESS OF BREATH/DYSPNEA 54 g 3     amLODIPine (NORVASC) 5 MG tablet Take 5 mg by mouth daily       ASPIRIN NOT PRESCRIBED (INTENTIONAL) Please choose reason not prescribed, below 0 each 0     calcium carbonate (TUMS) 500 MG chewable tablet Take 2 chew tab by mouth daily as needed for heartburn       docusate sodium (COLACE) 100 MG capsule Take 100 mg by mouth 2 times daily as needed for constipation **7/8/20: hold in am on 7/9 until seen by MD       furosemide (LASIX) 20 MG tablet Take 2 tablets (40 mg) by mouth daily 180 tablet 3     loperamide (IMODIUM A-D) 2 MG tablet Take 2 mg by mouth 4 times daily as needed for diarrhea       losartan (COZAAR) 50 MG tablet Take 2 tablets (100 mg) by mouth daily 180 tablet 3     Metoprolol Succinate 50 MG CS24 Take 1 capful by mouth 2 times daily       pantoprazole (PROTONIX) 40 MG EC tablet Take 1 tablet (40 mg) by mouth daily 30 tablet 3     pantoprazole (PROTONIX) 40 MG EC tablet Take 1 tablet (40 mg) by mouth daily Possible GI Bleed 60 tablet 1       Allergies    Prednisone and Penicillins    Family History    Family History   Problem Relation Age of Onset     Cerebrovascular Disease Mother      Cerebrovascular Disease Father      Cancer - colorectal Brother      Cancer - colorectal Brother      Breast Cancer Sister      Cancer Sister         lung     Cancer Sister         lung     Cancer Sister         lung     Scleroderma Sister        Social History    Social History     Socioeconomic History     Marital status:      Spouse name: thanh     Number of children: 1     Years of education: 12     Highest education level: Not on file   Occupational History     Occupation: part-time [employer] section at [employer]     Financial resource strain: Not on file     Food insecurity     Worry: Not on file     Inability: Not on file     Transportation needs     Medical: Not on file     Non-medical:  Not on file   Tobacco Use     Smoking status: Former Smoker     Packs/day: 3.00     Years: 50.00     Pack years: 150.00     Types: Cigarettes     Last attempt to quit: 1993     Years since quittin.6     Smokeless tobacco: Never Used     Tobacco comment: quit    Substance and Sexual Activity     Alcohol use: No     Drug use: No     Comment: no herbal meds either      Sexual activity: Not Currently     Comment:  for 24 years    Lifestyle     Physical activity     Days per week: Not on file     Minutes per session: Not on file     Stress: Not on file   Relationships     Social connections     Talks on phone: Not on file     Gets together: Not on file     Attends Scientology service: Not on file     Active member of club or organization: Not on file     Attends meetings of clubs or organizations: Not on file     Relationship status: Not on file     Intimate partner violence     Fear of current or ex partner: Not on file     Emotionally abused: Not on file     Physically abused: Not on file     Forced sexual activity: Not on file   Other Topics Concern      Service Not Asked     Blood Transfusions Not Asked     Caffeine Concern Yes     Comment: 1 pot  qd - switched to decaff now     Occupational Exposure Not Asked     Hobby Hazards Not Asked     Sleep Concern Not Asked     Stress Concern Not Asked     Weight Concern Not Asked     Special Diet Yes     Comment: Low salt     Back Care Not Asked     Exercise No     Bike Helmet Not Asked     Seat Belt Yes     Self-Exams Yes     Comment: SBE encouraged motnhly      Parent/sibling w/ CABG, MI or angioplasty before 65F 55M? No   Social History Narrative    calcium - 3 large dairy servings/day - pt declines fosamax and other meds for her osteoporosis    flex sig/colonoscopy -last colonoscopy      sun precautions - discussed     mammogram - hasn't had one since  at least - ordered     Td booster - 06    pneumovax -today 06    DEXA - pt  declines repeat scan today 4/25/06 despite her osteoporosis     stool hemoccults - every year after age 40    ASA- easy bruising - can't take     mulvitamin - encouraged       Reviewed and updated as needed this visit by Provider           Review of Systems     CONSTITUTIONAL: NEGATIVE for fever, chills, change in weight  INTEGUMENTARY/SKIN: NEGATIVE for worrisome rashes, moles or lesions  EYES: NEGATIVE for vision changes or irritation  ENT/MOUTH: NEGATIVE for ear, mouth and throat problems  RESP: NEGATIVE for significant cough or SOB  CV: NEGATIVE for chest pain, palpitations or peripheral edema  GI: NEGATIVE for nausea, abdominal pain, heartburn, or change in bowel habits  : NEGATIVE for frequency, dysuria, or hematuria  MUSCULOSKELETAL: NEGATIVE for significant arthralgias or myalgia  NEURO: NEGATIVE for weakness, dizziness or paresthesias  ENDOCRINE: NEGATIVE for temperature intolerance, skin/hair changes  HEME: NEGATIVE for bleeding problems  PSYCHIATRIC: NEGATIVE for changes in mood or affect    Objective    Reported vitals:  There were no vitals taken for this visit.     healthy, alert and no distress  Psych: Alert and oriented times 3; coherent speech, normal   rate and volume, able to articulate logical thoughts, able   to abstract reason, no tangential thoughts, no hallucinations   or delusions  Her affect is normal     Diagnostic Test Results:  Labs reviewed in Epic    Assessment/Plan:      ICD-10-CM    1. Upper GI bleed  K92.2 Comprehensive metabolic panel (BMP + Alb, Alk Phos, ALT, AST, Total. Bili, TP)     CBC with platelets     CARE COORDINATION REFERRAL     pantoprazole (PROTONIX) 40 MG EC tablet   2. Anemia in stage 4 chronic kidney disease (H)  N18.4 Comprehensive metabolic panel (BMP + Alb, Alk Phos, ALT, AST, Total. Bili, TP)    D63.1 CBC with platelets     CARE COORDINATION REFERRAL     pantoprazole (PROTONIX) 40 MG EC tablet   3. Anemia due to blood loss, acute  D62 Comprehensive metabolic  panel (BMP + Alb, Alk Phos, ALT, AST, Total. Bili, TP)     CBC with platelets     CARE COORDINATION REFERRAL     pantoprazole (PROTONIX) 40 MG EC tablet   4. Hyponatremia  E87.1 Comprehensive metabolic panel (BMP + Alb, Alk Phos, ALT, AST, Total. Bili, TP)     CARE COORDINATION REFERRAL   5. Recurrent falls  R29.6 CARE COORDINATION REFERRAL   6. Debility  R53.81 CARE COORDINATION REFERRAL   7. COPD, moderate  J44.9 CARE COORDINATION REFERRAL   8. Diastolic dysfunction  I51.89 CARE COORDINATION REFERRAL   9. CKD (chronic kidney disease) stage 4, GFR 15-29 ml/min (H)  N18.4 Comprehensive metabolic panel (BMP + Alb, Alk Phos, ALT, AST, Total. Bili, TP)     CARE COORDINATION REFERRAL   10. Frequent urination  R35.0 *UA reflex to Microscopic and Culture ([facility] (except [place] and [place])     Urine Culture Aerobic Bacterial   11. Venous stasis of lower extremity  I87.8 CARE COORDINATION REFERRAL   12. Secondary renal hyperparathyroidism (H)  N25.81 Comprehensive metabolic panel (BMP + Alb, Alk Phos, ALT, AST, Total. Bili, TP)     CARE COORDINATION REFERRAL   13. Hypertension goal BP (blood pressure) < 140/90  I10 Comprehensive metabolic panel (BMP + Alb, Alk Phos, ALT, AST, Total. Bili, TP)     CARE COORDINATION REFERRAL   14. Vitamin D deficiency  E55.9 CARE COORDINATION REFERRAL   15. Primary osteoarthritis involving multiple joints  M89.49 CARE COORDINATION REFERRAL   16. Other chronic pain  G89.29 CARE COORDINATION REFERRAL   17. Osteoporosis without current pathological fracture, unspecified osteoporosis type  M81.0 Comprehensive metabolic panel (BMP + Alb, Alk Phos, ALT, AST, Total. Bili, TP)     CARE COORDINATION REFERRAL   18. Polyneuropathy associated with underlying disease (H)  G63 CARE COORDINATION REFERRAL   19. Medication monitoring encounter  Z51.81 Comprehensive metabolic panel (BMP + Alb, Alk Phos, ALT, AST, Total. Bili, TP)     *UA reflex to Microscopic and Culture ([facility]  "[facility] (except [place] and [place])     Urine Culture Aerobic Bacterial     CBC with platelets     CARE COORDINATION REFERRAL       Son would like referral to hospice, patient desires DNR/DNI/Tube feeding, as well as declines AL/NH, declines new meds, wants no further testing    Await home care, get DME, consider hospice, CC consult    Continue current meds    Hold on labs    protonixix long term    Return in about 1 week (around 8/5/2020), or if symptoms worsen or fail to improve, for Telephone/Video visit.    Phone call duration:  38 minutes    The patient has been notified of following:     \"This telephone visit will be conducted via a call between you and your physician/provider. We have found that certain health care needs can be provided without the need for a physical exam.  This service lets us provide the care you need with a short phone conversation.  If a prescription is necessary we can send it directly to your pharmacy.  If lab work is needed we can place an order for that and you can then stop by our lab to have the test done at a later time.    Telephone visits are billed at different rates depending on your insurance coverage. During this emergency period, for some insurers they may be billed the same as an in-person visit.  Please reach out to your insurance provider with any questions.    If during the course of the call the physician/provider feels a telephone visit is not appropriate, you will not be charged for this service.\"    Patient has given verbal consent for Telephone visit?  Yes    How would you like to obtain your AVS? Gabriella Calle MD, FAAFP     [facility]  [address] [place] [id number]  [email or web address]   Office: [phone]  Fax: [phone]  Pager: [phone]     "

## 2020-07-30 ENCOUNTER — TELEPHONE (OUTPATIENT)
Dept: FAMILY MEDICINE | Facility: CLINIC | Age: 85
End: 2020-07-30

## 2020-07-30 NOTE — TELEPHONE ENCOUNTER
[facility] now requests orders and shares plan of care/discharge summaries for some patients through [employer].  Please REPLY TO THIS MESSAGE OR ROUTE BACK TO THE AUTHOR in order to give authorization for orders when needed.  This is considered a verbal order, you will still receive a faxed copy of orders for signature.  Thank you for your assistance in improving collaboration for our patients.    ORDER   PT 2W3, 1W2 for therex, gait/balance/strength/transfer training, education.     MD SUMMARY/PLAN OF CARE   Pt. home after hospital and TCU stay following anemia due to GI bleed. Pt. presents today with significant LE weaknesss, poor balance, severely limited activity tolerance, fatigue and significant difficulty with all transfers.  Pt. must have 24 hour assist at this time.  Pt. will benefit from PT 2W3, 1W2 for therex, gait/balance/strength/transfer training, education.     Tegan Ewing, PT  [facility]  [phone]  [email or web address]

## 2020-07-30 NOTE — TELEPHONE ENCOUNTER
[facility] now requests orders and shares plan of care/discharge summaries for some patients through [employer].  Please REPLY TO THIS MESSAGE OR ROUTE BACK TO THE AUTHOR in order to give authorization for orders when needed.  This is considered a verbal order, you will still receive a faxed copy of orders for signature.  Thank you for your assistance in improving collaboration for our patients.    Also, forgot to put this in my last message.  Her blood pressure during todays visit was 88/40...  [id number]  Tegan Ewing, PT  [facility]  [phone]  [email or web address]

## 2020-07-30 NOTE — TELEPHONE ENCOUNTER
[facility] now requests orders and shares plan of care/discharge summaries for some patients through [employer].  Please REPLY TO THIS MESSAGE OR ROUTE BACK TO THE AUTHOR in order to give authorization for orders when needed.  This is considered a verbal order, you will still receive a faxed copy of orders for signature.  Thank you for your assistance in improving collaboration for our patients.    Patient's blood pressure today was 88/40, then after walking was 100/45.   I instructed family to make sure she is drinking a lot of fluids.   I also informed our homecare nurse who will be seeing her tomorrow.    I did not give any instruction as to holding any medications   Can you please call her if she should do something different with her meds?  Thanks!    Tegan Ewing, PT  [facility]  [phone]  [email or web address]

## 2020-07-31 ENCOUNTER — PATIENT OUTREACH (OUTPATIENT)
Dept: CARE COORDINATION | Facility: CLINIC | Age: 85
End: 2020-07-31

## 2020-07-31 NOTE — TELEPHONE ENCOUNTER
Patient son was notified with information noted by provider and agreed with plan.  MACIE UribeN, RN  [employer]

## 2020-07-31 NOTE — PROGRESS NOTES
Clinic Care Coordination Contact    Clinic Care Coordination Contact  OUTREACH    Referral Information:  Referral Source: Care Team         Chief Complaint   Patient presents with     Clinic Care Coordination - Homecare/TCU     Post TCU-follow up        [place] Utilization: Reviewed and no concern      Utilization    Last refreshed: 7/31/2020 12:03 PM:  Hospital Admissions 2           Last refreshed: 7/31/2020 12:03 PM:  ED Visits 3           Last refreshed: 7/31/2020 12:03 PM:  No Show Count (past year) 0              Current as of: 7/31/2020 12:03 PM              Clinical Concerns: 1145: Pc to Anat and she is hard of hearing. Her son came on the phone and indicates they are waiting for HHA to come any minute now to help with cares. Ask for a call back within one hour.     1245: Pc to Anat and her son Jose M was present with her. Jose M indicates he and his wife is helping Anat out. [facility] has started and coming to visit. Patient is needing cares and this is being provided by son and daughter in law. Reported no concern at this time as Home care is in place but will contact CC if there is any question or concern.     Current Medical Concerns:    Patient Active Problem List   Diagnosis     History of colonic polyps     Calculus of kidney     Lesion of plantar nerve     Osteoporosis     Primary iridocyclitis     Anemia     Hyperlipidemia LDL goal <100     OA (osteoarthritis)     Advanced directives, counseling/discussion     Hypertension goal BP (blood pressure) < 140/90     COPD, moderate     Controlled substance agreement signed     Vitamin D deficiency     CKD (chronic kidney disease) stage 4, GFR 15-29 ml/min (H)     Anemia in chronic renal disease     Secondary renal hyperparathyroidism (H)     Venous stasis of lower extremity     Peripheral neuropathy     Chronic pain     Lung nodule     Nodule of left lung     Malignant neoplasm of upper lobe of left lung (H)     Acute pain of  right shoulder     S/P amputation of lesser toe, unspecified laterality (H)     Retinal hemorrhage of right eye     Rhabdomyolysis     Debility     Acute kidney injury (H)     Hypercalcemia     Leukocytosis, unspecified type     Physical deconditioning     Anemia due to blood loss, acute     Dark stools     Hyponatremia     Melanotic stools     Gastrointestinal hemorrhage associated with acute gastritis     Diastolic dysfunction     Acute cystitis       Education Provided to patient: Role of Care Coordinator         Medication Management:  Reviewed and reconciled. Patient indicate she is not taking Tums and Aspirin. She was recommended by nurse to hold off on the Amlodipine for a couple of days due to her blood pressure being low. Patient's son and daughter in law is currently helping with medication management.     Functional Status:  Dependent ADLs:: Ambulation-walker, Dressing  Dependent IADLs:: Shopping, Meal Preparation, Laundry, Cooking, Cleaning, Transportation, Medication Management  Bed or wheelchair confined:: No  Mobility Status: Independent w/Device  Fallen 2 or more times in the past year?: Yes  Any fall with injury in the past year?: Yes    Living Situation:  Current living arrangement:: I live in a private home with family    Lifestyle & Psychosocial Needs:                     Socioeconomic History     Marital status:      Spouse name: thanh     Number of children: 1     Years of education: 12     Highest education level: Not on file   Occupational History     Occupation: part-time [employer] section at [employer]      Tobacco Use     Smoking status: Former Smoker     Packs/day: 3.00     Years: 50.00     Pack years: 150.00     Types: Cigarettes     Last attempt to quit: 1993     Years since quittin.6     Smokeless tobacco: Never Used     Tobacco comment: quit    Substance and Sexual Activity     Alcohol use: No     Drug use: No     Comment: no herbal meds either      Sexual  activity: Not Currently     Comment:  for 24 years         Resources and Interventions:  Current Resources:   List of home care services:: Skilled Nursing, Home Health Aid, Physicial Therapy, Speech Therapy     Supplies used at home:: None  Equipment Currently Used at Home: cane, straight, walker, rolling    Advance Care Plan/Directive  Advanced Care Plans/Directives on file:: Yes  Type Advanced Care Plans/Directives: Advanced Directive - On File, DNR/DNI      Patient/Caregiver understanding: Patient verbalized understanding and denies any additional questions or concerns at this time. RNCC engaged in [employer] communications during encounter.          Future Appointments              In 3 days Rashi Calle MD [facility] [place], [place]          Plan: Patient will attend upcoming appointment with PCP.  Patient will work with home care services.   Patient indicates Patient and family will work with Home Care Services at this time.  Patient endorses that all needs are appropriately met at this time with home care services, and identifies no need for further CC outreaches.   CC provided Patient with CC's contact information and that CC is available to answer any question or support, if needed.      Bear Zuñiga RN Care Coordinator  [facility]: [place], [place], [place], [place]  Phone: [phone]  E-Mail: [email or web address]

## 2020-08-03 ENCOUNTER — VIRTUAL VISIT (OUTPATIENT)
Dept: FAMILY MEDICINE | Facility: CLINIC | Age: 85
End: 2020-08-03
Payer: MEDICARE

## 2020-08-03 DIAGNOSIS — I10 HYPERTENSION GOAL BP (BLOOD PRESSURE) < 140/90: ICD-10-CM

## 2020-08-03 DIAGNOSIS — M15.0 PRIMARY OSTEOARTHRITIS INVOLVING MULTIPLE JOINTS: ICD-10-CM

## 2020-08-03 DIAGNOSIS — I51.89 DIASTOLIC DYSFUNCTION: ICD-10-CM

## 2020-08-03 DIAGNOSIS — K92.2 UPPER GI BLEED: Primary | ICD-10-CM

## 2020-08-03 DIAGNOSIS — D63.1 ANEMIA IN STAGE 4 CHRONIC KIDNEY DISEASE (H): ICD-10-CM

## 2020-08-03 DIAGNOSIS — G89.29 OTHER CHRONIC PAIN: ICD-10-CM

## 2020-08-03 DIAGNOSIS — R35.0 FREQUENT URINATION: ICD-10-CM

## 2020-08-03 DIAGNOSIS — E87.1 HYPONATREMIA: ICD-10-CM

## 2020-08-03 DIAGNOSIS — I87.8 VENOUS STASIS OF LOWER EXTREMITY: ICD-10-CM

## 2020-08-03 DIAGNOSIS — J44.9 COPD, MODERATE (H): ICD-10-CM

## 2020-08-03 DIAGNOSIS — Z51.81 MEDICATION MONITORING ENCOUNTER: ICD-10-CM

## 2020-08-03 DIAGNOSIS — G63 POLYNEUROPATHY ASSOCIATED WITH UNDERLYING DISEASE (H): ICD-10-CM

## 2020-08-03 DIAGNOSIS — R53.81 DEBILITY: ICD-10-CM

## 2020-08-03 DIAGNOSIS — N18.4 ANEMIA IN STAGE 4 CHRONIC KIDNEY DISEASE (H): ICD-10-CM

## 2020-08-03 DIAGNOSIS — M81.0 OSTEOPOROSIS WITHOUT CURRENT PATHOLOGICAL FRACTURE, UNSPECIFIED OSTEOPOROSIS TYPE: ICD-10-CM

## 2020-08-03 DIAGNOSIS — N25.81 SECONDARY RENAL HYPERPARATHYROIDISM (H): ICD-10-CM

## 2020-08-03 DIAGNOSIS — R29.6 RECURRENT FALLS: ICD-10-CM

## 2020-08-03 DIAGNOSIS — D62 ANEMIA DUE TO BLOOD LOSS, ACUTE: ICD-10-CM

## 2020-08-03 DIAGNOSIS — E55.9 VITAMIN D DEFICIENCY: ICD-10-CM

## 2020-08-03 DIAGNOSIS — N18.4 CKD (CHRONIC KIDNEY DISEASE) STAGE 4, GFR 15-29 ML/MIN (H): ICD-10-CM

## 2020-08-03 PROCEDURE — 99443 ZZC PHYSICIAN TELEPHONE EVALUATION 21-30 MIN: CPT | Performed by: FAMILY MEDICINE

## 2020-08-03 NOTE — PROGRESS NOTES
[facility]    Anat Correa is a 85 year old female who presents to clinic today for the following health issues:    {ACUTE Problem - extended histories:314427}  {additonal problems for provider to add (Optional):131434}    {HIST REVIEW/ LINKS 2 (Optional):174817}    {Additional problems for the provider to add (optional):301483}    Reviewed and updated as needed this visit by Provider           BP Readings from Last 3 Encounters:   07/24/20 (!) 144/70   07/21/20 116/63   07/14/20 (!) 157/66       body mass index is unknown because there is no height or weight on file.    Wt Readings from Last 4 Encounters:   07/24/20 66.4 kg (146 lb 4.8 oz)   07/21/20 66 kg (145 lb 8 oz)   07/14/20 66.2 kg (146 lb)   07/09/20 63 kg (138 lb 12.8 oz)       Health Maintenance    Health Maintenance Due   Topic Date Due     URINE DRUG SCREEN  1935     LIPID  03/09/2017     MAMMO SCREENING  10/04/2018     MEDICARE ANNUAL WELLNESS VISIT  04/24/2019       Current Problem List    Patient Active Problem List   Diagnosis     History of colonic polyps     Calculus of kidney     Lesion of plantar nerve     Osteoporosis     Primary iridocyclitis     Anemia     Hyperlipidemia LDL goal <100     OA (osteoarthritis)     Advanced directives, counseling/discussion     Hypertension goal BP (blood pressure) < 140/90     COPD, moderate     Controlled substance agreement signed     Vitamin D deficiency     CKD (chronic kidney disease) stage 4, GFR 15-29 ml/min (H)     Anemia in chronic renal disease     Secondary renal hyperparathyroidism (H)     Venous stasis of lower extremity     Peripheral neuropathy     Chronic pain     Lung nodule     Nodule of left lung     Malignant neoplasm of upper lobe of left lung (H)     Acute pain of right shoulder     S/P amputation of lesser toe, unspecified laterality (H)     Retinal hemorrhage of right eye     Rhabdomyolysis     Debility     Acute kidney injury (H)      Hypercalcemia     Leukocytosis, unspecified type     Physical deconditioning     Anemia due to blood loss, acute     Dark stools     Hyponatremia     Melanotic stools     Gastrointestinal hemorrhage associated with acute gastritis     Diastolic dysfunction     Acute cystitis       Past Medical History    Past Medical History:   Diagnosis Date     Anemia      Calculus of kidney 1998    dr hope     Chronic pain     OA     CKD (chronic kidney disease) stage 4, GFR 15-29 ml/min (H) 2008    dr mcdermott     COPD, moderate (H) 2004    moderate obstruction, with pulm htn - dr francisco did AAP     Diverticulosis of colon (without mention of hemorrhage) 7/03     Hemorrhage of gastrointestinal tract, unspecified 4/08    dr su     Hyperlipidemia LDL goal <100 2006     Hypertension goal BP (blood pressure) < 140/90 2005     Internal hemorrhoids without mention of complication 7/03     Irritable bowel syndrome 7/03     Lesion of plantar nerve 8/98    hay's neuroma dr connor     Lung nodule 4/14, 3/16    Dr Thompson, 1.4 x 1.1 cm, lingula, PET neg 3/16     Malignant neoplasm of upper lobe of left lung (H) 7/16    Dr Thompson - x 2 nodules - moderately differentiated adenocarcinoma (acinar predominant).  Final pathologic stage [id number], Final clinical stage IA, grade 2     Medication management     OA - 1 T#3 at HS with HX CKD     OA (osteoarthritis) 1998    lower ext worse katya knees     Obesity (BMI 30.0-34.9)      Osteoporosis, unspecified 2/02    FOSAMAX  = upset stomach , pt declines further rx.      Other ulcerative colitis 7/03    collangenous collitis- last colonoscopy 7/03      Peripheral neuropathy     early - burning at HS     Personal history of colonic polyps 7/98    dr araujo     PONJERE (postoperative nausea and vomiting)      Primary iridocyclitis 1998    iritis dr dominguez     Venous stasis of lower extremity        Past Surgical History    Past Surgical History:   Procedure Laterality Date     AMPUTATE TOE(S) Right  2015    Procedure: AMPUTATE TOE(S);  Surgeon: Ashia Whtie, DPM, Pod;  Location: [facility] OR     C APPENDECTOMY       C  DELIVERY ONLY      , Low Cervical     ESOPHAGOSCOPY, GASTROSCOPY, DUODENOSCOPY (EGD), COMBINED N/A 7/10/2020    Procedure: ESOPHAGOGASTRODUODENOSCOPY, WITH BIOPSY with stomach biopsies by jumbo forceps;  Surgeon: Harry Winter MD;  Location: [facility] GI     EXCISE MASS UPPER EXTREMITY  10/13/2011    Lt Forearm mass excision - dr ely     EXCISE MASS UPPER EXTREMITY  2012    Lt Forearm mass excision - dr ely     HC COLONOSCOPY THRU STOMA, DIAGNOSTIC      IBS/diverticula/hemmorhoids dr araujo     HC ESOPHAGOSCOPY, DIAGNOSTIC  , , 6/10    Gastric ulcer, healed, gastritis     HC HEMORRHOIDECTOMY,INT/EXT,SIMPLE       HC REPAIR SLIDING INGUINAL HERNIA      mitra     SURGICAL HISTORY OF -       [facility]ra neck & back tumors     SURGICAL HISTORY OF -       neuroma excision - 2nd toe amputation     SURGICAL HISTORY OF -   3/07    Rt IOL - dr jimenez     SURGICAL HISTORY OF -       Lt IOL - dr jimenez     SURGICAL HISTORY OF -       Lt Knee replacement - dr gayle     SURGICAL HISTORY OF -       Rt Knee replacement - dr gayle     SURGICAL HISTORY OF -   9/15    Rt Second toe amputation - Dr White     THORACOSCOPIC WEDGE RESECTION LUNG Left 2016    Procedure: THORACOSCOPIC WEDGE RESECTION LUNG;  Surgeon: Abdelrahman Thompson MD;  Location: [facility] OR     XR JOINT INJECTION HIP (HIB)  2015    Rt - Dr Terry       Current Medications    Current Outpatient Medications   Medication Sig Dispense Refill     acetaminophen (TYLENOL) 325 MG tablet Take 650 mg by mouth 4 times daily as needed for mild pain or pain Limit tylenol to 3g/24hrs.       albuterol (VENTOLIN HFA) 108 (90 Base) MCG/ACT inhaler INHALE TWO PUFFS INTO THE LUNGS EVERY 6 HOURS AS NEEDED FOR SHORTNESS OF BREATH/DYSPNEA 54 g 3     amLODIPine (NORVASC) 5 MG  tablet Take 5 mg by mouth daily       ASPIRIN NOT PRESCRIBED (INTENTIONAL) Please choose reason not prescribed, below 0 each 0     calcium carbonate (TUMS) 500 MG chewable tablet Take 2 chew tab by mouth daily as needed for heartburn       docusate sodium (COLACE) 100 MG capsule Take 100 mg by mouth 2 times daily as needed for constipation **7/8/20: hold in am on 7/9 until seen by MD       furosemide (LASIX) 20 MG tablet Take 2 tablets (40 mg) by mouth daily 180 tablet 3     loperamide (IMODIUM A-D) 2 MG tablet Take 2 mg by mouth 4 times daily as needed for diarrhea       losartan (COZAAR) 50 MG tablet Take 2 tablets (100 mg) by mouth daily 180 tablet 3     Metoprolol Succinate 50 MG CS24 Take 1 capful by mouth 2 times daily       pantoprazole (PROTONIX) 40 MG EC tablet Take 1 tablet (40 mg) by mouth daily 30 tablet 3     pantoprazole (PROTONIX) 40 MG EC tablet Take 1 tablet (40 mg) by mouth daily Possible GI Bleed 60 tablet 1       Allergies    Allergies   Allergen Reactions     Prednisone      Yeast infection - swollen lips     Penicillins Rash       Immunizations    Immunization History   Administered Date(s) Administered     Influenza (High Dose) 3 valent vaccine 10/07/2010, 09/19/2012, 11/04/2013, 10/08/2014, 11/03/2015, 09/16/2016, 10/02/2017, 09/13/2018, 10/29/2019     Influenza (IIV3) PF 10/19/2004, 11/15/2005     Pneumo Conj 13-V (2010&after) 11/03/2015     Pneumococcal 23 valent 04/25/2006     TD (ADULT, 7+) 03/03/1998, 01/23/2006, 06/21/2018     TDAP Vaccine (Boostrix) 08/27/2012     Zoster vaccine recombinant adjuvanted (SHINGRIX) 07/11/2018, 09/13/2018     Zoster vaccine, live 10/24/2012       Family History    Family History   Problem Relation Age of Onset     Cerebrovascular Disease Mother      Cerebrovascular Disease Father      Cancer - colorectal Brother      Cancer - colorectal Brother      Breast Cancer Sister      Cancer Sister         lung     Cancer Sister         lung     Cancer Sister          lung     Scleroderma Sister        Social History    Social History     Socioeconomic History     Marital status:      Spouse name: thanh     Number of children: 1     Years of education: 12     Highest education level: Not on file   Occupational History     Occupation: part-time [employer] section at [employer]    Social Needs     Financial resource strain: Not on file     Food insecurity     Worry: Not on file     Inability: Not on file     Transportation needs     Medical: Not on file     Non-medical: Not on file   Tobacco Use     Smoking status: Former Smoker     Packs/day: 3.00     Years: 50.00     Pack years: 150.00     Types: Cigarettes     Last attempt to quit: 1993     Years since quittin.6     Smokeless tobacco: Never Used     Tobacco comment: quit    Substance and Sexual Activity     Alcohol use: No     Drug use: No     Comment: no herbal meds either      Sexual activity: Not Currently     Comment:  for 24 years    Lifestyle     Physical activity     Days per week: Not on file     Minutes per session: Not on file     Stress: Not on file   Relationships     Social connections     Talks on phone: Not on file     Gets together: Not on file     Attends Congregation service: Not on file     Active member of club or organization: Not on file     Attends meetings of clubs or organizations: Not on file     Relationship status: Not on file     Intimate partner violence     Fear of current or ex partner: Not on file     Emotionally abused: Not on file     Physically abused: Not on file     Forced sexual activity: Not on file   Other Topics Concern      Service Not Asked     Blood Transfusions Not Asked     Caffeine Concern Yes     Comment: 1 pot  qd - switched to decaff now     Occupational Exposure Not Asked     Hobby Hazards Not Asked     Sleep Concern Not Asked     Stress Concern Not Asked     Weight Concern Not Asked     Special Diet Yes     Comment: Low salt     Back Care Not  "Asked     Exercise No     Bike Helmet Not Asked     Seat Belt Yes     Self-Exams Yes     Comment: SBE encouraged motnhly      Parent/sibling w/ CABG, MI or angioplasty before 65F 55M? No   Social History Narrative    calcium - 3 large dairy servings/day - pt declines fosamax and other meds for her osteoporosis    flex sig/colonoscopy -last colonoscopy 7/03     sun precautions - discussed     mammogram - hasn't had one since 2001 at least - ordered     Td booster - 1/23/06    pneumovax -today 4/25/06    DEXA - pt declines repeat scan today 4/25/06 despite her osteoporosis     stool hemoccults - every year after age 40    ASA- easy bruising - can't take     mulvitamin - encouraged       All above reviewed and updated, all stable unless otherwise noted    Recent labs reviewed    ROS    {ROS - zebra stripe:091957::\"CONSTITUTIONAL: NEGATIVE for fever, chills, change in weight\",\"INTEGUMENTARY/SKIN: NEGATIVE for worrisome rashes, moles or lesions\",\"EYES: NEGATIVE for vision changes or irritation\",\"ENT/MOUTH: NEGATIVE for ear, mouth and throat problems\",\"RESP: NEGATIVE for significant cough or SOB\",\"BREAST: NEGATIVE for masses, tenderness or discharge\",\"CV: NEGATIVE for chest pain, palpitations or peripheral edema\",\"GI: NEGATIVE for nausea, abdominal pain, heartburn, or change in bowel habits\",\": NEGATIVE for frequency, dysuria, or hematuria\",\"MUSCULOSKELETAL: NEGATIVE for significant arthralgias or myalgia\",\"NEURO: NEGATIVE for weakness, dizziness or paresthesias\",\"ENDOCRINE: NEGATIVE for temperature intolerance, skin/hair changes\",\"HEME: NEGATIVE for bleeding problems\",\"PSYCHIATRIC: NEGATIVE for changes in mood or affect\"}    OBJECTIVE    There were no vitals taken for this visit.  There is no height or weight on file to calculate BMI.  {ZEBRA STRIPE EXAM:952893}    DIAGNOSTICS/PROCEDURE    {DIAGNOSTIC TEST RESULTS:535401::\"none \"}     ASSESSMENT    No diagnosis found.    PLAN    Discussed treatment/modality options, " including risk and benefits she desires:    {PATIENT WANTS:406263}    1) ***    2) ***    3) ***    4) ***    5) ***    All diagnosis above reviewed and noted above, otherwise stable.  See [facility] orders for further details.     No follow-ups on file.    Health Maintenance Due   Topic Date Due     URINE DRUG SCREEN  1935     LIPID  03/09/2017     MAMMO SCREENING  10/04/2018     MEDICARE ANNUAL WELLNESS VISIT  04/24/2019       {FOLLOW UP CLINIC OPTIONS:813271}             Rashi Calle MD, FAAFP     [facility]  [address] [place] [id number]  [email or web address]   Office: [phone]  Fax: [phone]  Pager: [phone]

## 2020-08-03 NOTE — PROGRESS NOTES
[facility]    Telephone visit  - [phone]    Subjective    Anatsuzanne Correa is a 85 year old female who is being evaluated via a billable telephone visit.      Chief Complaint   Patient presents with     Telephone     8/3/20    Has home care, RN/PT/HHA, moving to [place] at [facility] in next few days, has 24 hr cares, slow improvement, has life alert, has met with CC, assessment in process    Reviewed with Anat and Jose M    7/29/20         Follow up after discharge from [facility] after multiple falls at home - was supposed to be there a few more weeks, but discharged abruptly on 7/27, uncertain why, recent admission for GI Bleed hgb stable in 8's, and chronic hyponatremia in upper 120's, physical deconditioning, has 1 fall at home, while getting help from son, has wheelchair, transfer belt, commode, and walker     Son reports she is still very weak and needs additional help and being home may not be the safest for the patient - family with 24/7 since - home care referral yesterday - in process getting needed DME for home, awaiting recommendations     Son would like referral to hospice, patient desires DNR/DNI/Tube feeding, declines AL/NH, declines new meds, wants no further testing     Son is on phone call too and has many questions     [facility] Home Discharge Summary     DISCHARGE DIAGNOSIS/NURSING FACILITY COURSE:      S/p acute gastrointestinal bleeding  Acute blood loss anemia  Recent hospitalization due to acute GI bleed. She was having melanotic stools. hgb dropped to 7.4. she received one unit PRBC. EGD showed two non bleeding superficial ulcers, erythematous duodenopathy and non bleeding erosive gastropathy. Biopsy was negative for malignancy and Helicobacter. She was started on Protonix. hgb has been stable in 8s. Baseline hgb 10s.   -continue protonix 40mg q day  -follow up with PCP     Chronic hyponatremia  Sodium runs 126-130. Sodium has been stable     Diastolic  dysfunction  Hypertension  ckd IV  Echocardiogram on 7/3 showed mild concentric LV hypertrophy, EF 55-60%.   Baseline creat 1.4-1.8.   No signs of volume overload today. wts stable. BPs running 144/70, 127/68, 128/66  -continue norvasc 5mg q day  -continue lasix 40mg q day  -continue losartan 100mg q day     UTI  uc 7/19 shows >100k e coli. She was started on cipro 500mg q day for 7 days  She denies urinary sx today  -complete cipro on 7/27     Physical deconditioning  Lives in house. Has had multiple falls lately. She is walking up to 75' with therapy.   -discharge home with home care from [facility]. physical therapy , OCCUPATIONAL THERAPY , RN and HHA for med management, strengthening.            PMH    Past Medical History:   Diagnosis Date     Anemia      Calculus of kidney 1998    dr hope     Chronic pain     OA     CKD (chronic kidney disease) stage 4, GFR 15-29 ml/min (H) 2008    dr mcdermott     COPD, moderate (H) 2004    moderate obstruction, with pulm htn - dr francisco did AAP     Diverticulosis of colon (without mention of hemorrhage) 7/03     Hemorrhage of gastrointestinal tract, unspecified 4/08    dr su     Hyperlipidemia LDL goal <100 2006     Hypertension goal BP (blood pressure) < 140/90 2005     Internal hemorrhoids without mention of complication 7/03     Irritable bowel syndrome 7/03     Lesion of plantar nerve 8/98    hay's neuroma dr connor     Lung nodule 4/14, 3/16    Dr Thompson, 1.4 x 1.1 cm, lingula, PET neg 3/16     Malignant neoplasm of upper lobe of left lung (H) 7/16    Dr Thompson - x 2 nodules - moderately differentiated adenocarcinoma (acinar predominant).  Final pathologic stage [id number], Final clinical stage IA, grade 2     Medication management     OA - 1 T#3 at HS with HX CKD     OA (osteoarthritis) 1998    lower ext worse katya knees     Obesity (BMI 30.0-34.9)      Osteoporosis, unspecified 2/02    FOSAMAX  = upset stomach , pt declines further rx.      Other ulcerative colitis      collangenous collitis- last colonoscopy       Peripheral neuropathy     early - burning at HS     Personal history of colonic polyps     dr araujo     PONV (postoperative nausea and vomiting)      Primary iridocyclitis 1998    iritis dr dominguez     Venous stasis of lower extremity        PSH    Past Surgical History:   Procedure Laterality Date     AMPUTATE TOE(S) Right 2015    Procedure: AMPUTATE TOE(S);  Surgeon: Ashia White, DPM, Pod;  Location: [facility] OR     C APPENDECTOMY       C  DELIVERY ONLY      , Low Cervical     ESOPHAGOSCOPY, GASTROSCOPY, DUODENOSCOPY (EGD), COMBINED N/A 7/10/2020    Procedure: ESOPHAGOGASTRODUODENOSCOPY, WITH BIOPSY with stomach biopsies by jumbo forceps;  Surgeon: Harry Winter MD;  Location: [facility] GI     EXCISE MASS UPPER EXTREMITY  10/13/2011    Lt Forearm mass excision - dr ely     EXCISE MASS UPPER EXTREMITY  2012    Lt Forearm mass excision - dr ely     HC COLONOSCOPY THRU STOMA, DIAGNOSTIC      IBS/diverticula/hemmorhoids dr araujo     HC ESOPHAGOSCOPY, DIAGNOSTIC  , , 6/10    Gastric ulcer, healed, gastritis     HC HEMORRHOIDECTOMY,INT/EXT,SIMPLE       HC REPAIR SLIDING INGUINAL HERNIA      mitra     SURGICAL HISTORY OF -       mitra neck & back tumors     SURGICAL HISTORY OF -       neuroma excision - 2nd toe amputation     SURGICAL HISTORY OF -   3/07    Rt IOL - dr jimenez     SURGICAL HISTORY OF -       Lt IOL - dr jimenez     SURGICAL HISTORY OF -       Lt Knee replacement - dr gayle     SURGICAL HISTORY OF -       Rt Knee replacement - dr gayle     SURGICAL HISTORY OF -   9/15    Rt Second toe amputation - Dr White     THORACOSCOPIC WEDGE RESECTION LUNG Left 2016    Procedure: THORACOSCOPIC WEDGE RESECTION LUNG;  Surgeon: Abdelrahman Thompson MD;  Location: [facility] OR     XR JOINT INJECTION HIP (HIB)  2015    Rt - Dr Terry       Medications    Current  Outpatient Medications   Medication Sig Dispense Refill     acetaminophen (TYLENOL) 325 MG tablet Take 650 mg by mouth 4 times daily as needed for mild pain or pain Limit tylenol to 3g/24hrs.       albuterol (VENTOLIN HFA) 108 (90 Base) MCG/ACT inhaler INHALE TWO PUFFS INTO THE LUNGS EVERY 6 HOURS AS NEEDED FOR SHORTNESS OF BREATH/DYSPNEA 54 g 3     amLODIPine (NORVASC) 5 MG tablet Take 5 mg by mouth daily       ASPIRIN NOT PRESCRIBED (INTENTIONAL) Please choose reason not prescribed, below 0 each 0     calcium carbonate (TUMS) 500 MG chewable tablet Take 2 chew tab by mouth daily as needed for heartburn       docusate sodium (COLACE) 100 MG capsule Take 100 mg by mouth 2 times daily as needed for constipation **7/8/20: hold in am on 7/9 until seen by MD       furosemide (LASIX) 20 MG tablet Take 2 tablets (40 mg) by mouth daily 180 tablet 3     loperamide (IMODIUM A-D) 2 MG tablet Take 2 mg by mouth 4 times daily as needed for diarrhea       losartan (COZAAR) 50 MG tablet Take 2 tablets (100 mg) by mouth daily 180 tablet 3     Metoprolol Succinate 50 MG CS24 Take 1 capful by mouth 2 times daily       pantoprazole (PROTONIX) 40 MG EC tablet Take 1 tablet (40 mg) by mouth daily 30 tablet 3     pantoprazole (PROTONIX) 40 MG EC tablet Take 1 tablet (40 mg) by mouth daily Possible GI Bleed 60 tablet 1       Allergies    Prednisone and Penicillins    Family History    Family History   Problem Relation Age of Onset     Cerebrovascular Disease Mother      Cerebrovascular Disease Father      Cancer - colorectal Brother      Cancer - colorectal Brother      Breast Cancer Sister      Cancer Sister         lung     Cancer Sister         lung     Cancer Sister         lung     Scleroderma Sister        Social History    Social History     Socioeconomic History     Marital status:      Spouse name: thanh     Number of children: 1     Years of education: 12     Highest education level: Not on file   Occupational History      Occupation: part-time [employer] section at [employer]    Social Needs     Financial resource strain: Not on file     Food insecurity     Worry: Not on file     Inability: Not on file     Transportation needs     Medical: Not on file     Non-medical: Not on file   Tobacco Use     Smoking status: Former Smoker     Packs/day: 3.00     Years: 50.00     Pack years: 150.00     Types: Cigarettes     Last attempt to quit: 1993     Years since quittin.6     Smokeless tobacco: Never Used     Tobacco comment: quit    Substance and Sexual Activity     Alcohol use: No     Drug use: No     Comment: no herbal meds either      Sexual activity: Not Currently     Comment:  for 24 years    Lifestyle     Physical activity     Days per week: Not on file     Minutes per session: Not on file     Stress: Not on file   Relationships     Social connections     Talks on phone: Not on file     Gets together: Not on file     Attends Alevism service: Not on file     Active member of club or organization: Not on file     Attends meetings of clubs or organizations: Not on file     Relationship status: Not on file     Intimate partner violence     Fear of current or ex partner: Not on file     Emotionally abused: Not on file     Physically abused: Not on file     Forced sexual activity: Not on file   Other Topics Concern      Service Not Asked     Blood Transfusions Not Asked     Caffeine Concern Yes     Comment: 1 pot  qd - switched to decaff now     Occupational Exposure Not Asked     Hobby Hazards Not Asked     Sleep Concern Not Asked     Stress Concern Not Asked     Weight Concern Not Asked     Special Diet Yes     Comment: Low salt     Back Care Not Asked     Exercise No     Bike Helmet Not Asked     Seat Belt Yes     Self-Exams Yes     Comment: SBE encouraged motnhly      Parent/sibling w/ CABG, MI or angioplasty before 65F 55M? No   Social History Narrative    calcium - 3 large dairy servings/day - pt  declines fosamax and other meds for her osteoporosis    flex sig/colonoscopy -last colonoscopy 7/03     sun precautions - discussed     mammogram - hasn't had one since 2001 at least - ordered     Td booster - 1/23/06    pneumovax -today 4/25/06    DEXA - pt declines repeat scan today 4/25/06 despite her osteoporosis     stool hemoccults - every year after age 40    ASA- easy bruising - can't take     mulvitamin - encouraged       Reviewed and updated as needed this visit by Provider           Review of Systems     CONSTITUTIONAL: NEGATIVE for fever, chills, change in weight  INTEGUMENTARY/SKIN: NEGATIVE for worrisome rashes, moles or lesions  EYES: NEGATIVE for vision changes or irritation  ENT/MOUTH: NEGATIVE for ear, mouth and throat problems  RESP: NEGATIVE for significant cough or SOB  CV: NEGATIVE for chest pain, palpitations or peripheral edema  GI: NEGATIVE for nausea, abdominal pain, heartburn, or change in bowel habits  : NEGATIVE for frequency, dysuria, or hematuria  MUSCULOSKELETAL: NEGATIVE for significant arthralgias or myalgia  NEURO: NEGATIVE for weakness, dizziness or paresthesias  ENDOCRINE: NEGATIVE for temperature intolerance, skin/hair changes  HEME: NEGATIVE for bleeding problems  PSYCHIATRIC: NEGATIVE for changes in mood or affect    Objective    Reported vitals:  There were no vitals taken for this visit.     healthy, alert and no distress  Psych: Alert and oriented times 3; coherent speech, normal   rate and volume, able to articulate logical thoughts, able   to abstract reason, no tangential thoughts, no hallucinations   or delusions  Her affect is normal     Diagnostic Test Results:  Labs reviewed in Epic    Assessment/Plan:      ICD-10-CM    1. Upper GI bleed  K92.2    2. Anemia in stage 4 chronic kidney disease (H)  N18.4     D63.1    3. Anemia due to blood loss, acute  D62    4. Hyponatremia  E87.1    5. Recurrent falls  R29.6    6. Debility  R53.81    7. COPD, moderate  J44.9    8.  "Diastolic dysfunction  I51.89    9. CKD (chronic kidney disease) stage 4, GFR 15-29 ml/min (H)  N18.4    10. Frequent urination  R35.0    11. Venous stasis of lower extremity  I87.8    12. Secondary renal hyperparathyroidism (H)  N25.81    13. Hypertension goal BP (blood pressure) < 140/90  I10    14. Vitamin D deficiency  E55.9    15. Primary osteoarthritis involving multiple joints  M89.49    16. Other chronic pain  G89.29    17. Osteoporosis without current pathological fracture, unspecified osteoporosis type  M81.0    18. Polyneuropathy associated with underlying disease (H)  G63    19. Medication monitoring encounter  Z51.81        Transition to [facility] [place], this week  Labs when able    Follow closely here or with NP at [facility]    Return in about 1 week (around 8/10/2020) for Follow Up Acute, Medication Recheck Visit, Follow Up Chronic.    Phone call duration:  21 minutes    The patient has been notified of following:     \"This telephone visit will be conducted via a call between you and your physician/provider. We have found that certain health care needs can be provided without the need for a physical exam.  This service lets us provide the care you need with a short phone conversation.  If a prescription is necessary we can send it directly to your pharmacy.  If lab work is needed we can place an order for that and you can then stop by our lab to have the test done at a later time.    Telephone visits are billed at different rates depending on your insurance coverage. During this emergency period, for some insurers they may be billed the same as an in-person visit.  Please reach out to your insurance provider with any questions.    If during the course of the call the physician/provider feels a telephone visit is not appropriate, you will not be charged for this service.\"    Patient has given verbal consent for Telephone visit?  Yes    How would you like to obtain your AVS? Mail a copy           Rashi Calle, " MD, FAAFP     [facility]  [address] [place] [id number]  [email or web address]   Office: [phone]  Fax: [phone]  Pager: [phone]

## 2020-08-03 NOTE — PROGRESS NOTES
"Anat Correa is a 85 year old female who is being evaluated via a billable telephone visit.      The patient has been notified of following:     \"This telephone visit will be conducted via a call between you and your physician/provider. We have found that certain health care needs can be provided without the need for a physical exam.  This service lets us provide the care you need with a short phone conversation.  If a prescription is necessary we can send it directly to your pharmacy.  If lab work is needed we can place an order for that and you can then stop by our lab to have the test done at a later time.    Telephone visits are billed at different rates depending on your insurance coverage. During this emergency period, for some insurers they may be billed the same as an in-person visit.  Please reach out to your insurance provider with any questions.    If during the course of the call the physician/provider feels a telephone visit is not appropriate, you will not be charged for this service.\"    Patient has given verbal consent for Telephone visit?  {YES-NO  Default Yes:4444::\"Yes\"}    What phone number would you like to be contacted at? [phone]     How would you like to obtain your AVS? {AVS Preference:025896}    Subjective     Anat Correa is a 85 year old female who presents via phone visit today for the following health issues:    HPI    {SUPERLIST (Optional):569321}  {PEDS Chronic and Acute Problems (Optional):012359}     {additonal problems for provider to add (Optional):120649}    {HIST REVIEW/ LINKS 2 (Optional):606787}    Reviewed and updated as needed this visit by Provider         Review of Systems   {ROS COMP (Optional):013959}       Objective   Reported vitals:  There were no vitals taken for this visit.   {GENERAL APPEARANCE:50::\"healthy\",\"alert\",\"no distress\"}  PSYCH: Alert and oriented times 3; coherent speech, normal   rate and volume, able to articulate logical thoughts, " "able   to abstract reason, no tangential thoughts, no hallucinations   or delusions  Her affect is { :7373583::\"normal\"}  RESP: No cough, no audible wheezing, able to talk in full sentences  Remainder of exam unable to be completed due to telephone visits    {Diagnostic Test Results (Optional):432699::\"Diagnostic Test Results:\",\"Labs reviewed in Epic\"}        Assessment/Plan:    {Diagnosis, Associated Orders and Comment:054160}    No follow-ups on file.      Phone call duration:  *** minutes    {signature options:663907}        "

## 2020-08-06 ENCOUNTER — TELEPHONE (OUTPATIENT)
Dept: FAMILY MEDICINE | Facility: CLINIC | Age: 85
End: 2020-08-06

## 2020-08-06 NOTE — TELEPHONE ENCOUNTER
General Call:   Who is calling:  Jose M Loya  Reason for Call:  Son states pt is moving to [facility], on Monday Aug 10th  Need signed order and Drs note.  Need faxed to [phone]  Currently seeing [facility]  What are your questions or concerns:  na  Date of last appointment with provider: na   Okay to leave a detailed message:Yes at Other phone number:  [phone]

## 2020-08-10 ENCOUNTER — RECORDS - HEALTHEAST (OUTPATIENT)
Dept: LAB | Facility: CLINIC | Age: 85
End: 2020-08-10

## 2020-08-13 LAB
SARS-COV-2 BY NAA - HISTORICAL: NOT DETECTED
SARS-COV-2 SOURCE - HISTORICAL: NORMAL

## 2020-08-19 ENCOUNTER — RECORDS - HEALTHEAST (OUTPATIENT)
Dept: LAB | Facility: CLINIC | Age: 85
End: 2020-08-19

## 2020-08-20 LAB
ANION GAP SERPL CALCULATED.3IONS-SCNC: 10 MMOL/L (ref 5–18)
BUN SERPL-MCNC: 25 MG/DL (ref 8–28)
CALCIUM SERPL-MCNC: 9.6 MG/DL (ref 8.5–10.5)
CHLORIDE BLD-SCNC: 97 MMOL/L (ref 98–107)
CO2 SERPL-SCNC: 23 MMOL/L (ref 22–31)
CREAT SERPL-MCNC: 1.88 MG/DL (ref 0.6–1.1)
ERYTHROCYTE [DISTWIDTH] IN BLOOD BY AUTOMATED COUNT: 13.5 % (ref 11–14.5)
GFR SERPL CREATININE-BSD FRML MDRD: 25 ML/MIN/1.73M2
GLUCOSE BLD-MCNC: 106 MG/DL (ref 70–125)
HCT VFR BLD AUTO: 29.2 % (ref 35–47)
HGB BLD-MCNC: 9.4 G/DL (ref 12–16)
MCH RBC QN AUTO: 29.1 PG (ref 27–34)
MCHC RBC AUTO-ENTMCNC: 32.2 G/DL (ref 32–36)
MCV RBC AUTO: 90 FL (ref 80–100)
PLATELET # BLD AUTO: 345 THOU/UL (ref 140–440)
PMV BLD AUTO: 9.4 FL (ref 8.5–12.5)
POTASSIUM BLD-SCNC: 4.6 MMOL/L (ref 3.5–5)
RBC # BLD AUTO: 3.23 MILL/UL (ref 3.8–5.4)
SODIUM SERPL-SCNC: 130 MMOL/L (ref 136–145)
WBC: 10.8 THOU/UL (ref 4–11)

## 2020-08-25 ENCOUNTER — TELEPHONE (OUTPATIENT)
Dept: FAMILY MEDICINE | Facility: CLINIC | Age: 85
End: 2020-08-25

## 2020-08-25 NOTE — TELEPHONE ENCOUNTER
[facility]isc[facility] called to discuss orders. LVM to call back.    Rakan HANSEN RN   [facility]

## 2020-08-25 NOTE — TELEPHONE ENCOUNTER
Priscila calling asking for verbal orders     Nursing 1 time a week for 2 weeks and 1 as needed     Gave verbal okay     Ariadna Starks RN, BSN  [facility]

## 2020-08-25 NOTE — TELEPHONE ENCOUNTER
General Call:     Who is calling:   Home care Priscila    Reason for Call:  Priscila needs orders    What are your questions or concerns:      Date of last appointment with provider:     Okay to leave a detailed message:Yes at Other phone number:  [phone]

## 2021-01-01 ENCOUNTER — APPOINTMENT (OUTPATIENT)
Dept: NUCLEAR MEDICINE | Facility: CLINIC | Age: 86
DRG: 871 | End: 2021-01-01
Attending: NURSE PRACTITIONER
Payer: MEDICARE

## 2021-01-01 ENCOUNTER — LAB REQUISITION (OUTPATIENT)
Dept: LAB | Facility: CLINIC | Age: 86
End: 2021-01-01
Payer: MEDICARE

## 2021-01-01 ENCOUNTER — RECORDS - HEALTHEAST (OUTPATIENT)
Dept: LAB | Facility: CLINIC | Age: 86
End: 2021-01-01

## 2021-01-01 ENCOUNTER — APPOINTMENT (OUTPATIENT)
Dept: OCCUPATIONAL THERAPY | Facility: CLINIC | Age: 86
DRG: 871 | End: 2021-01-01
Attending: INTERNAL MEDICINE
Payer: MEDICARE

## 2021-01-01 ENCOUNTER — APPOINTMENT (OUTPATIENT)
Dept: CARDIOLOGY | Facility: CLINIC | Age: 86
DRG: 871 | End: 2021-01-01
Attending: NURSE PRACTITIONER
Payer: MEDICARE

## 2021-01-01 ENCOUNTER — NURSE TRIAGE (OUTPATIENT)
Dept: NURSING | Facility: CLINIC | Age: 86
End: 2021-01-01

## 2021-01-01 ENCOUNTER — TELEPHONE (OUTPATIENT)
Dept: FAMILY MEDICINE | Facility: CLINIC | Age: 86
End: 2021-01-01

## 2021-01-01 ENCOUNTER — DOCUMENTATION ONLY (OUTPATIENT)
Dept: OTHER | Facility: CLINIC | Age: 86
End: 2021-01-01

## 2021-01-01 ENCOUNTER — APPOINTMENT (OUTPATIENT)
Dept: GENERAL RADIOLOGY | Facility: CLINIC | Age: 86
DRG: 871 | End: 2021-01-01
Attending: INTERNAL MEDICINE
Payer: MEDICARE

## 2021-01-01 ENCOUNTER — HOSPITAL ENCOUNTER (INPATIENT)
Facility: CLINIC | Age: 86
LOS: 5 days | Discharge: HOME HEALTH | DRG: 871 | End: 2021-01-24
Attending: NURSE PRACTITIONER | Admitting: INTERNAL MEDICINE
Payer: MEDICARE

## 2021-01-01 ENCOUNTER — HOSPITAL ENCOUNTER (OUTPATIENT)
Facility: CLINIC | Age: 86
Setting detail: OBSERVATION
Discharge: NURSING, REHAB OR LONG-TERM CARE | End: 2021-02-18
Attending: NURSE PRACTITIONER | Admitting: HOSPITALIST
Payer: MEDICARE

## 2021-01-01 ENCOUNTER — APPOINTMENT (OUTPATIENT)
Dept: CARDIOLOGY | Facility: CLINIC | Age: 86
DRG: 871 | End: 2021-01-01
Attending: INTERNAL MEDICINE
Payer: MEDICARE

## 2021-01-01 ENCOUNTER — APPOINTMENT (OUTPATIENT)
Dept: GENERAL RADIOLOGY | Facility: CLINIC | Age: 86
DRG: 871 | End: 2021-01-01
Attending: NURSE PRACTITIONER
Payer: MEDICARE

## 2021-01-01 ENCOUNTER — APPOINTMENT (OUTPATIENT)
Dept: PHYSICAL THERAPY | Facility: CLINIC | Age: 86
DRG: 871 | End: 2021-01-01
Attending: INTERNAL MEDICINE
Payer: MEDICARE

## 2021-01-01 ENCOUNTER — APPOINTMENT (OUTPATIENT)
Dept: CT IMAGING | Facility: CLINIC | Age: 86
DRG: 871 | End: 2021-01-01
Attending: NURSE PRACTITIONER
Payer: MEDICARE

## 2021-01-01 VITALS
BODY MASS INDEX: 23.19 KG/M2 | TEMPERATURE: 97.6 F | DIASTOLIC BLOOD PRESSURE: 85 MMHG | HEART RATE: 81 BPM | SYSTOLIC BLOOD PRESSURE: 156 MMHG | HEIGHT: 65 IN | OXYGEN SATURATION: 92 % | WEIGHT: 139.2 LBS | RESPIRATION RATE: 18 BRPM

## 2021-01-01 VITALS
TEMPERATURE: 97.2 F | WEIGHT: 132.7 LBS | BODY MASS INDEX: 22.11 KG/M2 | OXYGEN SATURATION: 95 % | HEART RATE: 73 BPM | SYSTOLIC BLOOD PRESSURE: 129 MMHG | DIASTOLIC BLOOD PRESSURE: 57 MMHG | HEIGHT: 65 IN | RESPIRATION RATE: 16 BRPM

## 2021-01-01 DIAGNOSIS — L03.90 CELLULITIS, UNSPECIFIED CELLULITIS SITE: ICD-10-CM

## 2021-01-01 DIAGNOSIS — I95.9 HYPOTENSION, UNSPECIFIED HYPOTENSION TYPE: ICD-10-CM
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
## 2021-01-01 DIAGNOSIS — M62.81 GENERALIZED MUSCLE WEAKNESS: ICD-10-CM

## 2021-01-01 DIAGNOSIS — N17.9 AKI (ACUTE KIDNEY INJURY) (H): ICD-10-CM

## 2021-01-01 DIAGNOSIS — E87.1 HYPONATREMIA: ICD-10-CM

## 2021-01-01 DIAGNOSIS — I48.91 ATRIAL FIBRILLATION, UNSPECIFIED TYPE (H): ICD-10-CM

## 2021-01-01 DIAGNOSIS — N18.4 CKD (CHRONIC KIDNEY DISEASE) STAGE 4, GFR 15-29 ML/MIN (H): ICD-10-CM

## 2021-01-01 DIAGNOSIS — I48.91 ATRIAL FIBRILLATION, UNSPECIFIED TYPE (H): Primary | ICD-10-CM

## 2021-01-01 DIAGNOSIS — R94.31 ST SEGMENT DEPRESSION: ICD-10-CM

## 2021-01-01 DIAGNOSIS — E86.0 DEHYDRATION: ICD-10-CM

## 2021-01-01 DIAGNOSIS — R41.82 ALTERED MENTAL STATUS, UNSPECIFIED: ICD-10-CM

## 2021-01-01 DIAGNOSIS — I10 HYPERTENSION GOAL BP (BLOOD PRESSURE) < 140/90: ICD-10-CM

## 2021-01-01 LAB
ALBUMIN SERPL-MCNC: 2.7 G/DL (ref 3.4–5)
ALBUMIN SERPL-MCNC: 3 G/DL (ref 3.5–5)
ALBUMIN UR-MCNC: 10 MG/DL
ALBUMIN UR-MCNC: 10 MG/DL
ALBUMIN UR-MCNC: NEGATIVE G/DL
ALBUMIN UR-MCNC: NEGATIVE MG/DL
ALBUMIN UR-MCNC: NEGATIVE MG/DL
ALP SERPL-CCNC: 102 U/L (ref 45–120)
ALP SERPL-CCNC: 82 U/L (ref 40–150)
ALT SERPL W P-5'-P-CCNC: 13 U/L (ref 0–50)
ALT SERPL W P-5'-P-CCNC: 14 U/L (ref 0–45)
ANION GAP SERPL CALCULATED.3IONS-SCNC: 5 MMOL/L (ref 3–14)
ANION GAP SERPL CALCULATED.3IONS-SCNC: 6 MMOL/L (ref 3–14)
ANION GAP SERPL CALCULATED.3IONS-SCNC: 6 MMOL/L (ref 5–18)
ANION GAP SERPL CALCULATED.3IONS-SCNC: 7 MMOL/L (ref 3–14)
ANION GAP SERPL CALCULATED.3IONS-SCNC: 8 MMOL/L (ref 5–18)
ANION GAP SERPL CALCULATED.3IONS-SCNC: 9 MMOL/L (ref 3–14)
APPEARANCE UR: CLEAR
APTT PPP: 34 SEC (ref 22–37)
AST SERPL W P-5'-P-CCNC: 19 U/L (ref 0–40)
AST SERPL W P-5'-P-CCNC: 40 U/L (ref 0–45)
BACTERIA #/AREA URNS HPF: ABNORMAL /HPF
BACTERIA SPEC CULT: NO GROWTH
BACTERIA UR CULT: NORMAL
BASOPHILS # BLD AUTO: 0 10E9/L (ref 0–0.2)
BASOPHILS # BLD AUTO: 0 10E9/L (ref 0–0.2)
BASOPHILS NFR BLD AUTO: 0.2 %
BASOPHILS NFR BLD AUTO: 0.4 %
BILIRUB SERPL-MCNC: 0.4 MG/DL (ref 0–1)
BILIRUB SERPL-MCNC: 0.5 MG/DL (ref 0.2–1.3)
BILIRUB UR QL STRIP: NEGATIVE
BUN SERPL-MCNC: 16 MG/DL (ref 7–30)
BUN SERPL-MCNC: 20 MG/DL (ref 7–30)
BUN SERPL-MCNC: 24 MG/DL (ref 8–28)
BUN SERPL-MCNC: 26 MG/DL (ref 7–30)
BUN SERPL-MCNC: 26 MG/DL (ref 8–28)
BUN SERPL-MCNC: 29 MG/DL (ref 7–30)
BUN SERPL-MCNC: 35 MG/DL (ref 7–30)
BUN SERPL-MCNC: 35 MG/DL (ref 7–30)
BUN SERPL-MCNC: 53 MG/DL (ref 7–30)
BUN SERPL-MCNC: 67 MG/DL (ref 7–30)
CALCIUM SERPL-MCNC: 8.6 MG/DL (ref 8.5–10.1)
CALCIUM SERPL-MCNC: 8.7 MG/DL (ref 8.5–10.1)
CALCIUM SERPL-MCNC: 8.7 MG/DL (ref 8.5–10.1)
CALCIUM SERPL-MCNC: 9.1 MG/DL (ref 8.5–10.1)
CALCIUM SERPL-MCNC: 9.3 MG/DL (ref 8.5–10.5)
CALCIUM SERPL-MCNC: 9.3 MG/DL (ref 8.5–10.5)
CALCIUM SERPL-MCNC: 9.5 MG/DL (ref 8.5–10.1)
CALCIUM SERPL-MCNC: 9.6 MG/DL (ref 8.5–10.1)
CHLORIDE BLD-SCNC: 100 MMOL/L (ref 98–107)
CHLORIDE BLD-SCNC: 97 MMOL/L (ref 98–107)
CHLORIDE SERPL-SCNC: 100 MMOL/L (ref 94–109)
CHLORIDE SERPL-SCNC: 101 MMOL/L (ref 94–109)
CHLORIDE SERPL-SCNC: 105 MMOL/L (ref 94–109)
CHLORIDE SERPL-SCNC: 105 MMOL/L (ref 94–109)
CHLORIDE SERPL-SCNC: 107 MMOL/L (ref 94–109)
CHLORIDE SERPL-SCNC: 107 MMOL/L (ref 94–109)
CHLORIDE SERPL-SCNC: 109 MMOL/L (ref 94–109)
CHLORIDE SERPL-SCNC: 98 MMOL/L (ref 94–109)
CO2 SERPL-SCNC: 20 MMOL/L (ref 20–32)
CO2 SERPL-SCNC: 22 MMOL/L (ref 20–32)
CO2 SERPL-SCNC: 22 MMOL/L (ref 20–32)
CO2 SERPL-SCNC: 23 MMOL/L (ref 20–32)
CO2 SERPL-SCNC: 24 MMOL/L (ref 20–32)
CO2 SERPL-SCNC: 24 MMOL/L (ref 22–31)
CO2 SERPL-SCNC: 25 MMOL/L (ref 22–31)
COLOR UR AUTO: ABNORMAL
COLOR UR AUTO: NORMAL
COLOR UR AUTO: NORMAL
CREAT SERPL-MCNC: 1.26 MG/DL (ref 0.52–1.04)
CREAT SERPL-MCNC: 1.29 MG/DL (ref 0.52–1.04)
CREAT SERPL-MCNC: 1.46 MG/DL (ref 0.52–1.04)
CREAT SERPL-MCNC: 1.76 MG/DL (ref 0.6–1.1)
CREAT SERPL-MCNC: 1.78 MG/DL (ref 0.52–1.04)
CREAT SERPL-MCNC: 1.88 MG/DL (ref 0.52–1.04)
CREAT SERPL-MCNC: 1.94 MG/DL (ref 0.6–1.1)
CREAT SERPL-MCNC: 2.23 MG/DL (ref 0.52–1.04)
CREAT SERPL-MCNC: 2.65 MG/DL (ref 0.52–1.04)
CREAT SERPL-MCNC: 3.63 MG/DL (ref 0.52–1.04)
CREAT UR-MCNC: 104 MG/DL
CREAT UR-MCNC: 55 MG/DL
CV STRESS MAX HR HE: 98
DIFFERENTIAL METHOD BLD: ABNORMAL
DIFFERENTIAL METHOD BLD: ABNORMAL
EOSINOPHIL # BLD AUTO: 0.1 10E9/L (ref 0–0.7)
EOSINOPHIL # BLD AUTO: 0.3 10E9/L (ref 0–0.7)
EOSINOPHIL NFR BLD AUTO: 0.9 %
EOSINOPHIL NFR BLD AUTO: 2.6 %
ERYTHROCYTE [DISTWIDTH] IN BLOOD BY AUTOMATED COUNT: 14.3 % (ref 10–15)
ERYTHROCYTE [DISTWIDTH] IN BLOOD BY AUTOMATED COUNT: 14.3 % (ref 10–15)
ERYTHROCYTE [DISTWIDTH] IN BLOOD BY AUTOMATED COUNT: 14.4 % (ref 10–15)
ERYTHROCYTE [DISTWIDTH] IN BLOOD BY AUTOMATED COUNT: 14.5 % (ref 10–15)
ERYTHROCYTE [DISTWIDTH] IN BLOOD BY AUTOMATED COUNT: 14.8 % (ref 10–15)
ERYTHROCYTE [DISTWIDTH] IN BLOOD BY AUTOMATED COUNT: 14.9 % (ref 10–15)
ERYTHROCYTE [DISTWIDTH] IN BLOOD BY AUTOMATED COUNT: 15 % (ref 10–15)
ERYTHROCYTE [DISTWIDTH] IN BLOOD BY AUTOMATED COUNT: 15.7 % (ref 11–14.5)
FRACT EXCRET NA UR+SERPL-RTO: 1 %
FRACT EXCRET NA UR+SERPL-RTO: 2.3 %
GFR SERPL CREATININE-BSD FRML MDRD: 11 ML/MIN/{1.73_M2}
GFR SERPL CREATININE-BSD FRML MDRD: 16 ML/MIN/{1.73_M2}
GFR SERPL CREATININE-BSD FRML MDRD: 19 ML/MIN/{1.73_M2}
GFR SERPL CREATININE-BSD FRML MDRD: 24 ML/MIN/{1.73_M2}
GFR SERPL CREATININE-BSD FRML MDRD: 25 ML/MIN/1.73M2
GFR SERPL CREATININE-BSD FRML MDRD: 26 ML/MIN/{1.73_M2}
GFR SERPL CREATININE-BSD FRML MDRD: 27 ML/MIN/1.73M2
GFR SERPL CREATININE-BSD FRML MDRD: 32 ML/MIN/{1.73_M2}
GFR SERPL CREATININE-BSD FRML MDRD: 38 ML/MIN/{1.73_M2}
GFR SERPL CREATININE-BSD FRML MDRD: 39 ML/MIN/{1.73_M2}
GLUCOSE BLD-MCNC: 122 MG/DL (ref 70–125)
GLUCOSE BLD-MCNC: 84 MG/DL (ref 70–125)
GLUCOSE SERPL-MCNC: 106 MG/DL (ref 70–99)
GLUCOSE SERPL-MCNC: 114 MG/DL (ref 70–99)
GLUCOSE SERPL-MCNC: 127 MG/DL (ref 70–99)
GLUCOSE SERPL-MCNC: 83 MG/DL (ref 70–99)
GLUCOSE SERPL-MCNC: 88 MG/DL (ref 70–99)
GLUCOSE SERPL-MCNC: 88 MG/DL (ref 70–99)
GLUCOSE SERPL-MCNC: 90 MG/DL (ref 70–99)
GLUCOSE SERPL-MCNC: 93 MG/DL (ref 70–99)
GLUCOSE UR STRIP-MCNC: NEGATIVE MG/DL
HCT VFR BLD AUTO: 26.8 % (ref 35–47)
HCT VFR BLD AUTO: 26.8 % (ref 35–47)
HCT VFR BLD AUTO: 27.1 % (ref 35–47)
HCT VFR BLD AUTO: 27.3 % (ref 35–47)
HCT VFR BLD AUTO: 27.7 % (ref 35–47)
HCT VFR BLD AUTO: 27.9 % (ref 35–47)
HCT VFR BLD AUTO: 28.7 % (ref 35–47)
HCT VFR BLD AUTO: 28.9 % (ref 35–47)
HCT VFR BLD AUTO: 29.1 % (ref 35–47)
HCT VFR BLD AUTO: 30.7 % (ref 35–47)
HEMOCCULT STL QL: NEGATIVE
HGB BLD-MCNC: 8.5 G/DL (ref 11.7–15.7)
HGB BLD-MCNC: 8.6 G/DL (ref 11.7–15.7)
HGB BLD-MCNC: 8.6 G/DL (ref 11.7–15.7)
HGB BLD-MCNC: 8.7 G/DL (ref 11.7–15.7)
HGB BLD-MCNC: 8.7 G/DL (ref 11.7–15.7)
HGB BLD-MCNC: 9 G/DL (ref 11.7–15.7)
HGB BLD-MCNC: 9 G/DL (ref 12–16)
HGB BLD-MCNC: 9.2 G/DL (ref 11.7–15.7)
HGB BLD-MCNC: 9.3 G/DL (ref 11.7–15.7)
HGB BLD-MCNC: 9.7 G/DL (ref 11.7–15.7)
HGB UR QL STRIP: ABNORMAL
HGB UR QL STRIP: NEGATIVE
HYALINE CASTS #/AREA URNS LPF: 6 /LPF (ref 0–2)
IMM GRANULOCYTES # BLD: 0 10E9/L (ref 0–0.4)
IMM GRANULOCYTES # BLD: 0.1 10E9/L (ref 0–0.4)
IMM GRANULOCYTES NFR BLD: 0.4 %
IMM GRANULOCYTES NFR BLD: 0.4 %
INR PPP: 0.91 (ref 0.86–1.14)
INTERPRETATION ECG - MUSE: NORMAL
KETONES UR STRIP-MCNC: NEGATIVE MG/DL
LABORATORY COMMENT REPORT: NORMAL
LABORATORY COMMENT REPORT: NORMAL
LACTATE BLD-SCNC: 2.1 MMOL/L (ref 0.7–2)
LACTATE BLD-SCNC: 2.3 MMOL/L (ref 0.7–2)
LEUKOCYTE ESTERASE UR QL STRIP: ABNORMAL
LEUKOCYTE ESTERASE UR QL STRIP: NEGATIVE
LYMPHOCYTES # BLD AUTO: 1.1 10E9/L (ref 0.8–5.3)
LYMPHOCYTES # BLD AUTO: 1.8 10E9/L (ref 0.8–5.3)
LYMPHOCYTES NFR BLD AUTO: 16.8 %
LYMPHOCYTES NFR BLD AUTO: 8.8 %
Lab: NORMAL
Lab: NORMAL
MCH RBC QN AUTO: 27.4 PG (ref 26.5–33)
MCH RBC QN AUTO: 27.8 PG (ref 26.5–33)
MCH RBC QN AUTO: 27.9 PG (ref 27–34)
MCH RBC QN AUTO: 28 PG (ref 26.5–33)
MCH RBC QN AUTO: 28.3 PG (ref 26.5–33)
MCH RBC QN AUTO: 28.4 PG (ref 26.5–33)
MCHC RBC AUTO-ENTMCNC: 31.1 G/DL (ref 31.5–36.5)
MCHC RBC AUTO-ENTMCNC: 31.4 G/DL (ref 31.5–36.5)
MCHC RBC AUTO-ENTMCNC: 31.4 G/DL (ref 31.5–36.5)
MCHC RBC AUTO-ENTMCNC: 31.6 G/DL (ref 31.5–36.5)
MCHC RBC AUTO-ENTMCNC: 31.8 G/DL (ref 31.5–36.5)
MCHC RBC AUTO-ENTMCNC: 32 G/DL (ref 31.5–36.5)
MCHC RBC AUTO-ENTMCNC: 32.1 G/DL (ref 31.5–36.5)
MCHC RBC AUTO-ENTMCNC: 32.3 G/DL (ref 32–36)
MCV RBC AUTO: 86 FL (ref 80–100)
MCV RBC AUTO: 87 FL (ref 78–100)
MCV RBC AUTO: 87 FL (ref 78–100)
MCV RBC AUTO: 88 FL (ref 78–100)
MCV RBC AUTO: 89 FL (ref 78–100)
MONOCYTES # BLD AUTO: 1 10E9/L (ref 0–1.3)
MONOCYTES # BLD AUTO: 1.1 10E9/L (ref 0–1.3)
MONOCYTES NFR BLD AUTO: 8.2 %
MONOCYTES NFR BLD AUTO: 9.3 %
MUCOUS THREADS #/AREA URNS LPF: PRESENT /LPF
MUCOUS THREADS #/AREA URNS LPF: PRESENT /LPF
NEUTROPHILS # BLD AUTO: 10.5 10E9/L (ref 1.6–8.3)
NEUTROPHILS # BLD AUTO: 7.7 10E9/L (ref 1.6–8.3)
NEUTROPHILS NFR BLD AUTO: 70.5 %
NEUTROPHILS NFR BLD AUTO: 81.5 %
NITRATE UR QL: NEGATIVE
NRBC # BLD AUTO: 0 10*3/UL
NRBC # BLD AUTO: 0 10*3/UL
NRBC BLD AUTO-RTO: 0 /100
NRBC BLD AUTO-RTO: 0 /100
OSMOLALITY SERPL: 280 MMOL/KG (ref 280–301)
OSMOLALITY UR: 312 MMOL/KG (ref 100–1200)
PH UR STRIP: 6 PH (ref 5–7)
PH UR STRIP: 6.5 PH (ref 5–7)
PH UR STRIP: 6.5 [PH] (ref 5–7)
PH UR STRIP: 6.5 [PH] (ref 5–8)
PH UR STRIP: 7 [PH] (ref 4.5–8)
PLATELET # BLD AUTO: 287 10E9/L (ref 150–450)
PLATELET # BLD AUTO: 294 10E9/L (ref 150–450)
PLATELET # BLD AUTO: 294 10E9/L (ref 150–450)
PLATELET # BLD AUTO: 302 10E9/L (ref 150–450)
PLATELET # BLD AUTO: 302 10E9/L (ref 150–450)
PLATELET # BLD AUTO: 309 10E9/L (ref 150–450)
PLATELET # BLD AUTO: 319 10E9/L (ref 150–450)
PLATELET # BLD AUTO: 321 10E9/L (ref 150–450)
PLATELET # BLD AUTO: 335 10E9/L (ref 150–450)
PLATELET # BLD AUTO: 369 THOU/UL (ref 140–440)
PMV BLD AUTO: 9.5 FL (ref 8.5–12.5)
POTASSIUM BLD-SCNC: 4.4 MMOL/L (ref 3.5–5)
POTASSIUM BLD-SCNC: 4.5 MMOL/L (ref 3.5–5)
POTASSIUM SERPL-SCNC: 3.4 MMOL/L (ref 3.4–5.3)
POTASSIUM SERPL-SCNC: 3.6 MMOL/L (ref 3.4–5.3)
POTASSIUM SERPL-SCNC: 3.7 MMOL/L (ref 3.4–5.3)
POTASSIUM SERPL-SCNC: 3.7 MMOL/L (ref 3.4–5.3)
POTASSIUM SERPL-SCNC: 3.8 MMOL/L (ref 3.4–5.3)
POTASSIUM SERPL-SCNC: 3.8 MMOL/L (ref 3.4–5.3)
POTASSIUM SERPL-SCNC: 4.2 MMOL/L (ref 3.4–5.3)
POTASSIUM SERPL-SCNC: 4.2 MMOL/L (ref 3.4–5.3)
PROCALCITONIN SERPL-MCNC: 0.22 NG/ML
PROT SERPL-MCNC: 6.4 G/DL (ref 6.8–8.8)
PROT SERPL-MCNC: 6.4 G/DL (ref 6–8)
RATE PRESSURE PRODUCT: NORMAL
RBC # BLD AUTO: 3.04 10E12/L (ref 3.8–5.2)
RBC # BLD AUTO: 3.06 10E12/L (ref 3.8–5.2)
RBC # BLD AUTO: 3.06 10E12/L (ref 3.8–5.2)
RBC # BLD AUTO: 3.07 10E12/L (ref 3.8–5.2)
RBC # BLD AUTO: 3.11 10E12/L (ref 3.8–5.2)
RBC # BLD AUTO: 3.23 MILL/UL (ref 3.8–5.4)
RBC # BLD AUTO: 3.28 10E12/L (ref 3.8–5.2)
RBC # BLD AUTO: 3.29 10E12/L (ref 3.8–5.2)
RBC # BLD AUTO: 3.35 10E12/L (ref 3.8–5.2)
RBC # BLD AUTO: 3.49 10E12/L (ref 3.8–5.2)
RBC #/AREA URNS AUTO: 1 /HPF (ref 0–2)
RBC #/AREA URNS AUTO: <1 /HPF (ref 0–2)
RBC URINE: 0 /HPF
SARS-COV-2 PCR COMMENT: NORMAL
SARS-COV-2 RNA RESP QL NAA+PROBE: NEGATIVE
SARS-COV-2 RNA RESP QL NAA+PROBE: NEGATIVE
SARS-COV-2 RNA SPEC QL NAA+PROBE: NEGATIVE
SARS-COV-2 VIRUS SPECIMEN SOURCE: NORMAL
SODIUM SERPL-SCNC: 129 MMOL/L (ref 136–145)
SODIUM SERPL-SCNC: 130 MMOL/L (ref 133–144)
SODIUM SERPL-SCNC: 130 MMOL/L (ref 133–144)
SODIUM SERPL-SCNC: 131 MMOL/L (ref 133–144)
SODIUM SERPL-SCNC: 131 MMOL/L (ref 136–145)
SODIUM SERPL-SCNC: 132 MMOL/L (ref 136–145)
SODIUM SERPL-SCNC: 133 MMOL/L (ref 133–144)
SODIUM SERPL-SCNC: 134 MMOL/L (ref 133–144)
SODIUM SERPL-SCNC: 135 MMOL/L (ref 133–144)
SODIUM UR-SCNC: 39 MMOL/L
SODIUM UR-SCNC: 72 MMOL/L
SOURCE: ABNORMAL
SOURCE: ABNORMAL
SP GR UR STRIP: 1.01 (ref 1–1.03)
SPECIMEN SOURCE: NORMAL
SQUAMOUS EPITHELIAL: 1 /HPF
STRESS ECHO BASELINE DIASTOLIC HE: 75
STRESS ECHO BASELINE HR: 92
STRESS ECHO BASELINE SYSTOLIC BP: 149
STRESS ECHO CALCULATED PERCENT HR: 72 %
STRESS ECHO LAST STRESS DIASTOLIC BP: 62
STRESS ECHO LAST STRESS SYSTOLIC BP: 136
STRESS ECHO TARGET HR: 136
TRANSITIONAL EPI: <1 /HPF
TROPONIN I SERPL-MCNC: 0.02 UG/L (ref 0–0.04)
TROPONIN I SERPL-MCNC: 0.79 UG/L (ref 0–0.04)
TROPONIN I SERPL-MCNC: 2.34 UG/L (ref 0–0.04)
TROPONIN I SERPL-MCNC: 4.58 UG/L (ref 0–0.04)
TROPONIN I SERPL-MCNC: 7.19 UG/L (ref 0–0.04)
TROPONIN I SERPL-MCNC: 7.96 UG/L (ref 0–0.04)
UFH PPP CHRO-ACNC: 0.14 IU/ML
UFH PPP CHRO-ACNC: 0.29 IU/ML
UFH PPP CHRO-ACNC: 0.29 IU/ML
UFH PPP CHRO-ACNC: 0.43 IU/ML
UFH PPP CHRO-ACNC: 0.43 IU/ML
UROBILINOGEN UR STRIP-ACNC: NORMAL
UROBILINOGEN UR STRIP-ACNC: NORMAL
UROBILINOGEN UR STRIP-MCNC: <2 MG/DL
UROBILINOGEN UR STRIP-MCNC: NORMAL MG/DL (ref 0–2)
UROBILINOGEN UR STRIP-MCNC: NORMAL MG/DL (ref 0–2)
WBC # BLD AUTO: 11 10E9/L (ref 4–11)
WBC # BLD AUTO: 11.6 10E9/L (ref 4–11)
WBC # BLD AUTO: 12.2 10E9/L (ref 4–11)
WBC # BLD AUTO: 12.5 10E9/L (ref 4–11)
WBC # BLD AUTO: 12.9 10E9/L (ref 4–11)
WBC # BLD AUTO: 14.5 10E9/L (ref 4–11)
WBC # BLD AUTO: 16.9 10E9/L (ref 4–11)
WBC # BLD AUTO: 7.5 10E9/L (ref 4–11)
WBC # BLD AUTO: 9.2 10E9/L (ref 4–11)
WBC #/AREA URNS AUTO: 1 /HPF (ref 0–5)
WBC #/AREA URNS AUTO: 1 /HPF (ref 0–5)
WBC URINE: 2 /HPF
WBC: 7.6 THOU/UL (ref 4–11)

## 2021-01-01 PROCEDURE — 36415 COLL VENOUS BLD VENIPUNCTURE: CPT | Performed by: INTERNAL MEDICINE

## 2021-01-01 PROCEDURE — 85027 COMPLETE CBC AUTOMATED: CPT | Performed by: INTERNAL MEDICINE

## 2021-01-01 PROCEDURE — 120N000013 HC R&B IMCU

## 2021-01-01 PROCEDURE — 78452 HT MUSCLE IMAGE SPECT MULT: CPT | Mod: 26 | Performed by: INTERNAL MEDICINE

## 2021-01-01 PROCEDURE — 258N000003 HC RX IP 258 OP 636: Performed by: NURSE PRACTITIONER

## 2021-01-01 PROCEDURE — 250N000011 HC RX IP 250 OP 636: Performed by: INTERNAL MEDICINE

## 2021-01-01 PROCEDURE — 80048 BASIC METABOLIC PNL TOTAL CA: CPT | Performed by: INTERNAL MEDICINE

## 2021-01-01 PROCEDURE — 250N000013 HC RX MED GY IP 250 OP 250 PS 637: Performed by: INTERNAL MEDICINE

## 2021-01-01 PROCEDURE — 84484 ASSAY OF TROPONIN QUANT: CPT | Performed by: INTERNAL MEDICINE

## 2021-01-01 PROCEDURE — 84145 PROCALCITONIN (PCT): CPT | Performed by: NURSE PRACTITIONER

## 2021-01-01 PROCEDURE — G0463 HOSPITAL OUTPT CLINIC VISIT: HCPCS | Mod: 25

## 2021-01-01 PROCEDURE — 93005 ELECTROCARDIOGRAM TRACING: CPT

## 2021-01-01 PROCEDURE — 93017 CV STRESS TEST TRACING ONLY: CPT

## 2021-01-01 PROCEDURE — C9803 HOPD COVID-19 SPEC COLLECT: HCPCS

## 2021-01-01 PROCEDURE — 85520 HEPARIN ASSAY: CPT | Performed by: HOSPITALIST

## 2021-01-01 PROCEDURE — 81001 URINALYSIS AUTO W/SCOPE: CPT | Mod: ORL | Performed by: NURSE PRACTITIONER

## 2021-01-01 PROCEDURE — 80053 COMPREHEN METABOLIC PANEL: CPT | Performed by: INTERNAL MEDICINE

## 2021-01-01 PROCEDURE — G0378 HOSPITAL OBSERVATION PER HR: HCPCS

## 2021-01-01 PROCEDURE — 93306 TTE W/DOPPLER COMPLETE: CPT

## 2021-01-01 PROCEDURE — 93016 CV STRESS TEST SUPVJ ONLY: CPT | Performed by: INTERNAL MEDICINE

## 2021-01-01 PROCEDURE — 250N000011 HC RX IP 250 OP 636

## 2021-01-01 PROCEDURE — 80048 BASIC METABOLIC PNL TOTAL CA: CPT | Performed by: HOSPITALIST

## 2021-01-01 PROCEDURE — 85025 COMPLETE CBC W/AUTO DIFF WBC: CPT | Performed by: NURSE PRACTITIONER

## 2021-01-01 PROCEDURE — 96361 HYDRATE IV INFUSION ADD-ON: CPT

## 2021-01-01 PROCEDURE — 82570 ASSAY OF URINE CREATININE: CPT | Performed by: INTERNAL MEDICINE

## 2021-01-01 PROCEDURE — 87635 SARS-COV-2 COVID-19 AMP PRB: CPT | Performed by: NURSE PRACTITIONER

## 2021-01-01 PROCEDURE — 85027 COMPLETE CBC AUTOMATED: CPT | Performed by: HOSPITALIST

## 2021-01-01 PROCEDURE — 87040 BLOOD CULTURE FOR BACTERIA: CPT | Performed by: NURSE PRACTITIONER

## 2021-01-01 PROCEDURE — 258N000003 HC RX IP 258 OP 636: Performed by: INTERNAL MEDICINE

## 2021-01-01 PROCEDURE — 99285 EMERGENCY DEPT VISIT HI MDM: CPT | Mod: 25

## 2021-01-01 PROCEDURE — 93306 TTE W/DOPPLER COMPLETE: CPT | Mod: 26 | Performed by: INTERNAL MEDICINE

## 2021-01-01 PROCEDURE — 97165 OT EVAL LOW COMPLEX 30 MIN: CPT | Mod: GO

## 2021-01-01 PROCEDURE — 97530 THERAPEUTIC ACTIVITIES: CPT | Mod: GP

## 2021-01-01 PROCEDURE — G1004 CDSM NDSC: HCPCS | Performed by: INTERNAL MEDICINE

## 2021-01-01 PROCEDURE — 80048 BASIC METABOLIC PNL TOTAL CA: CPT | Performed by: NURSE PRACTITIONER

## 2021-01-01 PROCEDURE — 85520 HEPARIN ASSAY: CPT | Performed by: INTERNAL MEDICINE

## 2021-01-01 PROCEDURE — 97162 PT EVAL MOD COMPLEX 30 MIN: CPT | Mod: GP

## 2021-01-01 PROCEDURE — 120N000001 HC R&B MED SURG/OB

## 2021-01-01 PROCEDURE — 99217 PR OBSERVATION CARE DISCHARGE: CPT | Performed by: PHYSICIAN ASSISTANT

## 2021-01-01 PROCEDURE — 250N000013 HC RX MED GY IP 250 OP 250 PS 637: Mod: GY | Performed by: HOSPITALIST

## 2021-01-01 PROCEDURE — 250N000011 HC RX IP 250 OP 636: Performed by: NURSE PRACTITIONER

## 2021-01-01 PROCEDURE — 85610 PROTHROMBIN TIME: CPT | Performed by: NURSE PRACTITIONER

## 2021-01-01 PROCEDURE — 84300 ASSAY OF URINE SODIUM: CPT | Performed by: INTERNAL MEDICINE

## 2021-01-01 PROCEDURE — 93018 CV STRESS TEST I&R ONLY: CPT | Mod: ME | Performed by: INTERNAL MEDICINE

## 2021-01-01 PROCEDURE — 99231 SBSQ HOSP IP/OBS SF/LOW 25: CPT | Performed by: INTERNAL MEDICINE

## 2021-01-01 PROCEDURE — 83605 ASSAY OF LACTIC ACID: CPT | Performed by: INTERNAL MEDICINE

## 2021-01-01 PROCEDURE — 81001 URINALYSIS AUTO W/SCOPE: CPT | Performed by: NURSE PRACTITIONER

## 2021-01-01 PROCEDURE — 85730 THROMBOPLASTIN TIME PARTIAL: CPT | Performed by: INTERNAL MEDICINE

## 2021-01-01 PROCEDURE — 93010 ELECTROCARDIOGRAM REPORT: CPT | Performed by: INTERNAL MEDICINE

## 2021-01-01 PROCEDURE — 99233 SBSQ HOSP IP/OBS HIGH 50: CPT | Performed by: INTERNAL MEDICINE

## 2021-01-01 PROCEDURE — 250N000009 HC RX 250: Performed by: INTERNAL MEDICINE

## 2021-01-01 PROCEDURE — 70450 CT HEAD/BRAIN W/O DYE: CPT | Mod: ME

## 2021-01-01 PROCEDURE — 85027 COMPLETE CBC AUTOMATED: CPT | Performed by: NURSE PRACTITIONER

## 2021-01-01 PROCEDURE — 83930 ASSAY OF BLOOD OSMOLALITY: CPT | Performed by: NURSE PRACTITIONER

## 2021-01-01 PROCEDURE — 96360 HYDRATION IV INFUSION INIT: CPT

## 2021-01-01 PROCEDURE — 258N000003 HC RX IP 258 OP 636: Performed by: HOSPITALIST

## 2021-01-01 PROCEDURE — 82570 ASSAY OF URINE CREATININE: CPT | Performed by: HOSPITALIST

## 2021-01-01 PROCEDURE — 99222 1ST HOSP IP/OBS MODERATE 55: CPT | Mod: 25 | Performed by: INTERNAL MEDICINE

## 2021-01-01 PROCEDURE — 84484 ASSAY OF TROPONIN QUANT: CPT | Performed by: NURSE PRACTITIONER

## 2021-01-01 PROCEDURE — 36415 COLL VENOUS BLD VENIPUNCTURE: CPT | Performed by: HOSPITALIST

## 2021-01-01 PROCEDURE — 36415 COLL VENOUS BLD VENIPUNCTURE: CPT | Performed by: NURSE PRACTITIONER

## 2021-01-01 PROCEDURE — 83935 ASSAY OF URINE OSMOLALITY: CPT | Performed by: HOSPITALIST

## 2021-01-01 PROCEDURE — 93005 ELECTROCARDIOGRAM TRACING: CPT | Mod: 76

## 2021-01-01 PROCEDURE — 82272 OCCULT BLD FECES 1-3 TESTS: CPT | Performed by: NURSE PRACTITIONER

## 2021-01-01 PROCEDURE — 99225 PR SUBSEQUENT OBSERVATION CARE,LEVEL II: CPT | Performed by: HOSPITALIST

## 2021-01-01 PROCEDURE — 71045 X-RAY EXAM CHEST 1 VIEW: CPT

## 2021-01-01 PROCEDURE — A9502 TC99M TETROFOSMIN: HCPCS | Performed by: INTERNAL MEDICINE

## 2021-01-01 PROCEDURE — 96365 THER/PROPH/DIAG IV INF INIT: CPT

## 2021-01-01 PROCEDURE — 99223 1ST HOSP IP/OBS HIGH 75: CPT | Mod: AI | Performed by: INTERNAL MEDICINE

## 2021-01-01 PROCEDURE — 343N000001 HC RX 343: Performed by: INTERNAL MEDICINE

## 2021-01-01 PROCEDURE — 71046 X-RAY EXAM CHEST 2 VIEWS: CPT

## 2021-01-01 PROCEDURE — 99207 PR CDG-CODE CATEGORY CHANGED: CPT | Performed by: HOSPITALIST

## 2021-01-01 PROCEDURE — 87086 URINE CULTURE/COLONY COUNT: CPT | Mod: ORL | Performed by: NURSE PRACTITIONER

## 2021-01-01 PROCEDURE — 83605 ASSAY OF LACTIC ACID: CPT | Performed by: NURSE PRACTITIONER

## 2021-01-01 PROCEDURE — 85025 COMPLETE CBC W/AUTO DIFF WBC: CPT | Performed by: INTERNAL MEDICINE

## 2021-01-01 PROCEDURE — 97602 WOUND(S) CARE NON-SELECTIVE: CPT

## 2021-01-01 PROCEDURE — G1004 CDSM NDSC: HCPCS

## 2021-01-01 PROCEDURE — 99239 HOSP IP/OBS DSCHRG MGMT >30: CPT | Performed by: INTERNAL MEDICINE

## 2021-01-01 PROCEDURE — 96366 THER/PROPH/DIAG IV INF ADDON: CPT

## 2021-01-01 PROCEDURE — 84300 ASSAY OF URINE SODIUM: CPT | Performed by: HOSPITALIST

## 2021-01-01 PROCEDURE — 97535 SELF CARE MNGMENT TRAINING: CPT | Mod: GO

## 2021-01-01 RX ORDER — LOPERAMIDE HYDROCHLORIDE 2 MG/1
2 TABLET ORAL 4 TIMES DAILY PRN
Start: 2021-01-01

## 2021-01-01 RX ORDER — ALBUTEROL SULFATE 90 UG/1
2 AEROSOL, METERED RESPIRATORY (INHALATION) EVERY 5 MIN PRN
Status: DISCONTINUED | OUTPATIENT
Start: 2021-01-01 | End: 2021-01-01 | Stop reason: HOSPADM

## 2021-01-01 RX ORDER — ESCITALOPRAM OXALATE 5 MG/1
5 TABLET ORAL DAILY
Status: DISCONTINUED | OUTPATIENT
Start: 2021-01-01 | End: 2021-01-01 | Stop reason: HOSPADM

## 2021-01-01 RX ORDER — LIDOCAINE 40 MG/G
CREAM TOPICAL
Status: DISCONTINUED | OUTPATIENT
Start: 2021-01-01 | End: 2021-01-01

## 2021-01-01 RX ORDER — REGADENOSON 0.08 MG/ML
0.4 INJECTION, SOLUTION INTRAVENOUS ONCE
Status: COMPLETED | OUTPATIENT
Start: 2021-01-01 | End: 2021-01-01

## 2021-01-01 RX ORDER — LOSARTAN POTASSIUM 50 MG/1
50 TABLET ORAL AT BEDTIME
Start: 2021-01-01

## 2021-01-01 RX ORDER — ATORVASTATIN CALCIUM 40 MG/1
40 TABLET, FILM COATED ORAL EVERY EVENING
Qty: 30 TABLET | Refills: 0 | Status: SHIPPED | OUTPATIENT
Start: 2021-01-01

## 2021-01-01 RX ORDER — ATORVASTATIN CALCIUM 40 MG/1
40 TABLET, FILM COATED ORAL EVERY EVENING
Status: DISCONTINUED | OUTPATIENT
Start: 2021-01-01 | End: 2021-01-01 | Stop reason: HOSPADM

## 2021-01-01 RX ORDER — FUROSEMIDE 10 MG/ML
10 INJECTION INTRAMUSCULAR; INTRAVENOUS ONCE
Status: COMPLETED | OUTPATIENT
Start: 2021-01-01 | End: 2021-01-01

## 2021-01-01 RX ORDER — REGADENOSON 0.08 MG/ML
INJECTION, SOLUTION INTRAVENOUS
Status: COMPLETED
Start: 2021-01-01 | End: 2021-01-01

## 2021-01-01 RX ORDER — CEFTRIAXONE 2 G/1
2 INJECTION, POWDER, FOR SOLUTION INTRAMUSCULAR; INTRAVENOUS ONCE
Status: COMPLETED | OUTPATIENT
Start: 2021-01-01 | End: 2021-01-01

## 2021-01-01 RX ORDER — DILTIAZEM HCL IN NACL,ISO-OSM 125 MG/125
5-15 PLASTIC BAG, INJECTION (ML) INTRAVENOUS CONTINUOUS
Status: DISCONTINUED | OUTPATIENT
Start: 2021-01-01 | End: 2021-01-01

## 2021-01-01 RX ORDER — ONDANSETRON 4 MG/1
4 TABLET, ORALLY DISINTEGRATING ORAL EVERY 6 HOURS PRN
Status: DISCONTINUED | OUTPATIENT
Start: 2021-01-01 | End: 2021-01-01 | Stop reason: HOSPADM

## 2021-01-01 RX ORDER — CAFFEINE CITRATE 20 MG/ML
60 SOLUTION INTRAVENOUS
Status: DISCONTINUED | OUTPATIENT
Start: 2021-01-01 | End: 2021-01-01 | Stop reason: HOSPADM

## 2021-01-01 RX ORDER — CLOPIDOGREL BISULFATE 75 MG/1
75 TABLET ORAL DAILY
Status: DISCONTINUED | OUTPATIENT
Start: 2021-01-01 | End: 2021-01-01

## 2021-01-01 RX ORDER — HEPARIN SODIUM 10000 [USP'U]/100ML
0-5000 INJECTION, SOLUTION INTRAVENOUS CONTINUOUS
Status: DISCONTINUED | OUTPATIENT
Start: 2021-01-01 | End: 2021-01-01

## 2021-01-01 RX ORDER — ONDANSETRON 2 MG/ML
4 INJECTION INTRAMUSCULAR; INTRAVENOUS EVERY 6 HOURS PRN
Status: DISCONTINUED | OUTPATIENT
Start: 2021-01-01 | End: 2021-01-01 | Stop reason: HOSPADM

## 2021-01-01 RX ORDER — NALOXONE HYDROCHLORIDE 0.4 MG/ML
0.2 INJECTION, SOLUTION INTRAMUSCULAR; INTRAVENOUS; SUBCUTANEOUS
Status: DISCONTINUED | OUTPATIENT
Start: 2021-01-01 | End: 2021-01-01 | Stop reason: HOSPADM

## 2021-01-01 RX ORDER — CLOPIDOGREL BISULFATE 75 MG/1
75 TABLET ORAL DAILY
Status: DISCONTINUED | OUTPATIENT
Start: 2021-01-01 | End: 2021-01-01 | Stop reason: HOSPADM

## 2021-01-01 RX ORDER — ASPIRIN 81 MG/1
324 TABLET, CHEWABLE ORAL ONCE
Status: COMPLETED | OUTPATIENT
Start: 2021-01-01 | End: 2021-01-01

## 2021-01-01 RX ORDER — NALOXONE HYDROCHLORIDE 0.4 MG/ML
0.4 INJECTION, SOLUTION INTRAMUSCULAR; INTRAVENOUS; SUBCUTANEOUS
Status: DISCONTINUED | OUTPATIENT
Start: 2021-01-01 | End: 2021-01-01 | Stop reason: HOSPADM

## 2021-01-01 RX ORDER — AMIODARONE HYDROCHLORIDE 200 MG/1
200 TABLET ORAL DAILY
Status: DISCONTINUED | OUTPATIENT
Start: 2021-01-01 | End: 2021-01-01 | Stop reason: HOSPADM

## 2021-01-01 RX ORDER — ACETAMINOPHEN 325 MG/1
650 TABLET ORAL EVERY 4 HOURS PRN
Status: DISCONTINUED | OUTPATIENT
Start: 2021-01-01 | End: 2021-01-01 | Stop reason: HOSPADM

## 2021-01-01 RX ORDER — ACYCLOVIR 200 MG/1
0-1 CAPSULE ORAL
Status: DISCONTINUED | OUTPATIENT
Start: 2021-01-01 | End: 2021-01-01 | Stop reason: HOSPADM

## 2021-01-01 RX ORDER — AMINOPHYLLINE 25 MG/ML
50-100 INJECTION, SOLUTION INTRAVENOUS
Status: DISCONTINUED | OUTPATIENT
Start: 2021-01-01 | End: 2021-01-01 | Stop reason: HOSPADM

## 2021-01-01 RX ORDER — HYDROMORPHONE HYDROCHLORIDE 1 MG/ML
0.2 INJECTION, SOLUTION INTRAMUSCULAR; INTRAVENOUS; SUBCUTANEOUS
Status: DISCONTINUED | OUTPATIENT
Start: 2021-01-01 | End: 2021-01-01 | Stop reason: HOSPADM

## 2021-01-01 RX ORDER — AMIODARONE HYDROCHLORIDE 200 MG/1
200 TABLET ORAL 2 TIMES DAILY
Qty: 60 TABLET | Refills: 0 | Status: SHIPPED | OUTPATIENT
Start: 2021-01-01 | End: 2021-01-01

## 2021-01-01 RX ORDER — PANTOPRAZOLE SODIUM 40 MG/1
40 TABLET, DELAYED RELEASE ORAL DAILY
Status: DISCONTINUED | OUTPATIENT
Start: 2021-01-01 | End: 2021-01-01 | Stop reason: HOSPADM

## 2021-01-01 RX ORDER — HEPARIN SODIUM 5000 [USP'U]/.5ML
5000 INJECTION, SOLUTION INTRAVENOUS; SUBCUTANEOUS EVERY 12 HOURS
Status: DISCONTINUED | OUTPATIENT
Start: 2021-01-01 | End: 2021-01-01

## 2021-01-01 RX ORDER — CEPHALEXIN 500 MG/1
500 CAPSULE ORAL 2 TIMES DAILY
Qty: 4 CAPSULE | Refills: 0 | Status: SHIPPED | OUTPATIENT
Start: 2021-01-01 | End: 2021-01-01

## 2021-01-01 RX ORDER — ATORVASTATIN CALCIUM 40 MG/1
40 TABLET, FILM COATED ORAL EVERY EVENING
Qty: 30 TABLET | Refills: 0 | Status: SHIPPED | OUTPATIENT
Start: 2021-01-01 | End: 2021-01-01

## 2021-01-01 RX ORDER — CLOPIDOGREL BISULFATE 75 MG/1
75 TABLET ORAL DAILY
Qty: 30 TABLET | Refills: 0 | Status: SHIPPED | OUTPATIENT
Start: 2021-01-01 | End: 2021-01-01

## 2021-01-01 RX ORDER — ALBUTEROL SULFATE 90 UG/1
1-2 AEROSOL, METERED RESPIRATORY (INHALATION) EVERY 4 HOURS PRN
Status: DISCONTINUED | OUTPATIENT
Start: 2021-01-01 | End: 2021-01-01 | Stop reason: HOSPADM

## 2021-01-01 RX ORDER — CARVEDILOL 3.12 MG/1
3.12 TABLET ORAL 2 TIMES DAILY WITH MEALS
Status: DISCONTINUED | OUTPATIENT
Start: 2021-01-01 | End: 2021-01-01

## 2021-01-01 RX ORDER — ACETAMINOPHEN 650 MG/1
650 SUPPOSITORY RECTAL EVERY 4 HOURS PRN
Status: DISCONTINUED | OUTPATIENT
Start: 2021-01-01 | End: 2021-01-01 | Stop reason: HOSPADM

## 2021-01-01 RX ORDER — SODIUM CHLORIDE, SODIUM LACTATE, POTASSIUM CHLORIDE, CALCIUM CHLORIDE 600; 310; 30; 20 MG/100ML; MG/100ML; MG/100ML; MG/100ML
INJECTION, SOLUTION INTRAVENOUS CONTINUOUS
Status: ACTIVE | OUTPATIENT
Start: 2021-01-01 | End: 2021-01-01

## 2021-01-01 RX ORDER — CEFTRIAXONE 1 G/1
1 INJECTION, POWDER, FOR SOLUTION INTRAMUSCULAR; INTRAVENOUS EVERY 24 HOURS
Status: DISCONTINUED | OUTPATIENT
Start: 2021-01-01 | End: 2021-01-01 | Stop reason: HOSPADM

## 2021-01-01 RX ORDER — LOSARTAN POTASSIUM 50 MG/1
50 TABLET ORAL AT BEDTIME
Status: ON HOLD | COMMUNITY
End: 2021-01-01

## 2021-01-01 RX ORDER — NITROGLYCERIN 0.4 MG/1
0.4 TABLET SUBLINGUAL EVERY 5 MIN PRN
Status: DISCONTINUED | OUTPATIENT
Start: 2021-01-01 | End: 2021-01-01

## 2021-01-01 RX ORDER — ASPIRIN 81 MG/1
81 TABLET ORAL DAILY
Status: DISCONTINUED | OUTPATIENT
Start: 2021-01-01 | End: 2021-01-01 | Stop reason: HOSPADM

## 2021-01-01 RX ORDER — AMIODARONE HYDROCHLORIDE 200 MG/1
TABLET ORAL
Qty: 36 TABLET | Refills: 0
Start: 2021-01-01

## 2021-01-01 RX ORDER — LIDOCAINE 40 MG/G
CREAM TOPICAL
Status: DISCONTINUED | OUTPATIENT
Start: 2021-01-01 | End: 2021-01-01 | Stop reason: HOSPADM

## 2021-01-01 RX ORDER — CLOPIDOGREL BISULFATE 75 MG/1
75 TABLET ORAL DAILY
Qty: 30 TABLET | Refills: 0 | Status: SHIPPED | OUTPATIENT
Start: 2021-01-01

## 2021-01-01 RX ORDER — AMIODARONE HYDROCHLORIDE 200 MG/1
TABLET ORAL
Qty: 36 TABLET | Refills: 0 | Status: ON HOLD | OUTPATIENT
Start: 2021-01-01 | End: 2021-01-01

## 2021-01-01 RX ORDER — FUROSEMIDE 20 MG
40 TABLET ORAL DAILY
Qty: 180 TABLET | Refills: 3
Start: 2021-01-01

## 2021-01-01 RX ORDER — ESCITALOPRAM OXALATE 5 MG/1
5 TABLET ORAL DAILY
COMMUNITY

## 2021-01-01 RX ORDER — AMIODARONE HYDROCHLORIDE 200 MG/1
200 TABLET ORAL 2 TIMES DAILY
Status: DISCONTINUED | OUTPATIENT
Start: 2021-01-01 | End: 2021-01-01 | Stop reason: HOSPADM

## 2021-01-01 RX ORDER — AMIODARONE HYDROCHLORIDE 200 MG/1
200 TABLET ORAL DAILY
Qty: 30 TABLET | Refills: 0 | Status: SHIPPED | OUTPATIENT
Start: 2021-01-01 | End: 2021-01-01

## 2021-01-01 RX ORDER — METOPROLOL TARTRATE 1 MG/ML
2.5 INJECTION, SOLUTION INTRAVENOUS EVERY 4 HOURS PRN
Status: DISCONTINUED | OUTPATIENT
Start: 2021-01-01 | End: 2021-01-01 | Stop reason: HOSPADM

## 2021-01-01 RX ORDER — PANTOPRAZOLE SODIUM 40 MG/1
40 TABLET, DELAYED RELEASE ORAL DAILY
Status: DISCONTINUED | OUTPATIENT
Start: 2021-01-01 | End: 2021-01-01

## 2021-01-01 RX ORDER — IPRATROPIUM BROMIDE AND ALBUTEROL SULFATE 2.5; .5 MG/3ML; MG/3ML
3 SOLUTION RESPIRATORY (INHALATION) EVERY 4 HOURS PRN
Status: DISCONTINUED | OUTPATIENT
Start: 2021-01-01 | End: 2021-01-01 | Stop reason: HOSPADM

## 2021-01-01 RX ORDER — METOPROLOL TARTRATE 25 MG/1
25 TABLET, FILM COATED ORAL 2 TIMES DAILY
Status: DISCONTINUED | OUTPATIENT
Start: 2021-01-01 | End: 2021-01-01

## 2021-01-01 RX ORDER — SODIUM CHLORIDE 9 MG/ML
INJECTION, SOLUTION INTRAVENOUS CONTINUOUS
Status: DISCONTINUED | OUTPATIENT
Start: 2021-01-01 | End: 2021-01-01

## 2021-01-01 RX ORDER — CALCIUM CARBONATE 500 MG/1
1000 TABLET, CHEWABLE ORAL DAILY PRN
Status: DISCONTINUED | OUTPATIENT
Start: 2021-01-01 | End: 2021-01-01 | Stop reason: HOSPADM

## 2021-01-01 RX ADMIN — PANTOPRAZOLE SODIUM 40 MG: 40 TABLET, DELAYED RELEASE ORAL at 08:56

## 2021-01-01 RX ADMIN — CEFTRIAXONE 1 G: 1 INJECTION, POWDER, FOR SOLUTION INTRAMUSCULAR; INTRAVENOUS at 17:04

## 2021-01-01 RX ADMIN — PANTOPRAZOLE SODIUM 40 MG: 40 TABLET, DELAYED RELEASE ORAL at 08:01

## 2021-01-01 RX ADMIN — CLOPIDOGREL BISULFATE 75 MG: 75 TABLET ORAL at 09:50

## 2021-01-01 RX ADMIN — AMIODARONE HYDROCHLORIDE 200 MG: 200 TABLET ORAL at 20:58

## 2021-01-01 RX ADMIN — ATORVASTATIN CALCIUM 40 MG: 40 TABLET, FILM COATED ORAL at 21:13

## 2021-01-01 RX ADMIN — ASPIRIN 81 MG: 81 TABLET, COATED ORAL at 09:36

## 2021-01-01 RX ADMIN — SODIUM CHLORIDE 500 ML: 9 INJECTION, SOLUTION INTRAVENOUS at 20:46

## 2021-01-01 RX ADMIN — AMIODARONE HYDROCHLORIDE 200 MG: 200 TABLET ORAL at 08:10

## 2021-01-01 RX ADMIN — REGADENOSON 0.4 MG: 0.08 INJECTION, SOLUTION INTRAVENOUS at 14:46

## 2021-01-01 RX ADMIN — HEPARIN SODIUM 900 UNITS/HR: 10000 INJECTION, SOLUTION INTRAVENOUS at 12:51

## 2021-01-01 RX ADMIN — CEFTRIAXONE 1 G: 1 INJECTION, POWDER, FOR SOLUTION INTRAMUSCULAR; INTRAVENOUS at 16:28

## 2021-01-01 RX ADMIN — PANTOPRAZOLE SODIUM 40 MG: 40 TABLET, DELAYED RELEASE ORAL at 08:20

## 2021-01-01 RX ADMIN — PANTOPRAZOLE SODIUM 40 MG: 40 TABLET, DELAYED RELEASE ORAL at 09:36

## 2021-01-01 RX ADMIN — ATORVASTATIN CALCIUM 40 MG: 40 TABLET, FILM COATED ORAL at 20:38

## 2021-01-01 RX ADMIN — AMIODARONE HYDROCHLORIDE 200 MG: 200 TABLET ORAL at 21:01

## 2021-01-01 RX ADMIN — SODIUM CHLORIDE, POTASSIUM CHLORIDE, SODIUM LACTATE AND CALCIUM CHLORIDE: 600; 310; 30; 20 INJECTION, SOLUTION INTRAVENOUS at 23:02

## 2021-01-01 RX ADMIN — SODIUM CHLORIDE, PRESERVATIVE FREE: 5 INJECTION INTRAVENOUS at 14:50

## 2021-01-01 RX ADMIN — PANTOPRAZOLE SODIUM 40 MG: 40 TABLET, DELAYED RELEASE ORAL at 09:50

## 2021-01-01 RX ADMIN — METOPROLOL TARTRATE 2.5 MG: 5 INJECTION INTRAVENOUS at 04:24

## 2021-01-01 RX ADMIN — AMIODARONE HYDROCHLORIDE 200 MG: 200 TABLET ORAL at 09:36

## 2021-01-01 RX ADMIN — SODIUM CHLORIDE 500 ML: 9 INJECTION, SOLUTION INTRAVENOUS at 10:17

## 2021-01-01 RX ADMIN — SODIUM CHLORIDE 500 ML: 9 INJECTION, SOLUTION INTRAVENOUS at 19:01

## 2021-01-01 RX ADMIN — HEPARIN SODIUM 750 UNITS/HR: 10000 INJECTION, SOLUTION INTRAVENOUS at 00:17

## 2021-01-01 RX ADMIN — SODIUM CHLORIDE 1000 ML: 9 INJECTION, SOLUTION INTRAVENOUS at 14:06

## 2021-01-01 RX ADMIN — HEPARIN SODIUM 750 UNITS/HR: 10000 INJECTION, SOLUTION INTRAVENOUS at 06:22

## 2021-01-01 RX ADMIN — PANTOPRAZOLE SODIUM 40 MG: 40 TABLET, DELAYED RELEASE ORAL at 08:10

## 2021-01-01 RX ADMIN — SODIUM CHLORIDE, PRESERVATIVE FREE: 5 INJECTION INTRAVENOUS at 12:31

## 2021-01-01 RX ADMIN — TETROFOSMIN 11 MCI.: 1.38 INJECTION, POWDER, LYOPHILIZED, FOR SOLUTION INTRAVENOUS at 12:15

## 2021-01-01 RX ADMIN — ASPIRIN 81 MG: 81 TABLET, COATED ORAL at 08:56

## 2021-01-01 RX ADMIN — CARVEDILOL 3.12 MG: 3.12 TABLET, FILM COATED ORAL at 07:41

## 2021-01-01 RX ADMIN — SODIUM CHLORIDE, PRESERVATIVE FREE: 5 INJECTION INTRAVENOUS at 04:24

## 2021-01-01 RX ADMIN — SODIUM CHLORIDE, PRESERVATIVE FREE: 5 INJECTION INTRAVENOUS at 04:46

## 2021-01-01 RX ADMIN — METOPROLOL TARTRATE 2.5 MG: 5 INJECTION INTRAVENOUS at 21:56

## 2021-01-01 RX ADMIN — CLOPIDOGREL BISULFATE 75 MG: 75 TABLET ORAL at 08:56

## 2021-01-01 RX ADMIN — SODIUM CHLORIDE, POTASSIUM CHLORIDE, SODIUM LACTATE AND CALCIUM CHLORIDE: 600; 310; 30; 20 INJECTION, SOLUTION INTRAVENOUS at 09:20

## 2021-01-01 RX ADMIN — CEFTRIAXONE 1 G: 1 INJECTION, POWDER, FOR SOLUTION INTRAMUSCULAR; INTRAVENOUS at 16:15

## 2021-01-01 RX ADMIN — ATORVASTATIN CALCIUM 40 MG: 40 TABLET, FILM COATED ORAL at 20:32

## 2021-01-01 RX ADMIN — AMIODARONE HYDROCHLORIDE 200 MG: 200 TABLET ORAL at 08:56

## 2021-01-01 RX ADMIN — ASPIRIN 81 MG CHEWABLE TABLET 324 MG: 81 TABLET CHEWABLE at 08:20

## 2021-01-01 RX ADMIN — ATORVASTATIN CALCIUM 40 MG: 40 TABLET, FILM COATED ORAL at 21:01

## 2021-01-01 RX ADMIN — ESCITALOPRAM 5 MG: 5 TABLET, FILM COATED ORAL at 08:10

## 2021-01-01 RX ADMIN — CEFTRIAXONE 1 G: 1 INJECTION, POWDER, FOR SOLUTION INTRAMUSCULAR; INTRAVENOUS at 16:45

## 2021-01-01 RX ADMIN — AMIODARONE HYDROCHLORIDE 200 MG: 200 TABLET ORAL at 09:50

## 2021-01-01 RX ADMIN — ASPIRIN 81 MG: 81 TABLET, COATED ORAL at 09:50

## 2021-01-01 RX ADMIN — CARVEDILOL 3.12 MG: 3.12 TABLET, FILM COATED ORAL at 18:03

## 2021-01-01 RX ADMIN — FUROSEMIDE 10 MG: 10 INJECTION, SOLUTION INTRAMUSCULAR; INTRAVENOUS at 09:50

## 2021-01-01 RX ADMIN — TETROFOSMIN 30 MCI.: 1.38 INJECTION, POWDER, LYOPHILIZED, FOR SOLUTION INTRAVENOUS at 14:51

## 2021-01-01 RX ADMIN — HEPARIN SODIUM 5000 UNITS: 10000 INJECTION, SOLUTION INTRAVENOUS; SUBCUTANEOUS at 21:01

## 2021-01-01 RX ADMIN — CEFTRIAXONE 2 G: 2 INJECTION, POWDER, FOR SOLUTION INTRAMUSCULAR; INTRAVENOUS at 15:54

## 2021-01-01 RX ADMIN — SODIUM CHLORIDE, PRESERVATIVE FREE: 5 INJECTION INTRAVENOUS at 21:00

## 2021-01-01 RX ADMIN — METOPROLOL TARTRATE 2.5 MG: 5 INJECTION INTRAVENOUS at 17:01

## 2021-01-01 RX ADMIN — CARVEDILOL 3.12 MG: 3.12 TABLET, FILM COATED ORAL at 10:06

## 2021-01-01 RX ADMIN — METOPROLOL TARTRATE 2.5 MG: 5 INJECTION INTRAVENOUS at 06:39

## 2021-01-01 RX ADMIN — AMIODARONE HYDROCHLORIDE 200 MG: 200 TABLET ORAL at 13:41

## 2021-01-01 RX ADMIN — AMIODARONE HYDROCHLORIDE 200 MG: 200 TABLET ORAL at 21:13

## 2021-01-01 RX ADMIN — ASPIRIN 81 MG: 81 TABLET, COATED ORAL at 08:01

## 2021-01-01 RX ADMIN — SODIUM CHLORIDE 1000 ML: 9 INJECTION, SOLUTION INTRAVENOUS at 15:45

## 2021-01-01 RX ADMIN — CLOPIDOGREL BISULFATE 75 MG: 75 TABLET ORAL at 08:10

## 2021-01-01 RX ADMIN — CLOPIDOGREL BISULFATE 75 MG: 75 TABLET ORAL at 18:47

## 2021-01-01 ASSESSMENT — ENCOUNTER SYMPTOMS
CHILLS: 0
BACK PAIN: 0
WEAKNESS: 1
COUGH: 0
DIZZINESS: 0
FEVER: 0
ABDOMINAL PAIN: 0
NECK PAIN: 0
NAUSEA: 0
DYSURIA: 0
ABDOMINAL PAIN: 0
NUMBNESS: 0
VOMITING: 0
WOUND: 0
SHORTNESS OF BREATH: 0
LIGHT-HEADEDNESS: 0
HEADACHES: 0
FEVER: 0
BLOOD IN STOOL: 0
WEAKNESS: 0
DIARRHEA: 0

## 2021-01-01 ASSESSMENT — ACTIVITIES OF DAILY LIVING (ADL)
ADLS_ACUITY_SCORE: 18
ADLS_ACUITY_SCORE: 18
ADLS_ACUITY_SCORE: 24
ADLS_ACUITY_SCORE: 18
ADLS_ACUITY_SCORE: 23
ADLS_ACUITY_SCORE: 18
ADLS_ACUITY_SCORE: 18
ADLS_ACUITY_SCORE: 24
ADLS_ACUITY_SCORE: 18
ADLS_ACUITY_SCORE: 22
ADLS_ACUITY_SCORE: 18
ADLS_ACUITY_SCORE: 18
ADLS_ACUITY_SCORE: 22
ADLS_ACUITY_SCORE: 18
ADLS_ACUITY_SCORE: 18
ADLS_ACUITY_SCORE: 20
ADLS_ACUITY_SCORE: 19
ADLS_ACUITY_SCORE: 22
ADLS_ACUITY_SCORE: 24
ADLS_ACUITY_SCORE: 18
ADLS_ACUITY_SCORE: 19
ADLS_ACUITY_SCORE: 23
ADLS_ACUITY_SCORE: 18
ADLS_ACUITY_SCORE: 17

## 2021-01-01 ASSESSMENT — MIFFLIN-ST. JEOR
SCORE: 1085.01
SCORE: 1056.43
SCORE: 1082.29
SCORE: 1047.8
SCORE: 1061.88
SCORE: 1101.79

## 2021-01-19 PROBLEM — N17.9 AKI (ACUTE KIDNEY INJURY) (H): Status: ACTIVE | Noted: 2021-01-01

## 2021-01-19 PROBLEM — I95.9 HYPOTENSION, UNSPECIFIED HYPOTENSION TYPE: Status: ACTIVE | Noted: 2021-01-01

## 2021-01-19 PROBLEM — E86.0 DEHYDRATION: Status: ACTIVE | Noted: 2021-01-01
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
## 2021-01-19 PROBLEM — M62.81 GENERALIZED MUSCLE WEAKNESS: Status: ACTIVE | Noted: 2021-01-01

## 2021-01-19 PROBLEM — R94.31 ST SEGMENT DEPRESSION: Status: ACTIVE | Noted: 2021-01-01

## 2021-01-19 PROBLEM — E87.1 HYPONATREMIA: Status: ACTIVE | Noted: 2020-07-08

## 2021-01-19 NOTE — ED NOTES
Bed: ED36  Expected date: 1/19/21  Expected time:   Means of arrival: Ambulance  Comments:  Omer Bach

## 2021-01-19 NOTE — ED PROVIDER NOTES
History   Chief Complaint:  Fall     HPI   Anat Correa is a 85 year old female with history of anemia, chronic pain, CKD, hypertension, ulcerative colitis and lung cancer who presents after having 2 falls earlier today. The patient states that she fell twice earlier today. She reports currently living in an assisted living facility in [place], [place] and states that she recently moved there. She states she landed on her right side, but denies hitting her head or neck. The patient was able to get up on her own and states she is feeling normal. Her only current complaint is feeling thirsty, hungry and cold. EMS was told that the patient woke up at 0800 and took her medications. However she was later found on the ground at 0900 and 1100 when staff went to check on her. The patient specifically denies any syncope, wounds, weakness, numbness, fever, cough, chest pain, abdominal pain, nausea, vomiting, diarrhea, incontinence, headache, dizziness, lightheadedness, bloody stools, leg swelling. Of note, the patient states she was recently hospitalized, but is unsure why she was admitted. Per chart review, the patient was admitted to the hospital on 07/09/2020 for an acute GI bleed and anemia. The patient was discharged on 07/13/2020 and later had a follow up esophagogastroduodenoscopy.    Review of Systems   Constitutional: Negative for fever.   Respiratory: Negative for cough.    Cardiovascular: Negative for chest pain and leg swelling.   Gastrointestinal: Negative for abdominal pain, blood in stool, diarrhea, nausea and vomiting.   Genitourinary: Negative for dysuria.   Skin: Negative for wound.   Neurological: Negative for dizziness, syncope, weakness, light-headedness, numbness and headaches.   All other systems reviewed and are negative.      Allergies:  Prednisone  Penicillins    Medications:  Ventolin  Norvasc  Colace  Lasix  Imodium  Cozaar  Metoprolol succinate  Protonix    Past Medical History:     Anemia  Calculus of kidney    Chronic pain  Chronic kidney disease  COPD  Diverticulosis  Hemorrhage of GI tract  Hyperlipidemia  Hypertension  Internal hemorrhoids  Irritable bowel syndrome  Lesion of plantar nerve  Lung nodule  Malignant neoplasm of upper left lung   Osteoarthritis  Obesity  Ulcerative colitis  Peripheral neuropathy  Colonic polyps  PONV  Primary iridocyclitis  Venous stasis of lower extremity   Rhabdomyolysis    Past Surgical History:    Amputate toes  Appendectomy     Esophagoscopy, gastroscopy, duodenoscopy, combined  Upper extremity mass excision  Colonoscopy  Esophagoscopy  Hemorrhoidectomy   Hernia repair  Neck/back tumors  Neuroma excision  IOL x2  Knee replacement x2  Lung resection  Hip injection    Family History:    Mother: cerebrovascular disease  Father: cerebrovascular disease  Brother: colorectal cancer  Sister: lung cancer, scleroderma    Social History:  The patient arrives via EMS.  The patient states that she currently lives in an assisted living home on [place], [place] and recently moved there.     Physical Exam     Patient Vitals for the past 24 hrs:   BP Temp Temp src Pulse Resp SpO2   21 1630 -- -- -- 73 17 95 %   21 1615 93/47 -- -- 68 13 94 %   21 1600 97/52 -- -- 72 21 95 %   21 1545 99/42 -- -- 71 16 94 %   21 1515 92/49 -- -- 67 14 95 %   21 1500 95/47 -- -- 74 29 95 %   21 1445 92/51 -- -- 59 -- --   21 1400 (!) 78/41 -- -- 62 -- 94 %   21 1345 (!) 79/40 -- -- 70 -- 94 %   21 1330 93/43 96.9  F (36.1  C) Temporal 69 18 95 %       Physical Exam  General: Alert, No obvious discomfort, well kept, Very [place], elderly and frail  Eyes: PERRL, conjunctivae pink no scleral icterus or conjunctival injection, non-tender orbits and zygomatic arch, normal EOM  HENT:  Moist mucus membranes, posterior oropharynx clear without erythema or exudates, No lymphadenopathy, Normal voice. No polk's sign, No hemotympanum,  No scalp hematoma  Resp:  Lungs clear to auscultation bilaterally, no crackles/rubs/wheezes. Good air movement, non-tender chest wall  CV:  Normal rate and rhythm, no murmurs/rubs/gallops  GI:  Abdomen soft and non-distended.  Normoactive BS.  No tenderness, guarding or rebound, No masses, no bruising, non tender CVA   Rectal: Normal external exam with skin breakdown as listed below.  No significant stool in vault or obvious blood on exam.  Skin:  Warm, dry.  Chronic discoloration bilateral lower extremities.  Right lower extremity with some recent skin tears that are covered with Band-Aids.  No proximal streaking or significant erythema.  There is a pressure wound on patient's coccyx area.  Healing bruise left dorsal aspect of hand.  Musculoskeletal: No peripheral edema or calf tenderness, Normal gross ROM of all extremities, stable pelvis, Normal gross ROM No midline tenderness of the cervical/thoracic/lumbar spine, No tenderness/crepitus/bony stepoffs over the clavicles, chest wall, arms or legs.  Neuro: Alert and oriented to person/place/time, normal sensation, GCS 15  Psychiatric: Normal affect, cooperative, moderate eye contact    Emergency Department Course     ECG:  Completed at 1351.  Read at 1358 by Dr. Dai.   Rate 63 bpm. AR interval 148. QRS duration 114. QT/QTc 442/452. P-R-T axes 68 -20 105.  Normal sinus rhythm  Inferior infarct, age undetermined  ST and T wave abnormality, consider anterolateral ischemia  New ST degree    ECG #2:  Completed at 1423.  Read at 1430 by Dr. Mcintyre.   Rate 72 bpm. AR interval 170. QRS duration 98. QT/QTc 430/470. P-R-T axes 57 -7 100.  Normal sinus rhythm  Inferior infarct, age undetermined  ST & T wave abnormality, consider lateral ischemia  ST segment changes -  new since 07/2020    Imaging:  CT Head WO Contrast  1.  No acute abnormality.  2.  Moderate presumed chronic small vessel ischemic change with  moderate generalized volume loss.  3.  No change.  Reading per  "radiology.    XR Chest 2 Views  Stable lower left lung calcified granulomas. Interstitial  coarsening may reflect chronic changes and/or mild edema. No discrete  focal minor consolidation or definite pleural effusion. Stable heart  size. No overt vascular congestion. Left lower lung calcified  granulomas. Newly visualized small nodular opacities in the right  upper lobe and right lung base for which attention on follow-up is  Recommended.  Reading per radiology.     Laboratory:  CBC: WBC 12.9 (H), HGB 9.7 (L) o/w WNL. ()   BMP: Sodium 130 (L), Glucose 127 (H), Urea Nitrogen 67 (H), Creatinine 3.63 (H), GFR 11 (L) o/w AWNL    Troponin (Collected 1341): 0.020  INR: 0.91    Lactic Acid (Collected at 1341): 2.3 (H)     Stool: Occult blood: Negative  UA with micro: Protein Albumin 10, Mucous present o/w negative     Asymptomatic COVID-19 Virus by PCR: Negative    Blood Culture x2: Pending   Procalcitonin: Pending     Emergency Department Course:    Reviewed:  I reviewed nursing notes, vitals and past medical history    Assessments:  1328 I obtained history and examined the patient as noted above.     Consults:   1516 I spoke with Dr. Taylor, hospitalist, who agreed to admit the patient.     Interventions:  1406 NS, 1 L, IV bolus   1554 Rocephin 2 g IV     Disposition:  The patient was admitted to the hospital under the care of Dr. Taylor .       Impression & Plan     Medical Decision Making:  Anat Correa is a 85 year old female who presents today for evaluation of falls.  She lives in a assisted living facility where staff regularly checks in on her.  They noted that she was on the floor twice today.  Patient states that she \"just fell.\"  She denied any preceding factors to falling and was unable to tell if she tripped over something.  Her examination today showed no evidence of significant trauma on head to toe exam.  Given her age and that she had an unwitnessed fall I did CT her head.  No acute " findings.  Was unable to elicit pain anywhere on her exam as well.  Her laboratory studies show significant dehydration with an acute kidney injury with her creatinine at 3.6.  A lot of her laboratory abnormalities can be consistent with dehydration.  However we do have to consider possibility of infection.  She does have slightly elevated white cell count and slightly elevated ANC.  Her urinalysis is negative.  She does not have signs or symptoms of cough or cold.  Covid and influenza are negative.  She does have several episodes of hypotension which could indicate infection as well.  There is no obvious source of infection at this time.  She does have again some skin tears and skin breakdown on her lower extremity and coccyx as described above.  Will empirically treat with Rocephin.  Procalcitonin is pending.  Discussed the case with the hospitalist who has accepted patient to her service.  Patient will be admitted to the telemetry unit.      Covid-19  Anat Correa was evaluated during a global COVID-19 pandemic, which necessitated consideration that the patient might be at risk for infection with the SARS-CoV-2 virus that causes COVID-19.   Applicable protocols for evaluation were followed during the patient's care.   COVID-19 was considered as part of the patient's evaluation. The plan for testing is:  a test was obtained during this visit.    Diagnosis:    ICD-10-CM    1. NENA (acute kidney injury) (H)  N17.9 UA with Microscopic reflex to Culture     Asymptomatic SARS-CoV-2 COVID-19 Virus (Coronavirus) by PCR     Procalcitonin     Blood culture     Blood culture   2. Generalized muscle weakness  M62.81    3. Fall, initial encounter  W19.XXXA    4. Dehydration  E86.0    5. Hypotension, unspecified hypotension type  I95.9    6. Hyponatremia  E87.1    7. ST segment depression  R94.31        Scribe Disclosure:  Guicho ALICIA, am serving as a scribe at 1:22 PM on 1/19/2021 to document services personally  performed by Amrit Dawson APRN based on my observations and the provider's statements to me.            Amrit Dawson APRN CNP  01/19/21 2814

## 2021-01-19 NOTE — ED TRIAGE NOTES
Pt arrives via EMS from assisted living for falls. Staff report that pt woke up at 8 am, took meds normal. Came back at 9 am and 11 am to find pt down. Pt states she fell, but unsure how. Denies LOC or any pain anywhere. A&Ox4.

## 2021-01-19 NOTE — ED NOTES
[facility]  ED Nurse Handoff Report    Anat Correa is a 85 year old female   ED Chief complaint: Fall  . ED Diagnosis:   Final diagnoses:   NENA (acute kidney injury) (H)   Generalized muscle weakness   Fall, initial encounter   Dehydration   Hypotension, unspecified hypotension type   Hyponatremia     Allergies:   Allergies   Allergen Reactions     Prednisone      Yeast infection - swollen lips     Penicillins Rash       Code Status: DNR / DNI  Activity level - Baseline/Home:  Independent. Activity Level - Current:   Assist X 2. Lift room needed: No. Bariatric: No   Needed: No   Isolation: No. Infection: Not Applicable.     Vital Signs:   Vitals:    01/19/21 1330 01/19/21 1345   BP: 93/43 (!) 79/40   Pulse: 69 70   Resp: 18    Temp: 96.9  F (36.1  C)    TempSrc: Temporal    SpO2: 95% 94%       Cardiac Rhythm:  ,      Pain level:    Patient confused: Yes. Patient Falls Risk: Yes.   Elimination Status: Purewick in place   Patient Report - Initial Complaint: Falls. Focused Assessment: A&O x4, ABCs intact. Pt appears forgetful at times. Pt came to ED after staff at her [facility] found her on the ground 2 times. Pt does not remember how she fell. The ER Tech and myself tried to stand patient up for Ortho BPs, but patient was unable to stand. We had to hold her up which is why we were unable to continue ortho BPs when standing. Pt is noted to have pressure ulcer on her right and left buttock. We placed barrier cream on these areas. She also has blanchable redness along her spine. When pt arrived, her BP was low. Fluids are running at this time. Cardiac monitoring reads as NSR.   Tests Performed: lab, imaging. Abnormal Results:   Labs Ordered and Resulted from Time of ED Arrival Up to the Time of Departure from the ED   CBC WITH PLATELETS DIFFERENTIAL - Abnormal; Notable for the following components:       Result Value    WBC 12.9 (*)     RBC Count 3.49 (*)     Hemoglobin 9.7 (*)     Hematocrit  30.7 (*)     Absolute Neutrophil 10.5 (*)     All other components within normal limits   BASIC METABOLIC PANEL - Abnormal; Notable for the following components:    Sodium 130 (*)     Glucose 127 (*)     Urea Nitrogen 67 (*)     Creatinine 3.63 (*)     GFR Estimate 11 (*)     GFR Estimate If Black 13 (*)     All other components within normal limits   LACTIC ACID WHOLE BLOOD - Abnormal; Notable for the following components:    Lactic Acid 2.3 (*)     All other components within normal limits   INR   TROPONIN I   OCCULT BLOOD STOOL   ROUTINE UA WITH MICROSCOPIC REFLEX TO CULTURE   SARS-COV-2 (COVID-19) VIRUS RT-PCR   ORTHOSTATIC BLOOD PRESSURE AND PULSE     Head CT w/o contrast   Final Result   IMPRESSION:   1.  No acute abnormality.   2.  Moderate presumed chronic small vessel ischemic change with   moderate generalized volume loss.   3.  No change.      Radiation dose for this scan was reduced using automated exposure   control, adjustment of the mA and/or kV according to patient size, or   iterative reconstruction technique      JEFFERY SOLANO MD        .   Treatments provided: See MAR  Family Comments: None  OBS brochure/video discussed/provided to patient:  N/A  ED Medications:   Medications   0.9% sodium chloride BOLUS (1,000 mLs Intravenous New Bag 1/19/21 1406)     Drips infusing:  Yes  For the majority of the shift, the patient's behavior Green. Interventions performed were None.    Sepsis treatment initiated: No     Patient tested for COVID 19 prior to admission: YES    ED Nurse Name/Phone Number: Angel Khan RN,   2:52 PM    RECEIVING UNIT ED HANDOFF REVIEW    Above ED Nurse Handoff Report was reviewed: Yes  Reviewed by: Kathleen Vincent RN on January 19, 2021 at 6:05 PM

## 2021-01-19 NOTE — H&P
[facility]    Hospitalist History and Physical    Name: Anat Correa    MRN: [id number]  YOB: 1935    Age: 85 year old  Date of Admission:  1/19/2021  Date of Service (when I saw the patient): 01/19/21    Assessment & Plan   Anat Correa is a 85 year old female with past medical history significant for anemia, chronic pain, chronic kidney disease, hypertension, ulcerative colitis and lung cancer presented to the emergency room with increased weakness and frequent falls.    In the emergency room patient was hypotensive, had elevated lactic acid levels, leukocytosis, renal failure and EKG changes.  Admitted for further evaluation.    Possible sepsis  -Presented with hypotension, leukocytosis, lactic acidosis, renal failure  -UA is negative  -Chest x-ray requested pending  -Erythema over right lower extremity chronic stasis changes possible cellulitis and decubitus ulcer  -Blood cultures ordered  -We will check procalcitonin  -Empirically start on IV ceftriaxone  -IV fluids in the emergency room    Hypotension  - poss sepsis Vs cardiogenicVs dehydration  -Has leukocytosis, lactic acidosis and possible cellulitis  -Check blood cultures and procalcitonin  - initial trop neg, ekg changes  -IV fluids and check FENa (blood pressure responded to hydration in the emergency room)    Acute renal failure  -We will check FENa suspect prerenal from dehydration and sepsis  -IV fluids  -Avoid nephrotoxins  -Monitor renal functions    Lactic acidosis  -Secondary to dehydration versus renal failure  -Continue IV fluids    EKG changes  -T wave inversion in lead aVL and V 2-4 (new when compared to prior EKG)  -Initial troponin negative patient is chest pain-free  -She gives history of falls cannot exclude syncope  -Monitor on telemetry  -Serial troponins  -Echocardiogram    History of chronic hypertension  -We will hold PTA blood pressure medications as patient is hypotensive  -Prior to  admission on metoprolol, losartan, Lasix     History of hyperlipidemia  -Continue statin    History of anemia and GI bleed  -Continue PPI  -Hemoglobin stable today    Hyponatremia  -Likely secondary to dehydration  -We will check FENa    Falls  -PT OT evaluate  -Social work consult for safe disposition      DVT Prophylaxis: Heparin SQ  Code Status: DNR / DNI    Disposition: Admitted as inpatient    Primary Care Physician   Rashi Calle    Chief Complaint   Weakness and 2 falls at assisted living    History is obtained from the patient    History of Present Illness   Anat Correa is a 85 year old female with past medical history significant for chronic kidney disease, anemia, hypertension, hyperlipidemia, COPD, chronic pain, ulcerative colitis, history of lung cancer presented to the emergency room for increased weakness and appose 2 episodes of fall.    Patient lives independently in a assisted living.  She had a fall.  Staff at the assisted living were concerned with her recurrent fall and increased weakness.  Not quite sure why she fell but she landed on her right side without hitting her head.  No loss of consciousness.  Denies any focal neurologic weakness.  She says she has been taking her medications.  May not have been eating as well.  Denies any chest pain pressure heaviness tightness.  No cough fever or chills.  No abdominal pain no nausea no vomiting no diarrhea no hematuria no dysuria.  Review of all other symptoms are negative.  Denies any exposure to COVID-19.  Did not notice any black color stools.    Past Medical History    Past Medical History:   Diagnosis Date     Anemia      Calculus of kidney 1998    dr hope     Chronic pain     OA     CKD (chronic kidney disease) stage 4, GFR 15-29 ml/min (H) 2008    dr mcdermott     COPD, moderate (H) 2004    moderate obstruction, with pulm htn - dr francisco did AAP     Diverticulosis of colon (without mention of hemorrhage) 7/03     Hemorrhage of  gastrointestinal tract, unspecified     dr su     Hyperlipidemia LDL goal <100      Hypertension goal BP (blood pressure) < 140/90      Internal hemorrhoids without mention of complication      Irritable bowel syndrome      Lesion of plantar nerve     hay's neuroma dr connor     Lung nodule , 3/16    Dr Thompson, 1.4 x 1.1 cm, lingula, PET neg 3/16     Malignant neoplasm of upper lobe of left lung (H)     Dr Thompson - x 2 nodules - moderately differentiated adenocarcinoma (acinar predominant).  Final pathologic stage [id number], Final clinical stage IA, grade 2     Medication management     OA - 1 T#3 at HS with HX CKD     OA (osteoarthritis)     lower ext worse katya knees     Obesity (BMI 30.0-34.9)      Osteoporosis, unspecified     FOSAMAX  = upset stomach , pt declines further rx.      Other ulcerative colitis     collangenous collitis- last colonoscopy       Peripheral neuropathy     early - burning at HS     Personal history of colonic polyps     dr araujo     PONJERE (postoperative nausea and vomiting)      Primary iridocyclitis     iritis dr dominguez     Venous stasis of lower extremity          Past Surgical History   Past Surgical History:   Procedure Laterality Date     AMPUTATE TOE(S) Right 2015    Procedure: AMPUTATE TOE(S);  Surgeon: Ashia White DPM, Pod;  Location: [facility] OR     C APPENDECTOMY       C  DELIVERY ONLY      , Low Cervical     ESOPHAGOSCOPY, GASTROSCOPY, DUODENOSCOPY (EGD), COMBINED N/A 7/10/2020    Procedure: ESOPHAGOGASTRODUODENOSCOPY, WITH BIOPSY with stomach biopsies by jumbo forceps;  Surgeon: Harry Winter MD;  Location: [facility] GI     EXCISE MASS UPPER EXTREMITY  10/13/2011    Lt Forearm mass excision - dr ely     EXCISE MASS UPPER EXTREMITY  2012    Lt Forearm mass excision - dr ely      COLONOSCOPY THRU STOMA, DIAGNOSTIC      IBS/diverticula/hemmorhoids dr araujo     HC  ESOPHAGOSCOPY, DIAGNOSTIC  4/08, 6/08, 6/10    Gastric ulcer, healed, gastritis     HC HEMORRHOIDECTOMY,INT/EXT,SIMPLE  1983     HC REPAIR SLIDING INGUINAL HERNIA  1960    [facility]     SURGICAL HISTORY OF -   1970    [facility] neck & back tumors     SURGICAL HISTORY OF -   9/06    neuroma excision - 2nd toe amputation     SURGICAL HISTORY OF -   3/07    Rt IOL - dr jimenez     SURGICAL HISTORY OF -   4/07    Lt IOL - dr jimenez     SURGICAL HISTORY OF -   9/04    Lt Knee replacement - dr gayle     SURGICAL HISTORY OF -   9/09    Rt Knee replacement - dr gayle     SURGICAL HISTORY OF -   9/15    Rt Second toe amputation - Dr White     THORACOSCOPIC WEDGE RESECTION LUNG Left 7/12/2016    Procedure: THORACOSCOPIC WEDGE RESECTION LUNG;  Surgeon: Abdelrahman Thompson MD;  Location: [facility] OR     XR JOINT INJECTION HIP (HIB)  2/2015    Rt - Dr Terry       Prior to Admission Medications   Prior to Admission Medications   Prescriptions Last Dose Informant Patient Reported? Taking?   ASPIRIN NOT PRESCRIBED (INTENTIONAL)  Son No No   Sig: Please choose reason not prescribed, below   Metoprolol Succinate 50 MG CS24   Yes No   Sig: Take 1 capful by mouth 2 times daily   acetaminophen (TYLENOL) 325 MG tablet   Yes No   Sig: Take 650 mg by mouth 4 times daily as needed for mild pain or pain Limit tylenol to 3g/24hrs.   albuterol (VENTOLIN HFA) 108 (90 Base) MCG/ACT inhaler  Son No No   Sig: INHALE TWO PUFFS INTO THE LUNGS EVERY 6 HOURS AS NEEDED FOR SHORTNESS OF BREATH/DYSPNEA   amLODIPine (NORVASC) 5 MG tablet   Yes No   Sig: Take 5 mg by mouth daily   calcium carbonate (TUMS) 500 MG chewable tablet  Son Yes No   Sig: Take 2 chew tab by mouth daily as needed for heartburn   docusate sodium (COLACE) 100 MG capsule  Son Yes No   Sig: Take 100 mg by mouth 2 times daily as needed for constipation **7/8/20: hold in am on 7/9 until seen by MD   furosemide (LASIX) 20 MG tablet  Son No No   Sig: Take 2 tablets (40 mg) by mouth daily    loperamide (IMODIUM A-D) 2 MG tablet  Son Yes No   Sig: Take 2 mg by mouth 4 times daily as needed for diarrhea   losartan (COZAAR) 50 MG tablet  Son No No   Sig: Take 2 tablets (100 mg) by mouth daily   pantoprazole (PROTONIX) 40 MG EC tablet   No No   Sig: Take 1 tablet (40 mg) by mouth daily Possible GI Bleed   pantoprazole (PROTONIX) 40 MG EC tablet   No No   Sig: Take 1 tablet (40 mg) by mouth daily      Facility-Administered Medications: None     Allergies   Allergies   Allergen Reactions     Prednisone      Yeast infection - swollen lips     Penicillins Rash       Social History   Social History     Tobacco Use     Smoking status: Former Smoker     Packs/day: 3.00     Years: 50.00     Pack years: 150.00     Types: Cigarettes     Quit date: 1993     Years since quittin.0     Smokeless tobacco: Never Used     Tobacco comment: quit    Substance Use Topics     Alcohol use: No     Social History     Social History Narrative    calcium - 3 large dairy servings/day - pt declines fosamax and other meds for her osteoporosis    flex sig/colonoscopy -last colonoscopy      sun precautions - discussed     mammogram - hasn't had one since  at least - ordered     Td booster - 06    pneumovax -today 06    DEXA - pt declines repeat scan today 06 despite her osteoporosis     stool hemoccults - every year after age 41    ASA- easy bruising - can't take     mulvitamin - encouraged     Lives in an assisted living.  Non-smoker denies use of alcohol    Family History   I have reviewed this patient's family history and updated it with pertinent information if needed.   Family History   Problem Relation Age of Onset     Cerebrovascular Disease Mother      Cerebrovascular Disease Father      Cancer - colorectal Brother      Cancer - colorectal Brother      Breast Cancer Sister      Cancer Sister         lung     Cancer Sister         lung     Cancer Sister         lung     Scleroderma Sister           Review of Systems   A Comprehensive greater than 10 system review of systems was carried out.  Pertinent positives and negatives are noted above.  Otherwise negative for contributory information.    Physical Exam   Temp: 96.9  F (36.1  C) Temp src: Temporal BP: 92/51 Pulse: 59   Resp: 18 SpO2: 94 %      Vital Signs with Ranges  Temp:  [96.9  F (36.1  C)] 96.9  F (36.1  C)  Pulse:  [59-70] 59  Resp:  [18] 18  BP: (78-93)/(40-51) 92/51  SpO2:  [94 %-95 %] 94 %  0 lbs 0 oz    GEN:  Alert, oriented x 3, appears frail, no overt distress  HEENT:  Normocephalic/atraumatic, no scleral icterus, no nasal discharge, mouth dry  CV:  Regular rate and rhythm, no murmur or JVD.  S1 + S2 noted, no S3 or S4.  LUNGS: Bibasilar crackles.  Poor inspiratory effort clear to auscultation bilaterally without rales/rhonchi/wheezing/retractions.  Symmetric chest rise on inhalation noted.  ABD:  Active bowel sounds, soft, non-tender/non-distended.  No rebound/guarding/rigidity.  EXT: Right lower extremity trace edema.  Chronic stasis changes noted bilaterally right more erythematous warm   SKIN: Stasis dermatitis with possible cellulitis on the right lower extremity and decubitus ulcer   NEURO:  Symmetric muscle strength, moving all extremities no new focal deficits appreciated.    Data   Data reviewed today:  I personally reviewed the EKG tracing showing Normal sinus rhythm with T inverted in aVL and lateral leads new when compared to prior EKG.    No results for input(s): PH, PHV, PO2, PO2V, SAT, PCO2, PCO2V, HCO3, HCO3V in the last 168 hours.  Recent Labs   Lab 01/19/21  1341   WBC 12.9*   HGB 9.7*   HCT 30.7*   MCV 88        Recent Labs   Lab 01/19/21  1341   *   POTASSIUM 3.8   CHLORIDE 98   CO2 23   ANIONGAP 9   *   BUN 67*   CR 3.63*   GFRESTIMATED 11*   GFRESTBLACK 13*   DUSTY 9.6     No results for input(s): CULT in the last 168 hours.  Recent Labs   Lab 01/19/21  1341   HGB 9.7*     No results for input(s):  AST, ALT, GGT, ALKPHOS, BILITOTAL, BILICONJ, BILIDIRECT, JACLYN in the last 168 hours.    Invalid input(s): BILIRUBININDIRECT  Recent Labs   Lab 01/19/21  1341   INR 0.91     Recent Labs   Lab 01/19/21  1341   LACT 2.3*     No results for input(s): LIPASE in the last 168 hours.  No results for input(s): TSH in the last 168 hours.  Recent Labs   Lab 01/19/21  1341   TROPI 0.020     Recent Labs   Lab 01/19/21  1448   COLOR Light Yellow   APPEARANCE Clear   URINEGLC Negative   URINEBILI Negative   URINEKETONE Negative   SG 1.012   UBLD Negative   URINEPH 6.0   PROTEIN 10*   NITRITE Negative   LEUKEST Negative   RBCU <1   WBCU 1       Recent Results (from the past 24 hour(s))   Head CT w/o contrast    Narrative    CT OF THE HEAD WITHOUT CONTRAST   1/19/2021 2:24 PM     COMPARISON: Head CT 7/2/2020    HISTORY:  Head trauma, minor (Age >= 65y)     TECHNIQUE:  Axial CT images of the head from the skull base to the  vertex were acquired without IV contrast.    FINDINGS:   INTRACRANIAL CONTENTS: No intracranial hemorrhage, extraaxial  collection, or mass effect.  No CT evidence of acute infarct.    Moderate presumed chronic small vessel ischemic change with moderate  generalized volume loss..    VISUALIZED ORBITS/SINUSES/MASTOIDS: No significant orbital  abnormality.  No significant paranasal sinus mucosal disease. No  significant middle ear or mastoid effusion.    OSSEOUS STRUCTURES/SOFT TISSUES: No significant abnormality.      Impression    IMPRESSION:  1.  No acute abnormality.  2.  Moderate presumed chronic small vessel ischemic change with  moderate generalized volume loss.  3.  No change.    Radiation dose for this scan was reduced using automated exposure  control, adjustment of the mA and/or kV according to patient size, or  iterative reconstruction technique    JEFFERY SOLANO MD

## 2021-01-20 NOTE — PROVIDER NOTIFICATION
Cardiology signed off and stated pt can eat. Can we get a diet order? Thank you!    Low fat low Na diet ordered.

## 2021-01-20 NOTE — PROGRESS NOTES
[facility]    Hospitalist Progress Note  Name: Anat Correa    MRN: [id number]  Provider: Alejandra Taylor MD  Date of Service: 01/20/2021    Assessment & Plan   Summary of Stay: Anat Correa is a 85 year old female who was admitted on 1/19/2021 for hypotension, acute renal failure and EKG changes.  She presented with increased weakness and falls.    Her past medical history significant for anemia, chronic pain, chronic kidney disease, hypertension, hyperlipidemia, colitis and history of lung cancer.    Patient seems to have elevated troponin concerning for NSTEMI.      NSTEMI  -Could explain initial hypotension could be cardiogenic  -Echocardiogram pending  -On aspirin  -Initially due to low blood pressure all antihypertensive meds including beta-blocker losartan and diuretics were held  -We will carefully start on low-dose carvedilol.  Now that systolic blood pressure is 120s  -Continue aspirin  -IV heparin was started overnight continue for 48 hours-Cardiology consulted  -She remains pain-free    Possible sepsis  -Presentation was hypotension, leukocytosis, lactic acidosis and renal failure  -Chest x-ray not definitive of any infiltrate with some chronic changes and small nodule opacities  -Blood cultures pending  -Procalcitonin is 0.22 low risk for systemic infection  -If blood cultures remain negative for 48 hours will discontinue antibiotics  -On exam patient does have an area of erythema over lying chronic stasis dermatitis could be a localized skin infection  -She received IV ceftriaxone on admission will continue for now and switch to oral if cultures remain negative      Hypotension  -Resolved with hydration  -FENa was less than 1 (likely prerenal)  -Poor perfusion versus poor hydration    Acute renal failure  -In context of NSTEMI elevated troponin initial hypotension will carefully hydrate patient going forward  -Cardiac echo pending  -Renal functions improved with  hydration  -Continue to avoid nephrotoxins    History of chronic hypertension  -held PTA blood pressure medications as patient was hypotensive  -Prior to admission on metoprolol, losartan, Lasix   -Started low-dose carvedilol     History of hyperlipidemia  -Continue statin     History of anemia and GI bleed  -Continue PPI  -Hemoglobin stable today     Hyponatremia  -Likely secondary to dehydration  -We will check FENa     Falls  -PT OT evaluate  -Social work consult for safe disposition      Abnormal chest x-ray finding consistent need for new right upper lobe nodular opacity  -We will likely need follow-up CT as outpatient    DVT Prophylaxis: on iv heparin  Code Status: No CPR- Do NOT Intubate    Disposition: Expected discharge to be decided    Interval History   Reviewed chart overnight troponin trended up.  Patient remains pain-free.  Blood pressure has improved significantly.  Renal functions are improving.  10 point review of system was conducted denies chest pain shortness of breath lightheadedness or dizziness.    -Data reviewed today: I reviewed all new labs and imaging reports over the last 24 hours. I personally reviewed the EKG tracing showing T inverted in lateral leads.    Physical Exam   Temp: 98.4  F (36.9  C) Temp src: Oral BP: 123/56 Pulse: 78   Resp: 16 SpO2: 93 % O2 Device: None (Room air)    Vitals:    01/19/21 1924 01/20/21 0500   Weight: 60.9 kg (134 lb 3.2 oz) 65.1 kg (143 lb 8 oz)     Vital Signs with Ranges  Temp:  [96.9  F (36.1  C)-98.4  F (36.9  C)] 98.4  F (36.9  C)  Pulse:  [59-81] 78  Resp:  [13-29] 16  BP: ()/(40-94) 123/56  SpO2:  [90 %-99 %] 93 %  I/O last 3 completed shifts:  In: 691.67 [P.O.:50; I.V.:641.67]  Out: 800 [Urine:800]      GEN:  Alert, oriented x 3, appears comfortable, NAD.  HEENT:  Normocephalic/atraumatic, no scleral icterus, no nasal discharge, mouth moist.  CV:  Regular rate and rhythm, no murmur or JVD.  S1 + S2 noted, no S3 or S4.  LUNGS:  Clear to  auscultation bilaterally without rales/rhonchi/wheezing/retractions.  Symmetric chest rise on inhalation noted.  ABD:  Active bowel sounds, soft, non-tender/non-distended.  No rebound/guarding/rigidity.  EXT:  No edema.  No cyanosis.  No joint synovitis noted.  SKIN:  Dry to touch, no exanthems noted in the visualized areas.    Medications     heparin 750 Units/hr (01/20/21 0822)     sodium chloride 125 mL/hr at 01/20/21 0823       [START ON 1/21/2021] aspirin  81 mg Oral Daily     atorvastatin  40 mg Oral QPM     carvedilol  3.125 mg Oral BID w/meals     cefTRIAXone  1 g Intravenous Q24H     pantoprazole  40 mg Oral Daily     sodium chloride (PF)  3 mL Intracatheter Q8H     Data     Recent Labs   Lab 01/20/21  0600 01/19/21  2338 01/19/21  1341   WBC 11.0 12.2* 12.9*   HGB 8.7* 8.6* 9.7*   HCT 27.7* 26.8* 30.7*   MCV 89 88 88    287 335     Recent Labs   Lab 01/20/21  0600 01/19/21  1341    130*   POTASSIUM 3.7 3.8   CHLORIDE 105 98   CO2 23 23   ANIONGAP 5 9   GLC 93 127*   BUN 53* 67*   CR 2.65* 3.63*   GFRESTIMATED 16* 11*   GFRESTBLACK 18* 13*   DUSTY 8.6 9.6     Recent Labs   Lab 01/19/21  1544 01/19/21  1538   CULT No growth after 12 hours No growth after 12 hours     No results for input(s): NTBNPI, NTBNP in the last 168 hours.  No results for input(s): CKT in the last 168 hours.    Invalid input(s): CK, CK TOTAL  Recent Labs   Lab 01/20/21  0600   AST 40   ALT 13   ALKPHOS 82   BILITOTAL 0.5     Recent Labs   Lab 01/19/21  1341   INR 0.91     Recent Labs   Lab 01/19/21  2000 01/19/21  1341   LACT 2.1* 2.3*     No results for input(s): LIPASE in the last 168 hours.  Recent Labs   Lab 01/20/21  0600 01/19/21  1341   BUN 53* 67*   CR 2.65* 3.63*     No results for input(s): TSH in the last 168 hours.  Recent Labs   Lab 01/20/21  0600 01/20/21  0200 01/19/21  2212   TROPI 4.577* 2.341* 0.788*     Recent Labs   Lab 01/19/21  1448   COLOR Light Yellow   APPEARANCE Clear   URINEGLC Negative   URINEBILI  Negative   URINEKETONE Negative   SG 1.012   UBLD Negative   URINEPH 6.0   PROTEIN 10*   NITRITE Negative   LEUKEST Negative   RBCU <1   WBCU 1       Recent Results (from the past 24 hour(s))   Head CT w/o contrast    Narrative    CT OF THE HEAD WITHOUT CONTRAST   1/19/2021 2:24 PM     COMPARISON: Head CT 7/2/2020    HISTORY:  Head trauma, minor (Age >= 65y)     TECHNIQUE:  Axial CT images of the head from the skull base to the  vertex were acquired without IV contrast.    FINDINGS:   INTRACRANIAL CONTENTS: No intracranial hemorrhage, extraaxial  collection, or mass effect.  No CT evidence of acute infarct.    Moderate presumed chronic small vessel ischemic change with moderate  generalized volume loss..    VISUALIZED ORBITS/SINUSES/MASTOIDS: No significant orbital  abnormality.  No significant paranasal sinus mucosal disease. No  significant middle ear or mastoid effusion.    OSSEOUS STRUCTURES/SOFT TISSUES: No significant abnormality.      Impression    IMPRESSION:  1.  No acute abnormality.  2.  Moderate presumed chronic small vessel ischemic change with  moderate generalized volume loss.  3.  No change.    Radiation dose for this scan was reduced using automated exposure  control, adjustment of the mA and/or kV according to patient size, or  iterative reconstruction technique    JEFFERY SOLANO MD   XR Chest 2 Views    Narrative    XR CHEST 2 VW 1/19/2021 4:55 PM    HISTORY: Chest pain    COMPARISON: Chest x-ray 7/2/2020    FINDINGS: Stable lower left lung calcified granulomas. Interstitial  coarsening may reflect chronic changes and/or mild edema. No discrete  focal minor consolidation or definite pleural effusion. Stable heart  size. No overt vascular congestion. Left lower lung calcified  granulomas. Newly visualized small nodular opacities in the right  upper lobe and right lung base for which attention on follow-up is  recommended.    MARILIN ESCALONA MD

## 2021-01-20 NOTE — PLAN OF CARE
OT: Blanca attempted - pt declining OOB activity at this time. Per discussion with RN, troponin continues to trend up, however cardiology has signed off. Will reschedule.

## 2021-01-20 NOTE — PHARMACY-ADMISSION MEDICATION HISTORY
Admission medication history interview status for this patient is complete. See [facility] admission navigator for allergy information, prior to admission medications and immunization status.     Medication history interview done via telephone during Covid-19 pandemic, indicate source(s): Caregiver (Lola) @ [phone]   Medication history resources (including written lists, pill bottles, clinic record):MAR--[facility]      Changes made to PTA medication list:  Added: anit-Itch Lotion  Deleted: protonix (duplicate)  Changed: none    Actions taken by pharmacist (provider contacted, etc):None     Additional medication history information:None    Medication reconciliation/reorder completed by provider prior to medication history?  n   (Y/N)         Prior to Admission medications    Medication Sig Last Dose Taking? Auth Provider   acetaminophen (TYLENOL) 325 MG tablet Take 650 mg by mouth 4 times daily as needed for mild pain or pain Limit tylenol to 3g/24hrs. Past Week at Unknown time Yes Reported, Patient   albuterol (VENTOLIN HFA) 108 (90 Base) MCG/ACT inhaler INHALE TWO PUFFS INTO THE LUNGS EVERY 6 HOURS AS NEEDED FOR SHORTNESS OF BREATH/DYSPNEA  Yes Rashi Calle MD   amLODIPine (NORVASC) 5 MG tablet Take 5 mg by mouth daily 1/19/2021 at Unknown time Yes Unknown, Entered By History   calcium carbonate (TUMS) 500 MG chewable tablet Take 2 chew tab by mouth daily as needed for heartburn  Yes Reported, Patient   camphor-menthol (ANTI-ITCH) 0.5-0.5 % external lotion Apply topically 2 times daily  Yes Unknown, Entered By History   docusate sodium (COLACE) 100 MG capsule Take 100 mg by mouth 2 times daily as needed for constipation **7/8/20: hold in am on 7/9 until seen by MD  Yes Unknown, Entered By History   escitalopram (LEXAPRO) 5 MG tablet Take 5 mg by mouth daily 1/19/2021 at Unknown time Yes Unknown, Entered By History   furosemide (LASIX) 20 MG tablet Take 2 tablets (40 mg) by mouth daily 1/19/2021 at  Unknown time Yes Rashi Calle MD   loperamide (IMODIUM A-D) 2 MG tablet Take 2 mg by mouth 4 times daily as needed for diarrhea  Yes Unknown, Entered By History   losartan (COZAAR) 50 MG tablet Take 2 tablets (100 mg) by mouth daily 1/19/2021 at Unknown time Yes Rashi Calle MD   Metoprolol Succinate 50 MG CS24 Take 1 capful by mouth 2 times daily 1/19/2021 at am Yes Unknown, Entered By History   pantoprazole (PROTONIX) 40 MG EC tablet Take 1 tablet (40 mg) by mouth daily Possible GI Bleed 1/19/2021 at Unknown time Yes Niko Can MD   ASPIRIN NOT PRESCRIBED (INTENTIONAL) Please choose reason not prescribed, below   Rashi Calle MD

## 2021-01-20 NOTE — PLAN OF CARE
PT: Received orders for evaluation and treatment.  Patient with increased troponin levels, now has cardiology consult.  Per discussion with RN, will hold PT evaluation today to allow for medical management.  Will reschedule for tomorrow.

## 2021-01-20 NOTE — PROVIDER NOTIFICATION
NOC hospitalist paged: Troponin 2.341 @0200. Heparin gtt already infusing. VSS. No Chest pain.     Awaiting response.     Dr. Taylor paged at 0701: Troponin increasing throughout night. Last trop 4.577. Heparin gtt infusing. VSS.     Awaiting response.

## 2021-01-20 NOTE — PLAN OF CARE
dmitting Diagnosis: Fall/NENA/ general weakness.   Pertinent History: significant for anemia, chronic pain, chronic kidney disease, HTN, ulcerative colitis, lung cancer.   Living Situation: group home  Pain plan: denies pain   Mobility: Ax1 with/w  Baseline activity:with assistive equipment.  Alarms/Safety: BA  LDA's:PIV  Pertinent test results: K+ 3.8, cr: 2.1, Lac: 2.1, critical lab.  trop. 0.788.  Consults: PT/OT following   Abnormals/Pending: none   Other Cares/Comments:pt alert to self and place. VSS, tele SR, LS coracles/clear. 02 RA.  ml/hr  running. Neuro q 4 hrs.   Discharge Disposition: Medically active  Discharge Time: Medically active.

## 2021-01-20 NOTE — CONSULTS
Care Management Initial Consult    General Information  Assessment completed with: Patient, Children, Son Jose M [phone]  Type of CM/SW Visit: Initial Assessment    Primary Care Provider verified and updated as needed: Yes   Readmission within the last 30 days: no previous admission in last 30 days      Reason for Consult: care coordination/care conference, discharge planning    Communication Assessment  Patient's communication style: spoken language (English or Bilingual)    Hearing Difficulty or Deaf: no   Wear Glasses or Blind: yes    Cognitive  Cognitive/Neuro/Behavioral: .WDL except  Level of Consciousness: alert  Arousal Level: opens eyes spontaneously  Orientation: oriented x 4  Mood/Behavior: cooperative  Best Language: 0 - No aphasia  Speech: clear, spontaneous    Living Environment:   People in home: facility resident     Current living Arrangements: assisted living  Name of Facility: [facility]   Able to return to prior arrangements: yes       Family/Social Support:  Care provided by: self  Provides care for: no one  Marital Status:   Children, Facility resident(s)/Staff          Description of Support System: Supportive, Involved    Support Assessment: Adequate family and caregiver support    Current Resources:   Equipment currently used at home: walker, rolling     Lifestyle & Psychosocial Needs:        Socioeconomic History     Marital status:      Spouse name: thanh     Number of children: 1     Years of education: 12     Highest education level: Not on file   Occupational History     Occupation: part-time [employer] section at [employer]      Tobacco Use     Smoking status: Former Smoker     Packs/day: 3.00     Years: 50.00     Pack years: 150.00     Types: Cigarettes     Quit date: 1993     Years since quittin.1     Smokeless tobacco: Never Used     Tobacco comment: quit    Substance and Sexual Activity     Alcohol use: No     Drug use: No     Comment: no  herbal meds either      Sexual activity: Not Currently     Comment:  for 24 years      Per chart review, pt resides at [facility] at [facility]. Called [phone] and LM requesting nurse to call back to verify services and discuss pt's baseline. Met w/ pt at bedside to discuss, she reports that she just moved into [facility] a few weeks ago. CM having difficulty getting clear answer from pt on what services are provided to her.     Pt does report that her son Jose M is supportive and involved. Called Jose M and discussed with him, he reports that pt moved into [facility] in August 2020. She ambulates independently with WW in her apartment at baseline. Through her KAYLAH, she gets 2 meals/day (lunch/dinner), laundry, cleaning, safety checks, medication management and administration.    Informed Jose M that PT is consulted to help with discharge recommendations. Discussed possibility of pt returning back to [facility] vs need for home care or TCU stay. Jose M acknowledging that pt may need higher level of care and discusses that she may eventually need memory care but unsure when she will need this. Will call Jose M to discuss discharge planning once PT evaluates. Jose M would like WC transport at time of discharge. Reviewed out of pocket cost for [facility] transport, $75 for base rate and $5 per mile to the destination. Pt/family expressed understanding and are agreeable to this.     Update 1500: Received call back from Annika at [facility], she verified the above information. They prefer pt return M-F but are able to accept weekend returns, pt should return by 1600. Pt's PCP is Dr. Barrios, chart updated. All new medications should be filled at [facility] pharmacy. Orders should be faxed to [facility] at [phone].     Will continue to follow patient for any additional discharge needs.     Lorin Gan RN BSN   Inpatient Care Coordination  [facility]   Phone [phone]

## 2021-01-20 NOTE — PROGRESS NOTES
Cross coverage.  Patient regarding positive troponin of 0.788.  Currently chest pain-free per nursing staff and otherwise asymptomatic.  Chart reviewed.  Patient presented here with 2 episodes of fall.  Nonspecific EKG changes noted and troponins were trended.  Initial troponin was 0.020, now 0.788.  We will start patient on heparin drip.  Continue to trend troponins and I do see that echocardiogram was ordered for the morning.  Continue to follow troponin and defer to rounder whether or not they want cardiology involvement.

## 2021-01-20 NOTE — PLAN OF CARE
Patient a/o x 4. Denies pain. Heparin gtt infusing. Next Anti Xa at 0515. VSS. Open wound on buttock, covered by dressing.     Problem: Adult Inpatient Plan of Care  Goal: Plan of Care Review  Outcome: No Change     Problem: Adult Inpatient Plan of Care  Goal: Absence of Hospital-Acquired Illness or Injury  Outcome: Improving  Intervention: Identify and Manage Fall Risk  Recent Flowsheet Documentation  Taken 1/20/2021 0030 by Kellee Harrell, MORENO  Safety Promotion/Fall Prevention:    activity supervised    bed alarm on    nonskid shoes/slippers when out of bed    patient and family education  Intervention: Prevent Skin Injury  Recent Flowsheet Documentation  Taken 1/20/2021 0030 by Kellee Harrell, RN  Body Position: right  Goal: Optimal Comfort and Wellbeing  Outcome: Improving

## 2021-01-20 NOTE — PLAN OF CARE
"Vitals: VSS. Afebrile. Denies pain.   Labs: Trops 4.577 increased to 7.188   Neuro: A&O x4, forgetful at times. Irritable at times but cooperative. Neuro checks q4hrs intact.   Respiratory: Lung sounds clear. Reports SOB at times. Tolerating RA. SpO2 93%.   Cardiac/Telemetry: SR. Denies chest pain. Trending trops. Echo 60-65%. Cardiology signed off.   GI/: Purewick in place d/t incontinence.   Skin: Pressure injury to coccyx, covered by clean, dry, intact mepilex. Scratches/abrasions to BLE.   LDAs: PIV to right arm infusing with NS @ 75 ml/hr and hep gtt @ 750 U/hr.   Diet: Low fat low   Activity: A2 with walker. Repositioning and weight shifting as tolerated. Pt has refused repositioning. Education provided. Informed refusal at times. States \"leave me alone to rest.\" Refused to get up into chair.   Plan: Neuro checks q4hrs. Stop hep gtt after 48 hours. Repositioning. IV abx q24hrs. Continue with POC.      "

## 2021-01-20 NOTE — CONSULTS
"Cardiology Consultation      Anat Correa MRN# [id number]   YOB: 1935 Age: 85 year old   Date of Admission: 2021     Reason for consult:  Elevated troponin           Assessment and Plan:     1. Asymptomatic elevated troponin in the setting of dehydration, acute kidney injury and hypotension    Echocardiogram without wall motion abnormality.  In the setting of her renal dysfunction, proceed conservatively and treat as possible demand ischemia but asymptomatic.    Due to fall and dehydration, would not be aggressive with dual antiplatelet therapy.    Patient may have outpatient nuclear stress testing.  Would do aspirin and statin in the meantime.    We will sign off, please call with questions.             Chief Complaint:   Fall           History of Present Illness:   This patient is a 85 year old female with single kidney who comes in with dehydration and weakness, incidentally found to have elevated troponin.  Asymptomatic.  Denies exertional chest discomfort or dyspnea on exertion.    Echocardiogram without wall motion abnormalities and normal function.         Physical Exam:   Vitals were reviewed  Blood pressure 119/68, pulse 77, temperature 98.4  F (36.9  C), temperature source Oral, resp. rate 16, height 1.651 m (5' 5\"), weight 65.1 kg (143 lb 8 oz), SpO2 93 %, not currently breastfeeding.  Temperatures:  Current - Temp: 98.4  F (36.9  C); Max - Temp  Av.6  F (36.4  C)  Min: 96.9  F (36.1  C)  Max: 98.4  F (36.9  C)  Respiration range: Resp  Av.1  Min: 13  Max: 29  Pulse range: Pulse  Av.1  Min: 59  Max: 81  Blood pressure range: Systolic (24hrs), Av , Min:78 , Max:129   ; Diastolic (24hrs), Av, Min:40, Max:94    Pulse oximetry range: SpO2  Av.1 %  Min: 90 %  Max: 99 %    Intake/Output Summary (Last 24 hours) at 2021 1110  Last data filed at 2021 0909  Gross per 24 hour   Intake 1265.67 ml   Output 800 ml   Net 465.67 ml     Constitutional:   " awake, alert, cooperative, no apparent distress, and appears stated age     Eyes:   Lids and lashes normal, pupils equal, round and reactive to light, extra ocular muscles intact, sclera clear, conjunctiva normal     Neck:   supple, symmetrical, trachea midline,      Back:   symmetric     Lungs:        Cardiovascular:        Abdomen:   non-tender     Musculoskeletal:   motor strength is 5 out of 5 all extremities bilaterally     Neurologic:   Grossly nonfocal     Skin:   normal skin color, texture, turgor     Additional findings:                  Past Medical History:   I have reviewed this patient's past medical history  Past Medical History:   Diagnosis Date     Anemia      Calculus of kidney 1998    dr hope     Chronic pain     OA     CKD (chronic kidney disease) stage 4, GFR 15-29 ml/min (H) 2008    dr mcdermott     COPD, moderate (H) 2004    moderate obstruction, with pulm htn - dr francisco did AAP     Diverticulosis of colon (without mention of hemorrhage) 7/03     Hemorrhage of gastrointestinal tract, unspecified 4/08    dr su     Hyperlipidemia LDL goal <100 2006     Hypertension goal BP (blood pressure) < 140/90 2005     Internal hemorrhoids without mention of complication 7/03     Irritable bowel syndrome 7/03     Lesion of plantar nerve 8/98    hay's neuroma dr connor     Lung nodule 4/14, 3/16    Dr Thompson, 1.4 x 1.1 cm, lingula, PET neg 3/16     Malignant neoplasm of upper lobe of left lung (H) 7/16    Dr Thompson - x 2 nodules - moderately differentiated adenocarcinoma (acinar predominant).  Final pathologic stage [id number], Final clinical stage IA, grade 2     Medication management     OA - 1 T#3 at HS with HX CKD     OA (osteoarthritis) 1998    lower ext worse katya knees     Obesity (BMI 30.0-34.9)      Osteoporosis, unspecified 2/02    FOSAMAX  = upset stomach , pt declines further rx.      Other ulcerative colitis 7/03    collangenous collitis- last colonoscopy 7/03      Peripheral neuropathy      early - burning at HS     Personal history of colonic polyps     dr araujo     PONV (postoperative nausea and vomiting)      Primary iridocyclitis 1998    iritis dr dominguez     Venous stasis of lower extremity              Past Surgical History:   I have reviewed this patient's past surgical history  Past Surgical History:   Procedure Laterality Date     AMPUTATE TOE(S) Right 2015    Procedure: AMPUTATE TOE(S);  Surgeon: Ashia White, DPM, Pod;  Location: [facility] OR     C APPENDECTOMY       C  DELIVERY ONLY      , Low Cervical     ESOPHAGOSCOPY, GASTROSCOPY, DUODENOSCOPY (EGD), COMBINED N/A 7/10/2020    Procedure: ESOPHAGOGASTRODUODENOSCOPY, WITH BIOPSY with stomach biopsies by jumbo forceps;  Surgeon: Harry Winter MD;  Location: [facility] GI     EXCISE MASS UPPER EXTREMITY  10/13/2011    Lt Forearm mass excision - dr eyl     EXCISE MASS UPPER EXTREMITY  2012    Lt Forearm mass excision - dr ely     HC COLONOSCOPY THRU STOMA, DIAGNOSTIC      IBS/diverticula/hemmorhoids dr araujo     HC ESOPHAGOSCOPY, DIAGNOSTIC  , , 6/10    Gastric ulcer, healed, gastritis     HC HEMORRHOIDECTOMY,INT/EXT,SIMPLE       HC REPAIR SLIDING INGUINAL HERNIA      mitra     SURGICAL HISTORY OF -       mitra neck & back tumors     SURGICAL HISTORY OF -       neuroma excision - 2nd toe amputation     SURGICAL HISTORY OF -   3/07    Rt IOL - dr jimenez     SURGICAL HISTORY OF -       Lt IOL - dr jimenez     SURGICAL HISTORY OF -       Lt Knee replacement - dr gayle     SURGICAL HISTORY OF -       Rt Knee replacement - dr gayle     SURGICAL HISTORY OF -   9/15    Rt Second toe amputation - Dr White     THORACOSCOPIC WEDGE RESECTION LUNG Left 2016    Procedure: THORACOSCOPIC WEDGE RESECTION LUNG;  Surgeon: Abdelrahman Thompson MD;  Location: [facility] OR     XR JOINT INJECTION HIP (HIB)  2015    Rt - Dr Terry               Social History:   I have  reviewed this patient's social history  Social History     Tobacco Use     Smoking status: Former Smoker     Packs/day: 3.00     Years: 50.00     Pack years: 150.00     Types: Cigarettes     Quit date: 1993     Years since quittin.1     Smokeless tobacco: Never Used     Tobacco comment: quit    Substance Use Topics     Alcohol use: No             Family History:   I have reviewed this patient's family history  Family History   Problem Relation Age of Onset     Cerebrovascular Disease Mother      Cerebrovascular Disease Father      Cancer - colorectal Brother      Cancer - colorectal Brother      Breast Cancer Sister      Cancer Sister         lung     Cancer Sister         lung     Cancer Sister         lung     Scleroderma Sister              Allergies:     Allergies   Allergen Reactions     Prednisone      Yeast infection - swollen lips     Penicillins Rash             Medications:   I have reviewed this patient's current medications  Medications Prior to Admission   Medication Sig Dispense Refill Last Dose     acetaminophen (TYLENOL) 325 MG tablet Take 650 mg by mouth 4 times daily as needed for mild pain or pain Limit tylenol to 3g/24hrs.   Past Week at Unknown time     albuterol (VENTOLIN HFA) 108 (90 Base) MCG/ACT inhaler INHALE TWO PUFFS INTO THE LUNGS EVERY 6 HOURS AS NEEDED FOR SHORTNESS OF BREATH/DYSPNEA 54 g 3      amLODIPine (NORVASC) 5 MG tablet Take 5 mg by mouth daily   2021 at Unknown time     calcium carbonate (TUMS) 500 MG chewable tablet Take 2 chew tab by mouth daily as needed for heartburn        camphor-menthol (ANTI-ITCH) 0.5-0.5 % external lotion Apply topically 2 times daily        docusate sodium (COLACE) 100 MG capsule Take 100 mg by mouth 2 times daily as needed for constipation **20: hold in am on  until seen by MD        escitalopram (LEXAPRO) 5 MG tablet Take 5 mg by mouth daily   2021 at Unknown time     furosemide (LASIX) 20 MG tablet Take 2 tablets  (40 mg) by mouth daily 180 tablet 3 1/19/2021 at Unknown time     loperamide (IMODIUM A-D) 2 MG tablet Take 2 mg by mouth 4 times daily as needed for diarrhea        losartan (COZAAR) 50 MG tablet Take 2 tablets (100 mg) by mouth daily 180 tablet 3 1/19/2021 at Unknown time     Metoprolol Succinate 50 MG CS24 Take 1 capful by mouth 2 times daily   1/19/2021 at am     pantoprazole (PROTONIX) 40 MG EC tablet Take 1 tablet (40 mg) by mouth daily Possible GI Bleed 60 tablet 1 1/19/2021 at Unknown time     ASPIRIN NOT PRESCRIBED (INTENTIONAL) Please choose reason not prescribed, below 0 each 0      Current Facility-Administered Medications Ordered in Epic   Medication Dose Route Frequency Last Rate Last Admin     albuterol (PROAIR HFA/PROVENTIL HFA/VENTOLIN HFA) 108 (90 Base) MCG/ACT inhaler 1-2 puff  1-2 puff Inhalation Q4H PRN         [START ON 1/21/2021] aspirin EC tablet 81 mg  81 mg Oral Daily         atorvastatin (LIPITOR) tablet 40 mg  40 mg Oral QPM         carvedilol (COREG) tablet 3.125 mg  3.125 mg Oral BID w/meals   3.125 mg at 01/20/21 1006     cefTRIAXone (ROCEPHIN) 1 g vial to attach to  mL bag for ADULTS or NS 50 mL bag for PEDS  1 g Intravenous Q24H         heparin 25,000 units in 0.45% NaCl 250 mL ANTICOAGULANT  infusion  0-5,000 Units/hr Intravenous Continuous 7.5 mL/hr at 01/20/21 0822 750 Units/hr at 01/20/21 0822     HYDROmorphone (PF) (DILAUDID) injection 0.2 mg  0.2 mg Intravenous Q2H PRN         lidocaine (LMX4) cream   Topical Q1H PRN         lidocaine 1 % 0.1-1 mL  0.1-1 mL Other Q1H PRN         melatonin tablet 1 mg  1 mg Oral At Bedtime PRN         naloxone (NARCAN) injection 0.2 mg  0.2 mg Intravenous Q2 Min PRN        Or     naloxone (NARCAN) injection 0.4 mg  0.4 mg Intravenous Q2 Min PRN        Or     naloxone (NARCAN) injection 0.2 mg  0.2 mg Intramuscular Q2 Min PRN        Or     naloxone (NARCAN) injection 0.4 mg  0.4 mg Intramuscular Q2 Min PRN         ondansetron (ZOFRAN-ODT)  ODT tab 4 mg  4 mg Oral Q6H PRN        Or     ondansetron (ZOFRAN) injection 4 mg  4 mg Intravenous Q6H PRN         pantoprazole (PROTONIX) EC tablet 40 mg  40 mg Oral Daily   40 mg at 01/20/21 0820     sodium chloride (PF) 0.9% PF flush 3 mL  3 mL Intracatheter Q8H   3 mL at 01/19/21 2101     sodium chloride (PF) 0.9% PF flush 3 mL  3 mL Intracatheter q1 min prn         sodium chloride 0.9% infusion   Intravenous Continuous 75 mL/hr at 01/20/21 0909 Rate Change at 01/20/21 0909     No current Epic-ordered outpatient medications on file.             Review of Systems:   The 10 point Review of Systems is negative other than noted in the HPI            Data:   All laboratory data reviewed  Results for orders placed or performed during the hospital encounter of 01/19/21 (from the past 24 hour(s))   INR   Result Value Ref Range    INR 0.91 0.86 - 1.14   CBC with platelets differential   Result Value Ref Range    WBC 12.9 (H) 4.0 - 11.0 10e9/L    RBC Count 3.49 (L) 3.8 - 5.2 10e12/L    Hemoglobin 9.7 (L) 11.7 - 15.7 g/dL    Hematocrit 30.7 (L) 35.0 - 47.0 %    MCV 88 78 - 100 fl    MCH 27.8 26.5 - 33.0 pg    MCHC 31.6 31.5 - 36.5 g/dL    RDW 14.3 10.0 - 15.0 %    Platelet Count 335 150 - 450 10e9/L    Diff Method Automated Method     % Neutrophils 81.5 %    % Lymphocytes 8.8 %    % Monocytes 8.2 %    % Eosinophils 0.9 %    % Basophils 0.2 %    % Immature Granulocytes 0.4 %    Nucleated RBCs 0 0 /100    Absolute Neutrophil 10.5 (H) 1.6 - 8.3 10e9/L    Absolute Lymphocytes 1.1 0.8 - 5.3 10e9/L    Absolute Monocytes 1.1 0.0 - 1.3 10e9/L    Absolute Eosinophils 0.1 0.0 - 0.7 10e9/L    Absolute Basophils 0.0 0.0 - 0.2 10e9/L    Abs Immature Granulocytes 0.1 0 - 0.4 10e9/L    Absolute Nucleated RBC 0.0    Basic metabolic panel   Result Value Ref Range    Sodium 130 (L) 133 - 144 mmol/L    Potassium 3.8 3.4 - 5.3 mmol/L    Chloride 98 94 - 109 mmol/L    Carbon Dioxide 23 20 - 32 mmol/L    Anion Gap 9 3 - 14 mmol/L    Glucose  127 (H) 70 - 99 mg/dL    Urea Nitrogen 67 (H) 7 - 30 mg/dL    Creatinine 3.63 (H) 0.52 - 1.04 mg/dL    GFR Estimate 11 (L) >60 mL/min/[1.73_m2]    GFR Estimate If Black 13 (L) >60 mL/min/[1.73_m2]    Calcium 9.6 8.5 - 10.1 mg/dL   Troponin I   Result Value Ref Range    Troponin I ES 0.020 0.000 - 0.045 ug/L   Lactic acid whole blood   Result Value Ref Range    Lactic Acid 2.3 (H) 0.7 - 2.0 mmol/L   Stool: occult blood   Result Value Ref Range    Occult Blood Negative NEG^Negative   EKG 12-lead, tracing only   Result Value Ref Range    Interpretation ECG Click View Image link to view waveform and result    EKG 12-lead, tracing only   Result Value Ref Range    Interpretation ECG Click View Image link to view waveform and result    Head CT w/o contrast    Narrative    CT OF THE HEAD WITHOUT CONTRAST   1/19/2021 2:24 PM     COMPARISON: Head CT 7/2/2020    HISTORY:  Head trauma, minor (Age >= 65y)     TECHNIQUE:  Axial CT images of the head from the skull base to the  vertex were acquired without IV contrast.    FINDINGS:   INTRACRANIAL CONTENTS: No intracranial hemorrhage, extraaxial  collection, or mass effect.  No CT evidence of acute infarct.    Moderate presumed chronic small vessel ischemic change with moderate  generalized volume loss..    VISUALIZED ORBITS/SINUSES/MASTOIDS: No significant orbital  abnormality.  No significant paranasal sinus mucosal disease. No  significant middle ear or mastoid effusion.    OSSEOUS STRUCTURES/SOFT TISSUES: No significant abnormality.      Impression    IMPRESSION:  1.  No acute abnormality.  2.  Moderate presumed chronic small vessel ischemic change with  moderate generalized volume loss.  3.  No change.    Radiation dose for this scan was reduced using automated exposure  control, adjustment of the mA and/or kV according to patient size, or  iterative reconstruction technique    JEFFERY SOLANO MD   Asymptomatic SARS-CoV-2 COVID-19 Virus (Coronavirus) by PCR    Specimen:  Nasopharyngeal   Result Value Ref Range    SARS-CoV-2 Virus Specimen Source Nasopharyngeal     SARS-CoV-2 PCR Result NEGATIVE     SARS-CoV-2 PCR Comment (Note)    UA with Microscopic reflex to Culture    Specimen: Urine catheter; Catheterized Urine   Result Value Ref Range    Color Urine Light Yellow     Appearance Urine Clear     Glucose Urine Negative NEG^Negative mg/dL    Bilirubin Urine Negative NEG^Negative    Ketones Urine Negative NEG^Negative mg/dL    Specific Gravity Urine 1.012 1.003 - 1.035    Blood Urine Negative NEG^Negative    pH Urine 6.0 5.0 - 7.0 pH    Protein Albumin Urine 10 (A) NEG^Negative mg/dL    Urobilinogen mg/dL Normal 0.0 - 2.0 mg/dL    Nitrite Urine Negative NEG^Negative    Leukocyte Esterase Urine Negative NEG^Negative    Source Catheterized Urine     WBC Urine 1 0 - 5 /HPF    RBC Urine <1 0 - 2 /HPF    Mucous Urine Present (A) NEG^Negative /LPF   Fractional excretion of sodium   Result Value Ref Range    Creatinine Urine 104 mg/dL    Sodium Urine mmol/L 39 mmol/L    %FENA 1.0 %   Blood culture    Specimen: Blood   Result Value Ref Range    Specimen Description Blood     Special Requests Left Arm     Culture Micro No growth after 12 hours    Procalcitonin   Result Value Ref Range    Procalcitonin 0.22 ng/ml   Blood culture    Specimen: Blood   Result Value Ref Range    Specimen Description Blood     Special Requests Right Arm     Culture Micro No growth after 12 hours    XR Chest 2 Views    Narrative    XR CHEST 2 VW 1/19/2021 4:55 PM    HISTORY: Chest pain    COMPARISON: Chest x-ray 7/2/2020    FINDINGS: Stable lower left lung calcified granulomas. Interstitial  coarsening may reflect chronic changes and/or mild edema. No discrete  focal minor consolidation or definite pleural effusion. Stable heart  size. No overt vascular congestion. Left lower lung calcified  granulomas. Newly visualized small nodular opacities in the right  upper lobe and right lung base for which attention on  follow-up is  recommended.    MARILIN ESCALONA MD   Lactic acid whole blood   Result Value Ref Range    Lactic Acid 2.1 (H) 0.7 - 2.0 mmol/L   Troponin I (STAT q4h x3)   Result Value Ref Range    Troponin I ES 0.788 (HH) 0.000 - 0.045 ug/L   Partial thromboplastin time   Result Value Ref Range    PTT 34 22 - 37 sec   CBC with platelets   Result Value Ref Range    WBC 12.2 (H) 4.0 - 11.0 10e9/L    RBC Count 3.04 (L) 3.8 - 5.2 10e12/L    Hemoglobin 8.6 (L) 11.7 - 15.7 g/dL    Hematocrit 26.8 (L) 35.0 - 47.0 %    MCV 88 78 - 100 fl    MCH 28.3 26.5 - 33.0 pg    MCHC 32.1 31.5 - 36.5 g/dL    RDW 14.4 10.0 - 15.0 %    Platelet Count 287 150 - 450 10e9/L   Troponin I (STAT q4h x3)   Result Value Ref Range    Troponin I ES 2.341 (HH) 0.000 - 0.045 ug/L   Heparin Unfractionated Anti Xa Level   Result Value Ref Range    Heparin Unfractionated Anti Xa Level 0.43 IU/mL   Troponin I (STAT q4h x3)   Result Value Ref Range    Troponin I ES 4.577 (HH) 0.000 - 0.045 ug/L   Comprehensive metabolic panel   Result Value Ref Range    Sodium 133 133 - 144 mmol/L    Potassium 3.7 3.4 - 5.3 mmol/L    Chloride 105 94 - 109 mmol/L    Carbon Dioxide 23 20 - 32 mmol/L    Anion Gap 5 3 - 14 mmol/L    Glucose 93 70 - 99 mg/dL    Urea Nitrogen 53 (H) 7 - 30 mg/dL    Creatinine 2.65 (H) 0.52 - 1.04 mg/dL    GFR Estimate 16 (L) >60 mL/min/[1.73_m2]    GFR Estimate If Black 18 (L) >60 mL/min/[1.73_m2]    Calcium 8.6 8.5 - 10.1 mg/dL    Bilirubin Total 0.5 0.2 - 1.3 mg/dL    Albumin 2.7 (L) 3.4 - 5.0 g/dL    Protein Total 6.4 (L) 6.8 - 8.8 g/dL    Alkaline Phosphatase 82 40 - 150 U/L    ALT 13 0 - 50 U/L    AST 40 0 - 45 U/L   CBC with platelets differential   Result Value Ref Range    WBC 11.0 4.0 - 11.0 10e9/L    RBC Count 3.11 (L) 3.8 - 5.2 10e12/L    Hemoglobin 8.7 (L) 11.7 - 15.7 g/dL    Hematocrit 27.7 (L) 35.0 - 47.0 %    MCV 89 78 - 100 fl    MCH 28.0 26.5 - 33.0 pg    MCHC 31.4 (L) 31.5 - 36.5 g/dL    RDW 14.3 10.0 - 15.0 %    Platelet  Count 302 150 - 450 10e9/L    Diff Method Automated Method     % Neutrophils 70.5 %    % Lymphocytes 16.8 %    % Monocytes 9.3 %    % Eosinophils 2.6 %    % Basophils 0.4 %    % Immature Granulocytes 0.4 %    Nucleated RBCs 0 0 /100    Absolute Neutrophil 7.7 1.6 - 8.3 10e9/L    Absolute Lymphocytes 1.8 0.8 - 5.3 10e9/L    Absolute Monocytes 1.0 0.0 - 1.3 10e9/L    Absolute Eosinophils 0.3 0.0 - 0.7 10e9/L    Absolute Basophils 0.0 0.0 - 0.2 10e9/L    Abs Immature Granulocytes 0.0 0 - 0.4 10e9/L    Absolute Nucleated RBC 0.0    Echocardiogram Complete    Narrative    [id number]  [id number]  [id number]  [id number]^TAMELA^KARINA^PAM           [facility]  Echocardiography Laboratory  [address] [place] [address]        Name: AMITA CRESPO  MRN: [id number]  : 1935  Study Date: 2021 07:29 AM  Age: 85 yrs  Gender: Female  Patient Location: [facility]  Reason For Study: Syncope  Ordering Physician: KARINA RAPP  Referring Physician: Rashi Calle MD  Performed By: Kelly Stewart RDCS     BSA: 1.7 m2  Height: 65 in  Weight: 143 lb  HR: 77  BP: 114/60 mmHg  _____________________________________________________________________________  __        Procedure  Complete Portable Echo Adult.  _____________________________________________________________________________  __        Interpretation Summary     There is mild eccentric left ventricular hypertrophy.  The visual ejection fraction is estimated at 60-65%.  The right ventricle is mildly dilated.  There is mild mitral annular calcification.  There is mild (1+) mitral regurgitation.  Right ventricular systolic pressure is elevated, consistent with moderate  pulmonary hypertension.  Mild valvular aortic stenosis.  The ascending aorta is Mildly dilated.  There is mild-moderate biatrial enlargement.  Minimal changes compared to previous study  _____________________________________________________________________________  __        Left  Ventricle  The left ventricle is normal in size. There is mild eccentric left ventricular  hypertrophy. Left ventricular systolic function is normal. The visual ejection  fraction is estimated at 60-65%. Grade I or early diastolic dysfunction. No  regional wall motion abnormalities noted.     Right Ventricle  The right ventricle is mildly dilated. The right ventricular systolic function  is normal.     Atria  There is mild-moderate biatrial enlargement. There is no color Doppler  evidence of an atrial shunt.     Mitral Valve  The mitral valve leaflets are mildly thickened. There is mild mitral annular  calcification. There is mild (1+) mitral regurgitation.        Tricuspid Valve  The tricuspid valve is normal in structure and function. There is trace  tricuspid regurgitation. The right ventricular systolic pressure is  approximated at 36.5 mmHg plus the right atrial pressure. Right ventricular  systolic pressure is elevated, consistent with moderate pulmonary  hypertension.     Aortic Valve  The mean AoV pressure gradient is 11.0 mmHg. Mild valvular aortic stenosis.     Pulmonic Valve  The pulmonic valve is not well visualized. There is no pulmonic valvular  regurgitation.     Vessels  Borderline aortic root dilatation. The ascending aorta is Mildly dilated.     Pericardium  There is no pericardial effusion.        Rhythm  Sinus rhythm was noted.  _____________________________________________________________________________  __  MMode/2D Measurements & Calculations  IVSd: 1.3 cm     LVIDd: 4.5 cm  LVIDs: 2.8 cm  LVPWd: 1.00 cm  FS: 36.5 %  LV mass(C)d: 182.6 grams  LV mass(C)dI: 106.5 grams/m2  Ao root diam: 3.7 cm  LA dimension: 4.2 cm  asc Aorta Diam: 3.8 cm  LA/Ao: 1.1  LVOT diam: 2.1 cm  LVOT area: 3.3 cm2  LA Volume (BP): 69.0 ml  LA Volume Index (BP): 40.1 ml/m2  RWT: 0.45           Doppler Measurements & Calculations  MV E max brannon: 87.4 cm/sec  MV A max brannon: 115.0 cm/sec  MV E/A: 0.76  MV max P.0  mmHg  MV mean P.1 mmHg  MV V2 VTI: 32.9 cm  MVA(VTI): 1.9 cm2  MV P1/2t max samuel: 108.6 cm/sec  MV P1/2t: 64.4 msec  MVA(P1/2t): 3.4 cm2  MV dec slope: 493.9 cm/sec2  MV dec time: 0.21 sec  Ao V2 max: 224.2 cm/sec  Ao max P.0 mmHg  Ao V2 mean: 154.3 cm/sec  Ao mean P.0 mmHg  Ao V2 VTI: 46.2 cm  CHERELLE(I,D): 1.3 cm2  CHERELLE(V,D): 1.4 cm2  LV V1 max PG: 3.8 mmHg  LV V1 max: 97.2 cm/sec  LV V1 VTI: 18.6 cm  MR ERO: 0.10 cm2  MR volume: 14.7 ml  SV(LVOT): 62.0 ml  SI(LVOT): 36.2 ml/m2  TR max samuel: 301.9 cm/sec  TR max P.5 mmHg  AV Samuel Ratio (DI): 0.43  CHERELLE Index (cm2/m2): 0.78  E/E' avg: 15.8  Lateral E/e': 14.0  Medial E/e': 17.6              _____________________________________________________________________________  __        Report approved by: Pastor Vogel 2021 10:57 AM          Lab Results   Component Value Date    CHOL 211 2016     Lab Results   Component Value Date    HDL 54 2016     Lab Results   Component Value Date     2016     Lab Results   Component Value Date    TRIG 136 2016     Lab Results   Component Value Date    CHOLHDLRATIO 3.8 2015     TSH   Date Value Ref Range Status   10/29/2019 2.65 0.40 - 4.00 mU/L Final         EKG results:    Echocardiology:    Cardiac stress testing:    Cardiac angiography:

## 2021-01-20 NOTE — CONSULTS
"[facility] Nurse Inpatient Pressure Injury Assessment   Reason for consultation: Evaluate and treat Bilateral buttocks and BLE      ASSESSMENT  Pressure Injury: on Bilateral buttocks , present on admission    Pressure Injury is Stage 2   Contributing factor of the pressure injury: pressure, shear and age  Status: initial assessment  Recommend provider order: None, at this time     Bilateral leg due to abrasions from scratching  Status: initial assessment  2 wounds on right leg partially slough/fibrin covered.  Use Aquacel to promote autolytic debridement.     TREATMENT PLAN  Bilateral buttocks wound: Every 3 days   1. Cleanse with wound cleanser and dry.  2. Apply Mepilex sacral    Bilateral leg wound: Daily  1. Cleanse with wound cleanser and dry.  2. Apply Aquacel Ag (cut to fit) to 2 open wounds on right leg  3. Apply Sween cream to bilateral legs and feet (not between toes)  4. Cover right leg wounds with gauze and wrap with kerlix  Orders Written  [facility] Nurse follow-up plan:weekly  Nursing to notify the Provider(s) and re-consult the [facility] Nurse if wound(s) deteriorates or new skin concern.      Pressure Injury Prevention (PIP) Plan:  If patient is declining pressure injury prevention interventions: Explore reason why and address patient's concerns, Educate on pressure injury risk and prevention intervention(s) and If patient is still declining, document \"informed refusal\"   Mattress: Follow bed algorithm, reassess daily and order specialty mattress, if indicated.  HOB: Maintain at or below 30 degrees, unless contraindicated  Repositioning in bed: Every 1-2 hours   Heels: Pillows under calves      Patient History  According to provider note(s):  Anat Correa is a 85 year old female with past medical history significant for anemia, chronic pain, chronic kidney disease, hypertension, ulcerative colitis and lung cancer presented to the emergency room with increased weakness and frequent falls.    Objective " Data  Containment of urine/stool: External catheter    Current Diet/ Nutrition:  Orders Placed This Encounter      Low Saturated Fat Na <2400 mg      Output:   I/O last 3 completed shifts:  In: 691.67 [P.O.:50; I.V.:641.67]  Out: 800 [Urine:800]    Risk Assessment:   Sensory Perception: 3-->slightly limited  Moisture: 3-->occasionally moist  Activity: 3-->walks occasionally  Mobility: 3-->slightly limited  Nutrition: 3-->adequate  Friction and Shear: 3-->no apparent problem  Donald Score: 18      Labs:   Recent Labs   Lab 01/20/21  0600 01/19/21  1341 01/19/21  1341   ALBUMIN 2.7*  --   --    HGB 8.7*   < > 9.7*   INR  --   --  0.91   WBC 11.0   < > 12.9*    < > = values in this interval not displayed.       Physical Exam  Skin inspection: focused bilateral buttocks, bilateral legs  Patient is high risk for pressure injury development secondary to history of pressure injury    Wound Location:  Bilateral buttocks  Date of last Photo 1/20/21    Wound History: Patient reports these started recently.  Measurements (length x width x depth, in cm) right is 3x3cm, left with 2 areas both 0.5x0.5cm  Wound Base:  100 % agranular  Tunneling N/A  Undermining N/A  Palpation of the wound bed: normal   Periwound skin: erythema- blanchable  Color: normal and consistent with surrounding tissue  Temperature: normal   Drainage:, small  Description of drainage: serous  Odor: none  Pain: denies , none     Wound Location:  Bilateral legs  Date of last Photo 1/20/21    Wound History: Patient reports these are all from scratching, even the larger wounds  Measurements (length x width x depth, in cm) Right shin with 2.5x1.5x0.2cm and 1x0.7cm wounds, both are a mix of agranular tissue and fibrin/slough.  Rest of right leg with areas of linear dried drainage.  Left leg with minimal pinpoint areas of dried drainage.  Wound Base:  See above  Tunneling N/A  Undermining N/A  Palpation of the wound bed: normal   Periwound skin: hemosiderin  staining  Color: normal and consistent with surrounding tissue  Temperature: normal   Drainage:, scant  Description of drainage: serosanguinous  Odor: none    Interventions  Current support surface: Standard  Atmos Air mattress-start pulsate  Current off-loading measures: Pillows under calves and Pillows  Repositioning aid: Pillows  Wound Care: was done per plan of care.  Supplies: ordered: Aquacel  Educated provided: importance of repositioning, plan of care and Off-loading pressure  Education provided to: patient  and nurse  Discussed importance of:repositioning every 2 hours and off-loading pressure to wound  Discussed plan of care with Patient and Nurse    Nestor Kat RN CWOCN

## 2021-01-21 NOTE — PROGRESS NOTES
I was called for new AFIB, which seems to be new, with RVR and rate of 120-160. She had echo yesterday showing normal EF. She is already on heparin drip. Cardiology is following. I ordered IV Metoprolol for use as needed.

## 2021-01-21 NOTE — PROVIDER NOTIFICATION
Pt converted to new Afib RVR this AM. Received 2.5mg IV metoprolol at 0640. HR continues to be 120s - 130s. Please advise. Thank you!

## 2021-01-21 NOTE — PROVIDER NOTIFICATION
"Tele tech called RN @ 0605 Patient converted to A-fib RVR 's.    NOC Hospitalist Dr. Schumacher paged: \"New onset A-fib. 's-160's. BP 95/77. Patient asymptomatic.\"    Dr. Schumacher at nursing station and discussed with RN face to face. New order to IV metoprolol. Will continue to monitor.   "

## 2021-01-21 NOTE — PLAN OF CARE
Tele this am A. Fib RVR, IVF bolus 500 ml given. Converted to SR at 1119. Currently at archana scan. Received po amio. IVF at 75/hr, heparin gtt at 750 unit(s)/hr. BLE jaqueline in color, wound to coccyx and RLE. Trop. 7.985. A & O x 4, forgetful.

## 2021-01-21 NOTE — PLAN OF CARE
"/70   Pulse 86   Temp 98.1  F (36.7  C) (Oral)   Resp 16   Ht 1.651 m (5' 5\")   Wt 65.1 kg (143 lb 8 oz)   SpO2 93%   BMI 23.88 kg/m    Pt is A&Ox4, but forget ful and orientation can change. Q4 neuro checks.  Trops elevated to 7.188 on days, on Hep gtt @ 7.5ml/hr, denies CP. LS clear . BS+ has purewick in place for INC. Skin is clean and dry. Ahs pressure injury on Coccyx w/ mepilex in place and R leg is wrapped per WOC orders. On IV rocephin Q 24hrs.  On Low fat diet. Pt is up Ax2 w/ walker and GB and lots of encouragement. Was up to the chair for dinner. Pt has pulsate mattress, repositioning Q2 if patient allows. Plan is to stop heparin gtt 1/22, continue IV ABX and continue to monitor.  "

## 2021-01-21 NOTE — CONSULTS
[facility]    Cardiology Progress Note    Date of Admission: 01/19/2021  Service Date: 01/21/2021    Summary:  Ms. Anat Correa is a very pleasant 85 year old female with a past medical history of a single kidney who was admitted on 01/19/2021 from her assisted living facility following recurrent falls with dehydration and increasing weakness, incidentally found to have elevated troponin for which cardiology was consulted. She has been asymptomatic with this from a cardiac standpoint denying exertional chest discomfort or dyspnea on exertion. Echocardiogram was completed showing normal LVEF without wall motion abnormalities. In the setting of her renal dysfunction, conservative medical management was recommended as this more likely represents demand ischemia. She was started on aspirin and a statin and consideration for outpatient nuclear stress testing was suggested. She later developed new onset of atrial fibrillation with RVR early this morning for which cardiology has been re-consulted.     Assessment & Plan   1. New onset atrial fibrillation with RVR  - Rates have been around 110-140 bpm on telemetry.   - Currently on carvedilol 3.125 mg BID and a diltiazem drip has been ordered but has not yet been started due to hypotension.  - Started on a heparin drip for anticoagulation. [id number] Score of at least 4 (age++, female, HTN).     2. Troponin elevation   - Initial check at 0.02 and subsequently trended up to a peak of 7.9 this morning.  - Etiology unclear. Possibly secondary to demand in the setting of dehydration, NENA, recent fall, and possible sepsis.   - Remains free of chest pain.      3. Acute on chronic kidney disease; history of solitary kidney  - Creatinine of 3.63 upon presentation likely secondary to dehydration.   - Improved with IVF/hydration to 1.78 today.    4. Possible sepsis  - Presentation with hypotension, leukocytosis, lactic acidosis and renal  failure  -Chest x-ray not definitive of any infiltrate with some chronic changes and small nodule opacities.  - Procalcitonin is 0.22 low risk for systemic infection.  - Blood cultures remain negative after 48 hours.  - On exam patient does have an area of LE erythema with underlying chronic stasis dermatitis could be a localized skin infection.  - She received IV ceftriaxone on admission will continue for now and switch to oral if cultures remain negative.    5. Hypertension  - Currently hypotensive in the setting of dehydration and A.Fib with RVR as noted above.    6. Hyperlipidemia  - Treated on atorvastatin 40 mg once daily.    7. History of anemia and GI bleed  - Hgb stable at 8.6.     8. Recent falls    Plan:   1. Switch from carvedilol 3.125 to metoprolol tartrate 25 mg twice daily for better rate control.   2. Agree with the bolus of IV fluids given hypotension. Would start the diltiazem drip for better rate control as BP allows.   3. Discussed the option of a lexiscan nuclear stress test for further ischemic evaluation versus invasive coronary angiography. Ideally, would suggest waiting on an angiogram to allow for continued recovery of her renal function since she has not had symptoms to suggest ischemia. The patient prefers a more conservative approach, but was agreeable to pursuing a stress test for further evaluation.  4. Continue with IV heparin for anticoagulation. Given her advanced age, CKD,  and weight near <60 kg would switch to low dose apixaban 2.5 mg twice daily prior to discharge. She may be a good candidate for outpatient Watchman device implant as well given her history of falls and GI bleed.  5. Cardiology will continue to follow along.    Disposition Plan   Expected discharge in 1-2 days to prior living arrangement pending further evaluation and management of the issues as outlined above.     Entered: Kwadwo Castillo 01/21/2021, 10:42 AM     Interval History   Patient is resting  "comfortably. She went into A.Fib with RVR around 0400 this AM. She denies symptoms of chest pain, palpitations, shortness of breath, or orthopnea with this. We reviewed the plan of care as outlined above. She tells me that she would like to be discharged as soon as possible and prefers \"simplest\" options for management. Will pursue rate control for A.Fib and stress test for ischemic evaluation as above. She was agreeable to coronary angiography if indicated if a stress test demonstrates a significant area of ischemia.     Telemetry: A.Fib with HRs primarily around 110-140 bpm    Thank you for the opportunity to participate in this pleasant patient's care.     CAMACHO Hale, CNP   Nurse Practitioner  [facility]  Pager: [phone]  Text Page  (8am - 5pm, M-F)    Patient Active Problem List   Diagnosis     History of colonic polyps     Calculus of kidney     Lesion of plantar nerve     Osteoporosis     Primary iridocyclitis     Anemia     Hyperlipidemia LDL goal <100     OA (osteoarthritis)     Advanced directives, counseling/discussion     Hypertension goal BP (blood pressure) < 140/90     COPD, moderate     Controlled substance agreement signed     Vitamin D deficiency     CKD (chronic kidney disease) stage 4, GFR 15-29 ml/min (H)     Anemia in chronic renal disease     Secondary renal hyperparathyroidism (H)     Venous stasis of lower extremity     Peripheral neuropathy     Chronic pain     Lung nodule     Nodule of left lung     Malignant neoplasm of upper lobe of left lung (H)     Acute pain of right shoulder     S/P amputation of lesser toe, unspecified laterality (H)     Retinal hemorrhage of right eye     Rhabdomyolysis     Debility     Acute kidney injury (H)     Hypercalcemia     Leukocytosis, unspecified type     Physical deconditioning     Anemia due to blood loss, acute     Dark stools     Hyponatremia     Melanotic stools     Gastrointestinal hemorrhage associated with acute " gastritis     Diastolic dysfunction     Acute cystitis     Dehydration     Generalized muscle weakness     NENA (acute kidney injury) (H)     Fall, initial encounter     ST segment depression     Hypotension, unspecified hypotension type     Physical Exam   Temp: 98.3  F (36.8  C) Temp src: Oral BP: 95/72 Pulse: 138   Resp: 18 SpO2: 95 % O2 Device: None (Room air)    Vitals:    01/19/21 1924 01/20/21 0500 01/21/21 0608   Weight: 60.9 kg (134 lb 3.2 oz) 65.1 kg (143 lb 8 oz) 60.6 kg (133 lb 8 oz)     Vital Signs with Ranges  Temp:  [97.8  F (36.6  C)-98.6  F (37  C)] 98.3  F (36.8  C)  Pulse:  [] 138  Resp:  [16-18] 18  BP: ()/(56-79) 95/72  SpO2:  [92 %-95 %] 95 %  I/O last 3 completed shifts:  In: 2152.25 [P.O.:240; I.V.:1912.25]  Out: 1300 [Urine:1300]    Constitutional: Frail elderly woman in no acute distress.  Eyes: Pupils equal, round. Sclerae anicteric.   HEENT: Normocephalic, atraumatic.   Neck: Supple. No JVD appreciated.  Respiratory: Breathing non-labored. Lungs clear to auscultation bilaterally. No crackles, wheezes, rhonchi, or rales.  Cardiovascular: Irregularly irregular rhythm, fast rate, normal S1 and S2. No murmur, rub, or gallop.  GI: Soft, non-distended, non-tender.  Skin: Warm, dry.   Musculoskeletal/Extremities: Moves all extremities well and symmetrically. 1+ LE edema bilaterally with venous stasis changes noted.  Neurologic: No gross focal deficits. Alert, cooperative.  Psychiatric: Affect appropriate. Responds to questions appropriately.    Medications     dilTIAZem HCl-Sodium Chloride       heparin 750 Units/hr (01/21/21 0759)     sodium chloride 75 mL/hr at 01/21/21 0759       sodium chloride 0.9%  500 mL Intravenous Once     aspirin  81 mg Oral Daily     atorvastatin  40 mg Oral QPM     carvedilol  3.125 mg Oral BID w/meals     cefTRIAXone  1 g Intravenous Q24H     pantoprazole  40 mg Oral Daily     sodium chloride (PF)  3 mL Intracatheter Q8H     sodium chloride (PF)  3 mL  Intracatheter Q8H     Data   Results for orders placed or performed during the hospital encounter of 01/19/21 (from the past 24 hour(s))   Troponin I   Result Value Ref Range    Troponin I ES 7.188 () 0.000 - 0.045 ug/L   Care Management / Social Work IP Consult    Narrative    Lorin Gan RN     1/20/2021  3:06 PM  Care Management Initial Consult    General Information  Assessment completed with: Patient, Children, Son Jose M   [phone]  Type of CM/SW Visit: Initial Assessment    Primary Care Provider verified and updated as needed: Yes   Readmission within the last 30 days: no previous admission in   last 30 days      Reason for Consult: care coordination/care conference,   discharge planning    Communication Assessment  Patient's communication style: spoken language (English or   Bilingual)    Hearing Difficulty or Deaf: no   Wear Glasses or Blind: yes    Cognitive  Cognitive/Neuro/Behavioral: .WDL except  Level of Consciousness:   alert  Arousal Level: opens eyes spontaneously  Orientation:   oriented x 4  Mood/Behavior: cooperative  Best Language: 0 - No   aphasia  Speech: clear, spontaneous    Living Environment:   People in home: facility resident     Current living Arrangements: assisted living  [facility]   Able to return to prior arrangements: yes       Family/Social Support:  Care provided by: self  Provides care for: no one  Marital Status:   Children, Facility resident(s)/Staff          Description of Support System: Supportive, Involved    Support Assessment: Adequate family and caregiver support    Current Resources:   Equipment currently used at home: walker, rolling     Lifestyle & Psychosocial Needs:        Socioeconomic History     Marital status:      Spouse name: thanh     Number of children: 1     Years of education: 12     Highest education level: Not on file   Occupational History     Occupation: part-time [employer] section at [employer]       Tobacco Use     Smoking status: Former Smoker     Packs/day: 3.00     Years: 50.00     Pack years: 150.00     Types: Cigarettes     Quit date: 1993     Years since quittin.1     Smokeless tobacco: Never Used     Tobacco comment: quit    Substance and Sexual Activity     Alcohol use: No     Drug use: No     Comment: no herbal meds either      Sexual activity: Not Currently     Comment:  for 24 years      Per chart review, pt resides at [facility] at [facility]. Called   [phone] and LM requesting nurse to call back to verify   services and discuss pt's baseline. Met w/ pt at bedside to   discuss, she reports that she just moved into [facility] a few weeks   ago. CM having difficulty getting clear answer from pt on what   services are provided to her.     Pt does report that her son Jose M is supportive and involved.   Called Jose M and discussed with him, he reports that pt moved   into [facility] in 2020. She ambulates independently with WW in   her apartment at baseline. Through her [facility], she gets 2 meals/day   (lunch/dinner), laundry, cleaning, safety checks, medication   management and administration.    Informed Jose M that PT is consulted to help with discharge   recommendations. Discussed possibility of pt returning back to   [facility] vs need for home care or TCU stay. Jose M acknowledging that   pt may need higher level of care and discusses that she may   eventually need memory care but unsure when she will need this.   Will call Jose M to discuss discharge planning once PT evaluates.   Jose M would like  transport at time of discharge. Reviewed out   of pocket cost for [facility] transport, $75 for base   rate and $5 per mile to the destination. Pt/family expressed   understanding and are agreeable to this.     Update 1500: Received call back from Annika at [facility],   she verified the above information. They prefer pt return M-F but   are able to accept weekend returns, pt  should return by 1600.   Pt's PCP is Dr. Barrios, chart updated. All new medications should   be filled at [facility] pharmacy. Orders should be faxed to [facility]   at [phone].     Will continue to follow patient for any additional discharge   needs.     Lorin Gan RN BSN   Inpatient Care Coordination  [facility]   Phone [phone]     Heparin Unfractionated Anti Xa Level   Result Value Ref Range    Heparin Unfractionated Anti Xa Level 0.43 IU/mL   Heparin Unfractionated Anti Xa Level   Result Value Ref Range    Heparin Unfractionated Anti Xa Level 0.29 IU/mL   CBC with platelets   Result Value Ref Range    WBC 12.5 (H) 4.0 - 11.0 10e9/L    RBC Count 3.07 (L) 3.8 - 5.2 10e12/L    Hemoglobin 8.6 (L) 11.7 - 15.7 g/dL    Hematocrit 26.8 (L) 35.0 - 47.0 %    MCV 87 78 - 100 fl    MCH 28.0 26.5 - 33.0 pg    MCHC 32.1 31.5 - 36.5 g/dL    RDW 14.5 10.0 - 15.0 %    Platelet Count 294 150 - 450 10e9/L   Basic metabolic panel   Result Value Ref Range    Sodium 135 133 - 144 mmol/L    Potassium 3.6 3.4 - 5.3 mmol/L    Chloride 107 94 - 109 mmol/L    Carbon Dioxide 22 20 - 32 mmol/L    Anion Gap 6 3 - 14 mmol/L    Glucose 88 70 - 99 mg/dL    Urea Nitrogen 35 (H) 7 - 30 mg/dL    Creatinine 1.78 (H) 0.52 - 1.04 mg/dL    GFR Estimate 26 (L) >60 mL/min/[1.73_m2]    GFR Estimate If Black 30 (L) >60 mL/min/[1.73_m2]    Calcium 8.7 8.5 - 10.1 mg/dL   Troponin I   Result Value Ref Range    Troponin I ES 7.959 (HH) 0.000 - 0.045 ug/L   EKG 12-lead, complete   Result Value Ref Range    Interpretation ECG Click View Image link to view waveform and result      This note was completed in part using Dragon voice recognition software. Although reviewed after completion, some word and grammatical errors may occur.

## 2021-01-21 NOTE — PLAN OF CARE
PT: order received; Per chart review, patient having increasing troponins and new Afib with RVR; check with teletech and HR currently 147. Will hold evaluation today.

## 2021-01-21 NOTE — PLAN OF CARE
VSS. Denies pain. New IV placed in L arm. Dressing changed on RLE and sacrum. Neuros intact. Hep gtt infusing. Repositioned Q2h.     Problem: Adult Inpatient Plan of Care  Goal: Plan of Care Review  Outcome: No Change     Problem: Adult Inpatient Plan of Care  Goal: Optimal Comfort and Wellbeing  Outcome: Improving     Problem: Discharge Planning  Goal: Discharge Planning (Adult, OB, Behavioral, Peds)  Outcome: Improving

## 2021-01-21 NOTE — PROGRESS NOTES
[facility]    Hospitalist Progress Note  Name: Anat Correa    MRN: [id number]  Provider: Mundo Oreilly MD  Date of Service: 01/21/2021    Assessment & Plan   Summary of Stay: Anat Correa is a 85 year old female who was admitted on 1/19/2021 for hypotension, acute renal failure and EKG changes.  She presented with increased weakness and falls.    Her past medical history significant for anemia, chronic pain, chronic kidney disease, hypertension, hyperlipidemia, colitis and history of lung cancer.    Patient seems to have elevated troponin concerning for NSTEMI.  Patient developed A. fib with RVR this morning.  She was given IV metoprolol but remained in A. fib with RVR with heart rate in 120s to 130s.  Started on Cardizem drip. Cardiology reconsulted.       Asymptomatic elevated troponin in the setting of dehydration and hypotension.  -Patient was started on heparin drip for non-STEMI.  Cardiology consulted and recommended medical treatment.  Echocardiogram showed eccentric left ventricular hypertrophy, EF at 60 to 65%, evidence of  moderate pulmonary hypertension  -On aspirin  -Initially due to low blood pressure all antihypertensive meds including beta-blocker losartan and diuretics were held  -Now on Coreg 3.125 mg p.o. twice daily.    -cardiology following.  Appreciate input    Afib with RVR  Tried IV metoprolol. Patient remained in A. fib with RVR. Started on Cardizem drip.  [id number] score 4.  Already on heparin drip for non-STEMI. Cardiology reconsulted.    Possible sepsis  -Presentation was hypotension, leukocytosis, lactic acidosis and renal failure  -Chest x-ray not definitive of any infiltrate with some chronic changes and small nodule opacities  -Blood cultures negative so far  -Procalcitonin is 0.22 low risk for systemic infection.  WBC up from 11 to 12.5 today.  Currently on ceftriaxone.  We will continue empiric antibiotic at this moment.  Will monitor  cultures.    Hypotension  -Resolved with hydration  -FENa was less than 1 (likely prerenal)  -Poor perfusion versus poor hydration    Acute renal failure  -In context of NSTEMI elevated troponin initial hypotension will carefully hydrate patient going forward  -Cardiac echo pending  -Renal functions improved with hydration  -Continue to avoid nephrotoxins    History of chronic hypertension  -held PTA blood pressure medications as patient was hypotensive  -Prior to admission on metoprolol, losartan, Lasix   -Started low-dose carvedilol     History of hyperlipidemia  -Continue statin     History of anemia and GI bleed  -Continue PPI  -Hemoglobin stable today     Hyponatremia  -Likely secondary to dehydration  -We will check FENa     Falls  -PT OT evaluate  -Social work consult for safe disposition    Abnormal chest x-ray finding consistent need for new right upper lobe nodular opacity  -We will likely need follow-up CT as outpatient    DVT Prophylaxis: on iv heparin  Code Status: No CPR- Do NOT Intubate    Disposition: Expected discharge to be decided.  Started on Cardizem drip for A. fib with RVR.  Cardiology following  Interval History   Patient seen and examined.  Also discussed with RN.  Patient was noted to be in A. fib with RVR this morning.  She complains of palpitations.  She was started on Cardizem drip.    -Data reviewed today: I reviewed all new labs and imaging reports over the last 24 hours. I personally reviewed the EKG tracing showing T inverted in lateral leads.    Physical Exam   Temp: 98.3  F (36.8  C) Temp src: Oral BP: 101/79 Pulse: 134   Resp: 18 SpO2: 92 % O2 Device: None (Room air)    Vitals:    01/19/21 1924 01/20/21 0500 01/21/21 0608   Weight: 60.9 kg (134 lb 3.2 oz) 65.1 kg (143 lb 8 oz) 60.6 kg (133 lb 8 oz)     Vital Signs with Ranges  Temp:  [97.8  F (36.6  C)-98.6  F (37  C)] 98.3  F (36.8  C)  Pulse:  [] 134  Resp:  [16-18] 18  BP: ()/(56-79) 101/79  SpO2:  [92 %-95 %] 92  %  I/O last 3 completed shifts:  In: 2152.25 [P.O.:240; I.V.:1912.25]  Out: 1300 [Urine:1300]      GEN:  Alert, oriented x 3, appears comfortable, NAD.  HEENT:  Normocephalic/atraumatic, no scleral icterus, no nasal discharge, mouth moist.  CV:  Regular rate and rhythm, no murmur or JVD.  S1 + S2 noted, no S3 or S4.  LUNGS:  Clear to auscultation bilaterally without rales/rhonchi/wheezing/retractions.  Symmetric chest rise on inhalation noted.  ABD:  Active bowel sounds, soft, non-tender/non-distended.  No rebound/guarding/rigidity.  EXT:  No edema.  No cyanosis.  No joint synovitis noted.  SKIN:  Dry to touch, no exanthems noted in the visualized areas.    Medications     dilTIAZem HCl-Sodium Chloride       heparin 750 Units/hr (01/21/21 0759)     sodium chloride 75 mL/hr at 01/21/21 0759       sodium chloride 0.9%  500 mL Intravenous Once     aspirin  81 mg Oral Daily     atorvastatin  40 mg Oral QPM     cefTRIAXone  1 g Intravenous Q24H     metoprolol tartrate  25 mg Oral BID     pantoprazole  40 mg Oral Daily     sodium chloride (PF)  3 mL Intracatheter Q8H     sodium chloride (PF)  3 mL Intracatheter Q8H     Data     Recent Labs   Lab 01/21/21  0615 01/20/21  0600 01/19/21  2338   WBC 12.5* 11.0 12.2*   HGB 8.6* 8.7* 8.6*   HCT 26.8* 27.7* 26.8*   MCV 87 89 88    302 287     Recent Labs   Lab 01/21/21  0615 01/20/21  0600 01/19/21  1341    133 130*   POTASSIUM 3.6 3.7 3.8   CHLORIDE 107 105 98   CO2 22 23 23   ANIONGAP 6 5 9   GLC 88 93 127*   BUN 35* 53* 67*   CR 1.78* 2.65* 3.63*   GFRESTIMATED 26* 16* 11*   GFRESTBLACK 30* 18* 13*   DUSTY 8.7 8.6 9.6     Recent Labs   Lab 01/19/21  1544 01/19/21  1538   CULT No growth after 2 days No growth after 2 days     No results for input(s): NTBNPI, NTBNP in the last 168 hours.  No results for input(s): CKT in the last 168 hours.    Invalid input(s): CK, CK TOTAL  Recent Labs   Lab 01/20/21  0600   AST 40   ALT 13   ALKPHOS 82   BILITOTAL 0.5     Recent  Labs   Lab 01/19/21  1341   INR 0.91     Recent Labs   Lab 01/19/21  2000 01/19/21  1341   LACT 2.1* 2.3*     No results for input(s): LIPASE in the last 168 hours.  Recent Labs   Lab 01/21/21  0615 01/20/21  0600 01/19/21  1341   BUN 35* 53* 67*   CR 1.78* 2.65* 3.63*     No results for input(s): TSH in the last 168 hours.  Recent Labs   Lab 01/21/21  0615 01/20/21  1101 01/20/21  0600   TROPI 7.959* 7.188* 4.577*     Recent Labs   Lab 01/19/21  1448   COLOR Light Yellow   APPEARANCE Clear   URINEGLC Negative   URINEBILI Negative   URINEKETONE Negative   SG 1.012   UBLD Negative   URINEPH 6.0   PROTEIN 10*   NITRITE Negative   LEUKEST Negative   RBCU <1   WBCU 1       No results found for this or any previous visit (from the past 24 hour(s)).

## 2021-01-22 NOTE — PROGRESS NOTES
Cardiology Progress Note   Date of service: 01/22/21       Assessment and Plan:     1. Likely inferior non-STEMI with residual ischemia on stress testing but preserved ejection fraction.    Continue medical management as patient states she is asymptomatic and does not want any thing aggressive done.      2. Paroxysmal atrial fibrillation with rapid ventricular response, may have precipitated her initial presentation    Currently in sinus rhythm.  Continue oral amiodarone loading and continue outpatient daily amiodarone for symptom relief long-term.      3. Initial fall and memory decrease    Unclear if patient is an anticoagulation candidate.  Primary service can discuss with family their goals of care.    If they feel she is stable enough to be anticoagulated, would do clopidogrel and reduced dose Eliquis 2.5 mg twice daily without aspirin.    If they do not feel she is stable enough and is at high risk for falling, she could do aspirin and clopidogrel without anticoagulation, accepting that she could have embolic phenomenon with her paroxysmal atrial fibrillation.  Hopefully, reducing her atrial fibrillation burden with amiodarone would help reduce the stroke risk also.  It is my sense she might be better served with aspirin and clopidogrel without anticoagulation due to memory and fall risk, but will leave that up to primary hospitalist and family to decide.    We will sign off, please call with questions.                   Interval History:   Patient denies any chest discomfort or dyspnea even though she appears somewhat short of breath.  Reviewed nuclear stress testing which, as expected shows inferior infarct with some nataly-infarct ischemia.  Asymptomatic as far as I can tell from the patient.  She also had paroxysmal atrial fibrillation, currently in sinus rhythm.  On oral amiodarone loading.  Patient states that she wants to go home.                  Review of Systems:   As per subjective, otherwise 5  "systems reviewed and negative.           Physical Exam:     Vitals were reviewed  Blood pressure (!) 160/86, pulse 83, temperature 97.7  F (36.5  C), temperature source Oral, resp. rate 20, height 1.651 m (5' 5\"), weight 61.1 kg (134 lb 11.2 oz), SpO2 96 %, not currently breastfeeding.  Temperatures:  Current - Temp: 97.7  F (36.5  C); Max - Temp  Av  F (36.7  C)  Min: 97.3  F (36.3  C)  Max: 98.6  F (37  C)  Respiration range: Resp  Av.9  Min: 18  Max: 20  Pulse range: Pulse  Av.4  Min: 83  Max: 144  Blood pressure range: Systolic (24hrs), Av , Min:121 , Max:160   ; Diastolic (24hrs), Av, Min:70, Max:97    Pulse oximetry range: SpO2  Av.6 %  Min: 88 %  Max: 97 %    Intake/Output Summary (Last 24 hours) at 2021 1121  Last data filed at 2021 1051  Gross per 24 hour   Intake 593 ml   Output 575 ml   Net 18 ml       Constitutional:   awake, alert, cooperative, no apparent distress, and appears stated age     Eyes:   Lids and lashes normal, pupils equal, round and reactive to light, extra ocular muscles intact, sclera clear, conjunctiva normal     Neck:   supple, symmetrical, trachea midline, no JVD     Back:   symmetric     Lungs:   Coarse breath sounds     Cardiovascular:   Regular     Abdomen:   benign     Musculoskeletal:   motor strength is 5 out of 5 all extremities bilaterally     Neurologic:   Grossly nonfocal except for decreased long-term memory     Skin:   normal skin color, texture, turgor            Medications:     Current Facility-Administered Medications Ordered in Epic   Medication Dose Route Frequency Last Rate Last Admin     albuterol (PROAIR HFA/PROVENTIL HFA/VENTOLIN HFA) 108 (90 Base) MCG/ACT inhaler 1-2 puff  1-2 puff Inhalation Q4H PRN         albuterol (PROAIR HFA/PROVENTIL HFA/VENTOLIN HFA) 108 (90 Base) MCG/ACT inhaler 2 puff  2 puff Inhalation Q5 Min PRN         aminophylline  mg   mg Intravenous Once PRN         amiodarone (PACERONE) tablet " 200 mg  200 mg Oral BID   200 mg at 01/22/21 0936     aspirin EC tablet 81 mg  81 mg Oral Daily   81 mg at 01/22/21 0936     atorvastatin (LIPITOR) tablet 40 mg  40 mg Oral QPM   40 mg at 01/21/21 2113     caffeine citrate (CAFCIT) injection 60 mg  60 mg Intravenous Once PRN         cefTRIAXone (ROCEPHIN) 1 g vial to attach to  mL bag for ADULTS or NS 50 mL bag for PEDS  1 g Intravenous Q24H 200 mL/hr at 01/20/21 1645 1 g at 01/21/21 1704     heparin 25,000 units in 0.45% NaCl 250 mL ANTICOAGULANT  infusion  0-5,000 Units/hr Intravenous Continuous 9 mL/hr at 01/22/21 0932 900 Units/hr at 01/22/21 0932     HYDROmorphone (PF) (DILAUDID) injection 0.2 mg  0.2 mg Intravenous Q2H PRN         lidocaine (LMX4) cream   Topical Q1H PRN         lidocaine 1 % 0.1-1 mL  0.1-1 mL Other Q1H PRN         melatonin tablet 1 mg  1 mg Oral At Bedtime PRN         metoprolol (LOPRESSOR) injection 2.5 mg  2.5 mg Intravenous Q4H PRN   2.5 mg at 01/21/21 2156     naloxone (NARCAN) injection 0.2 mg  0.2 mg Intravenous Q2 Min PRN        Or     naloxone (NARCAN) injection 0.4 mg  0.4 mg Intravenous Q2 Min PRN        Or     naloxone (NARCAN) injection 0.2 mg  0.2 mg Intramuscular Q2 Min PRN        Or     naloxone (NARCAN) injection 0.4 mg  0.4 mg Intramuscular Q2 Min PRN         ondansetron (ZOFRAN-ODT) ODT tab 4 mg  4 mg Oral Q6H PRN        Or     ondansetron (ZOFRAN) injection 4 mg  4 mg Intravenous Q6H PRN         pantoprazole (PROTONIX) EC tablet 40 mg  40 mg Oral Daily   40 mg at 01/22/21 0936     sodium chloride (PF) 0.9% PF flush 10 mL  10 mL Intravenous Q10 Min PRN         sodium chloride (PF) 0.9% PF flush 3 mL  3 mL Intracatheter Q8H   3 mL at 01/19/21 2101     sodium chloride (PF) 0.9% PF flush 3 mL  3 mL Intracatheter q1 min prn         sodium chloride (PF) 0.9% PF flush 5-10 mL  5-10 mL Intravenous q1 min prn         sodium chloride bacteriostatic 0.9 % flush 0-1 mL  0-1 mL Intradermal Once PRN         No current  [facility]-ordered outpatient medications on file.                Data:   All laboratory data reviewed  Results for orders placed or performed during the hospital encounter of 01/19/21 (from the past 24 hour(s))   NM MPI w Lexiscan   Result Value Ref Range    Target      Baseline Systolic      Baseline Diastolic BP 75     Last Stress Systolic      Last Stress Diastolic BP 62     Baseline HR 92     Max HR 98     Calculated Percent HR 72 %    Rate Pressure Product 13,328.0     Narrative       The nuclear stress test is abnormal.     There is a medium sized area of infarction in the inferior and   inferoseptal segment(s) of the left ventricle associated with a medium   sized area of a moderate degree of residual and  nataly-infarct ischemia.     Left ventricular function is normal. Inferior and inferoseptal   hypokinesis with EF 69%.     There is no prior study for comparison.     CBC with platelets   Result Value Ref Range    WBC 16.9 (H) 4.0 - 11.0 10e9/L    RBC Count 3.06 (L) 3.8 - 5.2 10e12/L    Hemoglobin 8.5 (L) 11.7 - 15.7 g/dL    Hematocrit 27.3 (L) 35.0 - 47.0 %    MCV 89 78 - 100 fl    MCH 27.8 26.5 - 33.0 pg    MCHC 31.1 (L) 31.5 - 36.5 g/dL    RDW 14.9 10.0 - 15.0 %    Platelet Count 319 150 - 450 10e9/L   Heparin Unfractionated Anti Xa Level   Result Value Ref Range    Heparin Unfractionated Anti Xa Level 0.14 IU/mL   Basic metabolic panel   Result Value Ref Range    Sodium 134 133 - 144 mmol/L    Potassium 3.8 3.4 - 5.3 mmol/L    Chloride 109 94 - 109 mmol/L    Carbon Dioxide 20 20 - 32 mmol/L    Anion Gap 5 3 - 14 mmol/L    Glucose 114 (H) 70 - 99 mg/dL    Urea Nitrogen 26 7 - 30 mg/dL    Creatinine 1.46 (H) 0.52 - 1.04 mg/dL    GFR Estimate 32 (L) >60 mL/min/[1.73_m2]    GFR Estimate If Black 38 (L) >60 mL/min/[1.73_m2]    Calcium 9.5 8.5 - 10.1 mg/dL       All laboratory data reviewed  Lab Results   Component Value Date    CHOL 211 03/09/2016     Lab Results   Component Value Date    HDL 54  03/09/2016     Lab Results   Component Value Date     03/09/2016     Lab Results   Component Value Date    TRIG 136 03/09/2016     Lab Results   Component Value Date    CHOLHDLRATIO 3.8 06/01/2015     TSH   Date Value Ref Range Status   10/29/2019 2.65 0.40 - 4.00 mU/L Final     Last Basic Metabolic Panel:  Lab Results   Component Value Date     01/22/2021      Lab Results   Component Value Date    POTASSIUM 3.8 01/22/2021     Lab Results   Component Value Date    CHLORIDE 109 01/22/2021     Lab Results   Component Value Date    DUSTY 9.5 01/22/2021     Lab Results   Component Value Date    CO2 20 01/22/2021     Lab Results   Component Value Date    BUN 26 01/22/2021     Lab Results   Component Value Date    CR 1.46 01/22/2021     Lab Results   Component Value Date     01/22/2021     Lab Results   Component Value Date    WBC 16.9 01/22/2021     Lab Results   Component Value Date    RBC 3.06 01/22/2021     Lab Results   Component Value Date    HGB 8.5 01/22/2021     Lab Results   Component Value Date    HCT 27.3 01/22/2021     Lab Results   Component Value Date    MCV 89 01/22/2021     Lab Results   Component Value Date    MCH 27.8 01/22/2021     Lab Results   Component Value Date    MCHC 31.1 01/22/2021     Lab Results   Component Value Date    RDW 14.9 01/22/2021     Lab Results   Component Value Date     01/22/2021

## 2021-01-22 NOTE — PROGRESS NOTES
Care Management Follow Up    Length of Stay (days): 3    Expected Discharge Date: 01/25/21     Concerns to be Addressed: discharge planning, care coordination/care conferences     Patient plan of care discussed at interdisciplinary rounds: Yes    Anticipated Discharge Disposition: Assisted Living, Home Care     Anticipated Discharge Services: None  Anticipated Discharge DME: None    Patient/Family in Agreement with the Plan: yes    Referrals Placed by CM/SW: Homecare    Additional Information:  CM following for discharge planning, PT/OT recommending TCU at discharge. Met w/ pt at bedside, she is agreeable to TCU but prefers CM discuss further with her son Jose M, pt is noted to be forgetful at times. Called son Jose M [phone] to discuss, he feels like pt made minimal progress when she went to TCU this past summer. Instead, he is wondering if pt could return back to her [facility] with increased services, will first need to see if [facility] can meet pt's needs and son would have to arrange financial agreement. Son voices that he would like home PT/OT arranged through [facility] if pt returns back to [facility].     Spoke with PT/OT, if pt were to return back to [facility] instead of TCU, they are recommending that pt have assistance of 1 person with transfers, ambulation, toileting, dressing, showering, grooming, use of WW and safety checks every 2 hours. Additionally pt would need home PT/OT. Called Chriss at [facility] MICHELE García [phone] and LM requesting call back. Awaiting call back at this time.     Update 1600: Spoke with Raquel from Chriss at [facility], she confirms that they will be able to accept pt back with increased services. Informed her of MD note stating possible discharge in 1-2 days. They are able to take back on weekend but prefer the pt arrive early in the day if possible. Should call [phone] to coordinate with nurse on day of discharge. Updated Jose M, he will discuss financials further with  Raquel, he is satisfied with this plan. Will plan for [facility] at time of discharge. E-mail referral sent to [facility] for home PT/OT at discharge, AVS updated.     Will continue to follow patient for any additional discharge needs.     Lorin Gan RN BSN   Inpatient Care Coordination  [facility]   Phone [phone]

## 2021-01-22 NOTE — PROGRESS NOTES
01/22/21 0821   Quick Adds   Type of Visit Initial Occupational Therapy Evaluation   Living Environment   People in home facility resident   Current Living Arrangements assisted living   Home Accessibility no concerns   Self-Care   Usual Activity Tolerance moderate   Current Activity Tolerance fair   Equipment Currently Used at Home walker, rolling   Activity/Exercise/Self-Care Comment Pt is unreliable historian. Reports at baseline she is independent in self-cares with walker. Sleeps in recliner due to back pain  (Unable to recall home adaptive equipment)   Disability/Function   Fall history within last six months yes   Number of times patient has fallen within last six months 4   General Information   Onset of Illness/Injury or Date of Surgery 01/19/21   Referring Physician Alejandra Taylor MD   Patient/Family Therapy Goal Statement (OT) Get stronger   Additional Occupational Profile Info/Pertinent History of Current Problem Pt admitted on 1/19/2021 for hypotension, acute renal failure and EKG changes.  She presented with increased weakness and falls.   Existing Precautions/Restrictions fall   Cognitive Status Examination   Orientation Status person;place;time  (disoriented to day of week)   Affect/Mental Status (Cognitive) anxious   Follows Commands follows two-step commands;50-74% accuracy   Memory Deficit moderate deficit;short-term memory;long-term memory   Attention Deficit divided attention;alternating attention;selective attention;focused/sustained attention   Executive Function Deficit planning/decision-making;problem-solving/reasoning;self-monitoring/self-correction   Visual Perception   Visual Impairment/Limitations corrective lenses full-time   Sensory   Sensory Comments Pt denies BUE/BLE numbness or tingling   Pain Assessment   Patient Currently in Pain Yes, see Vital Sign flowsheet   Posture   Posture Comments Kyphotic   Strength Comprehensive (MMT)   Comment, General Manual Muscle Testing  "(MMT) Assessment Generalized weakness   Coordination   Upper Extremity Coordination Left UE impaired;Right UE impaired   Coordination Comments Reduce BUE fine motor coordination   Bed Mobility   Bed Mobility supine-sit   Supine-Sit [place] (Bed Mobility) moderate assist (50% patient effort)   Assistive Device (Bed Mobility) bed rails   Comment (Bed Mobility) pt sleeps in recliner at home   Transfers   Transfers sit-stand transfer;toilet transfer   Sit-Stand Transfer   Sit-Stand [place] (Transfers) minimum assist (75% patient effort)   Toilet Transfer   Type (Toilet Transfer) stand-sit   [place] Level (Toilet Transfer) moderate assist (50% patient effort)   Assistive Device (Toilet Transfer) walker, front-wheeled;grab bars/safety frame   Activities of Daily Living   BADL Assessment/Intervention lower body dressing;grooming;toileting   Lower Body Dressing Assessment/Training   [place] Level (Lower Body Dressing) dependent (less than 25% patient effort)   Position (Lower Body Dressing) edge of bed sitting   Grooming Assessment/Training   [place] Level (Grooming) supervision;set up   Toileting   [place] Level (Toileting) maximum assist (25% patient effort)   Instrumental Activities of Daily Living (IADL)   IADL Comments Per case management note, \"Called Jose M and discussed with him, he reports that pt moved into detention in August 2020. She ambulates independently with WW in her apartment at baseline. Through her detention, she gets 2 meals/day (lunch/dinner), laundry, cleaning, safety checks, medication management and administration.\"   Clinical Impression   Criteria for Skilled Therapeutic Interventions Met (OT) yes   OT Diagnosis decline function   OT Problem List-Impairments impacting ADL activity tolerance impaired;balance;cognition;mobility;pain;strength;coordination   Assessment of Occupational Performance 3-5 Performance Deficits   Identified Performance Deficits dressing, grooming, toileting, " functional mobility   Planned Therapy Interventions (OT) ADL retraining;progressive activity/exercise;cognition   Clinical Decision Making Complexity (OT) low complexity   Therapy Frequency (OT) 5x/week   Predicted Duration of Therapy 5 days   Anticipated Equipment Needs Upon Discharge (OT) raised toilet seat;reacher;shower chair;walker, rolling  (Grab bars)   Risks and Benefits of Treatment have been explained. Yes   Patient, Family & other staff in agreement with plan of care Yes   Comment-Clinical Impression Pt functioning below baseline and will benefit from continued skilled OT to maximize safety and independence   OT Discharge Planning    OT Discharge Recommendation (DC Rec) Transitional Care Facility   OT Rationale for DC Rec  Pt requiring assist of 1 to complete self-cares and functional mobility primarily due to impairments in dynamic standing balance, strength, coordination, cognition. Recommend TCU to progress pt's safety and independence prior to returning to TCU.    Addendum: If pt does discharge to [facility] recommend assist of 1 for dressing,grooming, toileting, mobility, showering and home OT/PT   OT Brief overview of current status  ModA for toileting. [place] for short distance ambulation. ModA for toilet transfer.  Ax1 with LB dressing   Total Evaluation Time (Minutes)   Total Evaluation Time (Minutes) 7

## 2021-01-22 NOTE — PROGRESS NOTES
[facility]    Hospitalist Progress Note  Name: Anat Correa    MRN: [id number]  Provider: Mundo Oreilly MD  Date of Service: 01/22/2021    Assessment & Plan   Summary of Stay: Anat Correa is a 85 year old female who was admitted on 1/19/2021 for hypotension, acute renal failure and EKG changes.  She presented with increased weakness and falls.    Her past medical history significant for anemia, chronic pain, chronic kidney disease, hypertension, hyperlipidemia, colitis and history of lung cancer.    Patient seems to have elevated troponin concerning for NSTEMI.  Patient developed A. fib with RVR this morning.  She was given IV metoprolol but remained in A. fib with RVR with heart rate in 120s to 130s.  Started on Cardizem drip. Cardiology re-consulted and she was started on amiodarone. Cards recommended aspirin and plavix instead of Elquis or coumadin due to her fall risk.        Asymptomatic elevated troponin in the setting of dehydration and hypotension.  -Patient was started on heparin drip for non-STEMI.  Cardiology consulted and recommended medical treatment.  Echocardiogram showed eccentric left ventricular hypertrophy, EF at 60 to 65%, evidence of  moderate pulmonary hypertension  -On aspirin. Heparin drip discontinued today. Started on Plavix after discussion with her son.   -Cardiology signed off.     Afib with RVR  Tried IV metoprolol. Patient remained in A. fib with RVR. Started on Cardizem drip.  [id number] score 4. Cardiology re-consulted and started on amiodarone. Cards also recommended to put her on aspirin and Plavix instead of anticoagulation with Coumadin or Eliquis given her increased risk of falls.  This was discussed with her son and patient started on Plavix.  She is already on aspirin.  Heparin drip discontinued today.    Possible sepsis  -Presentation was hypotension, leukocytosis, lactic acidosis and renal failure  -Chest x-ray not definitive of any infiltrate  with some chronic changes and small nodule opacities  -Blood cultures negative so far  -Procalcitonin is 0.22 low risk for systemic infection.  WBC 16.9 today.  Currently on ceftriaxone.  We will continue empiric antibiotic at this moment. Will monitor cultures.    Hypotension  -Resolved with hydration. IV fluid discontinued today.     Acute renal failure  -In context of NSTEMI elevated troponin initial hypotension will carefully hydrate patient going forward  -She was treated with IV fluids.  Creatinine down to 1.46 today. Will monitor renal function.    History of chronic hypertension  -held PTA blood pressure medications as patient was hypotensive  -Prior to admission on metoprolol, losartan, Lasix   -She was started on Coreg during this admission but this was discontinued per cardiology.     History of hyperlipidemia  -Continue statin     History of anemia and GI bleed  -Continue PPI  -Hemoglobin stable today     Hyponatremia  -Likely secondary to dehydration  -We will check FENa     Falls  -PT OT evaluate  -Social work consult for safe disposition    Abnormal chest x-ray finding consistent need for new right upper lobe nodular opacity  -We will likely need follow-up CT as outpatient    DVT Prophylaxis: on iv heparin  Code Status: No CPR- Do NOT Intubate    Disposition: Anticipate discharge likely to TCU in 1 to 2 days if she remains stable.  Interval History   Patient seen and examined.  Also discussed with RN.Patient denies chest pain or palpitations today. She has no fever. No nausea or vomiting.     -Data reviewed today: I reviewed all new labs and imaging reports over the last 24 hours. I personally reviewed the EKG tracing showing T inverted in lateral leads.    Physical Exam   Temp: 97.9  F (36.6  C) Temp src: Oral BP: (!) 156/76 Pulse: 88   Resp: 20 SpO2: 94 % O2 Device: None (Room air) Oxygen Delivery: 1/2 LPM  Vitals:    01/20/21 0500 01/21/21 0608 01/22/21 0547   Weight: 65.1 kg (143 lb 8 oz) 60.6 kg  (133 lb 8 oz) 61.1 kg (134 lb 11.2 oz)     Vital Signs with Ranges  Temp:  [97.3  F (36.3  C)-98.6  F (37  C)] 97.9  F (36.6  C)  Pulse:  [] 88  Resp:  [18-20] 20  BP: (121-160)/(70-97) 156/76  SpO2:  [88 %-97 %] 94 %  I/O last 3 completed shifts:  In: 433 [P.O.:430; I.V.:3]  Out: 875 [Urine:875]      GEN:  Alert, oriented x 3, appears comfortable, NAD.  HEENT:  Normocephalic/atraumatic, no scleral icterus, no nasal discharge, mouth moist.  CV:  Regular rate and rhythm, no murmur or JVD.  S1 + S2 noted, no S3 or S4.  LUNGS:  Clear to auscultation bilaterally without rales/rhonchi/wheezing/retractions.  Symmetric chest rise on inhalation noted.  ABD:  Active bowel sounds, soft, non-tender/non-distended.  No rebound/guarding/rigidity.  EXT:  No edema.  No cyanosis.  No joint synovitis noted.  SKIN:  Dry to touch, no exanthems noted in the visualized areas.    Medications       amiodarone  200 mg Oral BID     aspirin  81 mg Oral Daily     atorvastatin  40 mg Oral QPM     cefTRIAXone  1 g Intravenous Q24H     [START ON 1/23/2021] clopidogrel  75 mg Oral Daily     pantoprazole  40 mg Oral Daily     sodium chloride (PF)  3 mL Intracatheter Q8H     Data     Recent Labs   Lab 01/22/21  0851 01/21/21  0615 01/20/21  0600   WBC 16.9* 12.5* 11.0   HGB 8.5* 8.6* 8.7*   HCT 27.3* 26.8* 27.7*   MCV 89 87 89    294 302     Recent Labs   Lab 01/22/21  0851 01/21/21  0615 01/20/21  0600    135 133   POTASSIUM 3.8 3.6 3.7   CHLORIDE 109 107 105   CO2 20 22 23   ANIONGAP 5 6 5   * 88 93   BUN 26 35* 53*   CR 1.46* 1.78* 2.65*   GFRESTIMATED 32* 26* 16*   GFRESTBLACK 38* 30* 18*   DUSTY 9.5 8.7 8.6     Recent Labs   Lab 01/19/21  1544 01/19/21  1538   CULT No growth after 3 days No growth after 3 days     No results for input(s): NTBNPI, NTBNP in the last 168 hours.  No results for input(s): CKT in the last 168 hours.    Invalid input(s): CK, CK TOTAL  Recent Labs   Lab 01/20/21  0600   AST 40   ALT 13   ALKPHOS  82   BILITOTAL 0.5     Recent Labs   Lab 01/19/21  1341   INR 0.91     Recent Labs   Lab 01/19/21  2000 01/19/21  1341   LACT 2.1* 2.3*     No results for input(s): LIPASE in the last 168 hours.  Recent Labs   Lab 01/22/21  0851 01/21/21  0615 01/20/21  0600   BUN 26 35* 53*   CR 1.46* 1.78* 2.65*     No results for input(s): TSH in the last 168 hours.  Recent Labs   Lab 01/21/21  0615 01/20/21  1101 01/20/21  0600   TROPI 7.959* 7.188* 4.577*     Recent Labs   Lab 01/19/21  1448   COLOR Light Yellow   APPEARANCE Clear   URINEGLC Negative   URINEBILI Negative   URINEKETONE Negative   SG 1.012   UBLD Negative   URINEPH 6.0   PROTEIN 10*   NITRITE Negative   LEUKEST Negative   RBCU <1   WBCU 1       Recent Results (from the past 24 hour(s))   NM MPI w Lexiscan   Result Value    Target     Baseline Systolic     Baseline Diastolic BP 75    Last Stress Systolic     Last Stress Diastolic BP 62    Baseline HR 92    Max HR 98    Calculated Percent HR 72    Rate Pressure Product 13,328.0    Narrative       The nuclear stress test is abnormal.     There is a medium sized area of infarction in the inferior and   inferoseptal segment(s) of the left ventricle associated with a medium   sized area of a moderate degree of residual and  nataly-infarct ischemia.     Left ventricular function is normal. Inferior and inferoseptal   hypokinesis with EF 69%.     There is no prior study for comparison.

## 2021-01-22 NOTE — PLAN OF CARE
Tele: SR, HR 80s <--> AFib RVR, HR 110s. A&O Xs, disoriented to time, repeatedly states [birth year]. Physical assessment WDL w/exceptions as noted of Exp wz & diminished throughout, stage 2 pressure sore on buttocks, area cleansed, skin prep applied & new mepilex applied. Blanchable redness to thoracic spine, turned & repositioned using pillows frequently, dressing to LLE is CDI. Purewick in place, low UOP IVF NS @ 75/hr, taking sips of H2O with cares. Refer to VS, I/O, Adult PCS for full assessment & shift details. Disposition TBD  Sydney Art, MACIEN, RN  Medical/Telemetry - 3

## 2021-01-22 NOTE — PROGRESS NOTES
01/22/21 1140   Quick Adds   Type of Visit Initial PT Evaluation   Living Environment   People in home facility resident   Current Living Arrangements assisted living   Home Accessibility no concerns   Transportation Anticipated agency   Self-Care   Usual Activity Tolerance moderate   Current Activity Tolerance poor   Equipment Currently Used at Home walker, rolling   Activity/Exercise/Self-Care Comment Pt reports she uses FWW at baseline   Disability/Function   Fall history within last six months yes   Number of times patient has fallen within last six months 4   Change in Functional Status Since Onset of Current Illness/Injury yes   General Information   Onset of Illness/Injury or Date of Surgery 01/19/21   Referring Physician Alejandra Taylor MD   Pertinent History of Current Problem (include personal factors and/or comorbidities that impact the POC)  Anat Correa is a 85 year old female who was admitted on 1/19/2021 for hypotension, acute renal failure and EKG changes.  She presented with increased weakness and falls, NSTEMI   Existing Precautions/Restrictions fall   Cognition   Orientation Status (Cognition) person;place   Cognitive Status Comments See OT notes for details    Pain Assessment   Patient Currently in Pain No   Integumentary/Edema   Integumentary/Edema Comments see nursing notes for details    Posture    Posture Forward head position   Range of Motion (ROM)   ROM Comment BLE WFL   Strength   Strength Comments Pt demonstrates gross functional weakness with mobility    Bed Mobility   Comment (Bed Mobility) Not assessed    Transfers   Transfer Safety Comments STS with FWW and min A x 1    Gait/Stairs (Locomotion)   Comment (Gait/Stairs) Not assessed pt refusing    Balance   Balance Comments fair static balance with BUE support on FWW    Clinical Impression   Criteria for Skilled Therapeutic Intervention yes, treatment indicated   PT Diagnosis (PT) Impaired IND with mobility from baseline    Influenced by the following impairments weakness, balance, activity tolerance   Functional limitations due to impairments see above   Clinical Presentation Evolving/Changing   Clinical Presentation Rationale clinical judgement, age, co morbidities    Clinical Decision Making (Complexity) moderate complexity   Therapy Frequency (PT) 5x/week   Predicted Duration of Therapy Intervention (days/wks) 1 week   Planned Therapy Interventions (PT) balance training;bed mobility training;gait training;strengthening;transfer training   Risk & Benefits of therapy have been explained evaluation/treatment results reviewed;risks/benefits reviewed;care plan/treatment goals reviewed;current/potential barriers reviewed;participants voiced agreement with care plan;participants included;patient   PT Discharge Planning    PT Discharge Recommendation (DC Rec) Transitional Care Facility   PT Rationale for DC Rec Recommend TCU to improve IND with functional mobility as pt far below baseline at this time. Pt demonstrates impaired strength, balance, activity tolerance   Total Evaluation Time   Total Evaluation Time (Minutes) 10

## 2021-01-23 NOTE — PROVIDER NOTIFICATION
Web based page at 0404 to hospitalist: Pt converted to a flutter RVR HR 110s, do you want EKG to confirm? Has PRN metoprolol available for HR >110    MD promptly called unit, advised no EKG necessary

## 2021-01-23 NOTE — PROGRESS NOTES
[facility]    Hospitalist Progress Note  Name: Anat Correa    MRN: [id number]  Provider: Mundo Oreilly MD  Date of Service: 01/23/2021    Assessment & Plan   Summary of Stay: Anat Correa is a 85 year old female who was admitted on 1/19/2021 for hypotension, acute renal failure and EKG changes.  She presented with increased weakness and falls.    Her past medical history significant for anemia, chronic pain, chronic kidney disease, hypertension, hyperlipidemia, colitis and history of lung cancer.    Patient seems to have elevated troponin concerning for NSTEMI.  Patient developed A. fib with RVR this morning.  She was given IV metoprolol but remained in A. fib with RVR with heart rate in 120s to 130s.  Started on Cardizem drip. Cardiology re-consulted and she was started on amiodarone. Cards recommended aspirin and plavix instead of Elquis or coumadin due to her fall risk.        Asymptomatic elevated troponin in the setting of dehydration and hypotension.  -Patient was started on heparin drip for non-STEMI.  Cardiology consulted and recommended medical treatment.  Echocardiogram showed eccentric left ventricular hypertrophy, EF at 60 to 65%, evidence of  moderate pulmonary hypertension  -On aspirin. Heparin drip discontinued today. Started on Plavix after discussion with her son.   -Cardiology signed off.   -Patient appears dyspneic today although she stated that her breathing is at her baseline.  She was given Lasix 10 mg IV x1 dose.  Chest x-ray ordered.  We will continue to monitor.  -Anticipate discharge tomorrow to [facility] with increased services.    Afib with RVR  Tried IV metoprolol. Patient remained in A. fib with RVR. Started on Cardizem drip.  [id number] score 4. Cardiology re-consulted and started on amiodarone. Cards also recommended to put her on aspirin and Plavix instead of anticoagulation with Coumadin or Eliquis given her increased risk of falls.  This was discussed with  her son and patient started on Plavix.  She is already on aspirin.  Heparin drip discontinued on 1/22    Possible sepsis  -Presentation was hypotension, leukocytosis, lactic acidosis and renal failure  -Chest x-ray not definitive of any infiltrate with some chronic changes and small nodule opacities  -Blood cultures negative so far  -Erythema over right lower extremity improved. Decubitus ulcer  -Procalcitonin is 0.22 low risk for systemic infection.  Currently on ceftriaxone.  Will discontinue antibiotic tomorrow.    Hypotension  -Resolved with hydration. IV fluid discontinued on 1/22    Acute renal failure  -In context of NSTEMI elevated troponin initial hypotension will carefully hydrate patient going forward  -She was treated with IV fluids.  Creatinine down to 1.46 today. Will monitor renal function.    History of chronic hypertension  -held PTA blood pressure medications as patient was hypotensive  -Prior to admission on metoprolol, losartan, Lasix   -She was started on Coreg during this admission but this was discontinued per cardiology.     History of hyperlipidemia  -Continue statin     History of anemia and GI bleed  -Continue PPI  -Hemoglobin stable today     Hyponatremia  -Likely secondary to dehydration.  Treated with IV fluids.  Serum sodium improved     Falls  -PT OT evaluate  -Social work consult for safe disposition    Pressure Injury: on Bilateral buttocks , present on admission    Pressure Injury is Stage 2 . Appreciate WOC input    Abnormal chest x-ray finding consistent need for new right upper lobe nodular opacity  -We will likely need follow-up CT as outpatient    DVT Prophylaxis: on iv heparin  Code Status: No CPR- Do NOT Intubate    Disposition: Anticipate discharge tomorrow to [facility] with increased services felt breathing improved and no other issues    Interval History   Patient seen and examined.  Also discussed with RN.Patient denies chest pain or palpitations today. She has no fever. No  nausea or vomiting.     -Data reviewed today: I reviewed all new labs and imaging reports over the last 24 hours. I personally reviewed the EKG tracing showing T inverted in lateral leads.    Physical Exam   Temp: 97.5  F (36.4  C) Temp src: Oral BP: (!) 155/81 Pulse: 92   Resp: 20 SpO2: 94 % O2 Device: None (Room air)    Vitals:    01/21/21 0608 01/22/21 0547 01/23/21 0949   Weight: 60.6 kg (133 lb 8 oz) 61.1 kg (134 lb 11.2 oz) 63.4 kg (139 lb 12.8 oz)     Vital Signs with Ranges  Temp:  [97.5  F (36.4  C)-97.9  F (36.6  C)] 97.5  F (36.4  C)  Pulse:  [] 92  Resp:  [20-22] 20  BP: (151-164)/(76-98) 155/81  SpO2:  [94 %-97 %] 94 %  I/O last 3 completed shifts:  In: 160 [P.O.:160]  Out: -       GEN:  Alert, oriented x 3, appears comfortable, NAD.  HEENT:  Normocephalic/atraumatic, no scleral icterus, no nasal discharge, mouth moist.  CV:  Regular rate and rhythm, no murmur or JVD.  S1 + S2 noted, no S3 or S4.  LUNGS:  Clear to auscultation bilaterally without rales/rhonchi/wheezing/retractions.  Symmetric chest rise on inhalation noted.  ABD:  Active bowel sounds, soft, non-tender/non-distended.  No rebound/guarding/rigidity.  EXT:  No edema.  No cyanosis.  No joint synovitis noted.  SKIN:  Dry to touch, no exanthems noted in the visualized areas.    Medications       amiodarone  200 mg Oral BID     aspirin  81 mg Oral Daily     atorvastatin  40 mg Oral QPM     cefTRIAXone  1 g Intravenous Q24H     clopidogrel  75 mg Oral Daily     pantoprazole  40 mg Oral Daily     sodium chloride (PF)  3 mL Intracatheter Q8H     Data     Recent Labs   Lab 01/23/21  0600 01/22/21  0851 01/21/21  0615   WBC 14.5* 16.9* 12.5*   HGB 8.7* 8.5* 8.6*   HCT 27.1* 27.3* 26.8*   MCV 89 89 87    319 294     Recent Labs   Lab 01/23/21  0600 01/22/21  0851 01/21/21  0615    134 135   POTASSIUM 3.7 3.8 3.6   CHLORIDE 107 109 107   CO2 22 20 22   ANIONGAP 5 5 6   GLC 88 114* 88   BUN 20 26 35*   CR 1.29* 1.46* 1.78*    GFRESTIMATED 38* 32* 26*   GFRESTBLACK 44* 38* 30*   DUSTY 9.5 9.5 8.7     Recent Labs   Lab 01/19/21  1544 01/19/21  1538   CULT No growth after 4 days No growth after 4 days     No results for input(s): NTBNPI, NTBNP in the last 168 hours.  No results for input(s): CKT in the last 168 hours.    Invalid input(s): CK, CK TOTAL  Recent Labs   Lab 01/20/21  0600   AST 40   ALT 13   ALKPHOS 82   BILITOTAL 0.5     Recent Labs   Lab 01/19/21  1341   INR 0.91     Recent Labs   Lab 01/19/21  2000 01/19/21  1341   LACT 2.1* 2.3*     No results for input(s): LIPASE in the last 168 hours.  Recent Labs   Lab 01/23/21  0600 01/22/21  0851 01/21/21  0615   BUN 20 26 35*   CR 1.29* 1.46* 1.78*     No results for input(s): TSH in the last 168 hours.  Recent Labs   Lab 01/21/21  0615 01/20/21  1101 01/20/21  0600   TROPI 7.959* 7.188* 4.577*     Recent Labs   Lab 01/19/21  1448   COLOR Light Yellow   APPEARANCE Clear   URINEGLC Negative   URINEBILI Negative   URINEKETONE Negative   SG 1.012   UBLD Negative   URINEPH 6.0   PROTEIN 10*   NITRITE Negative   LEUKEST Negative   RBCU <1   WBCU 1       No results found for this or any previous visit (from the past 24 hour(s)).

## 2021-01-23 NOTE — PLAN OF CARE
Please see flowsheets for detailed vital signs and assessments.   Neuro: Alert and oriented except to situation   Vital Signs: Stable except HR went into A flutter given PRN Metoprolol back to Sinus rhythm    01/23/21 0234   Vital Signs   Temp 97.8  F (36.6  C)   Temp src Axillary   Resp 22   Pulse 119   Pulse Rate Source Monitor   BP (!) 164/86   BP Location Left arm     Pain: denies  O2: mid 90s   Tele: Sinus rhythm except brief A-flutter  Respiratory: LS posterior diminished   GI: WDL  : voiding w/o difficulty  Skin: WDL except bruised (ecchymotic), pressure injury on coccyx and wound on lower right leg. Dressing  changed daily   Activity: Activity adjusted per tolerance, assistance, 1 person, gait and walker  IVF: saline locked  Notable labs: Create 1.29, K+ 3.7   Plan:Continue with POC  Discharge: Plan to discharge in 1-2 days if stable to TCU

## 2021-01-23 NOTE — PLAN OF CARE
VSS. Tele SR. RA.   A/O X3, disoriented to situation, forgetful at times.   No neuro deficits   Pt denies pain, CP.  Pt has SOB, at rest and on exertion. LS diminished with wheezes, MD aware. X-ray, pt weighed, lasix given.   Edema 1+ in arms bilaterally.   BS normoactive, BM 1/22  Wound on sacrum/buttocks, New dressing applied, Clean/dry/Intact  Wound on R leg, cleaned, new dressing applied, Clean/dry/intact  Assist X1,gb/walker.   2g Na diet.   Pt up in chair during shift to promote breathing.   Continue POC.

## 2021-01-23 NOTE — PLAN OF CARE
Pt A/Ox3, up with assist of 1, gb, walker. Pt denies pain, N/V, lightheadedness, and dizziness. Pt AGUIRRE - expiratory wheezes throughout. IV SL. Pt is on IV abx rocephin.  VSS. Tele  SR. CM, OT, PT, Woc following. Plan OT recommending TCU. Continue with POC.    Major Shift Event/Provider Notifications:  Heparin gtt turned off and plavix started.  Fluids Stopped.   Pt worked with therapies.

## 2021-01-24 NOTE — PLAN OF CARE
End of Shift Note:  For VS and complete assessments, please see documentation flowsheets.     Pertinent shift assessments: Cared for pt from 0530 - 0700. AxOx ,VSS. TELE SR/ST. Pt denies pain, N/V, SOB, lightheadedness, and dizziness. Voiding without difficulty, some incontinence. Up Ax1 with gb and walker. Turn/repo provided. Wound to left lower shin wrapped with kerlix, CDI. Diet: 2 g Na. Plan for discharge to [facility] today. Will continue with POC.

## 2021-01-24 NOTE — PLAN OF CARE
Occupational Therapy Discharge Summary    Reason for therapy discharge:    Discharged to home with home therapy.    Progress towards therapy goal(s). See goals on Care Plan in [facility] electronic health record for goal details.  Goals partially met.  Barriers to achieving goals:   discharge from facility.    Therapy recommendation(s):    Continued therapy is recommended.  Rationale/Recommendations:  Recommend continued home OT to increase independence and endurance for ADLS at home.

## 2021-01-24 NOTE — PLAN OF CARE
"VSS. Tele SR. RA.  A/O X3, disoriented to situation, forgetful. No new neuro deficits.   Pt denies CP, pain, has dyspnea on exertion, which pt states \"I normally feel like this.\" LS diminished   Abd round, BS normoactive.   Incontinent on occasion.   Scab/abbrasions on legs. meplex applied to wound on legs and buttocks.   Pt discharged to previous living arrangements via HE transport with increased help and PT/OT.    "

## 2021-01-24 NOTE — PROGRESS NOTES
Care Management Discharge Note    Discharge Date: 01/24/21       Discharge Disposition: Assisted Living, Home Care. Returning to [facility] at [facility]    Discharge Services:[facility] for home PT/OT     Discharge DME: None    Discharge Transportation: [facility]     Private pay costs discussed: transportation costs.     Patient/family educated on Medicare website which has current facility and service quality ratings: yes    Education Provided on the Discharge Plan:  yes  Persons Notified of Discharge Plans: sonJose M/KseniaTOO, [facility]  Patient/Family in Agreement with the Plan: yes    Handoff Referral Completed: No    Additional Information:  Patient discharging to [facility]. Pt/family have declined therapy recommendation for TCU. I called [facility] [phone]  and left message for Nursing letting them know patient discharging today. Message and update also left for pt sonJose M, [phone].     [facility] HC notified of discharge today.     [facility] transportation arranged for 11:30am.     Evita Dumont RN  Evita Dumont RN BSN PHN [facility]   Inpatient Care Coordination   [facility]   [phone]    Received call back from Jose M Mccormack wife. Jose M is at work. She acknowledges understanding of discharge plan and will update Jose M.     Received call back from Jess Davila at [facility]#[phone]. Facility anticipating patient return. Discussed activity Ax1, gait belt and walker. [facility]ations [facility] fax#[phone].      Discharge instruction to be faxed to [facility] fax#[phone].     Will continue to follow for discharge plan of care.

## 2021-01-24 NOTE — DISCHARGE SUMMARY
[facility]    Discharge Summary  Hospitalist    Date of Admission:  1/19/2021  Date of Discharge:  1/24/2021  Discharging Provider: Mundo Oreilly MD  Date of Service (when I saw the patient): 01/24/21    Discharge Diagnoses      Asymptomatic elevated troponin in the setting of dehydration and hypotension.     Afib with RVR    Possible sepsis     Hypotension     Acute renal failure     History of chronic hypertension    History of hyperlipidemia     History of anemia and GI bleed     Hyponatremia    Falls     Pressure Injury     Abnormal chest x-ray    History of Present Illness   Anat Correa is an 85 year old female who presented with     Hospital Course   Summary of Stay: Anat Correa is a 85 year old female who was admitted on 1/19/2021 for hypotension, acute renal failure and EKG changes.  She presented with increased weakness and falls.     Her past medical history significant for anemia, chronic pain, chronic kidney disease, hypertension, hyperlipidemia, colitis and history of lung cancer.     Patient seems to have elevated troponin concerning for NSTEMI.  Patient developed A. fib with RVR this morning.  She was given IV metoprolol but remained in A. fib with RVR with heart rate in 120s to 130s. Started on Cardizem drip. Cardiology re-consulted and she was started on amiodarone. Cards recommended aspirin and plavix instead of Elquis or coumadin due to her fall risk.          Asymptomatic elevated troponin in the setting of dehydration and hypotension.  -Patient was started on heparin drip for non-STEMI.  Cardiology consulted and recommended medical treatment.  Echocardiogram showed eccentric left ventricular hypertrophy, EF at 60 to 65%, evidence of  moderate pulmonary hypertension  -Heparin was discontinued, patient was started on aspirin and Plavix.  -She was discharged to assisted living facility.    Afib with RVR  Initially treated with Cardizem drip.  [id number] score 4. Cardiology re-consulted and started on amiodarone. Cards also recommended to put her on aspirin and Plavix instead of anticoagulation with Coumadin or Eliquis given her increased risk of falls.  This was discussed with her son and patient started on Plavix.  She is already on aspirin.  Heparin drip discontinued on 1/22     Possible sepsis  -Presentation was hypotension, leukocytosis, lactic acidosis and renal failure  -Chest x-ray not definitive of any infiltrate with some chronic changes and small nodule opacities  -Blood cultures negative so far  -Erythema over right lower extremity improved. Decubitus ulcer  -Procalcitonin is 0.22 low risk for systemic infection.  Currently on ceftriaxone.    IV ceftriaxone discontinued today.  She was discharged on Keflex to complete 7-day course of treatment.     Hypotension  -Resolved with hydration. IV fluid discontinued on 1/22     Acute renal failure  -In context of NSTEMI elevated troponin initial hypotension will carefully hydrate patient going forward  -She was treated with IV fluids     History of chronic hypertension  -held PTA blood pressure medications as patient was hypotensive  -Prior to admission on metoprolol, losartan, Lasix   -Beta-blocker discontinued per cardiology.  She was started on amiodarone for paroxysmal A. fib.     History of hyperlipidemia  -Continue statin     History of anemia and GI bleed  -Continue PPI  -Hemoglobin stable today     Hyponatremia  -Likely secondary to dehydration.  Treated with IV fluids.  Serum sodium improved     Falls  -PT OT evaluate  -Social work consult for safe disposition     Pressure Injury: on Bilateral buttocks , present on admission    Pressure Injury is Stage 2 . Appreciate WOC input      She remained stable during hospital course.  She was discharged to assisted living facility in a stable condition.      Significant Results and Procedures   Results for orders placed or performed during the  hospital encounter of 01/19/21   Head CT w/o contrast    Narrative    CT OF THE HEAD WITHOUT CONTRAST   1/19/2021 2:24 PM     COMPARISON: Head CT 7/2/2020    HISTORY:  Head trauma, minor (Age >= 65y)     TECHNIQUE:  Axial CT images of the head from the skull base to the  vertex were acquired without IV contrast.    FINDINGS:   INTRACRANIAL CONTENTS: No intracranial hemorrhage, extraaxial  collection, or mass effect.  No CT evidence of acute infarct.    Moderate presumed chronic small vessel ischemic change with moderate  generalized volume loss..    VISUALIZED ORBITS/SINUSES/MASTOIDS: No significant orbital  abnormality.  No significant paranasal sinus mucosal disease. No  significant middle ear or mastoid effusion.    OSSEOUS STRUCTURES/SOFT TISSUES: No significant abnormality.      Impression    IMPRESSION:  1.  No acute abnormality.  2.  Moderate presumed chronic small vessel ischemic change with  moderate generalized volume loss.  3.  No change.    Radiation dose for this scan was reduced using automated exposure  control, adjustment of the mA and/or kV according to patient size, or  iterative reconstruction technique    JEFFERY SOLANO MD   XR Chest 2 Views    Narrative    XR CHEST 2 VW 1/19/2021 4:55 PM    HISTORY: Chest pain    COMPARISON: Chest x-ray 7/2/2020    FINDINGS: Stable lower left lung calcified granulomas. Interstitial  coarsening may reflect chronic changes and/or mild edema. No discrete  focal minor consolidation or definite pleural effusion. Stable heart  size. No overt vascular congestion. Left lower lung calcified  granulomas. Newly visualized small nodular opacities in the right  upper lobe and right lung base for which attention on follow-up is  recommended.    MARILIN ESCALONA MD   XR Chest Port 1 View    Narrative    XR CHEST PORT 1 VW   1/23/2021 10:01 AM     HISTORY: SOB    COMPARISON: 1/19/2021      Impression    IMPRESSION: No acute cardiopulmonary disease. Calcified granuloma  left  lower lung. No new infiltrate. Normal heart size and pulmonary  vascularity. Aortic calcifications.    SASKIA BARAJAS MD   Echocardiogram Complete    Narrative    [id number]  [id number]  [id number]  [id number]^TAMELA^KARINA^PAM           [facility]  Echocardiography Laboratory  [address] [place] [address]        Name: AMITA CRESPO  MRN: [id number]  : 1935  Study Date: 2021 07:29 AM  Age: 85 yrs  Gender: Female  Patient Location: [facility]  Reason For Study: Syncope  Ordering Physician: KARINA RAPP  Referring Physician: Rashi Calle MD  Performed By: Kelly Stewart RDCS     BSA: 1.7 m2  Height: 65 in  Weight: 143 lb  HR: 77  BP: 114/60 mmHg  _____________________________________________________________________________  __        Procedure  Complete Portable Echo Adult.  _____________________________________________________________________________  __        Interpretation Summary     There is mild eccentric left ventricular hypertrophy.  The visual ejection fraction is estimated at 60-65%.  The right ventricle is mildly dilated.  There is mild mitral annular calcification.  There is mild (1+) mitral regurgitation.  Right ventricular systolic pressure is elevated, consistent with moderate  pulmonary hypertension.  Mild valvular aortic stenosis.  The ascending aorta is Mildly dilated.  There is mild-moderate biatrial enlargement.  Minimal changes compared to previous study  _____________________________________________________________________________  __        Left Ventricle  The left ventricle is normal in size. There is mild eccentric left ventricular  hypertrophy. Left ventricular systolic function is normal. The visual ejection  fraction is estimated at 60-65%. Grade I or early diastolic dysfunction. No  regional wall motion abnormalities noted.     Right Ventricle  The right ventricle is mildly dilated. The right ventricular systolic function  is normal.      Atria  There is mild-moderate biatrial enlargement. There is no color Doppler  evidence of an atrial shunt.     Mitral Valve  The mitral valve leaflets are mildly thickened. There is mild mitral annular  calcification. There is mild (1+) mitral regurgitation.        Tricuspid Valve  The tricuspid valve is normal in structure and function. There is trace  tricuspid regurgitation. The right ventricular systolic pressure is  approximated at 36.5 mmHg plus the right atrial pressure. Right ventricular  systolic pressure is elevated, consistent with moderate pulmonary  hypertension.     Aortic Valve  The mean AoV pressure gradient is 11.0 mmHg. Mild valvular aortic stenosis.     Pulmonic Valve  The pulmonic valve is not well visualized. There is no pulmonic valvular  regurgitation.     Vessels  Borderline aortic root dilatation. The ascending aorta is Mildly dilated.     Pericardium  There is no pericardial effusion.        Rhythm  Sinus rhythm was noted.  _____________________________________________________________________________  __  MMode/2D Measurements & Calculations  IVSd: 1.3 cm     LVIDd: 4.5 cm  LVIDs: 2.8 cm  LVPWd: 1.00 cm  FS: 36.5 %  LV mass(C)d: 182.6 grams  LV mass(C)dI: 106.5 grams/m2  Ao root diam: 3.7 cm  LA dimension: 4.2 cm  asc Aorta Diam: 3.8 cm  LA/Ao: 1.1  LVOT diam: 2.1 cm  LVOT area: 3.3 cm2  LA Volume (BP): 69.0 ml  LA Volume Index (BP): 40.1 ml/m2  RWT: 0.45           Doppler Measurements & Calculations  MV E max brannon: 87.4 cm/sec  MV A max brannon: 115.0 cm/sec  MV E/A: 0.76  MV max P.0 mmHg  MV mean P.1 mmHg  MV V2 VTI: 32.9 cm  MVA(VTI): 1.9 cm2  MV P1/2t max brannon: 108.6 cm/sec  MV P1/2t: 64.4 msec  MVA(P1/2t): 3.4 cm2  MV dec slope: 493.9 cm/sec2  MV dec time: 0.21 sec  Ao V2 max: 224.2 cm/sec  Ao max P.0 mmHg  Ao V2 mean: 154.3 cm/sec  Ao mean P.0 mmHg  Ao V2 VTI: 46.2 cm  CHERELLE(I,D): 1.3 cm2  CHERELLE(V,D): 1.4 cm2  LV V1 max PG: 3.8 mmHg  LV V1 max: 97.2 cm/sec  LV V1 VTI: 18.6  cm  MR ERO: 0.10 cm2  MR volume: 14.7 ml  SV(LVOT): 62.0 ml  SI(LVOT): 36.2 ml/m2  TR max samuel: 301.9 cm/sec  TR max P.5 mmHg  AV Samuel Ratio (DI): 0.43  CHERELLE Index (cm2/m2): 0.78  E/E' avg: 15.8  Lateral E/e': 14.0  Medial E/e': 17.6              _____________________________________________________________________________  __        Report approved by: Pastor Vogel 2021 10:57 AM      NM MPI w Lexiscan     Value    Target     Baseline Systolic     Baseline Diastolic BP 75    Last Stress Systolic     Last Stress Diastolic BP 62    Baseline HR 92    Max HR 98    Calculated Percent HR 72    Rate Pressure Product 13,328.0    Narrative       The nuclear stress test is abnormal.     There is a medium sized area of infarction in the inferior and   inferoseptal segment(s) of the left ventricle associated with a medium   sized area of a moderate degree of residual and  nataly-infarct ischemia.     Left ventricular function is normal. Inferior and inferoseptal   hypokinesis with EF 69%.     There is no prior study for comparison.             Pending Results   None  Code Status   Full Code       Primary Care Physician   Angel Barrios        Discharge Disposition   Discharged to [facility]  Condition at discharge: Stable    Consultations This Hospital Stay   PHYSICAL THERAPY ADULT IP CONSULT  OCCUPATIONAL THERAPY ADULT IP CONSULT  PHARMACY IP CONSULT  PHARMACY IP CONSULT  CARDIOLOGY IP CONSULT  CARE MANAGEMENT / SOCIAL WORK IP CONSULT  WOUND OSTOMY CONTINENCE NURSE  IP CONSULT  CARDIOLOGY IP CONSULT    Time Spent on this Encounter   I, Mundo Oreilly MD, MD, personally saw the patient today and spent greater than 30 minutes discharging this patient.    Discharge Orders      Reason for your hospital stay    NSTEMI, Afib with RVR     Follow-up and recommended labs and tests     Follow up with primary care provider, Angel Barrios, within 7 days     Activity    Your activity upon discharge: activity as  tolerated     Diet    Follow this diet upon discharge: Orders Placed This Encounter      2 Gram Sodium Diet     Discharge Medications   Current Discharge Medication List      START taking these medications    Details   amiodarone (PACERONE) 200 MG tablet Take 1 tablet (200 mg) by mouth daily  Qty: 30 tablet, Refills: 0    Associated Diagnoses: Atrial fibrillation, unspecified type (H)      aspirin (ASA) 81 MG EC tablet Take 1 tablet (81 mg) by mouth daily  Qty:      Associated Diagnoses: Atrial fibrillation, unspecified type (H)      atorvastatin (LIPITOR) 40 MG tablet Take 1 tablet (40 mg) by mouth every evening  Qty: 30 tablet, Refills: 0    Associated Diagnoses: Atrial fibrillation, unspecified type (H)      clopidogrel (PLAVIX) 75 MG tablet Take 1 tablet (75 mg) by mouth daily  Qty: 30 tablet, Refills: 0    Associated Diagnoses: Atrial fibrillation, unspecified type (H)         CONTINUE these medications which have NOT CHANGED    Details   acetaminophen (TYLENOL) 325 MG tablet Take 650 mg by mouth 4 times daily as needed for mild pain or pain Limit tylenol to 3g/24hrs.      albuterol (VENTOLIN HFA) 108 (90 Base) MCG/ACT inhaler INHALE TWO PUFFS INTO THE LUNGS EVERY 6 HOURS AS NEEDED FOR SHORTNESS OF BREATH/DYSPNEA  Qty: 54 g, Refills: 3    Comments: Pharmacy may dispense brand covered by insurance (Proair, or proventil or ventolin or generic albuterol inhaler)  Associated Diagnoses: COPD, moderate (H)      amLODIPine (NORVASC) 5 MG tablet Take 5 mg by mouth daily      calcium carbonate (TUMS) 500 MG chewable tablet Take 2 chew tab by mouth daily as needed for heartburn      camphor-menthol (ANTI-ITCH) 0.5-0.5 % external lotion Apply topically 2 times daily      docusate sodium (COLACE) 100 MG capsule Take 100 mg by mouth 2 times daily as needed for constipation **7/8/20: hold in am on 7/9 until seen by MD      escitalopram (LEXAPRO) 5 MG tablet Take 5 mg by mouth daily      furosemide (LASIX) 20 MG tablet Take 2  tablets (40 mg) by mouth daily  Qty: 180 tablet, Refills: 3    Associated Diagnoses: Hypertension goal BP (blood pressure) < 140/90; CKD (chronic kidney disease) stage 4, GFR 15-29 ml/min (H)      loperamide (IMODIUM A-D) 2 MG tablet Take 2 mg by mouth 4 times daily as needed for diarrhea      losartan (COZAAR) 50 MG tablet Take 2 tablets (100 mg) by mouth daily  Qty: 180 tablet, Refills: 3    Associated Diagnoses: Hypertension goal BP (blood pressure) < 140/90; CKD (chronic kidney disease) stage 4, GFR 15-29 ml/min (H)      pantoprazole (PROTONIX) 40 MG EC tablet Take 1 tablet (40 mg) by mouth daily Possible GI Bleed  Qty: 60 tablet, Refills: 1    Associated Diagnoses: Melanotic stools         STOP taking these medications       ASPIRIN NOT PRESCRIBED (INTENTIONAL) Comments:   Reason for Stopping:         Metoprolol Succinate 50 MG CS24 Comments:   Reason for Stopping:               Allergies   Allergies   Allergen Reactions     Prednisone      Yeast infection - swollen lips     Penicillins Rash     Data   Most Recent 3 CBC's:  Recent Labs   Lab Test 01/24/21  0725 01/23/21  0600 01/22/21  0851   WBC 11.6* 14.5* 16.9*   HGB 9.2* 8.7* 8.5*   MCV 88 89 89    294 319      Most Recent 3 BMP's:  Recent Labs   Lab Test 01/24/21  0725 01/23/21  0600 01/22/21  0851    134 134   POTASSIUM 3.4 3.7 3.8   CHLORIDE 105 107 109   CO2 23 22 20   BUN 16 20 26   CR 1.26* 1.29* 1.46*   ANIONGAP 6 5 5   DUSTY 9.5 9.5 9.5   GLC 90 88 114*     Most Recent 2 LFT's:  Recent Labs   Lab Test 01/20/21  0600 07/09/20  1008   AST 40 51*   ALT 13 174*   ALKPHOS 82 64   BILITOTAL 0.5 0.4     Most Recent INR's and Anticoagulation Dosing History:  Anticoagulation Dose History     Recent Dosing and Labs Latest Ref Rng & Units 10/1/2009 1/11/2013 5/9/2013 7/12/2016 7/2/2020 7/9/2020 1/19/2021    INR 0.86 - 1.14 1.84(H) 0.90 2.43(H) 0.93 1.12 0.94 0.91        Most Recent 3 Troponin's:  Recent Labs   Lab Test 01/21/21  0615 01/20/21  110  01/20/21  0600   TROPI 7.959* 7.188* 4.577*     Most Recent Cholesterol Panel:  Recent Labs   Lab Test 03/09/16  0810   CHOL 211*   *   HDL 54   TRIG 136     Most Recent 6 Bacteria Isolates From Any Culture (See EPIC Reports for Culture Details):  Recent Labs   Lab Test 01/19/21  1544 01/19/21  1538 07/02/20  1202 05/14/19  0731 03/19/19  0836 02/19/18  1030   CULT No growth after 5 days No growth after 5 days No growth  No growth No growth >100,000 colonies/mL  Escherichia coli  * >100,000 colonies/mL  Escherichia coli  *     Most Recent TSH, T4 and A1c Labs:  Recent Labs   Lab Test 10/29/19  0813 07/13/19  0754 02/19/18  0825 02/19/18  0825   TSH 2.65  --    < > 2.70   T4  --   --   --  1.10   A1C  --  5.5  --   --     < > = values in this interval not displayed.

## 2021-01-24 NOTE — PLAN OF CARE
Please see flowsheets for detailed vital signs and assessments.   Neuro: a/o, forgetful  Vital Signs: stable  Pain: denies  O2: mid 90s RA  Tele: ST, low 100s  Respiratory: LS WDL  GI: WDL  : voiding w/o difficu lty, incontinence  Skin: bruising, wound to RLE  CDI, wound to coccyx CDI  Activity: Assist 1, gait belt/walker  IVF: saline locked  Notable labs: K 3.7, Cr 1.29  Protocols: NA  Plan: Antibiotics, wound care, ambulation. Continue with plan of care.   Discharge: possible today

## 2021-01-25 NOTE — PLAN OF CARE
Physical Therapy Discharge Summary    Reason for therapy discharge:    Discharged to home with home therapy.    Progress towards therapy goal(s). See goals on Care Plan in [facility] electronic health record for goal details.  Goals partially met.  Barriers to achieving goals:   discharge from facility.    Therapy recommendation(s):    Continued therapy is recommended.  Rationale/Recommendations:  Recommend continued home PT to increase independence and endurance for mobility.

## 2021-01-27 PROBLEM — Z71.89 ADVANCED DIRECTIVES, COUNSELING/DISCUSSION: Status: RESOLVED | Noted: 2017-10-02 | Resolved: 2021-01-01

## 2021-01-29 NOTE — TELEPHONE ENCOUNTER
Attempt # 1  Called # [phone]    Left a non detailed VM to call back at [phone] and ask for any available Triage Nurse.    Rakan Dawn RN   [facility] [place]

## 2021-01-29 NOTE — TELEPHONE ENCOUNTER
Patient discharged from [facility] for inpatient hospital stay on 01/24/2021 for Yves (Acute Kidney Injury) (H), Atrial Fibrillation, Unspecified Type (H).    Please contact patient to follow up; no appointment scheduled at this time.    ER / IP:  1/2    Care Coordination: Not a Candidate     Edelmira WALKER

## 2021-02-01 NOTE — TELEPHONE ENCOUNTER
ED / Discharge Outreach Protocol    Patient Contact    Attempt # 2    Was call answered?  No.  Unable to leave message.      Annika Novak RN  [facility]

## 2021-02-02 NOTE — TELEPHONE ENCOUNTER
Attempt # 3  Called # [phone]     Left a non detailed VM to call back at [phone] and ask for any available Triage Nurse.    Rakan Dawn RN   [facility]

## 2021-02-17 NOTE — LETTER
Madison Hurtado RN Case Manager  Inpatient Care Coordination  [facility]   [phone]

## 2021-02-17 NOTE — LETTER
Madison Hurtado RN Case Manager  Inpatient Care Coordination  [facility]   [phone]

## 2021-02-17 NOTE — LETTER
Madison Hurtado RN Case Manager  Inpatient Care Coordination  [facility]   [phone]

## 2021-02-18 NOTE — DISCHARGE INSTRUCTIONS
Your home care will continue through [facility], adding PT  If you haven't heard from them within the next 24-48 hours,  Please call them at [phone]

## 2021-02-18 NOTE — CONSULTS
Care Management Initial Consult    General Information  Assessment completed with: Patient, Children, Care Team Member, Nurse Allison at [facility]  Type of CM/SW Visit: Initial Assessment    Primary Care Provider verified and updated as needed:     Readmission within the last 30 days: other (see comments)   Return Category: Progression of disease  Reason for Consult: care coordination/care conference, discharge planning           Communication Assessment  Patient's communication style: spoken language (English or Bilingual)    Hearing Difficulty or Deaf: no([place])   Wear Glasses or Blind: yes(glasses not w/ patient)    Cognitive  Cognitive/Neuro/Behavioral: WDL  Level of Consciousness: alert  Arousal Level: opens eyes spontaneously  Orientation: disoriented to, place  Mood/Behavior: calm, cooperative  Best Language: 0 - No aphasia  Speech: clear, spontaneous    Living Environment:   People in home:   Assisted Living    Current living Arrangements: assisted living  Name of Facility: [facility]   Able to return to prior arrangements: yes       Family/Social Support:  Care provided by: homecare agency  Provides care for: no one     Children, Facility resident(s)/Staff          Description of Support System: Supportive, Involved    Support Assessment: Adequate family and caregiver support    Current Resources:   Patient receiving home care services: Yes  Skilled Home Care Services: Skilled Nursing, Occupational Therapy  Community Resources: Home Care  Equipment currently used at home: walker, rolling  Supplies currently used at home: None      Lifestyle & Psychosocial Needs:        Socioeconomic History     Marital status:      Spouse name: thanh     Number of children: 1     Years of education: 12     Highest education level: Not on file   Occupational History     Occupation: part-time [employer] section at [employer]      Tobacco Use     Smoking status: Former Smoker     Packs/day: 3.00     Years:  50.00     Pack years: 150.00     Types: Cigarettes     Quit date: 1993     Years since quittin.1     Smokeless tobacco: Never Used     Tobacco comment: quit    Substance and Sexual Activity     Alcohol use: No     Drug use: No     Comment: no herbal meds either      Sexual activity: Not Currently     Comment:  for 24 years        Functional Status:  Prior to admission patient needed assistance:   Dependent ADLs:: Ambulation-walker  Dependent IADLs:: Medication Management, Cleaning, Cooking, Transportation  Assesssment of Functional Status: At functional baseline    Care Management Follow Up    Length of Stay (days): 0    Expected Discharge Date: 21     Concerns to be Addressed: discharge planning, care coordination/care conferences     Patient plan of care discussed at interdisciplinary rounds: Yes    Anticipated Discharge Disposition: Assisted Living     Anticipated Discharge Services: None  Anticipated Discharge DME: None    Patient/Family in Agreement with the Plan: yes    Referrals Placed by CM/SW: External Care Coordination, Transportation  Private pay costs discussed: Not applicable    Additional Information:  Per provider, patient ready for discharge today. Spoke with nursing Allison at [facility], [phone]. Updated on plan of care and discharge plans. Agreeable to discharge and return to [facility] today. Orders to be faxed to [phone]. Discharge medications to be sent to [facility] pharmacy.   Attempted to reach son, but no answer, calls dropping on cell phone. Left VM on home phone. Patient states that son is traveling back from [place].     [facility] transport scheduled for 1400 today.     Update 1100: Received call from beverley Jose M and updated on discharge plan. Agreeable to [facility] transportation and discharge plan. Jose M will speak with the patient over phone later today. No further questions or concerns at this time.  Discharge orders faxed to [facility] at  [facility].     Update 1445: Received call from Anshul at [facility] and gave update on discharge. Discharge orders faxed to [facility] at [phone].     Madison Hurtado RN      Madison Hurtado RN Case Manager  Inpatient Care Coordination  [facility]   [phone]

## 2021-02-18 NOTE — PHARMACY-ADMISSION MEDICATION HISTORY
.Admission medication history interview status for this patient is complete. See [facility] admission navigator for allergy information, prior to admission medications and immunization status.     Medication history interview done via telephone during Covid-19 pandemic, indicate source(s): none  Medication history resources (including written lists, pill bottles, clinic record):Medlist from Chriss víctor Marcos (MAR)      Changes made to PTA medication list:  Added: none  Deleted: norvasc and asa  Changed: anti-inch lotion    Actions taken by pharmacist (provider contacted, etc):None     Additional medication history information:None    Medication reconciliation/reorder completed by provider prior to medication history?  n   (Y/N)          Prior to Admission medications    Medication Sig Last Dose Taking? Auth Provider   acetaminophen (TYLENOL) 325 MG tablet Take 650 mg by mouth 4 times daily as needed for mild pain or pain Limit tylenol to 3g/24hrs.  Yes Reported, Patient   albuterol (VENTOLIN HFA) 108 (90 Base) MCG/ACT inhaler INHALE TWO PUFFS INTO THE LUNGS EVERY 6 HOURS AS NEEDED FOR SHORTNESS OF BREATH/DYSPNEA  Yes Rashi Calle MD   amiodarone (PACERONE) 200 MG tablet Amiodarone 200 mg p.o. twice daily for 3 days and then 200 mg p.o. daily 2/17/2021 at Unknown time Yes Mundo Oreilly MD   atorvastatin (LIPITOR) 40 MG tablet Take 1 tablet (40 mg) by mouth every evening 2/17/2021 at Unknown time Yes Mundo Oreilly MD   calcium carbonate (TUMS) 500 MG chewable tablet Take 2 chew tab by mouth daily as needed for heartburn  Yes Reported, Patient   camphor-menthol (DERMASARRA) 0.5-0.5 % external lotion Apply topically every morning 2/17/2021 at Unknown time Yes Unknown, Entered By History   clopidogrel (PLAVIX) 75 MG tablet Take 1 tablet (75 mg) by mouth daily 2/17/2021 at Unknown time Yes Mundo Oreilly MD   docusate sodium (COLACE) 100 MG capsule Take 100 mg by mouth 2 times daily as needed  for constipation **7/8/20: hold in am on 7/9 until seen by MD  Yes Unknown, Entered By History   escitalopram (LEXAPRO) 5 MG tablet Take 5 mg by mouth daily 2/17/2021 at Unknown time Yes Unknown, Entered By History   furosemide (LASIX) 20 MG tablet Take 2 tablets (40 mg) by mouth daily 2/17/2021 at Unknown time Yes Rashi Calle MD   loperamide (IMODIUM A-D) 2 MG tablet Take 2 mg by mouth 4 times daily as needed for diarrhea  Yes Unknown, Entered By History   losartan (COZAAR) 50 MG tablet Take 50 mg by mouth At Bedtime 2/17/2021 at Unknown time Yes Unknown, Entered By History   pantoprazole (PROTONIX) 40 MG EC tablet Take 1 tablet (40 mg) by mouth daily Possible GI Bleed 2/17/2021 at Unknown time Yes Niko Can MD

## 2021-02-18 NOTE — PLAN OF CARE
PRIMARY DIAGNOSIS: GENERALIZED WEAKNESS, DEHYDRATION    OUTPATIENT/OBSERVATION GOALS TO BE MET BEFORE DISCHARGE  1. Orthostatic performed: No    2. Tolerating PO medications: Yes    3. Return to near baseline physical activity: No    4. Cleared for discharge by consultants (if involved): No    Discharge Planner Nurse   Safe discharge environment identified: Yes  Barriers to discharge: Yes       Entered by: Joy Morales 02/17/2021 11:58 PM     Vitals are Temp: 96.1  F (35.6  C) Temp src: Oral BP: (!) 151/60 Pulse: 80   Resp: 16 SpO2: 96 %.  Patient is Alert and Oriented x4 but forgetful. They are 1 Assist with Gait Belt and Walker.  Pt is a Regular diet.  They are denying pain.  Patient has Lactated Ringer's running at 100 mL per hour. Blanchable redness and excoriation on coccyx. Will continue to monitor and provide cares.     Please review provider order for any additional goals.   Nurse to notify provider when observation goals have been met and patient is ready for discharge.

## 2021-02-18 NOTE — DISCHARGE SUMMARY
[facility]    Discharge Summary  Hospitalist    Date of Admission:  2/17/2021  Date of Discharge:  2/18/2021  2:04 PM  Discharging Provider: Allison Glover PA-C  Date of Service (when I saw the patient): 2/18/21    Discharge Diagnoses   Acute on chronic renal insufficiency  Hyponatremia, mild  Dehydration, Systemic weakness  Anemia  HTN    History of Present Illness   Anat Correa is an 86 year old female with PMhx of CKD, COPD, diverticulosis, anemia, HTN, HLD, IBS, OA, neuropathy, colonic polyps, who presented with weakness.  She had been here recently and sent to [facility]. She was sent to ER via EMS for assessment of weakness.Apparently patient was hold by the staff and slowly place her down on anticipation of a fall. After assessment in ED, [facility] HCP tried to contact the site to return her but he was told there was no personal available. He is requesting admission for observation overnight, hydration and correction of hyponatremia.   Patient herself denied any symptoms or concerns. Vitals were stable. Lab work was notable for a mild hyponatremia, renal insufficiency. Negative UA and COVID 19 viral PCR.    Please see admitting H & P  by Wil Collazo MD, on 2/17/2021 for full details of the encounter.     Hospital Course   Anat Correa was admitted on 2/17/2021.  The following problems were addressed during her hospitalization:    #Hyponatremia  Mild. Asymptomatic. In part chronic. Improving with rehydration and diuretic reduction. Multifactorial in the setting of hypovolemia, inadequate solute intake.    #Acute on Chronic Renal Insufficiency  Hx of CKD 3-4, solitary kidney per patient remote hx of surgical removal, per documentation previously had seen Dr. Gauthier.   NENA improving and suspected to be multifactorial secondary to prerenal and medications.    #Dehydration  Symptomatic with systemic weakness PTA. Improved to baseline reported functional status.   Hx of  "falls. Home PT added to [facility] orders.        Pending Results   None.    Code Status   DNR / DNI       Primary Care Physician   Angel Barrios      INTERVAL HISTORY  Resumed care of patient this morning. No pain or weakness. Ambulating at baseline with walker and staff supervision. No lightheadedness or dizziness.  Tolerating normal diet. No abdominal pain, nausea, vomiting, dysuria. Afebrile.    Redirectable. During interview asks repeated questions, \"when are you going to send me upstairs?\".   Eager to return home.     /57 (BP Location: Right arm)   Pulse 73   Temp 97.2  F (36.2  C) (Oral)   Resp 16   Ht 1.651 m (5' 5\")   Wt 60.2 kg (132 lb 11.2 oz)   SpO2 95%   BMI 22.08 kg/m      Constitutional: Awake, alert, no apparent distress  Respiratory:  Normal work of breathing. Lungs clear to auscultation bilaterally, no crackles or wheezing.  Cardiovascular: Regular rate and rhythm, normal S1 and S2, and no murmur appreciated.   GI: Bowel sounds present. soft, non-distended, non-tender.   Skin/Integument: Warm, dry. no peripheral edema.  Neuro: No focal deficits. Answers majority of questions appropriately. Follows commands. Alert and oriented to time, self, situation.  Moving all extremities. Coordination and sensation grossly intact. Speech clear.   Psych: Appropriate affect.        Discharge Disposition   Discharged to assisted living left voicemail for patient's son, Jose M, no return phone call prior to discharge regarding updates.  Social work contacted facility regarding return  Condition at discharge: Good    Consultations This Hospital Stay   CARE MANAGEMENT / SOCIAL WORK IP CONSULT  PHYSICAL THERAPY ADULT IP CONSULT  OCCUPATIONAL THERAPY ADULT IP CONSULT    Time Spent on this Encounter   IAllison PA-C, personally saw the patient today and spent greater than 30 minutes discharging this patient.    Discharge Orders      Home Care PT Referral for Hospital Discharge      Reason for your hospital " stay    You were admitted to observation after being unable to return to assisted living following an episode where you were lowered to the ground by assisted living facility staff.  Your lab work noted signs of dehydration with a low sodium and reduction in renal function which improved after IV fluids and hold of Lasix medication.     Follow-up and recommended labs and tests     1. Follow up with primary care provider, Angel Barrios, within 7 days to evaluate medication change and for hospital follow- up.  The following labs/tests are recommended: BMP (sodium and renal function recheck within 3 days).    2. Consider referral from PCP back to Nephrology (Dr. Gauthier)  3. Hold lasix and losartan until BP recheck and repeat renal panel  4.  Ensure wearing proper footwear with ambulation and transfers, walker use     Activity    Your activity upon discharge: activity as tolerated with walker and proper footwear     When to contact your care team    Call your primary care doctor if you have any of the following: temperature greater than 101 F, worsening shortness of breath, increased swelling, worsening pain, new or unrelenting diarrhea, or any other concerning symptoms. Call 911 or go to the emergency room if you need immediate assistance.     MD face to face encounter    Documentation of Face to Face and Certification for Home Health Services    I certify that patient: Anat Correa is under my care and that I, or a nurse practitioner or physician's assistant working with me, had a face-to-face encounter that meets the physician face-to-face encounter requirements with this patient on: 2/18/2021.    This encounter with the patient was in whole, or in part, for the following medical condition, which is the primary reason for home health care: falls, physical deconditioning.    I certify that, based on my findings, the following services are medically necessary home health services: Physical Therapy.    My clinical  findings support the need for the above services because: Physical Therapy Services are needed to assess and treat the following functional impairments: falls, lower extremity weakness, prior amputation of toes.    Further, I certify that my clinical findings support that this patient is homebound (i.e. absences from home require considerable and taxing effort and are for medical reasons or J[employer] services or infrequently or of short duration when for other reasons) because: Requires assistance of another person or specialized equipment to access medical services because patient: Requires supervision of another for safe transfer...    Based on the above findings. I certify that this patient is confined to the home and needs intermittent skilled nursing care, physical therapy and/or speech therapy.  The patient is under my care, and I have initiated the establishment of the plan of care.  This patient will be followed by a physician who will periodically review the plan of care.  Physician/Provider to provide follow up care: Angel Barrios    Attending hospital physician (the Medicare certified PEC provider): Allison Glover PA-C  Physician Signature: See electronic signature associated with these discharge orders.  Date: 2/18/2021     Diet    Follow this diet upon discharge: Regular     Discharge Medications   Current Discharge Medication List      CONTINUE these medications which have CHANGED    Details   amiodarone (PACERONE) 200 MG tablet Take 200 mg by mouth daily for rate control with atrial fibrillation.  Qty: 36 tablet, Refills: 0    Associated Diagnoses: Atrial fibrillation, unspecified type (H)      furosemide (LASIX) 20 MG tablet Take 2 tablets (40 mg) by mouth daily Hold this medication until further direction from primary care doctor.  Qty: 180 tablet, Refills: 3    Associated Diagnoses: Hypertension goal BP (blood pressure) < 140/90; CKD (chronic kidney disease) stage 4, GFR 15-29 ml/min (H)       loperamide (IMODIUM A-D) 2 MG tablet Take 1 tablet (2 mg) by mouth 4 times daily as needed for diarrhea Hold for constipation    Associated Diagnoses: Dehydration      losartan (COZAAR) 50 MG tablet Take 1 tablet (50 mg) by mouth At Bedtime Hold until follow up with PCP to resume after repeat kidney function test    Associated Diagnoses: Hyponatremia         CONTINUE these medications which have NOT CHANGED    Details   acetaminophen (TYLENOL) 325 MG tablet Take 650 mg by mouth 4 times daily as needed for mild pain or pain Limit tylenol to 3g/24hrs.      albuterol (VENTOLIN HFA) 108 (90 Base) MCG/ACT inhaler INHALE TWO PUFFS INTO THE LUNGS EVERY 6 HOURS AS NEEDED FOR SHORTNESS OF BREATH/DYSPNEA  Qty: 54 g, Refills: 3    Comments: Pharmacy may dispense brand covered by insurance (Proair, or proventil or ventolin or generic albuterol inhaler)  Associated Diagnoses: COPD, moderate (H)      atorvastatin (LIPITOR) 40 MG tablet Take 1 tablet (40 mg) by mouth every evening  Qty: 30 tablet, Refills: 0    Associated Diagnoses: Atrial fibrillation, unspecified type (H)      calcium carbonate (TUMS) 500 MG chewable tablet Take 2 chew tab by mouth daily as needed for heartburn      camphor-menthol (DERMASARRA) 0.5-0.5 % external lotion Apply topically every morning      clopidogrel (PLAVIX) 75 MG tablet Take 1 tablet (75 mg) by mouth daily  Qty: 30 tablet, Refills: 0    Associated Diagnoses: Atrial fibrillation, unspecified type (H)      docusate sodium (COLACE) 100 MG capsule Take 100 mg by mouth 2 times daily as needed for constipation **7/8/20: hold in am on 7/9 until seen by MD      escitalopram (LEXAPRO) 5 MG tablet Take 5 mg by mouth daily      pantoprazole (PROTONIX) 40 MG EC tablet Take 1 tablet (40 mg) by mouth daily Possible GI Bleed  Qty: 60 tablet, Refills: 1    Associated Diagnoses: Melanotic stools           Allergies   Allergies   Allergen Reactions     Prednisone      Yeast infection - swollen lips      Penicillins Rash     Data   Most Recent 3 CBC's:  Recent Labs   Lab Test 02/18/21  0655 02/17/21  1837 01/24/21  0725   WBC 7.5 9.2 11.6*   HGB 9.0* 9.3* 9.2*   MCV 88 87 88    321 309      Most Recent 3 BMP's:  Recent Labs   Lab Test 02/18/21  0655 02/17/21  1837 01/24/21  0725   * 130* 134   POTASSIUM 4.2 4.2 3.4   CHLORIDE 101 100 105   CO2 24 23 23   BUN 29 35* 16   CR 1.88* 2.23* 1.26*   ANIONGAP 6 7 6   DUSTY 9.1 8.7 9.5   GLC 83 106* 90     Most Recent 2 LFT's:  Recent Labs   Lab Test 01/20/21  0600 07/09/20  1008   AST 40 51*   ALT 13 174*   ALKPHOS 82 64   BILITOTAL 0.5 0.4     Most Recent INR's and Anticoagulation Dosing History:  Anticoagulation Dose History     Recent Dosing and Labs Latest Ref Rng & Units 10/1/2009 1/11/2013 5/9/2013 7/12/2016 7/2/2020 7/9/2020 1/19/2021    INR 0.86 - 1.14 1.84(H) 0.90 2.43(H) 0.93 1.12 0.94 0.91        Most Recent 3 Troponin's:  Recent Labs   Lab Test 01/21/21  0615 01/20/21  1101 01/20/21  0600   TROPI 7.959* 7.188* 4.577*     Most Recent Cholesterol Panel:  Recent Labs   Lab Test 03/09/16  0810   CHOL 211*   *   HDL 54   TRIG 136     Most Recent 6 Bacteria Isolates From Any Culture (See EPIC Reports for Culture Details):  Recent Labs   Lab Test 01/19/21  1544 01/19/21  1538 07/02/20  1202 05/14/19  0731 03/19/19  0836 02/19/18  1030   CULT No growth No growth No growth  No growth No growth >100,000 colonies/mL  Escherichia coli  * >100,000 colonies/mL  Escherichia coli  *     Most Recent TSH, T4 and A1c Labs:  Recent Labs   Lab Test 10/29/19  0813 07/13/19  0754 02/19/18 0825 02/19/18 0825   TSH 2.65  --    < > 2.70   T4  --   --   --  1.10   A1C  --  5.5  --   --     < > = values in this interval not displayed.       Allison Glover PA-C  [facility]

## 2021-02-18 NOTE — ED NOTES
"[facility]  ED Nurse Handoff Report    Anat Correa is a 86 year old female   ED Chief complaint: Generalized Weakness  . ED Diagnosis:   Final diagnoses:   Generalized muscle weakness   Hyponatremia   NENA (acute kidney injury) (H)   Dehydration     Allergies:   Allergies   Allergen Reactions     Prednisone      Yeast infection - swollen lips     Penicillins Rash       Code Status: DNR / DNI  Activity level - Baseline/Home:  Assist X 1. Activity Level - Current:   Total Care. Lift room needed: No. Bariatric: No   Needed: No   Isolation: No. Infection: Not Applicable.     Vital Signs:   Vitals:    02/17/21 2030 02/17/21 2050 02/17/21 2100 02/17/21 2120   BP: (!) 151/69 (!) 149/72 (!) 157/68 (!) 152/70   Pulse: 70  70    Resp:       Temp:       TempSrc:       SpO2: 98% 97% 97% 97%       Cardiac Rhythm:  ,      Pain level:    Patient confused: Yes. Patient Falls Risk: Yes.   Elimination Status: Has voided , used straight cath for urine  Patient Report - Initial Complaint: Weakness. Focused Assessment: Arrives via medic from [facility] for \"fall\". Patient was ambulating in home when she \"fell back\" into the arms of a worker, who slowly and safely lowered her to the ground. Staff check VS and found her BP to be 80s/40s, but per EMS Pt has was 110s systolic on their arrival  (which she has maintained since coming tot his ED). Pt was hospitalized here for multiple falls last month, and this prompted the staff there to send her to this ED. Pt is alert and somewhat cooperative with interventions. Difficult to determine orientation- she is aware of situation, where she is and where she came from, but thinks that she was here a week ago (it was a month ago) and repeatedly asks the same questions over and over again. No documented hx dementia, but [facility] is a memory care center. She apparently is ambulatory in the assisted living, but here is content to stay in " bed and has not attempted to ambulate unsafely- I do assist her to use a bedside commode 1x which is successful. While apologetic and nice, pt uses the call bell often and needs frequent reassurance. Her labs show a new NENA (her creatinine has doubled), dehydration and new weakness from baseline per staff. I note diffuse skin excoriation from scratching, and a small wound to her inner RLE that is covered and cared for.  Tests Performed:   Labs Ordered and Resulted from Time of ED Arrival Up to the Time of Departure from the ED   CBC WITH PLATELETS - Abnormal; Notable for the following components:       Result Value    RBC Count 3.35 (*)     Hemoglobin 9.3 (*)     Hematocrit 29.1 (*)     All other components within normal limits   BASIC METABOLIC PANEL - Abnormal; Notable for the following components:    Sodium 130 (*)     Glucose 106 (*)     Urea Nitrogen 35 (*)     Creatinine 2.23 (*)     GFR Estimate 19 (*)     GFR Estimate If Black 23 (*)     All other components within normal limits   ROUTINE UA WITH MICROSCOPIC REFLEX TO CULTURE - Abnormal; Notable for the following components:    Mucous Urine Present (*)     Hyaline Casts 6 (*)     All other components within normal limits   SARS-COV-2 (COVID-19) VIRUS RT-PCR   MAY SALINE LOCK IV   . Abnormal Results: see above.   Treatments provided: Fluids   Family Comments: none  OBS brochure/video discussed/provided to patient:  Yes  ED Medications:   Medications   0.9% sodium chloride BOLUS (0 mLs Intravenous Stopped 2/17/21 2036)   0.9% sodium chloride BOLUS (0 mLs Intravenous Stopped 2/17/21 2111)     Drips infusing:  No  For the majority of the shift, the patient's behavior Green. Interventions performed were none.    Sepsis treatment initiated: No     Patient tested for COVID 19 prior to admission: YES    ED Nurse Name/Phone Number: Betzaida Portillo RN,   9:42 PM      RECEIVING UNIT ED HANDOFF REVIEW    Above ED Nurse Handoff Report was reviewed: Yes  Reviewed by:  Gwendolyn Lovett RN on February 17, 2021 at 10:13 PM

## 2021-02-18 NOTE — PLAN OF CARE
OT: Order received. Per MD in IDT rounds, pt near baseline level of functioning, plan to return to her care facility with resumed assist. No skilled IP OT needs. OT order completed.

## 2021-02-18 NOTE — PLAN OF CARE
Patient's After Visit Summary was reviewed with patient.   Patient verbalized understanding of After Visit Summary, recommended follow up and was given an opportunity to ask questions.   Discharge medications sent home with patient/family: No   Discharged with other: [facility] WC transport

## 2021-02-18 NOTE — ED TRIAGE NOTES
Pt arrives via EMS from [facility] d/t generalized weakness. Pt denies pain, SOB, cough, fever, n/v/d. Pt A&O x4. Per EMS, staff's needed to lower pt to the ground with her leg weakness today. No actual falls or injuries. Pt recently had medication changes. ABC intact. A&O x4. Staff got BPs 80s SBP. EMS got 110s SBP. Then medics got 86/40s. Here in ED, SBP 110s. Pt denies lightheadedness/dizziness.

## 2021-02-18 NOTE — PROGRESS NOTES
[facility]  Patient is currently open to home care services with [facility]. The patient is currently receiving RN and OT services.  [facility]  and team have been notified of patient admission.  [facility] liaison will continue to follow patient during stay.

## 2021-02-18 NOTE — H&P
"[facility]    History and Physical  Hospitalist     Date of Admission:  2/17/2021  Date of Service (when I saw the patient): 02/17/21  Provider: Wil Collazo MD      Chief Complaint   Weakness     History is obtained from the patient, electronic health record and emergency department physician    History of Present Illness   Anat Correa is a 86 year old female who has a complex past medical history as listed below presents with weakness. She had been here recently and sent to [facility]. She was sent to ER via EMS for assessment of weakness.Apparently patient was hold by the staff and slowly place her down on anticipation of a fall. After assessment in ED, [facility] HCP tried to contact the site to return her but he was told there was no personal available. He is requesting admission for observation overnight, hydration and correction of hyponatremia. Patient has denied symptoms and she does not understand why she is back in the hospital.  She is telling me that she is \"strong as an ox\", with excellent appetite.  She denies any chest pain, shortness of breath, fever, nausea or vomiting/diarrhea, urinary symptoms.  Denies lightheadedness or muscle weakness, numbness, vision abnormality or speech difficulties.  She is hungry and asking for food.    Vital signs:  Temp: 97.6  F (36.4  C) Temp src: Temporal BP: (Abnormal) 152/70 Pulse: 70   Resp: 16 SpO2: 97 %          Estimated body mass index is 23.16 kg/m  as calculated from the following:    Height as of 1/19/21: 1.651 m (5' 5\").    Weight as of 1/24/21: 63.1 kg (139 lb 3.2 oz).    Lab work up:  CBC show hgb 9.3 of chronic anemia, unchanged  Chemistry with Na 130, and Cr 2.23, GFR 19.  Glycemia 106   UA is negative  Covid 19 pcr negative       Past Medical History    I have reviewed this patient's medical history and updated it with pertinent information if needed.   Past Medical History:   Diagnosis Date     Anemia      Calculus " of kidney 1998    dr hope     Chronic pain     OA     CKD (chronic kidney disease) stage 4, GFR 15-29 ml/min (H) 2008    dr mcdermott     COPD, moderate (H) 2004    moderate obstruction, with pulm htn - dr francisco did AAP     Diverticulosis of colon (without mention of hemorrhage) 7/03     Hemorrhage of gastrointestinal tract, unspecified 4/08    dr su     Hyperlipidemia LDL goal <100 2006     Hypertension goal BP (blood pressure) < 140/90 2005     Internal hemorrhoids without mention of complication 7/03     Irritable bowel syndrome 7/03     Lesion of plantar nerve 8/98    hay's neuroma dr connor     Lung nodule 4/14, 3/16    Dr Thompson, 1.4 x 1.1 cm, lingula, PET neg 3/16     Malignant neoplasm of upper lobe of left lung (H) 7/16    Dr Thompson - x 2 nodules - moderately differentiated adenocarcinoma (acinar predominant).  Final pathologic stage [id number], Final clinical stage IA, grade 2     Medication management     OA - 1 T#3 at HS with HX CKD     OA (osteoarthritis) 1998    lower ext worse katya knees     Obesity (BMI 30.0-34.9)      Osteoporosis, unspecified 2/02    FOSAMAX  = upset stomach , pt declines further rx.      Other ulcerative colitis 7/03    collangenous collitis- last colonoscopy 7/03      Peripheral neuropathy     early - burning at HS     Personal history of colonic polyps 7/98    dr araujo     PONJERE (postoperative nausea and vomiting)      Primary iridocyclitis 1998    iritis dr dominguez     Venous stasis of lower extremity             Assessment & Plan   Anat Correa is a 86 year old female who presents for assessment of weakness. She has been found with hyponatremia and acuter renal injury.     1. NENA. Suspect pre-renal, perhaps poor oral intake.Check FeNA and osm. Hydrate overnight.  Hold losartan and furosemide, NSAID and nephrotoxins .  Follow up in AM.   2. Hyponatremia. On Lasix, suspect med induced in combination with #1.  Hold on Lasix.  3. Weakness. Suspect dehydration, lytes  imbalance and decondition.Assessment by PT.  4.  History of fall, she has been seen here for the same reason.  -Physical therapy assessment and treatment.  5.  Essential hypertension.  -Amlodipine.  Losartan.  Furosemide  6.  Hyperlipidemia.  -Atorvastatin.  7.  Atrial fibrillation with CVR.  At the moment of my exam she has a regular rate and rhythm.  Moderate.   -On amiodarone, aspirin and Plavix.  8.  History of chronic anemia and gastrointestinal bleeding.  -Hemoglobin is stable.  9.  History of abnormal chest x-ray with calcified granuloma in the lower left lung and nodular opacities in right upper lobe and right lung base.  10. Observation  - ADAT.  - Resume home after reconciliation except for Lasix and Losartan .      Code Status   DNR / DNI    Primary Care Physician   Angel Barrios      Past Surgical History   I have reviewed this patient's surgical history and updated it with pertinent information if needed.  Past Surgical History:   Procedure Laterality Date     AMPUTATE TOE(S) Right 2015    Procedure: AMPUTATE TOE(S);  Surgeon: Ashia White, DPM, Pod;  Location: [facility] OR     C APPENDECTOMY       C  DELIVERY ONLY      , Low Cervical     ESOPHAGOSCOPY, GASTROSCOPY, DUODENOSCOPY (EGD), COMBINED N/A 7/10/2020    Procedure: ESOPHAGOGASTRODUODENOSCOPY, WITH BIOPSY with stomach biopsies by jumbo forceps;  Surgeon: Harry Winter MD;  Location: [facility] GI     EXCISE MASS UPPER EXTREMITY  10/13/2011    Lt Forearm mass excision - dr ely     EXCISE MASS UPPER EXTREMITY  2012    Lt Forearm mass excision - dr ely     HC COLONOSCOPY THRU STOMA, DIAGNOSTIC      IBS/diverticula/hemmorhoids dr araujo     HC ESOPHAGOSCOPY, DIAGNOSTIC  , , 6/10    Gastric ulcer, healed, gastritis     HC HEMORRHOIDECTOMY,INT/EXT,SIMPLE       HC REPAIR SLIDING INGUINAL HERNIA      mitra     SURGICAL HISTORY OF -       mitra neck & back tumors     SURGICAL HISTORY OF -        neuroma excision - 2nd toe amputation     SURGICAL HISTORY OF -   3/07    Rt IOL - dr jimenez     SURGICAL HISTORY OF -   4/07    Lt IOL - dr jimenez     SURGICAL HISTORY OF -   9/04    Lt Knee replacement - dr gayle     SURGICAL HISTORY OF -   9/09    Rt Knee replacement - dr gayle     SURGICAL HISTORY OF -   9/15    Rt Second toe amputation - Dr White     THORACOSCOPIC WEDGE RESECTION LUNG Left 7/12/2016    Procedure: THORACOSCOPIC WEDGE RESECTION LUNG;  Surgeon: Abdelrahman Thompson MD;  Location: [facility] OR     XR JOINT INJECTION HIP (HIB)  2/2015    Rt - Dr Terry       Prior to Admission Medications   Prior to Admission Medications   Prescriptions Last Dose Informant Patient Reported? Taking?   acetaminophen (TYLENOL) 325 MG tablet   Yes No   Sig: Take 650 mg by mouth 4 times daily as needed for mild pain or pain Limit tylenol to 3g/24hrs.   albuterol (VENTOLIN HFA) 108 (90 Base) MCG/ACT inhaler  Son No No   Sig: INHALE TWO PUFFS INTO THE LUNGS EVERY 6 HOURS AS NEEDED FOR SHORTNESS OF BREATH/DYSPNEA   amLODIPine (NORVASC) 5 MG tablet   Yes No   Sig: Take 5 mg by mouth daily   amiodarone (PACERONE) 200 MG tablet   No No   Sig: Amiodarone 200 mg p.o. twice daily for 3 days and then 200 mg p.o. daily   aspirin (ASA) 81 MG EC tablet   No No   Sig: Take 1 tablet (81 mg) by mouth daily   atorvastatin (LIPITOR) 40 MG tablet   No No   Sig: Take 1 tablet (40 mg) by mouth every evening   calcium carbonate (TUMS) 500 MG chewable tablet  Son Yes No   Sig: Take 2 chew tab by mouth daily as needed for heartburn   camphor-menthol (ANTI-ITCH) 0.5-0.5 % external lotion   Yes No   Sig: Apply topically 2 times daily   clopidogrel (PLAVIX) 75 MG tablet   No No   Sig: Take 1 tablet (75 mg) by mouth daily   docusate sodium (COLACE) 100 MG capsule  Son Yes No   Sig: Take 100 mg by mouth 2 times daily as needed for constipation **7/8/20: hold in am on 7/9 until seen by MD   escitalopram (LEXAPRO) 5 MG tablet   Yes No    Sig: Take 5 mg by mouth daily   furosemide (LASIX) 20 MG tablet  Son No No   Sig: Take 2 tablets (40 mg) by mouth daily   loperamide (IMODIUM A-D) 2 MG tablet  Son Yes No   Sig: Take 2 mg by mouth 4 times daily as needed for diarrhea   losartan (COZAAR) 50 MG tablet  Son No No   Sig: Take 2 tablets (100 mg) by mouth daily   pantoprazole (PROTONIX) 40 MG EC tablet   No No   Sig: Take 1 tablet (40 mg) by mouth daily Possible GI Bleed      Facility-Administered Medications: None     Allergies   Allergies   Allergen Reactions     Prednisone      Yeast infection - swollen lips     Penicillins Rash       Social History   I have personally reviewed the social history with the patient showing.  Social History     Tobacco Use     Smoking status: Former Smoker     Packs/day: 3.00     Years: 50.00     Pack years: 150.00     Types: Cigarettes     Quit date: 1993     Years since quittin.1     Smokeless tobacco: Never Used     Tobacco comment: quit    Substance Use Topics     Alcohol use: No     Family History   I have reviewed this patient's family history and it is not contributory to the admission .       Review of Systems   Except for positive as noted in the HPI, a 12-system Review of Systems was found to be negative.       Physical Exam   Vital Signs with Ranges  Temp:  [97.6  F (36.4  C)] 97.6  F (36.4  C)  Pulse:  [66-74] 70  Resp:  [16] 16  BP: (110-157)/(54-72) 157/68  SpO2:  [97 %-99 %] 97 %  0 lbs 0 oz    GEN:  Alert, oriented x 3, appears comfortable, NAD.  HEENT:  Normocephalic/atraumatic, no scleral icterus, no nasal discharge, mouth moist.  CV:  Regular rate and rhythm, no murmur or JVD.  S1 + S2 noted, no S3 or S4.  LUNGS:  Clear to auscultation bilaterally without rales/rhonchi/wheezing/retractions.  Symmetric chest rise on inhalation noted.  ABD:  Active bowel sounds, soft, non-tender/non-distended.  No rebound/guarding/rigidity.  EXT:  No edema or cyanosis.  No joint synovitis noted.  SKIN:   Dry to touch, no exanthems noted in the visualized areas.       Data   I personally reviewed     Results for orders placed or performed during the hospital encounter of 02/17/21 (from the past 24 hour(s))   CBC (platelets, no diff)   Result Value Ref Range    WBC 9.2 4.0 - 11.0 10e9/L    RBC Count 3.35 (L) 3.8 - 5.2 10e12/L    Hemoglobin 9.3 (L) 11.7 - 15.7 g/dL    Hematocrit 29.1 (L) 35.0 - 47.0 %    MCV 87 78 - 100 fl    MCH 27.8 26.5 - 33.0 pg    MCHC 32.0 31.5 - 36.5 g/dL    RDW 15.0 10.0 - 15.0 %    Platelet Count 321 150 - 450 10e9/L   Basic metabolic panel   Result Value Ref Range    Sodium 130 (L) 133 - 144 mmol/L    Potassium 4.2 3.4 - 5.3 mmol/L    Chloride 100 94 - 109 mmol/L    Carbon Dioxide 23 20 - 32 mmol/L    Anion Gap 7 3 - 14 mmol/L    Glucose 106 (H) 70 - 99 mg/dL    Urea Nitrogen 35 (H) 7 - 30 mg/dL    Creatinine 2.23 (H) 0.52 - 1.04 mg/dL    GFR Estimate 19 (L) >60 mL/min/[1.73_m2]    GFR Estimate If Black 23 (L) >60 mL/min/[1.73_m2]    Calcium 8.7 8.5 - 10.1 mg/dL   UA with Microscopic reflex to Culture    Specimen: Catheterized Urine   Result Value Ref Range    Color Urine Light Yellow     Appearance Urine Clear     Glucose Urine Negative NEG^Negative mg/dL    Bilirubin Urine Negative NEG^Negative    Ketones Urine Negative NEG^Negative mg/dL    Specific Gravity Urine 1.010 1.003 - 1.035    Blood Urine Negative NEG^Negative    pH Urine 6.5 5.0 - 7.0 pH    Protein Albumin Urine Negative NEG^Negative mg/dL    Urobilinogen mg/dL Normal 0.0 - 2.0 mg/dL    Nitrite Urine Negative NEG^Negative    Leukocyte Esterase Urine Negative NEG^Negative    Source Catheterized Urine     WBC Urine 1 0 - 5 /HPF    RBC Urine 1 0 - 2 /HPF    Mucous Urine Present (A) NEG^Negative /LPF    Hyaline Casts 6 (H) 0 - 2 /LPF       Disclaimer: This note consists of symbols derived from keyboarding, dictation and/or voice recognition software. As a result, there may be errors in the script that have gone undetected. Please  consider this when interpreting information found in this chart.

## 2021-02-18 NOTE — PLAN OF CARE
ROOM # 212-2    Living Situation (if not independent, order SW consult):  [facility]  Facility name:  : [facility]    Activity level at baseline: Ax1 w/ walker & gait belt  Activity level on admit: SBA w/ walker      Patient registered to observation; given Patient Bill of Rights; given the opportunity to ask questions about observation status and their plan of care.  Patient has been oriented to the observation room, bathroom and call light is in place.    Discussed discharge goals and expectations with patient/family.

## 2021-02-18 NOTE — ED PROVIDER NOTES
History   Chief Complaint:  Generalized Weakness      HPI   Anat Correa is a 86 year old female, with a history of COPD and hypertension, who presents to the ED for evaluation of generalized weakness. The patient reports leg weakness causing her to almost fall, however, staff at her nursing home caught her before she fell. Patient recently changed her medications. Denies any pain.     Review of Systems   Constitutional: Negative for chills and fever.   Respiratory: Negative for shortness of breath.    Cardiovascular: Negative for chest pain.   Gastrointestinal: Negative for abdominal pain.   Musculoskeletal: Negative for back pain and neck pain.   Neurological: Positive for weakness.   All other systems reviewed and are negative.    Allergies:  Prednisone  Penicillins    Medications:    Lexapro  Norvasc  Protonix  Cozaar  Lasix  Plavix  Lipitor  Aspirin  Pacerone  Albuterol    Past Medical History:    Anemia  Hyperlipidemia  OA  Hypertension  COPD  Osteoporosis  Primary iridocyclitis  CKD Stage IV  Secondary renal hyperparathyroidism  Venous stasis - lower extremity  Peripheral neuropathy  Lung nodule  Malignant neoplasm of upper lobe - left lung  Retinal hemorrhage - right eye  Rhabdomyolysis  Debility  Acute kidney injury  Hypercalcemia  Leukocytosis  Hyponatremia  GI hemorrhage  Acute cystitis  Hypotension  Colonic polyps  Calculus of kidney  Plantar nerve lesion  IBS  Internal hemorrhoids  Diverticulitis    Past Surgical History:    Hemorrhoidectomy - INT/EXT   Appendectomy   section  Inguinal hernia repair  Bilateral neck and back tumors  Neuroma excision - 2nd toe amputation  Right IOL  Left IOL  Left knee replacement  Right knee replacement  Upper extremity mass excision x2  Thoracoscopic wedge resection - lung    Family History:    CVD  Colorectal cancer  Breast cancer  Lung cancer  Scleroderma    Social History:  Marital Status:   Lives in assisted living  PCP: Angel Barrios  Presents  to the ED with EMS    Physical Exam     Patient Vitals for the past 24 hrs:   BP Temp Temp src Pulse Resp SpO2   02/17/21 2100 (!) 157/68 -- -- 70 -- 97 %   02/17/21 2050 (!) 149/72 -- -- -- -- 97 %   02/17/21 2030 (!) 151/69 -- -- 70 -- 98 %   02/17/21 2020 (!) 141/63 -- -- -- -- 97 %   02/17/21 2010 -- -- -- -- -- 98 %   02/17/21 2009 -- 97.6  F (36.4  C) Temporal -- -- --   02/17/21 2000 (!) 152/69 -- -- 74 -- 98 %   02/17/21 1950 (!) 146/66 -- -- -- -- 97 %   02/17/21 1915 139/62 -- -- 66 -- 99 %   02/17/21 1910 -- -- -- -- -- 98 %   02/17/21 1900 121/57 -- -- 67 -- 98 %   02/17/21 1850 123/54 -- -- -- -- 99 %   02/17/21 1845 123/54 -- -- 70 -- 98 %   02/17/21 1831 110/55 -- Oral 74 16 98 %   02/17/21 1830 110/55 -- -- 73 -- 97 %       Physical Exam  General: Alert, No obvious discomfort, well kept  Eyes: PERRL, conjunctivae pink no scleral icterus or conjunctival injection  ENT:   Moist mucus membranes, posterior oropharynx clear without erythema or exudates, No lymphadenopathy, Normal voice, no scalp hematoma, normal range of motion of neck  Resp:  Lungs clear to auscultation bilaterally, no crackles/rubs/wheezes. Good air movement  CV:  Normal rate and rhythm, no murmurs/rubs/gallops  GI:  Abdomen soft and non-distended.  Normoactive BS.  No tenderness, guarding or rebound, No masses  Skin:  Warm, dry.  Multiple areas of excoriation across chest and arms.  Patient did have an area in her forehead that looks to be a recently picked off the scab.  Otherwise chronic discoloration of lower extremities and right lower extremity has a wound that is being treated and appears to be healing well.  Musculoskeletal: No peripheral edema or calf tenderness, Normal gross ROM of all extremities.  No hip or pelvis tenderness.  Neuro: Appears to be at baseline.  Answers questions appropriately follows all commands.  Psychiatric: Normal affect, cooperative, good eye contact    Emergency Department Course    Laboratory:  Laboratory findings were communicated with the patient who voiced understanding of the findings.    CBC: WBC: 9.2, HGB: 9.3 (L), PLT: 321    BMP: Glucose 106 (H), BUN 35 (H), GFR: 19 (L), Creatinine 2.23 (H) o/w WNL    UA: Mucous: Present, Hyaline Casts: 6 (H), o/w Negative     Asymptomatic COVID-19 Virus PCR: Pending    Emergency Department Course:    Reviewed:  I reviewed the patient's nursing notes, vitals, past medical records, Care Everywhere.     Assessments:  1840 I first assessed the patient and performed an exam. Discussed plans for care.    2120 I rechecked the patient and updated them on their results. Discussed plans for admittance.    Consults:   2114 I spoke with Dr. Collazo of the hospitalist service regarding patient's presentation, findings, and plan of care.    Interventions:  1901:  mL IV Bolus     Disposition:  The patient was admitted to the hospital under the care of Dr. Collazo.       Impression & Plan    CMS Diagnosis:       Covid-19  Anat Correa was evaluated during a global COVID-19 pandemic, which necessitated consideration that the patient might be at risk for infection with the SARS-CoV-2 virus that causes COVID-19.   Applicable protocols for evaluation were followed during the patient's care.   COVID-19 was considered as part of the patient's evaluation. The plan for testing is:  a test was obtained during this visit.    Medical Decision Making:   Anat Correa is a 86 year old female who presents with weakness.  She lives in an assisted living facility and they noted her to become weak and had to lower her to the ground.  She did not have an actual fall.  However as she had fallen and was admitted a month ago they sent her in for further evaluation.  On arrival patient has no concerns or complaints and wonders why she is here.  Head to toe trauma exam showed no indication for acute trauma.  She does have several areas of excoriation marks on her chest  and hands.  Also has small area middle forehead that appears to be a picked off the scab.  There is no indication for advanced imagery as she has no complaints and did not fall to the ground or strike her head.  This was confirmed via the staff at the care facility.  She does not reside in a skilled care facility.  It took us multiple attempts to contact the facility initially to try to ascertain why she was sent here.  Upon contacting them they said that the nursing staff was leaving for the evening.  There is no skilled care providers there to accept patient.  Her evaluation did show some dehydration and acute kidney injury with a sodium of 130 and creatinine of 2.2.  She is given fluids as above.  Given her repeated falls and the fact that she is not in a skilled care facility placement might be an issue.  There is no indication for further testing or imaging at this point.  She does not have any abnormal vital signs or concerns for ACS or CVA.  I discussed the case with the hospitalist who does accept patient to his service.  She will be admitted to the observation unit.    Diagnosis:     ICD-10-CM    1. Generalized muscle weakness  M62.81    2. Hyponatremia  E87.1    3. NENA (acute kidney injury) (H)  N17.9    4. Dehydration  E86.0        Disposition:   Admitted to observation.    Scribe Disclosure:  I, Martha Hines, am serving as a scribe on 2/17/2021 at 6:40 PM to personally document services performed by Amrit Dawson NP based on my observations and the provider's statements to me.           Amrit Dawson, CAMACHO CNP  02/17/21 1708

## 2021-02-18 NOTE — PROGRESS NOTES
Pt ambulated in a room and to the bathroom assist of x1 with a walker and gelt. Pt appeared unsteady right after getting up but got their balance when start walking. Denies pain, dizziness, lightheadedness while walking.

## 2021-02-18 NOTE — PLAN OF CARE
PRIMARY DIAGNOSIS: GENERALIZED WEAKNESS/ HYDRATION     OUTPATIENT/OBSERVATION GOALS TO BE MET BEFORE DISCHARGE  1. Orthostatic performed: No    2. Tolerating PO medications: Yes    3. Return to near baseline physical activity: Yes    4. Cleared for discharge by consultants (if involved): N/A    Discharge Planner Nurse   Safe discharge environment identified: Yes  Barriers to discharge: Yes       Entered by: LILIAN VIRGEN 02/18/2021   Vital signs:  Temp: 97.2  F (36.2  C) Temp src: Oral BP: 129/57 Pulse: 73   Resp: 16 SpO2: 95 % O2 Device: None (Room air) Alert and oriented x3. Disoriented to place. Pt think they are on a different floor at the assisted living where they are residing. Oriented to place. Denies pain. IVF running at 100mL/hr. SBA to assist of x1. Per support staff who helped pt this morning to the bathroom, pt needs a little help to stand up on their feet and they can walk with SBA with a walker and gelt belt. PT/OT discontinued. Will walk pt before discharge. On regular diet. Good appetite. Will continue to monitor.     Please review provider order for any additional goals.   Nurse to notify provider when observation goals have been met and patient is ready for discharge.

## 2021-02-18 NOTE — PLAN OF CARE
PT: Per IDT rounds pt at baseline, A x 1 with mobility. Will complete orders, Defer DISCHARGE recs to MD

## 2022-06-25 NOTE — TELEPHONE ENCOUNTER
Reason for Call:  Other call back    Detailed comments: Pt calling as she has been having pain in her R shoulder that goes down into her arm and hand.  She did mention this to MD when she was last seen, but it is becoming hard to do simple tasks due to the pain.  Asking if we can order imaging for her or if she needs to be seen.  If we need to see her can we work her in to MD schedule.    Phone Number Patient can be reached at: Home number on file [phone] (home)    Best Time:     Can we leave a detailed message on this number? YES    Call taken on 2/15/2017 at 2:23 PM by Genesis Arteaga       ambulatory

## 2022-07-14 NOTE — PLAN OF CARE
Patient HR up to 150, A Fib.  IV Metoprolol given x2, converted to SR. 2100: converted to A Fib, , scheduled PO amiodarone given , HR down to 110-120. Patient denied chest pain. Up to BR with assist of one and walker. Confused ti time.Lexiscan done today.Continue Heparin gtt. Continue to monitor.   Topical Ketoconazole Counseling: Patient counseled that this medication may cause skin irritation or allergic reactions.  In the event of skin irritation, the patient was advised to reduce the amount of the drug applied or use it less frequently.   The patient verbalized understanding of the proper use and possible adverse effects of ketoconazole.  All of the patient's questions and concerns were addressed.

## 2022-11-11 NOTE — PROGRESS NOTES
SUBJECTIVE:                                                    Anat Correa is a 82 year old female who presents to clinic today for the following health issues:    Shoulder Pain  Patient presents to clinic today with chief complaint of right shoulder pain. Onset of symptoms was ~2-3 months ago. Describes pain on lateral right deltoid that is painful when lifting arm above head. Pain when raising arm is throughout full ROM. She reports that her PCP sent her to PT to improve shoulder strength. Reports that PT provided little improvement but no significant. Patient does continue to do at home PT exercises with little improvement of symptoms.     Hip Pain  The patient also complains of right hip pain. Hip pain has been an ongoing issue for patient. States that right hip is painful to touch. Patient states that she is not able to sleep on right side at all due to pain. Hip pain wakes patient from sleep at night. She has tried doing home exercises to improve hip pain though admits they provided little improvement so she discontinued.       Problem list and histories reviewed & adjusted, as indicated.  Additional history: as documented    Patient Active Problem List   Diagnosis     History of colonic polyps     Calculus of kidney     Lesion of plantar nerve     Osteoporosis     Primary iridocyclitis     Anemia     Hyperlipidemia LDL goal <100     OA (osteoarthritis)     Advanced directives, counseling/discussion     Hypertension goal BP (blood pressure) < 140/90     COPD, moderate     Medication management     Vitamin D deficiency     CKD (chronic kidney disease) stage 4, GFR 15-29 ml/min (H)     Anemia in chronic renal disease     Secondary renal hyperparathyroidism (H)     Venous stasis of lower extremity     Peripheral neuropathy (H)     Chronic pain     Lung nodule     Nodule of left lung     Malignant neoplasm of upper lobe of left lung (H)     Acute pain of right shoulder     Past Surgical History  WHEN WORKING OUT:  Be sure that you can do all movements and exercises without breath holding or straining.  If you can't have a conversation while doing it, don't do it!    Be careful with any new glut strengthening to avoid flaring sciatica.  Triceps kick backs, Y's and T's for upper arm strengthening.  (Biceps curls too)    Home Program:      Kegels in sitting:    Sit comfortably with feet on floor.  Squeeze and LIFT YOUR VAGINA  Hold for 5 sec without holding breath  Release for 10 sec  Repeat 10 times, 2 sets, 1-2 times per day.      Abdominal sets:   In same position, draw in your lower belly as if zipping tight pants.  Hold for 5 sec without holding breath.  Release for 10 sec.  Repeat 10 times 1 set, 1-2 times per day.       Procedure Laterality Date     Hc hemorrhoidectomy,int/ext,simple       C appendectomy       C  delivery only       , Low Cervical     Hc repair sliding inguinal hernia       mitra     Surgical history of -        mitra neck & back tumors     Hc colonoscopy thru stoma, diagnostic       IBS/diverticula/hemmorhoids dr araujo     Surgical history of -        neuroma excision - 2nd toe amputation     Surgical history of -   3/07     Rt IOL - dr jimeenz     Surgical history of -        Lt IOL - dr jimenez     Hc esophagoscopy, diagnostic  , , 6/10     Gastric ulcer, healed, gastritis     Surgical history of -        Lt Knee replacement - dr gayle     Surgical history of -        Rt Knee replacement - dr gayle     Excise mass upper extremity  10/13/2011     Lt Forearm mass excision - dr ely     Excise mass upper extremity  2012     Lt Forearm mass excision - dr ely     Xr joint injection hip (hib)  2015     Rt - Dr Terry     Surgical history of -   9/15     Rt Second toe amputation - Dr White     Amputate toe(s) Right 2015     Procedure: AMPUTATE TOE(S);  Surgeon: Ashia White DPM, Pod;  Location: [facility] OR     Thoracoscopic wedge resection lung Left 2016     Procedure: THORACOSCOPIC WEDGE RESECTION LUNG;  Surgeon: Abdelrahman Thompson MD;  Location:  OR       Social History   Substance Use Topics     Smoking status: Former Smoker     Packs/day: 3.00     Years: 50.00     Types: Cigarettes     Quit date: 1993     Smokeless tobacco: Never Used      Comment: quit      Alcohol use No     Family History   Problem Relation Age of Onset     CEREBROVASCULAR DISEASE Mother      CEREBROVASCULAR DISEASE Father      Cancer - colorectal Brother      Cancer - colorectal Brother      Breast Cancer Sister      CANCER Sister      lung     CANCER Sister      lung     CANCER Sister      lung     Scleroderma Sister           Current Outpatient Prescriptions   Medication Sig Dispense Refill     furosemide (LASIX) 20 MG tablet 40 mg in am 180 tablet 3     potassium chloride SA (K-DUR/KLOR-CON M) 10 MEQ CR tablet Take 2 tablets (20 mEq) by mouth daily 180 tablet 3     losartan (COZAAR) 50 MG tablet Take 1 tablet (50 mg) by mouth daily 30 tablet 1     amLODIPine (NORVASC) 5 MG tablet Take 1 tablet (5 mg) by mouth daily 90 tablet 3     acetaminophen-codeine (TYLENOL #3) 300-30 MG per tablet Take 1-2 tablets by mouth every 6 hours as needed for moderate pain 90 tablet 0     MAGIC MOUTHWASH, ENTER INGREDIENTS IN COMMENTS, Swish and spit 5-10 mLs in mouth every 6 hours as needed 180 mL 1     albuterol (PROAIR HFA, PROVENTIL HFA, VENTOLIN HFA) 108 (90 BASE) MCG/ACT inhaler Inhale 2 puffs into the lungs every 6 hours as needed for shortness of breath / dyspnea 3 Inhaler 3     IBUPROFEN PO Take 400 mg by mouth every 6 hours as needed for moderate pain       calcium carbonate (TUMS) 500 MG chewable tablet Take 2 chew tab by mouth daily as needed for heartburn       METOPROLOL SUCCINATE ER PO Take 100 mg by mouth daily (2 x 50mg = 100mg)       ipratropium - albuterol 0.5 mg/2.5 mg/3 mL (DUONEB) 0.5-2.5 (3) MG/3ML nebulization Take 1 vial (3 mLs) by nebulization every 6 hours as needed for shortness of breath / dyspnea or wheezing 90 vial 3     Allergies   Allergen Reactions     Prednisone      Yeast infection - swollen lips       Reviewed and updated as needed this visit by clinical staff  Tobacco  Allergies  Meds       Reviewed and updated as needed this visit by Provider         ROS:  Constitutional, HEENT, cardiovascular, pulmonary, GI, , musculoskeletal, neuro, skin, endocrine and psych systems are negative, except as otherwise noted.    This document serves as a record of the services and decisions personally performed and made by Dinorah Terrazas PA-C. It was created on her behalf by Abigail Berger, a trained medical scribe. The creation  "of this document is based the provider's statements to the medical scribe.  Abigail Berger, March 16, 2017 2:11 PM    OBJECTIVE:                                                    /70  Pulse 75  Temp 97.2  F (36.2  C) (Tympanic)  Ht 5' 1\" (1.549 m)  Wt 165 lb (74.8 kg)  SpO2 97%  BMI 31.18 kg/m2  Body mass index is 31.18 kg/(m^2).     GENERAL: healthy, alert and no distress  MS: right greater trochanter markedly tender to palpation.  Right shoulder limited ROM secondary to pain, 4/5 rotator cuff strength of supraspinatus with pain, POSITIVE elias-femi  NEURO: Normal strength and tone, mentation intact and speech normal  PSYCH: mentation appears normal, affect normal/bright    Procedure(s):  After discussing the risks, benefits and alternatives to a right subacromialcortisone injection the patient elected to proceed with the injection.  The posterior aspect of the right shoulder was prepped with a betadine solution.  Using a sterile technique and a 22 gauge needle, 4 cc's of 1% lidocaine, 4 cc's of 0.25% marcaine, and 80 mg in Kenalog were injected into the right subacromial.  The patient tolerated the procedure well and there were no immediate adverse effects.    After discussing the risks, benefits and alternatives to a right greater trochanteric bursa cortisone injection the patient elected to proceed with the injection.  The lateral aspect of the right hip was prepped with a betadine solution.  Using a sterile technique and a 22 gauge needle, 1.5 cc's of 1% lidocaine and 20 mg in Kenalog were injected into the right greater trochanteric bursa.  The patient tolerated the procedure well and there were no immediate adverse effects.        Diagnostic Test Results:  Recent Results (from the past 744 hour(s))   XR Hip Right 2-3 Views    Narrative    XR HIP RIGHT 2-3 VIEWS 3/16/2017 2:09 PM    HISTORY: Pain.    COMPARISON: None.      Impression    IMPRESSION: No evidence of acute fracture or malalignment. " Mild  degenerative changes of the right hip joint.    NYASIA CASTRO MD   XR Shoulder Right G/E 3 Views    Narrative    XR SHOULDER RT G/E 3 VW 3/16/2017 2:10 PM    HISTORY: Pain.    COMPARISON: None.      Impression    IMPRESSION: No evidence of acute fracture or malalignment. Moderate  degenerative changes of the acromioclavicular joint.    NYASIA CASTRO MD          ASSESSMENT/PLAN:                                                    Anat was seen today for shoulder pain.    Diagnoses and all orders for this visit:    Greater trochanteric bursitis of right hip, Hip pain, right, Primary osteoarthritis of right hip  Patient stable and doing well after injection. Counseled patient on at home exercises and provided handout to improve hip strength.  -     Medium Joint/Bursa injection and/or drainage (Elbow, TM, Wrist, Ankle)  -     Kenalog 20 MG  []  -     triamcinolone acetonide (KENALOG) 10 MG/ML injection; 2 mLs (20 mg) by INTRA-ARTICULAR route once for 1 dose  -     XR Hip Right 2-3 Views; Future    Acute pain of right shoulder, Impingement syndrome of right shoulder  Patient stable and doing well after injection. Counseled patient on at home exercises and provided handout to improve shoulder strength.  -     XR Shoulder Right G/E 3 Views; Future  -     Large Joint/Bursa injection and/or drainage (Shoulder, Knee)  -     Kenalog  80 MG         []  -     triamcinolone acetonide (KENALOG) 40 MG/ML injection; 2 mLs (80 mg) by INTRA-ARTICULAR route once for 1 dose      The information in this document, created by the medical scribe for me, accurately reflects the services I personally performed and the decisions made by me. I have reviewed and approved this document for accuracy prior to leaving the patient care area. Dinorah Terrazas PA-C March 16, 2017 2:11 PM    Dinorah Terrazas PA-C  [facility]

## 2022-12-18 NOTE — PLAN OF CARE
Pt alert & oriented x4, resting in bed. No acute changes noted overnight. Pain managed with scheduled narco, 1 dose of prn dilaudid, and lidocaine patch as ordered. On tele, no calls this shift. 1 BM this shift -formed stool. Stand pivot to rolling chair. Heparin for AM dose on hold as ordered. Ultrasound of bladder scheduled 12/18 in AM. Enteric/contact precautions and fall precautions maintained. Call light within reach. Bed locked, in lowest position, I-bed active. Problem: Patient Centered Care  Goal: Patient preferences are identified and integrated in the patient's plan of care  Description: Interventions:  - What would you like us to know as we care for you?  Would like to be notified first of treatment plan  - Provide timely, complete, and accurate information to patient/family  - Incorporate patient and family knowledge, values, beliefs, and cultural backgrounds into the planning and delivery of care  - Encourage patient/family to participate in care and decision-making at the level they choose  - Honor patient and family perspectives and choices  Outcome: Progressing     Problem: PAIN - ADULT  Goal: Verbalizes/displays adequate comfort level or patient's stated pain goal  Description: INTERVENTIONS:  - Encourage pt to monitor pain and request assistance  - Assess pain using appropriate pain scale  - Administer analgesics based on type and severity of pain and evaluate response  - Implement non-pharmacological measures as appropriate and evaluate response  - Consider cultural and social influences on pain and pain management  - Manage/alleviate anxiety  - Utilize distraction and/or relaxation techniques  - Monitor for opioid side effects  - Notify MD/LIP if interventions unsuccessful or patient reports new pain  - Anticipate increased pain with activity and pre-medicate as appropriate  Outcome: Progressing     Problem: RISK FOR INFECTION - ADULT  Goal: Absence of fever/infection during anticipated To Do:  End of Shift Summary  For vital signs and complete assessments, please see documentation flowsheets.     Pertinent assessments: A/O but forgetful. VSS, denies SOB. Tylenol and ice pack for right shoulder pain. Swelling and bruise to right face.Up to commode with assistance of 2 but pt unsteady. Purewick in place  Major Shift Events None  Treatment Plan:IVF, monitor CK and crea  Discharge Readiness: Medically active  Expected Discharge Date: TBD  Discharge Disposition: Transitional Care Unit  Barriers/Criteria for discharge : placement and monitor CK           neutropenic period  Description: INTERVENTIONS  - Monitor WBC  - Administer growth factors as ordered  - Implement neutropenic guidelines  Outcome: Progressing     Problem: SAFETY ADULT - FALL  Goal: Free from fall injury  Description: INTERVENTIONS:  - Assess pt frequently for physical needs  - Identify cognitive and physical deficits and behaviors that affect risk of falls.   - [place] fall precautions as indicated by assessment.  - Educate pt/family on patient safety including physical limitations  - Instruct pt to call for assistance with activity based on assessment  - Modify environment to reduce risk of injury  - Provide assistive devices as appropriate  - Consider OT/PT consult to assist with strengthening/mobility  - Encourage toileting schedule  Outcome: Progressing     Problem: DISCHARGE PLANNING  Goal: Discharge to home or other facility with appropriate resources  Description: INTERVENTIONS:  - Identify barriers to discharge w/pt and caregiver  - Include patient/family/discharge partner in discharge planning  - Arrange for needed discharge resources and transportation as appropriate  - Identify discharge learning needs (meds, wound care, etc)  - Arrange for interpreters to assist at discharge as needed  - Consider post-discharge preferences of patient/family/discharge partner  - Complete POLST form as appropriate  - Assess patient's ability to be responsible for managing their own health  - Refer to Case Management Department for coordinating discharge planning if the patient needs post-hospital services based on physician/LIP order or complex needs related to functional status, cognitive ability or social support system  Outcome: Progressing

## 2023-02-02 NOTE — TELEPHONE ENCOUNTER
[facility] noted they did not see the script that was put through today.    Re sent   Nathalie Joseph RN  [place] T[facility]    
Filled today. Alerted pt. Pt is scheduled for an upcoming appt.    Nathalie Joseph RN  [facility] [place] [facility] [place]    
Reason for Call:  Other prescription    Detailed comments: Pt called this morning and would like a refill on her albuterol inhaler. Please refill this ASAP. Thank you.    Phone Number Patient can be reached at: Home number on file [phone] (home)    Best Time:     Can we leave a detailed message on this number? YES    Call taken on 11/30/2017 at 11:18 AM by Dona Ying      
Detail Level: Zone

## 2023-03-28 NOTE — TELEPHONE ENCOUNTER
Limited use 1 per night for OA/sleep with CKD 4    Recent visit    Creatinine   Date Value Ref Range Status   11/20/2019 1.86 (H) 0.52 - 1.04 mg/dL Final     BP Readings from Last 3 Encounters:   01/30/20 138/74   10/31/19 134/63   10/29/19 (!) 154/64        n/a

## 2023-05-04 NOTE — TELEPHONE ENCOUNTER
"Jose M, son CTC, called to discuss mother/Pt. Jose M was concerned that Pt is still confused and reporting to want to go home. Jose M unsure if he should take her out of TCU. Jose M stated he could provide 24 hr care at her home. Jose M noted he is unsure of what is going on at TCU as he is unable to go in and visit or talk with staff in regards to care.     Jose M was advised of recent lab results noting Hyponatremia, which can be cause of confusion/delirium. Jose M advised that this TCU is an unfamiliar place for Pt which can also add to confusion and wanting to go home. Jose M advised she is in great care and can be monitored better than going home. Jose M advised of staff monitoring levels of NA+ and having fluid restriction to help normalize. Jose M advised that if at home Pt would not receive this type of care, along with the PT/OT support.    Jose M advised that when Pt has returned to baseline Pt could go home and continue home care with PT/OT/SN in home. Jose M advised that if still not thriving at home Hospice can be considered and this does not always mean the end. Jose M advised that Pt's can \"Graduate\" from hospice and return to normal life for many years.     Jose M did understand and agreed to keep Pt in TCU to continue to monitor. Jose M would like PCP to be informed.    Routing conversation to PCP to review.    Rakan Dawn RN   [facility] - [place] Triage    " Quality 431: Preventive Care And Screening: Unhealthy Alcohol Use - Screening: Patient not identified as an unhealthy alcohol user when screened for unhealthy alcohol use using a systematic screening method Quality 111:Pneumonia Vaccination Status For Older Adults: Pneumococcal vaccine administered on or after patient’s 60th birthday and before the end of the measurement period Detail Level: Detailed Quality 130: Documentation Of Current Medications In The Medical Record: Current Medications Documented Quality 226: Preventive Care And Screening: Tobacco Use: Screening And Cessation Intervention: Patient screened for tobacco use and is an ex/non-smoker

## 2023-05-11 NOTE — DISCHARGE INSTRUCTIONS
Your home care referral was sent to [facility]  If you haven't heard from them within the next 24-48 hours,  Please call them at [phone]      
Negative

## 2023-08-12 NOTE — PROGRESS NOTES
Continue to observe, at baseline  
Hypertension Follow-up      Outpatient blood pressures are being checked in clinic.      BP Readings from Last 6 Encounters:   10/10/17 156/74   10/02/17 163/71   09/11/17 170/80   08/29/17 160/68   07/22/17 158/66   06/12/17 160/64         Low Salt Diet: low sodium so has been monitoring fluid intake.  Signs and symptoms of low sodium reviewed today.        Amount of exercise or physical activity: Not assessed    Problems taking medications regularly: No    Medication side effects: none  Diet: water restriction due to low sodium.      Note forwarded to Dr. Calle for review.  Patient states it is okay to leave her a message if Dr. Calle has recommendations.    Cassandra Kim, BS, RN, PHN  [facility]) [phone]         
Patient notified by phone of TS recommendations.  She will follow up as scheduled on 10/18/2017.  She verbalized understanding and agrees with plan.    Cassandra Kim, BS, RN, N  [facility]) [phone]      
no concerns

## 2023-11-18 NOTE — MR AVS SNAPSHOT
"              After Visit Summary   8/14/2018    Anat Correa    MRN: [id number]           Patient Information     Date Of Birth          1935        Visit Information        Provider Department      8/14/2018 1:34 PM Rashi Calle MD [facility]        Today's Diagnoses     BP check    -  1       Follow-ups after your visit        Your next 10 appointments already scheduled     Sep 13, 2018  8:00 AM CDT   SHORT with R[facility] Calle [facility]   [facility]    [address] [place] [address] [id number] [phone]              Who to contact     If you have questions or need follow up information about today's clinic visit or your schedule please contact [facility] directly at [phone].  Normal or non-critical lab and imaging results will be communicated to you by MyChart, letter or phone within 4 business days after the clinic has received the results. If you do not hear from us within 7 days, please contact the clinic through MyChart or phone. If you have a critical or abnormal lab result, we will notify you by phone as soon as possible.  Submit refill requests through [facility] or call your pharmacy and they will forward the refill request to us. Please allow 3 business days for your refill to be completed.          Additional Information About Your Visit        MyChart Information     [facility] lets you send messages to your doctor, view your test results, renew your prescriptions, schedule appointments and more. To sign up, go to [email or web address] . Click on \"Log in\" on the left side of the screen, which will take you to the Welcome page. Then click on \"Sign up Now\" on the right side of the page.     You will be asked to enter the access code listed below, as well as some personal information. Please follow the directions to create your username and password.     Your access code is: [email or web address]  Expires: " 2018  3:15 PM     Your access code will  in 90 days. If you need help or a new code, please call your [place] clinic or [phone].        Care EveryWhere ID     This is your Care EveryWhere ID. This could be used by other organizations to access your [place] medical records  [id number]         Blood Pressure from Last 3 Encounters:   18 156/68   18 160/64   18 155/69    Weight from Last 3 Encounters:   18 158 lb (71.7 kg)   18 161 lb (73 kg)   18 164 lb (74.4 kg)              Today, you had the following     No orders found for display       Primary Care Provider Office Phone # Fax #    Rashi Calle [phone] [address] [facility] [address]        Equal Access to Services     [facility]: Hadii aad ku hadasho Soomaali, waaxda luqadaha, qaybta kaalmada adeegyada, adriana yinin hayaan christiana farias . So [facility] [phone].    ATENCIÓN: Si habla español, tiene a [place] disposición servicios gratuitos de asistencia lingüística. Llame al [phone].    We comply with applicable federal civil rights laws and [place] laws. We do not discriminate on the basis of race, color, national origin, age, disability, sex, sexual orientation, or gender identity.            Thank you!     Thank you for choosing [facility]  for your care. Our goal is always to provide you with excellent care. Hearing back from our patients is one way we can continue to improve our services. Please take a few minutes to complete the written survey that you may receive in the mail after your visit with us. Thank you!             Your Updated Medication List - Protect others around you: Learn how to safely use, store and throw away your medicines at www.disposemymeds.org.          This list is accurate as of 18  1:37 PM.  Always use your most recent med list.                   Brand Name Dispense Instructions for use Diagnosis     acetaminophen-codeine 300-30 MG per tablet    TYLENOL #3    90 tablet    Take 1-2 tablets by mouth every 6 hours as needed for moderate pain    Primary osteoarthritis involving multiple joints       albuterol 108 (90 Base) MCG/ACT inhaler    VENTOLIN HFA    54 g    INHALE TWO PUFFS INTO THE LUNGS EVERY 6 HOURS AS NEEDED FOR SHORTNESS OF BREATH/DYSPNEA    COPD, moderate (H)       amLODIPine 5 MG tablet    NORVASC    180 tablet    Take 1 tablet (5 mg) by mouth 2 times daily    Hypertension goal BP (blood pressure) < 140/90, CKD (chronic kidney disease) stage 4, GFR 15-29 ml/min (H)       ASPIRIN NOT PRESCRIBED    INTENTIONAL    0 each    Please choose reason not prescribed, below    Hypertension goal BP (blood pressure) < 140/90       calcium carbonate 500 MG chewable tablet    TUMS     Take 2 chew tab by mouth daily as needed for heartburn        cephALEXin 500 MG capsule    KEFLEX    30 capsule    Take 1 capsule (500 mg) by mouth 3 times daily    Laceration of right lower extremity, subsequent encounter, Cellulitis of right lower extremity, Chronic venous stasis dermatitis of right lower extremity       furosemide 20 MG tablet    LASIX    180 tablet    40 mg in am    Hypertension goal BP (blood pressure) < 140/90, CKD (chronic kidney disease) stage 4, GFR 15-29 ml/min (H)       IBUPROFEN PO      Take 400 mg by mouth every 6 hours as needed for moderate pain        ipratropium - albuterol 0.5 mg/2.5 mg/3 mL 0.5-2.5 (3) MG/3ML neb solution    DUONEB    270 mL    Take 1 vial (3 mLs) by nebulization every 6 hours as needed for shortness of breath / dyspnea or wheezing    COPD, moderate (H)       losartan 50 MG tablet    COZAAR    180 tablet    Take 2 tablets (100 mg) by mouth daily    Hypertension goal BP (blood pressure) < 140/90, CKD (chronic kidney disease) stage 4, GFR 15-29 ml/min (H)       metoprolol succinate 50 MG 24 hr tablet    TOPROL-XL    180 tablet    Take 1 tablet (50 mg) by mouth 2 times daily (2 x 50mg =  100mg)    Hypertension goal BP (blood pressure) < 140/90, CKD (chronic kidney disease) stage 4, GFR 15-29 ml/min (H)       metroNIDAZOLE 0.75 % topical gel    METROGEL    45 g    Apply topically daily    Rosacea       mupirocin 2 % cream    BACTROBAN    30 g    Apply topically 3 times daily    Laceration of right lower extremity, subsequent encounter, Cellulitis of right lower extremity, Chronic venous stasis dermatitis of right lower extremity          Patient with one or more new problems requiring additional work-up/treatment.

## 2023-12-31 NOTE — TELEPHONE ENCOUNTER
Please offer 3:40 on July 29th or there is a green opening on Aug 9th at 3pm    Lab results mailed to patient   24

## 2024-04-13 NOTE — PROGRESS NOTES
Anat Correa is enrolled/participating in the retail pharmacy Blood Pressure Goals Achievement Program (BPGAP).  Anat Correa was evaluated at [facility] on Karen 3, 2017 at which time her blood pressure was:    BP Readings from Last 3 Encounters:   06/03/17 144/64   06/02/17 176/74   05/27/17 118/78     Reviewed lifestyle modifications for blood pressure control and reduction: including making healthy food choices, managing weight, getting regular exercise, smoking cessation, reducing alcohol consumption, monitoring blood pressure regularly.     Anat Correa is not experiencing symptoms.    Follow-Up: BP is at goal of < 150/90 mmHg (patient 60+ years of age without diabetes).  Recommended follow-up at pharmacy in 6 months.     Recommendation to Provider: Dr. Calle changed Toprol XL dosing to bid vs qd yesterday. Patient was thrilled with today's numbers vs the last few days. She thought anxiety might have entered into her readings at the doctor's office.    Anat Correa was evaluated for enrollment into the PGEN study today.    Patient eligible for enrollment:  No  Patient interested in enrollment:  No    Completed by:   Wayne Ott [facility] [place]  [phone]      
3 = A little assistance

## 2024-11-06 NOTE — PLAN OF CARE
"  [facility]        NAME: Jade Smith is a 40 y.o. female  : 1984    MRN: [id number]  DATE: 2024  TIME: 11:28 AM    Assessment and Plan   Impacted cerumen, left ear [H61.22]  1. Impacted cerumen, left ear              Patient Instructions     Patient Instructions   Moderate amount of cerumen removed.  You can use at home debrox drops to help remove any further ear wax.  Follow up with PCP.    Chief Complaint     Chief Complaint   Patient presents with    Ear Fullness     Patient c/o left ear pain and feeling blocked that started last night.          History of Present Illness   Jade Smith presents to the clinic c/o    This is a 40-year-old female here today with complaints of left ear pain.  She states symptoms started yesterday.  She states she feels as though her ear is blocked.  She has had some intermittent dizziness.  She states she has had issues with her ear in the past.  She has needed to have wax removed in the past.  She denies any cough or congestion.  No fevers bodyaches or chills.    Ear Fullness         Review of Systems   Review of Systems      Current Medications     Long-Term Medications   Medication Sig Dispense Refill    hydrocortisone 2.5 % cream Apply topically 2 (two) times a day (Patient not taking: Reported on 2024) 40 g 2    Multiple Vitamin (multivitamin) tablet Take 1 tablet by mouth daily (Patient not taking: Reported on 2024)         Current Allergies     Allergies as of 2024    (No Known Allergies)            The following portions of the patient's history were reviewed and updated as appropriate: allergies, current medications, past family history, past medical history, past social history, past surgical history and problem list.    Objective   /83   Pulse 76   Temp 98.4 °F (36.9 °C) (Temporal)   Resp 18   Ht 5' 3\" (1.6 m)   Wt 76.8 kg (169 lb 6.4 oz)   LMP 10/23/2024 (Exact Date)   SpO2 100%   BMI 30.01 kg/m²      " PRIMARY DIAGNOSIS: GENERALIZED WEAKNESS, DEHYDRATION    OUTPATIENT/OBSERVATION GOALS TO BE MET BEFORE DISCHARGE  1. Orthostatic performed: No    2. Tolerating PO medications: Yes    3. Return to near baseline physical activity: No    4. Cleared for discharge by consultants (if involved): No    Discharge Planner Nurse   Safe discharge environment identified: Yes  Barriers to discharge: Yes       Entered by: Joy Morales 02/18/2021 4:43 AM     Vitals are Temp: 96  F (35.6  C) Temp src: Oral BP: 122/53 Pulse: 78   Resp: 16 SpO2: 94 %.  Patient is Alert and Oriented x4 but forgetful. They are 1 Assist with Gait Belt and Walker.  Pt is a Regular diet.  They are denying pain.  Patient has Lactated Ringer's running at 100 mL per hour. Blanchable redness and excoriation on coccyx. Will continue to monitor and provide cares.     Please review provider order for any additional goals.   Nurse to notify provider when observation goals have been met and patient is ready for discharge.     Physical Exam     Physical Exam  Constitutional:       Appearance: Normal appearance.   HENT:      Head: Normocephalic and atraumatic.      Right Ear: Tympanic membrane normal.      Left Ear: There is impacted cerumen.      Ears:      Comments: Left TM large amount dark yellow/brown occluded cerumen.  Post procedure a small amount remained.   Pulmonary:      Effort: Pulmonary effort is normal.   Neurological:      Mental Status: She is alert and oriented to person, place, and time.   Psychiatric:         Mood and Affect: Mood normal.         Behavior: Behavior normal.         Thought Content: Thought content normal.         Judgment: Judgment normal.           Ear cerumen removal    Date/Time: 11/6/2024 10:00 AM    Performed by: ROCK Pearce  Authorized by: ROCK Pearce  Universal Protocol:  Consent: Verbal consent obtained.    Patient location:  Clinic  Procedure details:     Location:  L ear    Procedure type: irrigation with instrumentation      Instrumentation: curette      Approach:  External    Visualization (free text):  Large amount of cerumen removed.  Post-procedure details:     Complication:  None

## (undated) DEVICE — KIT ENDO TURNOVER/PROCEDURE W/CLEAN A SCOPE LINERS 103888

## (undated) DEVICE — ENDO FORCEP BX CAPTURA JUMBO SPIKE 2.8MMX160CM G56064

## (undated) DEVICE — ENDO BITE BLOCK ADULT OLYMPUS LATEX FREE MAJ-1632

## (undated) RX ORDER — FENTANYL CITRATE 50 UG/ML
INJECTION, SOLUTION INTRAMUSCULAR; INTRAVENOUS
Status: DISPENSED
Start: 2020-07-10